# Patient Record
Sex: MALE | Race: WHITE | NOT HISPANIC OR LATINO | Employment: OTHER | URBAN - METROPOLITAN AREA
[De-identification: names, ages, dates, MRNs, and addresses within clinical notes are randomized per-mention and may not be internally consistent; named-entity substitution may affect disease eponyms.]

---

## 2023-02-21 LAB
ERYTHROCYTE DISTRIBUTION WIDTH (RATIO) BY AUTOMATED COUNT: 16 % (ref 11.5–14.5)
ERYTHROCYTE MEAN CORPUSCULAR HEMOGLOBIN CONCENTRATION (G/DL) BY AUTOMATED: 29.7 G/DL (ref 32–36)
ERYTHROCYTE MEAN CORPUSCULAR VOLUME (FL) BY AUTOMATED COUNT: 94 FL (ref 80–100)
ERYTHROCYTES (10*6/UL) IN BLOOD BY AUTOMATED COUNT: 2.93 X10E12/L (ref 4.5–5.9)
HEMATOCRIT (%) IN BLOOD BY AUTOMATED COUNT: 27.6 % (ref 41–52)
HEMOGLOBIN (G/DL) IN BLOOD: 8.2 G/DL (ref 13.5–17.5)
LEUKOCYTES (10*3/UL) IN BLOOD BY AUTOMATED COUNT: 5.6 X10E9/L (ref 4.4–11.3)
NRBC (PER 100 WBCS) BY AUTOMATED COUNT: 0.4 /100 WBC (ref 0–0)
PLATELETS (10*3/UL) IN BLOOD AUTOMATED COUNT: 174 X10E9/L (ref 150–450)

## 2023-06-07 PROBLEM — I10 HYPERTENSION: Status: ACTIVE | Noted: 2023-06-07

## 2023-06-07 PROBLEM — R06.09 DYSPNEA ON EFFORT: Status: ACTIVE | Noted: 2023-06-07

## 2023-06-07 PROBLEM — I67.1 ANEURYSM OF ANTERIOR COMMUNICATING ARTERY (HHS-HCC): Status: ACTIVE | Noted: 2023-06-07

## 2023-06-07 PROBLEM — E11.9 DIABETES MELLITUS (MULTI): Status: ACTIVE | Noted: 2023-06-07

## 2023-06-07 PROBLEM — F41.9 ANXIETY: Status: ACTIVE | Noted: 2023-06-07

## 2023-06-07 PROBLEM — K59.00 CONSTIPATION: Status: ACTIVE | Noted: 2023-06-07

## 2023-06-07 PROBLEM — R06.02 SOB (SHORTNESS OF BREATH) ON EXERTION: Status: ACTIVE | Noted: 2023-06-07

## 2023-06-07 PROBLEM — M16.10 HIP ARTHRITIS: Status: ACTIVE | Noted: 2023-06-07

## 2023-06-07 PROBLEM — M54.12 CERVICAL RADICULITIS: Status: ACTIVE | Noted: 2023-06-07

## 2023-06-07 PROBLEM — M25.511 CHRONIC RIGHT SHOULDER PAIN: Status: ACTIVE | Noted: 2023-06-07

## 2023-06-07 PROBLEM — D64.9 ANEMIA: Status: ACTIVE | Noted: 2023-06-07

## 2023-06-07 PROBLEM — R00.2 PALPITATIONS: Status: ACTIVE | Noted: 2023-06-07

## 2023-06-07 PROBLEM — I48.91 AFIB (MULTI): Status: ACTIVE | Noted: 2023-06-07

## 2023-06-07 PROBLEM — D50.0 IRON DEFICIENCY ANEMIA DUE TO CHRONIC BLOOD LOSS: Status: ACTIVE | Noted: 2023-06-07

## 2023-06-07 PROBLEM — R07.9 CHEST PAIN: Status: ACTIVE | Noted: 2023-06-07

## 2023-06-07 PROBLEM — A41.9 SEPSIS (MULTI): Status: ACTIVE | Noted: 2023-06-07

## 2023-06-07 PROBLEM — G45.9 TIA (TRANSIENT ISCHEMIC ATTACK): Status: ACTIVE | Noted: 2023-06-07

## 2023-06-07 PROBLEM — G89.29 CHRONIC RIGHT SHOULDER PAIN: Status: ACTIVE | Noted: 2023-06-07

## 2023-06-07 PROBLEM — E78.5 HYPERLIPIDEMIA: Status: ACTIVE | Noted: 2023-06-07

## 2023-06-07 PROBLEM — R60.0 LOWER LEG EDEMA: Status: ACTIVE | Noted: 2023-06-07

## 2023-06-07 PROBLEM — K21.9 GERD WITHOUT ESOPHAGITIS: Status: ACTIVE | Noted: 2023-06-07

## 2023-06-07 RX ORDER — METFORMIN HYDROCHLORIDE 500 MG/1
2 TABLET ORAL 2 TIMES DAILY
COMMUNITY
End: 2024-01-29 | Stop reason: SDUPTHER

## 2023-06-07 RX ORDER — LISINOPRIL 20 MG/1
1 TABLET ORAL DAILY
COMMUNITY
Start: 2021-06-09 | End: 2023-10-10

## 2023-06-07 RX ORDER — ASPIRIN 81 MG/1
81 TABLET ORAL DAILY
COMMUNITY
Start: 2022-03-08

## 2023-06-07 RX ORDER — PERPHENAZINE/AMITRIPTYLINE HCL 2 MG-25 MG
TABLET ORAL
COMMUNITY
Start: 2022-01-12 | End: 2023-06-14 | Stop reason: ALTCHOICE

## 2023-06-07 RX ORDER — METFORMIN HYDROCHLORIDE 500 MG/1
2 TABLET ORAL 2 TIMES DAILY
COMMUNITY
End: 2023-06-08 | Stop reason: ALTCHOICE

## 2023-06-07 RX ORDER — INSULIN GLARGINE 100 [IU]/ML
INJECTION, SOLUTION SUBCUTANEOUS
COMMUNITY
Start: 2022-08-16 | End: 2023-06-08 | Stop reason: ALTCHOICE

## 2023-06-07 RX ORDER — FERROUS SULFATE 325(65) MG
1 TABLET ORAL DAILY
COMMUNITY
Start: 2023-02-09 | End: 2023-06-08 | Stop reason: ALTCHOICE

## 2023-06-07 RX ORDER — OMEPRAZOLE 40 MG/1
1 CAPSULE, DELAYED RELEASE ORAL DAILY
COMMUNITY
Start: 2020-03-17 | End: 2023-11-17 | Stop reason: HOSPADM

## 2023-06-07 RX ORDER — TAMSULOSIN HYDROCHLORIDE 0.4 MG/1
1 CAPSULE ORAL 2 TIMES DAILY
COMMUNITY
End: 2023-06-08 | Stop reason: ALTCHOICE

## 2023-06-07 RX ORDER — FERROUS SULFATE 325(65) MG
1 TABLET ORAL DAILY
COMMUNITY
Start: 2023-03-23 | End: 2023-06-08 | Stop reason: ALTCHOICE

## 2023-06-07 RX ORDER — PERPHENAZINE/AMITRIPTYLINE HCL 2 MG-25 MG
TABLET ORAL
COMMUNITY
Start: 2022-01-12 | End: 2023-06-08 | Stop reason: ALTCHOICE

## 2023-06-07 RX ORDER — TAMSULOSIN HYDROCHLORIDE 0.4 MG/1
1 CAPSULE ORAL 2 TIMES DAILY
COMMUNITY
End: 2023-10-17 | Stop reason: SDUPTHER

## 2023-06-07 RX ORDER — SOLIFENACIN SUCCINATE 5 MG/1
TABLET, FILM COATED ORAL
COMMUNITY
Start: 2021-06-27 | End: 2023-06-08 | Stop reason: ALTCHOICE

## 2023-06-07 RX ORDER — INSULIN GLARGINE 100 [IU]/ML
INJECTION, SOLUTION SUBCUTANEOUS
COMMUNITY
Start: 2023-04-21 | End: 2023-06-14 | Stop reason: DRUGHIGH

## 2023-06-07 RX ORDER — ATORVASTATIN CALCIUM 40 MG/1
1 TABLET, FILM COATED ORAL DAILY
COMMUNITY
End: 2024-01-29 | Stop reason: SDUPTHER

## 2023-06-07 RX ORDER — METOPROLOL SUCCINATE 50 MG/1
1 TABLET, EXTENDED RELEASE ORAL DAILY
COMMUNITY
Start: 2020-03-17 | End: 2023-11-17 | Stop reason: HOSPADM

## 2023-06-07 RX ORDER — INSULIN GLARGINE 100 [IU]/ML
INJECTION, SOLUTION SUBCUTANEOUS
COMMUNITY
Start: 2022-08-16 | End: 2023-06-14 | Stop reason: DRUGHIGH

## 2023-06-07 RX ORDER — FINASTERIDE 5 MG/1
1 TABLET, FILM COATED ORAL DAILY
COMMUNITY
End: 2023-06-08 | Stop reason: ALTCHOICE

## 2023-06-07 RX ORDER — FERROUS SULFATE 325(65) MG
1 TABLET, DELAYED RELEASE (ENTERIC COATED) ORAL DAILY
COMMUNITY
Start: 2023-04-19 | End: 2024-01-17 | Stop reason: SDUPTHER

## 2023-06-07 RX ORDER — OMEPRAZOLE 20 MG/1
20 CAPSULE, DELAYED RELEASE ORAL
COMMUNITY
Start: 2022-12-11 | End: 2023-06-08 | Stop reason: ALTCHOICE

## 2023-06-07 RX ORDER — METOPROLOL SUCCINATE 50 MG/1
1 TABLET, EXTENDED RELEASE ORAL DAILY
COMMUNITY
Start: 2020-03-17 | End: 2023-06-08 | Stop reason: ALTCHOICE

## 2023-06-07 RX ORDER — FINASTERIDE 5 MG/1
1 TABLET, FILM COATED ORAL DAILY
COMMUNITY
End: 2023-06-14 | Stop reason: SDUPTHER

## 2023-06-07 RX ORDER — OMEPRAZOLE 40 MG/1
1 CAPSULE, DELAYED RELEASE ORAL DAILY
COMMUNITY
Start: 2020-03-17 | End: 2023-06-08 | Stop reason: ALTCHOICE

## 2023-06-07 RX ORDER — AMLODIPINE BESYLATE 5 MG/1
1 TABLET ORAL DAILY
COMMUNITY
End: 2023-06-14 | Stop reason: ALTCHOICE

## 2023-06-08 ENCOUNTER — OFFICE VISIT (OUTPATIENT)
Dept: PRIMARY CARE | Facility: CLINIC | Age: 83
End: 2023-06-08
Payer: MEDICARE

## 2023-06-08 ENCOUNTER — LAB (OUTPATIENT)
Dept: LAB | Facility: LAB | Age: 83
End: 2023-06-08
Payer: MEDICARE

## 2023-06-08 VITALS
BODY MASS INDEX: 25.2 KG/M2 | HEIGHT: 71 IN | DIASTOLIC BLOOD PRESSURE: 52 MMHG | SYSTOLIC BLOOD PRESSURE: 127 MMHG | TEMPERATURE: 97.8 F | WEIGHT: 180 LBS | HEART RATE: 55 BPM

## 2023-06-08 DIAGNOSIS — K21.9 GERD WITHOUT ESOPHAGITIS: ICD-10-CM

## 2023-06-08 DIAGNOSIS — G45.9 TIA (TRANSIENT ISCHEMIC ATTACK): ICD-10-CM

## 2023-06-08 DIAGNOSIS — I48.91 ATRIAL FIBRILLATION, UNSPECIFIED TYPE (MULTI): ICD-10-CM

## 2023-06-08 DIAGNOSIS — I10 HYPERTENSION, UNSPECIFIED TYPE: ICD-10-CM

## 2023-06-08 DIAGNOSIS — E11.42 TYPE 2 DIABETES MELLITUS WITH DIABETIC POLYNEUROPATHY, WITH LONG-TERM CURRENT USE OF INSULIN (MULTI): ICD-10-CM

## 2023-06-08 DIAGNOSIS — Z00.00 MEDICARE ANNUAL WELLNESS VISIT, SUBSEQUENT: ICD-10-CM

## 2023-06-08 DIAGNOSIS — E11.42 TYPE 2 DIABETES MELLITUS WITH DIABETIC POLYNEUROPATHY, WITH LONG-TERM CURRENT USE OF INSULIN (MULTI): Primary | ICD-10-CM

## 2023-06-08 DIAGNOSIS — D50.0 IRON DEFICIENCY ANEMIA DUE TO CHRONIC BLOOD LOSS: ICD-10-CM

## 2023-06-08 DIAGNOSIS — M25.551 RIGHT HIP PAIN: ICD-10-CM

## 2023-06-08 DIAGNOSIS — R42 DYSEQUILIBRIUM: ICD-10-CM

## 2023-06-08 DIAGNOSIS — Z79.4 TYPE 2 DIABETES MELLITUS WITH DIABETIC POLYNEUROPATHY, WITH LONG-TERM CURRENT USE OF INSULIN (MULTI): ICD-10-CM

## 2023-06-08 DIAGNOSIS — E78.5 HYPERLIPIDEMIA, UNSPECIFIED HYPERLIPIDEMIA TYPE: ICD-10-CM

## 2023-06-08 DIAGNOSIS — Z79.4 TYPE 2 DIABETES MELLITUS WITH DIABETIC POLYNEUROPATHY, WITH LONG-TERM CURRENT USE OF INSULIN (MULTI): Primary | ICD-10-CM

## 2023-06-08 LAB
ALANINE AMINOTRANSFERASE (SGPT) (U/L) IN SER/PLAS: 15 U/L (ref 10–52)
ALBUMIN (G/DL) IN SER/PLAS: 4.5 G/DL (ref 3.4–5)
ALKALINE PHOSPHATASE (U/L) IN SER/PLAS: 51 U/L (ref 33–136)
ANION GAP IN SER/PLAS: 19 MMOL/L (ref 10–20)
ASPARTATE AMINOTRANSFERASE (SGOT) (U/L) IN SER/PLAS: 22 U/L (ref 9–39)
BILIRUBIN TOTAL (MG/DL) IN SER/PLAS: 1 MG/DL (ref 0–1.2)
CALCIUM (MG/DL) IN SER/PLAS: 10.4 MG/DL (ref 8.6–10.6)
CARBON DIOXIDE, TOTAL (MMOL/L) IN SER/PLAS: 26 MMOL/L (ref 21–32)
CHLORIDE (MMOL/L) IN SER/PLAS: 103 MMOL/L (ref 98–107)
CREATININE (MG/DL) IN SER/PLAS: 1.02 MG/DL (ref 0.5–1.3)
ERYTHROCYTE DISTRIBUTION WIDTH (RATIO) BY AUTOMATED COUNT: 17.3 % (ref 11.5–14.5)
ERYTHROCYTE MEAN CORPUSCULAR HEMOGLOBIN CONCENTRATION (G/DL) BY AUTOMATED: 33.2 G/DL (ref 32–36)
ERYTHROCYTE MEAN CORPUSCULAR VOLUME (FL) BY AUTOMATED COUNT: 98 FL (ref 80–100)
ERYTHROCYTES (10*6/UL) IN BLOOD BY AUTOMATED COUNT: 4.02 X10E12/L (ref 4.5–5.9)
GFR MALE: 73 ML/MIN/1.73M2
GLUCOSE (MG/DL) IN SER/PLAS: 125 MG/DL (ref 74–99)
HEMATOCRIT (%) IN BLOOD BY AUTOMATED COUNT: 39.4 % (ref 41–52)
HEMOGLOBIN (G/DL) IN BLOOD: 13.1 G/DL (ref 13.5–17.5)
LEUKOCYTES (10*3/UL) IN BLOOD BY AUTOMATED COUNT: 9.5 X10E9/L (ref 4.4–11.3)
NRBC (PER 100 WBCS) BY AUTOMATED COUNT: 0 /100 WBC (ref 0–0)
PLATELETS (10*3/UL) IN BLOOD AUTOMATED COUNT: 143 X10E9/L (ref 150–450)
POTASSIUM (MMOL/L) IN SER/PLAS: 5.5 MMOL/L (ref 3.5–5.3)
PROTEIN TOTAL: 7.1 G/DL (ref 6.4–8.2)
SODIUM (MMOL/L) IN SER/PLAS: 142 MMOL/L (ref 136–145)
UREA NITROGEN (MG/DL) IN SER/PLAS: 24 MG/DL (ref 6–23)

## 2023-06-08 PROCEDURE — 99204 OFFICE O/P NEW MOD 45 MIN: CPT | Performed by: STUDENT IN AN ORGANIZED HEALTH CARE EDUCATION/TRAINING PROGRAM

## 2023-06-08 PROCEDURE — 1170F FXNL STATUS ASSESSED: CPT | Performed by: STUDENT IN AN ORGANIZED HEALTH CARE EDUCATION/TRAINING PROGRAM

## 2023-06-08 PROCEDURE — 1160F RVW MEDS BY RX/DR IN RCRD: CPT | Performed by: STUDENT IN AN ORGANIZED HEALTH CARE EDUCATION/TRAINING PROGRAM

## 2023-06-08 PROCEDURE — 1159F MED LIST DOCD IN RCRD: CPT | Performed by: STUDENT IN AN ORGANIZED HEALTH CARE EDUCATION/TRAINING PROGRAM

## 2023-06-08 PROCEDURE — 1036F TOBACCO NON-USER: CPT | Performed by: STUDENT IN AN ORGANIZED HEALTH CARE EDUCATION/TRAINING PROGRAM

## 2023-06-08 PROCEDURE — 85027 COMPLETE CBC AUTOMATED: CPT

## 2023-06-08 PROCEDURE — 80053 COMPREHEN METABOLIC PANEL: CPT

## 2023-06-08 PROCEDURE — 3078F DIAST BP <80 MM HG: CPT | Performed by: STUDENT IN AN ORGANIZED HEALTH CARE EDUCATION/TRAINING PROGRAM

## 2023-06-08 PROCEDURE — 3074F SYST BP LT 130 MM HG: CPT | Performed by: STUDENT IN AN ORGANIZED HEALTH CARE EDUCATION/TRAINING PROGRAM

## 2023-06-08 PROCEDURE — 36415 COLL VENOUS BLD VENIPUNCTURE: CPT

## 2023-06-08 PROCEDURE — G0439 PPPS, SUBSEQ VISIT: HCPCS | Performed by: STUDENT IN AN ORGANIZED HEALTH CARE EDUCATION/TRAINING PROGRAM

## 2023-06-08 ASSESSMENT — PATIENT HEALTH QUESTIONNAIRE - PHQ9
1. LITTLE INTEREST OR PLEASURE IN DOING THINGS: NOT AT ALL
2. FEELING DOWN, DEPRESSED OR HOPELESS: NOT AT ALL
SUM OF ALL RESPONSES TO PHQ9 QUESTIONS 1 AND 2: 0

## 2023-06-08 ASSESSMENT — ACTIVITIES OF DAILY LIVING (ADL)
DOING_HOUSEWORK: NEEDS ASSISTANCE
MANAGING_FINANCES: NEEDS ASSISTANCE
BATHING: INDEPENDENT
DRESSING: INDEPENDENT
GROCERY_SHOPPING: NEEDS ASSISTANCE
TAKING_MEDICATION: INDEPENDENT
BATHING: INDEPENDENT
DRESSING: INDEPENDENT

## 2023-06-08 ASSESSMENT — ENCOUNTER SYMPTOMS
OCCASIONAL FEELINGS OF UNSTEADINESS: 1
DEPRESSION: 0
LOSS OF SENSATION IN FEET: 0

## 2023-06-08 NOTE — PROGRESS NOTES
"Subjective   Reason for Visit: Luis Greene is an 83 y.o. male here for a Medicare Wellness visit.          Reviewed all medications by prescribing practitioner or clinical pharmacist (such as prescriptions, OTCs, herbal therapies and supplements) and documented in the medical record.    [facility]    Patient Care Team:  Christopher D'Amico, DO as PCP - General (Family Medicine)     Review of Systems    Objective   Vitals:  /52   Pulse 55   Temp 36.6 °C (97.8 °F)   Ht 1.803 m (5' 11\")   Wt 81.6 kg (180 lb)   PF 95 L/min   BMI 25.10 kg/m²       Physical Exam    Assessment/Plan   Problem List Items Addressed This Visit    None           HPI: 83-year-old male presenting to establish care, for follow-up on chronic care, Medicare annual wellness exam.    CAD with CABG in 2011, A-fib, HTN, HLD  Stable, tolerating current regimen well, following with cardiology.  Currently asymptomatic.  Has a loop recorder implant.    DMII  Currently on basal insulin, recently increased dosing, last A1c above 9.    GI bleed history  Approximately 2 months ago, was discontinued on Xarelto.  Has been stable since.  Following with GI, has had upper and lower scopes and capsule endoscopy, no source of bleeding found per patient.    Diabetic neuropathy, disequilibrium  Patient admits to significant loss of sensation in his lower extremities bilaterally.  Is having difficulty with balance.  Has not seen podiatry.  Does not get diabetic shoes.    12 point ROS reviewed and negative other than as stated in HPI      General: Alert, oriented, pleasant, in no acute distress  HEENT:      Head: normocephalic, atraumatic;      eyes: EOMI, no scleral icterus;   Neck: soft, supple, non-tender, no masses appreciated  CV: Heart with regular rate and rhythm, normal S1/S2, no murmurs  Lungs: CTAB without wheezing, rhonchi or rales; good respiratory effort, no increased work of breathing  Extremities: no edema, no cyanosis  Neuro: Cranial nerves grossly " intact; alert and oriented, ambulating with cane  Psych: Appropriate mood and affect    1. HM  -CBC, CMP, other labs recent and reviewed  -Vaccines:       Flu: out of season      Shingrix: Recommended, advised to go to local pharmacy      Pneumococcal: UTD      Tdap: 2014  -Colonoscopy: No longer screening    2.  CAD with remote CABG, 2021, A-fib 3.  HTN 4.  HLD 5.  History of TIA  - Follows with cardiology and neurology  - Blood pressure at goal in office  - Continue amlodipine 5 mg daily, lisinopril 20 mg daily, metoprolol succinate 50 mg daily  -Continue aspirin 81 mg daily, atorvastatin 40 mg daily    6.  DMII  - Currently on Lantus 20 units in the morning, 5 units in the evening?,  Metformin 1000 mg twice daily, Januvia 50 mg daily  -No recent ophthalmology exam, referral ordered  - Never established with podiatry, diabetic foot exam positive in office, referral ordered  -Most recent A1c above 9 2 months ago  - Refer to [facility] pharmacist for hopeful initiation of GLP-1, discontinuation of DPP 4, possibly SGLT2 given comorbidities, and hopeful decrease in insulin    7.  History of GI bleed  - Repeat CBC  - Continue with GI, and upcoming hematology consultation as scheduled    8.  Diabetic neuropathy 9.  Disequilibrium  - Neurological PT, podiatry, can continue to follow with his neurologist, extensive education given in office    F/U 3 months or sooner if indicated    Chris D'Amico, DO

## 2023-06-09 ENCOUNTER — APPOINTMENT (OUTPATIENT)
Dept: PRIMARY CARE | Facility: CLINIC | Age: 83
End: 2023-06-09
Payer: MEDICARE

## 2023-06-09 DIAGNOSIS — E87.5 HYPERKALEMIA: Primary | ICD-10-CM

## 2023-06-09 NOTE — RESULT ENCOUNTER NOTE
Hemoglobin continuing to improve over past 3 months, near normal.    Still slightly high potassium, but improved from last lab.  This can sometimes be seen with lisinopril, especially if not on diuretic.  We should repeat lab next week, order is in.      If continuing to have elevated potassium, I would advise calling cardiology to see if medication switching is reasonable, as elevated potassium can be dangerous, although current levels should not be emergent concern.    Remaining labs unremarkable.

## 2023-06-14 ENCOUNTER — TELEMEDICINE (OUTPATIENT)
Dept: PHARMACY | Facility: HOSPITAL | Age: 83
End: 2023-06-14
Payer: MEDICARE

## 2023-06-14 DIAGNOSIS — E11.42 TYPE 2 DIABETES MELLITUS WITH DIABETIC POLYNEUROPATHY, WITH LONG-TERM CURRENT USE OF INSULIN (MULTI): ICD-10-CM

## 2023-06-14 DIAGNOSIS — Z79.4 TYPE 2 DIABETES MELLITUS WITHOUT COMPLICATION, WITH LONG-TERM CURRENT USE OF INSULIN (MULTI): Primary | ICD-10-CM

## 2023-06-14 DIAGNOSIS — R60.0 LOWER LEG EDEMA: ICD-10-CM

## 2023-06-14 DIAGNOSIS — E11.9 TYPE 2 DIABETES MELLITUS WITHOUT COMPLICATION, WITH LONG-TERM CURRENT USE OF INSULIN (MULTI): Primary | ICD-10-CM

## 2023-06-14 DIAGNOSIS — N40.0 BENIGN PROSTATIC HYPERPLASIA WITHOUT LOWER URINARY TRACT SYMPTOMS: ICD-10-CM

## 2023-06-14 DIAGNOSIS — I10 HYPERTENSION, UNSPECIFIED TYPE: ICD-10-CM

## 2023-06-14 DIAGNOSIS — Z79.4 TYPE 2 DIABETES MELLITUS WITH DIABETIC POLYNEUROPATHY, WITH LONG-TERM CURRENT USE OF INSULIN (MULTI): ICD-10-CM

## 2023-06-14 RX ORDER — INSULIN ASPART 100 [IU]/ML
5 INJECTION, SOLUTION INTRAVENOUS; SUBCUTANEOUS DAILY
Qty: 3 ML | Refills: 12
Start: 2023-06-14 | End: 2023-06-26 | Stop reason: ALTCHOICE

## 2023-06-14 RX ORDER — OXYBUTYNIN CHLORIDE 5 MG/1
5 TABLET, EXTENDED RELEASE ORAL DAILY
Qty: 30 TABLET | Refills: 11 | Status: ON HOLD
Start: 2023-06-14 | End: 2023-11-10 | Stop reason: SINTOL

## 2023-06-14 RX ORDER — INSULIN GLARGINE 100 [IU]/ML
20 INJECTION, SOLUTION SUBCUTANEOUS NIGHTLY
Qty: 10 ML | Refills: 12
Start: 2023-06-14 | End: 2023-06-26 | Stop reason: ALTCHOICE

## 2023-06-14 RX ORDER — FINASTERIDE 5 MG/1
5 TABLET, FILM COATED ORAL DAILY
Qty: 30 TABLET | Refills: 5 | Status: SHIPPED | OUTPATIENT
Start: 2023-06-14 | End: 2023-10-17 | Stop reason: SDUPTHER

## 2023-06-14 RX ORDER — FUROSEMIDE 40 MG/1
40 TABLET ORAL DAILY
Qty: 30 TABLET | Refills: 11
Start: 2023-06-14 | End: 2024-03-05 | Stop reason: SDUPTHER

## 2023-06-14 NOTE — PROGRESS NOTES
Pharmacist Clinic: Diabetes Management  Luis Greene is a 83 y.o. male was referred to Clinical Pharmacy Team for diabetes management.     Referring Provider: Christopher D'Amico, DO     HISTORY OF PRESENT ILLNESS  Patient is a type 2 diabetic, his DM is currently complicated with steroid injections for back pain. In his lifetime he has gotten 4 shots. Currenlty, [facility] [place] [facility] [facility], he believes he has somewhere between 3-6 months left on this program. Last A1c came back elevated, but patient knew this would happen as his insulin was adjusted 2 months prior.     Diet:   - 1-2 snacks throughout the day (apple, popcorn, chips, sandwich)   - Dinner: varies, pasta, soup, take out     LAB REVIEW   Glucose (mg/dL)   Date Value   06/08/2023 125 (H)   03/22/2023 329 (H)   08/22/2022 188 (H)     Hemoglobin A1C (%)   Date Value   04/19/2023 9.7 (H)   02/03/2023 9.6 (H)   08/22/2022 8.3 (A)   12/20/2021 7.5 (H)     Bicarbonate (mmol/L)   Date Value   06/08/2023 26   03/22/2023 24   08/22/2022 24     Urea Nitrogen (mg/dL)   Date Value   06/08/2023 24 (H)   03/22/2023 21   08/22/2022 20     Creatinine (mg/dL)   Date Value   06/08/2023 1.02   03/22/2023 1.04   08/22/2022 0.95     Lab Results   Component Value Date    HGBA1C 9.7 (H) 04/19/2023    HGBA1C 9.6 (H) 02/03/2023    HGBA1C 8.3 (A) 08/22/2022     Lab Results   Component Value Date    CHOL 77 08/22/2022    CHOL 101 09/17/2019     Lab Results   Component Value Date    HDL 29.5 (A) 08/22/2022    HDL 33.5 (A) 09/17/2019     Lab Results   Component Value Date    TRIG 53 08/22/2022    TRIG 78 09/17/2019       DIABETES ASSESSMENT    CURRENT PHARMACOTHERAPY  - metformin 500 mg 2 tabs by mouth twice daily   - januvia 50 mg by mouth once daily   - lantus 20 units under the skin once daily (evening)   - novolog 5 units under the skin once daily (with dinner)    SECONDARY PREVENTION  - Statin? Yes  - ACE-I/ARB? Yes    SMBG  MEASUREMENTS:  - Fastin's     Patient does not report symptoms of hypoglycemia. Patient denies dizziness, jitteriness, and sweating.  Patient does not report symptoms of hyperglycemia. Patient denies excessive thirst, fatigue, and polyuria.    DISCUSSION:   - Had long discussion with patient and his daughter about his t2dm and medication regimen.   - Discussed goals with medications, how we would like him to be on 1 insulin (not 2), discussed how he doesn't eat enough throughout the day to support his current insulin regimen.   - Discussed how we will use his blood glucose readings to make medication changes for the patient. Discussed importance of testing in the AM and before dinner occasionally.   - Patient does not qualify for  PAP, he and his wife bring in approx $7,000/month.     RECOMMENDATIONS/PLAN  1. Patients diabetes is poorly controlled with most recent A1c of 9.7% (goal < 7 %).   - Continue all meds under the continuation of care with the referring provider and clinical pharmacy team.  - Test your blood sugars every morning and occasionally before dinner, we will use these readings to make medication changes.     2. Future considerations:  - Continue metformin   - Change basal insulin to soliqua for GLP1 benefits   - Discontinue bolus insulin  - Discontinue januvia     Clinical Pharmacist follow up: one week    Thank you,  Leeann Page, PharmD    Verbal consent to manage patient's drug therapy was obtained from the patient. They were informed they may decline to participate or withdraw from participation in pharmacy services at any time.

## 2023-06-19 ENCOUNTER — TELEMEDICINE (OUTPATIENT)
Dept: PHARMACY | Facility: HOSPITAL | Age: 83
End: 2023-06-19
Payer: MEDICARE

## 2023-06-19 DIAGNOSIS — Z79.4 TYPE 2 DIABETES MELLITUS WITHOUT COMPLICATION, WITH LONG-TERM CURRENT USE OF INSULIN (MULTI): Primary | ICD-10-CM

## 2023-06-19 DIAGNOSIS — E11.9 TYPE 2 DIABETES MELLITUS WITHOUT COMPLICATION, WITH LONG-TERM CURRENT USE OF INSULIN (MULTI): Primary | ICD-10-CM

## 2023-06-19 NOTE — PROGRESS NOTES
Pharmacist Clinic: Diabetes Management  Luis Greene is a 83 y.o. male was referred to Clinical Pharmacy Team for diabetes management. At last visit, no medication changes were made.     Referring Provider: Christopher D'Amico,      LAB REVIEW   Glucose (mg/dL)   Date Value   2023 125 (H)   2023 329 (H)   2022 188 (H)     Hemoglobin A1C (%)   Date Value   2023 9.7 (H)   2023 9.6 (H)   2022 8.3 (A)   2021 7.5 (H)     Bicarbonate (mmol/L)   Date Value   2023 26   2023 24   2022 24     Urea Nitrogen (mg/dL)   Date Value   2023 24 (H)   2023 21   2022 20     Creatinine (mg/dL)   Date Value   2023 1.02   2023 1.04   2022 0.95     Lab Results   Component Value Date    HGBA1C 9.7 (H) 2023    HGBA1C 9.6 (H) 2023    HGBA1C 8.3 (A) 2022     Lab Results   Component Value Date    CHOL 77 2022    CHOL 101 2019     Lab Results   Component Value Date    HDL 29.5 (A) 2022    HDL 33.5 (A) 2019     Lab Results   Component Value Date    TRIG 53 2022    TRIG 78 2019       DIABETES ASSESSMENT    CURRENT PHARMACOTHERAPY  - metformin 500 mg 2 tabs by mouth twice daily   - januvia 50 mg by mouth once daily   - lantus 20 units under the skin once daily (evening)   - novolog 5 units under the skin once daily (with dinner)    SECONDARY PREVENTION  - Statin? Yes  - ACE-I/ARB? Yes    SMBG MEASUREMENTS:  - Fastin-125, throughout the day his numbers fluctuate significantly      DISCUSSION:   - Patient checks his sugars throughout the day, there is minimal consistency with how/when he checks   - Discussed having him check and write down his number, the time he checks, and how long ago he ate   - Deferring med changes at this time until we have better sugar readings    RECOMMENDATIONS/PLAN  1. Patients diabetes is poorly controlled with most recent A1c of 9.7% (goal < 7 %).   - Continue all  meds under the continuation of care with the referring provider and clinical pharmacy team.  - Test your blood sugars every morning and occasionally before dinner, write down the number you get from the reading and the time you test.     2. Future considerations:  - Continue metformin   - Change basal insulin to soliqua for GLP1 benefits   - Discontinue bolus insulin  - Discontinue januvia     Clinical Pharmacist follow up: one week    Thank you,  Leeann Page, PharmD    Verbal consent to manage patient's drug therapy was obtained from the patient. They were informed they may decline to participate or withdraw from participation in pharmacy services at any time.

## 2023-06-22 ENCOUNTER — LAB (OUTPATIENT)
Dept: LAB | Facility: LAB | Age: 83
End: 2023-06-22
Payer: MEDICARE

## 2023-06-22 ENCOUNTER — OFFICE VISIT (OUTPATIENT)
Dept: PRIMARY CARE | Facility: CLINIC | Age: 83
End: 2023-06-22
Payer: MEDICARE

## 2023-06-22 VITALS
HEART RATE: 69 BPM | OXYGEN SATURATION: 97 % | SYSTOLIC BLOOD PRESSURE: 94 MMHG | BODY MASS INDEX: 24.27 KG/M2 | TEMPERATURE: 97.7 F | WEIGHT: 174 LBS | DIASTOLIC BLOOD PRESSURE: 55 MMHG

## 2023-06-22 DIAGNOSIS — E87.5 HYPERKALEMIA: ICD-10-CM

## 2023-06-22 DIAGNOSIS — N39.0 URINARY TRACT INFECTION WITHOUT HEMATURIA, SITE UNSPECIFIED: ICD-10-CM

## 2023-06-22 DIAGNOSIS — E11.9 TYPE 2 DIABETES MELLITUS WITHOUT COMPLICATION, WITHOUT LONG-TERM CURRENT USE OF INSULIN (MULTI): ICD-10-CM

## 2023-06-22 DIAGNOSIS — I10 HYPERTENSION, UNSPECIFIED TYPE: Primary | ICD-10-CM

## 2023-06-22 DIAGNOSIS — I10 HYPERTENSION, UNSPECIFIED TYPE: ICD-10-CM

## 2023-06-22 LAB
ALANINE AMINOTRANSFERASE (SGPT) (U/L) IN SER/PLAS: 19 U/L (ref 10–52)
ALBUMIN (G/DL) IN SER/PLAS: 4.3 G/DL (ref 3.4–5)
ALKALINE PHOSPHATASE (U/L) IN SER/PLAS: 51 U/L (ref 33–136)
ANION GAP IN SER/PLAS: 20 MMOL/L (ref 10–20)
APPEARANCE, URINE: CLEAR
ASPARTATE AMINOTRANSFERASE (SGOT) (U/L) IN SER/PLAS: 22 U/L (ref 9–39)
BILIRUBIN TOTAL (MG/DL) IN SER/PLAS: 1.1 MG/DL (ref 0–1.2)
BILIRUBIN, URINE: NEGATIVE
BLOOD, URINE: NEGATIVE
CALCIUM (MG/DL) IN SER/PLAS: 10.1 MG/DL (ref 8.6–10.6)
CARBON DIOXIDE, TOTAL (MMOL/L) IN SER/PLAS: 25 MMOL/L (ref 21–32)
CHLORIDE (MMOL/L) IN SER/PLAS: 98 MMOL/L (ref 98–107)
COLOR, URINE: YELLOW
CREATININE (MG/DL) IN SER/PLAS: 1.16 MG/DL (ref 0.5–1.3)
GFR MALE: 63 ML/MIN/1.73M2
GLUCOSE (MG/DL) IN SER/PLAS: 226 MG/DL (ref 74–99)
GLUCOSE, URINE: NEGATIVE MG/DL
HYALINE CASTS, URINE: ABNORMAL /LPF
KETONES, URINE: NEGATIVE MG/DL
LEUKOCYTE ESTERASE, URINE: ABNORMAL
NITRITE, URINE: NEGATIVE
PH, URINE: 5 (ref 5–8)
POTASSIUM (MMOL/L) IN SER/PLAS: 6.1 MMOL/L (ref 3.5–5.3)
PROTEIN TOTAL: 6.7 G/DL (ref 6.4–8.2)
PROTEIN, URINE: NEGATIVE MG/DL
RBC, URINE: 1 /HPF (ref 0–5)
SODIUM (MMOL/L) IN SER/PLAS: 137 MMOL/L (ref 136–145)
SPECIFIC GRAVITY, URINE: 1.01 (ref 1–1.03)
SQUAMOUS EPITHELIAL CELLS, URINE: 1 /HPF
UREA NITROGEN (MG/DL) IN SER/PLAS: 31 MG/DL (ref 6–23)
UROBILINOGEN, URINE: <2 MG/DL (ref 0–1.9)
WBC, URINE: 4 /HPF (ref 0–5)

## 2023-06-22 PROCEDURE — 80053 COMPREHEN METABOLIC PANEL: CPT

## 2023-06-22 PROCEDURE — 3078F DIAST BP <80 MM HG: CPT | Performed by: STUDENT IN AN ORGANIZED HEALTH CARE EDUCATION/TRAINING PROGRAM

## 2023-06-22 PROCEDURE — 99214 OFFICE O/P EST MOD 30 MIN: CPT | Performed by: STUDENT IN AN ORGANIZED HEALTH CARE EDUCATION/TRAINING PROGRAM

## 2023-06-22 PROCEDURE — 3074F SYST BP LT 130 MM HG: CPT | Performed by: STUDENT IN AN ORGANIZED HEALTH CARE EDUCATION/TRAINING PROGRAM

## 2023-06-22 PROCEDURE — 1036F TOBACCO NON-USER: CPT | Performed by: STUDENT IN AN ORGANIZED HEALTH CARE EDUCATION/TRAINING PROGRAM

## 2023-06-22 PROCEDURE — 36415 COLL VENOUS BLD VENIPUNCTURE: CPT

## 2023-06-22 PROCEDURE — 81001 URINALYSIS AUTO W/SCOPE: CPT

## 2023-06-22 PROCEDURE — 1159F MED LIST DOCD IN RCRD: CPT | Performed by: STUDENT IN AN ORGANIZED HEALTH CARE EDUCATION/TRAINING PROGRAM

## 2023-06-22 PROCEDURE — 1160F RVW MEDS BY RX/DR IN RCRD: CPT | Performed by: STUDENT IN AN ORGANIZED HEALTH CARE EDUCATION/TRAINING PROGRAM

## 2023-06-22 ASSESSMENT — COLUMBIA-SUICIDE SEVERITY RATING SCALE - C-SSRS
2. HAVE YOU ACTUALLY HAD ANY THOUGHTS OF KILLING YOURSELF?: NO
1. IN THE PAST MONTH, HAVE YOU WISHED YOU WERE DEAD OR WISHED YOU COULD GO TO SLEEP AND NOT WAKE UP?: NO
6. HAVE YOU EVER DONE ANYTHING, STARTED TO DO ANYTHING, OR PREPARED TO DO ANYTHING TO END YOUR LIFE?: NO

## 2023-06-22 ASSESSMENT — ENCOUNTER SYMPTOMS
OCCASIONAL FEELINGS OF UNSTEADINESS: 0
DEPRESSION: 0
LOSS OF SENSATION IN FEET: 0

## 2023-06-22 ASSESSMENT — PATIENT HEALTH QUESTIONNAIRE - PHQ9
2. FEELING DOWN, DEPRESSED OR HOPELESS: NOT AT ALL
SUM OF ALL RESPONSES TO PHQ9 QUESTIONS 1 AND 2: 0
1. LITTLE INTEREST OR PLEASURE IN DOING THINGS: NOT AT ALL

## 2023-06-22 NOTE — PROGRESS NOTES
83-year-old male presenting for ED follow-up and follow-up on multiple concerns:    Went to the ED for sugar at 300, altered mental status, increased urinary frequency.  Given ceftriaxone shot, discharged with Keflex.  Still having increased urinary frequency, mental status seems to have improved.  Blood sugar today in the 130s.  Feeling relatively well.    12 point ROS reviewed and negative other than as stated in HPI    General: Alert, oriented, pleasant, in no acute distress  HEENT:      Head: normocephalic, atraumatic;      eyes: EOMI, no scleral icterus;   CV: Sinus arrhythmia, no murmurs appreciated  Lungs: CTAB without wheezing, rhonchi or rales; good respiratory effort, no increased work of breathing  Extremities: no edema, no cyanosis  Neuro: Cranial nerves grossly intact; alert and oriented  Psych: Appropriate mood and affect    1. DMII  -We will continue with [facility] pharmacist, should be on Soliqua and discontinuing insulin regimen, has not been done yet    2. UTI   -Continue with Keflex as prescribed, monitor symptoms, likely urinary frequency exacerbated by water pill    3. Dehydration  -Technically hypotensive, but asymptomatic, will continue to follow outpatient  -Cut Lasix to 20 mg daily for approximately 5 days with close monitoring, monitor weight and leg swelling and breathing, call lisinopril if not improving  -Follow blood pressure closely, want to see improving over 90/60, given blood pressure logs, and will monitor closely    Follow-up in 3 months, plan to talk to pharmacist on Monday, and reach out with any updates    Christopher D'Amico, DO

## 2023-06-23 ENCOUNTER — LAB (OUTPATIENT)
Dept: LAB | Facility: LAB | Age: 83
End: 2023-06-23
Payer: MEDICARE

## 2023-06-23 DIAGNOSIS — E87.5 HYPERKALEMIA: ICD-10-CM

## 2023-06-23 DIAGNOSIS — E87.5 HYPERKALEMIA: Primary | ICD-10-CM

## 2023-06-23 NOTE — RESULT ENCOUNTER NOTE
Discussed with patient myself, moderate hyperkalemia, patient feels well.  Will have repeat stat BMP done this morning.  Patient to hold lisinopril, and continue with previous dose of furosemide at 40 mg, though we discussed decrease to 20 mg at our appointment.  We will follow closely.  Patient advised if he gets any chest pain or palpitations to go to the emergency department immediately.  Patient continues to report that he feels well.

## 2023-06-24 DIAGNOSIS — E87.5 HYPERKALEMIA: Primary | ICD-10-CM

## 2023-06-24 NOTE — PROGRESS NOTES
I received a call while on-call from the lab 6/24/23. Apparently the patient had a repeat STAT CMP ordered by his PCP (for hyperkalemia) however the lab sarah the wrong tube of blood. It was ultimately cancelled.     I called the patient 6/24/23 11:15AM and LVM that he needs to return to the lab and have repeat levels drawn. I placed a new order for STAT CMP under Dr. D' Amico.

## 2023-06-26 ENCOUNTER — TELEMEDICINE (OUTPATIENT)
Dept: PHARMACY | Facility: HOSPITAL | Age: 83
End: 2023-06-26
Payer: MEDICARE

## 2023-06-26 ENCOUNTER — LAB (OUTPATIENT)
Dept: LAB | Facility: LAB | Age: 83
End: 2023-06-26
Payer: MEDICARE

## 2023-06-26 DIAGNOSIS — E87.5 HYPERKALEMIA: ICD-10-CM

## 2023-06-26 DIAGNOSIS — Z79.4 TYPE 2 DIABETES MELLITUS WITHOUT COMPLICATION, WITH LONG-TERM CURRENT USE OF INSULIN (MULTI): Primary | ICD-10-CM

## 2023-06-26 DIAGNOSIS — E11.9 TYPE 2 DIABETES MELLITUS WITHOUT COMPLICATION, WITH LONG-TERM CURRENT USE OF INSULIN (MULTI): Primary | ICD-10-CM

## 2023-06-26 DIAGNOSIS — E87.5 HYPERKALEMIA: Primary | ICD-10-CM

## 2023-06-26 LAB
ALANINE AMINOTRANSFERASE (SGPT) (U/L) IN SER/PLAS: 25 U/L (ref 10–52)
ALBUMIN (G/DL) IN SER/PLAS: 3.8 G/DL (ref 3.4–5)
ALKALINE PHOSPHATASE (U/L) IN SER/PLAS: 43 U/L (ref 33–136)
ANION GAP IN SER/PLAS: 15 MMOL/L (ref 10–20)
ASPARTATE AMINOTRANSFERASE (SGOT) (U/L) IN SER/PLAS: 26 U/L (ref 9–39)
BILIRUBIN TOTAL (MG/DL) IN SER/PLAS: 0.8 MG/DL (ref 0–1.2)
CALCIUM (MG/DL) IN SER/PLAS: 9.5 MG/DL (ref 8.6–10.6)
CARBON DIOXIDE, TOTAL (MMOL/L) IN SER/PLAS: 27 MMOL/L (ref 21–32)
CHLORIDE (MMOL/L) IN SER/PLAS: 102 MMOL/L (ref 98–107)
CREATININE (MG/DL) IN SER/PLAS: 1.03 MG/DL (ref 0.5–1.3)
GFR MALE: 72 ML/MIN/1.73M2
GLUCOSE (MG/DL) IN SER/PLAS: 133 MG/DL (ref 74–99)
POTASSIUM (MMOL/L) IN SER/PLAS: 5.5 MMOL/L (ref 3.5–5.3)
PROTEIN TOTAL: 6.1 G/DL (ref 6.4–8.2)
SODIUM (MMOL/L) IN SER/PLAS: 138 MMOL/L (ref 136–145)
UREA NITROGEN (MG/DL) IN SER/PLAS: 21 MG/DL (ref 6–23)

## 2023-06-26 PROCEDURE — 80053 COMPREHEN METABOLIC PANEL: CPT

## 2023-06-26 PROCEDURE — 36415 COLL VENOUS BLD VENIPUNCTURE: CPT

## 2023-06-26 RX ORDER — INSULIN GLARGINE AND LIXISENATIDE 100; 33 U/ML; UG/ML
15 INJECTION, SOLUTION SUBCUTANEOUS NIGHTLY
Qty: 15 ML | Refills: 1 | Status: SHIPPED | OUTPATIENT
Start: 2023-06-26 | End: 2023-08-01 | Stop reason: SDUPTHER

## 2023-06-26 NOTE — PROGRESS NOTES
Pharmacist Clinic: Diabetes Management  Luis Greene is a 83 y.o. male was referred to Clinical Pharmacy Team for diabetes management. At last visit, no medication changes were made, patient was confused on the phone and his son took him to get lab work.     Referring Provider: Christopher D'Amico, DO     LAB REVIEW   Glucose (mg/dL)   Date Value   06/26/2023 133 (H)   06/22/2023 226 (H)   06/08/2023 125 (H)     Hemoglobin A1C (%)   Date Value   04/19/2023 9.7 (H)   02/03/2023 9.6 (H)   08/22/2022 8.3 (A)   12/20/2021 7.5 (H)     Bicarbonate (mmol/L)   Date Value   06/26/2023 27   06/22/2023 25   06/08/2023 26     Urea Nitrogen (mg/dL)   Date Value   06/26/2023 21   06/22/2023 31 (H)   06/08/2023 24 (H)     Creatinine (mg/dL)   Date Value   06/26/2023 1.03   06/22/2023 1.16   06/08/2023 1.02     Lab Results   Component Value Date    HGBA1C 9.7 (H) 04/19/2023    HGBA1C 9.6 (H) 02/03/2023    HGBA1C 8.3 (A) 08/22/2022     Lab Results   Component Value Date    CHOL 77 08/22/2022    CHOL 101 09/17/2019     Lab Results   Component Value Date    HDL 29.5 (A) 08/22/2022    HDL 33.5 (A) 09/17/2019     Lab Results   Component Value Date    TRIG 53 08/22/2022    TRIG 78 09/17/2019       DIABETES ASSESSMENT    CURRENT PHARMACOTHERAPY  - metformin 500 mg 2 tabs by mouth twice daily   - januvia 50 mg by mouth once daily   - lantus 20 units under the skin once daily (evening)   - novolog 5 units under the skin once daily (with dinner)    SECONDARY PREVENTION  - Statin? Yes  - ACE-I/ARB? Yes    SMBG MEASUREMENTS:  - numbers are fluctuating, ranging from 100-300     DISCUSSION:   - Discussed soliqua with the patient, discussed it is a stronger version of insulin with a molecule that is known as GLP1   - We will stop all other insulins, soliqua will be your new insulin     RECOMMENDATIONS/PLAN  1. Patients diabetes is poorly controlled with most recent A1c of 9.7% (goal < 7 %).   - Continue all meds under the continuation of care  with the referring provider and clinical pharmacy team.  - Initiate soliqua 15 units under the skin once daily. Titrate based on SMBG reading.   - Discontinue basal/bolus insulin regimen.     Clinical Pharmacist follow up: one week  - [phone]    Thank you,  Leeann Page, PharmD    Verbal consent to manage patient's drug therapy was obtained from the patient. They were informed they may decline to participate or withdraw from participation in pharmacy services at any time.

## 2023-06-26 NOTE — RESULT ENCOUNTER NOTE
Potassium has improved, still slightly high, continue to hold lisinopril, and we will repeat labs and 2 to 3 days.  Please find out how blood pressure readings have been doing.

## 2023-06-30 ENCOUNTER — TELEMEDICINE (OUTPATIENT)
Dept: PHARMACY | Facility: HOSPITAL | Age: 83
End: 2023-06-30
Payer: MEDICARE

## 2023-06-30 DIAGNOSIS — Z79.4 TYPE 2 DIABETES MELLITUS WITHOUT COMPLICATION, WITH LONG-TERM CURRENT USE OF INSULIN (MULTI): Primary | ICD-10-CM

## 2023-06-30 DIAGNOSIS — E11.9 TYPE 2 DIABETES MELLITUS WITHOUT COMPLICATION, WITH LONG-TERM CURRENT USE OF INSULIN (MULTI): Primary | ICD-10-CM

## 2023-06-30 NOTE — PROGRESS NOTES
Pharmacist Clinic: Diabetes Management  Luis Greene is a 83 y.o. male was referred to Clinical Pharmacy Team for diabetes management. At last visit, insulin was changed from lantus 20 units and novolog 5 units to soliqua 15 units.    Referring Provider: Christopher D'Amico, DO     HISTORY OF PRESENT ILLNESS  Patient is a type 2 diabetic, his DM is currently complicated with steroid injections for back pain. In his lifetime he has gotten 4 shots. Currenlty, [facility] [place] [facility], [facility] [place], he believes he has somewhere between 3-6 months left on this program. Last A1c came back elevated, but patient knew this would happen as his insulin was adjusted 2 months prior.      Diet:   - 1-2 snacks throughout the day (apple, popcorn, chips, sandwich)   - Dinner: varies, pasta, soup, take out     LAB REVIEW   Glucose (mg/dL)   Date Value   06/26/2023 133 (H)   06/22/2023 226 (H)   06/08/2023 125 (H)     Hemoglobin A1C (%)   Date Value   04/19/2023 9.7 (H)   02/03/2023 9.6 (H)   08/22/2022 8.3 (A)   12/20/2021 7.5 (H)     Bicarbonate (mmol/L)   Date Value   06/26/2023 27   06/22/2023 25   06/08/2023 26     Urea Nitrogen (mg/dL)   Date Value   06/26/2023 21   06/22/2023 31 (H)   06/08/2023 24 (H)     Creatinine (mg/dL)   Date Value   06/26/2023 1.03   06/22/2023 1.16   06/08/2023 1.02     Lab Results   Component Value Date    HGBA1C 9.7 (H) 04/19/2023    HGBA1C 9.6 (H) 02/03/2023    HGBA1C 8.3 (A) 08/22/2022     Lab Results   Component Value Date    CHOL 77 08/22/2022    CHOL 101 09/17/2019     Lab Results   Component Value Date    HDL 29.5 (A) 08/22/2022    HDL 33.5 (A) 09/17/2019     Lab Results   Component Value Date    TRIG 53 08/22/2022    TRIG 78 09/17/2019       DIABETES ASSESSMENT    CURRENT PHARMACOTHERAPY  - metformin 500 mg 2 tabs by mouth twice daily   - januvia 50 mg by mouth once daily   - soliqua 15 units under the skin once daily    SECONDARY PREVENTION  - Statin? Yes  -  ACE-I/ARB? Yes    SMBG MEASUREMENTS:  - fasting blood glucose numbers since starting soliqua have been around 90    DISCUSSION:   - Discussed blood glucose numbers are at goal (between )   - Discussed we will continue to follow up on these readings to ensure no dosage titration is needed    RECOMMENDATIONS/PLAN  1. Patients diabetes is poorly controlled with most recent A1c of 9.7% (goal < 7 %).   - Continue all meds under the continuation of care with the referring provider and clinical pharmacy team    Clinical Pharmacist follow up: 7/5 at 1 PM to discuss BG readings  - [phone]    Thank you,  Leeann Page, PharmD    Verbal consent to manage patient's drug therapy was obtained from the patient. They were informed they may decline to participate or withdraw from participation in pharmacy services at any time.

## 2023-07-05 ENCOUNTER — TELEMEDICINE (OUTPATIENT)
Dept: PHARMACY | Facility: HOSPITAL | Age: 83
End: 2023-07-05
Payer: MEDICARE

## 2023-07-05 DIAGNOSIS — E11.9 TYPE 2 DIABETES MELLITUS WITHOUT COMPLICATION, WITH LONG-TERM CURRENT USE OF INSULIN (MULTI): Primary | ICD-10-CM

## 2023-07-05 DIAGNOSIS — Z79.4 TYPE 2 DIABETES MELLITUS WITHOUT COMPLICATION, WITH LONG-TERM CURRENT USE OF INSULIN (MULTI): Primary | ICD-10-CM

## 2023-07-05 NOTE — PROGRESS NOTES
Pharmacist Clinic: Diabetes Management  Luis Greene is a 83 y.o. male was referred to Clinical Pharmacy Team for diabetes management. At last visit, no medication changes were made.     Referring Provider: Christopher D'Amico, DO     HISTORY OF PRESENT ILLNESS  Patient is a type 2 diabetic, his DM is currently complicated with steroid injections for back pain. In his lifetime he has gotten 4 shots. Currenlty, he [place] [facility], he is [place]10/month, he believes he has somewhere between 3-6 months left on this program. Last A1c came back elevated, but patient knew this would happen as his insulin was adjusted 2 months prior.      Diet:   - 1-2 snacks throughout the day (apple, popcorn, chips, sandwich)   - Dinner: varies, pasta, soup, take out     LAB REVIEW   Glucose (mg/dL)   Date Value   06/26/2023 133 (H)   06/22/2023 226 (H)   06/08/2023 125 (H)     Hemoglobin A1C (%)   Date Value   04/19/2023 9.7 (H)   02/03/2023 9.6 (H)   08/22/2022 8.3 (A)   12/20/2021 7.5 (H)     Bicarbonate (mmol/L)   Date Value   06/26/2023 27   06/22/2023 25   06/08/2023 26     Urea Nitrogen (mg/dL)   Date Value   06/26/2023 21   06/22/2023 31 (H)   06/08/2023 24 (H)     Creatinine (mg/dL)   Date Value   06/26/2023 1.03   06/22/2023 1.16   06/08/2023 1.02     Lab Results   Component Value Date    HGBA1C 9.7 (H) 04/19/2023    HGBA1C 9.6 (H) 02/03/2023    HGBA1C 8.3 (A) 08/22/2022     Lab Results   Component Value Date    CHOL 77 08/22/2022    CHOL 101 09/17/2019     Lab Results   Component Value Date    HDL 29.5 (A) 08/22/2022    HDL 33.5 (A) 09/17/2019     Lab Results   Component Value Date    TRIG 53 08/22/2022    TRIG 78 09/17/2019       DIABETES ASSESSMENT    CURRENT PHARMACOTHERAPY  - metformin 500 mg 2 tabs by mouth twice daily   - januvia 50 mg by mouth once daily   - soliqua 15 units under the skin once daily    SECONDARY PREVENTION  - Statin? Yes  - ACE-I/ARB? Yes    SMBG MEASUREMENTS:  7/5:  114  7/4: 130  7/3: 89  7/2: 88  7/1: 97  6/30: 178  6/29: 160    DISCUSSION:   - Good job testing your blood sugar, your numbers look very good!   - Our goal range for you is between , you are right around this range  - When your sugars are high in the morning, I encourage you to think about what you ate the night prior   - Patient has a steroid injection scheduled for next Tuesday (7/11), plan to monitor his sugars closely for dose increases as needed     RECOMMENDATIONS/PLAN  1. Patients diabetes is poorly controlled with most recent A1c of 9.7% (goal < 7 %).   - Continue all meds under the continuation of care with the referring provider and clinical pharmacy team    Clinical Pharmacist follow up: 7/13 at 1 PM to discuss BG readings  - [phone]    Thank you,  Leeann Page, PharmD    Verbal consent to manage patient's drug therapy was obtained from the patient. They were informed they may decline to participate or withdraw from participation in pharmacy services at any time.

## 2023-07-13 ENCOUNTER — TELEMEDICINE (OUTPATIENT)
Dept: PHARMACY | Facility: HOSPITAL | Age: 83
End: 2023-07-13
Payer: MEDICARE

## 2023-07-13 DIAGNOSIS — Z79.4 TYPE 2 DIABETES MELLITUS WITHOUT COMPLICATION, WITH LONG-TERM CURRENT USE OF INSULIN (MULTI): Primary | ICD-10-CM

## 2023-07-13 DIAGNOSIS — E11.9 TYPE 2 DIABETES MELLITUS WITHOUT COMPLICATION, WITH LONG-TERM CURRENT USE OF INSULIN (MULTI): Primary | ICD-10-CM

## 2023-07-13 NOTE — PROGRESS NOTES
Pharmacist Clinic: Diabetes Management  Luis Greene is a 83 y.o. male was referred to Clinical Pharmacy Team for diabetes management. At last visit, no medication changes were made.     Referring Provider: Christopher D'Amico, DO     HISTORY OF PRESENT ILLNESS  Patient is a type 2 diabetic, his DM is currently complicated with steroid injections for back pain. In his lifetime he has gotten 4 shots. Currenlty, [facility], he is [place]10/month, he believes he has somewhere between 3-6 months left on this program. Last A1c came back elevated, but patient knew this would happen as his insulin was adjusted 2 months prior.      Diet:   - 1-2 snacks throughout the day (apple, popcorn, chips, sandwich)   - Dinner: varies, pasta, soup, take out     LAB REVIEW   Glucose (mg/dL)   Date Value   06/26/2023 133 (H)   06/22/2023 226 (H)   06/08/2023 125 (H)     Hemoglobin A1C (%)   Date Value   04/19/2023 9.7 (H)   02/03/2023 9.6 (H)   08/22/2022 8.3 (A)   12/20/2021 7.5 (H)     Bicarbonate (mmol/L)   Date Value   06/26/2023 27   06/22/2023 25   06/08/2023 26     Urea Nitrogen (mg/dL)   Date Value   06/26/2023 21   06/22/2023 31 (H)   06/08/2023 24 (H)     Creatinine (mg/dL)   Date Value   06/26/2023 1.03   06/22/2023 1.16   06/08/2023 1.02     Lab Results   Component Value Date    HGBA1C 9.7 (H) 04/19/2023    HGBA1C 9.6 (H) 02/03/2023    HGBA1C 8.3 (A) 08/22/2022     Lab Results   Component Value Date    CHOL 77 08/22/2022    CHOL 101 09/17/2019     Lab Results   Component Value Date    HDL 29.5 (A) 08/22/2022    HDL 33.5 (A) 09/17/2019     Lab Results   Component Value Date    TRIG 53 08/22/2022    TRIG 78 09/17/2019       DIABETES ASSESSMENT    CURRENT PHARMACOTHERAPY  - metformin 500 mg 2 tabs by mouth twice daily   - januvia 50 mg by mouth once daily   - soliqua 15 units under the skin once daily    SECONDARY PREVENTION  - Statin? Yes  - ACE-I/ARB? Yes    SMBG MEASUREMENTS: around  100  7/13: 80  7/12: 144  7/11: 140  7/10: 88  7/9: 127  7/8: 80  7/7: 123  7/6: 113    DISCUSSION:   - Good job testing your blood sugar, your numbers look very good!   - Our goal range for you is between , you are right around this range  - When your sugars are high in the morning, I encourage you to think about what you ate the night prior   - Patient has a steroid injection scheduled for next Tuesday (7/11), plan to monitor his sugars closely for dose increases as needed     RECOMMENDATIONS/PLAN  1. Patients diabetes is poorly controlled with most recent A1c of 9.7% (goal < 7 %).   - Continue all meds under the continuation of care with the referring provider and clinical pharmacy team    Clinical Pharmacist follow up: 7/25 at 2 PM- [phone]    Thank you,  Leeann Page, PharmD    Verbal consent to manage patient's drug therapy was obtained from the patient. They were informed they may decline to participate or withdraw from participation in pharmacy services at any time.

## 2023-07-14 LAB
ALANINE AMINOTRANSFERASE (SGPT) (U/L) IN SER/PLAS: 32 U/L (ref 10–52)
ALBUMIN (G/DL) IN SER/PLAS: 4.2 G/DL (ref 3.4–5)
ALKALINE PHOSPHATASE (U/L) IN SER/PLAS: 42 U/L (ref 33–136)
ANION GAP IN SER/PLAS: 18 MMOL/L (ref 10–20)
ASPARTATE AMINOTRANSFERASE (SGOT) (U/L) IN SER/PLAS: 31 U/L (ref 9–39)
BASOPHILS (10*3/UL) IN BLOOD BY AUTOMATED COUNT: 0.03 X10E9/L (ref 0–0.1)
BASOPHILS/100 LEUKOCYTES IN BLOOD BY AUTOMATED COUNT: 0.4 % (ref 0–2)
BILIRUBIN TOTAL (MG/DL) IN SER/PLAS: 1.1 MG/DL (ref 0–1.2)
CALCIUM (MG/DL) IN SER/PLAS: 9.9 MG/DL (ref 8.6–10.6)
CARBON DIOXIDE, TOTAL (MMOL/L) IN SER/PLAS: 26 MMOL/L (ref 21–32)
CHLORIDE (MMOL/L) IN SER/PLAS: 98 MMOL/L (ref 98–107)
COBALAMIN (VITAMIN B12) (PG/ML) IN SER/PLAS: 1456 PG/ML (ref 211–911)
CREATININE (MG/DL) IN SER/PLAS: 1.2 MG/DL (ref 0.5–1.3)
EOSINOPHILS (10*3/UL) IN BLOOD BY AUTOMATED COUNT: 0.37 X10E9/L (ref 0–0.4)
EOSINOPHILS/100 LEUKOCYTES IN BLOOD BY AUTOMATED COUNT: 4.9 % (ref 0–6)
ERYTHROCYTE DISTRIBUTION WIDTH (RATIO) BY AUTOMATED COUNT: 15.3 % (ref 11.5–14.5)
ERYTHROCYTE MEAN CORPUSCULAR HEMOGLOBIN CONCENTRATION (G/DL) BY AUTOMATED: 34.2 G/DL (ref 32–36)
ERYTHROCYTE MEAN CORPUSCULAR VOLUME (FL) BY AUTOMATED COUNT: 100 FL (ref 80–100)
ERYTHROCYTES (10*6/UL) IN BLOOD BY AUTOMATED COUNT: 3.89 X10E12/L (ref 4.5–5.9)
ESTIMATED AVERAGE GLUCOSE FOR HBA1C: 169 MG/DL
GFR MALE: 60 ML/MIN/1.73M2
GLUCOSE (MG/DL) IN SER/PLAS: 145 MG/DL (ref 74–99)
HEMATOCRIT (%) IN BLOOD BY AUTOMATED COUNT: 38.9 % (ref 41–52)
HEMOGLOBIN (G/DL) IN BLOOD: 13.3 G/DL (ref 13.5–17.5)
HEMOGLOBIN A1C/HEMOGLOBIN TOTAL IN BLOOD: 7.5 %
IMMATURE GRANULOCYTES/100 LEUKOCYTES IN BLOOD BY AUTOMATED COUNT: 1.1 % (ref 0–0.9)
LEUKOCYTES (10*3/UL) IN BLOOD BY AUTOMATED COUNT: 7.5 X10E9/L (ref 4.4–11.3)
LYMPHOCYTES (10*3/UL) IN BLOOD BY AUTOMATED COUNT: 2.42 X10E9/L (ref 0.8–3)
LYMPHOCYTES/100 LEUKOCYTES IN BLOOD BY AUTOMATED COUNT: 32.3 % (ref 13–44)
MONOCYTES (10*3/UL) IN BLOOD BY AUTOMATED COUNT: 0.58 X10E9/L (ref 0.05–0.8)
MONOCYTES/100 LEUKOCYTES IN BLOOD BY AUTOMATED COUNT: 7.7 % (ref 2–10)
NEUTROPHILS (10*3/UL) IN BLOOD BY AUTOMATED COUNT: 4.01 X10E9/L (ref 1.6–5.5)
NEUTROPHILS/100 LEUKOCYTES IN BLOOD BY AUTOMATED COUNT: 53.6 % (ref 40–80)
NRBC (PER 100 WBCS) BY AUTOMATED COUNT: 0 /100 WBC (ref 0–0)
PLATELETS (10*3/UL) IN BLOOD AUTOMATED COUNT: 130 X10E9/L (ref 150–450)
POTASSIUM (MMOL/L) IN SER/PLAS: 4.7 MMOL/L (ref 3.5–5.3)
PROTEIN TOTAL: 6.4 G/DL (ref 6.4–8.2)
SEDIMENTATION RATE, ERYTHROCYTE: <1 MM/H (ref 0–20)
SODIUM (MMOL/L) IN SER/PLAS: 137 MMOL/L (ref 136–145)
THYROTROPIN (MIU/L) IN SER/PLAS BY DETECTION LIMIT <= 0.05 MIU/L: 6.44 MIU/L (ref 0.44–3.98)
UREA NITROGEN (MG/DL) IN SER/PLAS: 28 MG/DL (ref 6–23)

## 2023-07-15 LAB — RPR MONITORING: NONREACTIVE

## 2023-07-20 LAB — VITAMIN B6: 65.4 NMOL/L (ref 20–125)

## 2023-07-23 LAB
ALBUMIN ELP: 4.1 G/DL (ref 3.4–5)
ALPHA 1: 0.2 G/DL (ref 0.2–0.6)
ALPHA 2: 0.7 G/DL (ref 0.4–1.1)
BETA: 0.7 G/DL (ref 0.5–1.2)
GAMMA GLOBULIN: 0.7 G/DL (ref 0.5–1.4)
PATH REVIEW - SERUM IMMUNOFIXATION: NORMAL
PATH REVIEW-SERUM PROTEIN ELECTROPHORESIS: NORMAL
PROTEIN ELECTROPHORESIS INTERPRETATION: NORMAL
PROTEIN TOTAL: 6.4 G/DL (ref 6.4–8.2)
SERUM IMMUNOFIXATION INTERPRETATION: NORMAL

## 2023-07-27 ENCOUNTER — TELEMEDICINE (OUTPATIENT)
Dept: PHARMACY | Facility: HOSPITAL | Age: 83
End: 2023-07-27
Payer: MEDICARE

## 2023-07-27 DIAGNOSIS — Z79.4 TYPE 2 DIABETES MELLITUS WITHOUT COMPLICATION, WITH LONG-TERM CURRENT USE OF INSULIN (MULTI): Primary | ICD-10-CM

## 2023-07-27 DIAGNOSIS — E11.9 TYPE 2 DIABETES MELLITUS WITHOUT COMPLICATION, WITH LONG-TERM CURRENT USE OF INSULIN (MULTI): Primary | ICD-10-CM

## 2023-08-01 DIAGNOSIS — E11.9 TYPE 2 DIABETES MELLITUS WITHOUT COMPLICATION, WITH LONG-TERM CURRENT USE OF INSULIN (MULTI): ICD-10-CM

## 2023-08-01 DIAGNOSIS — Z79.4 TYPE 2 DIABETES MELLITUS WITHOUT COMPLICATION, WITH LONG-TERM CURRENT USE OF INSULIN (MULTI): ICD-10-CM

## 2023-08-01 RX ORDER — INSULIN GLARGINE AND LIXISENATIDE 100; 33 U/ML; UG/ML
15 INJECTION, SOLUTION SUBCUTANEOUS NIGHTLY
Qty: 15 ML | Refills: 1 | Status: ON HOLD | OUTPATIENT
Start: 2023-08-01 | End: 2023-12-26 | Stop reason: SDUPTHER

## 2023-08-10 ENCOUNTER — TELEMEDICINE (OUTPATIENT)
Dept: PHARMACY | Facility: HOSPITAL | Age: 83
End: 2023-08-10
Payer: MEDICARE

## 2023-08-10 DIAGNOSIS — E11.9 TYPE 2 DIABETES MELLITUS WITHOUT COMPLICATION, WITH LONG-TERM CURRENT USE OF INSULIN (MULTI): Primary | ICD-10-CM

## 2023-08-10 DIAGNOSIS — Z79.4 TYPE 2 DIABETES MELLITUS WITHOUT COMPLICATION, WITH LONG-TERM CURRENT USE OF INSULIN (MULTI): Primary | ICD-10-CM

## 2023-08-10 NOTE — PROGRESS NOTES
Pharmacist Clinic: Diabetes Management  Luis Greene is a 83 y.o. male was referred to Clinical Pharmacy Team for diabetes management. At last visit, no medication changes were made.     Referring Provider: Christopher D'Amico, DO     HISTORY OF PRESENT ILLNESS  Patient is a type 2 diabetic, his DM is currently complicated with steroid injections for back pain. In his lifetime he has gotten 4 shots.      Diet:   - 1-2 snacks throughout the day (apple, popcorn, chips, sandwich)   - Dinner: varies, pasta, soup, take out     LAB REVIEW   Glucose (mg/dL)   Date Value   07/14/2023 145 (H)   06/26/2023 133 (H)   06/22/2023 226 (H)     Hemoglobin A1C (%)   Date Value   07/14/2023 7.5 (A)   04/19/2023 9.7 (H)   02/03/2023 9.6 (H)   08/22/2022 8.3 (A)   12/20/2021 7.5 (H)     Bicarbonate (mmol/L)   Date Value   07/14/2023 26   06/26/2023 27   06/22/2023 25     Urea Nitrogen (mg/dL)   Date Value   07/14/2023 28 (H)   06/26/2023 21   06/22/2023 31 (H)     Creatinine (mg/dL)   Date Value   07/14/2023 1.20   06/26/2023 1.03   06/22/2023 1.16     Lab Results   Component Value Date    HGBA1C 7.5 (A) 07/14/2023    HGBA1C 9.7 (H) 04/19/2023    HGBA1C 9.6 (H) 02/03/2023     Lab Results   Component Value Date    CHOL 77 08/22/2022    CHOL 101 09/17/2019     Lab Results   Component Value Date    HDL 29.5 (A) 08/22/2022    HDL 33.5 (A) 09/17/2019     Lab Results   Component Value Date    TRIG 53 08/22/2022    TRIG 78 09/17/2019       DIABETES ASSESSMENT    CURRENT PHARMACOTHERAPY  - metformin 500 mg 2 tabs by mouth twice daily   - soliqua 15 units under the skin once daily    SECONDARY PREVENTION  - Statin? Yes  - ACE-I/ARB? Yes    HISTORICAL PHARMACOTHERAPY   - Lantus 20 units under the skin once daily   - Novolog 5 units once daily once daily     SMBG MEASUREMENTS:   8/1: 100  8/2: 140  8/3: 132  8/4: 123  8/5: 121  8/6: 129  8/7: 109  8/8: 126  8/9: 121  8/10: 115    DISCUSSION:   - Good job testing your blood sugar, your numbers  look very good!   - Patient denies any symptoms of hypoglycemia, he denies feeling shakey, sweaty, and lightheadedness     RECOMMENDATIONS/PLAN  1. Patients diabetes is improving with most recent A1c of 7.5% (goal < 7 %).   - Continue all meds under the continuation of care with the referring provider and clinical pharmacy team    Clinical Pharmacist follow up: as needed, PCP appt on 9/14    Thank you,  Leeann Page, PharmD    Verbal consent to manage patient's drug therapy was obtained from the patient. They were informed they may decline to participate or withdraw from participation in pharmacy services at any time.

## 2023-08-22 ENCOUNTER — NURSING HOME VISIT (OUTPATIENT)
Dept: POST ACUTE CARE | Facility: EXTERNAL LOCATION | Age: 83
End: 2023-08-22
Payer: MEDICARE

## 2023-08-22 DIAGNOSIS — M21.371 BILATERAL FOOT-DROP: ICD-10-CM

## 2023-08-22 DIAGNOSIS — M62.81 GENERALIZED MUSCLE WEAKNESS: ICD-10-CM

## 2023-08-22 DIAGNOSIS — R79.89 ELEVATED TROPONIN: ICD-10-CM

## 2023-08-22 DIAGNOSIS — G62.9 PERIPHERAL POLYNEUROPATHY: ICD-10-CM

## 2023-08-22 DIAGNOSIS — G45.9 TIA (TRANSIENT ISCHEMIC ATTACK): Primary | ICD-10-CM

## 2023-08-22 DIAGNOSIS — E78.5 HYPERLIPIDEMIA, UNSPECIFIED HYPERLIPIDEMIA TYPE: ICD-10-CM

## 2023-08-22 DIAGNOSIS — I48.91 ATRIAL FIBRILLATION, UNSPECIFIED TYPE (MULTI): ICD-10-CM

## 2023-08-22 DIAGNOSIS — M48.00 SPINAL STENOSIS, UNSPECIFIED SPINAL REGION: ICD-10-CM

## 2023-08-22 DIAGNOSIS — M21.372 BILATERAL FOOT-DROP: ICD-10-CM

## 2023-08-22 DIAGNOSIS — R26.89 IMPAIRED GAIT AND MOBILITY: ICD-10-CM

## 2023-08-22 DIAGNOSIS — M54.50 CHRONIC LOW BACK PAIN, UNSPECIFIED BACK PAIN LATERALITY, UNSPECIFIED WHETHER SCIATICA PRESENT: ICD-10-CM

## 2023-08-22 DIAGNOSIS — I10 BENIGN ESSENTIAL HTN: ICD-10-CM

## 2023-08-22 DIAGNOSIS — N40.0 BENIGN PROSTATIC HYPERPLASIA WITHOUT LOWER URINARY TRACT SYMPTOMS: ICD-10-CM

## 2023-08-22 DIAGNOSIS — K21.9 GASTROESOPHAGEAL REFLUX DISEASE, UNSPECIFIED WHETHER ESOPHAGITIS PRESENT: ICD-10-CM

## 2023-08-22 DIAGNOSIS — Z79.4 TYPE 2 DIABETES MELLITUS WITHOUT COMPLICATION, WITH LONG-TERM CURRENT USE OF INSULIN (MULTI): ICD-10-CM

## 2023-08-22 DIAGNOSIS — G89.29 CHRONIC LOW BACK PAIN, UNSPECIFIED BACK PAIN LATERALITY, UNSPECIFIED WHETHER SCIATICA PRESENT: ICD-10-CM

## 2023-08-22 DIAGNOSIS — E11.9 TYPE 2 DIABETES MELLITUS WITHOUT COMPLICATION, WITH LONG-TERM CURRENT USE OF INSULIN (MULTI): ICD-10-CM

## 2023-08-22 DIAGNOSIS — I25.10 CORONARY ARTERY DISEASE INVOLVING NATIVE HEART WITHOUT ANGINA PECTORIS, UNSPECIFIED VESSEL OR LESION TYPE: ICD-10-CM

## 2023-08-22 DIAGNOSIS — M19.90 OSTEOARTHRITIS, UNSPECIFIED OSTEOARTHRITIS TYPE, UNSPECIFIED SITE: ICD-10-CM

## 2023-08-22 PROCEDURE — 99310 SBSQ NF CARE HIGH MDM 45: CPT | Performed by: NURSE PRACTITIONER

## 2023-08-22 NOTE — LETTER
Patient: Luis Greene  : 1940    Encounter Date: 2023    Name: Luis Greene    YOB: 1940    [facility]     Chief Complaint:  Initial visit; new patient;  TIA; elevated troponin; atrial fibrillation; etc.......       HPI   83 year old male with medical history of TIAs, hypertension, hyperlipidemia, type 2 diabetes, GERD, arthritis, BPH, CAD s/p CABG, spinal stenosis, bilateral foot drop, chronic back pain, and cataracts.     HOSPITAL COURSE:  Patient presented to [facility] ER for chief complaint of bilateral upper extremity paresthesias and expressive aphasia.  Patient reports that the morning he presented to the ER he was riding the car with his daughter;  he began experiencing abrupt bilateral upper extremity paresthesias and expressive aphasia.  His daughter took note of the situation and drove him to [facility] ER for further evaluation and treatment.    While he was there patient states that his symptoms resolved within 30 minutes.  Patient was alert and oriented x3.    Patient did not have any neurological deficits.  He reported that he was taken off Xarelto last spring because he had blood in his stools.    He also reports he was taken off Plavix last year for an unknown reason.  Patient had a stroke work-up during his current admission.  Neurology was consulted.  CT brain was performed and negative.    MRI of the brain, MRA of the head, ultrasound of bilateral carotid arteries and echocardiogram were all performed.  Patient sees Dr. Garvey outpatient.  Patient had elevated troponin.  EKG was performed that showed A-fib which was unchanged from his previous EKG.  Cardiology was consulted.    Patient sees Dr. Grant outpatient. Patient evaluated by PT/OT. SNF recommended.       Patient admitted to [facility] on 2023 for skilled services.    Hospital and chronic diagnoses as follows:    TIA:  Patient has history of multiple TIAs in the  past.  Had negative stroke work-up in the hospital.  No neurological deficits were noted in the hospital.  Patient's neurologist is Dr. Garvey.  Discharged on aspirin and Eliquis 2.5 mg p.o. twice daily.  Patient seen today.  He is sitting in the room in a chair.  Calm and cooperative.  Follows commands.   Mentation at baseline.   A x O x 3.   No complaints of headaches or dizziness.  No complaints of any TIA or stroke symptoms.     Elevated troponin; atrial fibrillation:  EKG was performed and it showed A-fib, unchanged from previous EKG.  Troponin improved during hospitalization.   On metoprolol.  Patient was not taking an anticoagulant prior to hospitalization.  He was previously on Xarelto that was discontinued in the spring by his cardiologist.  During his hospitalization cardiology started him on low-dose Eliquis 2.5 mg p.o. twice daily.  On metoprolol and Eliquis for a-fib.  No complaints of chest pain or palpitations today.     DM type II:  Discharged from the hospital on sliding scale insulin and metformin.  Patient states prior to his hospitalization he was not on sliding scale he was on Soliqua subcutaneous every day.  No recent hemoglobin A1c available.  Patient is asking that he be taken off sliding scale insulin and started on his Soliqua he used to take at home.    Benign essential hypertension:  On lisinopril, metoprolol and lasix.   No complaints of headaches or dizziness.  Recent /58.    Hyperlipidemia:  On atorvastatin.  No recent lipid panel available.    GERD:  On omeprazole.  No complaints of nausea or vomiting or reflux symptoms.      Peripheral neuropathy:  On gabapentin.  No complaints of neuropathy symptoms today.     Spinal stenosis; osteoarthritis and chronic back pain:  On gabapentin, tramadol as needed and acetaminophen PRN.   No complaints of pain today.  Reminded patient to ask for PRN medications if he is having pain.   At [facility] participating in PT/OT.    BPH:   On  "Tamsulosin.  No complaints of urinary symptoms or urinary retention.    CAD; history of CABG:  On aspirin and Eliquis.  No complaints of chest pain.     Impaired gait and mobility; generalized muscle weakness:  At [facility] for skilled services PT/OT.    Bilateral foot drop:  He states he has bilateral foot drop which makes it difficult to ambulate.   At [facility] for skilled services PT/OT.          Reviewed [facility] records.  Reviewed  hospital records from recent hospitalization.  Reviewed  EMR  Reviewed medical, social, surgical and family history.  Reviewed all current medications and performed medication reconciliation.  Performed prescription drug management.  Reviewed vital signs AND lab results  Reviewed [facility] Documentation  Discussed patient with nursing.   Ordered RX for tramadol.  Spent greater than 45 minutes on patient visit.        ROS: 10 point ROS performed; Negative unless noted in HPI.    SOCIAL HISTORY:    Lives with his wife  Works part-time.  No alcohol use  No tobacco use  No illicit drug use    SURGICAL HISTORY:  Tonsillectomy  Right knee surgery 50 years ago  CABG x2 in 2011  Right shoulder rotator cuff repair in 2002 with postop infection.  Laminectomy L3-S1  Excision of synovial cyst    FAMILY HISTORY:  Father----lung cancer.           Lab Results   Component Value Date    WBC 7.5 07/14/2023    HGB 13.3 (L) 07/14/2023    HCT 38.9 (L) 07/14/2023     (L) 07/14/2023    CHOL 77 08/22/2022    TRIG 53 08/22/2022    HDL 29.5 (A) 08/22/2022    ALT 32 07/14/2023    AST 31 07/14/2023     07/14/2023    K 4.7 07/14/2023    CL 98 07/14/2023    CREATININE 1.20 07/14/2023    BUN 28 (H) 07/14/2023    CO2 26 07/14/2023    TSH 6.44 (H) 07/14/2023    INR 1.1 07/06/2020    HGBA1C 7.5 (A) 07/14/2023             /58   Pulse 70   Temp 36.2 °C (97.2 °F)   Resp 18   Ht 1.803 m (5' 11\")   Wt 78.5 kg (173 lb)   SpO2 94%   BMI 24.13 kg/m²      Physical Exam  Vitals " and nursing note reviewed.   Constitutional:       General: He is awake.      Appearance: Normal appearance.   HENT:      Head: Normocephalic.      Right Ear: External ear normal.      Left Ear: External ear normal.      Nose: Nose normal.      Mouth/Throat:      Mouth: Mucous membranes are moist.      Pharynx: Oropharynx is clear.   Eyes:      Extraocular Movements: Extraocular movements intact.      Conjunctiva/sclera: Conjunctivae normal.      Pupils: Pupils are equal, round, and reactive to light.   Cardiovascular:      Rate and Rhythm: Normal rate and regular rhythm.      Pulses: Normal pulses.      Heart sounds: Normal heart sounds.   Pulmonary:      Effort: Pulmonary effort is normal.      Breath sounds: Normal breath sounds.   Abdominal:      General: Bowel sounds are normal.      Palpations: Abdomen is soft.   Musculoskeletal:         General: Normal range of motion.      Cervical back: Normal range of motion and neck supple.      Comments: Generalized muscle weakness;  Impaired gait and mobility;  Bilateral foot drop.   Skin:     General: Skin is warm and dry.   Neurological:      General: No focal deficit present.      Mental Status: He is alert and oriented to person, place, and time. Mental status is at baseline.      Motor: Weakness present.      Gait: Gait abnormal.   Psychiatric:         Attention and Perception: Attention normal.         Mood and Affect: Mood and affect normal.         Behavior: Behavior normal. Behavior is cooperative.          Assessment/Plan   Ordered CMP, CBC with diff. And Ha1c for tomorrow.      TIA:  Patient has history of multiple TIAs in the past.  Had negative stroke work-up in the hospital.  Patient's neurologist is Dr. Garvey.  Follow up with Dr. Garvey as recommended.   Continue aspirin and Eliquis 2.5 mg p.o. twice daily.  Monitor for TIA and/or stroke symptoms.    Elevated troponin; atrial fibrillation:  EKG was performed and it showed A-fib, unchanged from  previous EKG.  During his hospitalization cardiology started him on low-dose Eliquis 2.5 mg p.o. twice daily.  Continue metoprolol and Eliquis for a-fib.  Monitor for chest pain and palpitations.   Follow up with cardiologist.    DM type II:  DC sliding scale insulin.  Continue metformin.  Order Ha1c.  Start Soliqua 100/33 (insulin glargine/lixisenatide) 15 units subcutaneous at bedtime.   Monitor accuchecks TID without coverage for one week.    Benign essential hypertension:  Continue lisinopril, metoprolol and lasix.   Monitor Bps.     Hyperlipidemia:  Continue atorvastatin.  Order lipid panel.     GERD:  Continue omeprazole.  Elevate HOB 2-3 hours after meals.     Peripheral neuropathy:  Continue gabapentin.    Spinal stenosis; osteoarthritis and chronic back pain:  Continue gabapentin, tramadol as needed and acetaminophen PRN.   Reminded patient to ask for PRN medications if he is having pain.   Continue at [facility] participating in PT/OT.  Follow up with Pain management PRN.    BPH:   Continue Tamsulosin.  Monitor for urinary symptoms or urinary retention.    CAD; history of CABG:  Continue aspirin and Eliquis.  Monitor for chest pain.     Impaired gait and mobility; generalized muscle weakness:  Continue at  [facility] for skilled services PT/OT.  Fall prevention strategies.     Bilateral foot drop:  He states he has bilateral foot drop which makes it difficult to ambulate.   Continue at [facility] for skilled services PT/OT.  Fall prevention strategies.       Problem List Items Addressed This Visit          Cardiac and Vasculature    Afib (CMS/[facility])    Hyperlipidemia       Endocrine/Metabolic    Diabetes mellitus (CMS/[facility])       Neuro    TIA (transient ischemic attack) - Primary     Other Visit Diagnoses       Elevated troponin        Benign essential HTN        Gastroesophageal reflux disease, unspecified whether esophagitis present        Peripheral polyneuropathy        Spinal stenosis, unspecified spinal region         Osteoarthritis, unspecified osteoarthritis type, unspecified site        Chronic low back pain, unspecified back pain laterality, unspecified whether sciatica present        Benign prostatic hyperplasia without lower urinary tract symptoms        Coronary artery disease involving native heart without angina pectoris, unspecified vessel or lesion type        Impaired gait and mobility        Generalized muscle weakness        Bilateral foot-drop                SUSAN Baugh       Electronically Signed By: SUSAN Baugh   8/27/23  3:34 PM

## 2023-08-27 VITALS
HEIGHT: 71 IN | SYSTOLIC BLOOD PRESSURE: 123 MMHG | BODY MASS INDEX: 24.22 KG/M2 | RESPIRATION RATE: 18 BRPM | DIASTOLIC BLOOD PRESSURE: 58 MMHG | WEIGHT: 173 LBS | HEART RATE: 70 BPM | OXYGEN SATURATION: 94 % | TEMPERATURE: 97.2 F

## 2023-08-27 NOTE — PROGRESS NOTES
Name: Luis Greene    YOB: 1940    Code Status: DNc     Chief Complaint:  Initial visit; new patient;  TIA; elevated troponin; atrial fibrillation; etc.......       HPI   83 year old male with medical history of TIAs, hypertension, hyperlipidemia, type 2 diabetes, GERD, arthritis, BPH, CAD s/p CABG, spinal stenosis, bilateral foot drop, chronic back pain, and cataracts.     HOSPITAL COURSE:  Patient presented to [facility] ER for chief complaint of bilateral upper extremity paresthesias and expressive aphasia.  Patient reports that the morning he presented to the ER he was riding the car with his daughter;  he began experiencing abrupt bilateral upper extremity paresthesias and expressive aphasia.  His daughter took note of the situation and drove him to [facility] ER for further evaluation and treatment.    While he was there patient states that his symptoms resolved within 30 minutes.  Patient was alert and oriented x3.    Patient did not have any neurological deficits.  He reported that he was taken off Xarelto last spring because he had blood in his stools.    He also reports he was taken off Plavix last year for an unknown reason.  Patient had a stroke work-up during his current admission.  Neurology was consulted.  CT brain was performed and negative.    MRI of the brain, MRA of the head, ultrasound of bilateral carotid arteries and echocardiogram were all performed.  Patient sees Dr. Garvey outpatient.  Patient had elevated troponin.  EKG was performed that showed A-fib which was unchanged from his previous EKG.  Cardiology was consulted.    Patient sees Dr. Grant outpatient. Patient evaluated by PT/OT. SNF recommended.       Patient admitted to [facility] on 8/18/2023 for skilled services.    Hospital and chronic diagnoses as follows:    TIA:  Patient has history of multiple TIAs in the past.  Had negative stroke work-up in the hospital.  No neurological  deficits were noted in the hospital.  Patient's neurologist is Dr. Garvey.  Discharged on aspirin and Eliquis 2.5 mg p.o. twice daily.  Patient seen today.  He is sitting in the room in a chair.  Calm and cooperative.  Follows commands.   Mentation at baseline.   A x O x 3.   No complaints of headaches or dizziness.  No complaints of any TIA or stroke symptoms.     Elevated troponin; atrial fibrillation:  EKG was performed and it showed A-fib, unchanged from previous EKG.  Troponin improved during hospitalization.   On metoprolol.  Patient was not taking an anticoagulant prior to hospitalization.  He was previously on Xarelto that was discontinued in the spring by his cardiologist.  During his hospitalization cardiology started him on low-dose Eliquis 2.5 mg p.o. twice daily.  On metoprolol and Eliquis for a-fib.  No complaints of chest pain or palpitations today.     DM type II:  Discharged from the hospital on sliding scale insulin and metformin.  Patient states prior to his hospitalization he was not on sliding scale he was on Soliqua subcutaneous every day.  No recent hemoglobin A1c available.  Patient is asking that he be taken off sliding scale insulin and started on his Soliqua he used to take at home.    Benign essential hypertension:  On lisinopril, metoprolol and lasix.   No complaints of headaches or dizziness.  Recent /58.    Hyperlipidemia:  On atorvastatin.  No recent lipid panel available.    GERD:  On omeprazole.  No complaints of nausea or vomiting or reflux symptoms.      Peripheral neuropathy:  On gabapentin.  No complaints of neuropathy symptoms today.     Spinal stenosis; osteoarthritis and chronic back pain:  On gabapentin, tramadol as needed and acetaminophen PRN.   No complaints of pain today.  Reminded patient to ask for PRN medications if he is having pain.   At [facility] participating in PT/OT.    BPH:   On Tamsulosin.  No complaints of urinary symptoms or urinary retention.    CAD;  "history of CABG:  On aspirin and Eliquis.  No complaints of chest pain.     Impaired gait and mobility; generalized muscle weakness:  At [facility] for skilled services PT/OT.    Bilateral foot drop:  He states he has bilateral foot drop which makes it difficult to ambulate.   At [facility] for skilled services PT/OT.          Reviewed [facility] records.  Reviewed  hospital records from recent hospitalization.  Reviewed  EMR  Reviewed medical, social, surgical and family history.  Reviewed all current medications and performed medication reconciliation.  Performed prescription drug management.  Reviewed vital signs AND lab results  Reviewed [facility] Documentation  Discussed patient with nursing.   Ordered RX for tramadol.  Spent greater than 45 minutes on patient visit.        ROS: 10 point ROS performed; Negative unless noted in HPI.    SOCIAL HISTORY:    Lives with his wife  Works part-time.  No alcohol use  No tobacco use  No illicit drug use    SURGICAL HISTORY:  Tonsillectomy  Right knee surgery 50 years ago  CABG x2 in 2011  Right shoulder rotator cuff repair in 2002 with postop infection.  Laminectomy L3-S1  Excision of synovial cyst    FAMILY HISTORY:  Father----lung cancer.           Lab Results   Component Value Date    WBC 7.5 07/14/2023    HGB 13.3 (L) 07/14/2023    HCT 38.9 (L) 07/14/2023     (L) 07/14/2023    CHOL 77 08/22/2022    TRIG 53 08/22/2022    HDL 29.5 (A) 08/22/2022    ALT 32 07/14/2023    AST 31 07/14/2023     07/14/2023    K 4.7 07/14/2023    CL 98 07/14/2023    CREATININE 1.20 07/14/2023    BUN 28 (H) 07/14/2023    CO2 26 07/14/2023    TSH 6.44 (H) 07/14/2023    INR 1.1 07/06/2020    HGBA1C 7.5 (A) 07/14/2023             /58   Pulse 70   Temp 36.2 °C (97.2 °F)   Resp 18   Ht 1.803 m (5' 11\")   Wt 78.5 kg (173 lb)   SpO2 94%   BMI 24.13 kg/m²      Physical Exam  Vitals and nursing note reviewed.   Constitutional:       General: He is awake.      " Appearance: Normal appearance.   HENT:      Head: Normocephalic.      Right Ear: External ear normal.      Left Ear: External ear normal.      Nose: Nose normal.      Mouth/Throat:      Mouth: Mucous membranes are moist.      Pharynx: Oropharynx is clear.   Eyes:      Extraocular Movements: Extraocular movements intact.      Conjunctiva/sclera: Conjunctivae normal.      Pupils: Pupils are equal, round, and reactive to light.   Cardiovascular:      Rate and Rhythm: Normal rate and regular rhythm.      Pulses: Normal pulses.      Heart sounds: Normal heart sounds.   Pulmonary:      Effort: Pulmonary effort is normal.      Breath sounds: Normal breath sounds.   Abdominal:      General: Bowel sounds are normal.      Palpations: Abdomen is soft.   Musculoskeletal:         General: Normal range of motion.      Cervical back: Normal range of motion and neck supple.      Comments: Generalized muscle weakness;  Impaired gait and mobility;  Bilateral foot drop.   Skin:     General: Skin is warm and dry.   Neurological:      General: No focal deficit present.      Mental Status: He is alert and oriented to person, place, and time. Mental status is at baseline.      Motor: Weakness present.      Gait: Gait abnormal.   Psychiatric:         Attention and Perception: Attention normal.         Mood and Affect: Mood and affect normal.         Behavior: Behavior normal. Behavior is cooperative.          Assessment/Plan    Ordered CMP, CBC with diff. And Ha1c for tomorrow.      TIA:  Patient has history of multiple TIAs in the past.  Had negative stroke work-up in the hospital.  Patient's neurologist is Dr. Garvey.  Follow up with Dr. Garvey as recommended.   Continue aspirin and Eliquis 2.5 mg p.o. twice daily.  Monitor for TIA and/or stroke symptoms.    Elevated troponin; atrial fibrillation:  EKG was performed and it showed A-fib, unchanged from previous EKG.  During his hospitalization cardiology started him on low-dose  Eliquis 2.5 mg p.o. twice daily.  Continue metoprolol and Eliquis for a-fib.  Monitor for chest pain and palpitations.   Follow up with cardiologist.    DM type II:  DC sliding scale insulin.  Continue metformin.  Order Ha1c.  Start Soliqua 100/33 (insulin glargine/lixisenatide) 15 units subcutaneous at bedtime.   Monitor accuchecks TID without coverage for one week.    Benign essential hypertension:  Continue lisinopril, metoprolol and lasix.   Monitor Bps.     Hyperlipidemia:  Continue atorvastatin.  Order lipid panel.     GERD:  Continue omeprazole.  Elevate HOB 2-3 hours after meals.     Peripheral neuropathy:  Continue gabapentin.    Spinal stenosis; osteoarthritis and chronic back pain:  Continue gabapentin, tramadol as needed and acetaminophen PRN.   Reminded patient to ask for PRN medications if he is having pain.   Continue at [facility] participating in PT/OT.  Follow up with Pain management PRN.    BPH:   Continue Tamsulosin.  Monitor for urinary symptoms or urinary retention.    CAD; history of CABG:  Continue aspirin and Eliquis.  Monitor for chest pain.     Impaired gait and mobility; generalized muscle weakness:  Continue at  [facility] for skilled services PT/OT.  Fall prevention strategies.     Bilateral foot drop:  He states he has bilateral foot drop which makes it difficult to ambulate.   Continue at [facility] for skilled services PT/OT.  Fall prevention strategies.       Problem List Items Addressed This Visit          Cardiac and Vasculature    Afib (CMS/[facility])    Hyperlipidemia       Endocrine/Metabolic    Diabetes mellitus (CMS/[facility])       Neuro    TIA (transient ischemic attack) - Primary     Other Visit Diagnoses       Elevated troponin        Benign essential HTN        Gastroesophageal reflux disease, unspecified whether esophagitis present        Peripheral polyneuropathy        Spinal stenosis, unspecified spinal region        Osteoarthritis, unspecified osteoarthritis type, unspecified site        Chronic  low back pain, unspecified back pain laterality, unspecified whether sciatica present        Benign prostatic hyperplasia without lower urinary tract symptoms        Coronary artery disease involving native heart without angina pectoris, unspecified vessel or lesion type        Impaired gait and mobility        Generalized muscle weakness        Bilateral foot-drop                Mellissa Kat, APRN-CNP

## 2023-08-28 ENCOUNTER — TELEPHONE (OUTPATIENT)
Dept: PHARMACY | Facility: HOSPITAL | Age: 83
End: 2023-08-28
Payer: MEDICARE

## 2023-08-28 NOTE — TELEPHONE ENCOUNTER
Patient called to say he is in a facility right now and would like permission from his PCP to bring his soliqua into the facility so he does not have to use basal/bolus insulin.     Pharmacy team to reach out to the facility to discuss with care team at [facility], room [address] wing [address].     Thanks,   Leeann Page

## 2023-08-29 ENCOUNTER — NURSING HOME VISIT (OUTPATIENT)
Dept: POST ACUTE CARE | Facility: EXTERNAL LOCATION | Age: 83
End: 2023-08-29
Payer: MEDICARE

## 2023-08-29 DIAGNOSIS — G89.29 CHRONIC LOW BACK PAIN, UNSPECIFIED BACK PAIN LATERALITY, UNSPECIFIED WHETHER SCIATICA PRESENT: ICD-10-CM

## 2023-08-29 DIAGNOSIS — I25.10 CORONARY ARTERY DISEASE INVOLVING NATIVE HEART WITHOUT ANGINA PECTORIS, UNSPECIFIED VESSEL OR LESION TYPE: ICD-10-CM

## 2023-08-29 DIAGNOSIS — Z86.73 HX OF TIA (TRANSIENT ISCHEMIC ATTACK) AND STROKE: ICD-10-CM

## 2023-08-29 DIAGNOSIS — E11.9 TYPE 2 DIABETES MELLITUS WITHOUT COMPLICATION, WITH LONG-TERM CURRENT USE OF INSULIN (MULTI): Primary | ICD-10-CM

## 2023-08-29 DIAGNOSIS — Z79.4 TYPE 2 DIABETES MELLITUS WITHOUT COMPLICATION, WITH LONG-TERM CURRENT USE OF INSULIN (MULTI): Primary | ICD-10-CM

## 2023-08-29 DIAGNOSIS — I48.91 ATRIAL FIBRILLATION, UNSPECIFIED TYPE (MULTI): ICD-10-CM

## 2023-08-29 DIAGNOSIS — I10 BENIGN ESSENTIAL HTN: ICD-10-CM

## 2023-08-29 DIAGNOSIS — M19.90 OSTEOARTHRITIS, UNSPECIFIED OSTEOARTHRITIS TYPE, UNSPECIFIED SITE: ICD-10-CM

## 2023-08-29 DIAGNOSIS — M62.81 GENERALIZED MUSCLE WEAKNESS: ICD-10-CM

## 2023-08-29 DIAGNOSIS — G62.9 PERIPHERAL POLYNEUROPATHY: ICD-10-CM

## 2023-08-29 DIAGNOSIS — M54.50 CHRONIC LOW BACK PAIN, UNSPECIFIED BACK PAIN LATERALITY, UNSPECIFIED WHETHER SCIATICA PRESENT: ICD-10-CM

## 2023-08-29 DIAGNOSIS — M48.00 SPINAL STENOSIS, UNSPECIFIED SPINAL REGION: ICD-10-CM

## 2023-08-29 DIAGNOSIS — R26.89 IMPAIRED GAIT AND MOBILITY: ICD-10-CM

## 2023-08-29 PROCEDURE — 99309 SBSQ NF CARE MODERATE MDM 30: CPT | Performed by: NURSE PRACTITIONER

## 2023-08-29 NOTE — LETTER
Patient: Luis Greene  : 1940    Encounter Date: 2023    Name: Luis Greene    YOB: 1940    Code Status: [facility]     Chief Complaint:  Follow up on DM 2; etc.......       HPI   83 year old male with medical history of TIAs, hypertension, hyperlipidemia, type 2 diabetes, GERD, arthritis, BPH, CAD s/p CABG, spinal stenosis, bilateral foot drop, chronic back pain, and cataracts.     Patient admitted to [facility] on 2023 for skilled services.    Diagnoses as follows:    DM type II:  Discharged from the hospital on sliding scale insulin and metformin.  Patient had reported that  prior to his hospitalization he was not on sliding scale he was on Soliqua subcutaneous every day  Per patient request his sliding scale insulin was discontinued and he was started on his Soliqua he used to take at home.  Recent accuchecks as follows: 122 mg/dL, 243 mg/dL, 204 mg/dL, 132 mg/dL  Patient seen today.  He is sitting in the room in a chair.  Calm and cooperative.  Follows commands.   Mentation at baseline.   A x O x 3.   No complaints of headaches or dizziness.    Atrial fibrillation:  New diagnosis of atrial fibrillation during his hospitalization.  On metoprolol.  Patient was not taking an anticoagulant prior to hospitalization.  He was previously on Xarelto that was discontinued in the spring by his cardiologist.  During his hospitalization cardiology started him on low-dose Eliquis 2.5 mg p.o. twice daily.  On metoprolol and Eliquis for a-fib.  No complaints of chest pain or palpitations today.     Benign essential hypertension:  On lisinopril, metoprolol and lasix.   No complaints of headaches or dizziness.  Recent /70    Peripheral neuropathy:  On gabapentin.  No complaints of neuropathy symptoms today.     Osteoarthritis and chronic back pain; Spinal stenosis:  On gabapentin, tramadol as needed and acetaminophen PRN.   No complaints of pain today.  Reminded patient to ask  for PRN medications if he is having pain.   At [facility] participating in PT/OT.    CAD; history of CABG:  On aspirin and Eliquis.  No complaints of chest pain.     TIA:  Patient has history of multiple TIAs in the past.  Had negative stroke work-up in the hospital.  No neurological deficits were noted in the hospital.  Patient's neurologist is Dr. Garvey.  Discharged on aspirin and Eliquis 2.5 mg p.o. twice daily.    Generalized muscle weakness; Impaired gait and mobility; :  At [facility] for skilled services PT/OT.              Reviewed  hospital records from recent hospitalization.  Reviewed  EMR  Reviewed medical, social, surgical and family history.  Reviewed all current medications and performed medication reconciliation.  Performed prescription drug management.  Reviewed vital signs AND lab results  Reviewed [facility] Documentation  Discussed patient with nursing.         ROS: 10 point ROS performed; Negative unless noted in HPI.    SOCIAL HISTORY:    Lives with his wife  Works part-time.  No alcohol use  No tobacco use  No illicit drug use    SURGICAL HISTORY:  Tonsillectomy  Right knee surgery 50 years ago  CABG x2 in 2011  Right shoulder rotator cuff repair in 2002 with postop infection.  Laminectomy L3-S1  Excision of synovial cyst    FAMILY HISTORY:  Father----lung cancer.    BMP: Date: 8/23/2023 Na 138 K 4.7 Cr 0.85 BUN 23 glucose 101 Ca 9.5 GFR 87  CBC: Date: 8/23/2023 WBC 6.05 Hgb 12 hematocrit 34.4 platelets 109  Liver function: Date: 8/23/2023 ALT 20 AST 20 alkaline phos.  40 bilirubin 0.9 albumin 3.7 protein 5.6       Lab Results   Component Value Date    WBC 7.5 07/14/2023    HGB 13.3 (L) 07/14/2023    HCT 38.9 (L) 07/14/2023     (L) 07/14/2023    CHOL 77 08/22/2022    TRIG 53 08/22/2022    HDL 29.5 (A) 08/22/2022    ALT 32 07/14/2023    AST 31 07/14/2023     07/14/2023    K 4.7 07/14/2023    CL 98 07/14/2023    CREATININE 1.20 07/14/2023    BUN 28 (H) 07/14/2023    CO2 26  "07/14/2023    TSH 6.44 (H) 07/14/2023    INR 1.1 07/06/2020    HGBA1C 7.5 (A) 07/14/2023             /70   Pulse 70   Temp 36.5 °C (97.7 °F)   Resp 16   Ht 1.803 m (5' 11\")   Wt 78.6 kg (173 lb 3.2 oz)   SpO2 99%   BMI 24.16 kg/m²      Physical Exam  Vitals and nursing note reviewed.   Constitutional:       General: He is awake.      Appearance: Normal appearance.   HENT:      Head: Normocephalic.      Right Ear: External ear normal.      Left Ear: External ear normal.      Nose: Nose normal.      Mouth/Throat:      Mouth: Mucous membranes are moist.      Pharynx: Oropharynx is clear.   Eyes:      Extraocular Movements: Extraocular movements intact.      Conjunctiva/sclera: Conjunctivae normal.      Pupils: Pupils are equal, round, and reactive to light.   Cardiovascular:      Rate and Rhythm: Normal rate and regular rhythm.      Pulses: Normal pulses.      Heart sounds: Normal heart sounds.   Pulmonary:      Effort: Pulmonary effort is normal.      Breath sounds: Normal breath sounds.   Abdominal:      General: Bowel sounds are normal.      Palpations: Abdomen is soft.   Musculoskeletal:         General: Normal range of motion.      Cervical back: Normal range of motion and neck supple.      Comments: Generalized muscle weakness;  Impaired gait and mobility;  Bilateral foot drop.   Skin:     General: Skin is warm and dry.   Neurological:      General: No focal deficit present.      Mental Status: He is alert and oriented to person, place, and time. Mental status is at baseline.      Motor: Weakness present.      Gait: Gait abnormal.   Psychiatric:         Attention and Perception: Attention normal.         Mood and Affect: Mood and affect normal.         Behavior: Behavior normal. Behavior is cooperative.          Assessment/Plan   Ordered CMP, CBC with diff. And Ha1c for tomorrow.    DM type II:  Continue Soliqua and metformin.  Monitor accuchecks.   Patient seen today.  He is sitting in the room in a " chair.  Calm and cooperative.  Follows commands.   Mentation at baseline.   A x O x 3.   No complaints of headaches or dizziness.    Atrial fibrillation:  Continue metoprolol & Eliquis.  Monitor for chest pain and palpitations.    Benign essential hypertension:  Continue lisinopril, metoprolol and lasix.     Peripheral neuropathy:  Continue gabapentin.    Osteoarthritis and chronic back pain; Spinal stenosis:  Continue gabapentin, tramadol as needed and acetaminophen PRN.   Continue at [facility] participating in PT/OT.    CAD; history of CABG:  Continue aspirin and Eliquis.  Monitor for chest pain.     Hx TIA:  Patient has history of multiple TIAs in the past.  Had negative stroke work-up in the hospital.  No neurological deficits were noted in the hospital.  Patient's neurologist is Dr. Garvey.  Discharged on aspirin and Eliquis 2.5 mg p.o. twice daily.    Generalized muscle weakness; Impaired gait and mobility:  Continue at  [facility] for skilled services PT/OT.  Fall prevention strategies.      Problem List Items Addressed This Visit          Cardiac and Vasculature    Afib (CMS/[facility])       Endocrine/Metabolic    Diabetes mellitus (CMS/[facility]) - Primary     Other Visit Diagnoses       Benign essential HTN        Peripheral polyneuropathy        Osteoarthritis, unspecified osteoarthritis type, unspecified site        Chronic low back pain, unspecified back pain laterality, unspecified whether sciatica present        Spinal stenosis, unspecified spinal region        Coronary artery disease involving native heart without angina pectoris, unspecified vessel or lesion type        Hx of TIA (transient ischemic attack) and stroke        Generalized muscle weakness        Impaired gait and mobility              SUSAN Baugh       Electronically Signed By: SUSAN Baugh   9/1/23  4:45 PM

## 2023-09-01 VITALS
BODY MASS INDEX: 24.25 KG/M2 | SYSTOLIC BLOOD PRESSURE: 122 MMHG | HEART RATE: 70 BPM | DIASTOLIC BLOOD PRESSURE: 70 MMHG | RESPIRATION RATE: 16 BRPM | OXYGEN SATURATION: 99 % | HEIGHT: 71 IN | WEIGHT: 173.2 LBS | TEMPERATURE: 97.7 F

## 2023-09-01 NOTE — PROGRESS NOTES
Name: Luis Greene    YOB: 1940    Code Status: [facility]     Chief Complaint:  Follow up on DM 2; etc.......       HPI   83 year old male with medical history of TIAs, hypertension, hyperlipidemia, type 2 diabetes, GERD, arthritis, BPH, CAD s/p CABG, spinal stenosis, bilateral foot drop, chronic back pain, and cataracts.     Patient admitted to [facility] on 8/18/2023 for skilled services.    Diagnoses as follows:    DM type II:  Discharged from the hospital on sliding scale insulin and metformin.  Patient had reported that  prior to his hospitalization he was not on sliding scale he was on Soliqua subcutaneous every day  Per patient request his sliding scale insulin was discontinued and he was started on his Soliqua he used to take at home.  Recent accuchecks as follows: 122 mg/dL, 243 mg/dL, 204 mg/dL, 132 mg/dL  Patient seen today.  He is sitting in the room in a chair.  Calm and cooperative.  Follows commands.   Mentation at baseline.   A x O x 3.   No complaints of headaches or dizziness.    Atrial fibrillation:  New diagnosis of atrial fibrillation during his hospitalization.  On metoprolol.  Patient was not taking an anticoagulant prior to hospitalization.  He was previously on Xarelto that was discontinued in the spring by his cardiologist.  During his hospitalization cardiology started him on low-dose Eliquis 2.5 mg p.o. twice daily.  On metoprolol and Eliquis for a-fib.  No complaints of chest pain or palpitations today.     Benign essential hypertension:  On lisinopril, metoprolol and lasix.   No complaints of headaches or dizziness.  Recent /70    Peripheral neuropathy:  On gabapentin.  No complaints of neuropathy symptoms today.     Osteoarthritis and chronic back pain; Spinal stenosis:  On gabapentin, tramadol as needed and acetaminophen PRN.   No complaints of pain today.  Reminded patient to ask for PRN medications if he is having pain.   At [facility] participating in  PT/OT.    CAD; history of CABG:  On aspirin and Eliquis.  No complaints of chest pain.     TIA:  Patient has history of multiple TIAs in the past.  Had negative stroke work-up in the hospital.  No neurological deficits were noted in the hospital.  Patient's neurologist is Dr. Garvey.  Discharged on aspirin and Eliquis 2.5 mg p.o. twice daily.    Generalized muscle weakness; Impaired gait and mobility; :  At [facility] for skilled services PT/OT.              Reviewed  hospital records from recent hospitalization.  Reviewed  EMR  Reviewed medical, social, surgical and family history.  Reviewed all current medications and performed medication reconciliation.  Performed prescription drug management.  Reviewed vital signs AND lab results  Reviewed [facility] Documentation  Discussed patient with nursing.         ROS: 10 point ROS performed; Negative unless noted in HPI.    SOCIAL HISTORY:    Lives with his wife  Works part-time.  No alcohol use  No tobacco use  No illicit drug use    SURGICAL HISTORY:  Tonsillectomy  Right knee surgery 50 years ago  CABG x2 in 2011  Right shoulder rotator cuff repair in 2002 with postop infection.  Laminectomy L3-S1  Excision of synovial cyst    FAMILY HISTORY:  Father----lung cancer.    BMP: Date: 8/23/2023 Na 138 K 4.7 Cr 0.85 BUN 23 glucose 101 Ca 9.5 GFR 87  CBC: Date: 8/23/2023 WBC 6.05 Hgb 12 hematocrit 34.4 platelets 109  Liver function: Date: 8/23/2023 ALT 20 AST 20 alkaline phos.  40 bilirubin 0.9 albumin 3.7 protein 5.6       Lab Results   Component Value Date    WBC 7.5 07/14/2023    HGB 13.3 (L) 07/14/2023    HCT 38.9 (L) 07/14/2023     (L) 07/14/2023    CHOL 77 08/22/2022    TRIG 53 08/22/2022    HDL 29.5 (A) 08/22/2022    ALT 32 07/14/2023    AST 31 07/14/2023     07/14/2023    K 4.7 07/14/2023    CL 98 07/14/2023    CREATININE 1.20 07/14/2023    BUN 28 (H) 07/14/2023    CO2 26 07/14/2023    TSH 6.44 (H) 07/14/2023    INR 1.1 07/06/2020    HGBA1C  "7.5 (A) 07/14/2023             /70   Pulse 70   Temp 36.5 °C (97.7 °F)   Resp 16   Ht 1.803 m (5' 11\")   Wt 78.6 kg (173 lb 3.2 oz)   SpO2 99%   BMI 24.16 kg/m²      Physical Exam  Vitals and nursing note reviewed.   Constitutional:       General: He is awake.      Appearance: Normal appearance.   HENT:      Head: Normocephalic.      Right Ear: External ear normal.      Left Ear: External ear normal.      Nose: Nose normal.      Mouth/Throat:      Mouth: Mucous membranes are moist.      Pharynx: Oropharynx is clear.   Eyes:      Extraocular Movements: Extraocular movements intact.      Conjunctiva/sclera: Conjunctivae normal.      Pupils: Pupils are equal, round, and reactive to light.   Cardiovascular:      Rate and Rhythm: Normal rate and regular rhythm.      Pulses: Normal pulses.      Heart sounds: Normal heart sounds.   Pulmonary:      Effort: Pulmonary effort is normal.      Breath sounds: Normal breath sounds.   Abdominal:      General: Bowel sounds are normal.      Palpations: Abdomen is soft.   Musculoskeletal:         General: Normal range of motion.      Cervical back: Normal range of motion and neck supple.      Comments: Generalized muscle weakness;  Impaired gait and mobility;  Bilateral foot drop.   Skin:     General: Skin is warm and dry.   Neurological:      General: No focal deficit present.      Mental Status: He is alert and oriented to person, place, and time. Mental status is at baseline.      Motor: Weakness present.      Gait: Gait abnormal.   Psychiatric:         Attention and Perception: Attention normal.         Mood and Affect: Mood and affect normal.         Behavior: Behavior normal. Behavior is cooperative.          Assessment/Plan    Ordered CMP, CBC with diff. And Ha1c for tomorrow.    DM type II:  Continue Soliqua and metformin.  Monitor accuchecks.   Patient seen today.  He is sitting in the room in a chair.  Calm and cooperative.  Follows commands.   Mentation at " baseline.   A x O x 3.   No complaints of headaches or dizziness.    Atrial fibrillation:  Continue metoprolol & Eliquis.  Monitor for chest pain and palpitations.    Benign essential hypertension:  Continue lisinopril, metoprolol and lasix.     Peripheral neuropathy:  Continue gabapentin.    Osteoarthritis and chronic back pain; Spinal stenosis:  Continue gabapentin, tramadol as needed and acetaminophen PRN.   Continue at [facility] participating in PT/OT.    CAD; history of CABG:  Continue aspirin and Eliquis.  Monitor for chest pain.     Hx TIA:  Patient has history of multiple TIAs in the past.  Had negative stroke work-up in the hospital.  No neurological deficits were noted in the hospital.  Patient's neurologist is Dr. Garvey.  Discharged on aspirin and Eliquis 2.5 mg p.o. twice daily.    Generalized muscle weakness; Impaired gait and mobility:  Continue at  [facility] for skilled services PT/OT.  Fall prevention strategies.      Problem List Items Addressed This Visit          Cardiac and Vasculature    Afib (CMS/[facility])       Endocrine/Metabolic    Diabetes mellitus (CMS/[facility]) - Primary     Other Visit Diagnoses       Benign essential HTN        Peripheral polyneuropathy        Osteoarthritis, unspecified osteoarthritis type, unspecified site        Chronic low back pain, unspecified back pain laterality, unspecified whether sciatica present        Spinal stenosis, unspecified spinal region        Coronary artery disease involving native heart without angina pectoris, unspecified vessel or lesion type        Hx of TIA (transient ischemic attack) and stroke        Generalized muscle weakness        Impaired gait and mobility              Mellissa Kat, APRN-CNP

## 2023-09-07 ENCOUNTER — APPOINTMENT (OUTPATIENT)
Dept: PRIMARY CARE | Facility: CLINIC | Age: 83
End: 2023-09-07
Payer: MEDICARE

## 2023-09-12 ENCOUNTER — TELEPHONE (OUTPATIENT)
Dept: PRIMARY CARE | Facility: CLINIC | Age: 83
End: 2023-09-12

## 2023-09-14 ENCOUNTER — LAB (OUTPATIENT)
Dept: LAB | Facility: LAB | Age: 83
End: 2023-09-14
Payer: MEDICARE

## 2023-09-14 ENCOUNTER — OFFICE VISIT (OUTPATIENT)
Dept: PRIMARY CARE | Facility: CLINIC | Age: 83
End: 2023-09-14
Payer: MEDICARE

## 2023-09-14 VITALS
WEIGHT: 170 LBS | OXYGEN SATURATION: 96 % | SYSTOLIC BLOOD PRESSURE: 137 MMHG | DIASTOLIC BLOOD PRESSURE: 63 MMHG | BODY MASS INDEX: 23.8 KG/M2 | HEART RATE: 56 BPM | HEIGHT: 71 IN

## 2023-09-14 DIAGNOSIS — E11.9 TYPE 2 DIABETES MELLITUS WITHOUT COMPLICATION, WITH LONG-TERM CURRENT USE OF INSULIN (MULTI): ICD-10-CM

## 2023-09-14 DIAGNOSIS — I50.9 CONGESTIVE HEART FAILURE, UNSPECIFIED HF CHRONICITY, UNSPECIFIED HEART FAILURE TYPE (MULTI): ICD-10-CM

## 2023-09-14 DIAGNOSIS — I10 HYPERTENSION, UNSPECIFIED TYPE: ICD-10-CM

## 2023-09-14 DIAGNOSIS — D69.6 THROMBOCYTOPENIA, UNSPECIFIED (CMS-HCC): ICD-10-CM

## 2023-09-14 DIAGNOSIS — E78.5 HYPERLIPIDEMIA, UNSPECIFIED HYPERLIPIDEMIA TYPE: ICD-10-CM

## 2023-09-14 DIAGNOSIS — Z79.4 TYPE 2 DIABETES MELLITUS WITHOUT COMPLICATION, WITH LONG-TERM CURRENT USE OF INSULIN (MULTI): ICD-10-CM

## 2023-09-14 DIAGNOSIS — G45.9 TIA (TRANSIENT ISCHEMIC ATTACK): ICD-10-CM

## 2023-09-14 DIAGNOSIS — G62.9 POLYNEUROPATHY: ICD-10-CM

## 2023-09-14 DIAGNOSIS — D64.9 ANEMIA, UNSPECIFIED TYPE: ICD-10-CM

## 2023-09-14 DIAGNOSIS — E87.5 HYPERKALEMIA: ICD-10-CM

## 2023-09-14 DIAGNOSIS — I48.91 ATRIAL FIBRILLATION, UNSPECIFIED TYPE (MULTI): ICD-10-CM

## 2023-09-14 DIAGNOSIS — G45.9 TIA (TRANSIENT ISCHEMIC ATTACK): Primary | ICD-10-CM

## 2023-09-14 DIAGNOSIS — Z86.73 HISTORY OF TIA (TRANSIENT ISCHEMIC ATTACK): ICD-10-CM

## 2023-09-14 PROBLEM — M75.111 NONTRAUMATIC INCOMPLETE TEAR OF RIGHT ROTATOR CUFF: Status: ACTIVE | Noted: 2019-06-24

## 2023-09-14 PROBLEM — M47.816 LUMBAR SPONDYLOSIS: Status: ACTIVE | Noted: 2021-03-03

## 2023-09-14 PROBLEM — G47.30 SLEEP APNEA: Status: ACTIVE | Noted: 2023-09-14

## 2023-09-14 PROBLEM — T81.41XA INFECTION OF SUPERFICIAL INCISIONAL SURGICAL SITE AFTER PROCEDURE: Status: ACTIVE | Noted: 2020-01-12

## 2023-09-14 PROBLEM — Z86.0100 HISTORY OF COLONIC POLYPS: Status: ACTIVE | Noted: 2019-08-09

## 2023-09-14 PROBLEM — M48.061 SPINAL STENOSIS OF LUMBAR REGION: Status: ACTIVE | Noted: 2020-10-23

## 2023-09-14 PROBLEM — Z86.010 HISTORY OF COLONIC POLYPS: Status: ACTIVE | Noted: 2019-08-09

## 2023-09-14 PROBLEM — R25.1 TREMOR: Status: ACTIVE | Noted: 2023-09-14
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
## 2023-09-14 PROBLEM — S61.219A LACERATION OF FINGER: Status: ACTIVE | Noted: 2023-09-14

## 2023-09-14 PROBLEM — H52.03 HYPEROPIA OF BOTH EYES: Status: ACTIVE | Noted: 2023-09-14

## 2023-09-14 PROBLEM — R26.81 GENERAL UNSTEADINESS: Status: ACTIVE | Noted: 2023-09-14

## 2023-09-14 PROBLEM — M70.61 TROCHANTERIC BURSITIS OF RIGHT HIP: Status: ACTIVE | Noted: 2021-08-25

## 2023-09-14 PROBLEM — G47.33 OBSTRUCTIVE SLEEP APNEA SYNDROME: Status: ACTIVE | Noted: 2023-09-14

## 2023-09-14 PROBLEM — G89.18 POSTOPERATIVE PAIN: Status: ACTIVE | Noted: 2023-09-14

## 2023-09-14 LAB
ALANINE AMINOTRANSFERASE (SGPT) (U/L) IN SER/PLAS: 18 U/L (ref 10–52)
ALBUMIN (G/DL) IN SER/PLAS: 4.2 G/DL (ref 3.4–5)
ALKALINE PHOSPHATASE (U/L) IN SER/PLAS: 40 U/L (ref 33–136)
ANION GAP IN SER/PLAS: 19 MMOL/L (ref 10–20)
ASPARTATE AMINOTRANSFERASE (SGOT) (U/L) IN SER/PLAS: 22 U/L (ref 9–39)
BASOPHILS (10*3/UL) IN BLOOD BY AUTOMATED COUNT: 0.03 X10E9/L (ref 0–0.1)
BASOPHILS/100 LEUKOCYTES IN BLOOD BY AUTOMATED COUNT: 0.3 % (ref 0–2)
BILIRUBIN TOTAL (MG/DL) IN SER/PLAS: 1 MG/DL (ref 0–1.2)
CALCIUM (MG/DL) IN SER/PLAS: 9.4 MG/DL (ref 8.6–10.6)
CARBON DIOXIDE, TOTAL (MMOL/L) IN SER/PLAS: 23 MMOL/L (ref 21–32)
CHLORIDE (MMOL/L) IN SER/PLAS: 103 MMOL/L (ref 98–107)
COBALAMIN (VITAMIN B12) (PG/ML) IN SER/PLAS: 1114 PG/ML (ref 211–911)
CREATININE (MG/DL) IN SER/PLAS: 0.93 MG/DL (ref 0.5–1.3)
EOSINOPHILS (10*3/UL) IN BLOOD BY AUTOMATED COUNT: 0.31 X10E9/L (ref 0–0.4)
EOSINOPHILS/100 LEUKOCYTES IN BLOOD BY AUTOMATED COUNT: 3.3 % (ref 0–6)
ERYTHROCYTE DISTRIBUTION WIDTH (RATIO) BY AUTOMATED COUNT: 13.9 % (ref 11.5–14.5)
ERYTHROCYTE MEAN CORPUSCULAR HEMOGLOBIN CONCENTRATION (G/DL) BY AUTOMATED: 34.3 G/DL (ref 32–36)
ERYTHROCYTE MEAN CORPUSCULAR VOLUME (FL) BY AUTOMATED COUNT: 108 FL (ref 80–100)
ERYTHROCYTES (10*6/UL) IN BLOOD BY AUTOMATED COUNT: 3.37 X10E12/L (ref 4.5–5.9)
ESTIMATED AVERAGE GLUCOSE FOR HBA1C: 148 MG/DL
GFR MALE: 82 ML/MIN/1.73M2
GLUCOSE (MG/DL) IN SER/PLAS: 175 MG/DL (ref 74–99)
HEMATOCRIT (%) IN BLOOD BY AUTOMATED COUNT: 36.4 % (ref 41–52)
HEMOGLOBIN (G/DL) IN BLOOD: 12.5 G/DL (ref 13.5–17.5)
HEMOGLOBIN A1C/HEMOGLOBIN TOTAL IN BLOOD: 6.8 %
IMMATURE GRANULOCYTES/100 LEUKOCYTES IN BLOOD BY AUTOMATED COUNT: 0.5 % (ref 0–0.9)
LEUKOCYTES (10*3/UL) IN BLOOD BY AUTOMATED COUNT: 9.5 X10E9/L (ref 4.4–11.3)
LYMPHOCYTES (10*3/UL) IN BLOOD BY AUTOMATED COUNT: 2.23 X10E9/L (ref 0.8–3)
LYMPHOCYTES/100 LEUKOCYTES IN BLOOD BY AUTOMATED COUNT: 23.6 % (ref 13–44)
MONOCYTES (10*3/UL) IN BLOOD BY AUTOMATED COUNT: 0.7 X10E9/L (ref 0.05–0.8)
MONOCYTES/100 LEUKOCYTES IN BLOOD BY AUTOMATED COUNT: 7.4 % (ref 2–10)
NEUTROPHILS (10*3/UL) IN BLOOD BY AUTOMATED COUNT: 6.13 X10E9/L (ref 1.6–5.5)
NEUTROPHILS/100 LEUKOCYTES IN BLOOD BY AUTOMATED COUNT: 64.9 % (ref 40–80)
NRBC (PER 100 WBCS) BY AUTOMATED COUNT: 0 /100 WBC (ref 0–0)
PLATELETS (10*3/UL) IN BLOOD AUTOMATED COUNT: 143 X10E9/L (ref 150–450)
POTASSIUM (MMOL/L) IN SER/PLAS: 4.8 MMOL/L (ref 3.5–5.3)
PROTEIN TOTAL: 6.3 G/DL (ref 6.4–8.2)
SODIUM (MMOL/L) IN SER/PLAS: 140 MMOL/L (ref 136–145)
UREA NITROGEN (MG/DL) IN SER/PLAS: 26 MG/DL (ref 6–23)

## 2023-09-14 PROCEDURE — 1036F TOBACCO NON-USER: CPT | Performed by: STUDENT IN AN ORGANIZED HEALTH CARE EDUCATION/TRAINING PROGRAM

## 2023-09-14 PROCEDURE — 1159F MED LIST DOCD IN RCRD: CPT | Performed by: STUDENT IN AN ORGANIZED HEALTH CARE EDUCATION/TRAINING PROGRAM

## 2023-09-14 PROCEDURE — 85025 COMPLETE CBC W/AUTO DIFF WBC: CPT

## 2023-09-14 PROCEDURE — 3078F DIAST BP <80 MM HG: CPT | Performed by: STUDENT IN AN ORGANIZED HEALTH CARE EDUCATION/TRAINING PROGRAM

## 2023-09-14 PROCEDURE — 1125F AMNT PAIN NOTED PAIN PRSNT: CPT | Performed by: STUDENT IN AN ORGANIZED HEALTH CARE EDUCATION/TRAINING PROGRAM

## 2023-09-14 PROCEDURE — 82607 VITAMIN B-12: CPT

## 2023-09-14 PROCEDURE — 99496 TRANSJ CARE MGMT HIGH F2F 7D: CPT | Performed by: STUDENT IN AN ORGANIZED HEALTH CARE EDUCATION/TRAINING PROGRAM

## 2023-09-14 PROCEDURE — 80053 COMPREHEN METABOLIC PANEL: CPT

## 2023-09-14 PROCEDURE — 83036 HEMOGLOBIN GLYCOSYLATED A1C: CPT

## 2023-09-14 PROCEDURE — 3075F SYST BP GE 130 - 139MM HG: CPT | Performed by: STUDENT IN AN ORGANIZED HEALTH CARE EDUCATION/TRAINING PROGRAM

## 2023-09-14 PROCEDURE — 36415 COLL VENOUS BLD VENIPUNCTURE: CPT

## 2023-09-14 PROCEDURE — 1160F RVW MEDS BY RX/DR IN RCRD: CPT | Performed by: STUDENT IN AN ORGANIZED HEALTH CARE EDUCATION/TRAINING PROGRAM

## 2023-09-14 RX ORDER — GABAPENTIN 100 MG/1
CAPSULE ORAL
COMMUNITY
Start: 2023-09-08 | End: 2023-10-16 | Stop reason: SDUPTHER

## 2023-09-14 RX ORDER — INSULIN ASPART 100 [IU]/ML
INJECTION, SOLUTION INTRAVENOUS; SUBCUTANEOUS
COMMUNITY
Start: 2023-04-20 | End: 2023-10-17 | Stop reason: ALTCHOICE

## 2023-09-14 RX ORDER — HYDROCHLOROTHIAZIDE 25 MG/1
25 TABLET ORAL
COMMUNITY
End: 2023-10-31

## 2023-09-14 RX ORDER — APIXABAN 2.5 MG/1
2.5 TABLET, FILM COATED ORAL 2 TIMES DAILY
COMMUNITY
Start: 2023-09-08 | End: 2023-11-10 | Stop reason: HOSPADM

## 2023-09-14 RX ORDER — TRAMADOL HYDROCHLORIDE 50 MG/1
1 TABLET ORAL 2 TIMES DAILY PRN
COMMUNITY
End: 2023-11-17 | Stop reason: HOSPADM

## 2023-09-14 RX ORDER — INSULIN GLARGINE 100 [IU]/ML
INJECTION, SOLUTION SUBCUTANEOUS
COMMUNITY
Start: 2023-04-20 | End: 2023-10-17 | Stop reason: ALTCHOICE

## 2023-09-14 RX ORDER — GLIMEPIRIDE 2 MG/1
TABLET ORAL EVERY 24 HOURS
Status: ON HOLD | COMMUNITY
End: 2023-11-10 | Stop reason: ALTCHOICE

## 2023-09-14 NOTE — PROGRESS NOTES
83-year-old male presenting for hospital follow-up.  Admitted to [facility] 8/15/2023 - 8/18/2023.  Discharged to SNF 8/18/2023 - 9/9/2023.  Patient with TIA symptoms, upper extremity paresthesia and dysarthria.  Resolved while at the ED.  Originally put in for observation, then admitted to inpatient, followed by SNF discharge.  Was seen by neuro and cardio.  Was found to be in A-fib, which is known to patient.  Was previously taken off Xarelto earlier in the year due to potential GI bleed.  Was restarted at Eliquis 2.5 mg twice daily, aspirin 81 mg daily.  He is tolerating well.  At SNF, worked on balance issues, states that there is some improvement, still using rollator.  Now getting home health.    DMII  Was put on insulin basal bolus regimen, back on Soliqua, blood pressures look good.  Down to 15 units daily, having blood sugars down into the low 80s, asymptomatic, though on beta-blocker.    No other new concerns or interval changes.  Patient doing relatively well, at baseline.  Denies chest pain/shortness of breath/palpitations.  Has follow-up with cardiology outpatient within 2 weeks.    12 point ROS reviewed and negative other than as stated in HPI    General: Alert, oriented, pleasant, in no acute distress  HEENT:      Head: normocephalic, atraumatic;      eyes: EOMI, no scleral icterus;   CV: Mostly sinus rhythm with ectopy, no murmur  Lungs: CTAB without wheezing, rhonchi or rales; good respiratory effort, no increased work of breathing  Abdomen: soft, non-tender, non-distended, no masses appreciated  Extremities: no edema, no cyanosis  Neuro: Cranial nerves grossly intact; alert and oriented  Psych: Appropriate mood and affect    1. TIA 2.  CAD with remote CABG, 2021, A-fib 3.  HTN 4.  HLD 5.  History of TIA  - Follows with cardiology and neurology  - Blood pressure at goal in office  - Continue amlodipine 5 mg daily, lisinopril 20 mg daily, metoprolol succinate 50 mg daily  -Continue aspirin 81 mg  daily, Eliquis 2.5 mg twice daily atorvastatin 40 mg daily     6.  DMII  - Currently on Soliqua 15 units daily, metformin 1000 mg twice daily  -No recent ophthalmology exam, referral ordered at last appointment  - Never established with podiatry, diabetic foot exam positive in office, referral ordered at last appointment  -Most recent A1c below 8  - We will discuss with [facility] pharmacist, would like to get him off insulin altogether, trial maximize GLP-1, consider SGLT2 for further treatment that is necessary     7.  History of GI bleed  - Repeat CBC     8.  Diabetic neuropathy 9.  Disequilibrium  - Continue with home health, PT/OT    Follow-up 12 weeks, sooner if indicated    Christopher D'Amico, DO

## 2023-09-15 NOTE — RESULT ENCOUNTER NOTE
NUTRITIONAL RE- ASSESSMENT GLYCEMIC CONTROL/ PLAN OF CARE Wilda Newby           37 y.o.           2/26/2019 1. Sepsis, due to unspecified organism ([facility] [address] 2. Hypotension, unspecified hypotension type Patient Active Problem List  
Diagnosis Code     
 UTI (urinary tract infection) N39.0  DM (diabetes mellitus) ([facility] [address] E11.9  Acute on chronic respiratory failure with hypoxia (HCC) J96.21  
 GERD (gastroesophageal reflux disease) K21.9  Quadriplegia ([facility] [address] G82.50 INTERVENTIONS/PLAN:  
Changed supplement to Loftware tid at pt's request. 
 Encouraged increased intake at meals to meet calorie and protein requirements. ASSESSMENT:  
Nutritional Status: 
Pt is underweight related to inadequate caloric intake as evidenced by 85% ideal weight and BMI= 19.7kg/m2 . Pt with increased calorie and protein requirements related to multiple stage 3 and one stage 4 sacral ulcers. Pt with trach collar, and quadriplegia fed by RN. Observed intake of breakfast 90% of meal. 
Altered nutrition-related lab values: pt meets criteria for prediabetes as evidenced by A1c of 6.0%, well controlled on DM meds. Diabetes Management:  
Recent blood glucose: 
Lab Results Component Value Date/Time GLU 82 02/28/2019 04:00 AM  
 GLUCPOC 170 (H) 02/28/2019 11:54 AM  
 GLUCPOC 92 02/28/2019 08:56 AM  
 GLUCPOC 86 02/27/2019 10:23 PM  
Within target range (non-ICU: <140; ICU<180): [x] Yes   []  No 
Current Insulin regimen: corrective lispro Home medication/insulin regimen:   
Key Antihyperglycemic Medications   
    
  
 glipiZIDE SR (GLUCOTROL XL) 10 mg CR tablet   
 metFORMIN (GLUCOPHAGE) 500 mg tablet Take  by mouth two (2) times daily (with meals). HbA1c: 6%  equivalent  to ave Blood Glucose of 120 mg/dl for 2-3 months prior to admission Adequate glycemic control PTA:  [x] Yes  [] No 
  
SUBJECTIVE/OBJECTIVE:  
 
Diet: Diabetic Patient Vitals for the past 100 hrs: Vitamin B12 is above the upper limits of normal, no concern for loss of treatment issues.    Hemoglobin A1c down to 6.8, which is good, but does indicate as discussed in office that insulin is likely unnecessary.  Continue to follow with Leeann and try to eliminate it altogether.    Borderline anemia, elevated MCV, borderline low platelets.  Not far off from previous labs other than increased MCV, unclear etiology, but such mild anemia it is not concerning.  We will continue to monitor, no need for intervention at this time % Diet Eaten 02/27/19 1600 95 % Medications: [x]                Reviewed  
 multivitamin Labs:  
Lab Results Component Value Date/Time Hemoglobin A1c 6.0 (H) 02/23/2019 07:25 AM  
 
 
Lab Results Component Value Date/Time Sodium 141 02/28/2019 04:00 AM  
 Potassium 3.7 02/28/2019 04:00 AM  
 Chloride 109 (H) 02/28/2019 04:00 AM  
 CO2 27 02/28/2019 04:00 AM  
 Anion gap 5 02/28/2019 04:00 AM  
 Glucose 82 02/28/2019 04:00 AM  
 BUN 6 (L) 02/28/2019 04:00 AM  
 Creatinine 0.68 02/28/2019 04:00 AM  
 Calcium 8.0 (L) 02/28/2019 04:00 AM  
 Magnesium 1.9 02/28/2019 04:00 AM  
 Phosphorus 2.0 (L) 02/28/2019 04:00 AM  
 Albumin 2.8 (L) 02/26/2019 11:30 PM  
 
 
Anthropometrics: IBW : 67.1 kg (148 lb), % IBW (Calculated): 85.14 %, BMI (calculated): 19.7 Wt Readings from Last 1 Encounters:  
02/27/19 57.2 kg (126 lb) Ht Readings from Last 1 Encounters:  
02/27/19 5' 7\" (1.702 m) Estimated Nutrition Needs:  1995 Kcals/day, Protein (g): 86 g Fluid (ml): 2000 ml Based on:   [x]          Actual BW    []          ABW   []            Adjusted BW   
 
Nutrition Diagnoses:  
    
Meets Criteria for Chronic Malnutrition  
[x]Moderate Malnutrition, as evidenced by: 
 [x] Mild muscle wasting, loss of subcutaneous fat 
 [x] Nutritional intake <75% of recommended intake for >1 month 
 [] Weight loss of  5% in 1 month, 7.5% in 3 months, 10% in 6 months, or 20% in 1 year Nutrition Interventions: Ensure Enlive Supplements to provide additional calories and protein. Goal:  
Blood glucose will be within target range of  mg/dL by 3/2. Weight gain 1 lb. per week 3/9/19. Nutrition Monitoring and Evaluation   
 
[x]     Monitor po intake on meal rounds 
[x]     Continue inpatient monitoring and intervention 
[]     Other: 
 
 
Nutrition Risk:  [x]   High     []  Moderate    []  Minimal/Uncompromised Riddhi Etienne RD 
pgr [phone]

## 2023-10-02 ENCOUNTER — HOSPITAL ENCOUNTER (OUTPATIENT)
Dept: CARDIOLOGY | Facility: CLINIC | Age: 83
Discharge: HOME | End: 2023-10-02
Payer: MEDICARE

## 2023-10-02 DIAGNOSIS — R55 SYNCOPE AND COLLAPSE: ICD-10-CM

## 2023-10-02 DIAGNOSIS — Z45.09 ENCOUNTER FOR ADJUSTMENT AND MANAGEMENT OF OTHER CARDIAC DEVICE: ICD-10-CM

## 2023-10-02 DIAGNOSIS — Z45.09 ENCOUNTER FOR LOOP RECORDER CHECK: ICD-10-CM

## 2023-10-04 ENCOUNTER — HOSPITAL ENCOUNTER (OUTPATIENT)
Dept: CARDIOLOGY | Facility: CLINIC | Age: 83
Discharge: HOME | End: 2023-10-04
Payer: MEDICARE

## 2023-10-04 DIAGNOSIS — Z45.09 ENCOUNTER FOR LOOP RECORDER CHECK: ICD-10-CM

## 2023-10-04 DIAGNOSIS — R55 SYNCOPE AND COLLAPSE: ICD-10-CM

## 2023-10-07 DIAGNOSIS — I10 PRIMARY HYPERTENSION: Primary | ICD-10-CM

## 2023-10-08 DIAGNOSIS — N40.0 BENIGN PROSTATIC HYPERPLASIA WITHOUT LOWER URINARY TRACT SYMPTOMS: ICD-10-CM

## 2023-10-09 ENCOUNTER — HOSPITAL ENCOUNTER (OUTPATIENT)
Dept: CARDIOLOGY | Facility: CLINIC | Age: 83
Discharge: HOME | End: 2023-10-09
Payer: MEDICARE

## 2023-10-09 DIAGNOSIS — R55 SYNCOPE AND COLLAPSE: ICD-10-CM

## 2023-10-10 RX ORDER — LISINOPRIL 20 MG/1
20 TABLET ORAL DAILY
Qty: 90 TABLET | Refills: 0 | Status: ON HOLD | OUTPATIENT
Start: 2023-10-10 | End: 2023-11-17 | Stop reason: SDUPTHER

## 2023-10-11 ENCOUNTER — HOSPITAL ENCOUNTER (OUTPATIENT)
Dept: CARDIOLOGY | Facility: CLINIC | Age: 83
Discharge: HOME | End: 2023-10-11
Payer: MEDICARE

## 2023-10-11 DIAGNOSIS — R55 SYNCOPE AND COLLAPSE: ICD-10-CM

## 2023-10-16 ENCOUNTER — APPOINTMENT (OUTPATIENT)
Dept: CARDIOLOGY | Facility: CLINIC | Age: 83
End: 2023-10-16
Payer: MEDICARE

## 2023-10-16 ENCOUNTER — HOSPITAL ENCOUNTER (OUTPATIENT)
Dept: CARDIOLOGY | Facility: CLINIC | Age: 83
Discharge: HOME | End: 2023-10-16
Payer: MEDICARE

## 2023-10-16 DIAGNOSIS — R55 SYNCOPE AND COLLAPSE: ICD-10-CM

## 2023-10-16 DIAGNOSIS — G62.9 NEUROPATHY: ICD-10-CM

## 2023-10-16 PROBLEM — M19.90 UNSPECIFIED OSTEOARTHRITIS, UNSPECIFIED SITE: Status: ACTIVE | Noted: 2023-08-18

## 2023-10-16 PROBLEM — M21.372 FOOT DROP, LEFT FOOT: Status: ACTIVE | Noted: 2023-08-18

## 2023-10-16 PROBLEM — K59.00 CONSTIPATION: Status: RESOLVED | Noted: 2023-06-07 | Resolved: 2023-10-16

## 2023-10-16 PROBLEM — R06.02 SOB (SHORTNESS OF BREATH) ON EXERTION: Status: RESOLVED | Noted: 2023-06-07 | Resolved: 2023-10-16

## 2023-10-16 PROBLEM — Z95.1 PRESENCE OF AORTOCORONARY BYPASS GRAFT: Status: ACTIVE | Noted: 2023-08-18

## 2023-10-16 PROBLEM — M21.371 FOOT DROP, RIGHT FOOT: Status: ACTIVE | Noted: 2023-08-18

## 2023-10-16 PROBLEM — H52.4 BILATERAL PRESBYOPIA: Status: ACTIVE | Noted: 2023-10-16
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
## 2023-10-16 PROBLEM — R53.1 ASTHENIA: Status: ACTIVE | Noted: 2023-10-16

## 2023-10-16 PROBLEM — G89.18 POSTOPERATIVE PAIN: Status: RESOLVED | Noted: 2023-09-14 | Resolved: 2023-10-16

## 2023-10-16 PROBLEM — E11.40 TYPE 2 DIABETES MELLITUS WITH DIABETIC NEUROPATHY, UNSPECIFIED (MULTI): Status: RESOLVED | Noted: 2023-08-18 | Resolved: 2023-10-16

## 2023-10-16 PROBLEM — R40.1 CLOUDED CONSCIOUSNESS: Status: ACTIVE | Noted: 2023-10-16

## 2023-10-16 PROBLEM — I48.0 PAROXYSMAL ATRIAL FIBRILLATION (MULTI): Status: ACTIVE | Noted: 2023-10-16

## 2023-10-16 PROBLEM — M62.81 MUSCLE WEAKNESS (GENERALIZED): Status: RESOLVED | Noted: 2023-08-20 | Resolved: 2023-10-16

## 2023-10-16 PROBLEM — G25.0 ESSENTIAL TREMOR: Status: ACTIVE | Noted: 2023-10-16

## 2023-10-16 PROBLEM — E11.9 DIABETES MELLITUS (MULTI): Status: RESOLVED | Noted: 2023-06-07 | Resolved: 2023-10-16

## 2023-10-16 PROBLEM — Z96.1 BILATERAL PSEUDOPHAKIA: Status: ACTIVE | Noted: 2023-10-16

## 2023-10-16 PROBLEM — E11.40 TYPE 2 DIABETES MELLITUS WITH DIABETIC NEUROPATHY, UNSPECIFIED (MULTI): Status: ACTIVE | Noted: 2023-08-18

## 2023-10-16 PROBLEM — I10 BENIGN ESSENTIAL HYPERTENSION: Status: ACTIVE | Noted: 2023-10-16

## 2023-10-16 PROBLEM — M62.81 MUSCLE WEAKNESS (GENERALIZED): Status: ACTIVE | Noted: 2023-08-20

## 2023-10-16 RX ORDER — GABAPENTIN 100 MG/1
CAPSULE ORAL
Qty: 90 CAPSULE | Refills: 0 | Status: SHIPPED | OUTPATIENT
Start: 2023-10-16 | End: 2024-01-29 | Stop reason: SDUPTHER

## 2023-10-16 RX ORDER — AMLODIPINE BESYLATE 5 MG/1
1 TABLET ORAL DAILY
COMMUNITY
End: 2023-11-17 | Stop reason: HOSPADM

## 2023-10-16 RX ORDER — LIDOCAINE 560 MG/1
1 PATCH PERCUTANEOUS; TOPICAL; TRANSDERMAL DAILY
COMMUNITY
Start: 2023-08-29 | End: 2023-10-31

## 2023-10-16 RX ORDER — IPRATROPIUM BROMIDE AND ALBUTEROL SULFATE 2.5; .5 MG/3ML; MG/3ML
3 SOLUTION RESPIRATORY (INHALATION) EVERY 4 HOURS PRN
COMMUNITY
Start: 2023-09-01 | End: 2023-10-31

## 2023-10-16 RX ORDER — SOLIFENACIN SUCCINATE 5 MG/1
1 TABLET, FILM COATED ORAL DAILY
COMMUNITY
End: 2023-10-31

## 2023-10-17 ENCOUNTER — OFFICE VISIT (OUTPATIENT)
Dept: PRIMARY CARE | Facility: CLINIC | Age: 83
End: 2023-10-17
Payer: MEDICARE

## 2023-10-17 ENCOUNTER — HOSPITAL ENCOUNTER (OUTPATIENT)
Dept: CARDIOLOGY | Facility: CLINIC | Age: 83
Discharge: HOME | End: 2023-10-17
Payer: MEDICARE

## 2023-10-17 VITALS
HEIGHT: 71 IN | OXYGEN SATURATION: 100 % | DIASTOLIC BLOOD PRESSURE: 64 MMHG | SYSTOLIC BLOOD PRESSURE: 161 MMHG | BODY MASS INDEX: 23.52 KG/M2 | HEART RATE: 73 BPM | WEIGHT: 168 LBS

## 2023-10-17 DIAGNOSIS — R55 SYNCOPE AND COLLAPSE: ICD-10-CM

## 2023-10-17 DIAGNOSIS — R30.0 DYSURIA: ICD-10-CM

## 2023-10-17 DIAGNOSIS — N39.0 URINARY TRACT INFECTION WITHOUT HEMATURIA, SITE UNSPECIFIED: Primary | ICD-10-CM

## 2023-10-17 DIAGNOSIS — N40.0 BENIGN PROSTATIC HYPERPLASIA WITHOUT LOWER URINARY TRACT SYMPTOMS: Primary | ICD-10-CM

## 2023-10-17 DIAGNOSIS — E11.9 DIABETES MELLITUS TYPE 2 WITHOUT RETINOPATHY (MULTI): ICD-10-CM

## 2023-10-17 DIAGNOSIS — N40.0 BENIGN PROSTATIC HYPERPLASIA WITHOUT LOWER URINARY TRACT SYMPTOMS: ICD-10-CM

## 2023-10-17 PROCEDURE — 3077F SYST BP >= 140 MM HG: CPT | Performed by: STUDENT IN AN ORGANIZED HEALTH CARE EDUCATION/TRAINING PROGRAM

## 2023-10-17 PROCEDURE — 1125F AMNT PAIN NOTED PAIN PRSNT: CPT | Performed by: STUDENT IN AN ORGANIZED HEALTH CARE EDUCATION/TRAINING PROGRAM

## 2023-10-17 PROCEDURE — 99214 OFFICE O/P EST MOD 30 MIN: CPT | Performed by: STUDENT IN AN ORGANIZED HEALTH CARE EDUCATION/TRAINING PROGRAM

## 2023-10-17 PROCEDURE — 1159F MED LIST DOCD IN RCRD: CPT | Performed by: STUDENT IN AN ORGANIZED HEALTH CARE EDUCATION/TRAINING PROGRAM

## 2023-10-17 PROCEDURE — 87181 SC STD AGAR DILUTION PER AGT: CPT

## 2023-10-17 PROCEDURE — 1036F TOBACCO NON-USER: CPT | Performed by: STUDENT IN AN ORGANIZED HEALTH CARE EDUCATION/TRAINING PROGRAM

## 2023-10-17 PROCEDURE — 87086 URINE CULTURE/COLONY COUNT: CPT

## 2023-10-17 PROCEDURE — 3078F DIAST BP <80 MM HG: CPT | Performed by: STUDENT IN AN ORGANIZED HEALTH CARE EDUCATION/TRAINING PROGRAM

## 2023-10-17 PROCEDURE — 1160F RVW MEDS BY RX/DR IN RCRD: CPT | Performed by: STUDENT IN AN ORGANIZED HEALTH CARE EDUCATION/TRAINING PROGRAM

## 2023-10-17 RX ORDER — FINASTERIDE 5 MG/1
5 TABLET, FILM COATED ORAL DAILY
Qty: 90 TABLET | Refills: 0 | OUTPATIENT
Start: 2023-10-17

## 2023-10-17 RX ORDER — CIPROFLOXACIN 250 MG/1
500 TABLET, FILM COATED ORAL 2 TIMES DAILY
Qty: 20 TABLET | Refills: 0 | Status: SHIPPED | OUTPATIENT
Start: 2023-10-17 | End: 2023-10-22

## 2023-10-17 RX ORDER — FINASTERIDE 5 MG/1
5 TABLET, FILM COATED ORAL DAILY
Qty: 30 TABLET | Refills: 5 | Status: SHIPPED | OUTPATIENT
Start: 2023-10-17

## 2023-10-17 RX ORDER — TAMSULOSIN HYDROCHLORIDE 0.4 MG/1
0.4 CAPSULE ORAL 2 TIMES DAILY
Qty: 180 CAPSULE | Refills: 1 | Status: SHIPPED | OUTPATIENT
Start: 2023-10-17

## 2023-10-17 ASSESSMENT — ENCOUNTER SYMPTOMS
OCCASIONAL FEELINGS OF UNSTEADINESS: 0
LOSS OF SENSATION IN FEET: 0
DEPRESSION: 0

## 2023-10-17 ASSESSMENT — COLUMBIA-SUICIDE SEVERITY RATING SCALE - C-SSRS
1. IN THE PAST MONTH, HAVE YOU WISHED YOU WERE DEAD OR WISHED YOU COULD GO TO SLEEP AND NOT WAKE UP?: NO
6. HAVE YOU EVER DONE ANYTHING, STARTED TO DO ANYTHING, OR PREPARED TO DO ANYTHING TO END YOUR LIFE?: NO
2. HAVE YOU ACTUALLY HAD ANY THOUGHTS OF KILLING YOURSELF?: NO

## 2023-10-17 ASSESSMENT — PATIENT HEALTH QUESTIONNAIRE - PHQ9
SUM OF ALL RESPONSES TO PHQ9 QUESTIONS 1 AND 2: 0
2. FEELING DOWN, DEPRESSED OR HOPELESS: NOT AT ALL
1. LITTLE INTEREST OR PLEASURE IN DOING THINGS: NOT AT ALL

## 2023-10-17 NOTE — PROGRESS NOTES
83-year-old male with extensive past medical history presenting for UTI.  Has not had finasteride  for couple weeks due to prescription issues, has started to develop increased urinary frequency/dysuria and now incontinence.  Family reports having some confusion throughout the day over the past couple days.    DMII  Stable has been doing well.  Sugars have been increasing over the last few days.  Has not heard from pharmacist long-term.    A-fib, TIA, HTN, HLD  Follow with cardiology, has EP appointment tomorrow, considering discontinuing Eliquis and starting watchman device.    12 point ROS reviewed and negative other than as stated in HPI    General: Alert, oriented, pleasant, in no acute distress  HEENT:      Head: normocephalic, atraumatic;      eyes: EOMI, no scleral icterus;   CV: Mostly sinus rhythm with ectopy, no murmur  Lungs: CTAB without wheezing, rhonchi or rales; good respiratory effort, no increased work of breathing  Abdomen: soft, non-tender, non-distended, no masses appreciated  Extremities: no edema, no cyanosis  Neuro: Cranial nerves grossly intact; alert and oriented  Psych: Appropriate mood and affect    #TIA #CAD with remote CABG, 2021 #A-fib #HTN #HLD  - Follows with cardiology and neurology  - Continue amlodipine 5 mg daily, lisinopril 20 mg daily, metoprolol succinate 50 mg daily  -Continue aspirin 81 mg daily, Eliquis 2.5 mg twice daily atorvastatin 40 mg daily  -Following with cardiology, has EP scheduled for tomorrow, consideration of Watchman device and discontinuation of anticoagulation due to history of GI bleed     #DMII  - Currently on Soliqua 15 units daily, metformin 1000 mg twice daily  -No recent ophthalmology exam, referral ordered at last appointment  - Never established with podiatry, diabetic foot exam positive in office, referral ordered at last appointment  -Most recent A1c below 8  - [facility] pharmacist to contact, we were trying to get him off of insulin altogether, possible  addition of SGLT2, GLP-1 by itself     #History of GI bleed  - Repeat CBCs have been stable     #UTI  - Has had success with Cipro in the past, bad reaction to other antibiotics  - Rx ciprofloxacin 250 mg twice daily x5 days  - Urine sent out for culture    #BPH  - Reorder finasteride 5 mg daily, continue tamsulosin 0.4 mg daily    Follow-up as scheduled in December    Christopher D'Amico, DO

## 2023-10-18 ENCOUNTER — OFFICE VISIT (OUTPATIENT)
Dept: CARDIOLOGY | Facility: CLINIC | Age: 83
End: 2023-10-18
Payer: MEDICARE

## 2023-10-18 ENCOUNTER — HOSPITAL ENCOUNTER (OUTPATIENT)
Dept: CARDIOLOGY | Facility: CLINIC | Age: 83
Discharge: HOME | End: 2023-10-18
Payer: MEDICARE

## 2023-10-18 VITALS
BODY MASS INDEX: 24.18 KG/M2 | DIASTOLIC BLOOD PRESSURE: 69 MMHG | SYSTOLIC BLOOD PRESSURE: 149 MMHG | OXYGEN SATURATION: 99 % | WEIGHT: 173.4 LBS | HEART RATE: 85 BPM

## 2023-10-18 DIAGNOSIS — R55 SYNCOPE AND COLLAPSE: ICD-10-CM

## 2023-10-18 DIAGNOSIS — I48.0 PAROXYSMAL ATRIAL FIBRILLATION (MULTI): Primary | ICD-10-CM

## 2023-10-18 PROCEDURE — 3078F DIAST BP <80 MM HG: CPT | Performed by: INTERNAL MEDICINE

## 2023-10-18 PROCEDURE — 1159F MED LIST DOCD IN RCRD: CPT | Performed by: INTERNAL MEDICINE

## 2023-10-18 PROCEDURE — 1160F RVW MEDS BY RX/DR IN RCRD: CPT | Performed by: INTERNAL MEDICINE

## 2023-10-18 PROCEDURE — 99214 OFFICE O/P EST MOD 30 MIN: CPT | Performed by: INTERNAL MEDICINE

## 2023-10-18 PROCEDURE — 1036F TOBACCO NON-USER: CPT | Performed by: INTERNAL MEDICINE

## 2023-10-18 PROCEDURE — 3077F SYST BP >= 140 MM HG: CPT | Performed by: INTERNAL MEDICINE

## 2023-10-18 PROCEDURE — 1126F AMNT PAIN NOTED NONE PRSNT: CPT | Performed by: INTERNAL MEDICINE

## 2023-10-18 PROCEDURE — 99214 OFFICE O/P EST MOD 30 MIN: CPT | Mod: PO | Performed by: INTERNAL MEDICINE

## 2023-10-18 ASSESSMENT — LIFESTYLE VARIABLES
AUDIT-C TOTAL SCORE: 0
HOW OFTEN DO YOU HAVE SIX OR MORE DRINKS ON ONE OCCASION: NEVER
SKIP TO QUESTIONS 9-10: 1
HOW MANY STANDARD DRINKS CONTAINING ALCOHOL DO YOU HAVE ON A TYPICAL DAY: PATIENT DOES NOT DRINK
HOW OFTEN DO YOU HAVE A DRINK CONTAINING ALCOHOL: NEVER

## 2023-10-18 ASSESSMENT — PAIN SCALES - GENERAL: PAINLEVEL: 0-NO PAIN

## 2023-10-18 ASSESSMENT — ENCOUNTER SYMPTOMS: OCCASIONAL FEELINGS OF UNSTEADINESS: 1

## 2023-10-18 NOTE — PROGRESS NOTES
PCP: Dr. D'amico  Cardio: Dr. Grant     I was asked by Dr. Grant to evaluate this patient in consultation for evaluation of left atrial appendage closure.    The patient is a 83-year-old male with past medical history of CAD s/p CABG 2011, dyslipidemia and GI bleed last year when his gastroenterologist took off Xarelto.  He is currently on Eliquis and has not had any more GI bleeds.  Of note patient has had multiple TIAs in the past as per the patient and his wife here to TIAs in 2019 and another one in 2021 patient has a loop recorder which showed A-fib.  Patient had another episode of TIA in August 2023 when he was in a grocery store and he fell down and walk or speak.  Patient has also had multiple falls in the past and has had multiple ER visits in the past for falls.  Because of his diabetic neuropathy patient uses braces to maintain his balance and uses walker to walk and states he has very poor balance .  The patient has normal LVEF.    Given the patient's significant GI bleed, falls, TIAs,  the patient is referred for consideration of left atrial appendage closure for stroke risk reduction.       ROS:  Constitutional: not feeling tired, not feeling poorly, no fever and no chills.   Eyes: no eyesight problems, no blurred vision, no diplopia, no eye pain, no purulent discharge from the eyes, eyes not red, no dryness of the eyes and no itching of the eyes.   ENT: no nosebleeds, no hearing loss, no tinnitus, no earache, no sore throat, no hoarseness, no swollen glands in the neck and no nasal discharge.   Cardiovascular: no intermittent leg claudication, no chest pain, no tightness or heavy pressure, no shortness of breath, no palpitations, no lower extremity edema, the heart rate was not slow, the heart rate was not fast and as noted in HPI.   Respiratory: no chronic cough, not coughing up sputum,  no wheezing that is consistent with asthma, no asthma, no orthopnea and no postural nocturnal dyspnea.    Gastrointestinal: no change in bowel habits, no blood in stools, no diarrhea, no constipation, no nausea, no vomiting, no abdominal pain, no signs and symptoms of ulcer disease, no kristina colored stools and no intolerance to fatty foods.   Genitourinary: no hematuria,  no urinary frequency, no dysuria, no incontinence, no burning sensation during urination, no urinary hesitancy, no nocturia, no genital lesion, no testicular pain, urinary stream is not smaller and urinary stream does not start and stop.   Musculoskeletal: no arthralgias, no myalgias, no joint swelling, no joint stiffness, no muscle weakness, no back pain, no limb pain, no limb swelling and no difficulty walking.   Skin: no skin rashes, no change in skin color and pigmentation, no skin lesions and no skin lumps.   Neurological: no seizures, + frequent falls, no headaches, no dizziness, no tingling, no fainting and no limb weakness.   Psychiatric: no depression, not suicidal, no confusion, no memory lapses or loss, no anxiety, no personality change and no emotional problems.   Endocrine: no goiter, no thyroid disorder, no diabetes mellitus, no excessive thirst, no dry skin, no cold intolerance, no heat intolerance, no erectile dysfunction, no increased urinary frequency, no proptosis and no deepening of the voice.   Hematologic/Lymphatic: no bleeding issues.   All other systems have been reviewed and are negative for complaint.     Physical Exam:     Visit Vitals  /69 (BP Location: Left arm, Patient Position: Sitting, BP Cuff Size: Adult)   Pulse 85   Wt 78.7 kg (173 lb 6.4 oz)   SpO2 99%   BMI 24.18 kg/m²   Smoking Status Never   BSA 1.99 m²        Constitutional: alert and in no acute distress.   Eyes: no erythema, swelling or discharge from the eye .   Ears, Nose, Mouth, and Throat: external inspection of ears and nose is normal , lips, teeth, and gums are normal with good dentition  and oropharynx normal with no erythema, edema, exudate or  lesions .   Neck: neck is supple, symmetric, trachea midline, no masses  and no thyromegaly .   Pulmonary: no increased work of breathing or signs of respiratory distress , lungs clear to auscultation. , normal percussion of chest  and chest palpation normal .   Cardiovascular: RRR, no murmur,  no leg edema  Abdomen: abdomen non-tender, no masses  and no hepatomegaly .           Skin:  no skin lesions          Neurologic: non-focal neurologic examination.      Psychiatric judgment and insight is normal , oriented to person, place and time , normal mood and affect .       Labs:    Results for orders placed or performed during the hospital encounter of 10/17/23   Cardiac device check - Remote   Result Value Ref Range    BSA 1.95 m2          Medications:    Current Outpatient Medications   Medication Instructions    acetaminophen (Tylenol-ODT) 40 mg split rapid/chew split tablet 1 half tablet, oral, Daily    amLODIPine (Norvasc) 5 mg tablet 1 tablet, oral, Daily    aspirin 81 mg EC tablet 2 tablets, oral, Daily    atorvastatin (Lipitor) 40 mg tablet 1 tablet, oral, Daily    ciprofloxacin (CIPRO) 500 mg, oral, 2 times daily    Eliquis 2.5 mg tablet GIVE 1 TABLET BY MOUTH TWO TIMES A DAY FOR BLOOD THINNER    ferrous sulfate 325 (65 Fe) MG EC tablet 1 tablet, oral, Daily    finasteride (PROSCAR) 5 mg, oral, Daily    folic acid-vitamin B complex-vitamin C-selenium-zinc (Dialyvite) 3-70-15 mg-mcg-mg tablet 1 tablet, oral, Daily    furosemide (LASIX) 40 mg, oral, Daily    gabapentin (Neurontin) 100 mg capsule TAKE 1 CAPSULE BY MOUTH ONCE DAILY FOR NEUROPATHY PAIN    glimepiride (Amaryl) 2 mg tablet Every 24 hours    hydroCHLOROthiazide (HYDRODIURIL) 25 mg, oral, Daily RT    insulin glargine-lixisenatide (Soliqua 100/33) 100 unit-33 mcg/mL insulin pen 15 Units, subcutaneous, Nightly    ipratropium-albuteroL (Duo-Neb) 0.5-2.5 mg/3 mL nebulizer solution 3 mL, inhalation, Every 4 hours PRN    lidocaine (Lidocore) 4 % patch 1  patch, Topical, Daily, Apply to Right Knee     lisinopril 20 mg, oral, Daily    metFORMIN (Glucophage) 500 mg tablet 2 tablets, oral, 2 times daily    metoprolol succinate XL (Toprol-XL) 50 mg 24 hr tablet 1 tablet, oral, Daily    omeprazole (PriLOSEC) 40 mg DR capsule 1 capsule, oral, Daily    oxybutynin XL (DITROPAN-XL) 5 mg, oral, Daily, Do not crush, chew, or split.    solifenacin (VESIcare) 5 mg tablet 1 tablet, oral, Daily, Swallow tablet whole; do not crush, chew, or split.    tamsulosin (FLOMAX) 0.4 mg, oral, 2 times daily    traMADol (Ultram) 50 mg tablet GIVE 1 TABLET BY MOUTH TWICE DAILY AS NEEDED FOR PAIN          Assessment:      This is a 83-year-old gentleman with history of GI bleed, multiple falls requiring ER visits and multiple TIAs, who is also at increased risk of falls because of his diabetic neuropathy and use of braces/walker who was referred to us for left atrial appendage closure    The CHADS-VASC score is 6 and HAS-BLED score is 4. The patient is at increased risk of both bleeding and stroke.  As such the patient is a reasonable candidate for consideration of left atrial appendage occluder placement.    Today we discussed the left atrial appendage closure procedure. The patient was given written educational handout materials and watched an educational video. All risks, benefits and alternatives were discussed.     The risks discussed included but were not limited to vascular complications, sedation related complications, risk of MI, CVA, device embolization, pericardial tamponade and death. The patient verbalized understanding and decided to proceed.        Plan will be for preprocedural cardiac CT. Following device implant, strategy will be for dual antiplatelet therapy with aspirin and clopidogrel for 6 months then aspirin for life.     Thank you, Dr. Grant, for this consultation and for allowing me to participate in the care of this patient.

## 2023-10-19 ENCOUNTER — TELEPHONE (OUTPATIENT)
Dept: CARDIOLOGY | Facility: HOSPITAL | Age: 83
End: 2023-10-19
Payer: MEDICARE

## 2023-10-19 DIAGNOSIS — I48.91 ATRIAL FIBRILLATION, UNSPECIFIED TYPE (MULTI): ICD-10-CM

## 2023-10-20 ENCOUNTER — TELEPHONE (OUTPATIENT)
Dept: PRIMARY CARE | Facility: CLINIC | Age: 83
End: 2023-10-20
Payer: MEDICARE

## 2023-10-20 LAB — BACTERIA UR CULT: ABNORMAL

## 2023-10-20 NOTE — TELEPHONE ENCOUNTER
"Patients daughter Chantel called to ask your advice on what to do about her father's constant urination., says he has been going thru \"doubled depends\" and she doesn't know what to do , can you please call her @ [phone]  "

## 2023-10-23 ENCOUNTER — HOSPITAL ENCOUNTER (OUTPATIENT)
Dept: CARDIOLOGY | Facility: CLINIC | Age: 83
Discharge: HOME | End: 2023-10-23
Payer: MEDICARE

## 2023-10-23 ENCOUNTER — HOSPITAL ENCOUNTER (OUTPATIENT)
Facility: HOSPITAL | Age: 83
Setting detail: SURGERY ADMIT
End: 2023-10-23
Attending: INTERNAL MEDICINE | Admitting: INTERNAL MEDICINE
Payer: MEDICARE

## 2023-10-23 DIAGNOSIS — I48.91 ATRIAL FIBRILLATION, UNSPECIFIED TYPE (MULTI): ICD-10-CM

## 2023-10-23 DIAGNOSIS — R55 SYNCOPE AND COLLAPSE: ICD-10-CM

## 2023-10-23 PROCEDURE — 93298 REM INTERROG DEV EVAL SCRMS: CPT | Performed by: INTERNAL MEDICINE

## 2023-10-24 ENCOUNTER — LAB (OUTPATIENT)
Dept: LAB | Facility: LAB | Age: 83
End: 2023-10-24
Payer: MEDICARE

## 2023-10-24 DIAGNOSIS — I48.91 ATRIAL FIBRILLATION, UNSPECIFIED TYPE (MULTI): ICD-10-CM

## 2023-10-24 LAB
ANION GAP SERPL CALC-SCNC: 16 MMOL/L (ref 10–20)
BUN SERPL-MCNC: 21 MG/DL (ref 6–23)
CALCIUM SERPL-MCNC: 9.7 MG/DL (ref 8.6–10.6)
CHLORIDE SERPL-SCNC: 104 MMOL/L (ref 98–107)
CO2 SERPL-SCNC: 25 MMOL/L (ref 21–32)
CREAT SERPL-MCNC: 0.88 MG/DL (ref 0.5–1.3)
ERYTHROCYTE [DISTWIDTH] IN BLOOD BY AUTOMATED COUNT: 14.6 % (ref 11.5–14.5)
GFR SERPL CREATININE-BSD FRML MDRD: 86 ML/MIN/1.73M*2
GLUCOSE SERPL-MCNC: 100 MG/DL (ref 74–99)
HCT VFR BLD AUTO: 37 % (ref 41–52)
HGB BLD-MCNC: 12.4 G/DL (ref 13.5–17.5)
MCH RBC QN AUTO: 36.7 PG (ref 26–34)
MCHC RBC AUTO-ENTMCNC: 33.5 G/DL (ref 32–36)
MCV RBC AUTO: 110 FL (ref 80–100)
NRBC BLD-RTO: 0 /100 WBCS (ref 0–0)
PLATELET # BLD AUTO: 139 X10*3/UL (ref 150–450)
PMV BLD AUTO: 10.8 FL (ref 7.5–11.5)
POTASSIUM SERPL-SCNC: 4.4 MMOL/L (ref 3.5–5.3)
RBC # BLD AUTO: 3.38 X10*6/UL (ref 4.5–5.9)
SODIUM SERPL-SCNC: 141 MMOL/L (ref 136–145)
WBC # BLD AUTO: 6.4 X10*3/UL (ref 4.4–11.3)

## 2023-10-24 PROCEDURE — 85027 COMPLETE CBC AUTOMATED: CPT

## 2023-10-24 PROCEDURE — 36415 COLL VENOUS BLD VENIPUNCTURE: CPT

## 2023-10-24 PROCEDURE — 80048 BASIC METABOLIC PNL TOTAL CA: CPT

## 2023-10-25 ENCOUNTER — TELEPHONE (OUTPATIENT)
Dept: CARDIOLOGY | Facility: HOSPITAL | Age: 83
End: 2023-10-25
Payer: MEDICARE

## 2023-10-25 NOTE — TELEPHONE ENCOUNTER
Recived call from patients daughter. Pt is scheduled for [facility]O on 10/27. Currently being treated for UTI, under care of Urologist ,   Ua shows bacteruria and a culture is pending  October 24, 2023  >=100,000 CFU/ml Lactose positive gram negative bacilli   October 25, 2023  >=100,000 CFU/ml Escherichia coli Abnormal   Sens to macrobid which I eprescribed        Dr Avitia aware, and advised to cancel WATCHMAN procedure for 10/27/23. Daughter called back and aware . Instructed her to let us know how long patient would be on new antibiotic.

## 2023-10-27 ENCOUNTER — TELEPHONE (OUTPATIENT)
Dept: CARDIOLOGY | Facility: HOSPITAL | Age: 83
End: 2023-10-27
Payer: MEDICARE

## 2023-10-27 ENCOUNTER — HOSPITAL ENCOUNTER (OUTPATIENT)
Dept: RADIOLOGY | Facility: HOSPITAL | Age: 83
End: 2023-10-27
Payer: MEDICARE

## 2023-10-27 DIAGNOSIS — I48.91 ATRIAL FIBRILLATION, UNSPECIFIED TYPE (MULTI): ICD-10-CM

## 2023-10-30 ENCOUNTER — HOSPITAL ENCOUNTER (OUTPATIENT)
Dept: CARDIOLOGY | Facility: CLINIC | Age: 83
Discharge: HOME | End: 2023-10-30
Payer: MEDICARE

## 2023-10-30 DIAGNOSIS — R55 SYNCOPE AND COLLAPSE: ICD-10-CM

## 2023-10-31 ENCOUNTER — OFFICE VISIT (OUTPATIENT)
Dept: CARDIOLOGY | Facility: CLINIC | Age: 83
End: 2023-10-31
Payer: MEDICARE

## 2023-10-31 VITALS
SYSTOLIC BLOOD PRESSURE: 115 MMHG | HEIGHT: 71 IN | WEIGHT: 173.7 LBS | HEART RATE: 90 BPM | BODY MASS INDEX: 24.32 KG/M2 | OXYGEN SATURATION: 98 % | DIASTOLIC BLOOD PRESSURE: 58 MMHG

## 2023-10-31 DIAGNOSIS — I48.91 ATRIAL FIBRILLATION, UNSPECIFIED TYPE (MULTI): Primary | ICD-10-CM

## 2023-10-31 PROCEDURE — 99214 OFFICE O/P EST MOD 30 MIN: CPT | Performed by: NURSE PRACTITIONER

## 2023-10-31 PROCEDURE — 1126F AMNT PAIN NOTED NONE PRSNT: CPT | Performed by: NURSE PRACTITIONER

## 2023-10-31 PROCEDURE — 3074F SYST BP LT 130 MM HG: CPT | Performed by: NURSE PRACTITIONER

## 2023-10-31 PROCEDURE — 1036F TOBACCO NON-USER: CPT | Performed by: NURSE PRACTITIONER

## 2023-10-31 PROCEDURE — 1160F RVW MEDS BY RX/DR IN RCRD: CPT | Performed by: NURSE PRACTITIONER

## 2023-10-31 PROCEDURE — 99214 OFFICE O/P EST MOD 30 MIN: CPT | Mod: PO | Performed by: NURSE PRACTITIONER

## 2023-10-31 PROCEDURE — 1159F MED LIST DOCD IN RCRD: CPT | Performed by: NURSE PRACTITIONER

## 2023-10-31 PROCEDURE — 3078F DIAST BP <80 MM HG: CPT | Performed by: NURSE PRACTITIONER

## 2023-10-31 ASSESSMENT — ENCOUNTER SYMPTOMS
NEUROLOGICAL NEGATIVE: 1
OCCASIONAL FEELINGS OF UNSTEADINESS: 1
CARDIOVASCULAR NEGATIVE: 1
GASTROINTESTINAL NEGATIVE: 1
RESPIRATORY NEGATIVE: 1
LOSS OF SENSATION IN FEET: 0
DEPRESSION: 0
MUSCULOSKELETAL NEGATIVE: 1
CONSTITUTIONAL NEGATIVE: 1

## 2023-10-31 ASSESSMENT — PAIN SCALES - GENERAL: PAINLEVEL: 0-NO PAIN

## 2023-10-31 NOTE — PROGRESS NOTES
"Chief Complaint:   Follow up    History Of Present Illness:    .Mr Greene returns today in follow up.  He will get his Watchman device.  Denies chest pain, sob, palpitations or pedal edema.           Last Recorded Vitals:  Blood pressure 115/58, pulse 90, height 1.803 m (5' 11\"), weight 78.8 kg (173 lb 11.2 oz), SpO2 98 %.     Past Medical History:  Past Medical History:   Diagnosis Date    Personal history of other diseases of the circulatory system     History of cardiac disorder    Personal history of other endocrine, nutritional and metabolic disease     History of hypercholesterolemia        Past Surgical History:  Past Surgical History:   Procedure Laterality Date    MR HEAD ANGIO WO IV CONTRAST  2/17/2019    MR HEAD ANGIO WO IV CONTRAST [facility]    MR HEAD ANGIO WO IV CONTRAST  10/28/2022    MR HEAD ANGIO WO IV CONTRAST [facility]    MR HEAD ANGIO WO IV CONTRAST  8/15/2023    MR HEAD ANGIO WO IV CONTRAST [facility]    OTHER SURGICAL HISTORY  03/11/2019    Heart surgery    OTHER SURGICAL HISTORY  03/11/2019    Knee surgery    OTHER SURGICAL HISTORY  03/11/2019    Tonsillectomy       Social History:  Social History     Socioeconomic History    Marital status:      Spouse name: None    Number of children: None    Years of education: None    Highest education level: None   Occupational History    None   Tobacco Use    Smoking status: Never    Smokeless tobacco: Never   Vaping Use    Vaping Use: Never used   Substance and Sexual Activity    Alcohol use: Never    Drug use: Never    Sexual activity: None   Other Topics Concern    None   Social History Narrative    None     Social Determinants of Health     Financial Resource Strain: Not on file   Food Insecurity: Not on file   Transportation Needs: Not on file   Physical Activity: Not on file   Stress: Not on file   Social Connections: Not on file   Intimate Partner Violence: Not on file   Housing Stability: Not on file "       Family History:  Family History   Problem Relation Name Age of Onset    Other (cardiac disorder) Father      Diabetes Father      Hyperlipidemia Father      Hypertension Father      Other (liver carcinoma) Father      Other (hypercholesterolemia) Father      Hypertension Daughter           Allergies:  Famotidine, Prednisone, Amoxicillin, Donepezil, Gabapentin, Hydrochlorothiazide, Ibuprofen, Other, Oxycodone-acetaminophen, Tramadol hcl, and Oxycodone hcl    Outpatient Medications:  Current Outpatient Medications   Medication Sig Dispense Refill    acetaminophen (Tylenol-ODT) 40 mg split rapid/chew split tablet Take 1 half tablet (40 mg) by mouth once daily.      amLODIPine (Norvasc) 5 mg tablet Take 1 tablet (5 mg) by mouth once daily.      aspirin 81 mg EC tablet Take 2 tablets (162 mg) by mouth once daily.      atorvastatin (Lipitor) 40 mg tablet Take 1 tablet (40 mg) by mouth once daily.      Eliquis 2.5 mg tablet GIVE 1 TABLET BY MOUTH TWO TIMES A DAY FOR BLOOD THINNER      ferrous sulfate 325 (65 Fe) MG EC tablet Take 1 tablet (325 mg) by mouth once daily.      finasteride (Proscar) 5 mg tablet Take 1 tablet (5 mg) by mouth once daily. 30 tablet 5    folic acid-vitamin B complex-vitamin C-selenium-zinc (Dialyvite) 3-70-15 mg-mcg-mg tablet Take 1 tablet by mouth once daily.      furosemide (Lasix) 40 mg tablet Take 1 tablet (40 mg) by mouth once daily. 30 tablet 11    gabapentin (Neurontin) 100 mg capsule TAKE 1 CAPSULE BY MOUTH ONCE DAILY FOR NEUROPATHY PAIN 90 capsule 0    glimepiride (Amaryl) 2 mg tablet once every 24 hours.      insulin glargine-lixisenatide (Soliqua 100/33) 100 unit-33 mcg/mL insulin pen Inject 15 Units under the skin once daily at bedtime. 15 mL 1    lisinopril 20 mg tablet Take 1 tablet by mouth once daily 90 tablet 0    metFORMIN (Glucophage) 500 mg tablet Take 2 tablets (1,000 mg) by mouth 2 times a day.      metoprolol succinate XL (Toprol-XL) 50 mg 24 hr tablet Take 1 tablet (50  mg) by mouth once daily.      omeprazole (PriLOSEC) 40 mg DR capsule Take 1 capsule (40 mg) by mouth once daily.      oxybutynin XL (Ditropan-XL) 5 mg 24 hr tablet Take 1 tablet (5 mg) by mouth once daily. Do not crush, chew, or split. 30 tablet 11    tamsulosin (Flomax) 0.4 mg 24 hr capsule Take 1 capsule (0.4 mg) by mouth 2 times a day. 180 capsule 1    traMADol (Ultram) 50 mg tablet GIVE 1 TABLET BY MOUTH TWICE DAILY AS NEEDED FOR PAIN       No current facility-administered medications for this visit.        Physical Exam:  Cardiovascular:      PMI at left midclavicular line. Normal rate. Regular rhythm. Normal S1. Normal S2.       Murmurs: There is no murmur.      No gallop.  No click. No rub.   Pulses:     Intact distal pulses.   Edema:     Peripheral edema absent.         ROS:  Review of Systems   Constitutional: Negative.   Cardiovascular: Negative.    Respiratory: Negative.     Skin: Negative.    Musculoskeletal: Negative.    Gastrointestinal: Negative.    Genitourinary: Negative.    Neurological: Negative.           Last Labs:  CBC -  Lab Results   Component Value Date    WBC 6.4 10/24/2023    HGB 12.4 (L) 10/24/2023    HCT 37.0 (L) 10/24/2023     (H) 10/24/2023     (L) 10/24/2023       CMP -  Lab Results   Component Value Date    CALCIUM 9.7 10/24/2023    PROT 6.3 (L) 09/14/2023    ALBUMIN 4.2 09/14/2023    ALBUMIN 4.0 06/20/2023    AST 22 09/14/2023    ALT 18 09/14/2023    ALKPHOS 40 09/14/2023    BILITOT 1.0 09/14/2023       LIPID PANEL -   Lab Results   Component Value Date    CHOL 81 (L) 08/15/2023    TRIG 109 08/15/2023    HDL 28 (L) 08/15/2023    CHHDL 2.9 08/15/2023    LDLF 37 08/22/2022    VLDL 11 08/22/2022       RENAL FUNCTION PANEL -   Lab Results   Component Value Date    GLUCOSE 100 (H) 10/24/2023     10/24/2023    K 4.4 10/24/2023     10/24/2023    CO2 25 10/24/2023    ANIONGAP 16 10/24/2023    BUN 21 10/24/2023    CREATININE 0.88 10/24/2023    GFRMALE 82 09/14/2023     CALCIUM 9.7 10/24/2023    ALBUMIN 4.2 09/14/2023    ALBUMIN 4.0 06/20/2023        Lab Results   Component Value Date    HGBA1C 6.8 (A) 09/14/2023    HGBA1C 9.7 (H) 04/19/2023         Assessment/Plan   Problem List Items Addressed This Visit    None  Impressions     Assessment:     1. Coronary artery disease with remote CABG Ã--2 2011. This patient did develop recurrent angina in 2013 and underwent a repeat cardiac catheterization demonstrating closure of the original bypass grafts but with collateral circulation and no PCI was required.     2. History of TIA. This patient did have a TIA following his cardiac catheterization with transient confusion on the day of discharge. The patient was admitted to [facility] on 02/17/2019 with numbness of his left arm hand and ultimately jaw. The numbness of the left hand and jaw resolved but persisted somewhat longer within the left arm. Evaluation at that time included a chest x-ray showing previous sternotomy with clear lung fields. Carotid ultrasound showed mild bilateral plaque less than 50% stenoses. CT angiogram of the head and neck was unremarkable with nonstenotic calcification of the right common carotid artery without stenosis. The left common carotid artery had a nonstenotic calcification with a patent internal carotid artery. His intracerebral vessels were unremarkable with no aneurysm. An MRI of the brain on 02/17/2019 was unremarkable. He had an MRI of the cervical spine that showed minimal disc bulging at C2-C3 with moderate disc osteophyte complex at C3-C4 causing moderate canals narrowing and cord impingement along with bilateral neural foraminal stenoses. There was an osteophyte complex at C4-C5 causing mild anterior cord impingement and bilateral moderate neural foraminal narrowing and disc bulging at C5-C6 without cord impingement along with anterior subluxation of C7 upon T1. EKG showed sinus rhythm with first-degree AV block and no ST segment  change. There was no evidence of atrial fibrillation by telemetry monitoring. His high sensitivity troponins were negative. He had an echocardiogram performed on 02/17/2019 showing an estimated LV ejection fraction at 55â€“59 percent with mild hypokinesis of the anteroseptal wall due to previous thoracotomy. Left atrial size was in the upper range of normal with mild aortic root calcification and a mildly dilated right atrium. The patient was readmitted to [facility] on 06/04/2019 with recurrent neurological complaints. He had pins and needles paresthesias of the left hand and arm along with perceived weakness of the left hand and arm with loss of coordination. The patient underwent repeat evaluation including MRI of the brain which was unremarkable. Negligible carotid vascular disease by ultrasound and CT angiogram in the past. Blood pressure readings were acceptable at that time with lisinopril 40 mg daily amlodipine 5 mg daily metoprolol extended release 5 mg daily. He was on aspirin at that time along with the atorvastatin 40 mg daily. More recently the patient [place] he began to develop paresthesias of the right hand and fingertips. Initially he thought that an Ace wrap of his right shoulder was too tight and continued driving. The sense of tingling began to get worse and he developed a funny sensation around the right side of his face involving the lip. The patient pulled his car off to the side of the road. At that point in time he was having trouble communicating verbally due to the inability to formulate words. He was taken by EMS to [facility] and observed but then released without admission. He is subsequently seen by neurology as an outpatient. He did have a extra no cardiac monitor performed on 02/05/2020â€“02/14/2020. This did not demonstrate atrial fibrillation. It did demonstrate some brief episodes of Mobitz 1 second degree AV  block. He also had one 8 beat run of nonsustained ventricular tachycardia. The patient has never had presyncopal or syncopal events but more notably paresthesias. The results of this external cardiac monitor were discussed with electrophysiology and at this point the patient will be scheduled to have a loop recorder implanted to ensure that he is not having clinically silent paroxysmal atrial fibrillation not detected on previous telemetry monitoring. The patient has been seen by neurology and was switched to Plavix 75 mg daily which will be continued unless there is documentation of paroxysmal atrial fibrillation by the loop recorder. Echocardiogram performed at time of today's visit 03/17/2020 demonstrates an LV ejection fraction of 55â€“60 percent with mild hypokinesis of the anteroseptal wall due to previous thoracotomy with oneâ€“2+ mitral valve regurgitation. The findings are very similar to the echo that was performed at [facility], [place], operated by [facility] in 2/2019. Patient had loop recorder placed 07/2020 by Dr Steven Flores. Had negative pharmacologic nuclear stress test done 01/2022. Patient had hospital stay at [facility] 08/15/2023 and was worked up for TIA vs migraine equivalent. Carotid US showed < 50% stenosis bilaterally. MRI of brain was negative and MRA was unremarkable. Echo showed EF 55-60%, 1+ MR and 1-2+ TR. See Dr Avitia for consideration for Watchman device.     3. Hypertension. Adequate control with present management which includes lisinopril 20 mg daily, amlodipine 5 mg daily and metoprolol ER 50 mg daily.     4. Hyperlipidemia. Good response to atorvastatin 40 mg daily. Lipid panel on 08/2022 includes cholesterol 77 LDL 37 HDL 29 triglycerides 537. FLP done 08/2022 chol 77, HDL 29 LDL 37 trig 53.     5. Type 2 diabetes.     6. Lifetime nonsmoking.     5. Status post right rotator cuff repair 11/11/2019. The patient had a right rotator cuff operation on 11 11/20/1990 evidently developed an infection that required  surgical debridement.     6. Hx of covid-19 vaccine #1 and #2.      7. Afib noted to loop recorder after back surgery 02/2022. Patient had laminectomy with Dr Allred 02/22/2022 and developed afib on his loop recorder. Has been started on Xarelto. ECG done prior shows NSR with long first degree AVB.      8. TIA. Patient has been admitted to [facility] on 4 occasions, the first being February 2019 for complaints of left hand numbness and difficulty finding words and left leg heaviness.Carotid ultrasound showed less than 50% stenosis bilaterally. CT angiogram of the head and neck was unremarkable. MRI MRA of the brain was negative for acute stroke. Later in 2019 the patient was again admitted with similar complaints while driving. He had a loop recorder implanted which did show 2 episodes of atrial tachycardia or atrial fibrillation the longest lasting 6 minutes. The burden was only 0.06%. In October 2022 he was treated for UTI with antibiotics and again admitted for similar complaints. Neurology recommended outpatient physical therapy for inner ear issue. CT angiogram was again negative, carotid ultrasound showed less than 50% stenosis bilaterally, MRI MRA did not show any acute infarct or intracerebral vascular stenosis. Echo done October 2022 showed an EF of 60%, RAD, mild MR, mild to moderate TR, mildly elevated RVSP, PASP 43 mmHg. He was seen by neurology for possible alternative mechanisms to his TIA and treated with magnesium citrate and possibly verapamil. Alternative explanation may be migraine equivalent or focal seizure. Neurology discharged him with referral for vestibular studies and PT for balance. Patient currently will follow-up with Dr. Garvey.      9. GI bleed. Patient with black stools. Had extensive work up with hemoc and can restart Eliquis.     10. AVB. Monitor worn 03/24/2023 showed HR  with 67 bpm average. First degree AVB. PVC 0.08%. PAC 0.64%.         Sandy Gar, APRN-CNP

## 2023-11-02 ENCOUNTER — HOSPITAL ENCOUNTER (OUTPATIENT)
Dept: CARDIOLOGY | Facility: CLINIC | Age: 83
Discharge: HOME | End: 2023-11-02
Payer: MEDICARE

## 2023-11-02 DIAGNOSIS — R55 SYNCOPE AND COLLAPSE: ICD-10-CM

## 2023-11-03 ENCOUNTER — HOSPITAL ENCOUNTER (OUTPATIENT)
Dept: CARDIOLOGY | Facility: CLINIC | Age: 83
Discharge: HOME | End: 2023-11-03
Payer: MEDICARE

## 2023-11-03 DIAGNOSIS — R55 SYNCOPE AND COLLAPSE: ICD-10-CM

## 2023-11-06 ENCOUNTER — HOSPITAL ENCOUNTER (OUTPATIENT)
Dept: CARDIOLOGY | Facility: CLINIC | Age: 83
Discharge: HOME | End: 2023-11-06
Payer: MEDICARE

## 2023-11-06 DIAGNOSIS — R55 SYNCOPE AND COLLAPSE: ICD-10-CM

## 2023-11-08 ENCOUNTER — TELEPHONE (OUTPATIENT)
Dept: CARDIOLOGY | Facility: HOSPITAL | Age: 83
End: 2023-11-08
Payer: MEDICARE

## 2023-11-08 ENCOUNTER — HOME HEALTH ADMISSION (OUTPATIENT)
Dept: HOME HEALTH SERVICES | Facility: HOME HEALTH | Age: 83
End: 2023-11-08
Payer: MEDICARE

## 2023-11-09 ENCOUNTER — HOSPITAL ENCOUNTER (OUTPATIENT)
Dept: CARDIOLOGY | Facility: CLINIC | Age: 83
Discharge: HOME | End: 2023-11-09
Payer: MEDICARE

## 2023-11-09 DIAGNOSIS — R55 SYNCOPE AND COLLAPSE: ICD-10-CM

## 2023-11-10 ENCOUNTER — PREP FOR PROCEDURE (OUTPATIENT)
Dept: CARDIOLOGY | Facility: HOSPITAL | Age: 83
End: 2023-11-10

## 2023-11-10 ENCOUNTER — HOSPITAL ENCOUNTER (OUTPATIENT)
Dept: RADIOLOGY | Facility: HOSPITAL | Age: 83
Discharge: HOME | DRG: 274 | End: 2023-11-10
Payer: MEDICARE

## 2023-11-10 ENCOUNTER — APPOINTMENT (OUTPATIENT)
Dept: RADIOLOGY | Facility: HOSPITAL | Age: 83
DRG: 101 | End: 2023-11-10
Payer: MEDICARE

## 2023-11-10 ENCOUNTER — APPOINTMENT (OUTPATIENT)
Dept: CARDIOLOGY | Facility: HOSPITAL | Age: 83
DRG: 274 | End: 2023-11-10
Payer: MEDICARE

## 2023-11-10 ENCOUNTER — HOSPITAL ENCOUNTER (INPATIENT)
Facility: HOSPITAL | Age: 83
LOS: 4 days | Discharge: NURSING, REHAB OR LONG-TERM CARE | DRG: 101 | End: 2023-11-17
Attending: STUDENT IN AN ORGANIZED HEALTH CARE EDUCATION/TRAINING PROGRAM | Admitting: INTERNAL MEDICINE
Payer: MEDICARE

## 2023-11-10 ENCOUNTER — HOSPITAL ENCOUNTER (INPATIENT)
Facility: HOSPITAL | Age: 83
LOS: 1 days | Discharge: HOME | DRG: 274 | End: 2023-11-10
Attending: INTERNAL MEDICINE | Admitting: INTERNAL MEDICINE
Payer: MEDICARE

## 2023-11-10 DIAGNOSIS — I48.91 ATRIAL FIBRILLATION (MULTI): Primary | ICD-10-CM

## 2023-11-10 DIAGNOSIS — I10 BENIGN ESSENTIAL HYPERTENSION: ICD-10-CM

## 2023-11-10 DIAGNOSIS — N40.0 BENIGN PROSTATIC HYPERPLASIA WITHOUT LOWER URINARY TRACT SYMPTOMS: ICD-10-CM

## 2023-11-10 DIAGNOSIS — I48.0 PAROXYSMAL ATRIAL FIBRILLATION (MULTI): ICD-10-CM

## 2023-11-10 DIAGNOSIS — E11.9 TYPE 2 DIABETES MELLITUS WITHOUT COMPLICATION, WITH LONG-TERM CURRENT USE OF INSULIN (MULTI): ICD-10-CM

## 2023-11-10 DIAGNOSIS — I48.91 ATRIAL FIBRILLATION, UNSPECIFIED TYPE (MULTI): ICD-10-CM

## 2023-11-10 DIAGNOSIS — Z95.818 PRESENCE OF OTHER CARDIAC IMPLANTS AND GRAFTS: ICD-10-CM

## 2023-11-10 DIAGNOSIS — I10 PRIMARY HYPERTENSION: ICD-10-CM

## 2023-11-10 DIAGNOSIS — Z01.810 ENCOUNTER FOR PREPROCEDURAL CARDIOVASCULAR EXAMINATION: ICD-10-CM

## 2023-11-10 DIAGNOSIS — Z86.010 HISTORY OF COLONIC POLYPS: ICD-10-CM

## 2023-11-10 DIAGNOSIS — G45.9 TIA (TRANSIENT ISCHEMIC ATTACK): Primary | ICD-10-CM

## 2023-11-10 DIAGNOSIS — Z86.73 HISTORY OF TIA (TRANSIENT ISCHEMIC ATTACK): ICD-10-CM

## 2023-11-10 DIAGNOSIS — Z74.09 MOBILITY IMPAIRED: ICD-10-CM

## 2023-11-10 DIAGNOSIS — R47.01 APHASIA: ICD-10-CM

## 2023-11-10 DIAGNOSIS — D64.9 ANEMIA, UNSPECIFIED TYPE: ICD-10-CM

## 2023-11-10 DIAGNOSIS — Z79.4 TYPE 2 DIABETES MELLITUS WITHOUT COMPLICATION, WITH LONG-TERM CURRENT USE OF INSULIN (MULTI): ICD-10-CM

## 2023-11-10 DIAGNOSIS — I35.0 NONRHEUMATIC AORTIC VALVE STENOSIS: ICD-10-CM

## 2023-11-10 DIAGNOSIS — Z87.19 HISTORY OF GI BLEED: ICD-10-CM

## 2023-11-10 DIAGNOSIS — R53.1 WEAKNESS: ICD-10-CM

## 2023-11-10 DIAGNOSIS — K59.00 CONSTIPATION, UNSPECIFIED CONSTIPATION TYPE: ICD-10-CM

## 2023-11-10 PROBLEM — R29.90 STROKE-LIKE EPISODE: Status: ACTIVE | Noted: 2023-11-10

## 2023-11-10 LAB
ALBUMIN SERPL-MCNC: 3.5 G/DL (ref 3.5–5)
ALP BLD-CCNC: 45 U/L (ref 35–125)
ALT SERPL-CCNC: 40 U/L (ref 5–40)
ANION GAP SERPL CALC-SCNC: 10 MMOL/L
APTT PPP: 22.2 SECONDS (ref 22–32.5)
AST SERPL-CCNC: 30 U/L (ref 5–40)
BASOPHILS # BLD AUTO: 0.01 X10*3/UL (ref 0–0.1)
BASOPHILS NFR BLD AUTO: 0.1 %
BILIRUB SERPL-MCNC: 1.1 MG/DL (ref 0.1–1.2)
BUN SERPL-MCNC: 20 MG/DL (ref 8–25)
CALCIUM SERPL-MCNC: 8.4 MG/DL (ref 8.5–10.4)
CHLORIDE SERPL-SCNC: 103 MMOL/L (ref 97–107)
CO2 SERPL-SCNC: 22 MMOL/L (ref 24–31)
CREAT SERPL-MCNC: 0.8 MG/DL (ref 0.4–1.6)
EOSINOPHIL # BLD AUTO: 0.26 X10*3/UL (ref 0–0.4)
EOSINOPHIL NFR BLD AUTO: 3.9 %
ERYTHROCYTE [DISTWIDTH] IN BLOOD BY AUTOMATED COUNT: 13.8 % (ref 11.5–14.5)
GFR SERPL CREATININE-BSD FRML MDRD: 88 ML/MIN/1.73M*2
GLUCOSE BLD MANUAL STRIP-MCNC: 208 MG/DL (ref 74–99)
GLUCOSE SERPL-MCNC: 231 MG/DL (ref 65–99)
HCT VFR BLD AUTO: 32.5 % (ref 41–52)
HGB BLD-MCNC: 11.2 G/DL (ref 13.5–17.5)
IMM GRANULOCYTES # BLD AUTO: 0.03 X10*3/UL (ref 0–0.5)
IMM GRANULOCYTES NFR BLD AUTO: 0.4 % (ref 0–0.9)
INR PPP: 1.2 (ref 0.9–1.2)
LEFT VENTRICLE INTERNAL DIMENSION DIASTOLE: 4.36 (ref 3.5–6)
LYMPHOCYTES # BLD AUTO: 1.09 X10*3/UL (ref 0.8–3)
LYMPHOCYTES NFR BLD AUTO: 16.3 %
MCH RBC QN AUTO: 35.8 PG (ref 26–34)
MCHC RBC AUTO-ENTMCNC: 34.5 G/DL (ref 32–36)
MCV RBC AUTO: 104 FL (ref 80–100)
MONOCYTES # BLD AUTO: 0.72 X10*3/UL (ref 0.05–0.8)
MONOCYTES NFR BLD AUTO: 10.8 %
NEUTROPHILS # BLD AUTO: 4.58 X10*3/UL (ref 1.6–5.5)
NEUTROPHILS NFR BLD AUTO: 68.5 %
NRBC BLD-RTO: 0 /100 WBCS (ref 0–0)
PLATELET # BLD AUTO: 91 X10*3/UL (ref 150–450)
POTASSIUM SERPL-SCNC: 4.4 MMOL/L (ref 3.4–5.1)
PROT SERPL-MCNC: 5.3 G/DL (ref 5.9–7.9)
PROTHROMBIN TIME: 12.3 SECONDS (ref 9.3–12.7)
RBC # BLD AUTO: 3.13 X10*6/UL (ref 4.5–5.9)
RBC MORPH BLD: NORMAL
SODIUM SERPL-SCNC: 135 MMOL/L (ref 133–145)
TROPONIN T SERPL-MCNC: 117 NG/L
WBC # BLD AUTO: 6.7 X10*3/UL (ref 4.4–11.3)

## 2023-11-10 PROCEDURE — 85025 COMPLETE CBC W/AUTO DIFF WBC: CPT | Performed by: STUDENT IN AN ORGANIZED HEALTH CARE EDUCATION/TRAINING PROGRAM

## 2023-11-10 PROCEDURE — 75574 CT ANGIO HRT W/3D IMAGE: CPT | Performed by: RADIOLOGY

## 2023-11-10 PROCEDURE — 85347 COAGULATION TIME ACTIVATED: CPT | Performed by: INTERNAL MEDICINE

## 2023-11-10 PROCEDURE — 93662 INTRACARDIAC ECG (ICE): CPT | Performed by: INTERNAL MEDICINE

## 2023-11-10 PROCEDURE — C1760 CLOSURE DEV, VASC: HCPCS | Performed by: INTERNAL MEDICINE

## 2023-11-10 PROCEDURE — 70498 CT ANGIOGRAPHY NECK: CPT

## 2023-11-10 PROCEDURE — 33340 PERQ CLSR TCAT L ATR APNDGE: CPT | Performed by: INTERNAL MEDICINE

## 2023-11-10 PROCEDURE — 2550000001 HC RX 255 CONTRASTS: Performed by: STUDENT IN AN ORGANIZED HEALTH CARE EDUCATION/TRAINING PROGRAM

## 2023-11-10 PROCEDURE — 93308 TTE F-UP OR LMTD: CPT

## 2023-11-10 PROCEDURE — G0269 OCCLUSIVE DEVICE IN VEIN ART: HCPCS | Mod: 59

## 2023-11-10 PROCEDURE — 2500000005 HC RX 250 GENERAL PHARMACY W/O HCPCS: Performed by: INTERNAL MEDICINE

## 2023-11-10 PROCEDURE — 2720000007 HC OR 272 NO HCPCS: Performed by: INTERNAL MEDICINE

## 2023-11-10 PROCEDURE — 93308 TTE F-UP OR LMTD: CPT | Performed by: INTERNAL MEDICINE

## 2023-11-10 PROCEDURE — 2500000004 HC RX 250 GENERAL PHARMACY W/ HCPCS (ALT 636 FOR OP/ED): Performed by: INTERNAL MEDICINE

## 2023-11-10 PROCEDURE — 99291 CRITICAL CARE FIRST HOUR: CPT | Mod: 25 | Performed by: STUDENT IN AN ORGANIZED HEALTH CARE EDUCATION/TRAINING PROGRAM

## 2023-11-10 PROCEDURE — C1889 IMPLANT/INSERT DEVICE, NOC: HCPCS | Performed by: INTERNAL MEDICINE

## 2023-11-10 PROCEDURE — 2550000001 HC RX 255 CONTRASTS: Performed by: INTERNAL MEDICINE

## 2023-11-10 PROCEDURE — 2500000001 HC RX 250 WO HCPCS SELF ADMINISTERED DRUGS (ALT 637 FOR MEDICARE OP): Performed by: INTERNAL MEDICINE

## 2023-11-10 PROCEDURE — 84484 ASSAY OF TROPONIN QUANT: CPT | Performed by: STUDENT IN AN ORGANIZED HEALTH CARE EDUCATION/TRAINING PROGRAM

## 2023-11-10 PROCEDURE — G0378 HOSPITAL OBSERVATION PER HR: HCPCS

## 2023-11-10 PROCEDURE — C1893 INTRO/SHEATH, FIXED,NON-PEEL: HCPCS | Performed by: INTERNAL MEDICINE

## 2023-11-10 PROCEDURE — 71045 X-RAY EXAM CHEST 1 VIEW: CPT | Mod: FY

## 2023-11-10 PROCEDURE — 82947 ASSAY GLUCOSE BLOOD QUANT: CPT

## 2023-11-10 PROCEDURE — 1210000001 HC SEMI-PRIVATE ROOM DAILY

## 2023-11-10 PROCEDURE — 93010 ELECTROCARDIOGRAM REPORT: CPT | Performed by: INTERNAL MEDICINE

## 2023-11-10 PROCEDURE — 2780000003 HC OR 278 NO HCPCS: Performed by: INTERNAL MEDICINE

## 2023-11-10 PROCEDURE — 99153 MOD SED SAME PHYS/QHP EA: CPT | Performed by: INTERNAL MEDICINE

## 2023-11-10 PROCEDURE — 70496 CT ANGIOGRAPHY HEAD: CPT

## 2023-11-10 PROCEDURE — 99152 MOD SED SAME PHYS/QHP 5/>YRS: CPT | Performed by: INTERNAL MEDICINE

## 2023-11-10 PROCEDURE — 85610 PROTHROMBIN TIME: CPT | Performed by: STUDENT IN AN ORGANIZED HEALTH CARE EDUCATION/TRAINING PROGRAM

## 2023-11-10 PROCEDURE — C1894 INTRO/SHEATH, NON-LASER: HCPCS | Performed by: INTERNAL MEDICINE

## 2023-11-10 PROCEDURE — 75572 CT HRT W/3D IMAGE: CPT

## 2023-11-10 PROCEDURE — 94760 N-INVAS EAR/PLS OXIMETRY 1: CPT

## 2023-11-10 PROCEDURE — C1759 CATH, INTRA ECHOCARDIOGRAPHY: HCPCS | Performed by: INTERNAL MEDICINE

## 2023-11-10 PROCEDURE — 80053 COMPREHEN METABOLIC PANEL: CPT | Performed by: STUDENT IN AN ORGANIZED HEALTH CARE EDUCATION/TRAINING PROGRAM

## 2023-11-10 PROCEDURE — 2500000001 HC RX 250 WO HCPCS SELF ADMINISTERED DRUGS (ALT 637 FOR MEDICARE OP)

## 2023-11-10 PROCEDURE — 2500000004 HC RX 250 GENERAL PHARMACY W/ HCPCS (ALT 636 FOR OP/ED)

## 2023-11-10 PROCEDURE — 70450 CT HEAD/BRAIN W/O DYE: CPT

## 2023-11-10 PROCEDURE — 85730 THROMBOPLASTIN TIME PARTIAL: CPT | Performed by: STUDENT IN AN ORGANIZED HEALTH CARE EDUCATION/TRAINING PROGRAM

## 2023-11-10 PROCEDURE — 02L73DK OCCLUSION OF LEFT ATRIAL APPENDAGE WITH INTRALUMINAL DEVICE, PERCUTANEOUS APPROACH: ICD-10-PCS | Performed by: HOSPITALIST

## 2023-11-10 PROCEDURE — 36415 COLL VENOUS BLD VENIPUNCTURE: CPT | Performed by: STUDENT IN AN ORGANIZED HEALTH CARE EDUCATION/TRAINING PROGRAM

## 2023-11-10 DEVICE — IMPLANTABLE DEVICE: Type: IMPLANTABLE DEVICE | Site: HEART | Status: FUNCTIONAL

## 2023-11-10 RX ORDER — CLOPIDOGREL BISULFATE 300 MG/1
TABLET, FILM COATED ORAL AS NEEDED
Status: DISCONTINUED | OUTPATIENT
Start: 2023-11-10 | End: 2023-11-10 | Stop reason: HOSPADM

## 2023-11-10 RX ORDER — VANCOMYCIN HYDROCHLORIDE 1 G/200ML
1000 INJECTION, SOLUTION INTRAVENOUS ONCE
Status: COMPLETED | OUTPATIENT
Start: 2023-11-10 | End: 2023-11-10

## 2023-11-10 RX ORDER — FENTANYL CITRATE 50 UG/ML
INJECTION, SOLUTION INTRAMUSCULAR; INTRAVENOUS AS NEEDED
Status: DISCONTINUED | OUTPATIENT
Start: 2023-11-10 | End: 2023-11-10 | Stop reason: HOSPADM

## 2023-11-10 RX ORDER — SODIUM CHLORIDE, SODIUM LACTATE, POTASSIUM CHLORIDE, CALCIUM CHLORIDE 600; 310; 30; 20 MG/100ML; MG/100ML; MG/100ML; MG/100ML
75 INJECTION, SOLUTION INTRAVENOUS CONTINUOUS
Status: CANCELLED | OUTPATIENT
Start: 2023-11-10

## 2023-11-10 RX ORDER — LIDOCAINE HYDROCHLORIDE 10 MG/ML
INJECTION, SOLUTION EPIDURAL; INFILTRATION; INTRACAUDAL; PERINEURAL AS NEEDED
Status: DISCONTINUED | OUTPATIENT
Start: 2023-11-10 | End: 2023-11-10 | Stop reason: HOSPADM

## 2023-11-10 RX ORDER — HEPARIN SODIUM 1000 [USP'U]/ML
INJECTION, SOLUTION INTRAVENOUS; SUBCUTANEOUS AS NEEDED
Status: DISCONTINUED | OUTPATIENT
Start: 2023-11-10 | End: 2023-11-10 | Stop reason: HOSPADM

## 2023-11-10 RX ORDER — DOCUSATE SODIUM 100 MG/1
100 CAPSULE, LIQUID FILLED ORAL 2 TIMES DAILY
Status: DISCONTINUED | OUTPATIENT
Start: 2023-11-10 | End: 2023-11-17 | Stop reason: HOSPADM

## 2023-11-10 RX ORDER — NAPROXEN SODIUM 220 MG/1
324 TABLET, FILM COATED ORAL ONCE
Status: COMPLETED | OUTPATIENT
Start: 2023-11-10 | End: 2023-11-10

## 2023-11-10 RX ORDER — SODIUM CHLORIDE, SODIUM LACTATE, POTASSIUM CHLORIDE, CALCIUM CHLORIDE 600; 310; 30; 20 MG/100ML; MG/100ML; MG/100ML; MG/100ML
75 INJECTION, SOLUTION INTRAVENOUS CONTINUOUS
Status: DISCONTINUED | OUTPATIENT
Start: 2023-11-10 | End: 2023-11-10 | Stop reason: HOSPADM

## 2023-11-10 RX ORDER — DOCUSATE SODIUM 100 MG/1
100 CAPSULE, LIQUID FILLED ORAL 2 TIMES DAILY
Status: CANCELLED | OUTPATIENT
Start: 2023-11-10

## 2023-11-10 RX ORDER — ONDANSETRON HYDROCHLORIDE 2 MG/ML
4 INJECTION, SOLUTION INTRAVENOUS EVERY 8 HOURS PRN
Status: CANCELLED | OUTPATIENT
Start: 2023-11-10

## 2023-11-10 RX ORDER — VANCOMYCIN HYDROCHLORIDE 1 G/200ML
1000 INJECTION, SOLUTION INTRAVENOUS ONCE
Status: CANCELLED | OUTPATIENT
Start: 2023-11-10 | End: 2023-11-10

## 2023-11-10 RX ORDER — ONDANSETRON 4 MG/1
4 TABLET, ORALLY DISINTEGRATING ORAL EVERY 8 HOURS PRN
Status: CANCELLED | OUTPATIENT
Start: 2023-11-10

## 2023-11-10 RX ORDER — MIDAZOLAM HYDROCHLORIDE 1 MG/ML
INJECTION INTRAMUSCULAR; INTRAVENOUS AS NEEDED
Status: DISCONTINUED | OUTPATIENT
Start: 2023-11-10 | End: 2023-11-10 | Stop reason: HOSPADM

## 2023-11-10 RX ORDER — CLOPIDOGREL BISULFATE 75 MG/1
75 TABLET ORAL DAILY
Qty: 90 TABLET | Refills: 1 | Status: SHIPPED | OUTPATIENT
Start: 2023-11-10 | End: 2024-03-05 | Stop reason: ALTCHOICE

## 2023-11-10 RX ORDER — ONDANSETRON 4 MG/1
4 TABLET, ORALLY DISINTEGRATING ORAL EVERY 8 HOURS PRN
Status: DISCONTINUED | OUTPATIENT
Start: 2023-11-10 | End: 2023-11-17 | Stop reason: HOSPADM

## 2023-11-10 RX ORDER — ONDANSETRON HYDROCHLORIDE 2 MG/ML
4 INJECTION, SOLUTION INTRAVENOUS EVERY 8 HOURS PRN
Status: DISCONTINUED | OUTPATIENT
Start: 2023-11-10 | End: 2023-11-17 | Stop reason: HOSPADM

## 2023-11-10 RX ORDER — CLOPIDOGREL BISULFATE 75 MG/1
75 TABLET ORAL DAILY
Status: CANCELLED | OUTPATIENT
Start: 2023-11-11 | End: 2024-11-10

## 2023-11-10 RX ORDER — NAPROXEN SODIUM 220 MG/1
324 TABLET, FILM COATED ORAL ONCE
Status: CANCELLED | OUTPATIENT
Start: 2023-11-10 | End: 2023-11-10

## 2023-11-10 RX ORDER — PROTAMINE SULFATE 10 MG/ML
INJECTION, SOLUTION INTRAVENOUS CONTINUOUS PRN
Status: COMPLETED | OUTPATIENT
Start: 2023-11-10 | End: 2023-11-10

## 2023-11-10 RX ORDER — CLOPIDOGREL BISULFATE 75 MG/1
75 TABLET ORAL DAILY
Status: DISCONTINUED | OUTPATIENT
Start: 2023-11-11 | End: 2023-11-11 | Stop reason: SDUPTHER

## 2023-11-10 RX ADMIN — SODIUM CHLORIDE 500 ML: 9 INJECTION, SOLUTION INTRAVENOUS at 08:30

## 2023-11-10 RX ADMIN — IOHEXOL 75 ML: 350 INJECTION, SOLUTION INTRAVENOUS at 17:02

## 2023-11-10 RX ADMIN — IOHEXOL 70 ML: 350 INJECTION, SOLUTION INTRAVENOUS at 08:29

## 2023-11-10 RX ADMIN — SODIUM CHLORIDE, POTASSIUM CHLORIDE, SODIUM LACTATE AND CALCIUM CHLORIDE 75 ML/HR: 600; 310; 30; 20 INJECTION, SOLUTION INTRAVENOUS at 08:59

## 2023-11-10 RX ADMIN — IOHEXOL 75 ML: 350 INJECTION, SOLUTION INTRAVENOUS at 17:00

## 2023-11-10 RX ADMIN — ASPIRIN 81 MG CHEWABLE TABLET 324 MG: 81 TABLET CHEWABLE at 09:12

## 2023-11-10 SDOH — SOCIAL STABILITY: SOCIAL INSECURITY: DO YOU FEEL UNSAFE GOING BACK TO THE PLACE WHERE YOU ARE LIVING?: NO

## 2023-11-10 SDOH — SOCIAL STABILITY: SOCIAL INSECURITY: ARE YOU OR HAVE YOU BEEN THREATENED OR ABUSED PHYSICALLY, EMOTIONALLY, OR SEXUALLY BY ANYONE?: NO

## 2023-11-10 SDOH — SOCIAL STABILITY: SOCIAL INSECURITY: ABUSE: ADULT

## 2023-11-10 SDOH — SOCIAL STABILITY: SOCIAL INSECURITY: DOES ANYONE TRY TO KEEP YOU FROM HAVING/CONTACTING OTHER FRIENDS OR DOING THINGS OUTSIDE YOUR HOME?: NO

## 2023-11-10 SDOH — SOCIAL STABILITY: SOCIAL INSECURITY: WERE YOU ABLE TO COMPLETE ALL THE BEHAVIORAL HEALTH SCREENINGS?: YES

## 2023-11-10 SDOH — SOCIAL STABILITY: SOCIAL INSECURITY: DO YOU FEEL ANYONE HAS EXPLOITED OR TAKEN ADVANTAGE OF YOU FINANCIALLY OR OF YOUR PERSONAL PROPERTY?: NO

## 2023-11-10 SDOH — SOCIAL STABILITY: SOCIAL INSECURITY: HAVE YOU HAD THOUGHTS OF HARMING ANYONE ELSE?: YES

## 2023-11-10 SDOH — SOCIAL STABILITY: SOCIAL INSECURITY: HAS ANYONE EVER THREATENED TO HURT YOUR FAMILY OR YOUR PETS?: NO

## 2023-11-10 SDOH — SOCIAL STABILITY: SOCIAL INSECURITY: ARE THERE ANY APPARENT SIGNS OF INJURIES/BEHAVIORS THAT COULD BE RELATED TO ABUSE/NEGLECT?: NO

## 2023-11-10 ASSESSMENT — COGNITIVE AND FUNCTIONAL STATUS - GENERAL
HELP NEEDED FOR BATHING: A LITTLE
WALKING IN HOSPITAL ROOM: A LITTLE
PATIENT BASELINE BEDBOUND: NO
DRESSING REGULAR LOWER BODY CLOTHING: A LITTLE
MOBILITY SCORE: 22
DAILY ACTIVITIY SCORE: 19
TOILETING: A LITTLE
CLIMB 3 TO 5 STEPS WITH RAILING: A LITTLE
DRESSING REGULAR UPPER BODY CLOTHING: A LITTLE
PERSONAL GROOMING: A LITTLE

## 2023-11-10 ASSESSMENT — ACTIVITIES OF DAILY LIVING (ADL)
FEEDING YOURSELF: INDEPENDENT
ASSISTIVE_DEVICE: WALKER;DENTURES UPPER
PATIENT'S MEMORY ADEQUATE TO SAFELY COMPLETE DAILY ACTIVITIES?: NO
JUDGMENT_ADEQUATE_SAFELY_COMPLETE_DAILY_ACTIVITIES: NO
BATHING: INDEPENDENT
ADEQUATE_TO_COMPLETE_ADL: YES
LACK_OF_TRANSPORTATION: NO
LACK_OF_TRANSPORTATION: NO
HEARING - LEFT EAR: FUNCTIONAL
TOILETING: INDEPENDENT
WALKS IN HOME: INDEPENDENT
DRESSING YOURSELF: INDEPENDENT
GROOMING: INDEPENDENT
HEARING - RIGHT EAR: FUNCTIONAL

## 2023-11-10 ASSESSMENT — LIFESTYLE VARIABLES
AUDIT-C TOTAL SCORE: 0
REASON UNABLE TO ASSESS: NO
HAVE YOU EVER FELT YOU SHOULD CUT DOWN ON YOUR DRINKING: NO
HOW OFTEN DO YOU HAVE 6 OR MORE DRINKS ON ONE OCCASION: NEVER
AUDIT-C TOTAL SCORE: 0
EVER FELT BAD OR GUILTY ABOUT YOUR DRINKING: NO
HOW OFTEN DO YOU HAVE A DRINK CONTAINING ALCOHOL: NEVER
SUBSTANCE_ABUSE_PAST_12_MONTHS: NO
PRESCIPTION_ABUSE_PAST_12_MONTHS: NO
SKIP TO QUESTIONS 9-10: 1
EVER HAD A DRINK FIRST THING IN THE MORNING TO STEADY YOUR NERVES TO GET RID OF A HANGOVER: NO
HOW MANY STANDARD DRINKS CONTAINING ALCOHOL DO YOU HAVE ON A TYPICAL DAY: PATIENT DOES NOT DRINK
HAVE PEOPLE ANNOYED YOU BY CRITICIZING YOUR DRINKING: NO

## 2023-11-10 ASSESSMENT — COLUMBIA-SUICIDE SEVERITY RATING SCALE - C-SSRS
1. IN THE PAST MONTH, HAVE YOU WISHED YOU WERE DEAD OR WISHED YOU COULD GO TO SLEEP AND NOT WAKE UP?: NO
2. HAVE YOU ACTUALLY HAD ANY THOUGHTS OF KILLING YOURSELF?: NO
1. IN THE PAST MONTH, HAVE YOU WISHED YOU WERE DEAD OR WISHED YOU COULD GO TO SLEEP AND NOT WAKE UP?: NO
6. HAVE YOU EVER DONE ANYTHING, STARTED TO DO ANYTHING, OR PREPARED TO DO ANYTHING TO END YOUR LIFE?: NO
2. HAVE YOU ACTUALLY HAD ANY THOUGHTS OF KILLING YOURSELF?: NO
6. HAVE YOU EVER DONE ANYTHING, STARTED TO DO ANYTHING, OR PREPARED TO DO ANYTHING TO END YOUR LIFE?: NO

## 2023-11-10 ASSESSMENT — PAIN SCALES - GENERAL
PAINLEVEL_OUTOF10: 0 - NO PAIN
PAINLEVEL_OUTOF10: 0 - NO PAIN

## 2023-11-10 ASSESSMENT — PAIN - FUNCTIONAL ASSESSMENT
PAIN_FUNCTIONAL_ASSESSMENT: 0-10
PAIN_FUNCTIONAL_ASSESSMENT: 0-10

## 2023-11-10 ASSESSMENT — PATIENT HEALTH QUESTIONNAIRE - PHQ9
1. LITTLE INTEREST OR PLEASURE IN DOING THINGS: NOT AT ALL
2. FEELING DOWN, DEPRESSED OR HOPELESS: NOT AT ALL
SUM OF ALL RESPONSES TO PHQ9 QUESTIONS 1 & 2: 0

## 2023-11-10 NOTE — POST-PROCEDURE NOTE
Physician Transition of Care Summary  Invasive Cardiovascular Lab    Procedure Date: 11/10/2023  Attending:    * Louie Avitia - Primary  Resident/Fellow/Other Assistant: Surgeon(s) and Role:     * Donnie Kendrick MD - Fellow    Indications:   Pre-op Diagnosis     * Atrial fibrillation, unspecified type (CMS/HCC) [I48.91]    Post-procedure diagnosis:   Post-op Diagnosis     * Atrial fibrillation, unspecified type (CMS/HCC) [I48.91]    Procedure(s):   LAAO (Left Atrial Appendage Occlusion)  58959 - [place] PERQ CLSR TCAT L ATR APNDGE W/ENDOCARDIAL IMPLNT    [place] PERQ CLSR TCAT L ATR APNDGE W/ENDOCARDIAL IMPLNT [22019]      Description of the Procedure:   S/p RICHARDSON closure    Access: Dual right femoral vein access  Closure:               Primary : Perclose               Secondary: Vascade    Device: After confirmation of the device position on fluoroscopy and ICE, anchor with a tug test, compression and seal a 27 mm (initial attempt at 24 mm unsuccessful, second TSP needed) Watchman was successfully deployed without any immediate complications.     No effusion on ICE was present pre and post watchman deployment.     Recommendations:   DAPT   CT in 4 months  Tele  access site monitoring.   TTE   Likely discharge home today once recovery and monitoring period is complete.        Complications:   None    Stents/Implants:         Estimated Blood Loss:   10 mL    Anesthesia: Moderate Sedation Anesthesia Staff: No anesthesia staff entered.    Any Specimen(s) Removed:   No specimens collected during this procedure.          Electronically signed by: Donnie Kendrick MD, 11/10/2023 11:29 AM

## 2023-11-10 NOTE — H&P
PCP: Dr. D'amico  Cardio: Dr. Grant           The patient is a 83-year-old male with past medical history of CAD s/p CABG 2011, dyslipidemia and GI bleed last year when his gastroenterologist took off Xarelto.  He is currently on Eliquis and has not had any more GI bleeds.  Of note patient has had multiple TIAs in the past as per the patient and his wife here to TIAs in 2019 and another one in 2021 patient has a loop recorder which showed A-fib.  Patient had another episode of TIA in August 2023 when he was in a grocery store and he fell down and walk or speak.  Patient has also had multiple falls in the past and has had multiple ER visits in the past for falls.  Because of his diabetic neuropathy patient uses braces to maintain his balance and uses walker to walk and states he has very poor balance .  The patient has normal LVEF.     Given the patient's significant GI bleed, falls, TIAs,  the patient is here for left atrial appendage closure for stroke risk reduction.         ROS:  Constitutional: not feeling tired, not feeling poorly, no fever and no chills.   Eyes: no eyesight problems, no blurred vision, no diplopia, no eye pain, no purulent discharge from the eyes, eyes not red, no dryness of the eyes and no itching of the eyes.   ENT: no nosebleeds, no hearing loss, no tinnitus, no earache, no sore throat, no hoarseness, no swollen glands in the neck and no nasal discharge.   Cardiovascular: no intermittent leg claudication, no chest pain, no tightness or heavy pressure, no shortness of breath, no palpitations, no lower extremity edema, the heart rate was not slow, the heart rate was not fast and as noted in HPI.   Respiratory: no chronic cough, not coughing up sputum,  no wheezing that is consistent with asthma, no asthma, no orthopnea and no postural nocturnal dyspnea.   Gastrointestinal: no change in bowel habits, no blood in stools, no diarrhea, no constipation, no nausea, no vomiting, no abdominal pain,  no signs and symptoms of ulcer disease, no kristina colored stools and no intolerance to fatty foods.   Genitourinary: no hematuria,  no urinary frequency, no dysuria, no incontinence, no burning sensation during urination, no urinary hesitancy, no nocturia, no genital lesion, no testicular pain, urinary stream is not smaller and urinary stream does not start and stop.   Musculoskeletal: no arthralgias, no myalgias, no joint swelling, no joint stiffness, no muscle weakness, no back pain, no limb pain, no limb swelling and no difficulty walking.   Skin: no skin rashes, no change in skin color and pigmentation, no skin lesions and no skin lumps.   Neurological: no seizures, + frequent falls, no headaches, no dizziness, no tingling, no fainting and no limb weakness.   Psychiatric: no depression, not suicidal, no confusion, no memory lapses or loss, no anxiety, no personality change and no emotional problems.   Endocrine: no goiter, no thyroid disorder, no diabetes mellitus, no excessive thirst, no dry skin, no cold intolerance, no heat intolerance, no erectile dysfunction, no increased urinary frequency, no proptosis and no deepening of the voice.   Hematologic/Lymphatic: no bleeding issues.   All other systems have been reviewed and are negative for complaint.      Physical Exam:      Visit Vitals  reviewed        Constitutional: alert and in no acute distress.   Eyes: no erythema, swelling or discharge from the eye .   Ears, Nose, Mouth, and Throat: external inspection of ears and nose is normal , lips, teeth, and gums are normal with good dentition  and oropharynx normal with no erythema, edema, exudate or lesions .   Neck: neck is supple, symmetric, trachea midline, no masses  and no thyromegaly .   Pulmonary: no increased work of breathing or signs of respiratory distress , lungs clear to auscultation. , normal percussion of chest  and chest palpation normal .   Cardiovascular: RRR, no murmur,  no leg edema  Abdomen:  abdomen non-tender, no masses  and no hepatomegaly .           Skin:  no skin lesions          Neurologic: non-focal neurologic examination.      Psychiatric judgment and insight is normal , oriented to person, place and time , normal mood and affect .         Labs:           Results for orders placed or performed during the hospital encounter of 10/17/23   Cardiac device check - Remote   Result Value Ref Range     BSA 1.95 m2            Medications:          Current Outpatient Medications   Medication Instructions    acetaminophen (Tylenol-ODT) 40 mg split rapid/chew split tablet 1 half tablet, oral, Daily    amLODIPine (Norvasc) 5 mg tablet 1 tablet, oral, Daily    aspirin 81 mg EC tablet 2 tablets, oral, Daily    atorvastatin (Lipitor) 40 mg tablet 1 tablet, oral, Daily    ciprofloxacin (CIPRO) 500 mg, oral, 2 times daily    Eliquis 2.5 mg tablet GIVE 1 TABLET BY MOUTH TWO TIMES A DAY FOR BLOOD THINNER    ferrous sulfate 325 (65 Fe) MG EC tablet 1 tablet, oral, Daily    finasteride (PROSCAR) 5 mg, oral, Daily    folic acid-vitamin B complex-vitamin C-selenium-zinc (Dialyvite) 3-70-15 mg-mcg-mg tablet 1 tablet, oral, Daily    furosemide (LASIX) 40 mg, oral, Daily    gabapentin (Neurontin) 100 mg capsule TAKE 1 CAPSULE BY MOUTH ONCE DAILY FOR NEUROPATHY PAIN    glimepiride (Amaryl) 2 mg tablet Every 24 hours    hydroCHLOROthiazide (HYDRODIURIL) 25 mg, oral, Daily RT    insulin glargine-lixisenatide (Soliqua 100/33) 100 unit-33 mcg/mL insulin pen 15 Units, subcutaneous, Nightly    ipratropium-albuteroL (Duo-Neb) 0.5-2.5 mg/3 mL nebulizer solution 3 mL, inhalation, Every 4 hours PRN    lidocaine (Lidocore) 4 % patch 1 patch, Topical, Daily, Apply to Right Knee     lisinopril 20 mg, oral, Daily    metFORMIN (Glucophage) 500 mg tablet 2 tablets, oral, 2 times daily    metoprolol succinate XL (Toprol-XL) 50 mg 24 hr tablet 1 tablet, oral, Daily    omeprazole (PriLOSEC) 40 mg DR capsule 1 capsule, oral, Daily     oxybutynin XL (DITROPAN-XL) 5 mg, oral, Daily, Do not crush, chew, or split.    solifenacin (VESIcare) 5 mg tablet 1 tablet, oral, Daily, Swallow tablet whole; do not crush, chew, or split.    tamsulosin (FLOMAX) 0.4 mg, oral, 2 times daily    traMADol (Ultram) 50 mg tablet GIVE 1 TABLET BY MOUTH TWICE DAILY AS NEEDED FOR PAIN            Assessment:       This is a 83-year-old gentleman with history of GI bleed, multiple falls requiring ER visits and multiple TIAs, who is also at increased risk of falls because of his diabetic neuropathy and use of braces/walker who was referred to us for left atrial appendage closure     The CHADS-VASC score is 6 and HAS-BLED score is 4. The patient is at increased risk of both bleeding and stroke.  As such the patient is a reasonable candidate for consideration of left atrial appendage occluder placement.    Proceed with LAAC.       The risks discussed included but were not limited to vascular complications, sedation related complications, risk of MI, CVA, device embolization, pericardial tamponade and death. The patient verbalized understanding and decided to proceed.     Following device implant, strategy will be for dual antiplatelet therapy with aspirin and clopidogrel for 6 months then aspirin for life.

## 2023-11-10 NOTE — PROGRESS NOTES
Pharmacy Medication History Review    Luis Greene is a 83 y.o. male admitted for Afib (CMS/[facility]). Pharmacy reviewed the patient's hjvme-cd-vooemgics medications and allergies for accuracy.    The list below reflects the updated PTA list.   Comments regarding how patient may be taking medications differently can be found in the Admit Orders Activity  Prior to Admission Medications   Prescriptions Last Dose Informant Patient Reported?   Eliquis 2.5 mg tablet 11/6/2023 Family Member Yes   Sig: Take 1 tablet (2.5 mg) by mouth 2 times a day.   amLODIPine (Norvasc) 5 mg tablet 11/10/2023 Family Member Yes   Sig: Take 1 tablet (5 mg) by mouth once daily.   aspirin 81 mg EC tablet 11/10/2023 Family Member Yes   Sig: Take 2 tablets (162 mg) by mouth once daily.   atorvastatin (Lipitor) 40 mg tablet 11/10/2023 Family Member Yes   Sig: Take 1 tablet (40 mg) by mouth once daily.   ferrous sulfate 325 (65 Fe) MG EC tablet 11/10/2023 Family Member Yes   Sig: Take 1 tablet (325 mg) by mouth once daily.   finasteride (Proscar) 5 mg tablet 11/10/2023 Family Member No   Sig: Take 1 tablet (5 mg) by mouth once daily.   folic acid-vitamin B complex-vitamin C-selenium-zinc (Dialyvite) 3-70-15 mg-mcg-mg tablet 11/10/2023 Family Member Yes   Sig: Take 1 tablet by mouth once daily.   furosemide (Lasix) 40 mg tablet 11/9/2023 Family Member No   Sig: Take 1 tablet (40 mg) by mouth once daily.   gabapentin (Neurontin) 100 mg capsule 11/9/2023 Family Member No   Sig: TAKE 1 CAPSULE BY MOUTH ONCE DAILY FOR NEUROPATHY PAIN   insulin glargine-lixisenatide (Soliqua 100/33) 100 unit-33 mcg/mL insulin pen 11/9/2023 Family Member No   Sig: Inject 15 Units under the skin once daily at bedtime.   lisinopril 20 mg tablet 11/9/2023 Family Member No   Sig: Take 1 tablet by mouth once daily   metFORMIN (Glucophage) 500 mg tablet 11/10/2023 Family Member Yes   Sig: Take 2 tablets (1,000 mg) by mouth 2 times a day.   metoprolol succinate XL (Toprol-XL) 50  "mg 24 hr tablet 11/10/2023 Family Member Yes   Sig: Take 1 tablet (50 mg) by mouth once daily.   omeprazole (PriLOSEC) 40 mg DR capsule 11/10/2023 Family Member Yes   Sig: Take 1 capsule (40 mg) by mouth once daily.   tamsulosin (Flomax) 0.4 mg 24 hr capsule 11/10/2023 Family Member No   Sig: Take 1 capsule (0.4 mg) by mouth 2 times a day.   traMADol (Ultram) 50 mg tablet 2023 Family Member Yes   Si tablet (50 mg) 2 times a day as needed.      Facility-Administered Medications: None        The list below reflects the updated allergy list. Please review each documented allergy for additional clarification and justification.  Allergies  Reviewed by John Hewitt RPh on 11/10/2023        Severity Reactions Comments    Famotidine Medium Agitation Shakiness      Prednisone Medium Other, Unknown Agitation     Amoxicillin Not Specified Unknown     Donepezil Not Specified Unknown     Gabapentin Not Specified Other Tolerates - spouse added to allergy list because unsure if altered mental status was from gabapentin or UTI    Hydrochlorothiazide Not Specified Other     Ibuprofen Not Specified Unknown     Other Not Specified Other     Oxycodone-acetaminophen Not Specified Other     Tramadol Hcl Not Specified Unknown Tolerates tramadol     Oxycodone Hcl Low Rash             Sources:   Dispense History  Interview with family members (with med list)    Additional Comments:  M2B service not offered prior to surgery/cath lab, please reassess prior to patient discharge if Meds to Beds is desired.    John Hewitt PharmD  Transitions of Care Pharmacist    Reach out via [employer] Chat for questions,   if no response call [phone] or [employer] \"Med Rec\"  "

## 2023-11-10 NOTE — DISCHARGE INSTRUCTIONS
Anticoagulation Plan: Plavix and 81mg Aspirin for 6 months, then aspirin for life    Do not drive or lift anything heavier than 10lbs for 1 week.    You will have CT imaging completed in 4 months to assess the effectiveness of the Watchman Device. You will receive a call (within 45 days) in regards to timing of 4 month CT.     Please follow up with your primary cardiologist or PCP in 1-2 weeks     No elective dental procedures or cleanings for 3 months post procedure  You will need dental prophylaxis prior to dental work/cleanings for 6 months     Please call our nurse line for any questions or concerns: [phone]           Watchman Discharge Instructions  Anticoagulation Plan:  You are being discharged on Aspirin and Plavix for 6 months (Plavix will be stopped after 6 months and you will remain on 81mg Aspirin for life).  You will have a 4 month CTA to assess the effectiveness of the Watchman Device.     General Instructions:  DO NOT drink any alcoholic drinks or take any non-prescriptive medications that contain alcohol for the first 24 hours.   DO NOT make any important decisions for the first 24 hours.  Activity:  You are advised to go directly home from the hospital.   DO NOT lift anything heavier than 10 pounds for one week, this allows for proper healing of the groin.   No excessive exercise or treadmill use for one week. You may walk and do stairs, slowly.   No sexual activities for 24 hours after you arrive home.  Will need antibiotic prophylaxis to prevent infective endocarditis 1 hour prior to dental work for 6 months.     Wound Care:  If slight bleeding should occur at site, lie down and have someone apply firm pressure just above the puncture site for 5 minutes.  If it continues or is profuse, call 911. Always notify your doctor if bleeding occurs.   Keep site clean and dry. Let air dry or you may use a simple bandaid.   Gently cleanse the puncture site in your groin with soap and water only.   You  may experience some tenderness, bruising or minimal inflammation.  If you have any concerns, you may contact the Cath Lab or if any of these symptoms become excessive, contact your cardiologist or go to the emergency room.   No tub baths, soaking, or swimming for one week.   May shower the next day after your procedure.    Diet:  You may resume your normal diet. However it is better to start with liquids such as juices then soup and crackers, and gradually work up to solid foods.    Other Instructions:  If you develop difficulty breathing, rash, hives, severe nausea, vomiting, light-headedness or any signs of infection, immediately contact your doctor and go to the nearest emergency room.   You must take your aspirin, clopidogrel (Plavix), prasugrel (Effient), or ticagrelor (Brilinta) every day without missing a single dose.  If you are getting low on these medications, contact your physician immediately for a refill.  - CALL 911 IF YOU HAVE ANY OF THE SIGNS AND SYMPTOMS OF HEART FAILURE: 1. Chest pain 2. Significant Shortness of breath 3. Fainting.     Call Provider If (Home-going Patients):  Breathing faster than normal.   Breathing harder than normal or having retractions.   Fever of 100.4 F (38 C) or higher.   Chills.   Drinking less than normal.   Urinating less than normal, over 1 day.   Acting very sleepy and difficult to awaken.   Vomiting (throwing up) and not able to eat or drink for 12 hours.   3 or more loose, watery bowel movements in 24 hours (diarrhea).   Any new concerning symptoms.

## 2023-11-10 NOTE — TELECONSULT
HPI: Telestroke consult at   83 y.o. male with acute onset of difficulty speaking witnessed by family, had Watchman device placed earlier today.  Eliquis stopped for procedure, on plavix.     Prior episode 2/2019- confusion post-cath, numbness L face/ UE, MRI DWI no acute findings.    6/2019- numbness L face/ UE, MRI DWI no acute findings.   2021- speech difficulties and R face/ UE paresthesias.   8/2023- another event speech difficulty, MRI DWI no acute findings.     Last known Well: 3:50 pm  NIH Stroke Scale Reported: 8 with inability to speak, drift RUE, RLE, LLE, that was rapidly improving, recovery of speech and language.     Prior Functional Status (Modified [place] Scale):  3 Moderate disability; requiring some help, but able to walk without assistance. Listed due to spinal stenosis, neuropathy, LE weakness requiring AFO due to falls.      Imaging Results:  Head CT: no hemorrhage or early infarct signs  Head/Neck CTA- no LVO or severe stenosis.      Assessment:   Working Diagnosis: TIA by history, pattern is LMCA involvement with prior episodes also suggesting either LMCA or RMCA.        Recommendations:   IV tPA is not recommended/ Rationale rapidly improving, recent procedure  Patient is NOT a candidate for endovascular treatment/ Rationale no LVO      Additional Recommendations: suggest IV heparin and contact Dr Avitia given procedure today for his recommendations.      Consult completed by: TELEPHONE communication was used to provide this telehealth service.  Time includes consultation with ED provider and extensive review of data- history, medical records, test results, and neuroimaging studies: 21 - 30 mins was spent in consultation

## 2023-11-10 NOTE — ED PROVIDER NOTES
HPI   No chief complaint on file.      Patient presents with symptoms concerning for CVA.  Patient reportedly had a Watchman procedure performed earlier today.  He had gone out to eat with his daughter following the procedure and he developed sudden onset of inability to speak and difficulty moving the right side of his body starting at about 3:50 PM.  Patient is not able to contribute at all to history at this time.                          No data recorded       NIH Stroke Scale: 8          Patient History   Past Medical History:   Diagnosis Date    Coronary artery disease     Hyperlipidemia     Personal history of other diseases of the circulatory system     History of cardiac disorder    Personal history of other endocrine, nutritional and metabolic disease     History of hypercholesterolemia     Past Surgical History:   Procedure Laterality Date    MR HEAD ANGIO WO IV CONTRAST  2/17/2019    MR HEAD ANGIO WO IV CONTRAST [facility]    MR HEAD ANGIO WO IV CONTRAST  10/28/2022    MR HEAD ANGIO WO IV CONTRAST [facility]    MR HEAD ANGIO WO IV CONTRAST  8/15/2023    MR HEAD ANGIO WO IV CONTRAST [facility]    OTHER SURGICAL HISTORY  03/11/2019    Heart surgery    OTHER SURGICAL HISTORY  03/11/2019    Knee surgery    OTHER SURGICAL HISTORY  03/11/2019    Tonsillectomy     Family History   Problem Relation Name Age of Onset    Other (cardiac disorder) Father      Diabetes Father      Hyperlipidemia Father      Hypertension Father      Other (liver carcinoma) Father      Other (hypercholesterolemia) Father      Hypertension Daughter       Social History     Tobacco Use    Smoking status: Never    Smokeless tobacco: Never   Vaping Use    Vaping Use: Never used   Substance Use Topics    Alcohol use: Never    Drug use: Never       Physical Exam   ED Triage Vitals   Temp Pulse Resp BP   -- -- -- --      SpO2 Temp src Heart Rate Source Patient Position   -- -- -- --      BP Location FiO2 (%)      -- --       Physical Exam  HENT:      Head: Normocephalic.   Eyes:      Extraocular Movements: Extraocular movements intact.      Pupils: Pupils are equal, round, and reactive to light.   Pulmonary:      Effort: Pulmonary effort is normal.   Skin:     General: Skin is warm.   Neurological:      Mental Status: He is alert.      Comments: Patient seems to be able to understand commands but is not able to speak at all.  When asked questions, he attempts to respond verbally but is not able to do so.  Both of his legs seem weak although the right leg seems weaker than the left.  His right arm seems weak as well.  NIH stroke scale is 8.  Patient has difficulty cooperating in the coordination and visual field and sensory portion of testing.       EKG interpreted by me: Atrial fibrillation, rate 81.  Normal axis.  Right bundle branch block.  No ST or T wave abnormalities.    ED Course & MDM   Diagnoses as of 11/10/23 0750   TIA (transient ischemic attack)   Aphasia   Weakness       Medical Decision Making  Patient's case discussed with brain attack neurologist, Dr. Liao, who does not feel that patient is candidate for neuro intervention or TNK at this time.  She did recommend that I discussed with physician who performed Watchman procedure, Dr. Avitia, to see if patient may require echocardiogram.  On reassessment, patient is now completely normal.  NIH stroke scale is 0.    Dr Avitia is okay with anticoagulation if need be.  Patient's daughter later states that patient has had multiple episodes similar to this and a previous neurologist had felt that his symptoms were caused by migraine more so than TIA.  In this setting, as patient has already been given a Plavix load earlier today, I did not give him any further antiplatelet agents.  I also do not feel that he should receive blood thinners at this time as I am uncertain of the etiology of his symptoms.  Patient accepted to medicine service for further management.  Parts  of this chart were completed with dictation software, please excuse any errors in transcription.        Procedure  Critical Care    Performed by: Christian Trejo MD  Authorized by: Christian Trejo MD    Critical care provider statement:     Critical care time (minutes):  35    Critical care time was exclusive of:  Separately billable procedures and treating other patients    Critical care was necessary to treat or prevent imminent or life-threatening deterioration of the following conditions:  CNS failure or compromise    Critical care was time spent personally by me on the following activities:  Development of treatment plan with patient or surrogate, discussions with consultants, examination of patient, ordering and performing treatments and interventions, ordering and review of laboratory studies, ordering and review of radiographic studies, re-evaluation of patient's condition and obtaining history from patient or surrogate       Christian Trejo MD  11/10/23 5948

## 2023-11-11 LAB
ANION GAP SERPL CALC-SCNC: 11 MMOL/L
ANION GAP SERPL CALC-SCNC: 13 MMOL/L
BASOPHILS # BLD AUTO: 0.01 X10*3/UL (ref 0–0.1)
BASOPHILS # BLD AUTO: 0.02 X10*3/UL (ref 0–0.1)
BASOPHILS NFR BLD AUTO: 0.2 %
BASOPHILS NFR BLD AUTO: 0.4 %
BUN SERPL-MCNC: 20 MG/DL (ref 8–25)
BUN SERPL-MCNC: 20 MG/DL (ref 8–25)
CALCIUM SERPL-MCNC: 8.4 MG/DL (ref 8.5–10.4)
CALCIUM SERPL-MCNC: 8.7 MG/DL (ref 8.5–10.4)
CHLORIDE SERPL-SCNC: 100 MMOL/L (ref 97–107)
CHLORIDE SERPL-SCNC: 107 MMOL/L (ref 97–107)
CO2 SERPL-SCNC: 21 MMOL/L (ref 24–31)
CO2 SERPL-SCNC: 22 MMOL/L (ref 24–31)
CREAT SERPL-MCNC: 0.8 MG/DL (ref 0.4–1.6)
CREAT SERPL-MCNC: 0.8 MG/DL (ref 0.4–1.6)
EOSINOPHIL # BLD AUTO: 0.3 X10*3/UL (ref 0–0.4)
EOSINOPHIL # BLD AUTO: 0.3 X10*3/UL (ref 0–0.4)
EOSINOPHIL NFR BLD AUTO: 6.2 %
EOSINOPHIL NFR BLD AUTO: 6.2 %
ERYTHROCYTE [DISTWIDTH] IN BLOOD BY AUTOMATED COUNT: 13.6 % (ref 11.5–14.5)
ERYTHROCYTE [DISTWIDTH] IN BLOOD BY AUTOMATED COUNT: 13.8 % (ref 11.5–14.5)
GFR SERPL CREATININE-BSD FRML MDRD: 88 ML/MIN/1.73M*2
GFR SERPL CREATININE-BSD FRML MDRD: 88 ML/MIN/1.73M*2
GLUCOSE BLD MANUAL STRIP-MCNC: 164 MG/DL (ref 74–99)
GLUCOSE BLD MANUAL STRIP-MCNC: 275 MG/DL (ref 74–99)
GLUCOSE BLD MANUAL STRIP-MCNC: 296 MG/DL (ref 74–99)
GLUCOSE SERPL-MCNC: 171 MG/DL (ref 65–99)
GLUCOSE SERPL-MCNC: 243 MG/DL (ref 65–99)
HCT VFR BLD AUTO: 29.5 % (ref 41–52)
HCT VFR BLD AUTO: 31.4 % (ref 41–52)
HGB BLD-MCNC: 10.2 G/DL (ref 13.5–17.5)
HGB BLD-MCNC: 11 G/DL (ref 13.5–17.5)
IMM GRANULOCYTES # BLD AUTO: 0.01 X10*3/UL (ref 0–0.5)
IMM GRANULOCYTES # BLD AUTO: 0.02 X10*3/UL (ref 0–0.5)
IMM GRANULOCYTES NFR BLD AUTO: 0.2 % (ref 0–0.9)
IMM GRANULOCYTES NFR BLD AUTO: 0.4 % (ref 0–0.9)
INR PPP: 1.2 (ref 0.9–1.2)
INR PPP: 1.2 (ref 0.9–1.2)
LYMPHOCYTES # BLD AUTO: 0.97 X10*3/UL (ref 0.8–3)
LYMPHOCYTES # BLD AUTO: 1.13 X10*3/UL (ref 0.8–3)
LYMPHOCYTES NFR BLD AUTO: 19.9 %
LYMPHOCYTES NFR BLD AUTO: 23.4 %
MAGNESIUM SERPL-MCNC: 1.8 MG/DL (ref 1.6–3.1)
MAGNESIUM SERPL-MCNC: 1.9 MG/DL (ref 1.6–3.1)
MCH RBC QN AUTO: 35.5 PG (ref 26–34)
MCH RBC QN AUTO: 36.2 PG (ref 26–34)
MCHC RBC AUTO-ENTMCNC: 34.6 G/DL (ref 32–36)
MCHC RBC AUTO-ENTMCNC: 35 G/DL (ref 32–36)
MCV RBC AUTO: 101 FL (ref 80–100)
MCV RBC AUTO: 105 FL (ref 80–100)
MONOCYTES # BLD AUTO: 0.4 X10*3/UL (ref 0.05–0.8)
MONOCYTES # BLD AUTO: 0.45 X10*3/UL (ref 0.05–0.8)
MONOCYTES NFR BLD AUTO: 8.2 %
MONOCYTES NFR BLD AUTO: 9.3 %
NEUTROPHILS # BLD AUTO: 2.92 X10*3/UL (ref 1.6–5.5)
NEUTROPHILS # BLD AUTO: 3.17 X10*3/UL (ref 1.6–5.5)
NEUTROPHILS NFR BLD AUTO: 60.5 %
NEUTROPHILS NFR BLD AUTO: 65.1 %
NRBC BLD-RTO: 0 /100 WBCS (ref 0–0)
NRBC BLD-RTO: 0 /100 WBCS (ref 0–0)
PLATELET # BLD AUTO: 83 X10*3/UL (ref 150–450)
PLATELET # BLD AUTO: 91 X10*3/UL (ref 150–450)
POTASSIUM SERPL-SCNC: 4.5 MMOL/L (ref 3.4–5.1)
POTASSIUM SERPL-SCNC: 4.7 MMOL/L (ref 3.4–5.1)
PROTHROMBIN TIME: 12.3 SECONDS (ref 9.3–12.7)
PROTHROMBIN TIME: 12.3 SECONDS (ref 9.3–12.7)
RBC # BLD AUTO: 2.82 X10*6/UL (ref 4.5–5.9)
RBC # BLD AUTO: 3.1 X10*6/UL (ref 4.5–5.9)
RBC MORPH BLD: NORMAL
SODIUM SERPL-SCNC: 135 MMOL/L (ref 133–145)
SODIUM SERPL-SCNC: 139 MMOL/L (ref 133–145)
WBC # BLD AUTO: 4.8 X10*3/UL (ref 4.4–11.3)
WBC # BLD AUTO: 4.9 X10*3/UL (ref 4.4–11.3)

## 2023-11-11 PROCEDURE — 93010 ELECTROCARDIOGRAM REPORT: CPT | Performed by: INTERNAL MEDICINE

## 2023-11-11 PROCEDURE — 2500000002 HC RX 250 W HCPCS SELF ADMINISTERED DRUGS (ALT 637 FOR MEDICARE OP, ALT 636 FOR OP/ED): Performed by: INTERNAL MEDICINE

## 2023-11-11 PROCEDURE — 2500000001 HC RX 250 WO HCPCS SELF ADMINISTERED DRUGS (ALT 637 FOR MEDICARE OP): Performed by: INTERNAL MEDICINE

## 2023-11-11 PROCEDURE — 85025 COMPLETE CBC W/AUTO DIFF WBC: CPT

## 2023-11-11 PROCEDURE — 2500000004 HC RX 250 GENERAL PHARMACY W/ HCPCS (ALT 636 FOR OP/ED): Performed by: INTERNAL MEDICINE

## 2023-11-11 PROCEDURE — 82947 ASSAY GLUCOSE BLOOD QUANT: CPT

## 2023-11-11 PROCEDURE — 85610 PROTHROMBIN TIME: CPT

## 2023-11-11 PROCEDURE — 2500000001 HC RX 250 WO HCPCS SELF ADMINISTERED DRUGS (ALT 637 FOR MEDICARE OP)

## 2023-11-11 PROCEDURE — 83735 ASSAY OF MAGNESIUM: CPT

## 2023-11-11 PROCEDURE — 36415 COLL VENOUS BLD VENIPUNCTURE: CPT

## 2023-11-11 PROCEDURE — 82374 ASSAY BLOOD CARBON DIOXIDE: CPT

## 2023-11-11 PROCEDURE — G0378 HOSPITAL OBSERVATION PER HR: HCPCS

## 2023-11-11 RX ORDER — METFORMIN HYDROCHLORIDE 1000 MG/1
1000 TABLET ORAL 2 TIMES DAILY
Status: DISCONTINUED | OUTPATIENT
Start: 2023-11-11 | End: 2023-11-17 | Stop reason: HOSPADM

## 2023-11-11 RX ORDER — CLOPIDOGREL BISULFATE 75 MG/1
75 TABLET ORAL DAILY
Status: DISCONTINUED | OUTPATIENT
Start: 2023-11-12 | End: 2023-11-17 | Stop reason: HOSPADM

## 2023-11-11 RX ORDER — ATORVASTATIN CALCIUM 40 MG/1
40 TABLET, FILM COATED ORAL NIGHTLY
Status: DISCONTINUED | OUTPATIENT
Start: 2023-11-11 | End: 2023-11-17 | Stop reason: HOSPADM

## 2023-11-11 RX ORDER — FERROUS SULFATE 325(65) MG
325 TABLET ORAL DAILY
Status: DISCONTINUED | OUTPATIENT
Start: 2023-11-11 | End: 2023-11-17 | Stop reason: HOSPADM

## 2023-11-11 RX ORDER — LISINOPRIL 20 MG/1
20 TABLET ORAL DAILY
Status: DISCONTINUED | OUTPATIENT
Start: 2023-11-11 | End: 2023-11-17 | Stop reason: HOSPADM

## 2023-11-11 RX ORDER — GABAPENTIN 100 MG/1
200 CAPSULE ORAL 2 TIMES DAILY
Status: DISCONTINUED | OUTPATIENT
Start: 2023-11-11 | End: 2023-11-17 | Stop reason: HOSPADM

## 2023-11-11 RX ORDER — DEXTROSE 50 % IN WATER (D50W) INTRAVENOUS SYRINGE
25
Status: DISCONTINUED | OUTPATIENT
Start: 2023-11-11 | End: 2023-11-17 | Stop reason: HOSPADM

## 2023-11-11 RX ORDER — AMLODIPINE BESYLATE 5 MG/1
5 TABLET ORAL DAILY
Status: DISCONTINUED | OUTPATIENT
Start: 2023-11-11 | End: 2023-11-12

## 2023-11-11 RX ORDER — TAMSULOSIN HYDROCHLORIDE 0.4 MG/1
0.4 CAPSULE ORAL 2 TIMES DAILY
Status: DISCONTINUED | OUTPATIENT
Start: 2023-11-11 | End: 2023-11-17 | Stop reason: HOSPADM

## 2023-11-11 RX ORDER — DEXTROSE MONOHYDRATE 100 MG/ML
0.3 INJECTION, SOLUTION INTRAVENOUS ONCE AS NEEDED
Status: DISCONTINUED | OUTPATIENT
Start: 2023-11-11 | End: 2023-11-17 | Stop reason: HOSPADM

## 2023-11-11 RX ORDER — FINASTERIDE 5 MG/1
5 TABLET, FILM COATED ORAL DAILY
Status: DISCONTINUED | OUTPATIENT
Start: 2023-11-11 | End: 2023-11-17 | Stop reason: HOSPADM

## 2023-11-11 RX ORDER — ASPIRIN 81 MG/1
162 TABLET ORAL DAILY
Status: DISCONTINUED | OUTPATIENT
Start: 2023-11-11 | End: 2023-11-17 | Stop reason: HOSPADM

## 2023-11-11 RX ORDER — PANTOPRAZOLE SODIUM 40 MG/1
40 TABLET, DELAYED RELEASE ORAL
Status: DISCONTINUED | OUTPATIENT
Start: 2023-11-12 | End: 2023-11-13

## 2023-11-11 RX ORDER — METOPROLOL SUCCINATE 50 MG/1
50 TABLET, EXTENDED RELEASE ORAL DAILY
Status: DISCONTINUED | OUTPATIENT
Start: 2023-11-11 | End: 2023-11-12

## 2023-11-11 RX ORDER — INSULIN GLARGINE 100 [IU]/ML
15 INJECTION, SOLUTION SUBCUTANEOUS NIGHTLY
Status: DISCONTINUED | OUTPATIENT
Start: 2023-11-11 | End: 2023-11-17 | Stop reason: HOSPADM

## 2023-11-11 RX ADMIN — INSULIN GLARGINE 15 UNITS: 100 INJECTION, SOLUTION SUBCUTANEOUS at 22:47

## 2023-11-11 RX ADMIN — METFORMIN HYDROCHLORIDE 1000 MG: 1000 TABLET, FILM COATED ORAL at 22:34

## 2023-11-11 RX ADMIN — FINASTERIDE 5 MG: 5 TABLET, FILM COATED ORAL at 22:34

## 2023-11-11 RX ADMIN — AMLODIPINE BESYLATE 5 MG: 5 TABLET ORAL at 22:34

## 2023-11-11 RX ADMIN — DOCUSATE SODIUM 100 MG: 100 CAPSULE, LIQUID FILLED ORAL at 22:33

## 2023-11-11 RX ADMIN — TAMSULOSIN HYDROCHLORIDE 0.4 MG: 0.4 CAPSULE ORAL at 22:33

## 2023-11-11 RX ADMIN — ASPIRIN 162 MG: 81 TABLET, COATED ORAL at 22:33

## 2023-11-11 RX ADMIN — METOPROLOL SUCCINATE 50 MG: 50 TABLET, EXTENDED RELEASE ORAL at 22:32

## 2023-11-11 RX ADMIN — FERROUS SULFATE TAB 325 MG (65 MG ELEMENTAL FE) 325 MG: 325 (65 FE) TAB at 22:33

## 2023-11-11 RX ADMIN — ATORVASTATIN CALCIUM 40 MG: 40 TABLET, FILM COATED ORAL at 22:33

## 2023-11-11 RX ADMIN — CLOPIDOGREL BISULFATE 75 MG: 75 TABLET ORAL at 08:55

## 2023-11-11 RX ADMIN — DOCUSATE SODIUM 100 MG: 100 CAPSULE, LIQUID FILLED ORAL at 08:55

## 2023-11-11 RX ADMIN — GABAPENTIN 200 MG: 100 CAPSULE ORAL at 22:35

## 2023-11-11 ASSESSMENT — COGNITIVE AND FUNCTIONAL STATUS - GENERAL
PERSONAL GROOMING: A LOT
WALKING IN HOSPITAL ROOM: TOTAL
HELP NEEDED FOR BATHING: A LOT
TOILETING: A LOT
TURNING FROM BACK TO SIDE WHILE IN FLAT BAD: A LITTLE
STANDING UP FROM CHAIR USING ARMS: A LOT
MOVING TO AND FROM BED TO CHAIR: A LOT
DRESSING REGULAR LOWER BODY CLOTHING: A LITTLE
CLIMB 3 TO 5 STEPS WITH RAILING: TOTAL
DAILY ACTIVITIY SCORE: 17
MOBILITY SCORE: 13

## 2023-11-11 ASSESSMENT — PAIN SCALES - GENERAL
PAINLEVEL_OUTOF10: 0 - NO PAIN

## 2023-11-11 ASSESSMENT — PAIN - FUNCTIONAL ASSESSMENT
PAIN_FUNCTIONAL_ASSESSMENT: 0-10

## 2023-11-11 NOTE — PROGRESS NOTES
"Luis Greene is a 83 y.o. male on day 0 of admission presenting with Stroke-like episode.    Subjective   The patient was seen and examined.  Lying in the bed.  Comfortable.  Not in acute distress.  Patient denies any nausea or vomiting.       Objective     Physical Exam  HEENT:  Head externally atraumatic, no pallor, no icterus, extraocular movements intact, pupils reacting to light, oral mucosa moist and throat clear.  Neck:  Supple, no JVP, no palpable adenopathy or thyromegaly.  No carotid bruit.  Chest:  Clear to auscultation and resonant.  Heart:  Regular rate and rhythm, no murmur or gallop could be appreciated.  Abdomen:  Soft, nontender, bowel sounds present, normoactive, no palpable hepatosplenomegaly.  Extremities:  No edema, pulses present, no cyanosis or clubbing.  CNS:  Patient alert, oriented to time, place and person.  Power 5/5 all over and deep tendon reflexes symmetrical, cranial nerves 2-12 grossly intact.  Patient has got some cognitive impairment.  Skin:  No active rash.  Musculoskeletal:  No joint swelling or erythema, range of movement normal.  Last Recorded Vitals  Heart Rate:  []   Temp:  [36.4 °C (97.5 °F)-37 °C (98.6 °F)]   Resp:  [14-24]   BP: ()/()   Height:  [180.3 cm (5' 11\")]   Weight:  [78.5 kg (173 lb)]   SpO2:  [95 %-100 %]     Intake/Output last 3 Shifts:  I/O last 3 completed shifts:  In: - (0 mL/kg)   Out: 700 (8.9 mL/kg) [Urine:700 (0.2 mL/kg/hr)]  Weight: 78.5 kg     Relevant Results  Susceptibility data from last 90 days.  Collected Specimen Info Organism Ceftriaxone Ciprofloxacin Levofloxacin Penicillin Tetracycline Vancomycin   10/17/23 Urine from Clean Catch/Voided Aerococcus urinae S S S S S S     Results for orders placed or performed during the hospital encounter of 11/10/23 (from the past 24 hour(s))   CBC and Auto Differential   Result Value Ref Range    WBC 6.7 4.4 - 11.3 x10*3/uL    nRBC 0.0 0.0 - 0.0 /100 WBCs    RBC 3.13 (L) 4.50 - 5.90 " x10*6/uL    Hemoglobin 11.2 (L) 13.5 - 17.5 g/dL    Hematocrit 32.5 (L) 41.0 - 52.0 %     (H) 80 - 100 fL    MCH 35.8 (H) 26.0 - 34.0 pg    MCHC 34.5 32.0 - 36.0 g/dL    RDW 13.8 11.5 - 14.5 %    Platelets 91 (L) 150 - 450 x10*3/uL    Neutrophils % 68.5 40.0 - 80.0 %    Immature Granulocytes %, Automated 0.4 0.0 - 0.9 %    Lymphocytes % 16.3 13.0 - 44.0 %    Monocytes % 10.8 2.0 - 10.0 %    Eosinophils % 3.9 0.0 - 6.0 %    Basophils % 0.1 0.0 - 2.0 %    Neutrophils Absolute 4.58 1.60 - 5.50 x10*3/uL    Immature Granulocytes Absolute, Automated 0.03 0.00 - 0.50 x10*3/uL    Lymphocytes Absolute 1.09 0.80 - 3.00 x10*3/uL    Monocytes Absolute 0.72 0.05 - 0.80 x10*3/uL    Eosinophils Absolute 0.26 0.00 - 0.40 x10*3/uL    Basophils Absolute 0.01 0.00 - 0.10 x10*3/uL   Comprehensive metabolic panel   Result Value Ref Range    Glucose 231 (H) 65 - 99 mg/dL    Sodium 135 133 - 145 mmol/L    Potassium 4.4 3.4 - 5.1 mmol/L    Chloride 103 97 - 107 mmol/L    Bicarbonate 22 (L) 24 - 31 mmol/L    Urea Nitrogen 20 8 - 25 mg/dL    Creatinine 0.80 0.40 - 1.60 mg/dL    eGFR 88 >60 mL/min/1.73m*2    Calcium 8.4 (L) 8.5 - 10.4 mg/dL    Albumin 3.5 3.5 - 5.0 g/dL    Alkaline Phosphatase 45 35 - 125 U/L    Total Protein 5.3 (L) 5.9 - 7.9 g/dL    AST 30 5 - 40 U/L    Bilirubin, Total 1.1 0.1 - 1.2 mg/dL    ALT 40 5 - 40 U/L    Anion Gap 10 <=19 mmol/L   Troponin T, High Sensitivity   Result Value Ref Range    Troponin T, High Sensitivity 117 (H) <=15 ng/L   Protime-INR   Result Value Ref Range    Protime 12.3 9.3 - 12.7 seconds    INR 1.2 0.9 - 1.2   APTT   Result Value Ref Range    aPTT 22.2 22.0 - 32.5 seconds   Morphology   Result Value Ref Range    RBC Morphology No significant RBC morphology present    POCT GLUCOSE   Result Value Ref Range    POCT Glucose 208 (H) 74 - 99 mg/dL   Basic metabolic panel   Result Value Ref Range    Glucose 243 (H) 65 - 99 mg/dL    Sodium 135 133 - 145 mmol/L    Potassium 4.5 3.4 - 5.1 mmol/L     Chloride 100 97 - 107 mmol/L    Bicarbonate 22 (L) 24 - 31 mmol/L    Urea Nitrogen 20 8 - 25 mg/dL    Creatinine 0.80 0.40 - 1.60 mg/dL    eGFR 88 >60 mL/min/1.73m*2    Calcium 8.4 (L) 8.5 - 10.4 mg/dL    Anion Gap 13 <=19 mmol/L   Magnesium   Result Value Ref Range    Magnesium 1.80 1.60 - 3.10 mg/dL   CBC and Auto Differential   Result Value Ref Range    WBC 4.9 4.4 - 11.3 x10*3/uL    nRBC 0.0 0.0 - 0.0 /100 WBCs    RBC 2.82 (L) 4.50 - 5.90 x10*6/uL    Hemoglobin 10.2 (L) 13.5 - 17.5 g/dL    Hematocrit 29.5 (L) 41.0 - 52.0 %     (H) 80 - 100 fL    MCH 36.2 (H) 26.0 - 34.0 pg    MCHC 34.6 32.0 - 36.0 g/dL    RDW 13.8 11.5 - 14.5 %    Platelets 83 (L) 150 - 450 x10*3/uL    Neutrophils % 65.1 40.0 - 80.0 %    Immature Granulocytes %, Automated 0.2 0.0 - 0.9 %    Lymphocytes % 19.9 13.0 - 44.0 %    Monocytes % 8.2 2.0 - 10.0 %    Eosinophils % 6.2 0.0 - 6.0 %    Basophils % 0.4 0.0 - 2.0 %    Neutrophils Absolute 3.17 1.60 - 5.50 x10*3/uL    Immature Granulocytes Absolute, Automated 0.01 0.00 - 0.50 x10*3/uL    Lymphocytes Absolute 0.97 0.80 - 3.00 x10*3/uL    Monocytes Absolute 0.40 0.05 - 0.80 x10*3/uL    Eosinophils Absolute 0.30 0.00 - 0.40 x10*3/uL    Basophils Absolute 0.02 0.00 - 0.10 x10*3/uL   Protime-INR   Result Value Ref Range    Protime 12.3 9.3 - 12.7 seconds    INR 1.2 0.9 - 1.2   Morphology   Result Value Ref Range    RBC Morphology No significant RBC morphology present    CBC and Auto Differential   Result Value Ref Range    WBC 4.8 4.4 - 11.3 x10*3/uL    nRBC 0.0 0.0 - 0.0 /100 WBCs    RBC 3.10 (L) 4.50 - 5.90 x10*6/uL    Hemoglobin 11.0 (L) 13.5 - 17.5 g/dL    Hematocrit 31.4 (L) 41.0 - 52.0 %     (H) 80 - 100 fL    MCH 35.5 (H) 26.0 - 34.0 pg    MCHC 35.0 32.0 - 36.0 g/dL    RDW 13.6 11.5 - 14.5 %    Platelets 91 (L) 150 - 450 x10*3/uL    Neutrophils % 60.5 40.0 - 80.0 %    Immature Granulocytes %, Automated 0.4 0.0 - 0.9 %    Lymphocytes % 23.4 13.0 - 44.0 %    Monocytes % 9.3 2.0 -  10.0 %    Eosinophils % 6.2 0.0 - 6.0 %    Basophils % 0.2 0.0 - 2.0 %    Neutrophils Absolute 2.92 1.60 - 5.50 x10*3/uL    Immature Granulocytes Absolute, Automated 0.02 0.00 - 0.50 x10*3/uL    Lymphocytes Absolute 1.13 0.80 - 3.00 x10*3/uL    Monocytes Absolute 0.45 0.05 - 0.80 x10*3/uL    Eosinophils Absolute 0.30 0.00 - 0.40 x10*3/uL    Basophils Absolute 0.01 0.00 - 0.10 x10*3/uL   Protime-INR   Result Value Ref Range    Protime 12.3 9.3 - 12.7 seconds    INR 1.2 0.9 - 1.2   Magnesium   Result Value Ref Range    Magnesium 1.90 1.60 - 3.10 mg/dL   Basic Metabolic Panel   Result Value Ref Range    Glucose 171 (H) 65 - 99 mg/dL    Sodium 139 133 - 145 mmol/L    Potassium 4.7 3.4 - 5.1 mmol/L    Chloride 107 97 - 107 mmol/L    Bicarbonate 21 (L) 24 - 31 mmol/L    Urea Nitrogen 20 8 - 25 mg/dL    Creatinine 0.80 0.40 - 1.60 mg/dL    eGFR 88 >60 mL/min/1.73m*2    Calcium 8.7 8.5 - 10.4 mg/dL    Anion Gap 11 <=19 mmol/L   POCT GLUCOSE   Result Value Ref Range    POCT Glucose 164 (H) 74 - 99 mg/dL        Current Facility-Administered Medications:     clopidogrel (Plavix) tablet 75 mg, 75 mg, oral, Daily, SUSAN Orr, 75 mg at 11/11/23 0855    docusate sodium (Colace) capsule 100 mg, 100 mg, oral, BID, JONA Orr-CNP, 100 mg at 11/11/23 0855    ondansetron ODT (Zofran-ODT) disintegrating tablet 4 mg, 4 mg, oral, q8h PRN **OR** ondansetron (Zofran) injection 4 mg, 4 mg, intravenous, q8h PRN, SUSAN Orr    perflutren lipid microspheres (Definity) injection 0.5-10 mL of dilution, 0.5-10 mL of dilution, intravenous, Once in imaging, SUSAN Orr    perflutren lipid microspheres (Definity) injection 0.5-10 mL of dilution, 0.5-10 mL of dilution, intravenous, Once in imaging, SUSAN Orr    perflutren protein A microsphere (Optison) injection 0.5 mL, 0.5 mL, intravenous, Once in imaging, JONA Orr-CNP    perflutren protein A microsphere (Optison) injection 0.5 mL, 0.5 mL,  intravenous, Once in imaging, SUSAN Orr    sulfur hexafluoride microsphr (Lumason) injection 24.28 mg, 2 mL, intravenous, Once in imaging, SUSAN Orr    sulfur hexafluoride microsphr (Lumason) injection 24.28 mg, 2 mL, intravenous, Once in imaging, SUSAN Orr   Assessment/Plan   Principal Problem:    Stroke-like episode  Proximal atrial fibrillation  Coronary artery disease  Osteoarthritis    Plan: Continue current medication get supportive care.  Neurology consult.  Will discuss with neurology and MRI of the MRI can be done after Watchman procedure.  Consult cardiology.  We will take DVT, fall, aspiration decubitus precautions         Pete Oneill MD

## 2023-11-11 NOTE — NURSING NOTE
Called Dr Oneill regarding blood glucoses and need for insulin and homes meds. States he will put them in.

## 2023-11-11 NOTE — NURSING NOTE
Confused, removing telemetry, pulse ox and gown, bed alarm activated. Oriented to self only. Per family patient does experience increased confusion when hospitalized.

## 2023-11-11 NOTE — NURSING NOTE
Called for report was placed on hold. Hung up needed to attend to a call light.   2113: report received from [facility] in the ED.    2150; transferred to SDU 13A, alert to name and situation, confused to place and time. Family at the bedside, able to establish baseline.  Hedy (daughter) states that Devyn has had a decline in memory and is experiencing sundowners in the last few weeks. Chronic guidry present with brown color urine that was placed due to urinary retention diagnosed after a previous UTI. The patient is alert and able to answer some questions approprietly.  Admission and physical assessment completed.  Bed alarm activated, patient and family have reported a number of falls in the last few months.     0010: Up dated Dr. Oneill on patient admission and orders.     0015; patient removed IV and coband to skin tear on right wrist wound.     0020: with assistance a new 20g was placed in the LFA, patient cleaned, bedding changed CHG bath performed.  Iv is currently wrapped with gauze and dressing to the skin tear on the FA dressed with a tegaderm. Labs obtained.

## 2023-11-11 NOTE — CARE PLAN
The patient's goals for the shift include maintain safety    The clinical goals for the shift include intact neuros,    Over the shift, the patient did not make progress toward the following goals. Barriers to progression include increased confusion at night. Recommendations to address these barriers include activate bed alarm.

## 2023-11-11 NOTE — CONSULTS
Neurology Consult    History Of Present Illness  Luis Greene is a 83 y.o. male with listed PMHX, coming for evaluation of stroke-like sympt's.  Hx is obtained from chart, daughter at bedside and patient, but he is a poor historian.   Patient came yesterday for watchman device which was done w/o complications or major events reported.  Pat was d/h and went with camilo to buy/carry out something to eat.  Upon coming out from buying food, about 3:50 pm, developed sudden onset of inability to speak, some confusion and Rt facial droop.  Pat came to ED in stroke code w/ tele-Neurology done, but pat not found to be an IV thrombolysis or acute vascular intervention candidate given that his sympt's improved significantly at time of ER eval and no LVO found on CTA's.  Daughter mentions since been admitted, to at times seemed confusion.  Pt w/o good recollection of all details of the event.  Pat feels still speech still not completely 100% and feels some very mild weakness in right thigh.  Denies new sensory deficits, but he suffers from chronic distal LE tingling from neuropathy.  Daughter denies previous hx of strokes, but has hx of mlt TIA events w/ similar sympt's.  Pat was taking eliquis previous to watchman, but started on DAPT after device placement.  In ? If pat can have MRI or not after this device placement.        Past Medical History  Past Medical History:   Diagnosis Date    Coronary artery disease     Hyperlipidemia     Personal history of other diseases of the circulatory system     History of cardiac disorder    Personal history of other endocrine, nutritional and metabolic disease     History of hypercholesterolemia     Surgical History  Past Surgical History:   Procedure Laterality Date    MR HEAD ANGIO WO IV CONTRAST  2/17/2019    MR HEAD ANGIO WO IV CONTRAST [facility]    MR HEAD ANGIO WO IV CONTRAST  10/28/2022    MR HEAD ANGIO WO IV CONTRAST [facility] EM[facility]GENCY LEGACY    MR HEAD ANGIO WO IV  CONTRAST  8/15/2023    MR HEAD ANGIO WO IV CONTRAST [facility]    OTHER SURGICAL HISTORY  03/11/2019    Heart surgery    OTHER SURGICAL HISTORY  03/11/2019    Knee surgery    OTHER SURGICAL HISTORY  03/11/2019    Tonsillectomy     Social History  Social History     Tobacco Use    Smoking status: Never    Smokeless tobacco: Never   Vaping Use    Vaping Use: Never used   Substance Use Topics    Alcohol use: Never    Drug use: Never     Allergies  Famotidine, Prednisone, Amoxicillin, Donepezil, Gabapentin, Hydrochlorothiazide, Ibuprofen, Other, Oxycodone-acetaminophen, Tramadol hcl, and Oxycodone hcl  Medications Prior to Admission   Medication Sig Dispense Refill Last Dose    amLODIPine (Norvasc) 5 mg tablet Take 1 tablet (5 mg) by mouth once daily.   11/10/2023 at 0800    aspirin 81 mg EC tablet Take 2 tablets (162 mg) by mouth once daily.   11/10/2023 at 0800    atorvastatin (Lipitor) 40 mg tablet Take 1 tablet (40 mg) by mouth once daily.   11/9/2023 at 2200    clopidogrel (Plavix) 75 mg tablet Take 1 tablet (75 mg) by mouth once daily. 90 tablet 1     ferrous sulfate 325 (65 Fe) MG EC tablet Take 1 tablet (325 mg) by mouth once daily.   11/10/2023 at 0800    finasteride (Proscar) 5 mg tablet Take 1 tablet (5 mg) by mouth once daily. 30 tablet 5 11/10/2023 at 0800    folic acid-vitamin B complex-vitamin C-selenium-zinc (Dialyvite) 3-70-15 mg-mcg-mg tablet Take 1 tablet by mouth once daily.   11/10/2023 at 0800    furosemide (Lasix) 40 mg tablet Take 1 tablet (40 mg) by mouth once daily. 30 tablet 11 11/10/2023 at 0800    gabapentin (Neurontin) 100 mg capsule TAKE 1 CAPSULE BY MOUTH ONCE DAILY FOR NEUROPATHY PAIN 90 capsule 0 11/9/2023 at 2200    insulin glargine-lixisenatide (Soliqua 100/33) 100 unit-33 mcg/mL insulin pen Inject 15 Units under the skin once daily at bedtime. 15 mL 1 11/9/2023 at 2200    lisinopril 20 mg tablet Take 1 tablet by mouth once daily 90 tablet 0 11/10/2023 at 2200    metFORMIN  (Glucophage) 500 mg tablet Take 2 tablets (1,000 mg) by mouth 2 times a day.   11/9/2023 at 2200    metoprolol succinate XL (Toprol-XL) 50 mg 24 hr tablet Take 1 tablet (50 mg) by mouth once daily.   11/9/2023 at 0800    omeprazole (PriLOSEC) 40 mg DR capsule Take 1 capsule (40 mg) by mouth once daily.   11/10/2023 at 0800    tamsulosin (Flomax) 0.4 mg 24 hr capsule Take 1 capsule (0.4 mg) by mouth 2 times a day. 180 capsule 1 11/10/2023 at 0800    traMADol (Ultram) 50 mg tablet 1 tablet (50 mg) 2 times a day as needed.   11/9/2023 at 2200       ROS: See hpi and H&P  Neurological Exam  Physical Exam  General:  lying in bed, daughter at bedside and POA connected vial cell phone audio-video system, cooperative to the interview and physical exam, no distress, poor historian.    Head and Neck:  atraumatic.   Mental Status:  Alert, oriented to person, place and time, name current [employer] presidents, follow commands, no right to left confusion, no clear aphasia (+) bradyphrenia, fair naming and repetition w/ some titubation.       Cranial Nerves II-XII:  positive papillary reactivity to light and accommodation from 2mm to 1.5 mm bilaterally, normal extraocular movements, no visual field deficits to confrontational testing, normal bilateral facial sensation to light touch at all quadrants, normal jaw muscle strength, normal bilateral facial grimace, decreased hearing to conversation, normal tongue protrusion, palate elevation not eval, normal shoulder shrug, Motor:  slight Rt UE drift; strength (MRC score) at Rt UE is -5/5 w/ rest been 5/5; rest is 5/5  Coordination:  fair finger to nose and rapid alternating movements to finger tap bilaterally.     Reflexes:  +2/4 at blt Ue's; +1/4 at Le's; plantar reflex is flexor bilaterally.   Sensory:  Normal light touch, at all extremities.     Gait: deferred.        Last Recorded Vitals  Blood pressure (!) 147/36, pulse 77, temperature 37.6 °C (99.7 °F), temperature source Oral, resp.  "rate 20, height 1.803 m (5' 11\"), weight 78.5 kg (173 lb), SpO2 100 %.    Relevant Results  Results for orders placed or performed during the hospital encounter of 11/10/23 (from the past 96 hour(s))   CBC and Auto Differential   Result Value Ref Range    WBC 6.7 4.4 - 11.3 x10*3/uL    nRBC 0.0 0.0 - 0.0 /100 WBCs    RBC 3.13 (L) 4.50 - 5.90 x10*6/uL    Hemoglobin 11.2 (L) 13.5 - 17.5 g/dL    Hematocrit 32.5 (L) 41.0 - 52.0 %     (H) 80 - 100 fL    MCH 35.8 (H) 26.0 - 34.0 pg    MCHC 34.5 32.0 - 36.0 g/dL    RDW 13.8 11.5 - 14.5 %    Platelets 91 (L) 150 - 450 x10*3/uL    Neutrophils % 68.5 40.0 - 80.0 %    Immature Granulocytes %, Automated 0.4 0.0 - 0.9 %    Lymphocytes % 16.3 13.0 - 44.0 %    Monocytes % 10.8 2.0 - 10.0 %    Eosinophils % 3.9 0.0 - 6.0 %    Basophils % 0.1 0.0 - 2.0 %    Neutrophils Absolute 4.58 1.60 - 5.50 x10*3/uL    Immature Granulocytes Absolute, Automated 0.03 0.00 - 0.50 x10*3/uL    Lymphocytes Absolute 1.09 0.80 - 3.00 x10*3/uL    Monocytes Absolute 0.72 0.05 - 0.80 x10*3/uL    Eosinophils Absolute 0.26 0.00 - 0.40 x10*3/uL    Basophils Absolute 0.01 0.00 - 0.10 x10*3/uL   Comprehensive metabolic panel   Result Value Ref Range    Glucose 231 (H) 65 - 99 mg/dL    Sodium 135 133 - 145 mmol/L    Potassium 4.4 3.4 - 5.1 mmol/L    Chloride 103 97 - 107 mmol/L    Bicarbonate 22 (L) 24 - 31 mmol/L    Urea Nitrogen 20 8 - 25 mg/dL    Creatinine 0.80 0.40 - 1.60 mg/dL    eGFR 88 >60 mL/min/1.73m*2    Calcium 8.4 (L) 8.5 - 10.4 mg/dL    Albumin 3.5 3.5 - 5.0 g/dL    Alkaline Phosphatase 45 35 - 125 U/L    Total Protein 5.3 (L) 5.9 - 7.9 g/dL    AST 30 5 - 40 U/L    Bilirubin, Total 1.1 0.1 - 1.2 mg/dL    ALT 40 5 - 40 U/L    Anion Gap 10 <=19 mmol/L   Troponin T, High Sensitivity   Result Value Ref Range    Troponin T, High Sensitivity 117 (H) <=15 ng/L   Protime-INR   Result Value Ref Range    Protime 12.3 9.3 - 12.7 seconds    INR 1.2 0.9 - 1.2   APTT   Result Value Ref Range    aPTT 22.2 " 22.0 - 32.5 seconds   Morphology   Result Value Ref Range    RBC Morphology No significant RBC morphology present    POCT GLUCOSE   Result Value Ref Range    POCT Glucose 208 (H) 74 - 99 mg/dL   Basic metabolic panel   Result Value Ref Range    Glucose 243 (H) 65 - 99 mg/dL    Sodium 135 133 - 145 mmol/L    Potassium 4.5 3.4 - 5.1 mmol/L    Chloride 100 97 - 107 mmol/L    Bicarbonate 22 (L) 24 - 31 mmol/L    Urea Nitrogen 20 8 - 25 mg/dL    Creatinine 0.80 0.40 - 1.60 mg/dL    eGFR 88 >60 mL/min/1.73m*2    Calcium 8.4 (L) 8.5 - 10.4 mg/dL    Anion Gap 13 <=19 mmol/L   Magnesium   Result Value Ref Range    Magnesium 1.80 1.60 - 3.10 mg/dL   CBC and Auto Differential   Result Value Ref Range    WBC 4.9 4.4 - 11.3 x10*3/uL    nRBC 0.0 0.0 - 0.0 /100 WBCs    RBC 2.82 (L) 4.50 - 5.90 x10*6/uL    Hemoglobin 10.2 (L) 13.5 - 17.5 g/dL    Hematocrit 29.5 (L) 41.0 - 52.0 %     (H) 80 - 100 fL    MCH 36.2 (H) 26.0 - 34.0 pg    MCHC 34.6 32.0 - 36.0 g/dL    RDW 13.8 11.5 - 14.5 %    Platelets 83 (L) 150 - 450 x10*3/uL    Neutrophils % 65.1 40.0 - 80.0 %    Immature Granulocytes %, Automated 0.2 0.0 - 0.9 %    Lymphocytes % 19.9 13.0 - 44.0 %    Monocytes % 8.2 2.0 - 10.0 %    Eosinophils % 6.2 0.0 - 6.0 %    Basophils % 0.4 0.0 - 2.0 %    Neutrophils Absolute 3.17 1.60 - 5.50 x10*3/uL    Immature Granulocytes Absolute, Automated 0.01 0.00 - 0.50 x10*3/uL    Lymphocytes Absolute 0.97 0.80 - 3.00 x10*3/uL    Monocytes Absolute 0.40 0.05 - 0.80 x10*3/uL    Eosinophils Absolute 0.30 0.00 - 0.40 x10*3/uL    Basophils Absolute 0.02 0.00 - 0.10 x10*3/uL   Protime-INR   Result Value Ref Range    Protime 12.3 9.3 - 12.7 seconds    INR 1.2 0.9 - 1.2   Morphology   Result Value Ref Range    RBC Morphology No significant RBC morphology present    CBC and Auto Differential   Result Value Ref Range    WBC 4.8 4.4 - 11.3 x10*3/uL    nRBC 0.0 0.0 - 0.0 /100 WBCs    RBC 3.10 (L) 4.50 - 5.90 x10*6/uL    Hemoglobin 11.0 (L) 13.5 - 17.5  g/dL    Hematocrit 31.4 (L) 41.0 - 52.0 %     (H) 80 - 100 fL    MCH 35.5 (H) 26.0 - 34.0 pg    MCHC 35.0 32.0 - 36.0 g/dL    RDW 13.6 11.5 - 14.5 %    Platelets 91 (L) 150 - 450 x10*3/uL    Neutrophils % 60.5 40.0 - 80.0 %    Immature Granulocytes %, Automated 0.4 0.0 - 0.9 %    Lymphocytes % 23.4 13.0 - 44.0 %    Monocytes % 9.3 2.0 - 10.0 %    Eosinophils % 6.2 0.0 - 6.0 %    Basophils % 0.2 0.0 - 2.0 %    Neutrophils Absolute 2.92 1.60 - 5.50 x10*3/uL    Immature Granulocytes Absolute, Automated 0.02 0.00 - 0.50 x10*3/uL    Lymphocytes Absolute 1.13 0.80 - 3.00 x10*3/uL    Monocytes Absolute 0.45 0.05 - 0.80 x10*3/uL    Eosinophils Absolute 0.30 0.00 - 0.40 x10*3/uL    Basophils Absolute 0.01 0.00 - 0.10 x10*3/uL   Protime-INR   Result Value Ref Range    Protime 12.3 9.3 - 12.7 seconds    INR 1.2 0.9 - 1.2   Magnesium   Result Value Ref Range    Magnesium 1.90 1.60 - 3.10 mg/dL   Basic Metabolic Panel   Result Value Ref Range    Glucose 171 (H) 65 - 99 mg/dL    Sodium 139 133 - 145 mmol/L    Potassium 4.7 3.4 - 5.1 mmol/L    Chloride 107 97 - 107 mmol/L    Bicarbonate 21 (L) 24 - 31 mmol/L    Urea Nitrogen 20 8 - 25 mg/dL    Creatinine 0.80 0.40 - 1.60 mg/dL    eGFR 88 >60 mL/min/1.73m*2    Calcium 8.7 8.5 - 10.4 mg/dL    Anion Gap 11 <=19 mmol/L   POCT GLUCOSE   Result Value Ref Range    POCT Glucose 164 (H) 74 - 99 mg/dL     CT angio brain attack head w IV contrast and post procedure  Result Date: 11/10/2023  1. No large vessel occlusion or high-grade stenosis. 2. Atherosclerotic plaques in the bilateral carotid bulbs contributing to mild (less than 50%) stenosis. Atherosclerotic plaque in the right proximal ICA contributing to approximately 50% narrowing.       CT brain attack head wo IV contrast  Result Date: 11/10/2023  No acute intracranial abnormality.           Transthoracic Echo (TTE) Limited  Result Date: 11/10/2023  CONCLUSIONS:  1. Left ventricular systolic function is normal with a 60-65%  estimated ejection fraction.  2. Abnormal septal motion consistent with post-operative status.  3. The left atrium is enlarged.  4. The right atrium is moderately dilated.     Transthoracic Echo (TTE) Limited  Result Date: 11/10/2023  CONCLUSIONS:  1. Left ventricular systolic function is normal with a 60-65% estimated ejection fraction.  2. The left atrium is enlarged.  3. There is a trivial pericardial effusion.     CT watchman full contrast  Result Date: 11/10/2023  1. No evidence of left atrial/left atrial appendage thrombus. 2. Limited evaluation secondary to motion artifact which may affect the accuracy of left atrial appendage measurements. The left atrial appendage orifice is round in shape, measuring 1.9 x 1.8 cm in orthogonal dimensions and with an area of 2.8 cm. sq. Left atrial appendage depth is 2.2 cm. 3. Severe coronary artery calcifications are seen in the setting of prior bypass grafting. Please note,the study is not optimized for evaluation of coronary arteries/grafts. 4. Biatrial enlargement. 5. Mild aortic valve calcifications.       XR chest 1 view  Result Date: 11/10/2023  No acute cardiopulmonary process.        Assessment/Plan   Impression:  Stroke-like sympt's s/p watchman device placement.  As per hx and exam, cannot exclude an small lacunar stroke in the left subcortical territory.  Will benefit from MRI brain if ok w/ Cardio.  Otherwise may have to be hold and do a follow up CT.  Need to cont. Control of all vascular risk factors and dapt as per cardio.      Recommendations/Plan:  Check MRI brain w/o cont, if ok w/ Cardio.  If not, consider repeating CT brain w/o cont tomorrow.    Telemetry while in hospital.   Follow up Cardio eval/recc's.  4.   Control of all vascular risk factors, statin and dapt (aspirin 81mg + Plavix 75mg) Qday.  Defer to Cardio if pat needs to be switch to anticoagulation.    5.    Avoid hypotension, BP Parameters: SBP>120<170; DBP>60<90.    6.   Get PT/OT/ST eval.   NPO until swallowing is clear.    7.   DVT prophylaxis as per primary medical team.   8.   Follow to cont.     Joseph Elizondo MD

## 2023-11-11 NOTE — PROGRESS NOTES
I was contacted by Dr. Louie Avitia (interventional Cardiologist) who implanted watchman device and oriented that device is MRI compatible and MRI can be done shortly after implantation is clinically necessary.  Also noted this to be described on device safety info.  Will go ahead and proceed w/ MRI brain to further eval.

## 2023-11-11 NOTE — CARE PLAN
Problem: Fall/Injury  Goal: Not fall by end of shift  Outcome: Progressing  Goal: Be free from injury by end of the shift  Outcome: Progressing  Goal: Verbalize understanding of personal risk factors for fall in the hospital  Outcome: Progressing  Goal: Verbalize understanding of risk factor reduction measures to prevent injury from fall in the home  Outcome: Progressing  Goal: Use assistive devices by end of the shift  Outcome: Progressing  Goal: Pace activities to prevent fatigue by end of the shift  Outcome: Progressing     Problem: Diabetes  Goal: Achieve decreasing blood glucose levels by end of shift  Outcome: Progressing  Goal: Increase stability of blood glucose readings by end of shift  Outcome: Progressing  Goal: Decrease in ketones present in urine by end of shift  Outcome: Progressing  Goal: Maintain electrolyte levels within acceptable range throughout shift  Outcome: Progressing  Goal: Maintain glucose levels >70mg/dl to <250mg/dl throughout shift  Outcome: Progressing  Goal: No changes in neurological exam by end of shift  Outcome: Progressing  Goal: Learn about and adhere to nutrition recommendations by end of shift  Outcome: Progressing  Goal: Vital signs within normal range for age by end of shift  Outcome: Progressing  Goal: Increase self care and/or family involovement by end of shift  Outcome: Progressing  Goal: Receive DSME education by end of shift  Outcome: Progressing     Problem: Pain  Goal: My pain/discomfort is manageable  Outcome: Progressing     Problem: Safety  Goal: Patient will be injury free during hospitalization  Outcome: Progressing  Goal: I will remain free of falls  Outcome: Progressing     Problem: Daily Care  Goal: Daily care needs are met  Outcome: Progressing     Problem: Psychosocial Needs  Goal: Demonstrates ability to cope with hospitalization/illness  Outcome: Progressing  Goal: Collaborate with me, my family, and caregiver to identify my specific goals  Outcome:  Progressing

## 2023-11-11 NOTE — NURSING NOTE
Secure chat sent to Dr. Oneill regarding blood glucose and no insulin orders. Waiting for response.

## 2023-11-11 NOTE — NURSING NOTE
Called Dr. Oneill regarding need for home med orders and other plan of care orders. States he will place orders.

## 2023-11-11 NOTE — H&P
History Of Present Illness  Luis Greene is a 83 y.o. male presenting with slurred speech and right-sided weakness.  Patient had history of recurrent strokes in the past.  Patient had a Watchman procedure done today.  Patient was discharged home.  Patient went out to eat with his daughter.  Patient developed sudden slurred speech and right-sided weakness.  With this complaint he was brought to the emergency room.  Patient not able to provide much history.  Most of the history was taken from the old record and ER record.     Past Medical History  Past Medical History:   Diagnosis Date    Coronary artery disease     Hyperlipidemia     Personal history of other diseases of the circulatory system     History of cardiac disorder    Personal history of other endocrine, nutritional and metabolic disease     History of hypercholesterolemia       Surgical History  Past Surgical History:   Procedure Laterality Date    MR HEAD ANGIO WO IV CONTRAST  2/17/2019    MR HEAD ANGIO WO IV CONTRAST [facility] EMERGENCY LEGACY    MR HEAD ANGIO WO IV CONTRAST  10/28/2022    MR HEAD ANGIO WO IV CONTRAST [facility] EMERGENCY LEGACY    MR HEAD ANGIO WO IV CONTRAST  8/15/2023    MR HEAD ANGIO WO IV CONTRAST [facility] INPATIENT LEGACY    OTHER SURGICAL HISTORY  03/11/2019    Heart surgery    OTHER SURGICAL HISTORY  03/11/2019    Knee surgery    OTHER SURGICAL HISTORY  03/11/2019    Tonsillectomy        Social History  He reports that he has never smoked. He has never used smokeless tobacco. He reports that he does not drink alcohol and does not use drugs.    Family History  Family History   Problem Relation Name Age of Onset    Other (cardiac disorder) Father      Diabetes Father      Hyperlipidemia Father      Hypertension Father      Other (liver carcinoma) Father      Other (hypercholesterolemia) Father      Hypertension Daughter          Allergies  Famotidine, Prednisone, Amoxicillin, Donepezil, Gabapentin, Hydrochlorothiazide, Ibuprofen, Other,  "Oxycodone-acetaminophen, Tramadol hcl, and Oxycodone hcl    Review of Systems  I reviewed all systems reviewed as above otherwise is negative.  Physical Exam  HEENT:  Head externally atraumatic, no pallor, no icterus, extraocular movements intact, pupils reactive to light, oral mucosa moist and throat clear.  Neck:  Supple, no JVP, no palpable adenopathy or thyromegaly.  No carotid bruit.  Chest:  Clear to auscultation and resonant.  Heart:  Regular rate and rhythm, no murmur or gallop could be appreciated.  Abdomen:  Soft, nontender, bowel sounds present, normoactive, no palpable hepatosplenomegaly.  Extremities:  No edema, pulses present, no cyanosis or clubbing.  CNS:  Patient alert, oriented x1.  Power 5/5 all over and deep tendon reflexes symmetrical, cranial nerves 2-12 grossly intact.  Skin:  No active rash.  Musculoskeletal:  No joint swelling or erythema, range of movement normal.  Last Recorded Vitals  Heart Rate:  []   Resp:  [14-24]   BP: ()/()   Height:  [180.3 cm (5' 11\")]   Weight:  [78.5 kg (173 lb)]   SpO2:  [95 %-100 %]       Relevant Results        Results for orders placed or performed during the hospital encounter of 11/10/23 (from the past 24 hour(s))   CBC and Auto Differential   Result Value Ref Range    WBC 6.7 4.4 - 11.3 x10*3/uL    nRBC 0.0 0.0 - 0.0 /100 WBCs    RBC 3.13 (L) 4.50 - 5.90 x10*6/uL    Hemoglobin 11.2 (L) 13.5 - 17.5 g/dL    Hematocrit 32.5 (L) 41.0 - 52.0 %     (H) 80 - 100 fL    MCH 35.8 (H) 26.0 - 34.0 pg    MCHC 34.5 32.0 - 36.0 g/dL    RDW 13.8 11.5 - 14.5 %    Platelets 91 (L) 150 - 450 x10*3/uL    Neutrophils % 68.5 40.0 - 80.0 %    Immature Granulocytes %, Automated 0.4 0.0 - 0.9 %    Lymphocytes % 16.3 13.0 - 44.0 %    Monocytes % 10.8 2.0 - 10.0 %    Eosinophils % 3.9 0.0 - 6.0 %    Basophils % 0.1 0.0 - 2.0 %    Neutrophils Absolute 4.58 1.60 - 5.50 x10*3/uL    Immature Granulocytes Absolute, Automated 0.03 0.00 - 0.50 x10*3/uL    " Lymphocytes Absolute 1.09 0.80 - 3.00 x10*3/uL    Monocytes Absolute 0.72 0.05 - 0.80 x10*3/uL    Eosinophils Absolute 0.26 0.00 - 0.40 x10*3/uL    Basophils Absolute 0.01 0.00 - 0.10 x10*3/uL   Comprehensive metabolic panel   Result Value Ref Range    Glucose 231 (H) 65 - 99 mg/dL    Sodium 135 133 - 145 mmol/L    Potassium 4.4 3.4 - 5.1 mmol/L    Chloride 103 97 - 107 mmol/L    Bicarbonate 22 (L) 24 - 31 mmol/L    Urea Nitrogen 20 8 - 25 mg/dL    Creatinine 0.80 0.40 - 1.60 mg/dL    eGFR 88 >60 mL/min/1.73m*2    Calcium 8.4 (L) 8.5 - 10.4 mg/dL    Albumin 3.5 3.5 - 5.0 g/dL    Alkaline Phosphatase 45 35 - 125 U/L    Total Protein 5.3 (L) 5.9 - 7.9 g/dL    AST 30 5 - 40 U/L    Bilirubin, Total 1.1 0.1 - 1.2 mg/dL    ALT 40 5 - 40 U/L    Anion Gap 10 <=19 mmol/L   Troponin T, High Sensitivity   Result Value Ref Range    Troponin T, High Sensitivity 117 (H) <=15 ng/L   Protime-INR   Result Value Ref Range    Protime 12.3 9.3 - 12.7 seconds    INR 1.2 0.9 - 1.2   APTT   Result Value Ref Range    aPTT 22.2 22.0 - 32.5 seconds   Morphology   Result Value Ref Range    RBC Morphology No significant RBC morphology present    POCT GLUCOSE   Result Value Ref Range    POCT Glucose 208 (H) 74 - 99 mg/dL   Protime-INR   Result Value Ref Range    Protime 12.3 9.3 - 12.7 seconds    INR 1.2 0.9 - 1.2     Prior to Admission medications    Medication Sig Start Date End Date Taking? Authorizing Provider   amLODIPine (Norvasc) 5 mg tablet Take 1 tablet (5 mg) by mouth once daily.   Yes Historical Provider, MD   aspirin 81 mg EC tablet Take 2 tablets (162 mg) by mouth once daily. 3/8/22  Yes Historical Provider, MD   atorvastatin (Lipitor) 40 mg tablet Take 1 tablet (40 mg) by mouth once daily.   Yes Historical Provider, MD   ferrous sulfate 325 (65 Fe) MG EC tablet Take 1 tablet (325 mg) by mouth once daily. 4/19/23  Yes Historical Provider, MD   finasteride (Proscar) 5 mg tablet Take 1 tablet (5 mg) by mouth once daily. 10/17/23  Yes  Christopher D'Amico,    folic acid-vitamin B complex-vitamin C-selenium-zinc (Dialyvite) 3-70-15 mg-mcg-mg tablet Take 1 tablet by mouth once daily.   Yes Historical Provider, MD   furosemide (Lasix) 40 mg tablet Take 1 tablet (40 mg) by mouth once daily. 6/14/23  Yes Christopher D'Amico, DO   gabapentin (Neurontin) 100 mg capsule TAKE 1 CAPSULE BY MOUTH ONCE DAILY FOR NEUROPATHY PAIN 10/16/23  Yes Christopher D'Amico, DO   insulin glargine-lixisenatide (Soliqua 100/33) 100 unit-33 mcg/mL insulin pen Inject 15 Units under the skin once daily at bedtime. 8/1/23  Yes Christopher D'Amico, DO   lisinopril 20 mg tablet Take 1 tablet by mouth once daily 10/10/23  Yes Luis Grant MD   metFORMIN (Glucophage) 500 mg tablet Take 2 tablets (1,000 mg) by mouth 2 times a day.   Yes Historical Provider, MD   metoprolol succinate XL (Toprol-XL) 50 mg 24 hr tablet Take 1 tablet (50 mg) by mouth once daily. 3/17/20  Yes Historical Provider, MD   omeprazole (PriLOSEC) 40 mg DR capsule Take 1 capsule (40 mg) by mouth once daily. 3/17/20  Yes Historical Provider, MD   tamsulosin (Flomax) 0.4 mg 24 hr capsule Take 1 capsule (0.4 mg) by mouth 2 times a day. 10/17/23  Yes Christopher D'Amico, DO   traMADol (Ultram) 50 mg tablet 1 tablet (50 mg) 2 times a day as needed.   Yes Historical Provider, MD   clopidogrel (Plavix) 75 mg tablet Take 1 tablet (75 mg) by mouth once daily. 11/10/23   Donnie Kendrick MD   acetaminophen (Tylenol-ODT) 40 mg split rapid/chew split tablet Take 1 half tablet (40 mg) by mouth once daily. 8/21/23 11/10/23  Historical Provider, MD   Eliquis 2.5 mg tablet Take 1 tablet (2.5 mg) by mouth 2 times a day. 9/8/23 11/10/23  Historical Provider, MD   glimepiride (Amaryl) 2 mg tablet once every 24 hours.  11/10/23  Historical Provider, MD   oxybutynin XL (Ditropan-XL) 5 mg 24 hr tablet Take 1 tablet (5 mg) by mouth once daily. Do not crush, chew, or split. 6/14/23 11/10/23  Christopher D'Amico,        Current  Facility-Administered Medications:     clopidogrel (Plavix) tablet 75 mg, 75 mg, oral, Daily, SUSAN Orr    docusate sodium (Colace) capsule 100 mg, 100 mg, oral, BID, SUSAN Orr    ondansetron ODT (Zofran-ODT) disintegrating tablet 4 mg, 4 mg, oral, q8h PRN **OR** ondansetron (Zofran) injection 4 mg, 4 mg, intravenous, q8h PRN, SUSAN Orr    perflutren lipid microspheres (Definity) injection 0.5-10 mL of dilution, 0.5-10 mL of dilution, intravenous, Once in imaging, JONA Orr-CNP    perflutren lipid microspheres (Definity) injection 0.5-10 mL of dilution, 0.5-10 mL of dilution, intravenous, Once in imaging, JONA Orr-CNP    perflutren protein A microsphere (Optison) injection 0.5 mL, 0.5 mL, intravenous, Once in imaging, JONA Orr-CNP    perflutren protein A microsphere (Optison) injection 0.5 mL, 0.5 mL, intravenous, Once in imaging, JONA Orr-CNP    sulfur hexafluoride microsphr (Lumason) injection 24.28 mg, 2 mL, intravenous, Once in imaging, JONA Orr-CNP    sulfur hexafluoride microsphr (Lumason) injection 24.28 mg, 2 mL, intravenous, Once in imaging, SUSAN Orr  CT angio brain attack head w IV contrast and post procedure    Result Date: 11/10/2023  Interpreted By:  Martha Coleman, STUDY: CT ANGIO BRAIN ATTACK HEAD W IV CONTRAST AND POST PROCEDURE; CT ANGIO BRAIN ATTACK NECK W IV CONTRAST AND POST PROCEDURE; 11/10/2023 5:13 pm   INDICATION: Signs/Symptoms:Stroke Evaluation with VAN Positive;   COMPARISON: CT head same day /   ACCESSION NUMBER(S): [id number]; [id number]   ORDERING CLINICIAN: JUDY CHACON   TECHNIQUE: CT arteriogram of the head and neck with 75 ml of Omnipaque 350 was injected intravenously. 3D MIP images were created, processed, and interpreted on an independent workstation.   FINDINGS: CTA HEAD:   Basilar: Patent without aneurysm, dissection or thrombus. PCOM: Patent without aneurysm, dissection or  thrombus. ACOM: Patent without aneurysm, dissection or thrombus. ICA: Patent without aneurysm, dissection or thrombus.   LEFT:   MCA: Patent without aneurysm, dissection or thrombus. YOVANY: Patent without aneurysm, dissection or thrombus. PCA: Patent without aneurysm, dissection or thrombus. ICA: Patent without aneurysm, dissection or thrombus. VERT: Patent without aneurysm, dissection or thrombus.   RIGHT:   MCA: Patent without aneurysm, dissection or thrombus. YOVANY: Patent without aneurysm, dissection or thrombus. PCA: Patent without aneurysm, dissection or thrombus. ICA: Patent without aneurysm, dissection or thrombus. VERT: Patent without aneurysm, dissection or thrombus.     CTA NECK:     The estimate of the degree of stenosis included in this report is based on the NASCET CRITERIA for calculating stenosis by using the internal carotid artery distal to the stenosis as the reference point. Normal is no stenosis. Mild is less than 50% stenosis. Moderate is 50-69% stenosis. Severe is 70-99% stenosis. Total occlusion is no detectable patent lumen.   LEFT:   CCA: Patent without aneurysm, dissection or thrombus. Atherosclerotic plaque at the carotid bulb which contributes to less than 50% narrowing. ICA: Patent without aneurysm, dissection or thrombus. ECA: Patent without aneurysm, dissection or thrombus. VERT: Patent without aneurysm, dissection or thrombus.     RIGHT:   CCA: Patent without aneurysm, dissection or thrombus. Atherosclerotic plaque at the carotid bulb extending into the proximal right ICA and contributing to approximately 50% narrowing. ICA: Patent without aneurysm, dissection or thrombus. ECA: Patent without aneurysm, dissection or thrombus. VERT: Patent without aneurysm, dissection or thrombus.   AORTIC ARCH AND BRANCHES: Patent without aneurysm, dissection or thrombus.   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           1. No large vessel occlusion or high-grade stenosis.  2. Atherosclerotic plaques in the bilateral carotid bulbs contributing to mild (less than 50%) stenosis. Atherosclerotic plaque in the right proximal ICA contributing to approximately 50% narrowing.   Signed by: Martha Coleman 11/10/2023 6:19 PM Dictation workstation:   [id number]    CT angio brain attack neck w IV contrast and post procedure    Result Date: 11/10/2023  Interpreted By:  Martha Coleman, STUDY: CT ANGIO BRAIN ATTACK HEAD W IV CONTRAST AND POST PROCEDURE; CT ANGIO BRAIN ATTACK NECK W IV CONTRAST AND POST PROCEDURE; 11/10/2023 5:13 pm   INDICATION: Signs/Symptoms:Stroke Evaluation with VAN Positive;   COMPARISON: CT head same day /   ACCESSION NUMBER(S): [id number]; [id number]   ORDERING CLINICIAN: JUDY CHACON   TECHNIQUE: CT arteriogram of the head and neck with 75 ml of Omnipaque 350 was injected intravenously. 3D MIP images were created, processed, and interpreted on an independent workstation.   FINDINGS: CTA HEAD:   Basilar: Patent without aneurysm, dissection or thrombus. PCOM: Patent without aneurysm, dissection or thrombus. ACOM: Patent without aneurysm, dissection or thrombus. ICA: Patent without aneurysm, dissection or thrombus.   LEFT:   MCA: Patent without aneurysm, dissection or thrombus. YOVANY: Patent without aneurysm, dissection or thrombus. PCA: Patent without aneurysm, dissection or thrombus. ICA: Patent without aneurysm, dissection or thrombus. VERT: Patent without aneurysm, dissection or thrombus.   RIGHT:   MCA: Patent without aneurysm, dissection or thrombus. YOVANY: Patent without aneurysm, dissection or thrombus. PCA: Patent without aneurysm, dissection or thrombus. ICA: Patent without aneurysm, dissection or thrombus. VERT: Patent without aneurysm, dissection or thrombus.     CTA NECK:     The estimate of the degree of stenosis included in this report is based on the NASCET CRITERIA for calculating stenosis by using the internal carotid artery distal to the stenosis as the  reference point. Normal is no stenosis. Mild is less than 50% stenosis. Moderate is 50-69% stenosis. Severe is 70-99% stenosis. Total occlusion is no detectable patent lumen.   LEFT:   CCA: Patent without aneurysm, dissection or thrombus. Atherosclerotic plaque at the carotid bulb which contributes to less than 50% narrowing. ICA: Patent without aneurysm, dissection or thrombus. ECA: Patent without aneurysm, dissection or thrombus. VERT: Patent without aneurysm, dissection or thrombus.     RIGHT:   CCA: Patent without aneurysm, dissection or thrombus. Atherosclerotic plaque at the carotid bulb extending into the proximal right ICA and contributing to approximately 50% narrowing. ICA: Patent without aneurysm, dissection or thrombus. ECA: Patent without aneurysm, dissection or thrombus. VERT: Patent without aneurysm, dissection or thrombus.   AORTIC ARCH AND BRANCHES: Patent without aneurysm, dissection or thrombus.   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           1. No large vessel occlusion or high-grade stenosis. 2. Atherosclerotic plaques in the bilateral carotid bulbs contributing to mild (less than 50%) stenosis. Atherosclerotic plaque in the right proximal ICA contributing to approximately 50% narrowing.   Signed by: Martha Coleman 11/10/2023 6:19 PM Dictation workstation:   [id number]    XR chest 1 view    Result Date: 11/10/2023  Interpreted By:  Martha Coleman, STUDY: XR CHEST 1 VIEW; 11/10/2023 4:42 pm   INDICATION: Signs/Symptoms:brain attack/cough   COMPARISON: 06/20/2023   ACCESSION NUMBER(S): [id number]   ORDERING CLINICIAN: JUDY CHACON   TECHNIQUE: Frontal  chest radiographs.   FINDINGS: The cardiomediastinal silhouette is unremarkable. The lungs are clear. No pleural effusion is identified.   The osseous structures are intact.       No acute cardiopulmonary process.       Signed by: Martha Coleman 11/10/2023 4:46 PM Dictation workstation:   [id number]    CT  brain attack head wo IV contrast    Result Date: 11/10/2023  Interpreted By:  Martha Coleman, STUDY: CT BRAIN ATTACK HEAD WO IV CONTRAST;  11/10/2023 4:38 pm   INDICATION: Signs/Symptoms:Stroke Evaluation.   COMPARISON: [id number]   ACCESSION NUMBER(S): [id number]   ORDERING CLINICIAN: JUDY CHACON   TECHNIQUE: CT axial images through the Brain were obtained without contrast. All CT examinations are performed with 1 or more of the following dose reduction techniques: Automated exposure control, adjustment of mA and/or kv according to patient's size, or use of iterative reconstruction techniques.   FINDINGS: There is no mass effect, hemorrhage, or infarct. The ventricles appear normal. Gray-white differentiation is maintained. Patchy areas of hypodense attenuation are seen in the subcortical and periventricular white matter; compatible with small vessel ischemic disease. The visualized paranasal sinuses appear clear.       No acute intracranial abnormality.   MACRO: Martha Coleman discussed the significance and urgency of this critical finding by telephone with  JUDY FONTANEZHMAN on 11/10/2023 at 4:45 pm.  (**-RCF-**) Findings:  See findings.     Signed by: Martha Coleman 11/10/2023 4:45 PM Dictation workstation:   [id number]    Transthoracic Echo (TTE) Limited    Result Date: 11/10/2023   [facility], [address] [place] [address]                Tel [phone] and Fax [phone] TRANSTHORACIC ECHOCARDIOGRAM REPORT  Patient Name:      ASHKAN OLIVEROS       Reading Physician:    [id number] Kamar Doyle MD Study Date:        11/10/2023           Ordering Provider:    [id number] DAWNA AUGUSTIN MRN/PID:           [id number]             Fellow:               [id number] Yobany Strong MD Accession#:        [id number]         Nurse: Date of Birth/Age: 1940 / 83 years Sonographer:           Bruna Dalton [facility] Gender:            M                    Additional Staff: Height:            180.34 cm            Admit Date:           11/10/2023 Weight:            78.47 kg             Admission Status:     Inpatient - Time                                                               Critical BSA:               1.98 m2              Encounter#:           [id number]                                         Department Location:  [facility]                                                               Cath Lab Blood Pressure: 129 /65 mmHg Study Type:    TRANSTHORACIC ECHO (TTE) LIMITED Diagnosis/ICD: Presence of other cardiac implants and grafts-Z95.818 Indication:    Post-Watchman Procedure CPT Code:      Echo Limited-38554 Patient History: Pertinent History: S/p LAAO. Study Detail: The following Echo studies were performed: 2D. Technically               challenging study due to patient lying in supine position.  PHYSICIAN INTERPRETATION: Left Ventricle: The left ventricular systolic function is normal, with an estimated ejection fraction of 60-65%. The left ventricular cavity size is normal. There is left ventricular concentric remodeling. Abnormal (paradoxical) septal motion consistent with post-operative status. Left ventricular diastolic filling was not assessed. Left Atrium: The left atrium is enlarged. Right Ventricle: The right ventricle was not well visualized. Unable to determine right ventricular systolic function. Right Atrium: The right atrium is moderately dilated. Aortic Valve: The aortic valve appears normal. Aortic valve regurgitation was not assessed. Mitral Valve: The mitral valve is normal in structure. Mitral valve regurgitation was not assessed. Tricuspid Valve: The tricuspid valve is structurally normal. Tricuspid regurgitation was not assessed. Pulmonic Valve: The pulmonic valve is not well visualized. Pulmonic valve regurgitation was not assessed. Pericardium: There is no pericardial  effusion noted. Aorta: The aortic root is normal. Systemic Veins: The inferior vena cava was not well visualized. In comparison to the previous echocardiogram(s): Compared with study from 11/10/2023, no significant change. Note that this was a limited study. Compared to prior full echo from 8/16/23, there is no significant change from available data.  CONCLUSIONS:  1. Left ventricular systolic function is normal with a 60-65% estimated ejection fraction.  2. Abnormal septal motion consistent with post-operative status.  3. The left atrium is enlarged.  4. The right atrium is moderately dilated. QUANTITATIVE DATA SUMMARY: 2D MEASUREMENTS:                           Normal Ranges: IVSd:          1.27 cm    (0.6-1.1cm) LVPWd:         1.25 cm    (0.6-1.1cm) LVIDd:         4.36 cm    (3.9-5.9cm) LV Mass Index: 102.2 g/m2 RA VOLUME BY A/L METHOD:                       Normal Ranges: RA Area A4C: 22.0 cm2  [id number] Kamar Doyle MD Electronically signed on 11/10/2023 at 3:02:52 PM  ** Final **     Transthoracic Echo (TTE) Limited    Result Date: 11/10/2023   [facility] ([facility] [address] [place] [address]                Tel [phone] and Fax [phone] TRANSTHORACIC ECHOCARDIOGRAM REPORT  Patient Name:     ASHKAN Schmidt Physician:  [id number] Varghese Robles MD Study Date:       11/10/2023          Ordering Provider:  [id number] DAWNA AUGUSTIN MRN/PID:          [id number]            Fellow: Accession#:       [id number]        Nurse: Date of           1940 / 83      Sonographer:        Bruna Merlos RDCS Birth/Age:        years Gender:           M                   Additional Staff: Height:           180.00 cm           Admit Date:         11/10/2023 Weight:           79.00 kg            Admission Status:   Inpatient - Routine BSA:              1.99 m2             Encounter#:         [id number]                                       Department          [facility] Cath                                        Location:           Lab Blood Pressure: 149 /69 mmHg Study Type:    TRANSTHORACIC ECHO (TTE) LIMITED Diagnosis/ICD: Encounter for preprocedural cardiovascular examination-Z01.810 Indication:    Pre-Watchman Procedure CPT Code:      Echo Limited-44687 Patient History: Pertinent History: A-Fib, Chest Pain, HTN, Hyperlipidemia, LE Edema and                    Palpitations. ALEJANDRINA, DMII, CAD s/p CABG. Study Detail: The following Echo studies were performed: 2D. Technically               challenging study due to patient lying in supine position.  PHYSICIAN INTERPRETATION: Left Ventricle: The left ventricular systolic function is normal, with an estimated ejection fraction of 60-65%. There are no regional wall motion abnormalities. The left ventricular cavity size is normal. Left ventricular diastolic filling was not assessed. Left Atrium: The left atrium is enlarged. Right Ventricle: The right ventricle is normal in size. There is normal right ventricular global systolic function. Right Atrium: The right atrium is enlarged. Aortic Valve: The aortic valve is probably trileaflet. Aortic valve regurgitation was not assessed. Mitral Valve: The mitral valve was not well visualized. Mitral valve regurgitation was not assessed. Tricuspid Valve: The tricuspid valve was not well visualized. Tricuspid regurgitation was not assessed. The right ventricular systolic pressure is unable to be estimated. Pulmonic Valve: The pulmonic valve was not assessed. Pulmonic valve regurgitation was not assessed. Pericardium: There is a trivial pericardial effusion. Aorta: The aortic root is normal.  CONCLUSIONS:  1. Left ventricular systolic function is normal with a 60-65% estimated ejection fraction.  2. The left atrium is enlarged.  3. There is a trivial pericardial effusion. QUANTITATIVE DATA SUMMARY:  [id number] Varghese Robles MD Electronically signed on 11/10/2023 at 2:16:20 PM  ** Final **     CT watchman full contrast    Result Date:  11/10/2023  Interpreted By:  Russ Avila, STUDY: CT WATCHMAN FULL CONTRAST;  11/10/2023 8:28 am   INDICATION: Signs/Symptoms:Preprocedural RICHARDSON sizing and thrombus detection.   COMPARISON: None.   ACCESSION NUMBER(S): [id number]   ORDERING CLINICIAN: JAMIL RAMIREZ   TECHNIQUE: Using multi detector CT technology,axial imaging with prospective gating was performed of the chest following the intravenous administration of contrast material.  A low-osmolar contrast agent was used (70 mL Omnipaque 350). Next, the imaging was repeated with prospective gating after 10 sec delay as per watchman protocol. Delete   For optimization of anatomic evaluation, multiplanar reconstruction, maximum intensity projections, and advanced 3-D off-line postprocessing were performed on a dedicated stand-alone workstation under the direct supervision of the interpreting physician.   CT Dose-Length Product (DLP):  1943 mGy*cm CT Dose Reduction Employed: Yes (Prospective triggering, iterative reconstruction)   FINDINGS: POTENTIAL STUDY LIMITATIONS:  Motion artifact limits accurate assessment of the left atrial appendage.   CORONARY ARTERIES:   CORONARY ANATOMY: There is normal origin of the coronary arteries. Right dominant system. Severe coronary artery calcifications are seen. Please note,the study is not optimized for evaluation of coronary arteries.Status post coronary artery bypass grafting (CABG) with bypass grafts not optimally evaluated on present study.   CARDIAC CHAMBERS: The cardiac chambers demonstrate normal atrioventricular and ventriculoarterial concordance, and systemic and pulmonary venous return.   LEFT ATRIUM: Dilated (34.2-cm2). There is no evidence of left atrium or left atrial appendage thrombus. The left atrial appendage orifice is round in shape, measuring 1.9 x 1.8 cm in orthogonal dimensions and with an area of  2.8 cm. sq. Left atrial appendage depth is 2.2 cm.   RIGHT ATRIUM: Dilated (28.4-cm2)   VENTRICLES: The  left and right ventricles appear normal in appearance, although accurate size measurements are not possible on systolic phase imaging.   INTERATRIAL SEPTUM: Intact.   INTERVENTRICULAR SEPTUM: Intact.   AORTIC VALVE: The aortic valve is trileaflet in morphology. Mild calcifications.   MITRAL VALVE: No valvular thickening/calcification.   THORACIC AORTA: The thoracic aorta normal in course and caliber.There are mild scattered atherosclerosis present, including calcified and noncalcified plaques.There is no evidence for acute aortic pathology, such as dissection, intramural hematoma, or contained rupture. The aortic arch is not included on this examination.   PERICARDIUM: There is no pericardial effusion seen.   CHEST: The trachea and central airways are patent. No endobronchial lesion is seen. There is mild diffuse bronchial wall thickening, which is a non-specific finding. The bilateral lungs are clear without evidence of focal consolidation, pleural effusion, or pneumothorax. Minimal emphysematous changes. No evidence of lymphadenopathy within included mediastinum. Visualized esophagus appears within normal limits as seen.   UPPER ABDOMEN: The visualized subdiaphragmatic structures demonstrate no remarkable findings.     CHEST WALL AND OSSEOUS STRUCTURES: Status post median sternotomy. No acute osseous pathology.There are no suspicious osseous lesions within included chest.Multilevel degenerative changes of the spine. Osteopenia.       1. No evidence of left atrial/left atrial appendage thrombus. 2. Limited evaluation secondary to motion artifact which may affect the accuracy of left atrial appendage measurements. The left atrial appendage orifice is round in shape, measuring 1.9 x 1.8 cm in orthogonal dimensions and with an area of 2.8 cm. sq. Left atrial appendage depth is 2.2 cm. 3. Severe coronary artery calcifications are seen in the setting of prior bypass grafting. Please note,the study is not optimized for  evaluation of coronary arteries/grafts. 4. Biatrial enlargement. 5. Mild aortic valve calcifications.   MACRO: None   Signed by: Russ Avila 11/10/2023 9:44 AM Dictation workstation:   [id number]    Susceptibility data from last 90 days.  Collected Specimen Info Organism Ceftriaxone Ciprofloxacin Levofloxacin Penicillin Tetracycline Vancomycin   10/17/23 Urine from Clean Catch/Voided Aerococcus urinae S S S S S S        Assessment/Plan   Principal Problem:    Stroke-like episode  Coronary artery disease  Hyperlipidemia  Atrial fibrillation  Hyperlipidemia  Urinary incontinence  Diabetes mellitus type 2        Plan: Continue current medication.  Supportive care.  Monitor CBC and BMP.  Increase activity.  Check MRI of the brain if possible.  Consult neurology.  Check 2D echo, ultrasound carotid and do neurochecks.  We will take DVT fall, aspiration decubitus precautions.           Pete Oneill MD

## 2023-11-12 LAB
GLUCOSE BLD MANUAL STRIP-MCNC: 158 MG/DL (ref 74–99)
GLUCOSE BLD MANUAL STRIP-MCNC: 220 MG/DL (ref 74–99)
GLUCOSE BLD MANUAL STRIP-MCNC: 268 MG/DL (ref 74–99)
GLUCOSE BLD MANUAL STRIP-MCNC: 278 MG/DL (ref 74–99)

## 2023-11-12 PROCEDURE — 99222 1ST HOSP IP/OBS MODERATE 55: CPT | Performed by: NURSE PRACTITIONER

## 2023-11-12 PROCEDURE — 93010 ELECTROCARDIOGRAM REPORT: CPT | Performed by: INTERNAL MEDICINE

## 2023-11-12 PROCEDURE — 2500000001 HC RX 250 WO HCPCS SELF ADMINISTERED DRUGS (ALT 637 FOR MEDICARE OP): Performed by: INTERNAL MEDICINE

## 2023-11-12 PROCEDURE — 94760 N-INVAS EAR/PLS OXIMETRY 1: CPT

## 2023-11-12 PROCEDURE — 2500000002 HC RX 250 W HCPCS SELF ADMINISTERED DRUGS (ALT 637 FOR MEDICARE OP, ALT 636 FOR OP/ED): Performed by: INTERNAL MEDICINE

## 2023-11-12 PROCEDURE — 2500000004 HC RX 250 GENERAL PHARMACY W/ HCPCS (ALT 636 FOR OP/ED): Performed by: INTERNAL MEDICINE

## 2023-11-12 PROCEDURE — 2500000001 HC RX 250 WO HCPCS SELF ADMINISTERED DRUGS (ALT 637 FOR MEDICARE OP)

## 2023-11-12 PROCEDURE — G0378 HOSPITAL OBSERVATION PER HR: HCPCS

## 2023-11-12 PROCEDURE — 82947 ASSAY GLUCOSE BLOOD QUANT: CPT

## 2023-11-12 RX ORDER — ACETAMINOPHEN 325 MG/1
650 TABLET ORAL EVERY 6 HOURS PRN
Status: DISCONTINUED | OUTPATIENT
Start: 2023-11-12 | End: 2023-11-17 | Stop reason: HOSPADM

## 2023-11-12 RX ORDER — INSULIN LISPRO 100 [IU]/ML
0-15 INJECTION, SOLUTION INTRAVENOUS; SUBCUTANEOUS
Status: DISCONTINUED | OUTPATIENT
Start: 2023-11-12 | End: 2023-11-17 | Stop reason: HOSPADM

## 2023-11-12 RX ADMIN — LISINOPRIL 20 MG: 20 TABLET ORAL at 09:42

## 2023-11-12 RX ADMIN — ASPIRIN 162 MG: 81 TABLET, COATED ORAL at 09:43

## 2023-11-12 RX ADMIN — TAMSULOSIN HYDROCHLORIDE 0.4 MG: 0.4 CAPSULE ORAL at 09:43

## 2023-11-12 RX ADMIN — ACETAMINOPHEN 650 MG: 325 TABLET ORAL at 16:43

## 2023-11-12 RX ADMIN — ATORVASTATIN CALCIUM 40 MG: 40 TABLET, FILM COATED ORAL at 21:07

## 2023-11-12 RX ADMIN — DOCUSATE SODIUM 100 MG: 100 CAPSULE, LIQUID FILLED ORAL at 21:06

## 2023-11-12 RX ADMIN — INSULIN LISPRO 6 UNITS: 100 INJECTION, SOLUTION INTRAVENOUS; SUBCUTANEOUS at 16:43

## 2023-11-12 RX ADMIN — INSULIN GLARGINE 15 UNITS: 100 INJECTION, SOLUTION SUBCUTANEOUS at 21:05

## 2023-11-12 RX ADMIN — DOCUSATE SODIUM 100 MG: 100 CAPSULE, LIQUID FILLED ORAL at 09:43

## 2023-11-12 RX ADMIN — METFORMIN HYDROCHLORIDE 1000 MG: 1000 TABLET, FILM COATED ORAL at 21:06

## 2023-11-12 RX ADMIN — TAMSULOSIN HYDROCHLORIDE 0.4 MG: 0.4 CAPSULE ORAL at 21:07

## 2023-11-12 RX ADMIN — METFORMIN HYDROCHLORIDE 1000 MG: 1000 TABLET, FILM COATED ORAL at 09:43

## 2023-11-12 RX ADMIN — GABAPENTIN 200 MG: 100 CAPSULE ORAL at 09:43

## 2023-11-12 RX ADMIN — GABAPENTIN 200 MG: 100 CAPSULE ORAL at 21:06

## 2023-11-12 RX ADMIN — FINASTERIDE 5 MG: 5 TABLET, FILM COATED ORAL at 09:43

## 2023-11-12 RX ADMIN — FERROUS SULFATE TAB 325 MG (65 MG ELEMENTAL FE) 325 MG: 325 (65 FE) TAB at 09:43

## 2023-11-12 RX ADMIN — CLOPIDOGREL BISULFATE 75 MG: 75 TABLET ORAL at 09:43

## 2023-11-12 ASSESSMENT — COGNITIVE AND FUNCTIONAL STATUS - GENERAL
DRESSING REGULAR UPPER BODY CLOTHING: A LOT
TURNING FROM BACK TO SIDE WHILE IN FLAT BAD: A LITTLE
MOVING TO AND FROM BED TO CHAIR: A LOT
CLIMB 3 TO 5 STEPS WITH RAILING: TOTAL
STANDING UP FROM CHAIR USING ARMS: A LOT
WALKING IN HOSPITAL ROOM: TOTAL
PERSONAL GROOMING: A LOT
DRESSING REGULAR LOWER BODY CLOTHING: A LOT
DAILY ACTIVITIY SCORE: 13
HELP NEEDED FOR BATHING: A LOT
EATING MEALS: A LITTLE
TOILETING: A LOT
MOBILITY SCORE: 13

## 2023-11-12 ASSESSMENT — PAIN SCALES - GENERAL
PAINLEVEL_OUTOF10: 0 - NO PAIN
PAINLEVEL_OUTOF10: 0 - NO PAIN

## 2023-11-12 ASSESSMENT — PAIN - FUNCTIONAL ASSESSMENT: PAIN_FUNCTIONAL_ASSESSMENT: 0-10

## 2023-11-12 NOTE — PROGRESS NOTES
Luis Greene is a 83 y.o. male on day 0 of admission presenting with Stroke-like episode.    Subjective   The patient was seen and examined.  More alert.  Denies any headache or dizziness.  Family at bedside.  No fever chills or rigors.  No cough or expectoration.       Objective     Physical Exam  HEENT:  Head externally atraumatic, no pallor, no icterus, extraocular movements intact, pupils reacting to light, oral mucosa moist and throat clear.  Neck:  Supple, no JVP, no palpable adenopathy or thyromegaly.  No carotid bruit.  Chest:  Clear to auscultation and resonant.  Heart:  Regular rate and rhythm, no murmur or gallop could be appreciated.  Abdomen:  Soft, nontender, bowel sounds present, normoactive, no palpable hepatosplenomegaly.  Extremities:  No edema, pulses present, no cyanosis or clubbing.  CNS:  Patient alert, oriented to time, place and person.  Power 5/5 all over and deep tendon reflexes symmetrical, cranial nerves 2-12 grossly intact.  Skin:  No active rash.  Musculoskeletal:  No joint swelling or erythema, range of movement normal.  Last Recorded Vitals  Heart Rate:  [53-77]   Temp:  [36.4 °C (97.5 °F)-37.6 °C (99.7 °F)]   Resp:  [16-20]   BP: (110-149)/(36-55)   SpO2:  [96 %-100 %]     Intake/Output last 3 Shifts:  I/O last 3 completed shifts:  In: - (0 mL/kg)   Out: 1425 (18.2 mL/kg) [Urine:1425 (0.5 mL/kg/hr)]  Weight: 78.5 kg     Relevant Results  Susceptibility data from last 90 days.  Collected Specimen Info Organism Ceftriaxone Ciprofloxacin Levofloxacin Penicillin Tetracycline Vancomycin   10/17/23 Urine from Clean Catch/Voided Aerococcus urinae S S S S S S     Results for orders placed or performed during the hospital encounter of 11/10/23 (from the past 24 hour(s))   POCT GLUCOSE   Result Value Ref Range    POCT Glucose 275 (H) 74 - 99 mg/dL   POCT GLUCOSE   Result Value Ref Range    POCT Glucose 296 (H) 74 - 99 mg/dL   POCT GLUCOSE   Result Value Ref Range    POCT Glucose 158 (H) 74 -  99 mg/dL   POCT GLUCOSE   Result Value Ref Range    POCT Glucose 278 (H) 74 - 99 mg/dL        Current Facility-Administered Medications:     acetaminophen (Tylenol) tablet 650 mg, 650 mg, oral, q6h PRN, Pete Oneill MD    aspirin EC tablet 162 mg, 162 mg, oral, Daily, Pete Oneill MD, 162 mg at 11/12/23 0943    atorvastatin (Lipitor) tablet 40 mg, 40 mg, oral, Nightly, Pete Oneill MD, 40 mg at 11/11/23 2233    clopidogrel (Plavix) tablet 75 mg, 75 mg, oral, Daily, Pete Oneill MD, 75 mg at 11/12/23 0943    dextrose 10 % in water (D10W) infusion, 0.3 g/kg/hr, intravenous, Once PRN, Pete Oneill MD    dextrose 50 % injection 25 g, 25 g, intravenous, q15 min PRN, Pete Oneill MD    docusate sodium (Colace) capsule 100 mg, 100 mg, oral, BID, Alma Rosa Holden, APRN-CNP, 100 mg at 11/12/23 0943    ferrous sulfate (325 mg ferrous sulfate) tablet 325 mg, 325 mg, oral, Daily, Pete Oneill MD, 325 mg at 11/12/23 0943    finasteride (Proscar) tablet 5 mg, 5 mg, oral, Daily, Pete Oniell MD, 5 mg at 11/12/23 0943    gabapentin (Neurontin) capsule 200 mg, 200 mg, oral, BID, Pete Oneill MD, 200 mg at 11/12/23 0943    glucagon (Glucagen) injection 1 mg, 1 mg, intramuscular, q15 min PRN, Pete Oneill MD    insulin glargine (Lantus) injection 15 Units, 15 Units, subcutaneous, Nightly, Pete Oneill MD, 15 Units at 11/11/23 2247    lisinopril tablet 20 mg, 20 mg, oral, Daily, Pete Oneill MD, 20 mg at 11/12/23 0942    metFORMIN (Glucophage) tablet 1,000 mg, 1,000 mg, oral, BID, Pete Oneill MD, 1,000 mg at 11/12/23 0943    ondansetron ODT (Zofran-ODT) disintegrating tablet 4 mg, 4 mg, oral, q8h PRN **OR** ondansetron (Zofran) injection 4 mg, 4 mg, intravenous, q8h PRN, Alma Rosa Holden, APRN-CNP    pantoprazole (ProtoNix) EC tablet 40 mg, 40 mg, oral, Daily before breakfast, Pete Oneill MD    perflutren lipid microspheres (Definity) injection  0.5-10 mL of dilution, 0.5-10 mL of dilution, intravenous, Once in imaging, SUSAN Orr    perflutren lipid microspheres (Definity) injection 0.5-10 mL of dilution, 0.5-10 mL of dilution, intravenous, Once in imaging, JONA Orr-CNP    perflutren protein A microsphere (Optison) injection 0.5 mL, 0.5 mL, intravenous, Once in imaging, JONA Orr-CNP    perflutren protein A microsphere (Optison) injection 0.5 mL, 0.5 mL, intravenous, Once in imaging, JONA Orr-CNP    sulfur hexafluoride microsphr (Lumason) injection 24.28 mg, 2 mL, intravenous, Once in imaging, JONA Orr-CNP    sulfur hexafluoride microsphr (Lumason) injection 24.28 mg, 2 mL, intravenous, Once in imaging, SUSAN Orr    tamsulosin (Flomax) 24 hr capsule 0.4 mg, 0.4 mg, oral, BID, Pete Oneill MD, 0.4 mg at 11/12/23 0943   Assessment/Plan   Principal Problem:    Stroke-like episode  Diabetes mellitus type 2  Chronic A-fib  Hypertension  Hyperlipidemia  Coronary artery disease  Multi-infarct dementia  GERD  BPH    Plan: Continue current medication.  Get supportive care.  Give physical therapy and Occupational Therapy.  Monitor CBC and BMP.  Increase activity.  We will treat DVT, fall, aspiration decubitus question.  Monitor heart rate.  Heart was running low.  Patient was seen by cardiology.  Metoprolol has been discontinued.  Put parameter for the antihypertensives as after starting his home medications and blood pressures dropped down.  Continue to monitor.  We will take DVT, fall, aspiration decubitus pressure.  Monitor blood sugar before every meal and at bedtime; with a sliding scale insulin.  Check MRI of the brain.  Patient has been cleared by his cardiologist and electrophysiologist to go for MRI.         Pete Oneill MD

## 2023-11-12 NOTE — CARE PLAN
Problem: Fall/Injury  Goal: Not fall by end of shift  Outcome: Progressing  Goal: Be free from injury by end of the shift  Outcome: Progressing  Goal: Verbalize understanding of personal risk factors for fall in the hospital  Outcome: Progressing  Goal: Verbalize understanding of risk factor reduction measures to prevent injury from fall in the home  Outcome: Progressing  Goal: Use assistive devices by end of the shift  Outcome: Progressing  Goal: Pace activities to prevent fatigue by end of the shift  Outcome: Progressing     Problem: Diabetes  Goal: Achieve decreasing blood glucose levels by end of shift  Outcome: Progressing  Goal: Increase stability of blood glucose readings by end of shift  Outcome: Progressing  Goal: Decrease in ketones present in urine by end of shift  Outcome: Progressing  Goal: Maintain electrolyte levels within acceptable range throughout shift  Outcome: Progressing  Goal: Maintain glucose levels >70mg/dl to <250mg/dl throughout shift  Outcome: Progressing  Goal: No changes in neurological exam by end of shift  Outcome: Progressing  Goal: Learn about and adhere to nutrition recommendations by end of shift  Outcome: Progressing  Goal: Vital signs within normal range for age by end of shift  Outcome: Progressing  Goal: Increase self care and/or family involovement by end of shift  Outcome: Progressing  Goal: Receive DSME education by end of shift  Outcome: Progressing     Problem: Pain  Goal: My pain/discomfort is manageable  Outcome: Progressing     Problem: Safety  Goal: Patient will be injury free during hospitalization  Outcome: Progressing  Goal: I will remain free of falls  Outcome: Progressing     Problem: Daily Care  Goal: Daily care needs are met  Outcome: Progressing     Problem: Psychosocial Needs  Goal: Demonstrates ability to cope with hospitalization/illness  Outcome: Progressing  Goal: Collaborate with me, my family, and caregiver to identify my specific goals  Outcome:  Progressing   The patient's goals for the shift include maintain safety    The clinical goals for the shift include remain neurologically intact    Over the shift, the patient did not make progress toward the following goals. Barriers to progression include weakness. Recommendations to address these barriers include increase mobility.

## 2023-11-12 NOTE — CONSULTS
Inpatient consult to Cardiology  Consult performed by: JONA Cardenas-CNP  Consult ordered by: Pete Oneill MD  Reason for consult: Rule out stroke, atrial fibrillation, Watchman device, bradycardia        History Of Present Illness:    Luis Greene is a 83 y.o. male presenting with strokelike symptoms after he left the hospital from his Watchman device which included confusion as well as tingling/paresthesia to his left hand.  Current with Dr. Grant.  Recently underwent a Watchman procedure downtown on 11/10, post procedure appears to have strokelike symptoms.  Presented with this.  EKG on admission a rate controlled atrial fibrillation with right bundle branch block.  Creatinine 0.8.  Potassium 4.7.  Hemoglobin 11.0.  CT without acute findings.  Chest x-ray without acute findings.  Neurology has assessed patient and recommends MRI, has requested our service to assess if patient is capable having MRI at this time.      Last Recorded Vitals:  Vitals:    11/11/23 2025 11/12/23 0017 11/12/23 0454 11/12/23 0728   BP: 149/55 (!) 144/36 (!) 128/40 (!) 111/42   BP Location: Left arm Left arm Right arm Left arm   Patient Position: Lying Lying Lying Lying   Pulse: 77 75 56 53   Resp: 18 16 16 17   Temp: 36.9 °C (98.4 °F) 36.5 °C (97.7 °F) 36.5 °C (97.7 °F) 36.4 °C (97.5 °F)   TempSrc: Temporal Temporal Temporal Temporal   SpO2: 97% 96% 100% 97%   Weight:       Height:           Last Labs:  Results for orders placed or performed during the hospital encounter of 11/10/23 (from the past 24 hour(s))   POCT GLUCOSE   Result Value Ref Range    POCT Glucose 164 (H) 74 - 99 mg/dL   POCT GLUCOSE   Result Value Ref Range    POCT Glucose 275 (H) 74 - 99 mg/dL   POCT GLUCOSE   Result Value Ref Range    POCT Glucose 296 (H) 74 - 99 mg/dL   POCT GLUCOSE   Result Value Ref Range    POCT Glucose 158 (H) 74 - 99 mg/dL     Transthoracic Echo (TTE) Limited    Result Date: 11/10/2023   [facility], [address]  [place] [address]                Tel [phone] and Fax [phone] TRANSTHORACIC ECHOCARDIOGRAM REPORT  Patient Name:      ASHKAN OLIVEROS       Reading Physician:    [id number] Kamar Doyle MD Study Date:        11/10/2023           Ordering Provider:    [id number] DAWNA AUGUSTIN MRN/PID:           [id number]             Fellow:               [id number] Yobany Strong MD Accession#:        [id number]         Nurse: Date of Birth/Age: 1940 / 83 years Sonographer:          Bruna Merlos RDCS Gender:            M                    Additional Staff: Height:            180.34 cm            Admit Date:           11/10/2023 Weight:            78.47 kg             Admission Status:     Inpatient - Time                                                               Critical BSA:               1.98 m2              Encounter#:           [id number]                                         Department Location:  [facility]                                                               Cath Lab Blood Pressure: 129 /65 mmHg Study Type:    TRANSTHORACIC ECHO (TTE) LIMITED Diagnosis/ICD: Presence of other cardiac implants and grafts-Z95.818 Indication:    Post-Watchman Procedure CPT Code:      Echo Limited-49217 Patient History: Pertinent History: S/p LAAO. Study Detail: The following Echo studies were performed: 2D. Technically               challenging study due to patient lying in supine position.  PHYSICIAN INTERPRETATION: Left Ventricle: The left ventricular systolic function is normal, with an estimated ejection fraction of 60-65%. The left ventricular cavity size is normal. There is left ventricular concentric remodeling. Abnormal (paradoxical) septal motion consistent with post-operative status. Left ventricular diastolic filling was not assessed. Left Atrium: The left atrium is enlarged. Right  Ventricle: The right ventricle was not well visualized. Unable to determine right ventricular systolic function. Right Atrium: The right atrium is moderately dilated. Aortic Valve: The aortic valve appears normal. Aortic valve regurgitation was not assessed. Mitral Valve: The mitral valve is normal in structure. Mitral valve regurgitation was not assessed. Tricuspid Valve: The tricuspid valve is structurally normal. Tricuspid regurgitation was not assessed. Pulmonic Valve: The pulmonic valve is not well visualized. Pulmonic valve regurgitation was not assessed. Pericardium: There is no pericardial effusion noted. Aorta: The aortic root is normal. Systemic Veins: The inferior vena cava was not well visualized. In comparison to the previous echocardiogram(s): Compared with study from 11/10/2023, no significant change. Note that this was a limited study. Compared to prior full echo from 8/16/23, there is no significant change from available data.  CONCLUSIONS:  1. Left ventricular systolic function is normal with a 60-65% estimated ejection fraction.  2. Abnormal septal motion consistent with post-operative status.  3. The left atrium is enlarged.  4. The right atrium is moderately dilated. QUANTITATIVE DATA SUMMARY: 2D MEASUREMENTS:                           Normal Ranges: IVSd:          1.27 cm    (0.6-1.1cm) LVPWd:         1.25 cm    (0.6-1.1cm) LVIDd:         4.36 cm    (3.9-5.9cm) LV Mass Index: 102.2 g/m2 RA VOLUME BY A/L METHOD:                       Normal Ranges: RA Area A4C: 22.0 cm2  [id number] Kamar Doyle MD Electronically signed on 11/10/2023 at 3:02:52 PM  ** Final **     Transthoracic Echo (TTE) Limited    Result Date: 11/10/2023   [facility], [address] [place] [address]                Tel [phone] and Fax [phone] TRANSTHORACIC ECHOCARDIOGRAM REPORT  Patient Name:     ASHKAN Schmidt Physician:  [id number] Varghese Robles MD Study Date:       11/10/2023           Ordering Provider:  [id number] DAWNA AUGUSTIN MRN/PID:          [id number]            Fellow: Accession#:       [id number]        Nurse: Date of           1940 / 83      Sonographer:        Bruna Merlos RDCS Birth/Age:        years Gender:           M                   Additional Staff: Height:           180.00 cm           Admit Date:         11/10/2023 Weight:           79.00 kg            Admission Status:   Inpatient - Routine BSA:              1.99 m2             Encounter#:         [id number]                                       [facility] Cath                                       Location:           Lab Blood Pressure: 149 /69 mmHg Study Type:    TRANSTHORACIC ECHO (TTE) LIMITED Diagnosis/ICD: Encounter for preprocedural cardiovascular examination-Z01.810 Indication:    Pre-Watchman Procedure CPT Code:      Echo Limited-08191 Patient History: Pertinent History: A-Fib, Chest Pain, HTN, Hyperlipidemia, LE Edema and                    Palpitations. ALEJANDRINA, DMII, CAD s/p CABG. Study Detail: The following Echo studies were performed: 2D. Technically               challenging study due to patient lying in supine position.  PHYSICIAN INTERPRETATION: Left Ventricle: The left ventricular systolic function is normal, with an estimated ejection fraction of 60-65%. There are no regional wall motion abnormalities. The left ventricular cavity size is normal. Left ventricular diastolic filling was not assessed. Left Atrium: The left atrium is enlarged. Right Ventricle: The right ventricle is normal in size. There is normal right ventricular global systolic function. Right Atrium: The right atrium is enlarged. Aortic Valve: The aortic valve is probably trileaflet. Aortic valve regurgitation was not assessed. Mitral Valve: The mitral valve was not well visualized. Mitral valve regurgitation was not assessed. Tricuspid Valve: The tricuspid valve was not well visualized. Tricuspid regurgitation was not  assessed. The right ventricular systolic pressure is unable to be estimated. Pulmonic Valve: The pulmonic valve was not assessed. Pulmonic valve regurgitation was not assessed. Pericardium: There is a trivial pericardial effusion. Aorta: The aortic root is normal.  CONCLUSIONS:  1. Left ventricular systolic function is normal with a 60-65% estimated ejection fraction.  2. The left atrium is enlarged.  3. There is a trivial pericardial effusion. QUANTITATIVE DATA SUMMARY:  [id number] Varghese Robles MD Electronically signed on 11/10/2023 at 2:16:20 PM  ** Final **      Last I/O:  I/O last 3 completed shifts:  In: - (0 mL/kg)   Out: 1425 (18.2 mL/kg) [Urine:1425 (0.5 mL/kg/hr)]  Weight: 78.5 kg     Past Cardiology Tests (Last 3 Years):  EKG: Rate controlled atrial fibrillation with right bundle branch block    Ejection Fractions: 60%    Past Medical History:  He has a past medical history of Coronary artery disease, Hyperlipidemia, Personal history of other diseases of the circulatory system, and Personal history of other endocrine, nutritional and metabolic disease.    Past Surgical History:  He has a past surgical history that includes Other surgical history (03/11/2019); Other surgical history (03/11/2019); Other surgical history (03/11/2019); MR angio head wo IV contrast (2/17/2019); MR angio head wo IV contrast (10/28/2022); and MR angio head wo IV contrast (8/15/2023).      Social History:  He reports that he has never smoked. He has never used smokeless tobacco. He reports that he does not drink alcohol and does not use drugs.    Family History:  Family History   Problem Relation Name Age of Onset    Other (cardiac disorder) Father      Diabetes Father      Hyperlipidemia Father      Hypertension Father      Other (liver carcinoma) Father      Other (hypercholesterolemia) Father      Hypertension Daughter          Allergies:  Famotidine, Prednisone, Amoxicillin, Donepezil, Gabapentin, Hydrochlorothiazide, Ibuprofen,  Other, Oxycodone-acetaminophen, Tramadol hcl, and Oxycodone hcl    Inpatient Medications:  Scheduled medications   Medication Dose Route Frequency    aspirin  162 mg oral Daily    atorvastatin  40 mg oral Nightly    clopidogrel  75 mg oral Daily    docusate sodium  100 mg oral BID    ferrous sulfate (325 mg ferrous sulfate)  325 mg oral Daily    finasteride  5 mg oral Daily    gabapentin  200 mg oral BID    insulin glargine  15 Units subcutaneous Nightly    lisinopril  20 mg oral Daily    metFORMIN  1,000 mg oral BID    pantoprazole  40 mg oral Daily before breakfast    perflutren lipid microspheres  0.5-10 mL of dilution intravenous Once in imaging    perflutren lipid microspheres  0.5-10 mL of dilution intravenous Once in imaging    perflutren protein A microsphere  0.5 mL intravenous Once in imaging    perflutren protein A microsphere  0.5 mL intravenous Once in imaging    sulfur hexafluoride microsphr  2 mL intravenous Once in imaging    sulfur hexafluoride microsphr  2 mL intravenous Once in imaging    tamsulosin  0.4 mg oral BID     PRN medications   Medication    dextrose 10 % in water (D10W)    dextrose    glucagon    ondansetron ODT    Or    ondansetron     Continuous Medications   Medication Dose Last Rate     Outpatient Medications:  Current Outpatient Medications   Medication Instructions    amLODIPine (Norvasc) 5 mg tablet 1 tablet, oral, Daily    aspirin 81 mg EC tablet 2 tablets, oral, Daily    atorvastatin (Lipitor) 40 mg tablet 1 tablet, oral, Daily    clopidogrel (PLAVIX) 75 mg, oral, Daily    ferrous sulfate 325 (65 Fe) MG EC tablet 1 tablet, oral, Daily    finasteride (PROSCAR) 5 mg, oral, Daily    folic acid-vitamin B complex-vitamin C-selenium-zinc (Dialyvite) 3-70-15 mg-mcg-mg tablet 1 tablet, oral, Daily    furosemide (LASIX) 40 mg, oral, Daily    gabapentin (Neurontin) 100 mg capsule TAKE 1 CAPSULE BY MOUTH ONCE DAILY FOR NEUROPATHY PAIN    insulin glargine-lixisenatide (Soliqua 100/33) 100  unit-33 mcg/mL insulin pen 15 Units, subcutaneous, Nightly    lisinopril 20 mg, oral, Daily    metFORMIN (Glucophage) 500 mg tablet 2 tablets, oral, 2 times daily    metoprolol succinate XL (Toprol-XL) 50 mg 24 hr tablet 1 tablet, oral, Daily    omeprazole (PriLOSEC) 40 mg DR capsule 1 capsule, oral, Daily    tamsulosin (FLOMAX) 0.4 mg, oral, 2 times daily    traMADol (Ultram) 50 mg tablet 1 tablet (50 mg) 2 times a day as needed.       Physical Exam:  General: alert, oriented x2-3, very pleasant; son at bedside  HEENT: normal cephalic, atraumatic  Neck: No JVD, bruit or thrill, masses or tenderness   Heart: S1/S2, Rate 50, Rhythm irregular, no s3 or s4, no murmur, thrill, or heaves at PMI.   Lungs: Clear, equal expansion and excursion, no wheezes, crackles, rhales or rhonci.  Room air.  No conversational dyspnea appreciated.  No tachypnea.  No pain with deep aspiration   Abdomen: Overweight, bowel sounds x 4, soft, non-tender   Genitourinary: deferred   Extremities: No significant upper or lower extremity daylin appreciated.       Assessment/Plan     Rule out stroke  Watchman device  Longstanding persistent atrial fibrillation  Slow atrial fibrillation  Coronary disease  Hypertensive disorder    Overall impression:    11/12: Assessed by [facility] in collaboration with Dr. Ford on coverage for Dr. Grant.  Presented with strokelike symptoms.  Recently underwent a Watchman device placement on 11/10/2023.  Initial CT brain does not show any acute findings.  Neurology has requested MRI brain; given patient's recent Watchman device and the metallic nature of this device they question the compatibility of the Watchman device with MRI.      Review of literature from [employer] indicates 3 criteria which are required to safely undergo an MRI scan as follows:  Static magnetic field of 3.0 Judy or 1.5 Judy  Maximum spatial gradient field of 2500 GAUSS/centimeters  Maximum MR system reported, whole  body average specific absorption rate (XI) of <2.0 w/kg (normal operating mode only) for 15 minutes    Patient's implanting physician was contacted (Dr. Avitia) who notes the patient may go ahead to receive an MRI of the brain for further evaluation.    Otherwise from a cardiac perspective patient does remain in a slow atrial fibrillation with rates overnight in the mid 30s.  At this point we will stop his metoprolol and will stop calcium channel blocker and reassess heart rates over the next 24 hours.  I have seen no evidence to this point and the patient will require a permanent pacemaker, but we will continue to monitor.  He is euvolemic on examination chest pain-free.  Breathing comfortably.  We will follow with you.      Code Status:  Full Code    I spent 60 minutes in the professional and overall care of this patient.        Juan Luis Tate, JONA-CNP

## 2023-11-12 NOTE — NURSING NOTE
BSSR. Assumed patient care. Patient resting comfortably in bed with call light within reach. No complaints at this time.

## 2023-11-12 NOTE — PROGRESS NOTES
"Neurology Progress Note     Subjective   84 y/o M been eval for tia vs stroke.  Pat cont clinically stable and feels better and denies speech difficulties or obvious new weakness.  Family at bedside still notice a very subtle Rt decreased nasolabial fold.  Denies HA, n/v, vision changes or dizziness.       Objective   Scheduled medications  aspirin, 162 mg, oral, Daily  atorvastatin, 40 mg, oral, Nightly  clopidogrel, 75 mg, oral, Daily  docusate sodium, 100 mg, oral, BID  ferrous sulfate (325 mg ferrous sulfate), 325 mg, oral, Daily  finasteride, 5 mg, oral, Daily  gabapentin, 200 mg, oral, BID  insulin glargine, 15 Units, subcutaneous, Nightly  insulin lispro, 0-15 Units, subcutaneous, TID with meals  lisinopril, 20 mg, oral, Daily  metFORMIN, 1,000 mg, oral, BID  pantoprazole, 40 mg, oral, Daily before breakfast  perflutren lipid microspheres, 0.5-10 mL of dilution, intravenous, Once in imaging  perflutren lipid microspheres, 0.5-10 mL of dilution, intravenous, Once in imaging  perflutren protein A microsphere, 0.5 mL, intravenous, Once in imaging  perflutren protein A microsphere, 0.5 mL, intravenous, Once in imaging  sulfur hexafluoride microsphr, 2 mL, intravenous, Once in imaging  sulfur hexafluoride microsphr, 2 mL, intravenous, Once in imaging  tamsulosin, 0.4 mg, oral, BID      PRN medications  PRN medications: acetaminophen, dextrose 10 % in water (D10W), dextrose, glucagon, ondansetron ODT **OR** ondansetron    Last Recorded Vitals  Blood pressure 163/70, pulse 83, temperature 36.7 °C (98.1 °F), temperature source Temporal, resp. rate 26, height 1.803 m (5' 11\"), weight 78.5 kg (173 lb), SpO2 94 %.    Physical Exam  Neurological Exam  General:  lying in bed, family at bedside, cooperative to the interview and physical exam, no distress, poor historian.    Mental Status:  Alert, oriented to person, place and time, name current [employer] presidents, follow commands, no right to left confusion, no clear aphasia, " fair naming and repetition.       Cranial Nerves II-XII:  positive papillary reactivity to light and accommodation from 2mm to 1.5 mm bilaterally, normal extraocular movements, no visual field deficits to confrontational testing, normal bilateral facial sensation to light touch at all quadrants, normal jaw muscle strength, normal bilateral facial grimace, decreased hearing to conversation, normal tongue protrusion, palate elevation not eval, normal shoulder shrug, Motor:  slight Rt UE drift; strength (MRC score) at Rt UE is -5/5 w/ rest been 5/5; rest is 5/5  Coordination:  fair finger to nose and rapid alternating movements to finger tap bilaterally.     Reflexes:  +2/4 at blt Ue's; +1/4 at Le's; plantar reflex is flexor bilaterally.   Sensory:  Normal light touch, at all extremities.     Gait: deferred.        Relevant Results  Results for orders placed or performed during the hospital encounter of 11/10/23 (from the past 96 hour(s))   CBC and Auto Differential   Result Value Ref Range    WBC 6.7 4.4 - 11.3 x10*3/uL    nRBC 0.0 0.0 - 0.0 /100 WBCs    RBC 3.13 (L) 4.50 - 5.90 x10*6/uL    Hemoglobin 11.2 (L) 13.5 - 17.5 g/dL    Hematocrit 32.5 (L) 41.0 - 52.0 %     (H) 80 - 100 fL    MCH 35.8 (H) 26.0 - 34.0 pg    MCHC 34.5 32.0 - 36.0 g/dL    RDW 13.8 11.5 - 14.5 %    Platelets 91 (L) 150 - 450 x10*3/uL    Neutrophils % 68.5 40.0 - 80.0 %    Immature Granulocytes %, Automated 0.4 0.0 - 0.9 %    Lymphocytes % 16.3 13.0 - 44.0 %    Monocytes % 10.8 2.0 - 10.0 %    Eosinophils % 3.9 0.0 - 6.0 %    Basophils % 0.1 0.0 - 2.0 %    Neutrophils Absolute 4.58 1.60 - 5.50 x10*3/uL    Immature Granulocytes Absolute, Automated 0.03 0.00 - 0.50 x10*3/uL    Lymphocytes Absolute 1.09 0.80 - 3.00 x10*3/uL    Monocytes Absolute 0.72 0.05 - 0.80 x10*3/uL    Eosinophils Absolute 0.26 0.00 - 0.40 x10*3/uL    Basophils Absolute 0.01 0.00 - 0.10 x10*3/uL   Comprehensive metabolic panel   Result Value Ref Range    Glucose 231 (H)  65 - 99 mg/dL    Sodium 135 133 - 145 mmol/L    Potassium 4.4 3.4 - 5.1 mmol/L    Chloride 103 97 - 107 mmol/L    Bicarbonate 22 (L) 24 - 31 mmol/L    Urea Nitrogen 20 8 - 25 mg/dL    Creatinine 0.80 0.40 - 1.60 mg/dL    eGFR 88 >60 mL/min/1.73m*2    Calcium 8.4 (L) 8.5 - 10.4 mg/dL    Albumin 3.5 3.5 - 5.0 g/dL    Alkaline Phosphatase 45 35 - 125 U/L    Total Protein 5.3 (L) 5.9 - 7.9 g/dL    AST 30 5 - 40 U/L    Bilirubin, Total 1.1 0.1 - 1.2 mg/dL    ALT 40 5 - 40 U/L    Anion Gap 10 <=19 mmol/L   Troponin T, High Sensitivity   Result Value Ref Range    Troponin T, High Sensitivity 117 (H) <=15 ng/L   Protime-INR   Result Value Ref Range    Protime 12.3 9.3 - 12.7 seconds    INR 1.2 0.9 - 1.2   APTT   Result Value Ref Range    aPTT 22.2 22.0 - 32.5 seconds   Morphology   Result Value Ref Range    RBC Morphology No significant RBC morphology present    POCT GLUCOSE   Result Value Ref Range    POCT Glucose 208 (H) 74 - 99 mg/dL   Basic metabolic panel   Result Value Ref Range    Glucose 243 (H) 65 - 99 mg/dL    Sodium 135 133 - 145 mmol/L    Potassium 4.5 3.4 - 5.1 mmol/L    Chloride 100 97 - 107 mmol/L    Bicarbonate 22 (L) 24 - 31 mmol/L    Urea Nitrogen 20 8 - 25 mg/dL    Creatinine 0.80 0.40 - 1.60 mg/dL    eGFR 88 >60 mL/min/1.73m*2    Calcium 8.4 (L) 8.5 - 10.4 mg/dL    Anion Gap 13 <=19 mmol/L   Magnesium   Result Value Ref Range    Magnesium 1.80 1.60 - 3.10 mg/dL   CBC and Auto Differential   Result Value Ref Range    WBC 4.9 4.4 - 11.3 x10*3/uL    nRBC 0.0 0.0 - 0.0 /100 WBCs    RBC 2.82 (L) 4.50 - 5.90 x10*6/uL    Hemoglobin 10.2 (L) 13.5 - 17.5 g/dL    Hematocrit 29.5 (L) 41.0 - 52.0 %     (H) 80 - 100 fL    MCH 36.2 (H) 26.0 - 34.0 pg    MCHC 34.6 32.0 - 36.0 g/dL    RDW 13.8 11.5 - 14.5 %    Platelets 83 (L) 150 - 450 x10*3/uL    Neutrophils % 65.1 40.0 - 80.0 %    Immature Granulocytes %, Automated 0.2 0.0 - 0.9 %    Lymphocytes % 19.9 13.0 - 44.0 %    Monocytes % 8.2 2.0 - 10.0 %     Eosinophils % 6.2 0.0 - 6.0 %    Basophils % 0.4 0.0 - 2.0 %    Neutrophils Absolute 3.17 1.60 - 5.50 x10*3/uL    Immature Granulocytes Absolute, Automated 0.01 0.00 - 0.50 x10*3/uL    Lymphocytes Absolute 0.97 0.80 - 3.00 x10*3/uL    Monocytes Absolute 0.40 0.05 - 0.80 x10*3/uL    Eosinophils Absolute 0.30 0.00 - 0.40 x10*3/uL    Basophils Absolute 0.02 0.00 - 0.10 x10*3/uL   Protime-INR   Result Value Ref Range    Protime 12.3 9.3 - 12.7 seconds    INR 1.2 0.9 - 1.2   Morphology   Result Value Ref Range    RBC Morphology No significant RBC morphology present    CBC and Auto Differential   Result Value Ref Range    WBC 4.8 4.4 - 11.3 x10*3/uL    nRBC 0.0 0.0 - 0.0 /100 WBCs    RBC 3.10 (L) 4.50 - 5.90 x10*6/uL    Hemoglobin 11.0 (L) 13.5 - 17.5 g/dL    Hematocrit 31.4 (L) 41.0 - 52.0 %     (H) 80 - 100 fL    MCH 35.5 (H) 26.0 - 34.0 pg    MCHC 35.0 32.0 - 36.0 g/dL    RDW 13.6 11.5 - 14.5 %    Platelets 91 (L) 150 - 450 x10*3/uL    Neutrophils % 60.5 40.0 - 80.0 %    Immature Granulocytes %, Automated 0.4 0.0 - 0.9 %    Lymphocytes % 23.4 13.0 - 44.0 %    Monocytes % 9.3 2.0 - 10.0 %    Eosinophils % 6.2 0.0 - 6.0 %    Basophils % 0.2 0.0 - 2.0 %    Neutrophils Absolute 2.92 1.60 - 5.50 x10*3/uL    Immature Granulocytes Absolute, Automated 0.02 0.00 - 0.50 x10*3/uL    Lymphocytes Absolute 1.13 0.80 - 3.00 x10*3/uL    Monocytes Absolute 0.45 0.05 - 0.80 x10*3/uL    Eosinophils Absolute 0.30 0.00 - 0.40 x10*3/uL    Basophils Absolute 0.01 0.00 - 0.10 x10*3/uL   Protime-INR   Result Value Ref Range    Protime 12.3 9.3 - 12.7 seconds    INR 1.2 0.9 - 1.2   Magnesium   Result Value Ref Range    Magnesium 1.90 1.60 - 3.10 mg/dL   Basic Metabolic Panel   Result Value Ref Range    Glucose 171 (H) 65 - 99 mg/dL    Sodium 139 133 - 145 mmol/L    Potassium 4.7 3.4 - 5.1 mmol/L    Chloride 107 97 - 107 mmol/L    Bicarbonate 21 (L) 24 - 31 mmol/L    Urea Nitrogen 20 8 - 25 mg/dL    Creatinine 0.80 0.40 - 1.60 mg/dL     eGFR 88 >60 mL/min/1.73m*2    Calcium 8.7 8.5 - 10.4 mg/dL    Anion Gap 11 <=19 mmol/L   POCT GLUCOSE   Result Value Ref Range    POCT Glucose 164 (H) 74 - 99 mg/dL   POCT GLUCOSE   Result Value Ref Range    POCT Glucose 275 (H) 74 - 99 mg/dL   POCT GLUCOSE   Result Value Ref Range    POCT Glucose 296 (H) 74 - 99 mg/dL   POCT GLUCOSE   Result Value Ref Range    POCT Glucose 158 (H) 74 - 99 mg/dL   POCT GLUCOSE   Result Value Ref Range    POCT Glucose 278 (H) 74 - 99 mg/dL   POCT GLUCOSE   Result Value Ref Range    POCT Glucose 220 (H) 74 - 99 mg/dL          CT angio brain attack head w IV contrast and post procedure  Result Date: 11/10/2023  1. No large vessel occlusion or high-grade stenosis. 2. Atherosclerotic plaques in the bilateral carotid bulbs contributing to mild (less than 50%) stenosis. Atherosclerotic plaque in the right proximal ICA contributing to approximately 50% narrowing.        CT brain attack head wo IV contrast  Result Date: 11/10/2023  No acute intracranial abnormality.            Transthoracic Echo (TTE) Limited  Result Date: 11/10/2023  CONCLUSIONS:  1. Left ventricular systolic function is normal with a 60-65% estimated ejection fraction.  2. Abnormal septal motion consistent with post-operative status.  3. The left atrium is enlarged.  4. The right atrium is moderately dilated.      Transthoracic Echo (TTE) Limited  Result Date: 11/10/2023  CONCLUSIONS:  1. Left ventricular systolic function is normal with a 60-65% estimated ejection fraction.  2. The left atrium is enlarged.  3. There is a trivial pericardial effusion.      CT watchman full contrast  Result Date: 11/10/2023  1. No evidence of left atrial/left atrial appendage thrombus. 2. Limited evaluation secondary to motion artifact which may affect the accuracy of left atrial appendage measurements. The left atrial appendage orifice is round in shape, measuring 1.9 x 1.8 cm in orthogonal dimensions and with an area of 2.8 cm. sq. Left  atrial appendage depth is 2.2 cm. 3. Severe coronary artery calcifications are seen in the setting of prior bypass grafting. Please note,the study is not optimized for evaluation of coronary arteries/grafts. 4. Biatrial enlargement. 5. Mild aortic valve calcifications.        XR chest 1 view  Result Date: 11/10/2023  No acute cardiopulmonary process.     Assessment/Plan   Impression:  Stroke-like sympt's s/p watchman device placement.  As per hx and exam, cannot exclude an small lacunar stroke in the left subcortical territory.  MRI brain pending to further eval.  Cardio on board.  Further eval/tx will depend of MRI results.  To cont. control of all vascular risk factors and dapt as per cardio.       Recommendations/Plan:  Check MRI brain w/o cont.    Telemetry while in hospital.   Follow up Cardio eval/recc's.  4.   Control of all vascular risk factors, statin and dapt (aspirin 81mg + Plavix 75mg) Qday.  Defer to Cardio if pat needs to be switch to anticoagulation.    5.    Avoid hypotension, BP Parameters: SBP>120<160; DBP>60<90.    6.    DVT prophylaxis as per primary medical team.   7.   Follow to cont by Neurology on call.    Joseph Elizondo MD

## 2023-11-12 NOTE — CARE PLAN
The patient's goals for the shift include maintain safety    The clinical goals for the shift include remain neurologically intact    Over the shift, the patient did not make progress toward the following goals. Barriers to progression include . Recommendations to address these barriers include .

## 2023-11-13 ENCOUNTER — APPOINTMENT (OUTPATIENT)
Dept: RADIOLOGY | Facility: HOSPITAL | Age: 83
DRG: 101 | End: 2023-11-13
Payer: MEDICARE

## 2023-11-13 ENCOUNTER — HOME CARE VISIT (OUTPATIENT)
Dept: HOME HEALTH SERVICES | Facility: HOME HEALTH | Age: 83
End: 2023-11-13

## 2023-11-13 LAB
ANION GAP SERPL CALC-SCNC: 10 MMOL/L
BASOPHILS # BLD AUTO: 0.01 X10*3/UL (ref 0–0.1)
BASOPHILS NFR BLD AUTO: 0.2 %
BUN SERPL-MCNC: 24 MG/DL (ref 8–25)
CALCIUM SERPL-MCNC: 8.5 MG/DL (ref 8.5–10.4)
CHLORIDE SERPL-SCNC: 104 MMOL/L (ref 97–107)
CO2 SERPL-SCNC: 21 MMOL/L (ref 24–31)
CREAT SERPL-MCNC: 1 MG/DL (ref 0.4–1.6)
EOSINOPHIL # BLD AUTO: 0.34 X10*3/UL (ref 0–0.4)
EOSINOPHIL NFR BLD AUTO: 5.7 %
ERYTHROCYTE [DISTWIDTH] IN BLOOD BY AUTOMATED COUNT: 13.4 % (ref 11.5–14.5)
GFR SERPL CREATININE-BSD FRML MDRD: 75 ML/MIN/1.73M*2
GLUCOSE BLD MANUAL STRIP-MCNC: 161 MG/DL (ref 74–99)
GLUCOSE BLD MANUAL STRIP-MCNC: 238 MG/DL (ref 74–99)
GLUCOSE BLD MANUAL STRIP-MCNC: 256 MG/DL (ref 74–99)
GLUCOSE BLD MANUAL STRIP-MCNC: 259 MG/DL (ref 74–99)
GLUCOSE SERPL-MCNC: 123 MG/DL (ref 65–99)
HCT VFR BLD AUTO: 29.9 % (ref 41–52)
HCT VFR BLD AUTO: 31.6 % (ref 41–52)
HCT VFR BLD AUTO: 32.7 % (ref 41–52)
HGB BLD-MCNC: 10.5 G/DL (ref 13.5–17.5)
HGB BLD-MCNC: 10.8 G/DL (ref 13.5–17.5)
HGB BLD-MCNC: 11.2 G/DL (ref 13.5–17.5)
IMM GRANULOCYTES # BLD AUTO: 0.02 X10*3/UL (ref 0–0.5)
IMM GRANULOCYTES NFR BLD AUTO: 0.3 % (ref 0–0.9)
LYMPHOCYTES # BLD AUTO: 1.58 X10*3/UL (ref 0.8–3)
LYMPHOCYTES NFR BLD AUTO: 26.4 %
MCH RBC QN AUTO: 35.1 PG (ref 26–34)
MCHC RBC AUTO-ENTMCNC: 34.3 G/DL (ref 32–36)
MCV RBC AUTO: 103 FL (ref 80–100)
MONOCYTES # BLD AUTO: 0.65 X10*3/UL (ref 0.05–0.8)
MONOCYTES NFR BLD AUTO: 10.9 %
NEUTROPHILS # BLD AUTO: 3.39 X10*3/UL (ref 1.6–5.5)
NEUTROPHILS NFR BLD AUTO: 56.5 %
NRBC BLD-RTO: 0 /100 WBCS (ref 0–0)
PLATELET # BLD AUTO: 84 X10*3/UL (ref 150–450)
POTASSIUM SERPL-SCNC: 4.5 MMOL/L (ref 3.4–5.1)
RBC # BLD AUTO: 3.19 X10*6/UL (ref 4.5–5.9)
SODIUM SERPL-SCNC: 135 MMOL/L (ref 133–145)
WBC # BLD AUTO: 6 X10*3/UL (ref 4.4–11.3)

## 2023-11-13 PROCEDURE — 2500000001 HC RX 250 WO HCPCS SELF ADMINISTERED DRUGS (ALT 637 FOR MEDICARE OP): Performed by: INTERNAL MEDICINE

## 2023-11-13 PROCEDURE — 97161 PT EVAL LOW COMPLEX 20 MIN: CPT | Mod: GP

## 2023-11-13 PROCEDURE — 2500000004 HC RX 250 GENERAL PHARMACY W/ HCPCS (ALT 636 FOR OP/ED): Performed by: INTERNAL MEDICINE

## 2023-11-13 PROCEDURE — 36415 COLL VENOUS BLD VENIPUNCTURE: CPT | Performed by: INTERNAL MEDICINE

## 2023-11-13 PROCEDURE — 2500000002 HC RX 250 W HCPCS SELF ADMINISTERED DRUGS (ALT 637 FOR MEDICARE OP, ALT 636 FOR OP/ED): Performed by: INTERNAL MEDICINE

## 2023-11-13 PROCEDURE — 82947 ASSAY GLUCOSE BLOOD QUANT: CPT

## 2023-11-13 PROCEDURE — 2500000004 HC RX 250 GENERAL PHARMACY W/ HCPCS (ALT 636 FOR OP/ED): Performed by: NURSE PRACTITIONER

## 2023-11-13 PROCEDURE — 36415 COLL VENOUS BLD VENIPUNCTURE: CPT | Performed by: NURSE PRACTITIONER

## 2023-11-13 PROCEDURE — 85025 COMPLETE CBC W/AUTO DIFF WBC: CPT | Performed by: INTERNAL MEDICINE

## 2023-11-13 PROCEDURE — 85014 HEMATOCRIT: CPT | Performed by: NURSE PRACTITIONER

## 2023-11-13 PROCEDURE — 70551 MRI BRAIN STEM W/O DYE: CPT

## 2023-11-13 PROCEDURE — 97166 OT EVAL MOD COMPLEX 45 MIN: CPT | Mod: GO

## 2023-11-13 PROCEDURE — 80048 BASIC METABOLIC PNL TOTAL CA: CPT | Performed by: INTERNAL MEDICINE

## 2023-11-13 PROCEDURE — 85018 HEMOGLOBIN: CPT | Performed by: NURSE PRACTITIONER

## 2023-11-13 PROCEDURE — 1200000002 HC GENERAL ROOM WITH TELEMETRY DAILY

## 2023-11-13 PROCEDURE — 97116 GAIT TRAINING THERAPY: CPT | Mod: GP

## 2023-11-13 PROCEDURE — C9113 INJ PANTOPRAZOLE SODIUM, VIA: HCPCS | Performed by: NURSE PRACTITIONER

## 2023-11-13 PROCEDURE — 99233 SBSQ HOSP IP/OBS HIGH 50: CPT | Performed by: INTERNAL MEDICINE

## 2023-11-13 RX ORDER — PANTOPRAZOLE SODIUM 40 MG/10ML
40 INJECTION, POWDER, LYOPHILIZED, FOR SOLUTION INTRAVENOUS 2 TIMES DAILY
Status: DISCONTINUED | OUTPATIENT
Start: 2023-11-13 | End: 2023-11-15

## 2023-11-13 RX ADMIN — METFORMIN HYDROCHLORIDE 1000 MG: 1000 TABLET, FILM COATED ORAL at 09:16

## 2023-11-13 RX ADMIN — TAMSULOSIN HYDROCHLORIDE 0.4 MG: 0.4 CAPSULE ORAL at 21:59

## 2023-11-13 RX ADMIN — INSULIN LISPRO 3 UNITS: 100 INJECTION, SOLUTION INTRAVENOUS; SUBCUTANEOUS at 09:14

## 2023-11-13 RX ADMIN — PANTOPRAZOLE SODIUM 40 MG: 40 TABLET, DELAYED RELEASE ORAL at 06:16

## 2023-11-13 RX ADMIN — INSULIN LISPRO 6 UNITS: 100 INJECTION, SOLUTION INTRAVENOUS; SUBCUTANEOUS at 11:44

## 2023-11-13 RX ADMIN — GABAPENTIN 200 MG: 100 CAPSULE ORAL at 09:15

## 2023-11-13 RX ADMIN — ASPIRIN 162 MG: 81 TABLET, COATED ORAL at 16:43

## 2023-11-13 RX ADMIN — PANTOPRAZOLE SODIUM 40 MG: 40 INJECTION, POWDER, FOR SOLUTION INTRAVENOUS at 21:59

## 2023-11-13 RX ADMIN — FINASTERIDE 5 MG: 5 TABLET, FILM COATED ORAL at 09:16

## 2023-11-13 RX ADMIN — ATORVASTATIN CALCIUM 40 MG: 40 TABLET, FILM COATED ORAL at 21:59

## 2023-11-13 RX ADMIN — INSULIN LISPRO 9 UNITS: 100 INJECTION, SOLUTION INTRAVENOUS; SUBCUTANEOUS at 16:43

## 2023-11-13 RX ADMIN — TAMSULOSIN HYDROCHLORIDE 0.4 MG: 0.4 CAPSULE ORAL at 09:15

## 2023-11-13 RX ADMIN — FERROUS SULFATE TAB 325 MG (65 MG ELEMENTAL FE) 325 MG: 325 (65 FE) TAB at 09:16

## 2023-11-13 RX ADMIN — METFORMIN HYDROCHLORIDE 1000 MG: 1000 TABLET, FILM COATED ORAL at 21:59

## 2023-11-13 RX ADMIN — CLOPIDOGREL BISULFATE 75 MG: 75 TABLET ORAL at 16:42

## 2023-11-13 RX ADMIN — GABAPENTIN 200 MG: 100 CAPSULE ORAL at 21:59

## 2023-11-13 RX ADMIN — INSULIN GLARGINE 15 UNITS: 100 INJECTION, SOLUTION SUBCUTANEOUS at 21:59

## 2023-11-13 ASSESSMENT — COGNITIVE AND FUNCTIONAL STATUS - GENERAL
WALKING IN HOSPITAL ROOM: TOTAL
PERSONAL GROOMING: A LITTLE
TURNING FROM BACK TO SIDE WHILE IN FLAT BAD: A LITTLE
CLIMB 3 TO 5 STEPS WITH RAILING: A LOT
TOILETING: A LITTLE
DRESSING REGULAR UPPER BODY CLOTHING: A LOT
DAILY ACTIVITIY SCORE: 17
STANDING UP FROM CHAIR USING ARMS: A LOT
EATING MEALS: A LITTLE
MOVING TO AND FROM BED TO CHAIR: A LOT
WALKING IN HOSPITAL ROOM: A LITTLE
HELP NEEDED FOR BATHING: A LOT
HELP NEEDED FOR BATHING: A LOT
DRESSING REGULAR UPPER BODY CLOTHING: A LOT
DRESSING REGULAR LOWER BODY CLOTHING: A LOT
DRESSING REGULAR LOWER BODY CLOTHING: A LITTLE
MOBILITY SCORE: 13
MOBILITY SCORE: 19
CLIMB 3 TO 5 STEPS WITH RAILING: A LOT
TURNING FROM BACK TO SIDE WHILE IN FLAT BAD: A LITTLE
STANDING UP FROM CHAIR USING ARMS: A LITTLE
WALKING IN HOSPITAL ROOM: A LITTLE
DRESSING REGULAR LOWER BODY CLOTHING: A LOT
MOBILITY SCORE: 17
CLIMB 3 TO 5 STEPS WITH RAILING: TOTAL
HELP NEEDED FOR BATHING: A LOT
DRESSING REGULAR UPPER BODY CLOTHING: A LITTLE
PERSONAL GROOMING: A LITTLE
MOVING TO AND FROM BED TO CHAIR: A LITTLE
MOVING FROM LYING ON BACK TO SITTING ON SIDE OF FLAT BED WITH BEDRAILS: A LITTLE
PERSONAL GROOMING: A LOT
MOBILITY SCORE: 17
MOVING FROM LYING ON BACK TO SITTING ON SIDE OF FLAT BED WITH BEDRAILS: A LITTLE
HELP NEEDED FOR BATHING: A LOT
MOVING TO AND FROM BED TO CHAIR: A LITTLE
STANDING UP FROM CHAIR USING ARMS: A LITTLE
TOILETING: A LOT
CLIMB 3 TO 5 STEPS WITH RAILING: A LOT
TURNING FROM BACK TO SIDE WHILE IN FLAT BAD: A LITTLE
WALKING IN HOSPITAL ROOM: A LITTLE
DAILY ACTIVITIY SCORE: 13
TOILETING: A LITTLE
DAILY ACTIVITIY SCORE: 17
DRESSING REGULAR LOWER BODY CLOTHING: A LOT
DRESSING REGULAR UPPER BODY CLOTHING: A LITTLE
MOVING TO AND FROM BED TO CHAIR: A LITTLE
DAILY ACTIVITIY SCORE: 16
STANDING UP FROM CHAIR USING ARMS: A LITTLE
TOILETING: A LITTLE
PERSONAL GROOMING: A LOT

## 2023-11-13 ASSESSMENT — ENCOUNTER SYMPTOMS
EYES NEGATIVE: 1
PSYCHIATRIC NEGATIVE: 1
FACIAL ASYMMETRY: 1
SPEECH DIFFICULTY: 1
ACTIVITY CHANGE: 1
CARDIOVASCULAR NEGATIVE: 1
CONFUSION: 1
ALLERGIC/IMMUNOLOGIC NEGATIVE: 1
RESPIRATORY NEGATIVE: 1
MUSCULOSKELETAL NEGATIVE: 1
NEUROLOGICAL NEGATIVE: 1
HEMATOLOGIC/LYMPHATIC NEGATIVE: 1
ENDOCRINE NEGATIVE: 1
BLOOD IN STOOL: 1

## 2023-11-13 ASSESSMENT — PAIN SCALES - GENERAL
PAINLEVEL_OUTOF10: 0 - NO PAIN

## 2023-11-13 ASSESSMENT — PAIN - FUNCTIONAL ASSESSMENT
PAIN_FUNCTIONAL_ASSESSMENT: 0-10
PAIN_FUNCTIONAL_ASSESSMENT: FLACC (FACE, LEGS, ACTIVITY, CRY, CONSOLABILITY)
PAIN_FUNCTIONAL_ASSESSMENT: 0-10

## 2023-11-13 ASSESSMENT — ACTIVITIES OF DAILY LIVING (ADL)
ADLS_ADDRESSED: NO
ADL_ASSISTANCE: INDEPENDENT
BATHING_ASSISTANCE: MODERATE
ADL_ASSISTANCE: INDEPENDENT

## 2023-11-13 NOTE — CONSULTS
Inpatient consult to Gastroenterology  Consult performed by: SUSAN Gilman  Consult ordered by: SUSAN Man      Reason For Consult  Hematochezia     History Of Present Illness  Luis Greene is a 83 y.o. male presenting with  stroke like symptoms of slurred speech and right sided weakness.  He has PMH of Afib,  CAD s/p CABG 2011, dyslipidemia, GINGER. Patient remains confused, wife at bedside. He recently had a watchman procedure on 11/10/23. Patient is currently on Plavix protocol as per watchman. The patient has had intermittent GI bleeding in the past when placed on Eliquis. Patient had an episode of bright red stools this afternoon. Last colonoscopy/egd on 2/28/23 was done in by Dr. Goodman showed diverticulosis, no polyps/masses. He also capsule endoscopy which was negative. No use of NSAIDS, alcohol. He denies any abdominal pain, nausea, vomiting.      Past Medical History  He has a past medical history of Coronary artery disease, Hyperlipidemia, Personal history of other diseases of the circulatory system, and Personal history of other endocrine, nutritional and metabolic disease.    Surgical History  He has a past surgical history that includes Other surgical history (03/11/2019); Other surgical history (03/11/2019); Other surgical history (03/11/2019); MR angio head wo IV contrast (2/17/2019); MR angio head wo IV contrast (10/28/2022); MR angio head wo IV contrast (8/15/2023); and Cardiac catheterization (N/A, 11/10/2023).     Social History  He reports that he has never smoked. He has never used smokeless tobacco. He reports that he does not drink alcohol and does not use drugs.    Family History  Family History   Problem Relation Name Age of Onset    Other (cardiac disorder) Father      Diabetes Father      Hyperlipidemia Father      Hypertension Father      Other (liver carcinoma) Father      Other (hypercholesterolemia) Father      Hypertension Daughter         "  Allergies  Famotidine, Prednisone, Amoxicillin, Donepezil, Gabapentin, Hydrochlorothiazide, Ibuprofen, Oxycodone-acetaminophen, Tramadol hcl, and Oxycodone hcl    Review of Systems   Constitutional:  Positive for activity change.   HENT: Negative.     Eyes: Negative.    Respiratory: Negative.     Cardiovascular: Negative.    Gastrointestinal:  Positive for blood in stool.   Endocrine: Negative.    Genitourinary: Negative.    Musculoskeletal: Negative.    Skin: Negative.    Allergic/Immunologic: Negative.    Neurological: Negative.    Hematological: Negative.    Psychiatric/Behavioral: Negative.          Physical Exam  Constitutional:       Appearance: He is ill-appearing.   HENT:      Head: Normocephalic.      Nose: Nose normal.   Eyes:      Pupils: Pupils are equal, round, and reactive to light.   Cardiovascular:      Pulses: Normal pulses.   Pulmonary:      Effort: Pulmonary effort is normal.   Abdominal:      Palpations: Abdomen is soft.   Musculoskeletal:         General: Normal range of motion.      Cervical back: Normal range of motion.   Skin:     General: Skin is warm.   Neurological:      Mental Status: He is disoriented.   Psychiatric:         Mood and Affect: Mood normal.          Last Recorded Vitals  Blood pressure (!) 127/43, pulse 79, temperature 36.1 °C (97 °F), temperature source Temporal, resp. rate 22, height 1.803 m (5' 11\"), weight 78.5 kg (173 lb), SpO2 95 %.    Relevant Results  Results for orders placed or performed during the hospital encounter of 11/10/23 (from the past 24 hour(s))   POCT GLUCOSE   Result Value Ref Range    POCT Glucose 220 (H) 74 - 99 mg/dL   POCT GLUCOSE   Result Value Ref Range    POCT Glucose 268 (H) 74 - 99 mg/dL   CBC and Auto Differential   Result Value Ref Range    WBC 6.0 4.4 - 11.3 x10*3/uL    nRBC 0.0 0.0 - 0.0 /100 WBCs    RBC 3.19 (L) 4.50 - 5.90 x10*6/uL    Hemoglobin 11.2 (L) 13.5 - 17.5 g/dL    Hematocrit 32.7 (L) 41.0 - 52.0 %     (H) 80 - 100 fL "    MCH 35.1 (H) 26.0 - 34.0 pg    MCHC 34.3 32.0 - 36.0 g/dL    RDW 13.4 11.5 - 14.5 %    Platelets 84 (L) 150 - 450 x10*3/uL    Neutrophils % 56.5 40.0 - 80.0 %    Immature Granulocytes %, Automated 0.3 0.0 - 0.9 %    Lymphocytes % 26.4 13.0 - 44.0 %    Monocytes % 10.9 2.0 - 10.0 %    Eosinophils % 5.7 0.0 - 6.0 %    Basophils % 0.2 0.0 - 2.0 %    Neutrophils Absolute 3.39 1.60 - 5.50 x10*3/uL    Immature Granulocytes Absolute, Automated 0.02 0.00 - 0.50 x10*3/uL    Lymphocytes Absolute 1.58 0.80 - 3.00 x10*3/uL    Monocytes Absolute 0.65 0.05 - 0.80 x10*3/uL    Eosinophils Absolute 0.34 0.00 - 0.40 x10*3/uL    Basophils Absolute 0.01 0.00 - 0.10 x10*3/uL   Basic Metabolic Panel   Result Value Ref Range    Glucose 123 (H) 65 - 99 mg/dL    Sodium 135 133 - 145 mmol/L    Potassium 4.5 3.4 - 5.1 mmol/L    Chloride 104 97 - 107 mmol/L    Bicarbonate 21 (L) 24 - 31 mmol/L    Urea Nitrogen 24 8 - 25 mg/dL    Creatinine 1.00 0.40 - 1.60 mg/dL    eGFR 75 >60 mL/min/1.73m*2    Calcium 8.5 8.5 - 10.4 mg/dL    Anion Gap 10 <=19 mmol/L   POCT GLUCOSE   Result Value Ref Range    POCT Glucose 161 (H) 74 - 99 mg/dL   POCT GLUCOSE   Result Value Ref Range    POCT Glucose 238 (H) 74 - 99 mg/dL         Assessment/Plan     Rectal bleeding  Patient had an episode of red/bloody stool this afternoon.  Could be from diverticular bleeding. Patient also is on Plavix  Hgb stable at 11  - Continue to trend Hgb BID (goal > 7), platelets (goal > 50), fibrinogen daily (goal > 100)  - Additional supportive care per primary team  -PPI IV BID   -no urgent need for scope   -supportive care   -will monitor for now     Anticoagulant therapy   On plavix   No contraindications to hold at this time  Cardiology following     Patient evaluated by myself and Dr. Oliveros

## 2023-11-13 NOTE — NURSING NOTE
0732  Assuming pt care.    0740   Transport up to take to mri. In for assist and assmt. Pt confused to situation. Cleaned of incon red mucusy bm. Pt denies pain, cp, sob, abdominal and calf pain. Has nt of the ble. Chronic guidry. Pulses palpable all extrem. Scattered bruising. Will continue to round and monitor.    9343  Pt is turning himself, witnessed. Afib hr 78.

## 2023-11-13 NOTE — PROGRESS NOTES
Luis Greene is a 83 y.o. male on day 0 of admission presenting with Stroke-like episode.    TCC met with patient and daughter at bedside. Introduced self and explained role. Patient lives home with wife. There are 3 steps to enter and no steps within the home. Patient has grab bars and shower chair and a WC and walker. Patient independent with ADLs prior to admission. No reports of smoking or alcohol use.  PCP is Christopher Deamico . [facility] on [address] is pharmacy of choice for medications. Patient has LW and POA. Daughter Ni herrera listed as POA.  Daughter states pt had HC then was discharged, then pt was in the hospital and planned to have HHC again but then came to our ED. Would like to have HC set up upon discharge.     Patient will karina internal referral to home care upon discharge     Tess Ravi RN

## 2023-11-13 NOTE — PROGRESS NOTES
Luis Greene is a 83 y.o. male on day 0 of admission presenting with Stroke-like episode.    Patient observation at this time awating MRI and neuro clearance     Tess Ravi RN

## 2023-11-13 NOTE — CONSULTS
Consults    History Of Present Illness  Luis Greene is a 83 y.o. male with history of CAD, HLD, recent placement of Watchman device on 11/10 without complication.  After placement of Watchman device he was discharged while out to lunch after the procedure his daughter noted right facial droop and difficulty with speech as well as some mild confusion.  Patient is alert and oriented to all tells me he is back to baseline today his wife is at bedside and agrees however notes that over the last 3 years he has been evaluated for similar episodes that last anywhere from 20 to 40 minutes.  He seemingly has some transient weakness and confusion lasting less than an hour then returns to baseline.   MRI brain without acute findings.  CTA without significant stenosis  There is concern for antiplatelets given intermittent GI bleed over the last four months will defer to GI for this.  Past Medical History  Past Medical History:   Diagnosis Date    Coronary artery disease     Hyperlipidemia     Personal history of other diseases of the circulatory system     History of cardiac disorder    Personal history of other endocrine, nutritional and metabolic disease     History of hypercholesterolemia     Surgical History  Past Surgical History:   Procedure Laterality Date    CARDIAC CATHETERIZATION N/A 11/10/2023    Procedure: LAAO (Left Atrial Appendage Occlusion);  Surgeon: Louie Avitia MD;  Location: [place] [address] Cardiac Cath Lab;  Service: Cardiovascular;  Laterality: N/A;  Last Eliquis 11/06/SD /[employer]    MR HEAD ANGIO WO IV CONTRAST  2/17/2019    MR HEAD ANGIO WO IV CONTRAST [facility] EMERGENCY LEGACY    MR HEAD ANGIO WO IV CONTRAST  10/28/2022    MR HEAD ANGIO WO IV CONTRAST [facility]    MR HEAD ANGIO WO IV CONTRAST  8/15/2023    MR HEAD ANGIO WO IV CONTRAST [facility] INPATIENT LEGACY    OTHER SURGICAL HISTORY  03/11/2019    Heart surgery    OTHER SURGICAL HISTORY  03/11/2019    Knee surgery    OTHER  SURGICAL HISTORY  03/11/2019    Tonsillectomy     Social History  Social History     Tobacco Use    Smoking status: Never    Smokeless tobacco: Never   Vaping Use    Vaping Use: Never used   Substance Use Topics    Alcohol use: Never    Drug use: Never     Allergies  Famotidine, Prednisone, Amoxicillin, Donepezil, Gabapentin, Hydrochlorothiazide, Ibuprofen, Oxycodone-acetaminophen, Tramadol hcl, and Oxycodone hcl  Medications Prior to Admission   Medication Sig Dispense Refill Last Dose    amLODIPine (Norvasc) 5 mg tablet Take 1 tablet (5 mg) by mouth once daily.   11/10/2023 at 0800    aspirin 81 mg EC tablet Take 2 tablets (162 mg) by mouth once daily.   11/10/2023 at 0800    atorvastatin (Lipitor) 40 mg tablet Take 1 tablet (40 mg) by mouth once daily.   11/9/2023 at 2200    clopidogrel (Plavix) 75 mg tablet Take 1 tablet (75 mg) by mouth once daily. 90 tablet 1     ferrous sulfate 325 (65 Fe) MG EC tablet Take 1 tablet (325 mg) by mouth once daily.   11/10/2023 at 0800    finasteride (Proscar) 5 mg tablet Take 1 tablet (5 mg) by mouth once daily. 30 tablet 5 11/10/2023 at 0800    folic acid-vitamin B complex-vitamin C-selenium-zinc (Dialyvite) 3-70-15 mg-mcg-mg tablet Take 1 tablet by mouth once daily.   11/10/2023 at 0800    furosemide (Lasix) 40 mg tablet Take 1 tablet (40 mg) by mouth once daily. 30 tablet 11 11/10/2023 at 0800    gabapentin (Neurontin) 100 mg capsule TAKE 1 CAPSULE BY MOUTH ONCE DAILY FOR NEUROPATHY PAIN 90 capsule 0 11/9/2023 at 2200    insulin glargine-lixisenatide (Soliqua 100/33) 100 unit-33 mcg/mL insulin pen Inject 15 Units under the skin once daily at bedtime. 15 mL 1 11/9/2023 at 2200    lisinopril 20 mg tablet Take 1 tablet by mouth once daily 90 tablet 0 11/10/2023 at 2200    metFORMIN (Glucophage) 500 mg tablet Take 2 tablets (1,000 mg) by mouth 2 times a day.   11/9/2023 at 2200    metoprolol succinate XL (Toprol-XL) 50 mg 24 hr tablet Take 1 tablet (50 mg) by mouth once daily.    "11/9/2023 at 0800    omeprazole (PriLOSEC) 40 mg DR capsule Take 1 capsule (40 mg) by mouth once daily.   11/10/2023 at 0800    tamsulosin (Flomax) 0.4 mg 24 hr capsule Take 1 capsule (0.4 mg) by mouth 2 times a day. 180 capsule 1 11/10/2023 at 0800    traMADol (Ultram) 50 mg tablet 1 tablet (50 mg) 2 times a day as needed.   11/9/2023 at 2200       Review of Systems   Constitutional:  Positive for activity change.   Neurological:  Positive for facial asymmetry and speech difficulty.   Psychiatric/Behavioral:  Positive for confusion.      Neurological Exam  Mental Status  Alert.    Neurologic exam:    Awake alert oriented to all, no dysarthria, no aphasia  Naming repetition intact, speech is fluent  PERRL, EOMI without ptosis or nystagmus, visual fields intact, face symmetrical, tongue midline  Strength in upper and lower extremities is 5/5  Sensation is intact  FTN intact BLAS intact  Heel-to-shin without ataxia  Reflexes  2 throughout,  Toes downgoing bilaterally  Gait not assessed at this time    Physical Exam  Cardiovascular:      Rate and Rhythm: Normal rate.   Pulmonary:      Effort: Pulmonary effort is normal.   Skin:     General: Skin is warm and dry.   Neurological:      Mental Status: He is alert.         Last Recorded Vitals  Blood pressure 109/52, pulse 67, temperature 36.1 °C (97 °F), temperature source Temporal, resp. rate 18, height 1.803 m (5' 11\"), weight 78.5 kg (173 lb), SpO2 100 %.    Relevant Results  Results for orders placed or performed during the hospital encounter of 11/10/23 (from the past 24 hour(s))   POCT GLUCOSE   Result Value Ref Range    POCT Glucose 220 (H) 74 - 99 mg/dL   POCT GLUCOSE   Result Value Ref Range    POCT Glucose 268 (H) 74 - 99 mg/dL   CBC and Auto Differential   Result Value Ref Range    WBC 6.0 4.4 - 11.3 x10*3/uL    nRBC 0.0 0.0 - 0.0 /100 WBCs    RBC 3.19 (L) 4.50 - 5.90 x10*6/uL    Hemoglobin 11.2 (L) 13.5 - 17.5 g/dL    Hematocrit 32.7 (L) 41.0 - 52.0 %     " (H) 80 - 100 fL    MCH 35.1 (H) 26.0 - 34.0 pg    MCHC 34.3 32.0 - 36.0 g/dL    RDW 13.4 11.5 - 14.5 %    Platelets 84 (L) 150 - 450 x10*3/uL    Neutrophils % 56.5 40.0 - 80.0 %    Immature Granulocytes %, Automated 0.3 0.0 - 0.9 %    Lymphocytes % 26.4 13.0 - 44.0 %    Monocytes % 10.9 2.0 - 10.0 %    Eosinophils % 5.7 0.0 - 6.0 %    Basophils % 0.2 0.0 - 2.0 %    Neutrophils Absolute 3.39 1.60 - 5.50 x10*3/uL    Immature Granulocytes Absolute, Automated 0.02 0.00 - 0.50 x10*3/uL    Lymphocytes Absolute 1.58 0.80 - 3.00 x10*3/uL    Monocytes Absolute 0.65 0.05 - 0.80 x10*3/uL    Eosinophils Absolute 0.34 0.00 - 0.40 x10*3/uL    Basophils Absolute 0.01 0.00 - 0.10 x10*3/uL   Basic Metabolic Panel   Result Value Ref Range    Glucose 123 (H) 65 - 99 mg/dL    Sodium 135 133 - 145 mmol/L    Potassium 4.5 3.4 - 5.1 mmol/L    Chloride 104 97 - 107 mmol/L    Bicarbonate 21 (L) 24 - 31 mmol/L    Urea Nitrogen 24 8 - 25 mg/dL    Creatinine 1.00 0.40 - 1.60 mg/dL    eGFR 75 >60 mL/min/1.73m*2    Calcium 8.5 8.5 - 10.4 mg/dL    Anion Gap 10 <=19 mmol/L   POCT GLUCOSE   Result Value Ref Range    POCT Glucose 161 (H) 74 - 99 mg/dL   POCT GLUCOSE   Result Value Ref Range    POCT Glucose 238 (H) 74 - 99 mg/dL     Current Facility-Administered Medications:     acetaminophen (Tylenol) tablet 650 mg, 650 mg, oral, q6h PRN, Pete Oneill MD, 650 mg at 11/12/23 1643    aspirin EC tablet 162 mg, 162 mg, oral, Daily, Pete Oneill MD, 162 mg at 11/12/23 0943    atorvastatin (Lipitor) tablet 40 mg, 40 mg, oral, Nightly, Pete Oneill MD, 40 mg at 11/12/23 2107    clopidogrel (Plavix) tablet 75 mg, 75 mg, oral, Daily, Pete Oneill MD, 75 mg at 11/12/23 0943    dextrose 10 % in water (D10W) infusion, 0.3 g/kg/hr, intravenous, Once PRN, Pete Oneill MD    dextrose 50 % injection 25 g, 25 g, intravenous, q15 min PRN, Pete Oneill MD    docusate sodium (Colace) capsule 100 mg, 100 mg, oral, BID, Alma Rosa M  Navin, APRN-CNP, 100 mg at 11/12/23 2106    ferrous sulfate (325 mg ferrous sulfate) tablet 325 mg, 325 mg, oral, Daily, Pete Oneill MD, 325 mg at 11/13/23 0916    finasteride (Proscar) tablet 5 mg, 5 mg, oral, Daily, Pete Oneill MD, 5 mg at 11/13/23 0916    gabapentin (Neurontin) capsule 200 mg, 200 mg, oral, BID, Pete Oneill MD, 200 mg at 11/13/23 0915    glucagon (Glucagen) injection 1 mg, 1 mg, intramuscular, q15 min PRN, Pete Oneill MD    insulin glargine (Lantus) injection 15 Units, 15 Units, subcutaneous, Nightly, Pete Oneill MD, 15 Units at 11/12/23 2105    insulin lispro (HumaLOG) injection 0-15 Units, 0-15 Units, subcutaneous, TID with meals, Pete Oneill MD, 6 Units at 11/13/23 1144    lisinopril tablet 20 mg, 20 mg, oral, Daily, Pete Oneill MD, 20 mg at 11/12/23 0942    metFORMIN (Glucophage) tablet 1,000 mg, 1,000 mg, oral, BID, Pete Oneill MD, 1,000 mg at 11/13/23 0916    ondansetron ODT (Zofran-ODT) disintegrating tablet 4 mg, 4 mg, oral, q8h PRN **OR** ondansetron (Zofran) injection 4 mg, 4 mg, intravenous, q8h PRN, SUSAN Orr    pantoprazole (ProtoNix) injection 40 mg, 40 mg, intravenous, BID, SUSAN Man    perflutren lipid microspheres (Definity) injection 0.5-10 mL of dilution, 0.5-10 mL of dilution, intravenous, Once in imaging, SUSAN Orr    perflutren lipid microspheres (Definity) injection 0.5-10 mL of dilution, 0.5-10 mL of dilution, intravenous, Once in imaging, SUSAN Orr    perflutren protein A microsphere (Optison) injection 0.5 mL, 0.5 mL, intravenous, Once in imaging, Alma Rosa Holden APRN-CNP    perflutren protein A microsphere (Optison) injection 0.5 mL, 0.5 mL, intravenous, Once in imaging, JONA Orr-CNP    sulfur hexafluoride microsphr (Lumason) injection 24.28 mg, 2 mL, intravenous, Once in imaging, JONA rOr-CNP    sulfur hexafluoride microsphr (Lumason)  injection 24.28 mg, 2 mL, intravenous, Once in imaging, Alma Rosa Holden, APRN-CNP    tamsulosin (Flomax) 24 hr capsule 0.4 mg, 0.4 mg, oral, BID, Pete Oneill MD, 0.4 mg at 11/13/23 0915      NIH Stroke Scale  1A. Level of Consciousness: Alert, Keenly Responsive  1B. Ask Month and Age: Both Questions Right  1C. Blink Eyes & Squeeze Hands: Performs Both Tasks  2. Best Gaze: Normal  3. Visual: No Visual Loss  4. Facial Palsy: Normal Symmetrical Movements  5A. Motor - Left Arm: No Drift  5B. Motor - Right Arm: No Drift  6A. Motor - Left Leg: No Drift  6B. Motor - Right Leg: No Drift  7. Limb Ataxia: Absent  8. Sensory Loss: Normal  9. Best Language: No Aphasia  10. Dysarthria: Normal  11. Extinction and Inattention: No Abnormality  NIH Stroke Scale: 0           Tyrone Coma Scale  Best Eye Response: Spontaneous  Best Verbal Response: Confused  Best Motor Response: Follows commands  Tyrone Coma Scale Score: 14                 I have personally reviewed the following imaging results MR brain wo IV contrast    Result Date: 11/13/2023  Interpreted By:  Galdino Monet, STUDY: MR BRAIN WO IV CONTRAST; 11/13/2023 8:20 am   INDICATION: Signs/Symptoms:stroke-like sympt's.   COMPARISON: None.   ACCESSION NUMBER(S): [id number]   ORDERING CLINICIAN: SAMUEL GAITAN   TECHNIQUE: Multiecho multiplanar imaging of the brain without intravenous contrast.   FINDINGS: Extra-axial spaces and ventricular system: Prominent, reflecting atrophy Cerebral parenchyma: No restricted diffusion. No sign of intracranial hemorrhage. Small patchy periventricular hyperintensity and small hyperintensity in inferior left temporal lobe Midline shift: No mass effect or midline shift. Cerebellum: Normal. Brainstem: Normal. Pituitary gland: Normal.   Visualized paranasal sinuses: Small retention cyst left maxillary sinus. Visualized orbits: Sign of cataract surgery bilaterally. Visualized upper cervical spine: Normal.       No acute finding.    Signed by: Galdino Monet 11/13/2023 10:32 AM Dictation workstation:   [id number]    CT angio brain attack head w IV contrast and post procedure    Result Date: 11/10/2023  Interpreted By:  Martha Coleman, STUDY: CT ANGIO BRAIN ATTACK HEAD W IV CONTRAST AND POST PROCEDURE; CT ANGIO BRAIN ATTACK NECK W IV CONTRAST AND POST PROCEDURE; 11/10/2023 5:13 pm   INDICATION: Signs/Symptoms:Stroke Evaluation with VAN Positive;   COMPARISON: CT head same day /   ACCESSION NUMBER(S): [id number]; [id number]   ORDERING CLINICIAN: JUDY CHACON   TECHNIQUE: CT arteriogram of the head and neck with 75 ml of Omnipaque 350 was injected intravenously. 3D MIP images were created, processed, and interpreted on an independent workstation.   FINDINGS: CTA HEAD:   Basilar: Patent without aneurysm, dissection or thrombus. PCOM: Patent without aneurysm, dissection or thrombus. ACOM: Patent without aneurysm, dissection or thrombus. ICA: Patent without aneurysm, dissection or thrombus.   LEFT:   MCA: Patent without aneurysm, dissection or thrombus. YOVANY: Patent without aneurysm, dissection or thrombus. PCA: Patent without aneurysm, dissection or thrombus. ICA: Patent without aneurysm, dissection or thrombus. VERT: Patent without aneurysm, dissection or thrombus.   RIGHT:   MCA: Patent without aneurysm, dissection or thrombus. YOVANY: Patent without aneurysm, dissection or thrombus. PCA: Patent without aneurysm, dissection or thrombus. ICA: Patent without aneurysm, dissection or thrombus. VERT: Patent without aneurysm, dissection or thrombus.     CTA NECK:     The estimate of the degree of stenosis included in this report is based on the NASCET CRITERIA for calculating stenosis by using the internal carotid artery distal to the stenosis as the reference point. Normal is no stenosis. Mild is less than 50% stenosis. Moderate is 50-69% stenosis. Severe is 70-99% stenosis. Total occlusion is no detectable patent lumen.   LEFT:   CCA: Patent without  aneurysm, dissection or thrombus. Atherosclerotic plaque at the carotid bulb which contributes to less than 50% narrowing. ICA: Patent without aneurysm, dissection or thrombus. ECA: Patent without aneurysm, dissection or thrombus. VERT: Patent without aneurysm, dissection or thrombus.     RIGHT:   CCA: Patent without aneurysm, dissection or thrombus. Atherosclerotic plaque at the carotid bulb extending into the proximal right ICA and contributing to approximately 50% narrowing. ICA: Patent without aneurysm, dissection or thrombus. ECA: Patent without aneurysm, dissection or thrombus. VERT: Patent without aneurysm, dissection or thrombus.   AORTIC ARCH AND BRANCHES: Patent without aneurysm, dissection or thrombus.   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           1. No large vessel occlusion or high-grade stenosis. 2. Atherosclerotic plaques in the bilateral carotid bulbs contributing to mild (less than 50%) stenosis. Atherosclerotic plaque in the right proximal ICA contributing to approximately 50% narrowing.   Signed by: Martha Coleman 11/10/2023 6:19 PM Dictation workstation:   [id number]    CT angio brain attack neck w IV contrast and post procedure    Result Date: 11/10/2023  Interpreted By:  Martha Coleman, STUDY: CT ANGIO BRAIN ATTACK HEAD W IV CONTRAST AND POST PROCEDURE; CT ANGIO BRAIN ATTACK NECK W IV CONTRAST AND POST PROCEDURE; 11/10/2023 5:13 pm   INDICATION: Signs/Symptoms:Stroke Evaluation with VAN Positive;   COMPARISON: CT head same day /   ACCESSION NUMBER(S): [id number]; [id number]   ORDERING CLINICIAN: JUDY CHACON   TECHNIQUE: CT arteriogram of the head and neck with 75 ml of Omnipaque 350 was injected intravenously. 3D MIP images were created, processed, and interpreted on an independent workstation.   FINDINGS: CTA HEAD:   Basilar: Patent without aneurysm, dissection or thrombus. PCOM: Patent without aneurysm, dissection or thrombus. ACOM: Patent without  aneurysm, dissection or thrombus. ICA: Patent without aneurysm, dissection or thrombus.   LEFT:   MCA: Patent without aneurysm, dissection or thrombus. YOVANY: Patent without aneurysm, dissection or thrombus. PCA: Patent without aneurysm, dissection or thrombus. ICA: Patent without aneurysm, dissection or thrombus. VERT: Patent without aneurysm, dissection or thrombus.   RIGHT:   MCA: Patent without aneurysm, dissection or thrombus. YOVANY: Patent without aneurysm, dissection or thrombus. PCA: Patent without aneurysm, dissection or thrombus. ICA: Patent without aneurysm, dissection or thrombus. VERT: Patent without aneurysm, dissection or thrombus.     CTA NECK:     The estimate of the degree of stenosis included in this report is based on the NASCET CRITERIA for calculating stenosis by using the internal carotid artery distal to the stenosis as the reference point. Normal is no stenosis. Mild is less than 50% stenosis. Moderate is 50-69% stenosis. Severe is 70-99% stenosis. Total occlusion is no detectable patent lumen.   LEFT:   CCA: Patent without aneurysm, dissection or thrombus. Atherosclerotic plaque at the carotid bulb which contributes to less than 50% narrowing. ICA: Patent without aneurysm, dissection or thrombus. ECA: Patent without aneurysm, dissection or thrombus. VERT: Patent without aneurysm, dissection or thrombus.     RIGHT:   CCA: Patent without aneurysm, dissection or thrombus. Atherosclerotic plaque at the carotid bulb extending into the proximal right ICA and contributing to approximately 50% narrowing. ICA: Patent without aneurysm, dissection or thrombus. ECA: Patent without aneurysm, dissection or thrombus. VERT: Patent without aneurysm, dissection or thrombus.   AORTIC ARCH AND BRANCHES: Patent without aneurysm, dissection or thrombus.   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           1. No large vessel occlusion or high-grade stenosis. 2. Atherosclerotic plaques in  the bilateral carotid bulbs contributing to mild (less than 50%) stenosis. Atherosclerotic plaque in the right proximal ICA contributing to approximately 50% narrowing.   Signed by: Martha Coleman 11/10/2023 6:19 PM Dictation workstation:   [id number]    XR chest 1 view    Result Date: 11/10/2023  Interpreted By:  Martha Coleman, STUDY: XR CHEST 1 VIEW; 11/10/2023 4:42 pm   INDICATION: Signs/Symptoms:brain attack/cough   COMPARISON: 06/20/2023   ACCESSION NUMBER(S): [id number]   ORDERING CLINICIAN: JUDY CHACON   TECHNIQUE: Frontal  chest radiographs.   FINDINGS: The cardiomediastinal silhouette is unremarkable. The lungs are clear. No pleural effusion is identified.   The osseous structures are intact.       No acute cardiopulmonary process.       Signed by: Martha Coleman 11/10/2023 4:46 PM Dictation workstation:   [id number]    CT brain attack head wo IV contrast    Result Date: 11/10/2023  Interpreted By:  Martha Coleman, STUDY: CT BRAIN ATTACK HEAD WO IV CONTRAST;  11/10/2023 4:38 pm   INDICATION: Signs/Symptoms:Stroke Evaluation.   COMPARISON: [id number]   ACCESSION NUMBER(S): [id number]   ORDERING CLINICIAN: JUDY CHACON   TECHNIQUE: CT axial images through the Brain were obtained without contrast. All CT examinations are performed with 1 or more of the following dose reduction techniques: Automated exposure control, adjustment of mA and/or kv according to patient's size, or use of iterative reconstruction techniques.   FINDINGS: There is no mass effect, hemorrhage, or infarct. The ventricles appear normal. Gray-white differentiation is maintained. Patchy areas of hypodense attenuation are seen in the subcortical and periventricular white matter; compatible with small vessel ischemic disease. The visualized paranasal sinuses appear clear.       No acute intracranial abnormality.   MACRO: Martha Coleman discussed the significance and urgency of this critical finding by telephone with  JUDY CHACON on  11/10/2023 at 4:45 pm.  (**-RCF-**) Findings:  See findings.     Signed by: Martha Coleman 11/10/2023 4:45 PM Dictation workstation:   [id number]    Transthoracic Echo (TTE) Limited    Result Date: 11/10/2023   [facility], [address] [place] [address]                Tel [phone] and Fax [phone] TRANSTHORACIC ECHOCARDIOGRAM REPORT  Patient Name:      ASHKAN Schmidt Physician:    [id number] Kamar Doyle MD Study Date:        11/10/2023           Ordering Provider:    [id number] DAWNA AUGUSTIN MRN/PID:           [id number]             Fellow:               [id number] Yobany Strong MD Accession#:        [id number]         Nurse: Date of Birth/Age: 1940 / 83 years Sonographer:          Bruna Merlos RDCS Gender:            M                    Additional Staff: Height:            180.34 cm            Admit Date:           11/10/2023 Weight:            78.47 kg             Admission Status:     Inpatient - Time                                                               Critical BSA:               1.98 m2              Encounter#:           [id number]                                         Department Location:  [facility]                                                               Cath Lab Blood Pressure: 129 /65 mmHg Study Type:    TRANSTHORACIC ECHO (TTE) LIMITED Diagnosis/ICD: Presence of other cardiac implants and grafts-Z95.818 Indication:    Post-Watchman Procedure CPT Code:      Echo Limited-73930 Patient History: Pertinent History: S/p LAAO. Study Detail: The following Echo studies were performed: 2D. Technically               challenging study due to patient lying in supine position.  PHYSICIAN INTERPRETATION: Left Ventricle: The left ventricular systolic function is normal, with an estimated ejection fraction of 60-65%. The left ventricular  cavity size is normal. There is left ventricular concentric remodeling. Abnormal (paradoxical) septal motion consistent with post-operative status. Left ventricular diastolic filling was not assessed. Left Atrium: The left atrium is enlarged. Right Ventricle: The right ventricle was not well visualized. Unable to determine right ventricular systolic function. Right Atrium: The right atrium is moderately dilated. Aortic Valve: The aortic valve appears normal. Aortic valve regurgitation was not assessed. Mitral Valve: The mitral valve is normal in structure. Mitral valve regurgitation was not assessed. Tricuspid Valve: The tricuspid valve is structurally normal. Tricuspid regurgitation was not assessed. Pulmonic Valve: The pulmonic valve is not well visualized. Pulmonic valve regurgitation was not assessed. Pericardium: There is no pericardial effusion noted. Aorta: The aortic root is normal. Systemic Veins: The inferior vena cava was not well visualized. In comparison to the previous echocardiogram(s): Compared with study from 11/10/2023, no significant change. Note that this was a limited study. Compared to prior full echo from 8/16/23, there is no significant change from available data.  CONCLUSIONS:  1. Left ventricular systolic function is normal with a 60-65% estimated ejection fraction.  2. Abnormal septal motion consistent with post-operative status.  3. The left atrium is enlarged.  4. The right atrium is moderately dilated. QUANTITATIVE DATA SUMMARY: 2D MEASUREMENTS:                           Normal Ranges: IVSd:          1.27 cm    (0.6-1.1cm) LVPWd:         1.25 cm    (0.6-1.1cm) LVIDd:         4.36 cm    (3.9-5.9cm) LV Mass Index: 102.2 g/m2 RA VOLUME BY A/L METHOD:                       Normal Ranges: RA Area A4C: 22.0 cm2  [id number] Kamar Doyle MD Electronically signed on 11/10/2023 at 3:02:52 PM  ** Final **     Transthoracic Echo (TTE) Limited    Result Date: 11/10/2023   [facility],  [address] [place] [address]                Tel [phone] and Fax [phone] TRANSTHORACIC ECHOCARDIOGRAM REPORT  Patient Name:     ASHKAN OLIVEROS      Reading Physician:  [id number] Varghese Robles MD Study Date:       11/10/2023          Ordering Provider:  [id number] DAWNA AUGUSTIN MRN/PID:          [id number]            Fellow: Accession#:       [id number]        Nurse: Date of           1940 / 83      Sonographer:        Bruna Merlos RDCS Birth/Age:        years Gender:           M                   Additional Staff: Height:           180.00 cm           Admit Date:         11/10/2023 Weight:           79.00 kg            Admission Status:   Inpatient - Routine BSA:              1.99 m2             Encounter#:         [id number]                                       [facility]                                       Location:           Lab Blood Pressure: 149 /69 mmHg Study Type:    TRANSTHORACIC ECHO (TTE) LIMITED Diagnosis/ICD: Encounter for preprocedural cardiovascular examination-Z01.810 Indication:    Pre-Watchman Procedure CPT Code:      Echo Limited-74600 Patient History: Pertinent History: A-Fib, Chest Pain, HTN, Hyperlipidemia, LE Edema and                    Palpitations. ALEJANDRINA, DMII, CAD s/p CABG. Study Detail: The following Echo studies were performed: 2D. Technically               challenging study due to patient lying in supine position.  PHYSICIAN INTERPRETATION: Left Ventricle: The left ventricular systolic function is normal, with an estimated ejection fraction of 60-65%. There are no regional wall motion abnormalities. The left ventricular cavity size is normal. Left ventricular diastolic filling was not assessed. Left Atrium: The left atrium is enlarged. Right Ventricle: The right ventricle is normal in size. There is normal right ventricular global systolic function. Right Atrium: The right atrium is enlarged. Aortic Valve: The aortic valve is probably  trileaflet. Aortic valve regurgitation was not assessed. Mitral Valve: The mitral valve was not well visualized. Mitral valve regurgitation was not assessed. Tricuspid Valve: The tricuspid valve was not well visualized. Tricuspid regurgitation was not assessed. The right ventricular systolic pressure is unable to be estimated. Pulmonic Valve: The pulmonic valve was not assessed. Pulmonic valve regurgitation was not assessed. Pericardium: There is a trivial pericardial effusion. Aorta: The aortic root is normal.  CONCLUSIONS:  1. Left ventricular systolic function is normal with a 60-65% estimated ejection fraction.  2. The left atrium is enlarged.  3. There is a trivial pericardial effusion. QUANTITATIVE DATA SUMMARY:  [id number] Varghese Robles MD Electronically signed on 11/10/2023 at 2:16:20 PM  ** Final **     CT watchman full contrast    Result Date: 11/10/2023  Interpreted By:  Russ Avila, STUDY: CT WATCHMAN FULL CONTRAST;  11/10/2023 8:28 am   INDICATION: Signs/Symptoms:Preprocedural RICHARDSON sizing and thrombus detection.   COMPARISON: None.   ACCESSION NUMBER(S): [id number]   ORDERING CLINICIAN: JAMIL RAMIREZ   TECHNIQUE: Using multi detector CT technology,axial imaging with prospective gating was performed of the chest following the intravenous administration of contrast material.  A low-osmolar contrast agent was used (70 mL Omnipaque 350). Next, the imaging was repeated with prospective gating after 10 sec delay as per watchman protocol. Delete   For optimization of anatomic evaluation, multiplanar reconstruction, maximum intensity projections, and advanced 3-D off-line postprocessing were performed on a dedicated stand-alone workstation under the direct supervision of the interpreting physician.   CT Dose-Length Product (DLP):  1943 mGy*cm CT Dose Reduction Employed: Yes (Prospective triggering, iterative reconstruction)   FINDINGS: POTENTIAL STUDY LIMITATIONS:  Motion artifact limits accurate assessment of the  left atrial appendage.   CORONARY ARTERIES:   CORONARY ANATOMY: There is normal origin of the coronary arteries. Right dominant system. Severe coronary artery calcifications are seen. Please note,the study is not optimized for evaluation of coronary arteries.Status post coronary artery bypass grafting (CABG) with bypass grafts not optimally evaluated on present study.   CARDIAC CHAMBERS: The cardiac chambers demonstrate normal atrioventricular and ventriculoarterial concordance, and systemic and pulmonary venous return.   LEFT ATRIUM: Dilated (34.2-cm2). There is no evidence of left atrium or left atrial appendage thrombus. The left atrial appendage orifice is round in shape, measuring 1.9 x 1.8 cm in orthogonal dimensions and with an area of  2.8 cm. sq. Left atrial appendage depth is 2.2 cm.   RIGHT ATRIUM: Dilated (28.4-cm2)   VENTRICLES: The left and right ventricles appear normal in appearance, although accurate size measurements are not possible on systolic phase imaging.   INTERATRIAL SEPTUM: Intact.   INTERVENTRICULAR SEPTUM: Intact.   AORTIC VALVE: The aortic valve is trileaflet in morphology. Mild calcifications.   MITRAL VALVE: No valvular thickening/calcification.   THORACIC AORTA: The thoracic aorta normal in course and caliber.There are mild scattered atherosclerosis present, including calcified and noncalcified plaques.There is no evidence for acute aortic pathology, such as dissection, intramural hematoma, or contained rupture. The aortic arch is not included on this examination.   PERICARDIUM: There is no pericardial effusion seen.   CHEST: The trachea and central airways are patent. No endobronchial lesion is seen. There is mild diffuse bronchial wall thickening, which is a non-specific finding. The bilateral lungs are clear without evidence of focal consolidation, pleural effusion, or pneumothorax. Minimal emphysematous changes. No evidence of lymphadenopathy within included mediastinum.  Visualized esophagus appears within normal limits as seen.   UPPER ABDOMEN: The visualized subdiaphragmatic structures demonstrate no remarkable findings.     CHEST WALL AND OSSEOUS STRUCTURES: Status post median sternotomy. No acute osseous pathology.There are no suspicious osseous lesions within included chest.Multilevel degenerative changes of the spine. Osteopenia.       1. No evidence of left atrial/left atrial appendage thrombus. 2. Limited evaluation secondary to motion artifact which may affect the accuracy of left atrial appendage measurements. The left atrial appendage orifice is round in shape, measuring 1.9 x 1.8 cm in orthogonal dimensions and with an area of 2.8 cm. sq. Left atrial appendage depth is 2.2 cm. 3. Severe coronary artery calcifications are seen in the setting of prior bypass grafting. Please note,the study is not optimized for evaluation of coronary arteries/grafts. 4. Biatrial enlargement. 5. Mild aortic valve calcifications.   MACRO: None   Signed by: Russ Avila 11/10/2023 9:44 AM Dictation workstation:   [id number]    Assessment/Plan   Principal Problem:    Stroke-like episode    83-year-old male with history of CAD, HLD presented to the emergency department on 11/10 after having concerns for dysarthria and facial droop noted by his daughter while they were out to lunch after having a successful watchman placed without complications.  Patient has had these transient episodes of the last 3 years with intermittent confusion as well.  MRI of the brain without acute findings.  CT angio of the head and neck without significant stenosis.  Given that he has had multiple episodes similar to this with negative MR imaging these are not likely stroke related events,  Will order EEG and follow-up tomorrow.  Consider low-dose antiseizure medications in the future.  Continue to monitor.    Lanette Frederick, APRN-CNP

## 2023-11-13 NOTE — PROGRESS NOTES
Occupational Therapy    Evaluation    Patient Name: Luis Greene  MRN: [id number]  : 1940  Today's Date: 23  Time Calculation  Start Time: 837  Stop Time: 855  Time Calculation (min): 18 min       Assessment:  OT Assessment: Pt would benefit from acute OT services  Prognosis: Good  End of Session Communication: Bedside nurse  End of Session Patient Position: Up in chair, Alarm off, not on at start of session (daughter present, breakfast set up, all needs in reach)  OT Assessment Results: Decreased ADL status, Decreased upper extremity strength, Decreased endurance, Decreased functional mobility  Prognosis: Good  Strengths: Support of extended family/friends  Plan:  Treatment Interventions: ADL retraining, Functional transfer training, UE strengthening/ROM, Patient/family training, Equipment evaluation/education  OT Frequency: 4 times per week  OT Discharge Recommendations: Moderate intensity level of continued care  OT - OK to Discharge: Yes  Treatment Interventions: ADL retraining, Functional transfer training, UE strengthening/ROM, Patient/family training, Equipment evaluation/education    Subjective     General:   OT Received On: 23  General  Reason for Referral: Impaired ADLs; pt is an 83 year-old M admitted from home with c/o R-sided weakness and slured speech. CT negative for acute findings, pt awaiting MRI to r/o CVA. Pt s/p Watchman procedure 11/10 and d/c home when he developed above symptoms.  Past Medical History Relevant to Rehab: CAD, HLD, watchman, hyperchol, DM, Afib, HTN, dementia, GERD, BPH  Family/Caregiver Present: Yes (daughter present)  Co-Treatment: PT  Prior to Session Communication: Bedside nurse  Patient Position Received: Bed, 2 rail up, Alarm off, not on at start of session  General Comment: Cleared by nursing. Pt pleasant and agreeable to therapy  Precautions:  Hearing/Visual Limitations: reading glasses  Medical Precautions: Fall precautions  Vital Signs:      Pain:  Pain Assessment  Pain Assessment: 0-10  Pain Score: 0 - No pain    Objective   Cognition:  Overall Cognitive Status: Impaired at baseline  Orientation Level: Disoriented to situation           Home Living:  Type of Home: House  Lives With: Spouse  Home Adaptive Equipment: Walker rolling or standard  Home Layout: One level  Home Access: Stairs to enter with rails  Entrance Stairs-Rails: Both  Entrance Stairs-Number of Steps: 4  Bathroom Shower/Tub: Walk-in shower  Bathroom Toilet: Standard  Bathroom Equipment: Grab bars in shower, Shower chair with back  Prior Function:  Level of [place]: Independent with ADLs and functional transfers, Independent with homemaking with ambulation  Receives Help From: Family  ADL Assistance: Independent (sits during bathing)  Homemaking Assistance: Independent  Ambulatory Assistance: Independent (mod I with rollator)  Hand Dominance: Right       ADL:  Eating Assistance: Independent  Grooming Assistance:  (set up)  Bathing Assistance: Moderate  UE Dressing Assistance: Minimal  LE Dressing Assistance: Moderate  Toileting Assistance with Device: Minimal     Bed Mobility/Transfers: Bed Mobility  Bed Mobility:  (close supervision supine>seated EOB with HOB elevated and use of bed rail, increased time and effort to perform)    Transfers  Transfer:  (min A for balance sit>stand bed level; min A for controlled descent stand>sit chair level; VCs for safe hand and leg placement)    Vision:Vision - Basic Assessment  Current Vision: Wears glasses only for reading  Sensation:  Sensation Comment: pt denied numbness/paresthesia BUE  Strength:  Strength Comments: BUE grossly 3+/5    Hand Function:  Hand Function  Gross Grasp: Functional  Coordination: Functional  Extremities: RUE   RUE : Within Functional Limits and LUE   LUE: Within Functional Limits    Outcome Measures: [facility] Daily Activity  Putting on and taking off regular lower body clothing: A lot  Bathing (including washing,  rinsing, drying): A lot  Putting on and taking off regular upper body clothing: A little  Toileting, which includes using toilet, bedpan or urinal: A little  Taking care of personal grooming such as brushing teeth: A little  Eating Meals: None  Daily Activity - Total Score: 17    Education Documentation  ADL Training, taught by Malissa Zavala OT at 11/13/2023 11:25 AM.  Learner: Family, Patient  Readiness: Acceptance  Method: Explanation, Demonstration  Response: Needs Reinforcement    Education Comments  No comments found.      Goals:  Encounter Problems       Encounter Problems (Active)       ADLs       Pt will perform ADL tasks at mod I with use of AE prn (Progressing)       Start:  11/13/23    Expected End:  12/04/23               Mobility       Pt will perform functional mobility household distances at mod I with use of RW.    (Progressing)       Start:  11/13/23    Expected End:  12/04/23               Transfers       Pt will perform functional transfers at mod I with use of DME prn.   (Progressing)       Start:  11/13/23    Expected End:  12/04/23            Pt will perform BUE exercises to improve strength and independence with functional transfers/ADL tasks.   (Progressing)       Start:  11/13/23    Expected End:  12/04/23

## 2023-11-13 NOTE — CARE PLAN
The patient's goals for the shift include maintain safety    The clinical goals for the shift include Remain free of falls for the duration of shift    Over the shift, the patient did make progress toward the following goals.       Problem: Fall/Injury  Goal: Not fall by end of shift  Outcome: Progressing  Goal: Be free from injury by end of the shift  Outcome: Progressing  Goal: Verbalize understanding of personal risk factors for fall in the hospital  Outcome: Progressing  Goal: Verbalize understanding of risk factor reduction measures to prevent injury from fall in the home  Outcome: Progressing  Goal: Use assistive devices by end of the shift  Outcome: Progressing  Goal: Pace activities to prevent fatigue by end of the shift  Outcome: Progressing

## 2023-11-13 NOTE — NURSING NOTE
Assumed care of patient at this time 1226. At bedside report, patient had no c/o pain. vss and afebrile. Bed low and locked, call button within reach and bed alarm on. Patient has no needs that require immediate nursing interventions.

## 2023-11-13 NOTE — PROGRESS NOTES
Subjective Data:  The patient remains asymptomatic    Overnight Events:    No significant overnight events.     Objective Data:  Last Recorded Vitals:  Vitals:    11/12/23 1528 11/12/23 1943 11/13/23 0250 11/13/23 0637   BP: 163/70 118/56 (!) 124/44 109/52   BP Location: Left arm Left arm Right arm Right arm   Patient Position: Lying Lying Lying Lying   Pulse: 83 84 65 67   Resp: 26 12 16 18   Temp: 36.7 °C (98.1 °F) 36.4 °C (97.5 °F) 36.4 °C (97.5 °F) 36.1 °C (97 °F)   TempSrc: Temporal Temporal Temporal Temporal   SpO2: 94% 95% 98% 100%   Weight:       Height:           Last Labs:  CBC - 11/13/2023:  5:49 AM  6.0 11.2 84    32.7      CMP - 11/13/2023:  5:49 AM  8.5 5.3 30 --- 1.1   _ 3.5 40 45      PTT - 11/10/2023:  4:59 PM  1.2   12.3 22.2     HGBA1C   Date/Time Value Ref Range Status   09/14/2023 12:14 PM 6.8 % Final     Comment:          Diagnosis of Diabetes-Adults   Non-Diabetic: < or = 5.6%   Increased risk for developing diabetes: 5.7-6.4%   Diagnostic of diabetes: > or = 6.5%  .       Monitoring of Diabetes                Age (y)     Therapeutic Goal (%)   Adults:          >18           <7.0   Pediatrics:    13-18           <7.5                   7-12           <8.0                   0- 6            7.5-8.5   American Diabetes Association. Diabetes Care 33(S1), Jan 2010.   08/14/2023 02:45 PM 8.1 4.0 - 6.0 % Final     Comment:     Hemoglobin A1C levels are related to mean blood glucose during the   preceding 2-3 months. The relationship table below may be used as a   general guide. Each 1% increase in HGB A1C is a reflection of an   increase in mean glucose of approximately 30 mg/dl.   Reference: Diabetes Care, volume 29, supplement 1 Jan. 2006                        HGB A1C ................. Approx. Mean Glucose   _______________________________________________   6%   ...............................  120 mg/dl   7%   ...............................  150 mg/dl   8%   ...............................  180  "mg/dl   9%   ...............................  210 mg/dl   10%  ...............................  240 mg/dl  Performed at [address] [address]     04/19/2023 04:03 PM 9.7 4.0 - 5.6 % Final   02/03/2023 09:12 AM 9.6 4.0 - 5.6 % Final     LDLCALC   Date/Time Value Ref Range Status   08/15/2023 05:50 AM 31 65 - 130 MG/DL Final   10/28/2022 05:39 AM 32 65 - 130 MG/DL Final   07/06/2021 09:50 AM 39 65 - 130 MG/DL Final     VLDL   Date/Time Value Ref Range Status   08/22/2022 10:17 AM 11 0 - 40 mg/dL Final   09/17/2019 01:20 PM 16 0 - 40 mg/dL Final      Last I/O:  I/O last 3 completed shifts:  In: 1080 (13.8 mL/kg) [P.O.:1080]  Out: 875 (11.2 mL/kg) [Urine:875 (0.3 mL/kg/hr)]  Weight: 78.5 kg     Past Cardiology Tests (Last 3 Years):  EKG:  No results found for this or any previous visit from the past 1095 days.    Echo:  Transthoracic Echo (TTE) Limited 11/10/2023      Transthoracic Echo (TTE) Limited 11/10/2023    Ejection Fractions:  No results found for: \"EF\"  Cath:  No results found for this or any previous visit from the past 1095 days.    Stress Test:  Nuclear Stress Test 2/13/2023    Cardiac Imaging:  No results found for this or any previous visit from the past 1095 days.      Inpatient Medications:  Scheduled medications   Medication Dose Route Frequency    aspirin  162 mg oral Daily    atorvastatin  40 mg oral Nightly    clopidogrel  75 mg oral Daily    docusate sodium  100 mg oral BID    ferrous sulfate (325 mg ferrous sulfate)  325 mg oral Daily    finasteride  5 mg oral Daily    gabapentin  200 mg oral BID    insulin glargine  15 Units subcutaneous Nightly    insulin lispro  0-15 Units subcutaneous TID with meals    lisinopril  20 mg oral Daily    metFORMIN  1,000 mg oral BID    pantoprazole  40 mg oral Daily before breakfast    perflutren lipid microspheres  0.5-10 mL of dilution intravenous Once in imaging    perflutren lipid microspheres  0.5-10 mL of dilution intravenous Once in " imaging    perflutren protein A microsphere  0.5 mL intravenous Once in imaging    perflutren protein A microsphere  0.5 mL intravenous Once in imaging    sulfur hexafluoride microsphr  2 mL intravenous Once in imaging    sulfur hexafluoride microsphr  2 mL intravenous Once in imaging    tamsulosin  0.4 mg oral BID     PRN medications   Medication    acetaminophen    dextrose 10 % in water (D10W)    dextrose    glucagon    ondansetron ODT    Or    ondansetron     Continuous Medications   Medication Dose Last Rate       Physical Exam:  General: alert, oriented x2-3, very pleasant; son at bedside  HEENT: normal cephalic, atraumatic  Neck: No JVD, bruit or thrill, masses or tenderness   Heart: S1/S2, Rate 50, Rhythm irregular, no s3 or s4, no murmur, thrill, or heaves at PMI.   Lungs: Clear, equal expansion and excursion, no wheezes, crackles, rhales or rhonci.  Room air.  No conversational dyspnea appreciated.  No tachypnea.  No pain with deep aspiration   Abdomen: Overweight, bowel sounds x 4, soft, non-tender   Genitourinary: deferred   Extremities: No significant upper or lower extremity daylin appreciated.        Assessment/Plan   11/12: Assessed by [facility] in collaboration with Dr. Ford on coverage for Dr. Grant.  Presented with strokelike symptoms.  Recently underwent a Watchman device placement on 11/10/2023.  Initial CT brain does not show any acute findings.  Neurology has requested MRI brain; given patient's recent Watchman device and the metallic nature of this device they question the compatibility of the Watchman device with MRI.       Review of literature from [employer] indicates 3 criteria which are required to safely undergo an MRI scan as follows:  Static magnetic field of 3.0 Judy or 1.5 Judy  Maximum spatial gradient field of 2500 GAUSS/centimeters  Maximum MR system reported, whole body average specific absorption rate (XI) of <2.0 w/kg (normal operating mode only) for  15 minutes     Patient's implanting physician was contacted (Dr. Avitia) who notes the patient may go ahead to receive an MRI of the brain for further evaluation.     Otherwise from a cardiac perspective patient does remain in a slow atrial fibrillation with rates overnight in the mid 30s.  At this point we will stop his metoprolol and will stop calcium channel blocker and reassess heart rates over the next 24 hours.  I have seen no evidence to this point and the patient will require a permanent pacemaker, but we will continue to monitor.  He is euvolemic on examination chest pain-free.  Breathing comfortably.  We will follow with you.    11/13: The patient was seen and events reviewed in detail also discussed with one of his daughters.  The patient underwent his transesophageal echo guided Watchman implantation device performed at [facility] 11/10/2023 that was performed uneventfully.  The patient's left atrial appendage was negative for any visible thrombus at the time of implantation.  The patient was released on the same day.  After returning home he evidently had an episode of confusion and incomprehensible speech that lasted for approximately 20 to 30 minutes.  His other daughter was present at the time and drove him to the [facility], operated by [facility] emergency room and the symptoms had nearly resolved by the time of his arrival.  His initial head CT was negative for CVA.  His MRI of the brain has recently been completed.  His telemetry monitor initially demonstrated atrial fibrillation with a slow ventricular rate as low as the mid 30s per minute range and his metoprolol succinate 50 mg daily was held.  Amlodipine was also temporarily held.  I am still concerned about the possibility of recurring focal seizures given the fact that the patient has had at least 5 transient neurological events all attributed to TIAs but never confirmed CVA.  Patient is currently on Plavix protocol as per watchman glide lines  which will be continued for now.  The patient has had intermittent GI bleeding in the past when placed on Eliquis.  The patient's atrial fibrillation currently has a ventricular rate of 65/min and will observe for now in the absence of metoprolol.  The patient may have enough intrinsic AV charity conduction delay to provide rate control without the need for beta-blockade.  Patient's of blood pressure appears to be satisfactory on lisinopril 20 mg daily without previously prescribed amlodipine.  Peripheral IV 11/11/23 20 G Left;Dorsal Forearm (Active)   Site Assessment Clean;Dry;Intact 11/13/23 0700   Dressing Status Clean;Dry;Occlusive 11/13/23 0700   Number of days: 2       Urethral Catheter (Active)   Site Assessment Clean;Skin intact 11/13/23 0343   Number of days: 3       Code Status:  Full Code    I spent 30 minutes in the professional and overall care of this patient.        Luis Grant MD

## 2023-11-13 NOTE — PROGRESS NOTES
Physical Therapy    Physical Therapy Evaluation & Treatment    Patient Name: Luis Greene  MRN: [id number]  Today's Date: 11/13/2023   Time Calculation  Start Time: 0831  Stop Time: 0851  Time Calculation (min): 20 min    Assessment/Plan   PT Assessment  PT Assessment Results: Decreased strength, Decreased range of motion, Decreased endurance, Impaired balance, Decreased mobility, Decreased safety awareness  Rehab Prognosis: Good  Evaluation/Treatment Tolerance: Patient tolerated treatment well  Medical Staff Made Aware: Yes  Strengths: Support and attitude of living partners, Support of extended family/friends, Attitude of self, Living arrangement secure  Barriers to Participation: Comorbidities  End of Session Communication: Bedside nurse  Assessment Comment: Pt demonstrates mildly impaired functional mobility from baseline; pt with mild balance/BLE strength deficits and decreased overall activity tolerance; would benefit from continued skilled PT services during hospital stay and f/u in outpatient PT for strengthening/balance training.  End of Session Patient Position: Up in chair (daughter present)  IP OR SWING BED PT PLAN  Inpatient or Swing Bed: Inpatient  PT Plan  Treatment/Interventions: Bed mobility, Transfer training, Gait training, Stair training, Balance training, Endurance training, Strengthening, Range of motion, Therapeutic exercise, Therapeutic activity  PT Plan: Skilled PT  PT Frequency: 3 times per week  PT Discharge Recommendations: Low intensity level of continued care, 24 hr supervision due to cognition  Equipment Recommended upon Discharge: Wheeled walker  PT Recommended Transfer Status: Contact guard  PT - OK to Discharge: Yes    Subjective     General Visit Information:  General  Reason for Referral: weakness; r/o CVA  Referred By: Dr. Mai MD  Past Medical History Relevant to Rehab: CAD, HLD, watchman, hyperchol, DM, Afib, HTN, dementia, GERD, BPH  Family/Caregiver Present: Yes  Caregiver  Feedback: daughter at bedside  Co-Treatment: OT  Co-Treatment Reason: eval complexity  Prior to Session Communication: Bedside nurse  Patient Position Received: Bed, 2 rail up  Preferred Learning Style: verbal  General Comment: Pt cleared for therapy via RN, received in supine, NAD, agreeable to participate in therapy. (+) Karie (pt is an 83 year-old M admitted from home with c/o R-sided weakness and slured speech. CT negative for acute findings, pt awaiting MRI to r/o CVA. Pt s/p Watchman procedure 11/10 and d/c home when he developed above symptoms.)  Home Living:  Home Living  Type of Home: House  Lives With: Spouse  Home Adaptive Equipment: Walker rolling or standard  Home Layout: One level  Home Access: Stairs to enter with rails  Entrance Stairs-Rails: Both  Entrance Stairs-Number of Steps: 4  Bathroom Shower/Tub: Walk-in shower  Bathroom Toilet: Standard  Bathroom Equipment: Grab bars in shower, Shower chair with back  Prior Level of Function:  Prior Function Per Pt/Caregiver Report  Level of [place]: Independent with ADLs and functional transfers, Independent with homemaking with ambulation  Receives Help From: Family  ADL Assistance: Independent (sits during bathing)  Homemaking Assistance: Independent  Ambulatory Assistance: Independent (mod indep with rollator)  Vocational: Retired  Hand Dominance: Right  Precautions:  Precautions  Hearing/Visual Limitations: reading glasses  Medical Precautions: Fall precautions    Objective   Pain:  Pain Assessment  Pain Assessment: 0-10  Pain Score: 0 - No pain  Cognition:  Cognition  Overall Cognitive Status: Impaired at baseline  Orientation Level: Disoriented to situation  Safety/Judgement: Exceptions to WFL  Insight: Mild    General Assessments:      Activity Tolerance  Endurance: Tolerates 10 - 20 min exercise with multiple rests    Sensation  Light Touch: Partial deficits in the RLE, Partial deficits in the LLE (neuropathy BLE)    Strength  Strength  Comments: > 4/5 BLE except B ankle dorsiflexion > 2/5; B foot drop noted, pt wears B AFOs in shoes (currently not at hospital, spouse to bring in)    Coordination  Movements are Fluid and Coordinated: Yes    Postural Control  Postural Control: Impaired  Posture Comment: forward head, rounded shoulders    Static Sitting Balance  Static Sitting-Balance Support: Bilateral upper extremity supported  Static Sitting-Level of Assistance: Close supervision    Static Standing Balance  Static Standing-Balance Support: Bilateral upper extremity supported  Static Standing-Level of Assistance: Minimum assistance  Functional Assessments:  ADL  ADL's Addressed: No    Bed Mobility  Bed Mobility: Yes  Bed Mobility 1  Bed Mobility 1: Supine to sitting  Level of Assistance 1: Close supervision  Bed Mobility Comments 1: HOB elevated, use of bedrails    Transfers  Transfer: Yes  Transfer 1  Transfer From 1: Sit to  Transfer to 1: Stand  Technique 1: Sit to stand  Transfer Device 1: Walker  Transfer Level of Assistance 1: Minimum assistance  Trials/Comments 1: cueing for hand placement and use of brakes on rollator; min assist x 1 for safety  Transfers 2  Transfer From 2: Stand to  Transfer to 2: Sit  Technique 2: Stand to sit  Transfer Device 2: Walker  Transfer Level of Assistance 2: Minimum assistance  Trials/Comments 2: cueing for hand placement, min assist x 1 for eccentric control    Ambulation/Gait Training  Ambulation/Gait Training Performed: Yes  Ambulation/Gait Training 1  Surface 1: Level tile  Device 1: Rolling walker  Assistance 1: Minimum assistance  Quality of Gait 1:  (decreased jose david, reciprocating pattern, B foot drop with exaggerated B hip flexion to compensate, narrow BETH, forward head)  Comments/Distance (ft) 1: 30' x 2    Stairs  Stairs: No    Treatments:     Bed Mobility  Bed Mobility: Yes  Bed Mobility 1  Bed Mobility 1: Supine to sitting  Level of Assistance 1: Close supervision  Bed Mobility Comments 1: HOB  elevated, use of bedrails    Ambulation/Gait Training  Ambulation/Gait Training Performed: Yes  Ambulation/Gait Training 1  Surface 1: Level tile  Device 1: Rolling walker  Assistance 1: Minimum assistance  Quality of Gait 1:  (decreased jose david, reciprocating pattern, B foot drop with exaggerated B hip flexion to compensate, narrow BETH, forward head)  Comments/Distance (ft) 1: 30' x 2  Transfers  Transfer: Yes  Transfer 1  Transfer From 1: Sit to  Transfer to 1: Stand  Technique 1: Sit to stand  Transfer Device 1: Walker  Transfer Level of Assistance 1: Minimum assistance  Trials/Comments 1: cueing for hand placement and use of brakes on rollator; min assist x 1 for safety  Transfers 2  Transfer From 2: Stand to  Transfer to 2: Sit  Technique 2: Stand to sit  Transfer Device 2: Walker  Transfer Level of Assistance 2: Minimum assistance  Trials/Comments 2: cueing for hand placement, min assist x 1 for eccentric control    Stairs  Stairs: No    Outcome Measures:  [facility] Basic Mobility  Turning from your back to your side while in a flat bed without using bedrails: A little  Moving from lying on your back to sitting on the side of a flat bed without using bedrails: A little  Moving to and from bed to chair (including a wheelchair): A little  Standing up from a chair using your arms (e.g. wheelchair or bedside chair): A little  To walk in hospital room: A little  Climbing 3-5 steps with railing: A lot  Basic Mobility - Total Score: 17    Encounter Problems       Encounter Problems (Active)       Mobility       STG - Patient will ambulate 150' with rolling walker and modified independence. (Progressing)       Start:  11/13/23    Expected End:  11/27/23            STG - Patient will ascend and descend four stairs with one handrail and modified independence. (Progressing)       Start:  11/13/23    Expected End:  11/27/23               Pain - Adult          Safety       LTG - Patient will demonstrate safety requirements  appropriate to situation/environment with supervision. (Progressing)       Start:  11/13/23    Expected End:  11/27/23            STG - Patient uses call light consistently to request assistance with transfers (Progressing)       Start:  11/13/23    Expected End:  11/27/23               Transfers       STG - Patient will transfer sit to and from stand with rolling walker and modified independence. (Progressing)       Start:  11/13/23    Expected End:  11/27/23                   Education Documentation  Mobility Training, taught by Hafsa Lozano PT at 11/13/2023  9:44 AM.  Learner: Patient  Readiness: Acceptance  Method: Explanation, Demonstration  Response: Needs Reinforcement    Education Comments  No comments found.

## 2023-11-14 ENCOUNTER — APPOINTMENT (OUTPATIENT)
Dept: NEUROLOGY | Facility: HOSPITAL | Age: 83
DRG: 101 | End: 2023-11-14
Payer: MEDICARE

## 2023-11-14 LAB
ANION GAP SERPL CALC-SCNC: 10 MMOL/L
BUN SERPL-MCNC: 22 MG/DL (ref 8–25)
CALCIUM SERPL-MCNC: 8.3 MG/DL (ref 8.5–10.4)
CHLORIDE SERPL-SCNC: 103 MMOL/L (ref 97–107)
CO2 SERPL-SCNC: 20 MMOL/L (ref 24–31)
CREAT SERPL-MCNC: 1 MG/DL (ref 0.4–1.6)
ERYTHROCYTE [DISTWIDTH] IN BLOOD BY AUTOMATED COUNT: 13.2 % (ref 11.5–14.5)
GFR SERPL CREATININE-BSD FRML MDRD: 75 ML/MIN/1.73M*2
GLUCOSE BLD MANUAL STRIP-MCNC: 144 MG/DL (ref 74–99)
GLUCOSE BLD MANUAL STRIP-MCNC: 166 MG/DL (ref 74–99)
GLUCOSE BLD MANUAL STRIP-MCNC: 185 MG/DL (ref 74–99)
GLUCOSE BLD MANUAL STRIP-MCNC: 205 MG/DL (ref 74–99)
GLUCOSE BLD MANUAL STRIP-MCNC: 205 MG/DL (ref 74–99)
GLUCOSE SERPL-MCNC: 153 MG/DL (ref 65–99)
HCT VFR BLD AUTO: 28.9 % (ref 41–52)
HGB BLD-MCNC: 10.1 G/DL (ref 13.5–17.5)
MCH RBC QN AUTO: 35.4 PG (ref 26–34)
MCHC RBC AUTO-ENTMCNC: 34.9 G/DL (ref 32–36)
MCV RBC AUTO: 101 FL (ref 80–100)
NRBC BLD-RTO: 0 /100 WBCS (ref 0–0)
PLATELET # BLD AUTO: 86 X10*3/UL (ref 150–450)
POTASSIUM SERPL-SCNC: 4.7 MMOL/L (ref 3.4–5.1)
RBC # BLD AUTO: 2.85 X10*6/UL (ref 4.5–5.9)
SODIUM SERPL-SCNC: 133 MMOL/L (ref 133–145)
WBC # BLD AUTO: 4.8 X10*3/UL (ref 4.4–11.3)

## 2023-11-14 PROCEDURE — 1200000002 HC GENERAL ROOM WITH TELEMETRY DAILY

## 2023-11-14 PROCEDURE — 97116 GAIT TRAINING THERAPY: CPT | Mod: GP

## 2023-11-14 PROCEDURE — 2500000001 HC RX 250 WO HCPCS SELF ADMINISTERED DRUGS (ALT 637 FOR MEDICARE OP)

## 2023-11-14 PROCEDURE — C9113 INJ PANTOPRAZOLE SODIUM, VIA: HCPCS | Performed by: NURSE PRACTITIONER

## 2023-11-14 PROCEDURE — 2500000004 HC RX 250 GENERAL PHARMACY W/ HCPCS (ALT 636 FOR OP/ED): Performed by: INTERNAL MEDICINE

## 2023-11-14 PROCEDURE — 36415 COLL VENOUS BLD VENIPUNCTURE: CPT | Performed by: NURSE PRACTITIONER

## 2023-11-14 PROCEDURE — 2500000002 HC RX 250 W HCPCS SELF ADMINISTERED DRUGS (ALT 637 FOR MEDICARE OP, ALT 636 FOR OP/ED): Performed by: INTERNAL MEDICINE

## 2023-11-14 PROCEDURE — 80048 BASIC METABOLIC PNL TOTAL CA: CPT | Performed by: INTERNAL MEDICINE

## 2023-11-14 PROCEDURE — 99232 SBSQ HOSP IP/OBS MODERATE 35: CPT | Performed by: INTERNAL MEDICINE

## 2023-11-14 PROCEDURE — 95816 EEG AWAKE AND DROWSY: CPT | Performed by: PSYCHIATRY & NEUROLOGY

## 2023-11-14 PROCEDURE — 97535 SELF CARE MNGMENT TRAINING: CPT | Mod: GO,CO

## 2023-11-14 PROCEDURE — 36415 COLL VENOUS BLD VENIPUNCTURE: CPT | Performed by: INTERNAL MEDICINE

## 2023-11-14 PROCEDURE — 85027 COMPLETE CBC AUTOMATED: CPT | Performed by: NURSE PRACTITIONER

## 2023-11-14 PROCEDURE — 95816 EEG AWAKE AND DROWSY: CPT

## 2023-11-14 PROCEDURE — 2500000001 HC RX 250 WO HCPCS SELF ADMINISTERED DRUGS (ALT 637 FOR MEDICARE OP): Performed by: INTERNAL MEDICINE

## 2023-11-14 PROCEDURE — 97110 THERAPEUTIC EXERCISES: CPT | Mod: GP

## 2023-11-14 PROCEDURE — 2500000001 HC RX 250 WO HCPCS SELF ADMINISTERED DRUGS (ALT 637 FOR MEDICARE OP): Performed by: PSYCHIATRY & NEUROLOGY

## 2023-11-14 PROCEDURE — 2500000004 HC RX 250 GENERAL PHARMACY W/ HCPCS (ALT 636 FOR OP/ED): Performed by: NURSE PRACTITIONER

## 2023-11-14 PROCEDURE — 82947 ASSAY GLUCOSE BLOOD QUANT: CPT

## 2023-11-14 RX ORDER — LEVETIRACETAM 250 MG/1
250 TABLET ORAL 2 TIMES DAILY
Status: DISCONTINUED | OUTPATIENT
Start: 2023-11-14 | End: 2023-11-17 | Stop reason: HOSPADM

## 2023-11-14 RX ADMIN — ASPIRIN 162 MG: 81 TABLET, COATED ORAL at 09:42

## 2023-11-14 RX ADMIN — PANTOPRAZOLE SODIUM 40 MG: 40 INJECTION, POWDER, FOR SOLUTION INTRAVENOUS at 21:32

## 2023-11-14 RX ADMIN — GABAPENTIN 200 MG: 100 CAPSULE ORAL at 09:42

## 2023-11-14 RX ADMIN — METFORMIN HYDROCHLORIDE 1000 MG: 1000 TABLET, FILM COATED ORAL at 21:32

## 2023-11-14 RX ADMIN — INSULIN GLARGINE 15 UNITS: 100 INJECTION, SOLUTION SUBCUTANEOUS at 21:43

## 2023-11-14 RX ADMIN — ATORVASTATIN CALCIUM 40 MG: 40 TABLET, FILM COATED ORAL at 21:32

## 2023-11-14 RX ADMIN — LEVETIRACETAM 250 MG: 250 TABLET, FILM COATED ORAL at 21:32

## 2023-11-14 RX ADMIN — GABAPENTIN 200 MG: 100 CAPSULE ORAL at 21:32

## 2023-11-14 RX ADMIN — FINASTERIDE 5 MG: 5 TABLET, FILM COATED ORAL at 09:46

## 2023-11-14 RX ADMIN — FERROUS SULFATE TAB 325 MG (65 MG ELEMENTAL FE) 325 MG: 325 (65 FE) TAB at 09:42

## 2023-11-14 RX ADMIN — INSULIN LISPRO 6 UNITS: 100 INJECTION, SOLUTION INTRAVENOUS; SUBCUTANEOUS at 17:45

## 2023-11-14 RX ADMIN — DOCUSATE SODIUM 100 MG: 100 CAPSULE, LIQUID FILLED ORAL at 21:32

## 2023-11-14 RX ADMIN — TAMSULOSIN HYDROCHLORIDE 0.4 MG: 0.4 CAPSULE ORAL at 21:32

## 2023-11-14 RX ADMIN — ACETAMINOPHEN 650 MG: 325 TABLET ORAL at 02:34

## 2023-11-14 RX ADMIN — INSULIN LISPRO 6 UNITS: 100 INJECTION, SOLUTION INTRAVENOUS; SUBCUTANEOUS at 12:00

## 2023-11-14 RX ADMIN — PANTOPRAZOLE SODIUM 40 MG: 40 INJECTION, POWDER, FOR SOLUTION INTRAVENOUS at 09:43

## 2023-11-14 RX ADMIN — METFORMIN HYDROCHLORIDE 1000 MG: 1000 TABLET, FILM COATED ORAL at 09:42

## 2023-11-14 RX ADMIN — CLOPIDOGREL BISULFATE 75 MG: 75 TABLET ORAL at 09:42

## 2023-11-14 RX ADMIN — TAMSULOSIN HYDROCHLORIDE 0.4 MG: 0.4 CAPSULE ORAL at 09:42

## 2023-11-14 ASSESSMENT — PAIN - FUNCTIONAL ASSESSMENT
PAIN_FUNCTIONAL_ASSESSMENT: 0-10

## 2023-11-14 ASSESSMENT — COGNITIVE AND FUNCTIONAL STATUS - GENERAL
DRESSING REGULAR UPPER BODY CLOTHING: A LOT
HELP NEEDED FOR BATHING: A LOT
STANDING UP FROM CHAIR USING ARMS: A LITTLE
PERSONAL GROOMING: A LITTLE
MOVING TO AND FROM BED TO CHAIR: A LITTLE
WALKING IN HOSPITAL ROOM: A LITTLE
DRESSING REGULAR LOWER BODY CLOTHING: A LOT
DRESSING REGULAR LOWER BODY CLOTHING: A LOT
DAILY ACTIVITIY SCORE: 15
TOILETING: A LOT
TOILETING: A LITTLE
EATING MEALS: A LITTLE
HELP NEEDED FOR BATHING: A LITTLE
CLIMB 3 TO 5 STEPS WITH RAILING: A LOT
STANDING UP FROM CHAIR USING ARMS: A LITTLE
MOBILITY SCORE: 18
MOVING TO AND FROM BED TO CHAIR: A LITTLE
PERSONAL GROOMING: A LITTLE
CLIMB 3 TO 5 STEPS WITH RAILING: A LITTLE
DAILY ACTIVITIY SCORE: 16
MOVING FROM LYING ON BACK TO SITTING ON SIDE OF FLAT BED WITH BEDRAILS: A LITTLE
DRESSING REGULAR UPPER BODY CLOTHING: A LITTLE
EATING MEALS: A LITTLE
TURNING FROM BACK TO SIDE WHILE IN FLAT BAD: A LITTLE
MOBILITY SCORE: 19
WALKING IN HOSPITAL ROOM: A LITTLE

## 2023-11-14 ASSESSMENT — PAIN DESCRIPTION - ORIENTATION: ORIENTATION: LEFT

## 2023-11-14 ASSESSMENT — ACTIVITIES OF DAILY LIVING (ADL)
BATHING_LEVEL_OF_ASSISTANCE: SETUP;CLOSE SUPERVISION
BATHING_WHERE_ASSESSED: SITTING SINKSIDE
HOME_MANAGEMENT_TIME_ENTRY: 26

## 2023-11-14 ASSESSMENT — PAIN DESCRIPTION - LOCATION: LOCATION: HIP

## 2023-11-14 ASSESSMENT — PAIN SCALES - GENERAL
PAINLEVEL_OUTOF10: 7
PAINLEVEL_OUTOF10: 3
PAINLEVEL_OUTOF10: 0 - NO PAIN

## 2023-11-14 ASSESSMENT — PAIN DESCRIPTION - DESCRIPTORS
DESCRIPTORS: ACHING
DESCRIPTORS: SORE;DISCOMFORT

## 2023-11-14 NOTE — CARE PLAN
The clinical goals for the shift include No falls for the duration of shift    Over the shift, the patient did make progress toward the following goals.       Problem: Fall/Injury  Goal: Not fall by end of shift  Outcome: Progressing  Goal: Be free from injury by end of the shift  Outcome: Progressing  Goal: Verbalize understanding of personal risk factors for fall in the hospital  Outcome: Progressing  Goal: Verbalize understanding of risk factor reduction measures to prevent injury from fall in the home  Outcome: Progressing  Goal: Use assistive devices by end of the shift  Outcome: Progressing  Goal: Pace activities to prevent fatigue by end of the shift  Outcome: Progressing

## 2023-11-14 NOTE — PROGRESS NOTES
"Luis Greene is a 83 y.o. male on day 1 of admission presenting with Stroke-like episode.    Subjective   Denies bleeding today. Had red blood and mucous yesterday       Objective     Physical Exam  Constitutional:       Appearance: Normal appearance.   HENT:      Head: Normocephalic and atraumatic.   Pulmonary:      Effort: Pulmonary effort is normal.   Abdominal:      General: There is no distension.      Palpations: Abdomen is soft.      Tenderness: There is no abdominal tenderness. There is no guarding.   Musculoskeletal:         General: Normal range of motion.      Cervical back: Normal range of motion.   Skin:     General: Skin is warm and dry.      Coloration: Skin is pale.   Neurological:      General: No focal deficit present.      Mental Status: He is alert. Mental status is at baseline.   Psychiatric:         Mood and Affect: Mood normal.         Last Recorded Vitals  Blood pressure 126/52, pulse 63, temperature 36.9 °C (98.4 °F), temperature source Temporal, resp. rate 17, height 1.803 m (5' 11\"), weight 78.5 kg (173 lb), SpO2 100 %.  Intake/Output last 3 Shifts:  I/O last 3 completed shifts:  In: 1310 (16.7 mL/kg) [P.O.:1310]  Out: 1425 (18.2 mL/kg) [Urine:1425 (0.5 mL/kg/hr)]  Weight: 78.5 kg     Relevant Results              Results for orders placed or performed during the hospital encounter of 11/10/23 (from the past 24 hour(s))   POCT GLUCOSE   Result Value Ref Range    POCT Glucose 256 (H) 74 - 99 mg/dL   Hemoglobin and hematocrit, blood   Result Value Ref Range    Hemoglobin 10.8 (L) 13.5 - 17.5 g/dL    Hematocrit 31.6 (L) 41.0 - 52.0 %   POCT GLUCOSE   Result Value Ref Range    POCT Glucose 259 (H) 74 - 99 mg/dL   Hemoglobin and hematocrit, blood   Result Value Ref Range    Hemoglobin 10.5 (L) 13.5 - 17.5 g/dL    Hematocrit 29.9 (L) 41.0 - 52.0 %   Basic Metabolic Panel   Result Value Ref Range    Glucose 153 (H) 65 - 99 mg/dL    Sodium 133 133 - 145 mmol/L    Potassium 4.7 3.4 - 5.1 mmol/L "    Chloride 103 97 - 107 mmol/L    Bicarbonate 20 (L) 24 - 31 mmol/L    Urea Nitrogen 22 8 - 25 mg/dL    Creatinine 1.00 0.40 - 1.60 mg/dL    eGFR 75 >60 mL/min/1.73m*2    Calcium 8.3 (L) 8.5 - 10.4 mg/dL    Anion Gap 10 <=19 mmol/L   CBC   Result Value Ref Range    WBC 4.8 4.4 - 11.3 x10*3/uL    nRBC 0.0 0.0 - 0.0 /100 WBCs    RBC 2.85 (L) 4.50 - 5.90 x10*6/uL    Hemoglobin 10.1 (L) 13.5 - 17.5 g/dL    Hematocrit 28.9 (L) 41.0 - 52.0 %     (H) 80 - 100 fL    MCH 35.4 (H) 26.0 - 34.0 pg    MCHC 34.9 32.0 - 36.0 g/dL    RDW 13.2 11.5 - 14.5 %    Platelets 86 (L) 150 - 450 x10*3/uL   POCT GLUCOSE   Result Value Ref Range    POCT Glucose 144 (H) 74 - 99 mg/dL   POCT GLUCOSE   Result Value Ref Range    POCT Glucose 205 (H) 74 - 99 mg/dL     MR brain wo IV contrast    Result Date: 11/13/2023  Interpreted By:  Galdino Monet, STUDY: MR BRAIN WO IV CONTRAST; 11/13/2023 8:20 am   INDICATION: Signs/Symptoms:stroke-like sympt's.   COMPARISON: None.   ACCESSION NUMBER(S): [id number]   ORDERING CLINICIAN: SAMUEL GAITAN   TECHNIQUE: Multiecho multiplanar imaging of the brain without intravenous contrast.   FINDINGS: Extra-axial spaces and ventricular system: Prominent, reflecting atrophy Cerebral parenchyma: No restricted diffusion. No sign of intracranial hemorrhage. Small patchy periventricular hyperintensity and small hyperintensity in inferior left temporal lobe Midline shift: No mass effect or midline shift. Cerebellum: Normal. Brainstem: Normal. Pituitary gland: Normal.   Visualized paranasal sinuses: Small retention cyst left maxillary sinus. Visualized orbits: Sign of cataract surgery bilaterally. Visualized upper cervical spine: Normal.       No acute finding.   Signed by: Galdino Monet 11/13/2023 10:32 AM Dictation workstation:   [id number]    CT angio brain attack head w IV contrast and post procedure    Result Date: 11/10/2023  Interpreted By:  Martha Coleman, STUDY: CT ANGIO BRAIN ATTACK HEAD W IV  CONTRAST AND POST PROCEDURE; CT ANGIO BRAIN ATTACK NECK W IV CONTRAST AND POST PROCEDURE; 11/10/2023 5:13 pm   INDICATION: Signs/Symptoms:Stroke Evaluation with VAN Positive;   COMPARISON: CT head same day /   ACCESSION NUMBER(S): [id number]; [id number]   ORDERING CLINICIAN: JUDY CHACON   TECHNIQUE: CT arteriogram of the head and neck with 75 ml of Omnipaque 350 was injected intravenously. 3D MIP images were created, processed, and interpreted on an independent workstation.   FINDINGS: CTA HEAD:   Basilar: Patent without aneurysm, dissection or thrombus. PCOM: Patent without aneurysm, dissection or thrombus. ACOM: Patent without aneurysm, dissection or thrombus. ICA: Patent without aneurysm, dissection or thrombus.   LEFT:   MCA: Patent without aneurysm, dissection or thrombus. YOVANY: Patent without aneurysm, dissection or thrombus. PCA: Patent without aneurysm, dissection or thrombus. ICA: Patent without aneurysm, dissection or thrombus. VERT: Patent without aneurysm, dissection or thrombus.   RIGHT:   MCA: Patent without aneurysm, dissection or thrombus. YOVANY: Patent without aneurysm, dissection or thrombus. PCA: Patent without aneurysm, dissection or thrombus. ICA: Patent without aneurysm, dissection or thrombus. VERT: Patent without aneurysm, dissection or thrombus.     CTA NECK:     The estimate of the degree of stenosis included in this report is based on the NASCET CRITERIA for calculating stenosis by using the internal carotid artery distal to the stenosis as the reference point. Normal is no stenosis. Mild is less than 50% stenosis. Moderate is 50-69% stenosis. Severe is 70-99% stenosis. Total occlusion is no detectable patent lumen.   LEFT:   CCA: Patent without aneurysm, dissection or thrombus. Atherosclerotic plaque at the carotid bulb which contributes to less than 50% narrowing. ICA: Patent without aneurysm, dissection or thrombus. ECA: Patent without aneurysm, dissection or thrombus. VERT: Patent  without aneurysm, dissection or thrombus.     RIGHT:   CCA: Patent without aneurysm, dissection or thrombus. Atherosclerotic plaque at the carotid bulb extending into the proximal right ICA and contributing to approximately 50% narrowing. ICA: Patent without aneurysm, dissection or thrombus. ECA: Patent without aneurysm, dissection or thrombus. VERT: Patent without aneurysm, dissection or thrombus.   AORTIC ARCH AND BRANCHES: Patent without aneurysm, dissection or thrombus.   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           1. No large vessel occlusion or high-grade stenosis. 2. Atherosclerotic plaques in the bilateral carotid bulbs contributing to mild (less than 50%) stenosis. Atherosclerotic plaque in the right proximal ICA contributing to approximately 50% narrowing.   Signed by: Martha Coleman 11/10/2023 6:19 PM Dictation workstation:   [id number]    CT angio brain attack neck w IV contrast and post procedure    Result Date: 11/10/2023  Interpreted By:  Martha Coleman, STUDY: CT ANGIO BRAIN ATTACK HEAD W IV CONTRAST AND POST PROCEDURE; CT ANGIO BRAIN ATTACK NECK W IV CONTRAST AND POST PROCEDURE; 11/10/2023 5:13 pm   INDICATION: Signs/Symptoms:Stroke Evaluation with VAN Positive;   COMPARISON: CT head same day /   ACCESSION NUMBER(S): [id number]; [id number]   ORDERING CLINICIAN: JUDY CHACON   TECHNIQUE: CT arteriogram of the head and neck with 75 ml of Omnipaque 350 was injected intravenously. 3D MIP images were created, processed, and interpreted on an independent workstation.   FINDINGS: CTA HEAD:   Basilar: Patent without aneurysm, dissection or thrombus. PCOM: Patent without aneurysm, dissection or thrombus. ACOM: Patent without aneurysm, dissection or thrombus. ICA: Patent without aneurysm, dissection or thrombus.   LEFT:   MCA: Patent without aneurysm, dissection or thrombus. YOVANY: Patent without aneurysm, dissection or thrombus. PCA: Patent without aneurysm,  dissection or thrombus. ICA: Patent without aneurysm, dissection or thrombus. VERT: Patent without aneurysm, dissection or thrombus.   RIGHT:   MCA: Patent without aneurysm, dissection or thrombus. YOVANY: Patent without aneurysm, dissection or thrombus. PCA: Patent without aneurysm, dissection or thrombus. ICA: Patent without aneurysm, dissection or thrombus. VERT: Patent without aneurysm, dissection or thrombus.     CTA NECK:     The estimate of the degree of stenosis included in this report is based on the NASCET CRITERIA for calculating stenosis by using the internal carotid artery distal to the stenosis as the reference point. Normal is no stenosis. Mild is less than 50% stenosis. Moderate is 50-69% stenosis. Severe is 70-99% stenosis. Total occlusion is no detectable patent lumen.   LEFT:   CCA: Patent without aneurysm, dissection or thrombus. Atherosclerotic plaque at the carotid bulb which contributes to less than 50% narrowing. ICA: Patent without aneurysm, dissection or thrombus. ECA: Patent without aneurysm, dissection or thrombus. VERT: Patent without aneurysm, dissection or thrombus.     RIGHT:   CCA: Patent without aneurysm, dissection or thrombus. Atherosclerotic plaque at the carotid bulb extending into the proximal right ICA and contributing to approximately 50% narrowing. ICA: Patent without aneurysm, dissection or thrombus. ECA: Patent without aneurysm, dissection or thrombus. VERT: Patent without aneurysm, dissection or thrombus.   AORTIC ARCH AND BRANCHES: Patent without aneurysm, dissection or thrombus.   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           1. No large vessel occlusion or high-grade stenosis. 2. Atherosclerotic plaques in the bilateral carotid bulbs contributing to mild (less than 50%) stenosis. Atherosclerotic plaque in the right proximal ICA contributing to approximately 50% narrowing.   Signed by: Martha Coleman 11/10/2023 6:19 PM Dictation  workstation:   [id number]    XR chest 1 view    Result Date: 11/10/2023  Interpreted By:  Martha Coleman, STUDY: XR CHEST 1 VIEW; 11/10/2023 4:42 pm   INDICATION: Signs/Symptoms:brain attack/cough   COMPARISON: 06/20/2023   ACCESSION NUMBER(S): [id number]   ORDERING CLINICIAN: JUDY CHACON   TECHNIQUE: Frontal  chest radiographs.   FINDINGS: The cardiomediastinal silhouette is unremarkable. The lungs are clear. No pleural effusion is identified.   The osseous structures are intact.       No acute cardiopulmonary process.       Signed by: Martha Coleman 11/10/2023 4:46 PM Dictation workstation:   [id number]    CT brain attack head wo IV contrast    Result Date: 11/10/2023  Interpreted By:  Martha Coleman, STUDY: CT BRAIN ATTACK HEAD WO IV CONTRAST;  11/10/2023 4:38 pm   INDICATION: Signs/Symptoms:Stroke Evaluation.   COMPARISON: [id number]   ACCESSION NUMBER(S): [id number]   ORDERING CLINICIAN: JUDY CHACON   TECHNIQUE: CT axial images through the Brain were obtained without contrast. All CT examinations are performed with 1 or more of the following dose reduction techniques: Automated exposure control, adjustment of mA and/or kv according to patient's size, or use of iterative reconstruction techniques.   FINDINGS: There is no mass effect, hemorrhage, or infarct. The ventricles appear normal. Gray-white differentiation is maintained. Patchy areas of hypodense attenuation are seen in the subcortical and periventricular white matter; compatible with small vessel ischemic disease. The visualized paranasal sinuses appear clear.       No acute intracranial abnormality.   MACRO: Martha Coleman discussed the significance and urgency of this critical finding by telephone with  JUDY CHACON on 11/10/2023 at 4:45 pm.  (**-RCF-**) Findings:  See findings.     Signed by: Martha Coleman 11/10/2023 4:45 PM Dictation workstation:   [id number]    Transthoracic Echo (TTE) Limited    Result Date: 11/10/2023   [place]  [facility], [address] [place] [address]                Tel [phone] and Fax [phone] TRANSTHORACIC ECHOCARDIOGRAM REPORT  Patient Name:      ASHKAN ALICIAACE       Reading Physician:    [id number] Kamar Doyle MD Study Date:        11/10/2023           Ordering Provider:    [id number] DAWNA AUGUSTIN MRN/PID:           [id number]             Fellow:               [id number] Yobany Strong MD Accession#:        [id number]         Nurse: Date of Birth/Age: 1940 / 83 years Sonographer:          Bruna Merlos RD Gender:            M                    Additional Staff: Height:            180.34 cm            Admit Date:           11/10/2023 Weight:            78.47 kg             Admission Status:     Inpatient - Time                                                               Critical BSA:               1.98 m2              Encounter#:           [id number]                                         Department Location:  [facility]                                                               Cath Lab Blood Pressure: 129 /65 mmHg Study Type:    TRANSTHORACIC ECHO (TTE) LIMITED Diagnosis/ICD: Presence of other cardiac implants and grafts-Z95.818 Indication:    Post-Watchman Procedure CPT Code:      Echo Limited-77701 Patient History: Pertinent History: S/p LAAO. Study Detail: The following Echo studies were performed: 2D. Technically               challenging study due to patient lying in supine position.  PHYSICIAN INTERPRETATION: Left Ventricle: The left ventricular systolic function is normal, with an estimated ejection fraction of 60-65%. The left ventricular cavity size is normal. There is left ventricular concentric remodeling. Abnormal (paradoxical) septal motion consistent with post-operative status. Left ventricular diastolic filling was not assessed. Left Atrium: The left  atrium is enlarged. Right Ventricle: The right ventricle was not well visualized. Unable to determine right ventricular systolic function. Right Atrium: The right atrium is moderately dilated. Aortic Valve: The aortic valve appears normal. Aortic valve regurgitation was not assessed. Mitral Valve: The mitral valve is normal in structure. Mitral valve regurgitation was not assessed. Tricuspid Valve: The tricuspid valve is structurally normal. Tricuspid regurgitation was not assessed. Pulmonic Valve: The pulmonic valve is not well visualized. Pulmonic valve regurgitation was not assessed. Pericardium: There is no pericardial effusion noted. Aorta: The aortic root is normal. Systemic Veins: The inferior vena cava was not well visualized. In comparison to the previous echocardiogram(s): Compared with study from 11/10/2023, no significant change. Note that this was a limited study. Compared to prior full echo from 8/16/23, there is no significant change from available data.  CONCLUSIONS:  1. Left ventricular systolic function is normal with a 60-65% estimated ejection fraction.  2. Abnormal septal motion consistent with post-operative status.  3. The left atrium is enlarged.  4. The right atrium is moderately dilated. QUANTITATIVE DATA SUMMARY: 2D MEASUREMENTS:                           Normal Ranges: IVSd:          1.27 cm    (0.6-1.1cm) LVPWd:         1.25 cm    (0.6-1.1cm) LVIDd:         4.36 cm    (3.9-5.9cm) LV Mass Index: 102.2 g/m2 RA VOLUME BY A/L METHOD:                       Normal Ranges: RA Area A4C: 22.0 cm2  [id number] Kamar Doyle MD Electronically signed on 11/10/2023 at 3:02:52 PM  ** Final **     Transthoracic Echo (TTE) Limited    Result Date: 11/10/2023   [facility], [address] [place] [address]                Tel [phone] and Fax [phone] TRANSTHORACIC ECHOCARDIOGRAM REPORT  Patient Name:     ASHKAN OLIVEROS      Reading Physician:  [id number] Varghese Robles MD Study Date:        11/10/2023          Ordering Provider:  [id number] DAWNA AUGUSTIN MRN/PID:          [id number]            Fellow: Accession#:       [id number]        Nurse: Date of           1940 / 83      Sonographer:        Bruna Merols RDCS Birth/Age:        years Gender:           M                   Additional Staff: Height:           180.00 cm           Admit Date:         11/10/2023 Weight:           79.00 kg            Admission Status:   Inpatient - Routine BSA:              1.99 m2             Encounter#:         [id number]                                       Department          [facility] Cath                                       Location:           Lab Blood Pressure: 149 /69 mmHg Study Type:    TRANSTHORACIC ECHO (TTE) LIMITED Diagnosis/ICD: Encounter for preprocedural cardiovascular examination-Z01.810 Indication:    Pre-Watchman Procedure CPT Code:      Echo Limited-71328 Patient History: Pertinent History: A-Fib, Chest Pain, HTN, Hyperlipidemia, LE Edema and                    Palpitations. ALEJANDRINA, DMII, CAD s/p CABG. Study Detail: The following Echo studies were performed: 2D. Technically               challenging study due to patient lying in supine position.  PHYSICIAN INTERPRETATION: Left Ventricle: The left ventricular systolic function is normal, with an estimated ejection fraction of 60-65%. There are no regional wall motion abnormalities. The left ventricular cavity size is normal. Left ventricular diastolic filling was not assessed. Left Atrium: The left atrium is enlarged. Right Ventricle: The right ventricle is normal in size. There is normal right ventricular global systolic function. Right Atrium: The right atrium is enlarged. Aortic Valve: The aortic valve is probably trileaflet. Aortic valve regurgitation was not assessed. Mitral Valve: The mitral valve was not well visualized. Mitral valve regurgitation was not assessed. Tricuspid Valve: The tricuspid valve was not well visualized. Tricuspid  regurgitation was not assessed. The right ventricular systolic pressure is unable to be estimated. Pulmonic Valve: The pulmonic valve was not assessed. Pulmonic valve regurgitation was not assessed. Pericardium: There is a trivial pericardial effusion. Aorta: The aortic root is normal.  CONCLUSIONS:  1. Left ventricular systolic function is normal with a 60-65% estimated ejection fraction.  2. The left atrium is enlarged.  3. There is a trivial pericardial effusion. QUANTITATIVE DATA SUMMARY:  [id number] Varghese Robles MD Electronically signed on 11/10/2023 at 2:16:20 PM  ** Final **     CT watchman full contrast    Result Date: 11/10/2023  Interpreted By:  Russ Avila, STUDY: CT WATCHMAN FULL CONTRAST;  11/10/2023 8:28 am   INDICATION: Signs/Symptoms:Preprocedural RICHARDSON sizing and thrombus detection.   COMPARISON: None.   ACCESSION NUMBER(S): [id number]   ORDERING CLINICIAN: JAMIL RAMIREZ   TECHNIQUE: Using multi detector CT technology,axial imaging with prospective gating was performed of the chest following the intravenous administration of contrast material.  A low-osmolar contrast agent was used (70 mL Omnipaque 350). Next, the imaging was repeated with prospective gating after 10 sec delay as per watchman protocol. Delete   For optimization of anatomic evaluation, multiplanar reconstruction, maximum intensity projections, and advanced 3-D off-line postprocessing were performed on a dedicated stand-alone workstation under the direct supervision of the interpreting physician.   CT Dose-Length Product (DLP):  1943 mGy*cm CT Dose Reduction Employed: Yes (Prospective triggering, iterative reconstruction)   FINDINGS: POTENTIAL STUDY LIMITATIONS:  Motion artifact limits accurate assessment of the left atrial appendage.   CORONARY ARTERIES:   CORONARY ANATOMY: There is normal origin of the coronary arteries. Right dominant system. Severe coronary artery calcifications are seen. Please note,the study is not optimized for  evaluation of coronary arteries.Status post coronary artery bypass grafting (CABG) with bypass grafts not optimally evaluated on present study.   CARDIAC CHAMBERS: The cardiac chambers demonstrate normal atrioventricular and ventriculoarterial concordance, and systemic and pulmonary venous return.   LEFT ATRIUM: Dilated (34.2-cm2). There is no evidence of left atrium or left atrial appendage thrombus. The left atrial appendage orifice is round in shape, measuring 1.9 x 1.8 cm in orthogonal dimensions and with an area of  2.8 cm. sq. Left atrial appendage depth is 2.2 cm.   RIGHT ATRIUM: Dilated (28.4-cm2)   VENTRICLES: The left and right ventricles appear normal in appearance, although accurate size measurements are not possible on systolic phase imaging.   INTERATRIAL SEPTUM: Intact.   INTERVENTRICULAR SEPTUM: Intact.   AORTIC VALVE: The aortic valve is trileaflet in morphology. Mild calcifications.   MITRAL VALVE: No valvular thickening/calcification.   THORACIC AORTA: The thoracic aorta normal in course and caliber.There are mild scattered atherosclerosis present, including calcified and noncalcified plaques.There is no evidence for acute aortic pathology, such as dissection, intramural hematoma, or contained rupture. The aortic arch is not included on this examination.   PERICARDIUM: There is no pericardial effusion seen.   CHEST: The trachea and central airways are patent. No endobronchial lesion is seen. There is mild diffuse bronchial wall thickening, which is a non-specific finding. The bilateral lungs are clear without evidence of focal consolidation, pleural effusion, or pneumothorax. Minimal emphysematous changes. No evidence of lymphadenopathy within included mediastinum. Visualized esophagus appears within normal limits as seen.   UPPER ABDOMEN: The visualized subdiaphragmatic structures demonstrate no remarkable findings.     CHEST WALL AND OSSEOUS STRUCTURES: Status post median sternotomy. No acute  osseous pathology.There are no suspicious osseous lesions within included chest.Multilevel degenerative changes of the spine. Osteopenia.       1. No evidence of left atrial/left atrial appendage thrombus. 2. Limited evaluation secondary to motion artifact which may affect the accuracy of left atrial appendage measurements. The left atrial appendage orifice is round in shape, measuring 1.9 x 1.8 cm in orthogonal dimensions and with an area of 2.8 cm. sq. Left atrial appendage depth is 2.2 cm. 3. Severe coronary artery calcifications are seen in the setting of prior bypass grafting. Please note,the study is not optimized for evaluation of coronary arteries/grafts. 4. Biatrial enlargement. 5. Mild aortic valve calcifications.   MACRO: None   Signed by: Russ Avila 11/10/2023 9:44 AM Dictation workstation:   [id number]                  Assessment/Plan   Principal Problem:    Stroke-like episode  Active Problems:    TIA (transient ischemic attack)    Rectal bleeding (Red blood x2. Had recent EGD/colon/capsule 2023 no source)  -PPI IV BID   -no urgent need for scope   -supportive care   -will monitor for now   -possible he has some AVM intermittently bleeding. At the time he is passing active red blood, should complete stat CT angio vs NM scan. Unlikely to show anything today as he has not passed bloody stool in over 12 hours   -monitor HH. If repeat tomorrow is stable without overt bleeding, he could then consider discharge      Anticoagulant therapy   On plavix   No contraindications to hold at this time  Cardiology following        I spent 20 minutes in the professional and overall care of this patient.      Theresa Giraldo, APRN-CNP

## 2023-11-14 NOTE — NURSING NOTE
Assumed care of pt, pt is awake in bed eating breakfast, pt denies pain, HR is 72 afib on tele, on RA, bedside shift report given by WILY Salas, bed locked and lowered, continue to monitor, call light w/in reach.

## 2023-11-14 NOTE — PROGRESS NOTES
Subjective Data:  Patient without any new complaints.    Overnight Events:    No significant overnight events.     Objective Data:  Last Recorded Vitals:  Vitals:    11/13/23 1900 11/13/23 2330 11/14/23 0400 11/14/23 0903   BP: 137/69 142/71 145/61 126/52   BP Location: Right arm Right arm Right arm Left arm   Patient Position: Lying Lying Lying Lying   Pulse:  70 69 63   Resp:  20 16 17   Temp: 36.7 °C (98.1 °F) 36.8 °C (98.2 °F) 36.7 °C (98.1 °F) 36.9 °C (98.4 °F)   TempSrc: Temporal Oral Temporal Temporal   SpO2: 97% 96% 96% 100%   Weight:       Height:           Last Labs:  CBC - 11/14/2023:  5:56 AM  4.8 10.1 86    28.9      CMP - 11/14/2023:  5:56 AM  8.3 5.3 30 --- 1.1   _ 3.5 40 45      PTT - 11/10/2023:  4:59 PM  1.2   12.3 22.2     HGBA1C   Date/Time Value Ref Range Status   09/14/2023 12:14 PM 6.8 % Final     Comment:          Diagnosis of Diabetes-Adults   Non-Diabetic: < or = 5.6%   Increased risk for developing diabetes: 5.7-6.4%   Diagnostic of diabetes: > or = 6.5%  .       Monitoring of Diabetes                Age (y)     Therapeutic Goal (%)   Adults:          >18           <7.0   Pediatrics:    13-18           <7.5                   7-12           <8.0                   0- 6            7.5-8.5   American Diabetes Association. Diabetes Care 33(S1), Jan 2010.   08/14/2023 02:45 PM 8.1 4.0 - 6.0 % Final     Comment:     Hemoglobin A1C levels are related to mean blood glucose during the   preceding 2-3 months. The relationship table below may be used as a   general guide. Each 1% increase in HGB A1C is a reflection of an   increase in mean glucose of approximately 30 mg/dl.   Reference: Diabetes Care, volume 29, supplement 1 Jan. 2006                        HGB A1C ................. Approx. Mean Glucose   _______________________________________________   6%   ...............................  120 mg/dl   7%   ...............................  150 mg/dl   8%   ...............................  180 mg/dl    "9%   ...............................  210 mg/dl   10%  ...............................  240 mg/dl  Performed at [address] [address]     04/19/2023 04:03 PM 9.7 4.0 - 5.6 % Final   02/03/2023 09:12 AM 9.6 4.0 - 5.6 % Final     LDLCALC   Date/Time Value Ref Range Status   08/15/2023 05:50 AM 31 65 - 130 MG/DL Final   10/28/2022 05:39 AM 32 65 - 130 MG/DL Final   07/06/2021 09:50 AM 39 65 - 130 MG/DL Final     VLDL   Date/Time Value Ref Range Status   08/22/2022 10:17 AM 11 0 - 40 mg/dL Final   09/17/2019 01:20 PM 16 0 - 40 mg/dL Final      Last I/O:  I/O last 3 completed shifts:  In: 1310 (16.7 mL/kg) [P.O.:1310]  Out: 1425 (18.2 mL/kg) [Urine:1425 (0.5 mL/kg/hr)]  Weight: 78.5 kg     Past Cardiology Tests (Last 3 Years):  EKG:  No results found for this or any previous visit from the past 1095 days.    Echo:  Transthoracic Echo (TTE) Limited 11/10/2023      Transthoracic Echo (TTE) Limited 11/10/2023    Ejection Fractions:  No results found for: \"EF\"  Cath:  No results found for this or any previous visit from the past 1095 days.    Stress Test:  Nuclear Stress Test 2/13/2023    Cardiac Imaging:  No results found for this or any previous visit from the past 1095 days.      Inpatient Medications:  Scheduled medications   Medication Dose Route Frequency    aspirin  162 mg oral Daily    atorvastatin  40 mg oral Nightly    clopidogrel  75 mg oral Daily    docusate sodium  100 mg oral BID    ferrous sulfate (325 mg ferrous sulfate)  325 mg oral Daily    finasteride  5 mg oral Daily    gabapentin  200 mg oral BID    insulin glargine  15 Units subcutaneous Nightly    insulin lispro  0-15 Units subcutaneous TID with meals    insulin regular  0-15 Units subcutaneous TID with meals    lisinopril  20 mg oral Daily    metFORMIN  1,000 mg oral BID    pantoprazole  40 mg intravenous BID    perflutren lipid microspheres  0.5-10 mL of dilution intravenous Once in imaging    perflutren lipid microspheres  " 0.5-10 mL of dilution intravenous Once in imaging    perflutren protein A microsphere  0.5 mL intravenous Once in imaging    perflutren protein A microsphere  0.5 mL intravenous Once in imaging    sulfur hexafluoride microsphr  2 mL intravenous Once in imaging    sulfur hexafluoride microsphr  2 mL intravenous Once in imaging    tamsulosin  0.4 mg oral BID     PRN medications   Medication    acetaminophen    dextrose 10 % in water (D10W)    dextrose    glucagon    ondansetron ODT    Or    ondansetron     Continuous Medications   Medication Dose Last Rate       Physical Exam:  General: alert, oriented x2-3, very pleasant; son at bedside  HEENT: normal cephalic, atraumatic  Neck: No JVD, bruit or thrill, masses or tenderness   Heart: S1/S2, Rate 50, Rhythm irregular, no s3 or s4, no murmur, thrill, or heaves at PMI.   Lungs: Clear, equal expansion and excursion, no wheezes, crackles, rhales or rhonci.  Room air.  No conversational dyspnea appreciated.  No tachypnea.  No pain with deep aspiration   Abdomen: Overweight, bowel sounds x 4, soft, non-tender   Genitourinary: deferred   Extremities: No significant upper or lower extremity daylin appreciated.     Assessment/Plan   11/12: Assessed by [facility] in collaboration with Dr. Ford on coverage for Dr. Grant.  Presented with strokelike symptoms.  Recently underwent a Watchman device placement on 11/10/2023.  Initial CT brain does not show any acute findings.  Neurology has requested MRI brain; given patient's recent Watchman device and the metallic nature of this device they question the compatibility of the Watchman device with MRI.       Review of literature from [employer] indicates 3 criteria which are required to safely undergo an MRI scan as follows:  Static magnetic field of 3.0 Judy or 1.5 Judy  Maximum spatial gradient field of 2500 GAUSS/centimeters  Maximum MR system reported, whole body average specific absorption rate (XI) of <2.0  w/kg (normal operating mode only) for 15 minutes     Patient's implanting physician was contacted (Dr. Avitia) who notes the patient may go ahead to receive an MRI of the brain for further evaluation.     Otherwise from a cardiac perspective patient does remain in a slow atrial fibrillation with rates overnight in the mid 30s.  At this point we will stop his metoprolol and will stop calcium channel blocker and reassess heart rates over the next 24 hours.  I have seen no evidence to this point and the patient will require a permanent pacemaker, but we will continue to monitor.  He is euvolemic on examination chest pain-free.  Breathing comfortably.  We will follow with you.     11/13: The patient was seen and events reviewed in detail also discussed with one of his daughters.  The patient underwent his transesophageal echo guided Watchman implantation device performed at [facility] 11/10/2023 that was performed uneventfully.  The patient's left atrial appendage was negative for any visible thrombus at the time of implantation.  The patient was released on the same day.  After returning home he evidently had an episode of confusion and incomprehensible speech that lasted for approximately 20 to 30 minutes.  His other daughter was present at the time and drove him to the [facility] emergency room and the symptoms had nearly resolved by the time of his arrival.  His initial head CT was negative for CVA.  His MRI of the brain has recently been completed.  His telemetry monitor initially demonstrated atrial fibrillation with a slow ventricular rate as low as the mid 30s per minute range and his metoprolol succinate 50 mg daily was held.  Amlodipine was also temporarily held.  I am still concerned about the possibility of recurring focal seizures given the fact that the patient has had at least 5 transient neurological events all attributed to TIAs but never confirmed CVA.  Patient is currently on Plavix  protocol as per watchman glide lines which will be continued for now.  The patient has had intermittent GI bleeding in the past when placed on Eliquis.  The patient's atrial fibrillation currently has a ventricular rate of 65/min and will observe for now in the absence of metoprolol.  The patient may have enough intrinsic AV charity conduction delay to provide rate control without the need for beta-blockade.  Patient's of blood pressure appears to be satisfactory on lisinopril 20 mg daily without previously prescribed amlodipine.    11/14: The patient is sitting at edge of bed visiting with daughters and beginning to perform some physical therapy.  He is awake alert conversant without any complaints.  His MRI of the brain was negative for CVA.  He has been seen by neurology and again there is concerned about potential focal seizure disorder.  He is scheduled to have an EEG performed and subsequently will be placed on empiric antiseizure therapy with Keppra.  The patient's telemetry monitor shows atrial fibrillation controlled ventricular rate in the 60s per minute in the absence of previously prescribed metoprolol.  His systolic blood pressures are ranging between 125-145 mmHg currently on lisinopril 20 mg daily alone without the previously prescribed amlodipine.  We will continue to monitor.  The patient's electrolyte panel today is unremarkable other than glucose 153 creatinine is 1.0.  CBC stable hemoglobin 10.1 hematocrit 28.9.  Peripheral IV 11/11/23 20 G Left;Dorsal Forearm (Active)   Site Assessment Clean;Dry;Intact 11/14/23 0800   Dressing Status Clean;Dry 11/14/23 0800   Number of days: 3       Urethral Catheter (Active)   Site Assessment Clean;Skin intact 11/14/23 0317   Number of days: 4       Code Status:  Full Code    I spent 20 minutes in the professional and overall care of this patient.        Luis Grant MD

## 2023-11-14 NOTE — PROGRESS NOTES
Physical Therapy    Physical Therapy Treatment    Patient Name: Luis Greene  MRN: [id number]  Today's Date: 11/14/2023  Time Calculation  Start Time: 0935  Stop Time: 1004  Time Calculation (min): 29 min       Assessment/Plan   PT Assessment  PT Assessment Results: Decreased strength, Decreased range of motion, Decreased endurance, Impaired balance, Decreased mobility, Decreased safety awareness  Rehab Prognosis: Good  Evaluation/Treatment Tolerance: Patient tolerated treatment well  Medical Staff Made Aware: Yes  Strengths: Support and attitude of living partners, Support of extended family/friends, Attitude of self, Living arrangement secure  Barriers to Participation: Comorbidities  End of Session Communication: Bedside nurse  Assessment Comment: Improving functional mobility;  improving tolerance to activity;  fall risk.  End of Session Patient Position: Up in chair  PT Plan  Inpatient/Swing Bed or Outpatient: Inpatient  PT Plan  Treatment/Interventions: Bed mobility, Transfer training, Gait training, Stair training, Balance training, Endurance training, Strengthening, Range of motion, Therapeutic exercise, Therapeutic activity  PT Plan: Skilled PT  PT Frequency: 3 times per week  PT Discharge Recommendations: Low intensity level of continued care  Equipment Recommended upon Discharge: Wheeled walker  PT Recommended Transfer Status: Assist x1  PT - OK to Discharge: Yes (with skilled physical therapy services at next level of care.)      General Visit Information:   PT  Visit  PT Received On: 11/14/23  General  Prior to Session Communication: Bedside nurse  Patient Position Received: Bed, 3 rail up  General Comment: RN cleared patient for treatment.  Patient reports agreeable to treatment.    Subjective   Precautions:  Precautions  Hearing/Visual Limitations: reading glasses  Medical Precautions: Fall precautions         Objective   Pain:  Pain Assessment  Pain Assessment: 0-10  Pain Score: 0 - No  pain  Cognition:  Cognition  Overall Cognitive Status: Within Functional Limits               Activity Tolerance:  Activity Tolerance  Endurance: Decreased tolerance for upright activites  Treatments:  Therapeutic Exercise  Therapeutic Exercise Performed: Yes  Therapeutic Exercise Activity 1: performed passive gastroc stretch trinity LE ankles 5 reps x 1 set;  performed assisted ankle dorsiflexion (with green theraband) trinity LE ankles 5 reps x 1 set              Bed Mobility  Bed Mobility: Yes  Bed Mobility 1  Bed Mobility 1: Supine to sitting  Level of Assistance 1: Independent    Ambulation/Gait Training  Ambulation/Gait Training Performed: Yes  Ambulation/Gait Training 1  Surface 1: Level tile  Device 1: Rolling walker  Assistance 1: Close supervision  Comments/Distance (ft) 1: 100 feet x 2 with 4 wheeled walker and supervision for balance (Patient ambulates with reciprocating gait pattern, decreased step length trinity LE, narrow base of support, and forward flexed posture.)  Transfers  Transfer: Yes  Transfer 1  Transfer From 1: Sit to  Transfer to 1: Stand  Technique 1: Sit to stand  Transfer Device 1: Walker  Transfer Level of Assistance 1: Close supervision  Trials/Comments 1: Verbal cues for hand placement.  Transfers 2  Transfer From 2: Stand to  Transfer to 2: Sit  Technique 2: Stand to sit  Transfer Device 2: Walker  Transfer Level of Assistance 2: Close supervision  Trials/Comments 2: Verbal cues for hand placement.    Stairs  Stairs: No         Outcome Measures:  [facility] Basic Mobility  Turning from your back to your side while in a flat bed without using bedrails: None  Moving from lying on your back to sitting on the side of a flat bed without using bedrails: None  Moving to and from bed to chair (including a wheelchair): A little  Standing up from a chair using your arms (e.g. wheelchair or bedside chair): A little  To walk in hospital room: A little  Climbing 3-5 steps with railing: A lot  Basic Mobility -  Total Score: 19    Education Documentation  No documentation found.  Education Comments  No comments found.        OP EDUCATION:       Encounter Problems       Encounter Problems (Active)       Mobility       STG - Patient will ambulate 150' with rolling walker and modified independence. (Progressing)       Start:  11/13/23    Expected End:  11/27/23            STG - Patient will ascend and descend four stairs with one handrail and modified independence. (Progressing)       Start:  11/13/23    Expected End:  11/27/23               Mobility       Pt will perform functional mobility household distances at mod I with use of RW.    (Progressing)       Start:  11/13/23    Expected End:  12/04/23               Pain - Adult          Safety       LTG - Patient will demonstrate safety requirements appropriate to situation/environment with supervision. (Progressing)       Start:  11/13/23    Expected End:  11/27/23            STG - Patient uses call light consistently to request assistance with transfers (Progressing)       Start:  11/13/23    Expected End:  11/27/23               Transfers       STG - Patient will transfer sit to and from stand with rolling walker and modified independence. (Progressing)       Start:  11/13/23    Expected End:  11/27/23               Transfers       Pt will perform functional transfers at mod I with use of DME prn.   (Progressing)       Start:  11/13/23    Expected End:  12/04/23            Pt will perform BUE exercises to improve strength and independence with functional transfers/ADL tasks.   (Progressing)       Start:  11/13/23    Expected End:  12/04/23

## 2023-11-14 NOTE — NURSING NOTE
Called Dr. Oneill to let him know that the telemetry order was  for this patient, however the order was never renewed. So at this time patient has no active telemetry orders but is still on telemetry.

## 2023-11-14 NOTE — PROGRESS NOTES
Luis Greene is a 83 y.o. male on day 0 of admission presenting with Stroke-like episode.    Subjective   Patient seen and examined.  Resting sitting up in the chair in no acute distress.  Daughter at bedside.  Awake alert oriented x 3.  No complaints.  Intermittent stutter, chronic.  No new complaints.  Per daughter mentation at baseline     Spoke with nursing red mucus stool observed.  Capsule endoscopy completed 4 months ago per patient/daughter    Objective     Physical Exam  Vitals and nursing note reviewed.   Constitutional:       General: He is not in acute distress.     Appearance: Normal appearance. He is normal weight. He is not ill-appearing or toxic-appearing.   HENT:      Head: Normocephalic and atraumatic.      Right Ear: Tympanic membrane normal.      Left Ear: Tympanic membrane normal.      Nose: Nose normal.      Mouth/Throat:      Mouth: Mucous membranes are moist.      Pharynx: Oropharynx is clear.   Eyes:      Extraocular Movements: Extraocular movements intact.      Conjunctiva/sclera: Conjunctivae normal.      Pupils: Pupils are equal, round, and reactive to light.   Cardiovascular:      Rate and Rhythm: Normal rate and regular rhythm.      Pulses: Normal pulses.      Heart sounds: Normal heart sounds.   Pulmonary:      Effort: Pulmonary effort is normal. No respiratory distress.      Breath sounds: Normal breath sounds. No wheezing, rhonchi or rales.   Abdominal:      General: Bowel sounds are normal. There is no distension.      Palpations: Abdomen is soft.   Genitourinary:     Comments:  Tiwari catheter in place draining clear yellow urine. Rectal examination deferred  Musculoskeletal:         General: Normal range of motion.      Cervical back: Normal range of motion and neck supple.   Skin:     General: Skin is warm and dry.      Capillary Refill: Capillary refill takes less than 2 seconds.   Neurological:      General: No focal deficit present.      Mental Status: He is alert and  "oriented to person, place, and time.      Sensory: Sensation is intact.      Motor: Motor function is intact.   Psychiatric:         Mood and Affect: Mood normal.         Behavior: Behavior normal.       Last Recorded Vitals  Blood pressure 137/69, pulse 80, temperature 36.7 °C (98.1 °F), temperature source Temporal, resp. rate 20, height 1.803 m (5' 11\"), weight 78.5 kg (173 lb), SpO2 97 %.    Intake/Output last 3 Shifts:  I/O last 3 completed shifts:  In: 2390 (30.5 mL/kg) [P.O.:2390]  Out: 675 (8.6 mL/kg) [Urine:675 (0.2 mL/kg/hr)]  Weight: 78.5 kg     Telemetry    Relevant Results  Results for orders placed or performed during the hospital encounter of 11/10/23 (from the past 24 hour(s))   CBC and Auto Differential   Result Value Ref Range    WBC 6.0 4.4 - 11.3 x10*3/uL    nRBC 0.0 0.0 - 0.0 /100 WBCs    RBC 3.19 (L) 4.50 - 5.90 x10*6/uL    Hemoglobin 11.2 (L) 13.5 - 17.5 g/dL    Hematocrit 32.7 (L) 41.0 - 52.0 %     (H) 80 - 100 fL    MCH 35.1 (H) 26.0 - 34.0 pg    MCHC 34.3 32.0 - 36.0 g/dL    RDW 13.4 11.5 - 14.5 %    Platelets 84 (L) 150 - 450 x10*3/uL    Neutrophils % 56.5 40.0 - 80.0 %    Immature Granulocytes %, Automated 0.3 0.0 - 0.9 %    Lymphocytes % 26.4 13.0 - 44.0 %    Monocytes % 10.9 2.0 - 10.0 %    Eosinophils % 5.7 0.0 - 6.0 %    Basophils % 0.2 0.0 - 2.0 %    Neutrophils Absolute 3.39 1.60 - 5.50 x10*3/uL    Immature Granulocytes Absolute, Automated 0.02 0.00 - 0.50 x10*3/uL    Lymphocytes Absolute 1.58 0.80 - 3.00 x10*3/uL    Monocytes Absolute 0.65 0.05 - 0.80 x10*3/uL    Eosinophils Absolute 0.34 0.00 - 0.40 x10*3/uL    Basophils Absolute 0.01 0.00 - 0.10 x10*3/uL   Basic Metabolic Panel   Result Value Ref Range    Glucose 123 (H) 65 - 99 mg/dL    Sodium 135 133 - 145 mmol/L    Potassium 4.5 3.4 - 5.1 mmol/L    Chloride 104 97 - 107 mmol/L    Bicarbonate 21 (L) 24 - 31 mmol/L    Urea Nitrogen 24 8 - 25 mg/dL    Creatinine 1.00 0.40 - 1.60 mg/dL    eGFR 75 >60 mL/min/1.73m*2    " Calcium 8.5 8.5 - 10.4 mg/dL    Anion Gap 10 <=19 mmol/L   POCT GLUCOSE   Result Value Ref Range    POCT Glucose 161 (H) 74 - 99 mg/dL   POCT GLUCOSE   Result Value Ref Range    POCT Glucose 238 (H) 74 - 99 mg/dL   POCT GLUCOSE   Result Value Ref Range    POCT Glucose 256 (H) 74 - 99 mg/dL   Hemoglobin and hematocrit, blood   Result Value Ref Range    Hemoglobin 10.8 (L) 13.5 - 17.5 g/dL    Hematocrit 31.6 (L) 41.0 - 52.0 %   POCT GLUCOSE   Result Value Ref Range    POCT Glucose 259 (H) 74 - 99 mg/dL     MR brain complete, pending    Scheduled medications  aspirin, 162 mg, oral, Daily  atorvastatin, 40 mg, oral, Nightly  clopidogrel, 75 mg, oral, Daily  docusate sodium, 100 mg, oral, BID  ferrous sulfate (325 mg ferrous sulfate), 325 mg, oral, Daily  finasteride, 5 mg, oral, Daily  gabapentin, 200 mg, oral, BID  insulin glargine, 15 Units, subcutaneous, Nightly  insulin lispro, 0-15 Units, subcutaneous, TID with meals  lisinopril, 20 mg, oral, Daily  metFORMIN, 1,000 mg, oral, BID  pantoprazole, 40 mg, intravenous, BID  perflutren lipid microspheres, 0.5-10 mL of dilution, intravenous, Once in imaging  perflutren lipid microspheres, 0.5-10 mL of dilution, intravenous, Once in imaging  perflutren protein A microsphere, 0.5 mL, intravenous, Once in imaging  perflutren protein A microsphere, 0.5 mL, intravenous, Once in imaging  sulfur hexafluoride microsphr, 2 mL, intravenous, Once in imaging  sulfur hexafluoride microsphr, 2 mL, intravenous, Once in imaging  tamsulosin, 0.4 mg, oral, BID      Continuous medications     PRN medications  PRN medications: acetaminophen, dextrose 10 % in water (D10W), dextrose, glucagon, ondansetron ODT **OR** ondansetron    This patient has a urinary catheter   Reason for the urinary catheter remaining today? urinary retention/bladder outlet obstruction, acute or chronic    ASSESSMENT:  Stroke-like episode  Generalized weakness  Dementia  Hematechezia  Anemia  Chronic atrial  fibrillation - status post Watchman implant  Hypertension  Hyperlipidemia  Coronary artery disease  Type 2 diabetes mellitus  GERD   BPH  Tiwari catheter    PLAN:  Mentation stable.  No focal deficits.  MRI Brain complete, report pending.  Neuro checks.  Monitor mentation.  Neurology following.  Consult Gastroenterology.  Protonix to IV BID.  Repeat H&H later today, monitor.  Monitor blood pressure.  Metoprolol on hold.  Lisinopril with parameters.  Telemetry.  Cardiology following.  HgbA1c 6.8 9/14/2023. Monitor point of care glucose ac/hs.  Continue current medications.  PT/OT.  Fall precautions.  Up with assistance only.  Bed and chair alarm at all times.  DVT prophylaxis SCD's.  Supportive care.  Patient and daughter reassured.  Case management following for discharge planning.  Anticipate discharge pending MRI Brain, cleared by Neurology and Gastroenterology.  Discussed with patient, daughter, nursing and Dr. Oneill.      Martina Haynes, APRN-CNP

## 2023-11-14 NOTE — PROGRESS NOTES
Luis Greene is a 83 y.o. male on day 1 of admission presenting with Stroke-like episode.    Subjective   Patient seen and examined.  Resting in bed in no acute distress.  Other daughter at bedside.  Awake alert oriented x 3.  No new complaints.  Stool loose, grainy, brown - no black tarry or red bloody stools    Spoke with nursing at bedside, no issues    Objective     Physical Exam  Vitals and nursing note reviewed.   Constitutional:       General: He is not in acute distress.     Appearance: Normal appearance. He is normal weight. He is not ill-appearing or toxic-appearing.   HENT:      Head: Normocephalic and atraumatic.      Right Ear: Tympanic membrane normal.      Left Ear: Tympanic membrane normal.      Nose: Nose normal.      Mouth/Throat:      Mouth: Mucous membranes are moist.      Pharynx: Oropharynx is clear.   Eyes:      Extraocular Movements: Extraocular movements intact.      Conjunctiva/sclera: Conjunctivae normal.      Pupils: Pupils are equal, round, and reactive to light.   Cardiovascular:      Rate and Rhythm: Normal rate. Rhythm irregular.      Pulses: Normal pulses.      Heart sounds: Normal heart sounds. No murmur heard.  Pulmonary:      Effort: Pulmonary effort is normal. No respiratory distress.      Breath sounds: Normal breath sounds. No wheezing, rhonchi or rales.   Abdominal:      General: Bowel sounds are normal. There is no distension.      Palpations: Abdomen is soft.      Tenderness: There is no abdominal tenderness.   Genitourinary:     Comments:  Tiwari catheter in place draining clear yellow urine. Rectal examination deferred  Musculoskeletal:         General: No swelling. Normal range of motion.      Cervical back: Normal range of motion and neck supple.   Skin:     General: Skin is warm and dry.      Capillary Refill: Capillary refill takes less than 2 seconds.   Neurological:      General: No focal deficit present.      Mental Status: He is alert and oriented to person,  "place, and time.   Psychiatric:         Mood and Affect: Mood normal.         Behavior: Behavior normal.       Last Recorded Vitals  Blood pressure 126/52, pulse 63, temperature 36.9 °C (98.4 °F), temperature source Temporal, resp. rate 17, height 1.803 m (5' 11\"), weight 78.5 kg (173 lb), SpO2 100 %.    Intake/Output last 3 Shifts:  I/O last 3 completed shifts:  In: 1310 (16.7 mL/kg) [P.O.:1310]  Out: 1425 (18.2 mL/kg) [Urine:1425 (0.5 mL/kg/hr)]  Weight: 78.5 kg     Telemetry atrial fibrillation rate 70's    Relevant Results  Results for orders placed or performed during the hospital encounter of 11/10/23 (from the past 24 hour(s))   POCT GLUCOSE   Result Value Ref Range    POCT Glucose 256 (H) 74 - 99 mg/dL   Hemoglobin and hematocrit, blood   Result Value Ref Range    Hemoglobin 10.8 (L) 13.5 - 17.5 g/dL    Hematocrit 31.6 (L) 41.0 - 52.0 %   POCT GLUCOSE   Result Value Ref Range    POCT Glucose 259 (H) 74 - 99 mg/dL   Hemoglobin and hematocrit, blood   Result Value Ref Range    Hemoglobin 10.5 (L) 13.5 - 17.5 g/dL    Hematocrit 29.9 (L) 41.0 - 52.0 %   Basic Metabolic Panel   Result Value Ref Range    Glucose 153 (H) 65 - 99 mg/dL    Sodium 133 133 - 145 mmol/L    Potassium 4.7 3.4 - 5.1 mmol/L    Chloride 103 97 - 107 mmol/L    Bicarbonate 20 (L) 24 - 31 mmol/L    Urea Nitrogen 22 8 - 25 mg/dL    Creatinine 1.00 0.40 - 1.60 mg/dL    eGFR 75 >60 mL/min/1.73m*2    Calcium 8.3 (L) 8.5 - 10.4 mg/dL    Anion Gap 10 <=19 mmol/L   CBC   Result Value Ref Range    WBC 4.8 4.4 - 11.3 x10*3/uL    nRBC 0.0 0.0 - 0.0 /100 WBCs    RBC 2.85 (L) 4.50 - 5.90 x10*6/uL    Hemoglobin 10.1 (L) 13.5 - 17.5 g/dL    Hematocrit 28.9 (L) 41.0 - 52.0 %     (H) 80 - 100 fL    MCH 35.4 (H) 26.0 - 34.0 pg    MCHC 34.9 32.0 - 36.0 g/dL    RDW 13.2 11.5 - 14.5 %    Platelets 86 (L) 150 - 450 x10*3/uL   POCT GLUCOSE   Result Value Ref Range    POCT Glucose 144 (H) 74 - 99 mg/dL   POCT GLUCOSE   Result Value Ref Range    POCT Glucose " 205 (H) 74 - 99 mg/dL     ASSESSMENT:  Stroke-like episode  Generalized weakness  Dementia  Hematechezia - resolved?  Diarrhea/Grainy stool  Anemia stable  Chronic atrial fibrillation - status post Watchman implant  Hypertension  Hyperlipidemia  Coronary artery disease  Type 2 diabetes mellitus  GERD  BPH  Chronic urinary retention - Tiwari catheter    PLAN:  Patient is doing well this morning.  No new issues.  Mentation stable.  A&Ox 3.  No focal deficits.  MRI Brain radiology report reviewed, no acute findings.  EEG today.  Monitor.  Gastroenterology input appreciated.  Monitor H&H.  Continue Protonix 40 milligrams IV BID.  Continue Aspirin, Plavix - okay per Gastroenterology.  Telemetry atrial fibrillation rate controlled, 70's.  Blood pressure stable.  Metoprolol on hold.  Lisinopril with parameters.  Telemetry.  Cardiology following.  Stable cardiac.  Point of care glucose reviewed.  Continue current medications.  Sliding scale insulin.  PT/OT.  Fall precautions.  Up with assistance only.  Bed and chair alarm at all times.  DVT prophylaxis SCD's.  Supportive care.  Patient and daughter reassured.  Case management following for discharge planning.  Discharge plan home with  home health care.  Anticipate discharge pending EEG, consultations clearance.  Discussed with patient, daughter, nursing and Dr. Oneill.      Martina Haynes, JONA-CNP

## 2023-11-14 NOTE — PROGRESS NOTES
Occupational Therapy    OT Treatment    Patient Name: Luis Greene  MRN: [id number]  Today's Date: 11/14/2023  Time Calculation  Start Time: 1402  Stop Time: 1428  Time Calculation (min): 26 min         Assessment:  OT Assessment: Gradual progress made towards OT goals. Continue with current OT POC to maximize safety and independence prior to discharge.  Evaluation/Treatment Tolerance: Patient tolerated treatment well  End of Session Communication: PCT/NA/CTA  End of Session Patient Position: Bed, 3 rail up, Alarm off, not on at start of session  OT Assessment Results: Decreased ADL status, Decreased upper extremity strength, Decreased endurance, Decreased functional mobility  Evaluation/Treatment Tolerance: Patient tolerated treatment well  Plan:  OT Discharge Recommendations: Moderate intensity level of continued care       Subjective   General:  OT Received On: 11/14/23  Reason for Referral: Impaired ADLs; pt is an 83 year-old M admitted from home with c/o R-sided weakness and slured speech. CT negative for acute findings, pt awaiting MRI to r/o CVA. Pt s/p Watchman procedure 11/10 and d/c home when he developed above symptoms.  Past Medical History Relevant to Rehab: CAD, HLD, watchman, hyperchol, DM, Afib, HTN, dementia, GERD, BPH  Family/Caregiver Present: Yes  Caregiver Feedback: daughter at bedside  Prior to Session Communication: Bedside nurse  Patient Position Received: Bed, 3 rail up, Alarm off, not on at start of session  General Comment: Pt cleared for therapy per nursing, pleasant and cooperative throughout session.  Precautions:  Hearing/Visual Limitations: reading glasses  Medical Precautions: Fall precautions  Vital Signs:     Pain:  Pain Assessment  Pain Assessment: 0-10  Pain Score: 0 - No pain  Clinical Progression: Not changed    Objective    Cognition:  Cognition  Overall Cognitive Status:  (intermittent confusion observed throughout session.)  Coordination:  Movements are Fluid and  Coordinated: Yes  Activities of Daily Living: Grooming  Grooming Comments: hand and oral hygiene tasks completed seated at sinkside    UE Bathing  UE Bathing Level of Assistance: Close supervision, Setup  UE Bathing Where Assessed: Sitting sinkside    LE Bathing  LE Bathing Level of Assistance: Setup, Close supervision  LE Bathing Where Assessed: Sitting sinkside  LE Bathing Comments: increased time required for bathing tasks this date    Toileting  Toileting Level of Assistance: Maximum assistance  Where Assessed: Toilet  Toileting Comments: increased assistance required for guidry management. Verbal instruction and demonstration provided on emptying guidry this date  Functional Standing Tolerance:     Bed Mobility/Transfers: Bed Mobility  Bed Mobility: Yes  Bed Mobility 1  Bed Mobility 1: Supine to sitting, Sitting to supine  Level of Assistance 1: Close supervision  Bed Mobility Comments 1: HOB moderately elevated and use of bed rails    Transfers  Transfer: Yes  Transfer 1  Transfer From 1: Bed to  Transfer to 1: Stand  Technique 1: Sit to stand, Stand to sit  Transfer Device 1: Walker  Transfer Level of Assistance 1: Close supervision    Toilet Transfers  Toilet Transfer From: Bed  Toilet Transfer Type: To and from  Toilet Transfer to: Standard toilet  Toilet Transfer Technique: Ambulating  Toilet Transfers: Supervision  Toilet Transfers Comments: pt able to complete functional mobility <>bathroom with 4WW and close supervision however required Jeanne to complete sit>stand from standard toilet height      Outcome Measures:[facility] Daily Activity  Putting on and taking off regular lower body clothing: A lot  Bathing (including washing, rinsing, drying): A lot  Putting on and taking off regular upper body clothing: A little  Toileting, which includes using toilet, bedpan or urinal: A lot  Taking care of personal grooming such as brushing teeth: A little  Eating Meals: A little  Daily Activity - Total Score:  15        Education Documentation  ADL Training, taught by JF Gipson at 11/14/2023  3:59 PM.  Learner: Family, Patient  Readiness: Acceptance  Method: Explanation, Demonstration  Response: Verbalizes Understanding    Education Comments  No comments found.        OP EDUCATION:       Goals:  Encounter Problems       Encounter Problems (Active)       ADLs       Pt will perform ADL tasks at mod I with use of AE prn (Progressing)       Start:  11/13/23    Expected End:  12/04/23               Mobility       STG - Patient will ambulate 150' with rolling walker and modified independence. (Progressing)       Start:  11/13/23    Expected End:  11/27/23            STG - Patient will ascend and descend four stairs with one handrail and modified independence. (Progressing)       Start:  11/13/23    Expected End:  11/27/23               Mobility       Pt will perform functional mobility household distances at mod I with use of RW.    (Progressing)       Start:  11/13/23    Expected End:  12/04/23               Safety       LTG - Patient will demonstrate safety requirements appropriate to situation/environment with supervision. (Progressing)       Start:  11/13/23    Expected End:  11/27/23            STG - Patient uses call light consistently to request assistance with transfers (Progressing)       Start:  11/13/23    Expected End:  11/27/23               Transfers       STG - Patient will transfer sit to and from stand with rolling walker and modified independence. (Progressing)       Start:  11/13/23    Expected End:  11/27/23               Transfers       Pt will perform functional transfers at mod I with use of DME prn.   (Progressing)       Start:  11/13/23    Expected End:  12/04/23            Pt will perform BUE exercises to improve strength and independence with functional transfers/ADL tasks.   (Progressing)       Start:  11/13/23    Expected End:  12/04/23

## 2023-11-14 NOTE — NURSING NOTE
Assumed care of patient at this time 1922. At bedside report, patient had no c/o pain. vss and afebrile. Bed low and locked, call button within reach and bed alarm on. Patient has no needs that require immediate nursing interventions.

## 2023-11-15 ENCOUNTER — APPOINTMENT (OUTPATIENT)
Dept: CARDIOLOGY | Facility: HOSPITAL | Age: 83
DRG: 101 | End: 2023-11-15
Payer: MEDICARE

## 2023-11-15 LAB
ANION GAP SERPL CALC-SCNC: 11 MMOL/L
ATRIAL RATE: 88 BPM
BUN SERPL-MCNC: 17 MG/DL (ref 8–25)
CALCIUM SERPL-MCNC: 8.6 MG/DL (ref 8.5–10.4)
CHLORIDE SERPL-SCNC: 104 MMOL/L (ref 97–107)
CHOLEST SERPL-MCNC: 81 MG/DL (ref 133–200)
CHOLEST/HDLC SERPL: 2.8 {RATIO}
CO2 SERPL-SCNC: 20 MMOL/L (ref 24–31)
CREAT SERPL-MCNC: 0.9 MG/DL (ref 0.4–1.6)
ERYTHROCYTE [DISTWIDTH] IN BLOOD BY AUTOMATED COUNT: 13.2 % (ref 11.5–14.5)
EST. AVERAGE GLUCOSE BLD GHB EST-MCNC: 146 MG/DL
GFR SERPL CREATININE-BSD FRML MDRD: 85 ML/MIN/1.73M*2
GLUCOSE BLD MANUAL STRIP-MCNC: 100 MG/DL (ref 74–99)
GLUCOSE BLD MANUAL STRIP-MCNC: 184 MG/DL (ref 74–99)
GLUCOSE BLD MANUAL STRIP-MCNC: 192 MG/DL (ref 74–99)
GLUCOSE SERPL-MCNC: 104 MG/DL (ref 65–99)
HBA1C MFR BLD: 6.7 %
HCT VFR BLD AUTO: 29.5 % (ref 41–52)
HDLC SERPL-MCNC: 29 MG/DL
HGB BLD-MCNC: 10.4 G/DL (ref 13.5–17.5)
LDLC SERPL CALC-MCNC: 30 MG/DL (ref 65–130)
MCH RBC QN AUTO: 35.9 PG (ref 26–34)
MCHC RBC AUTO-ENTMCNC: 35.3 G/DL (ref 32–36)
MCV RBC AUTO: 102 FL (ref 80–100)
NRBC BLD-RTO: 0 /100 WBCS (ref 0–0)
PLATELET # BLD AUTO: 100 X10*3/UL (ref 150–450)
POTASSIUM SERPL-SCNC: 4.3 MMOL/L (ref 3.4–5.1)
Q ONSET: 221 MS
Q ONSET: 223 MS
Q ONSET: 223 MS
QRS COUNT: 12 BEATS
QRS COUNT: 14 BEATS
QRS COUNT: 9 BEATS
QRS DURATION: 132 MS
QRS DURATION: 142 MS
QRS DURATION: 142 MS
QT INTERVAL: 406 MS
QT INTERVAL: 406 MS
QT INTERVAL: 458 MS
QTC CALCULATION(BAZETT): 429 MS
QTC CALCULATION(BAZETT): 459 MS
QTC CALCULATION(BAZETT): 471 MS
QTC FREDERICIA: 439 MS
QTC FREDERICIA: 441 MS
QTC FREDERICIA: 448 MS
R AXIS: 79 DEGREES
R AXIS: 80 DEGREES
R AXIS: 82 DEGREES
RBC # BLD AUTO: 2.9 X10*6/UL (ref 4.5–5.9)
SODIUM SERPL-SCNC: 135 MMOL/L (ref 133–145)
T AXIS: -11 DEGREES
T AXIS: 17 DEGREES
T AXIS: 27 DEGREES
T OFFSET: 426 MS
T OFFSET: 426 MS
T OFFSET: 450 MS
TRIGL SERPL-MCNC: 110 MG/DL (ref 40–150)
VENTRICULAR RATE: 53 BPM
VENTRICULAR RATE: 77 BPM
VENTRICULAR RATE: 81 BPM
WBC # BLD AUTO: 4.9 X10*3/UL (ref 4.4–11.3)

## 2023-11-15 PROCEDURE — 2500000004 HC RX 250 GENERAL PHARMACY W/ HCPCS (ALT 636 FOR OP/ED): Performed by: INTERNAL MEDICINE

## 2023-11-15 PROCEDURE — 94760 N-INVAS EAR/PLS OXIMETRY 1: CPT

## 2023-11-15 PROCEDURE — 97110 THERAPEUTIC EXERCISES: CPT | Mod: GP

## 2023-11-15 PROCEDURE — 2500000002 HC RX 250 W HCPCS SELF ADMINISTERED DRUGS (ALT 637 FOR MEDICARE OP, ALT 636 FOR OP/ED): Performed by: INTERNAL MEDICINE

## 2023-11-15 PROCEDURE — 97116 GAIT TRAINING THERAPY: CPT | Mod: GP

## 2023-11-15 PROCEDURE — 83036 HEMOGLOBIN GLYCOSYLATED A1C: CPT | Performed by: PSYCHIATRY & NEUROLOGY

## 2023-11-15 PROCEDURE — 85027 COMPLETE CBC AUTOMATED: CPT | Performed by: NURSE PRACTITIONER

## 2023-11-15 PROCEDURE — 82947 ASSAY GLUCOSE BLOOD QUANT: CPT

## 2023-11-15 PROCEDURE — 93005 ELECTROCARDIOGRAM TRACING: CPT

## 2023-11-15 PROCEDURE — C9113 INJ PANTOPRAZOLE SODIUM, VIA: HCPCS | Performed by: NURSE PRACTITIONER

## 2023-11-15 PROCEDURE — 80048 BASIC METABOLIC PNL TOTAL CA: CPT | Performed by: NURSE PRACTITIONER

## 2023-11-15 PROCEDURE — 2500000004 HC RX 250 GENERAL PHARMACY W/ HCPCS (ALT 636 FOR OP/ED): Performed by: NURSE PRACTITIONER

## 2023-11-15 PROCEDURE — 97535 SELF CARE MNGMENT TRAINING: CPT | Mod: GO

## 2023-11-15 PROCEDURE — 1200000002 HC GENERAL ROOM WITH TELEMETRY DAILY

## 2023-11-15 PROCEDURE — 2500000001 HC RX 250 WO HCPCS SELF ADMINISTERED DRUGS (ALT 637 FOR MEDICARE OP): Performed by: INTERNAL MEDICINE

## 2023-11-15 PROCEDURE — 36415 COLL VENOUS BLD VENIPUNCTURE: CPT | Performed by: PSYCHIATRY & NEUROLOGY

## 2023-11-15 PROCEDURE — 2500000001 HC RX 250 WO HCPCS SELF ADMINISTERED DRUGS (ALT 637 FOR MEDICARE OP): Performed by: PSYCHIATRY & NEUROLOGY

## 2023-11-15 PROCEDURE — 36415 COLL VENOUS BLD VENIPUNCTURE: CPT | Performed by: NURSE PRACTITIONER

## 2023-11-15 PROCEDURE — 80061 LIPID PANEL: CPT | Performed by: PSYCHIATRY & NEUROLOGY

## 2023-11-15 PROCEDURE — 99232 SBSQ HOSP IP/OBS MODERATE 35: CPT | Performed by: INTERNAL MEDICINE

## 2023-11-15 RX ORDER — POLYETHYLENE GLYCOL 3350 17 G/17G
17 POWDER, FOR SOLUTION ORAL DAILY
Status: DISCONTINUED | OUTPATIENT
Start: 2023-11-15 | End: 2023-11-17

## 2023-11-15 RX ORDER — PANTOPRAZOLE SODIUM 40 MG/1
40 TABLET, DELAYED RELEASE ORAL 2 TIMES DAILY
Status: DISCONTINUED | OUTPATIENT
Start: 2023-11-15 | End: 2023-11-17 | Stop reason: HOSPADM

## 2023-11-15 RX ADMIN — FINASTERIDE 5 MG: 5 TABLET, FILM COATED ORAL at 09:34

## 2023-11-15 RX ADMIN — TAMSULOSIN HYDROCHLORIDE 0.4 MG: 0.4 CAPSULE ORAL at 20:42

## 2023-11-15 RX ADMIN — POLYETHYLENE GLYCOL 3350 17 G: 17 POWDER, FOR SOLUTION ORAL at 09:32

## 2023-11-15 RX ADMIN — INSULIN GLARGINE 15 UNITS: 100 INJECTION, SOLUTION SUBCUTANEOUS at 20:52

## 2023-11-15 RX ADMIN — INSULIN LISPRO 3 UNITS: 100 INJECTION, SOLUTION INTRAVENOUS; SUBCUTANEOUS at 12:11

## 2023-11-15 RX ADMIN — ASPIRIN 162 MG: 81 TABLET, COATED ORAL at 09:32

## 2023-11-15 RX ADMIN — TAMSULOSIN HYDROCHLORIDE 0.4 MG: 0.4 CAPSULE ORAL at 09:32

## 2023-11-15 RX ADMIN — METFORMIN HYDROCHLORIDE 1000 MG: 1000 TABLET, FILM COATED ORAL at 20:42

## 2023-11-15 RX ADMIN — GABAPENTIN 200 MG: 100 CAPSULE ORAL at 09:32

## 2023-11-15 RX ADMIN — PANTOPRAZOLE SODIUM 40 MG: 40 TABLET, DELAYED RELEASE ORAL at 20:42

## 2023-11-15 RX ADMIN — LISINOPRIL 20 MG: 20 TABLET ORAL at 09:32

## 2023-11-15 RX ADMIN — FERROUS SULFATE TAB 325 MG (65 MG ELEMENTAL FE) 325 MG: 325 (65 FE) TAB at 09:32

## 2023-11-15 RX ADMIN — LEVETIRACETAM 250 MG: 250 TABLET, FILM COATED ORAL at 20:41

## 2023-11-15 RX ADMIN — ATORVASTATIN CALCIUM 40 MG: 40 TABLET, FILM COATED ORAL at 20:37

## 2023-11-15 RX ADMIN — LEVETIRACETAM 250 MG: 250 TABLET, FILM COATED ORAL at 09:32

## 2023-11-15 RX ADMIN — PANTOPRAZOLE SODIUM 40 MG: 40 INJECTION, POWDER, FOR SOLUTION INTRAVENOUS at 09:32

## 2023-11-15 RX ADMIN — GABAPENTIN 200 MG: 100 CAPSULE ORAL at 20:41

## 2023-11-15 RX ADMIN — CLOPIDOGREL BISULFATE 75 MG: 75 TABLET ORAL at 09:32

## 2023-11-15 RX ADMIN — METFORMIN HYDROCHLORIDE 1000 MG: 1000 TABLET, FILM COATED ORAL at 09:32

## 2023-11-15 ASSESSMENT — COGNITIVE AND FUNCTIONAL STATUS - GENERAL
HELP NEEDED FOR BATHING: A LITTLE
DRESSING REGULAR LOWER BODY CLOTHING: A LITTLE
MOBILITY SCORE: 18
DRESSING REGULAR LOWER BODY CLOTHING: A LITTLE
PERSONAL GROOMING: A LITTLE
CLIMB 3 TO 5 STEPS WITH RAILING: A LITTLE
MOBILITY SCORE: 18
MOVING TO AND FROM BED TO CHAIR: A LITTLE
WALKING IN HOSPITAL ROOM: A LITTLE
EATING MEALS: A LITTLE
DAILY ACTIVITIY SCORE: 19
TOILETING: A LITTLE
MOVING TO AND FROM BED TO CHAIR: A LITTLE
HELP NEEDED FOR BATHING: A LITTLE
MOVING FROM LYING ON BACK TO SITTING ON SIDE OF FLAT BED WITH BEDRAILS: A LITTLE
DRESSING REGULAR UPPER BODY CLOTHING: A LITTLE
CLIMB 3 TO 5 STEPS WITH RAILING: A LITTLE
TURNING FROM BACK TO SIDE WHILE IN FLAT BAD: A LITTLE
DRESSING REGULAR UPPER BODY CLOTHING: A LOT
STANDING UP FROM CHAIR USING ARMS: A LITTLE
STANDING UP FROM CHAIR USING ARMS: A LITTLE
TURNING FROM BACK TO SIDE WHILE IN FLAT BAD: A LITTLE
DRESSING REGULAR LOWER BODY CLOTHING: A LOT
TOILETING: A LITTLE
DRESSING REGULAR UPPER BODY CLOTHING: A LITTLE
TOILETING: A LITTLE
PERSONAL GROOMING: A LITTLE
WALKING IN HOSPITAL ROOM: A LITTLE
MOBILITY SCORE: 18
CLIMB 3 TO 5 STEPS WITH RAILING: A LITTLE
MOVING FROM LYING ON BACK TO SITTING ON SIDE OF FLAT BED WITH BEDRAILS: A LITTLE
DAILY ACTIVITIY SCORE: 19
WALKING IN HOSPITAL ROOM: A LITTLE
DAILY ACTIVITIY SCORE: 16
HELP NEEDED FOR BATHING: A LITTLE
STANDING UP FROM CHAIR USING ARMS: A LITTLE
PERSONAL GROOMING: A LITTLE
TURNING FROM BACK TO SIDE WHILE IN FLAT BAD: A LITTLE
MOVING FROM LYING ON BACK TO SITTING ON SIDE OF FLAT BED WITH BEDRAILS: A LITTLE
MOVING TO AND FROM BED TO CHAIR: A LITTLE

## 2023-11-15 ASSESSMENT — PAIN - FUNCTIONAL ASSESSMENT
PAIN_FUNCTIONAL_ASSESSMENT: 0-10

## 2023-11-15 ASSESSMENT — PAIN SCALES - GENERAL
PAINLEVEL_OUTOF10: 0 - NO PAIN

## 2023-11-15 ASSESSMENT — ACTIVITIES OF DAILY LIVING (ADL): HOME_MANAGEMENT_TIME_ENTRY: 46

## 2023-11-15 NOTE — PROGRESS NOTES
Occupational Therapy    OT Treatment    Patient Name: Luis Greene  MRN: [id number]  Today's Date: 11/15/2023  Time Calculation  Start Time: 1014  Stop Time: 1100  Time Calculation (min): 46 min         Assessment:  OT Assessment: Pt demonstrates improvement with functional status. Pt does continue to have deconditioning impacting safety and independence with functional tasks.  Prognosis: Good  Evaluation/Treatment Tolerance: Patient tolerated treatment well  End of Session Communication: Bedside nurse  End of Session Patient Position: Up in chair (call bell in reach, family at bedsdie)  OT Assessment Results: Decreased ADL status, Decreased upper extremity strength, Decreased endurance, Decreased functional mobility  Prognosis: Good  Evaluation/Treatment Tolerance: Patient tolerated treatment well  Plan:  Treatment Interventions: ADL retraining, Functional transfer training, UE strengthening/ROM, Endurance training, Patient/family training  OT Discharge Recommendations: Low intensity level of continued care  Equipment Recommended upon Discharge:  (3-in-1 commode)  OT Recommended Transfer Status: Stand by assist  OT - OK to Discharge: Yes  Treatment Interventions: ADL retraining, Functional transfer training, UE strengthening/ROM, Endurance training, Patient/family training    Subjective   General:  OT Received On: 11/15/23  Reason for Referral: Impaired ADLs; pt is an 83 year-old M admitted from home with c/o R-sided weakness and slured speech. CT negative for acute findings, pt awaiting MRI to r/o CVA. Pt s/p Watchman procedure 11/10 and d/c home when he developed above symptoms.  Past Medical History Relevant to Rehab: CAD, HLD, watchman, hyperchol, DM, Afib, HTN, dementia, GERD, BPH  Family/Caregiver Present: Yes  Caregiver Feedback: dtr and spouse at bedside  Prior to Session Communication: Bedside nurse  Patient Position Received: Bed, 2 rail up  General Comment: Pt cleared by RN agreeable to participate in  OT.  Precautions:  Medical Precautions: Fall precautions  Prosthesis/Orthosis Used: Ankle/Foot orthosis (B)  Vital Signs:     Pain:  Pain Assessment  Pain Assessment: 0-10  Pain Score: 0 - No pain    Objective    Cognition:  Cognition  Overall Cognitive Status: Within Functional Limits  Orientation Level: Oriented X4  Coordination:  Movements are Fluid and Coordinated: Yes  Activities of Daily Living: Grooming  Grooming Level of Assistance: Close supervision  Grooming Where Assessed: Sitting sinkside (in rollator)    UE Bathing  UE Bathing Level of Assistance: Close supervision, Distant supervision  UE Bathing Where Assessed: Sitting sinkside (on rollator)    UE Dressing  UE Dressing Level of Assistance: Contact guard, Close supervision  UE Dressing Where Assessed: Other (Comment) (seated on rollator)    LE Dressing  LE Dressing: Yes  Sock Level of Assistance: Minimum assistance, Contact guard  Orthotics Level of Assistance: Minimum assistance, Contact guard  LE Dressing Where Assessed: Edge of bed    Toileting  Toileting Level of Assistance: Minimum assistance, Contact guard  Where Assessed: Toilet  Toileting Comments: Assist to stand from low level toilet, cues for hand placement on grab bars. Discussed with pt and family use of 3-in-1 commode over toilet to promote independence with transfers upon d/c.  Functional Standing Tolerance:     Bed Mobility/Transfers: Bed Mobility  Bed Mobility: Yes  Bed Mobility 1  Bed Mobility 1: Supine to sitting  Level of Assistance 1: Contact guard, Close supervision  Bed Mobility Comments 1: use of bedrails    Transfers  Transfer: Yes  Transfer 1  Technique 1: Stand to sit, Sit to stand  Transfer Level of Assistance 1: Contact guard, Close supervision    Toilet Transfers  Toilet Transfer Type: To and from  Toilet Transfer to: Standard toilet  Toilet Transfer Technique: Ambulating  Toilet Transfers: Contact guard, Minimal assistance, Verbal cues  Toilet Transfers Comments: Use of  grab bars    Ambulation/Gait Training:  Ambulation/Gait Training  Ambulation/Gait Training Performed: Yes  Ambulation/Gait Training 1  Surface 1: Level tile  Device 1: Rollator  Gait Support Devices: R Ankle-foot orthoses, L Ankle-foot orthosis  Assistance 1: Contact guard  Comments/Distance (ft) 1: ~25 ft  Sitting Balance:  Dynamic Sitting Balance  Dynamic Sitting-Balance Support: Feet supported  Dynamic Sitting-Comments: SUP  Standing Balance:  Dynamic Standing Balance  Dynamic Standing-Balance Support: Bilateral upper extremity supported  Dynamic Standing-Comments: SUP    Outcome Measures:[facility] Daily Activity  Putting on and taking off regular lower body clothing: A little  Bathing (including washing, rinsing, drying): A little  Putting on and taking off regular upper body clothing: A little  Toileting, which includes using toilet, bedpan or urinal: A little  Taking care of personal grooming such as brushing teeth: A little  Eating Meals: None  Daily Activity - Total Score: 19      Education Documentation  ADL Training, taught by Cheyenne Gonzales OT at 11/15/2023 11:56 AM.  Learner: Family, Patient  Readiness: Acceptance  Method: Explanation, Demonstration  Response: Verbalizes Understanding, Demonstrated Understanding    Education Comments  No comments found.        OP EDUCATION:       Goals:  Encounter Problems       Encounter Problems (Active)       ADLs       Pt will perform ADL tasks at mod I with use of AE prn (Progressing)       Start:  11/13/23    Expected End:  11/20/23                     Mobility       Pt will perform functional mobility household distances at mod I with use of RW.    (Progressing)       Start:  11/13/23    Expected End:  11/20/23               Transfers       Pt will perform functional transfers at mod I with use of DME prn.   (Progressing)       Start:  11/13/23    Expected End:  11/20/23            Pt will perform BUE exercises to improve strength and independence with functional  transfers/ADL tasks.   (Progressing)       Start:  11/13/23    Expected End:  11/20/23

## 2023-11-15 NOTE — PROGRESS NOTES
"Luis Greene is a 83 y.o. male on day 2 of admission presenting with Stroke-like episode.    Subjective   No bowel movements. Had some \"mucous and bloody anal leakage.\"        Objective     Physical Exam  Constitutional:       Appearance: Normal appearance.   HENT:      Head: Normocephalic and atraumatic.   Pulmonary:      Effort: Pulmonary effort is normal.   Abdominal:      General: There is no distension.      Palpations: Abdomen is soft.      Tenderness: There is no abdominal tenderness. There is no guarding.   Musculoskeletal:         General: Normal range of motion.      Cervical back: Normal range of motion.   Skin:     General: Skin is warm and dry.      Coloration: Skin is pale.   Neurological:      General: No focal deficit present.      Mental Status: He is alert. Mental status is at baseline.   Psychiatric:         Mood and Affect: Mood normal.         Last Recorded Vitals  Blood pressure 118/63, pulse 64, temperature 36.4 °C (97.5 °F), temperature source Oral, resp. rate 20, height 1.803 m (5' 11\"), weight 78.5 kg (173 lb), SpO2 97 %.  Intake/Output last 3 Shifts:  I/O last 3 completed shifts:  In: 480 (6.1 mL/kg) [P.O.:480]  Out: 3150 (40.1 mL/kg) [Urine:3150 (1.1 mL/kg/hr)]  Weight: 78.5 kg     Relevant Results              Results for orders placed or performed during the hospital encounter of 11/10/23 (from the past 24 hour(s))   POCT GLUCOSE   Result Value Ref Range    POCT Glucose 205 (H) 74 - 99 mg/dL   POCT GLUCOSE   Result Value Ref Range    POCT Glucose 205 (H) 74 - 99 mg/dL   POCT GLUCOSE   Result Value Ref Range    POCT Glucose 185 (H) 74 - 99 mg/dL   Basic Metabolic Panel   Result Value Ref Range    Glucose 104 (H) 65 - 99 mg/dL    Sodium 135 133 - 145 mmol/L    Potassium 4.3 3.4 - 5.1 mmol/L    Chloride 104 97 - 107 mmol/L    Bicarbonate 20 (L) 24 - 31 mmol/L    Urea Nitrogen 17 8 - 25 mg/dL    Creatinine 0.90 0.40 - 1.60 mg/dL    eGFR 85 >60 mL/min/1.73m*2    Calcium 8.6 8.5 - 10.4 " mg/dL    Anion Gap 11 <=19 mmol/L   CBC   Result Value Ref Range    WBC 4.9 4.4 - 11.3 x10*3/uL    nRBC 0.0 0.0 - 0.0 /100 WBCs    RBC 2.90 (L) 4.50 - 5.90 x10*6/uL    Hemoglobin 10.4 (L) 13.5 - 17.5 g/dL    Hematocrit 29.5 (L) 41.0 - 52.0 %     (H) 80 - 100 fL    MCH 35.9 (H) 26.0 - 34.0 pg    MCHC 35.3 32.0 - 36.0 g/dL    RDW 13.2 11.5 - 14.5 %    Platelets 100 (L) 150 - 450 x10*3/uL   POCT GLUCOSE   Result Value Ref Range    POCT Glucose 100 (H) 74 - 99 mg/dL     MR brain wo IV contrast    Result Date: 11/13/2023  Interpreted By:  Galdino Monet, STUDY: MR BRAIN WO IV CONTRAST; 11/13/2023 8:20 am   INDICATION: Signs/Symptoms:stroke-like sympt's.   COMPARISON: None.   ACCESSION NUMBER(S): [id number]   ORDERING CLINICIAN: SAMUEL GAITAN   TECHNIQUE: Multiecho multiplanar imaging of the brain without intravenous contrast.   FINDINGS: Extra-axial spaces and ventricular system: Prominent, reflecting atrophy Cerebral parenchyma: No restricted diffusion. No sign of intracranial hemorrhage. Small patchy periventricular hyperintensity and small hyperintensity in inferior left temporal lobe Midline shift: No mass effect or midline shift. Cerebellum: Normal. Brainstem: Normal. Pituitary gland: Normal.   Visualized paranasal sinuses: Small retention cyst left maxillary sinus. Visualized orbits: Sign of cataract surgery bilaterally. Visualized upper cervical spine: Normal.       No acute finding.   Signed by: Galdino Monet 11/13/2023 10:32 AM Dictation workstation:   [id number]    CT angio brain attack head w IV contrast and post procedure    Result Date: 11/10/2023  Interpreted By:  Martha Coleman, STUDY: CT ANGIO BRAIN ATTACK HEAD W IV CONTRAST AND POST PROCEDURE; CT ANGIO BRAIN ATTACK NECK W IV CONTRAST AND POST PROCEDURE; 11/10/2023 5:13 pm   INDICATION: Signs/Symptoms:Stroke Evaluation with VAN Positive;   COMPARISON: CT head same day /   ACCESSION NUMBER(S): [id number]; [id number]   ORDERING  CLINICIAN: JUDY CHACON   TECHNIQUE: CT arteriogram of the head and neck with 75 ml of Omnipaque 350 was injected intravenously. 3D MIP images were created, processed, and interpreted on an independent workstation.   FINDINGS: CTA HEAD:   Basilar: Patent without aneurysm, dissection or thrombus. PCOM: Patent without aneurysm, dissection or thrombus. ACOM: Patent without aneurysm, dissection or thrombus. ICA: Patent without aneurysm, dissection or thrombus.   LEFT:   MCA: Patent without aneurysm, dissection or thrombus. YOVANY: Patent without aneurysm, dissection or thrombus. PCA: Patent without aneurysm, dissection or thrombus. ICA: Patent without aneurysm, dissection or thrombus. VERT: Patent without aneurysm, dissection or thrombus.   RIGHT:   MCA: Patent without aneurysm, dissection or thrombus. YOVANY: Patent without aneurysm, dissection or thrombus. PCA: Patent without aneurysm, dissection or thrombus. ICA: Patent without aneurysm, dissection or thrombus. VERT: Patent without aneurysm, dissection or thrombus.     CTA NECK:     The estimate of the degree of stenosis included in this report is based on the NASCET CRITERIA for calculating stenosis by using the internal carotid artery distal to the stenosis as the reference point. Normal is no stenosis. Mild is less than 50% stenosis. Moderate is 50-69% stenosis. Severe is 70-99% stenosis. Total occlusion is no detectable patent lumen.   LEFT:   CCA: Patent without aneurysm, dissection or thrombus. Atherosclerotic plaque at the carotid bulb which contributes to less than 50% narrowing. ICA: Patent without aneurysm, dissection or thrombus. ECA: Patent without aneurysm, dissection or thrombus. VERT: Patent without aneurysm, dissection or thrombus.     RIGHT:   CCA: Patent without aneurysm, dissection or thrombus. Atherosclerotic plaque at the carotid bulb extending into the proximal right ICA and contributing to approximately 50% narrowing. ICA: Patent without aneurysm,  dissection or thrombus. ECA: Patent without aneurysm, dissection or thrombus. VERT: Patent without aneurysm, dissection or thrombus.   AORTIC ARCH AND BRANCHES: Patent without aneurysm, dissection or thrombus.   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           1. No large vessel occlusion or high-grade stenosis. 2. Atherosclerotic plaques in the bilateral carotid bulbs contributing to mild (less than 50%) stenosis. Atherosclerotic plaque in the right proximal ICA contributing to approximately 50% narrowing.   Signed by: Martha Coleman 11/10/2023 6:19 PM Dictation workstation:   [id number]    CT angio brain attack neck w IV contrast and post procedure    Result Date: 11/10/2023  Interpreted By:  Martha Coleman, STUDY: CT ANGIO BRAIN ATTACK HEAD W IV CONTRAST AND POST PROCEDURE; CT ANGIO BRAIN ATTACK NECK W IV CONTRAST AND POST PROCEDURE; 11/10/2023 5:13 pm   INDICATION: Signs/Symptoms:Stroke Evaluation with VAN Positive;   COMPARISON: CT head same day /   ACCESSION NUMBER(S): [id number]; [id number]   ORDERING CLINICIAN: JUDY CHACON   TECHNIQUE: CT arteriogram of the head and neck with 75 ml of Omnipaque 350 was injected intravenously. 3D MIP images were created, processed, and interpreted on an independent workstation.   FINDINGS: CTA HEAD:   Basilar: Patent without aneurysm, dissection or thrombus. PCOM: Patent without aneurysm, dissection or thrombus. ACOM: Patent without aneurysm, dissection or thrombus. ICA: Patent without aneurysm, dissection or thrombus.   LEFT:   MCA: Patent without aneurysm, dissection or thrombus. YOVANY: Patent without aneurysm, dissection or thrombus. PCA: Patent without aneurysm, dissection or thrombus. ICA: Patent without aneurysm, dissection or thrombus. VERT: Patent without aneurysm, dissection or thrombus.   RIGHT:   MCA: Patent without aneurysm, dissection or thrombus. YOVANY: Patent without aneurysm, dissection or thrombus. PCA: Patent without  aneurysm, dissection or thrombus. ICA: Patent without aneurysm, dissection or thrombus. VERT: Patent without aneurysm, dissection or thrombus.     CTA NECK:     The estimate of the degree of stenosis included in this report is based on the NASCET CRITERIA for calculating stenosis by using the internal carotid artery distal to the stenosis as the reference point. Normal is no stenosis. Mild is less than 50% stenosis. Moderate is 50-69% stenosis. Severe is 70-99% stenosis. Total occlusion is no detectable patent lumen.   LEFT:   CCA: Patent without aneurysm, dissection or thrombus. Atherosclerotic plaque at the carotid bulb which contributes to less than 50% narrowing. ICA: Patent without aneurysm, dissection or thrombus. ECA: Patent without aneurysm, dissection or thrombus. VERT: Patent without aneurysm, dissection or thrombus.     RIGHT:   CCA: Patent without aneurysm, dissection or thrombus. Atherosclerotic plaque at the carotid bulb extending into the proximal right ICA and contributing to approximately 50% narrowing. ICA: Patent without aneurysm, dissection or thrombus. ECA: Patent without aneurysm, dissection or thrombus. VERT: Patent without aneurysm, dissection or thrombus.   AORTIC ARCH AND BRANCHES: Patent without aneurysm, dissection or thrombus.   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           1. No large vessel occlusion or high-grade stenosis. 2. Atherosclerotic plaques in the bilateral carotid bulbs contributing to mild (less than 50%) stenosis. Atherosclerotic plaque in the right proximal ICA contributing to approximately 50% narrowing.   Signed by: Martha Coleman 11/10/2023 6:19 PM Dictation workstation:   [id number]    XR chest 1 view    Result Date: 11/10/2023  Interpreted By:  Martha Coleman, STUDY: XR CHEST 1 VIEW; 11/10/2023 4:42 pm   INDICATION: Signs/Symptoms:brain attack/cough   COMPARISON: 06/20/2023   ACCESSION NUMBER(S): [id number]   ORDERING  CLINICIAN: JUDY CHACON   TECHNIQUE: Frontal  chest radiographs.   FINDINGS: The cardiomediastinal silhouette is unremarkable. The lungs are clear. No pleural effusion is identified.   The osseous structures are intact.       No acute cardiopulmonary process.       Signed by: Martha Coleman 11/10/2023 4:46 PM Dictation workstation:   [id number]    CT brain attack head wo IV contrast    Result Date: 11/10/2023  Interpreted By:  Martha Coleman, STUDY: CT BRAIN ATTACK HEAD WO IV CONTRAST;  11/10/2023 4:38 pm   INDICATION: Signs/Symptoms:Stroke Evaluation.   COMPARISON: [id number]   ACCESSION NUMBER(S): [id number]   ORDERING CLINICIAN: JUDY CHACON   TECHNIQUE: CT axial images through the Brain were obtained without contrast. All CT examinations are performed with 1 or more of the following dose reduction techniques: Automated exposure control, adjustment of mA and/or kv according to patient's size, or use of iterative reconstruction techniques.   FINDINGS: There is no mass effect, hemorrhage, or infarct. The ventricles appear normal. Gray-white differentiation is maintained. Patchy areas of hypodense attenuation are seen in the subcortical and periventricular white matter; compatible with small vessel ischemic disease. The visualized paranasal sinuses appear clear.       No acute intracranial abnormality.   MACRO: Martha Coleman discussed the significance and urgency of this critical finding by telephone with  JUDY CHACON on 11/10/2023 at 4:45 pm.  (**-RCF-**) Findings:  See findings.     Signed by: Martha Coleman 11/10/2023 4:45 PM Dictation workstation:   [id number]    Transthoracic Echo (TTE) Limited    Result Date: 11/10/2023   [facility], [address] [place] [address]                Tel [phone] and Fax [phone] TRANSTHORACIC ECHOCARDIOGRAM REPORT  Patient Name:      ASHKAN Schmidt Physician:    [id number] Galvin                                                                Yanira CHEATHAM Study Date:        11/10/2023           Ordering Provider:    [id number] DAWNA CHAYA HUNTER MRN/PID:           [id number]             Fellow:               [id number] Yobany Strong MD Accession#:        [id number]         Nurse: Date of Birth/Age: 1940 / 83 years Sonographer:          Bruna Merlos RDCS Gender:            M                    Additional Staff: Height:            180.34 cm            Admit Date:           11/10/2023 Weight:            78.47 kg             Admission Status:     Inpatient - Time                                                               Critical BSA:               1.98 m2              Encounter#:           [id number]                                         Department Location:  [facility]                                                               Cath Lab Blood Pressure: 129 /65 mmHg Study Type:    TRANSTHORACIC ECHO (TTE) LIMITED Diagnosis/ICD: Presence of other cardiac implants and grafts-Z95.818 Indication:    Post-Watchman Procedure CPT Code:      Echo Limited-44162 Patient History: Pertinent History: S/p LAAO. Study Detail: The following Echo studies were performed: 2D. Technically               challenging study due to patient lying in supine position.  PHYSICIAN INTERPRETATION: Left Ventricle: The left ventricular systolic function is normal, with an estimated ejection fraction of 60-65%. The left ventricular cavity size is normal. There is left ventricular concentric remodeling. Abnormal (paradoxical) septal motion consistent with post-operative status. Left ventricular diastolic filling was not assessed. Left Atrium: The left atrium is enlarged. Right Ventricle: The right ventricle was not well visualized. Unable to determine right ventricular systolic function. Right Atrium: The right atrium is moderately dilated. Aortic Valve: The aortic valve appears normal. Aortic valve regurgitation was not  assessed. Mitral Valve: The mitral valve is normal in structure. Mitral valve regurgitation was not assessed. Tricuspid Valve: The tricuspid valve is structurally normal. Tricuspid regurgitation was not assessed. Pulmonic Valve: The pulmonic valve is not well visualized. Pulmonic valve regurgitation was not assessed. Pericardium: There is no pericardial effusion noted. Aorta: The aortic root is normal. Systemic Veins: The inferior vena cava was not well visualized. In comparison to the previous echocardiogram(s): Compared with study from 11/10/2023, no significant change. Note that this was a limited study. Compared to prior full echo from 8/16/23, there is no significant change from available data.  CONCLUSIONS:  1. Left ventricular systolic function is normal with a 60-65% estimated ejection fraction.  2. Abnormal septal motion consistent with post-operative status.  3. The left atrium is enlarged.  4. The right atrium is moderately dilated. QUANTITATIVE DATA SUMMARY: 2D MEASUREMENTS:                           Normal Ranges: IVSd:          1.27 cm    (0.6-1.1cm) LVPWd:         1.25 cm    (0.6-1.1cm) LVIDd:         4.36 cm    (3.9-5.9cm) LV Mass Index: 102.2 g/m2 RA VOLUME BY A/L METHOD:                       Normal Ranges: RA Area A4C: 22.0 cm2  [id number] Kamar Doyle MD Electronically signed on 11/10/2023 at 3:02:52 PM  ** Final **     Transthoracic Echo (TTE) Limited    Result Date: 11/10/2023   [facility], [address] [place] [address]                Tel [phone] and Fax [phone] TRANSTHORACIC ECHOCARDIOGRAM REPORT  Patient Name:     ASHKAN Schmidt Physician:  [id number] Varghese Robles MD Study Date:       11/10/2023          Ordering Provider:  [id number] DAWNA AUGUSTIN MRN/PID:          [id number]            Fellow: Accession#:       [id number]        Nurse: Date of           1940 / 83      Sonographer:        Bruna Merlos RDCS Birth/Age:        years Gender:           M                    Additional Staff: Height:           180.00 cm           Admit Date:         11/10/2023 Weight:           79.00 kg            Admission Status:   Inpatient - Routine BSA:              1.99 m2             Encounter#:         [id number]                                       [facility] Cath                                       Location:           Lab Blood Pressure: 149 /69 mmHg Study Type:    TRANSTHORACIC ECHO (TTE) LIMITED Diagnosis/ICD: Encounter for preprocedural cardiovascular examination-Z01.810 Indication:    Pre-Watchman Procedure CPT Code:      Echo Limited-57725 Patient History: Pertinent History: A-Fib, Chest Pain, HTN, Hyperlipidemia, LE Edema and                    Palpitations. ALEJANDRINA, DMII, CAD s/p CABG. Study Detail: The following Echo studies were performed: 2D. Technically               challenging study due to patient lying in supine position.  PHYSICIAN INTERPRETATION: Left Ventricle: The left ventricular systolic function is normal, with an estimated ejection fraction of 60-65%. There are no regional wall motion abnormalities. The left ventricular cavity size is normal. Left ventricular diastolic filling was not assessed. Left Atrium: The left atrium is enlarged. Right Ventricle: The right ventricle is normal in size. There is normal right ventricular global systolic function. Right Atrium: The right atrium is enlarged. Aortic Valve: The aortic valve is probably trileaflet. Aortic valve regurgitation was not assessed. Mitral Valve: The mitral valve was not well visualized. Mitral valve regurgitation was not assessed. Tricuspid Valve: The tricuspid valve was not well visualized. Tricuspid regurgitation was not assessed. The right ventricular systolic pressure is unable to be estimated. Pulmonic Valve: The pulmonic valve was not assessed. Pulmonic valve regurgitation was not assessed. Pericardium: There is a trivial pericardial effusion. Aorta: The aortic root is  normal.  CONCLUSIONS:  1. Left ventricular systolic function is normal with a 60-65% estimated ejection fraction.  2. The left atrium is enlarged.  3. There is a trivial pericardial effusion. QUANTITATIVE DATA SUMMARY:  [id number] Varghese Robles MD Electronically signed on 11/10/2023 at 2:16:20 PM  ** Final **     CT watchman full contrast    Result Date: 11/10/2023  Interpreted By:  Russ Avila, STUDY: CT WATCHMAN FULL CONTRAST;  11/10/2023 8:28 am   INDICATION: Signs/Symptoms:Preprocedural RICHARDSON sizing and thrombus detection.   COMPARISON: None.   ACCESSION NUMBER(S): [id number]   ORDERING CLINICIAN: JAMIL RAMIREZ   TECHNIQUE: Using multi detector CT technology,axial imaging with prospective gating was performed of the chest following the intravenous administration of contrast material.  A low-osmolar contrast agent was used (70 mL Omnipaque 350). Next, the imaging was repeated with prospective gating after 10 sec delay as per watchman protocol. Delete   For optimization of anatomic evaluation, multiplanar reconstruction, maximum intensity projections, and advanced 3-D off-line postprocessing were performed on a dedicated stand-alone workstation under the direct supervision of the interpreting physician.   CT Dose-Length Product (DLP):  1943 mGy*cm CT Dose Reduction Employed: Yes (Prospective triggering, iterative reconstruction)   FINDINGS: POTENTIAL STUDY LIMITATIONS:  Motion artifact limits accurate assessment of the left atrial appendage.   CORONARY ARTERIES:   CORONARY ANATOMY: There is normal origin of the coronary arteries. Right dominant system. Severe coronary artery calcifications are seen. Please note,the study is not optimized for evaluation of coronary arteries.Status post coronary artery bypass grafting (CABG) with bypass grafts not optimally evaluated on present study.   CARDIAC CHAMBERS: The cardiac chambers demonstrate normal atrioventricular and ventriculoarterial concordance, and systemic and pulmonary  venous return.   LEFT ATRIUM: Dilated (34.2-cm2). There is no evidence of left atrium or left atrial appendage thrombus. The left atrial appendage orifice is round in shape, measuring 1.9 x 1.8 cm in orthogonal dimensions and with an area of  2.8 cm. sq. Left atrial appendage depth is 2.2 cm.   RIGHT ATRIUM: Dilated (28.4-cm2)   VENTRICLES: The left and right ventricles appear normal in appearance, although accurate size measurements are not possible on systolic phase imaging.   INTERATRIAL SEPTUM: Intact.   INTERVENTRICULAR SEPTUM: Intact.   AORTIC VALVE: The aortic valve is trileaflet in morphology. Mild calcifications.   MITRAL VALVE: No valvular thickening/calcification.   THORACIC AORTA: The thoracic aorta normal in course and caliber.There are mild scattered atherosclerosis present, including calcified and noncalcified plaques.There is no evidence for acute aortic pathology, such as dissection, intramural hematoma, or contained rupture. The aortic arch is not included on this examination.   PERICARDIUM: There is no pericardial effusion seen.   CHEST: The trachea and central airways are patent. No endobronchial lesion is seen. There is mild diffuse bronchial wall thickening, which is a non-specific finding. The bilateral lungs are clear without evidence of focal consolidation, pleural effusion, or pneumothorax. Minimal emphysematous changes. No evidence of lymphadenopathy within included mediastinum. Visualized esophagus appears within normal limits as seen.   UPPER ABDOMEN: The visualized subdiaphragmatic structures demonstrate no remarkable findings.     CHEST WALL AND OSSEOUS STRUCTURES: Status post median sternotomy. No acute osseous pathology.There are no suspicious osseous lesions within included chest.Multilevel degenerative changes of the spine. Osteopenia.       1. No evidence of left atrial/left atrial appendage thrombus. 2. Limited evaluation secondary to motion artifact which may affect the accuracy  of left atrial appendage measurements. The left atrial appendage orifice is round in shape, measuring 1.9 x 1.8 cm in orthogonal dimensions and with an area of 2.8 cm. sq. Left atrial appendage depth is 2.2 cm. 3. Severe coronary artery calcifications are seen in the setting of prior bypass grafting. Please note,the study is not optimized for evaluation of coronary arteries/grafts. 4. Biatrial enlargement. 5. Mild aortic valve calcifications.   MACRO: None   Signed by: Russ Avila 11/10/2023 9:44 AM Dictation workstation:   [id number]                  Assessment/Plan   Principal Problem:    Stroke-like episode  Active Problems:    TIA (transient ischemic attack)    Rectal bleeding   -This is small amounts of red bloody mucous more than active bleeding. This is more consistent with his history of constipation than diverticulosis. Possible fissure vs stercoral colitis. His HH remains stable. No indication for urgent endoscopy   -Prevent constipation. Recommend Miralax daily for home bowel regimen       Anticoagulant therapy   No absolute contraindications to hold at this time from GI standpoint     No further GI intervention needed at this time. Please call us with questions or concerns          I spent 20 minutes in the professional and overall care of this patient.      Theresa Giraldo, APRN-CNP

## 2023-11-15 NOTE — PROGRESS NOTES
"Subjective   Patient remains at his normal neurologic baseline.  His wife is with him today and able to add to the history which does not change my interpretation.     Objective   Neurological Exam  Physical Exam    Last Recorded Vitals  Blood pressure 137/56, pulse 74, temperature 37.1 °C (98.8 °F), temperature source Temporal, resp. rate 17, height 1.803 m (5' 11\"), weight 78.5 kg (173 lb), SpO2 100 %.      Neurologically, pt is awake and oriented x4. Attention, concentration, memory, cortical processing are intact. No aphasia  Cranial nerves: VFF, EOMI, No nystagmus, Face is symmetric to sensory and motor, no dysarthria, Tongue protrudes midline, Shrug symmetric  Motor: 5/5 strength B throughout, no tremor or asterixis  Sensation is intact bilaterally throughout  Coordination: no ataxia, no dysmetria/dysdiadochokinesia         Scheduled medications  aspirin, 162 mg, oral, Daily  atorvastatin, 40 mg, oral, Nightly  clopidogrel, 75 mg, oral, Daily  docusate sodium, 100 mg, oral, BID  ferrous sulfate (325 mg ferrous sulfate), 325 mg, oral, Daily  finasteride, 5 mg, oral, Daily  gabapentin, 200 mg, oral, BID  insulin glargine, 15 Units, subcutaneous, Nightly  insulin lispro, 0-15 Units, subcutaneous, TID with meals  levETIRAcetam, 250 mg, oral, BID  lisinopril, 20 mg, oral, Daily  metFORMIN, 1,000 mg, oral, BID  pantoprazole, 40 mg, oral, BID  perflutren lipid microspheres, 0.5-10 mL of dilution, intravenous, Once in imaging  perflutren lipid microspheres, 0.5-10 mL of dilution, intravenous, Once in imaging  perflutren protein A microsphere, 0.5 mL, intravenous, Once in imaging  perflutren protein A microsphere, 0.5 mL, intravenous, Once in imaging  polyethylene glycol, 17 g, oral, Daily  sulfur hexafluoride microsphr, 2 mL, intravenous, Once in imaging  sulfur hexafluoride microsphr, 2 mL, intravenous, Once in imaging  tamsulosin, 0.4 mg, oral, BID      Continuous medications     PRN medications  PRN medications: " acetaminophen, dextrose 10 % in water (D10W), dextrose, glucagon, ondansetron ODT **OR** ondansetron     Results for orders placed or performed during the hospital encounter of 11/10/23 (from the past 96 hour(s))   POCT GLUCOSE   Result Value Ref Range    POCT Glucose 296 (H) 74 - 99 mg/dL   POCT GLUCOSE   Result Value Ref Range    POCT Glucose 158 (H) 74 - 99 mg/dL   POCT GLUCOSE   Result Value Ref Range    POCT Glucose 278 (H) 74 - 99 mg/dL   POCT GLUCOSE   Result Value Ref Range    POCT Glucose 220 (H) 74 - 99 mg/dL   POCT GLUCOSE   Result Value Ref Range    POCT Glucose 268 (H) 74 - 99 mg/dL   CBC and Auto Differential   Result Value Ref Range    WBC 6.0 4.4 - 11.3 x10*3/uL    nRBC 0.0 0.0 - 0.0 /100 WBCs    RBC 3.19 (L) 4.50 - 5.90 x10*6/uL    Hemoglobin 11.2 (L) 13.5 - 17.5 g/dL    Hematocrit 32.7 (L) 41.0 - 52.0 %     (H) 80 - 100 fL    MCH 35.1 (H) 26.0 - 34.0 pg    MCHC 34.3 32.0 - 36.0 g/dL    RDW 13.4 11.5 - 14.5 %    Platelets 84 (L) 150 - 450 x10*3/uL    Neutrophils % 56.5 40.0 - 80.0 %    Immature Granulocytes %, Automated 0.3 0.0 - 0.9 %    Lymphocytes % 26.4 13.0 - 44.0 %    Monocytes % 10.9 2.0 - 10.0 %    Eosinophils % 5.7 0.0 - 6.0 %    Basophils % 0.2 0.0 - 2.0 %    Neutrophils Absolute 3.39 1.60 - 5.50 x10*3/uL    Immature Granulocytes Absolute, Automated 0.02 0.00 - 0.50 x10*3/uL    Lymphocytes Absolute 1.58 0.80 - 3.00 x10*3/uL    Monocytes Absolute 0.65 0.05 - 0.80 x10*3/uL    Eosinophils Absolute 0.34 0.00 - 0.40 x10*3/uL    Basophils Absolute 0.01 0.00 - 0.10 x10*3/uL   Basic Metabolic Panel   Result Value Ref Range    Glucose 123 (H) 65 - 99 mg/dL    Sodium 135 133 - 145 mmol/L    Potassium 4.5 3.4 - 5.1 mmol/L    Chloride 104 97 - 107 mmol/L    Bicarbonate 21 (L) 24 - 31 mmol/L    Urea Nitrogen 24 8 - 25 mg/dL    Creatinine 1.00 0.40 - 1.60 mg/dL    eGFR 75 >60 mL/min/1.73m*2    Calcium 8.5 8.5 - 10.4 mg/dL    Anion Gap 10 <=19 mmol/L   POCT GLUCOSE   Result Value Ref Range    POCT  Glucose 161 (H) 74 - 99 mg/dL   POCT GLUCOSE   Result Value Ref Range    POCT Glucose 238 (H) 74 - 99 mg/dL   POCT GLUCOSE   Result Value Ref Range    POCT Glucose 256 (H) 74 - 99 mg/dL   Hemoglobin and hematocrit, blood   Result Value Ref Range    Hemoglobin 10.8 (L) 13.5 - 17.5 g/dL    Hematocrit 31.6 (L) 41.0 - 52.0 %   POCT GLUCOSE   Result Value Ref Range    POCT Glucose 259 (H) 74 - 99 mg/dL   Hemoglobin and hematocrit, blood   Result Value Ref Range    Hemoglobin 10.5 (L) 13.5 - 17.5 g/dL    Hematocrit 29.9 (L) 41.0 - 52.0 %   Basic Metabolic Panel   Result Value Ref Range    Glucose 153 (H) 65 - 99 mg/dL    Sodium 133 133 - 145 mmol/L    Potassium 4.7 3.4 - 5.1 mmol/L    Chloride 103 97 - 107 mmol/L    Bicarbonate 20 (L) 24 - 31 mmol/L    Urea Nitrogen 22 8 - 25 mg/dL    Creatinine 1.00 0.40 - 1.60 mg/dL    eGFR 75 >60 mL/min/1.73m*2    Calcium 8.3 (L) 8.5 - 10.4 mg/dL    Anion Gap 10 <=19 mmol/L   CBC   Result Value Ref Range    WBC 4.8 4.4 - 11.3 x10*3/uL    nRBC 0.0 0.0 - 0.0 /100 WBCs    RBC 2.85 (L) 4.50 - 5.90 x10*6/uL    Hemoglobin 10.1 (L) 13.5 - 17.5 g/dL    Hematocrit 28.9 (L) 41.0 - 52.0 %     (H) 80 - 100 fL    MCH 35.4 (H) 26.0 - 34.0 pg    MCHC 34.9 32.0 - 36.0 g/dL    RDW 13.2 11.5 - 14.5 %    Platelets 86 (L) 150 - 450 x10*3/uL   POCT GLUCOSE   Result Value Ref Range    POCT Glucose 144 (H) 74 - 99 mg/dL   POCT GLUCOSE   Result Value Ref Range    POCT Glucose 205 (H) 74 - 99 mg/dL   POCT GLUCOSE   Result Value Ref Range    POCT Glucose 205 (H) 74 - 99 mg/dL   POCT GLUCOSE   Result Value Ref Range    POCT Glucose 185 (H) 74 - 99 mg/dL   Basic Metabolic Panel   Result Value Ref Range    Glucose 104 (H) 65 - 99 mg/dL    Sodium 135 133 - 145 mmol/L    Potassium 4.3 3.4 - 5.1 mmol/L    Chloride 104 97 - 107 mmol/L    Bicarbonate 20 (L) 24 - 31 mmol/L    Urea Nitrogen 17 8 - 25 mg/dL    Creatinine 0.90 0.40 - 1.60 mg/dL    eGFR 85 >60 mL/min/1.73m*2    Calcium 8.6 8.5 - 10.4 mg/dL    Anion  Gap 11 <=19 mmol/L   CBC   Result Value Ref Range    WBC 4.9 4.4 - 11.3 x10*3/uL    nRBC 0.0 0.0 - 0.0 /100 WBCs    RBC 2.90 (L) 4.50 - 5.90 x10*6/uL    Hemoglobin 10.4 (L) 13.5 - 17.5 g/dL    Hematocrit 29.5 (L) 41.0 - 52.0 %     (H) 80 - 100 fL    MCH 35.9 (H) 26.0 - 34.0 pg    MCHC 35.3 32.0 - 36.0 g/dL    RDW 13.2 11.5 - 14.5 %    Platelets 100 (L) 150 - 450 x10*3/uL   POCT GLUCOSE   Result Value Ref Range    POCT Glucose 100 (H) 74 - 99 mg/dL   ECG 12 lead   Result Value Ref Range    Ventricular Rate 81 BPM    QRS Duration 142 ms    QT Interval 406 ms    QTC Calculation(Bazett) 471 ms    R Axis 79 degrees    T Axis 17 degrees    QRS Count 14 beats    Q Onset 223 ms    T Offset 426 ms    QTC Fredericia 448 ms   ECG 12 lead daily   Result Value Ref Range    Ventricular Rate 77 BPM    Atrial Rate 88 BPM    QRS Duration 132 ms    QT Interval 406 ms    QTC Calculation(Bazett) 459 ms    R Axis 82 degrees    T Axis -11 degrees    QRS Count 12 beats    Q Onset 223 ms    T Offset 426 ms    QTC Fredericia 441 ms   ECG 12 lead daily   Result Value Ref Range    Ventricular Rate 53 BPM    QRS Duration 142 ms    QT Interval 458 ms    QTC Calculation(Bazett) 429 ms    R Axis 80 degrees    T Axis 27 degrees    QRS Count 9 beats    Q Onset 221 ms    T Offset 450 ms    QTC Fredericia 439 ms   Lipid Panel   Result Value Ref Range    Cholesterol 81 (L) 133 - 200 mg/dL    HDL-Cholesterol 29.0 (L) >40.0 mg/dL    Cholesterol/HDL Ratio 2.8 SEE COMMENT    LDL Calculated 30 (L) 65 - 130 mg/dL    Triglycerides 110 40 - 150 mg/dL   Hemoglobin A1c   Result Value Ref Range    Hemoglobin A1C 6.7 (H) See below %    Estimated Average Glucose 146 Not Established mg/dL   POCT GLUCOSE   Result Value Ref Range    POCT Glucose 192 (H) 74 - 99 mg/dL          EEG    Result Date: 11/15/2023  IMPRESSION Impression This routine EEG is indicative of a moderate diffuse encephalopathy. No epileptiform discharges or lateralizing signs are recorded. A  full report will be scanned into the patient's chart at a later time. This report has been interpreted and electronically signed by    ECG 12 lead daily    Result Date: 11/15/2023  Atrial fibrillation Right bundle branch block Abnormal ECG No previous ECGs available Confirmed by Jailyn Boyer ([id number]) on 11/15/2023 8:53:14 AM    ECG 12 lead daily    Result Date: 11/15/2023  Undetermined rhythm Likely AFIB Right bundle branch block Septal infarct , age undetermined Abnormal ECG No previous ECGs available Confirmed by Jailyn Boyer ([id number]) on 11/15/2023 8:50:34 AM    ECG 12 lead    Result Date: 11/15/2023  Atrial fibrillation Right bundle branch block Abnormal ECG When compared with ECG of 10-NOV-2023 08:49, (unconfirmed) No significant change was found Confirmed by Jailyn Boyer ([id number]) on 11/15/2023 8:46:33 AM    Structural heart procedure    Result Date: 11/14/2023   [facility], Cath Lab, [address] [place] [address] Cardiovascular Catheterization Report Patient Name:     ASHKAN OLIVEROS      Performing Physician:  [id number] Avitia MD Study Date:       11/10/2023          Verifying Physician:   [id number] Avitia MD MRN/PID:          [id number]            Cardiologist/Co-scrub: Accession#:       [id number]        Ordering Physician:    [id number] AVIITA Date of           1940 / 83      Fellow:                [id number] Kendrick Birth/Age:        years                                      MD Gender:           M                   Fellow: Encounter#:       [id number]  Study: Left Atrial Appendage Closure  Left Atrial Appendage Closure (LAAC):  Sterile prep and appropriate procedure timeout were performed. Using ultrasound guidance and micropuncture technique dual access was obtained in the right common  femoral vein. Two 8F sheaths were placed using a modified Seldinger technique. The patient was administered IV Heparin and ACT was confirmed to be therapeutic. An AcuNav ICE probe was then advanced through one of the sheaths and under ICE guidance, transseptal puncture was with a versacross (access solutions), accessing the left atrium. The transseptal tract was then dilated with a Watchman Double Curve access sheath and the ICE probe was advanced through the dilated tract into the left atrium. Next, a 6 Syriac angled pigtail was advanced through the delivery sheath into the left atrial appendage and angiography was performed via the pigtail. Sizing of the device was confirmed by ICE and angiography. We then advanced a 24 mm Watchman Closure Device. At this stage it was evidence to us that the trans-septal puncture height was sub-optimal. Multiple attempts were made to optimize the position of the WM device, which were all unsuccessful (3 recaptures were performed total). At this stage a decision was made to optimize the trans-septal puncture. A second TSP was performed at lower location on the septum. Then, following the approprate steps a 27 mm Watchman FLX was advance and deployed successfully., and confirmed placement both by ICE and angiography. A 'tug test' was performed and confirmed stability. No color Doppler flow around the device was appreciated. 24% device compression was also confirmed. Having satisfied position, anchoring, size and seal criteria, the device was successfully deployed. All equipment was then removed and hemostasis was facilitated by Perclose for 14 Fr and Vascade for 8 Fr. Patient was enrolled in the LAAO registry and data entered for research purposes.  Hemo Personnel: +----------------+---------+ Name            Duty      +----------------+---------+ Louie Avitia MD 1 +----------------+---------+  Hemodynamic Pressures:   +----+----------------------+----------+---------+-----------+-----------+ Site      Date Time       Phase NameMean mmHgA-Wave mmHgV-Wave mmHg +----+----------------------+----------+---------+-----------+-----------+   LA11/10/2023 10:52:30 AM      Rest       18         14         31 +----+----------------------+----------+---------+-----------+-----------+  Complications: No in-lab complications observed.  Cardiac Cath Post Procedure Notes: Post Procedure Diagnosis: Successful LAAC with a 27 mm WM FLX. Blood Loss:               Estimated blood loss during the procedure was 20 mls. Specimens Removed:        Number of specimen(s) removed: none. ____________________________________________________________________________________ CONCLUSIONS:  1. Successful LAAC with a 27 mm WM FLX. ICD 10 Codes: Paroxysmal atrial fibrillation-I48.0  CPT Codes: Perc left atrial appendage closure (LAAC)-34539  [id number] oLuie Avitia MD Performing Physician Electronically signed by [id number] Louie Avitia MD on 11/14/2023 at 3:57:51 PM  ** Final **     MR brain wo IV contrast    Result Date: 11/13/2023  Interpreted By:  Galdino Monet, STUDY: MR BRAIN WO IV CONTRAST; 11/13/2023 8:20 am   INDICATION: Signs/Symptoms:stroke-like sympt's.   COMPARISON: None.   ACCESSION NUMBER(S): [id number]   ORDERING CLINICIAN: SAMUEL GAITAN   TECHNIQUE: Multiecho multiplanar imaging of the brain without intravenous contrast.   FINDINGS: Extra-axial spaces and ventricular system: Prominent, reflecting atrophy Cerebral parenchyma: No restricted diffusion. No sign of intracranial hemorrhage. Small patchy periventricular hyperintensity and small hyperintensity in inferior left temporal lobe Midline shift: No mass effect or midline shift. Cerebellum: Normal. Brainstem: Normal. Pituitary gland: Normal.   Visualized paranasal sinuses: Small retention cyst left maxillary sinus. Visualized orbits: Sign of cataract surgery bilaterally. Visualized upper  cervical spine: Normal.       No acute finding.   Signed by: Galdino Monet 11/13/2023 10:32 AM Dictation workstation:   [id number]    CT angio brain attack head w IV contrast and post procedure    Result Date: 11/10/2023  Interpreted By:  Martha Coleman, STUDY: CT ANGIO BRAIN ATTACK HEAD W IV CONTRAST AND POST PROCEDURE; CT ANGIO BRAIN ATTACK NECK W IV CONTRAST AND POST PROCEDURE; 11/10/2023 5:13 pm   INDICATION: Signs/Symptoms:Stroke Evaluation with VAN Positive;   COMPARISON: CT head same day /   ACCESSION NUMBER(S): [id number]; [id number]   ORDERING CLINICIAN: JUDY CHACON   TECHNIQUE: CT arteriogram of the head and neck with 75 ml of Omnipaque 350 was injected intravenously. 3D MIP images were created, processed, and interpreted on an independent workstation.   FINDINGS: CTA HEAD:   Basilar: Patent without aneurysm, dissection or thrombus. PCOM: Patent without aneurysm, dissection or thrombus. ACOM: Patent without aneurysm, dissection or thrombus. ICA: Patent without aneurysm, dissection or thrombus.   LEFT:   MCA: Patent without aneurysm, dissection or thrombus. YOVANY: Patent without aneurysm, dissection or thrombus. PCA: Patent without aneurysm, dissection or thrombus. ICA: Patent without aneurysm, dissection or thrombus. VERT: Patent without aneurysm, dissection or thrombus.   RIGHT:   MCA: Patent without aneurysm, dissection or thrombus. YOVANY: Patent without aneurysm, dissection or thrombus. PCA: Patent without aneurysm, dissection or thrombus. ICA: Patent without aneurysm, dissection or thrombus. VERT: Patent without aneurysm, dissection or thrombus.     CTA NECK:     The estimate of the degree of stenosis included in this report is based on the NASCET CRITERIA for calculating stenosis by using the internal carotid artery distal to the stenosis as the reference point. Normal is no stenosis. Mild is less than 50% stenosis. Moderate is 50-69% stenosis. Severe is 70-99% stenosis. Total occlusion is no  detectable patent lumen.   LEFT:   CCA: Patent without aneurysm, dissection or thrombus. Atherosclerotic plaque at the carotid bulb which contributes to less than 50% narrowing. ICA: Patent without aneurysm, dissection or thrombus. ECA: Patent without aneurysm, dissection or thrombus. VERT: Patent without aneurysm, dissection or thrombus.     RIGHT:   CCA: Patent without aneurysm, dissection or thrombus. Atherosclerotic plaque at the carotid bulb extending into the proximal right ICA and contributing to approximately 50% narrowing. ICA: Patent without aneurysm, dissection or thrombus. ECA: Patent without aneurysm, dissection or thrombus. VERT: Patent without aneurysm, dissection or thrombus.   AORTIC ARCH AND BRANCHES: Patent without aneurysm, dissection or thrombus.   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           1. No large vessel occlusion or high-grade stenosis. 2. Atherosclerotic plaques in the bilateral carotid bulbs contributing to mild (less than 50%) stenosis. Atherosclerotic plaque in the right proximal ICA contributing to approximately 50% narrowing.   Signed by: Martha Coleman 11/10/2023 6:19 PM Dictation workstation:   [id number]    CT angio brain attack neck w IV contrast and post procedure    Result Date: 11/10/2023  Interpreted By:  Martha Coleman, STUDY: CT ANGIO BRAIN ATTACK HEAD W IV CONTRAST AND POST PROCEDURE; CT ANGIO BRAIN ATTACK NECK W IV CONTRAST AND POST PROCEDURE; 11/10/2023 5:13 pm   INDICATION: Signs/Symptoms:Stroke Evaluation with VAN Positive;   COMPARISON: CT head same day /   ACCESSION NUMBER(S): [id number]; [id number]   ORDERING CLINICIAN: JUDY CHACON   TECHNIQUE: CT arteriogram of the head and neck with 75 ml of Omnipaque 350 was injected intravenously. 3D MIP images were created, processed, and interpreted on an independent workstation.   FINDINGS: CTA HEAD:   Basilar: Patent without aneurysm, dissection or thrombus. PCOM: Patent without  aneurysm, dissection or thrombus. ACOM: Patent without aneurysm, dissection or thrombus. ICA: Patent without aneurysm, dissection or thrombus.   LEFT:   MCA: Patent without aneurysm, dissection or thrombus. YOVANY: Patent without aneurysm, dissection or thrombus. PCA: Patent without aneurysm, dissection or thrombus. ICA: Patent without aneurysm, dissection or thrombus. VERT: Patent without aneurysm, dissection or thrombus.   RIGHT:   MCA: Patent without aneurysm, dissection or thrombus. YOVANY: Patent without aneurysm, dissection or thrombus. PCA: Patent without aneurysm, dissection or thrombus. ICA: Patent without aneurysm, dissection or thrombus. VERT: Patent without aneurysm, dissection or thrombus.     CTA NECK:     The estimate of the degree of stenosis included in this report is based on the NASCET CRITERIA for calculating stenosis by using the internal carotid artery distal to the stenosis as the reference point. Normal is no stenosis. Mild is less than 50% stenosis. Moderate is 50-69% stenosis. Severe is 70-99% stenosis. Total occlusion is no detectable patent lumen.   LEFT:   CCA: Patent without aneurysm, dissection or thrombus. Atherosclerotic plaque at the carotid bulb which contributes to less than 50% narrowing. ICA: Patent without aneurysm, dissection or thrombus. ECA: Patent without aneurysm, dissection or thrombus. VERT: Patent without aneurysm, dissection or thrombus.     RIGHT:   CCA: Patent without aneurysm, dissection or thrombus. Atherosclerotic plaque at the carotid bulb extending into the proximal right ICA and contributing to approximately 50% narrowing. ICA: Patent without aneurysm, dissection or thrombus. ECA: Patent without aneurysm, dissection or thrombus. VERT: Patent without aneurysm, dissection or thrombus.   AORTIC ARCH AND BRANCHES: Patent without aneurysm, dissection or thrombus.   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           1. No large vessel occlusion  or high-grade stenosis. 2. Atherosclerotic plaques in the bilateral carotid bulbs contributing to mild (less than 50%) stenosis. Atherosclerotic plaque in the right proximal ICA contributing to approximately 50% narrowing.   Signed by: Martha Coleman 11/10/2023 6:19 PM Dictation workstation:   [id number]    XR chest 1 view    Result Date: 11/10/2023  Interpreted By:  Martha Coleman, STUDY: XR CHEST 1 VIEW; 11/10/2023 4:42 pm   INDICATION: Signs/Symptoms:brain attack/cough   COMPARISON: 06/20/2023   ACCESSION NUMBER(S): [id number]   ORDERING CLINICIAN: JUDY CHACON   TECHNIQUE: Frontal  chest radiographs.   FINDINGS: The cardiomediastinal silhouette is unremarkable. The lungs are clear. No pleural effusion is identified.   The osseous structures are intact.       No acute cardiopulmonary process.       Signed by: Martha Coleman 11/10/2023 4:46 PM Dictation workstation:   [id number]    CT brain attack head wo IV contrast    Result Date: 11/10/2023  Interpreted By:  Martha Coleman, STUDY: CT BRAIN ATTACK HEAD WO IV CONTRAST;  11/10/2023 4:38 pm   INDICATION: Signs/Symptoms:Stroke Evaluation.   COMPARISON: [id number]   ACCESSION NUMBER(S): [id number]   ORDERING CLINICIAN: JUDY CHACON   TECHNIQUE: CT axial images through the Brain were obtained without contrast. All CT examinations are performed with 1 or more of the following dose reduction techniques: Automated exposure control, adjustment of mA and/or kv according to patient's size, or use of iterative reconstruction techniques.   FINDINGS: There is no mass effect, hemorrhage, or infarct. The ventricles appear normal. Gray-white differentiation is maintained. Patchy areas of hypodense attenuation are seen in the subcortical and periventricular white matter; compatible with small vessel ischemic disease. The visualized paranasal sinuses appear clear.       No acute intracranial abnormality.   MACRO: Martha Coleman discussed the significance and urgency of this  critical finding by telephone with  JUDY CHACON on 11/10/2023 at 4:45 pm.  (**-RCF-**) Findings:  See findings.     Signed by: Martha Coleman 11/10/2023 4:45 PM Dictation workstation:   [id number]    Transthoracic Echo (TTE) Limited    Result Date: 11/10/2023   [facility], [address] [place] [address]                Tel [phone] and Fax [phone] TRANSTHORACIC ECHOCARDIOGRAM REPORT  Patient Name:      ASHKAN Schmidt Physician:    [id number] Kamar Doyle MD Study Date:        11/10/2023           Ordering Provider:    [id number] DAWNA AUGUSTIN MRN/PID:           [id number]             Fellow:               [id number] Yobany Strong MD Accession#:        [id number]         Nurse: Date of Birth/Age: 1940 / 83 years Sonographer:          Bruna Merlos RDCS Gender:            M                    Additional Staff: Height:            180.34 cm            Admit Date:           11/10/2023 Weight:            78.47 kg             Admission Status:     Inpatient - Time                                                               Critical BSA:               1.98 m2              Encounter#:           [id number]                                         Department Location[facility]  [facility]                                                               Cath Lab Blood Pressure: 129 /65 mmHg Study Type:    TRANSTHORACIC ECHO (TTE) LIMITED Diagnosis/ICD: 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 Study Detail: The following Echo studies were performed: 2D. Technically               challenging study due to patient lying in supine position.  PHYSICIAN INTERPRETATION: Left Ventricle: The left ventricular systolic function is normal, with an estimated  ejection fraction of 60-65%. The left ventricular cavity size is normal. There is left ventricular concentric remodeling. Abnormal (paradoxical) septal motion consistent with post-operative status. Left ventricular diastolic filling was not assessed. Left Atrium: The left atrium is enlarged. Right Ventricle: The right ventricle was not well visualized. Unable to determine right ventricular systolic function. Right Atrium: The right atrium is moderately dilated. Aortic Valve: The aortic valve appears normal. Aortic valve regurgitation was not assessed. Mitral Valve: The mitral valve is normal in structure. Mitral valve regurgitation was not assessed. Tricuspid Valve: The tricuspid valve is structurally normal. Tricuspid regurgitation was not assessed. Pulmonic Valve: The pulmonic valve is not well visualized. Pulmonic valve regurgitation was not assessed. Pericardium: There is no pericardial effusion noted. Aorta: The aortic root is normal. Systemic Veins: The inferior vena cava was not well visualized. In comparison to the previous echocardiogram(s): Compared with study from 11/10/2023, no significant change. Note that this was a limited study. Compared to prior full echo from 8/16/23, there is no significant change from available data.  CONCLUSIONS:  1. Left ventricular systolic function is normal with a 60-65% estimated ejection fraction.  2. Abnormal septal motion consistent with post-operative status.  3. The left atrium is enlarged.  4. The right atrium is moderately dilated. QUANTITATIVE DATA SUMMARY: 2D MEASUREMENTS:                           Normal Ranges: IVSd:          1.27 cm    (0.6-1.1cm) LVPWd:         1.25 cm    (0.6-1.1cm) LVIDd:         4.36 cm    (3.9-5.9cm) LV Mass Index: 102.2 g/m2 RA VOLUME BY A/L METHOD:                       Normal Ranges: RA Area A4C: 22.0 cm2  [id number] Kamar Doyle MD Electronically signed on 11/10/2023 at 3:02:52 PM  ** Final **     Transthoracic Echo (TTE)  Limited    Result Date: 11/10/2023   [facility], [address] [place] [address]                Tel [phone] and Fax [phone] TRANSTHORACIC ECHOCARDIOGRAM REPORT  Patient Name:     ASHKAN OLIVEROS      Reading Physician:  [id number] Varghese Robles MD Study Date:       11/10/2023          Ordering Provider:  [id number] DAWNA AUGUSTIN MRN/PID:          [id number]            Fellow: Accession#:       [id number]        Nurse: Date of           1940 / 83      Sonographer:        Bruna Merlos [facility] Birth/Age:        years Gender:           M                   Additional Staff: Height:           180.00 cm           Admit Date:         11/10/2023 Weight:           79.00 kg            Admission Status:   Inpatient - Routine BSA:              1.99 m2             Encounter#:         [id number]                                       Department          [facility] Cath                                       Location:           Lab Blood Pressure: 149 /69 mmHg Study Type:    TRANSTHORACIC ECHO (TTE) LIMITED Diagnosis/ICD: Encounter for preprocedural cardiovascular examination-Z01.810 Indication:    Pre-Watchman Procedure CPT Code:      Echo Limited-66282 Patient History: Pertinent History: A-Fib, Chest Pain, HTN, Hyperlipidemia, LE Edema and                    Palpitations. ALEJANDRINA, DMII, CAD s/p CABG. Study Detail: The following Echo studies were performed: 2D. Technically               challenging study due to patient lying in supine position.  PHYSICIAN INTERPRETATION: Left Ventricle: The left ventricular systolic function is normal, with an estimated ejection fraction of 60-65%. There are no regional wall motion abnormalities. The left ventricular cavity size is normal. Left ventricular diastolic filling was not assessed. Left Atrium: The left atrium is enlarged. Right Ventricle: The right ventricle is normal in size. There is normal right ventricular global systolic function. Right Atrium: The right atrium  is enlarged. Aortic Valve: The aortic valve is probably trileaflet. Aortic valve regurgitation was not assessed. Mitral Valve: The mitral valve was not well visualized. Mitral valve regurgitation was not assessed. Tricuspid Valve: The tricuspid valve was not well visualized. Tricuspid regurgitation was not assessed. The right ventricular systolic pressure is unable to be estimated. Pulmonic Valve: The pulmonic valve was not assessed. Pulmonic valve regurgitation was not assessed. Pericardium: There is a trivial pericardial effusion. Aorta: The aortic root is normal.  CONCLUSIONS:  1. Left ventricular systolic function is normal with a 60-65% estimated ejection fraction.  2. The left atrium is enlarged.  3. There is a trivial pericardial effusion. QUANTITATIVE DATA SUMMARY:  [id number] Varghese Robles MD Electronically signed on 11/10/2023 at 2:16:20 PM  ** Final **     CT watchman full contrast    Result Date: 11/10/2023  Interpreted By:  Russ Avila, STUDY: CT WATCHMAN FULL CONTRAST;  11/10/2023 8:28 am   INDICATION: Signs/Symptoms:Preprocedural RICHARDSON sizing and thrombus detection.   COMPARISON: None.   ACCESSION NUMBER(S): [id number]   ORDERING CLINICIAN: JAMIL RAMIREZ   TECHNIQUE: Using multi detector CT technology,axial imaging with prospective gating was performed of the chest following the intravenous administration of contrast material.  A low-osmolar contrast agent was used (70 mL Omnipaque 350). Next, the imaging was repeated with prospective gating after 10 sec delay as per watchman protocol. Delete   For optimization of anatomic evaluation, multiplanar reconstruction, maximum intensity projections, and advanced 3-D off-line postprocessing were performed on a dedicated stand-alone workstation under the direct supervision of the interpreting physician.   CT Dose-Length Product (DLP):  1943 mGy*cm CT Dose Reduction Employed: Yes (Prospective triggering, iterative reconstruction)   FINDINGS: POTENTIAL STUDY  LIMITATIONS:  Motion artifact limits accurate assessment of the left atrial appendage.   CORONARY ARTERIES:   CORONARY ANATOMY: There is normal origin of the coronary arteries. Right dominant system. Severe coronary artery calcifications are seen. Please note,the study is not optimized for evaluation of coronary arteries.Status post coronary artery bypass grafting (CABG) with bypass grafts not optimally evaluated on present study.   CARDIAC CHAMBERS: The cardiac chambers demonstrate normal atrioventricular and ventriculoarterial concordance, and systemic and pulmonary venous return.   LEFT ATRIUM: Dilated (34.2-cm2). There is no evidence of left atrium or left atrial appendage thrombus. The left atrial appendage orifice is round in shape, measuring 1.9 x 1.8 cm in orthogonal dimensions and with an area of  2.8 cm. sq. Left atrial appendage depth is 2.2 cm.   RIGHT ATRIUM: Dilated (28.4-cm2)   VENTRICLES: The left and right ventricles appear normal in appearance, although accurate size measurements are not possible on systolic phase imaging.   INTERATRIAL SEPTUM: Intact.   INTERVENTRICULAR SEPTUM: Intact.   AORTIC VALVE: The aortic valve is trileaflet in morphology. Mild calcifications.   MITRAL VALVE: No valvular thickening/calcification.   THORACIC AORTA: The thoracic aorta normal in course and caliber.There are mild scattered atherosclerosis present, including calcified and noncalcified plaques.There is no evidence for acute aortic pathology, such as dissection, intramural hematoma, or contained rupture. The aortic arch is not included on this examination.   PERICARDIUM: There is no pericardial effusion seen.   CHEST: The trachea and central airways are patent. No endobronchial lesion is seen. There is mild diffuse bronchial wall thickening, which is a non-specific finding. The bilateral lungs are clear without evidence of focal consolidation, pleural effusion, or pneumothorax. Minimal emphysematous changes. No  evidence of lymphadenopathy within included mediastinum. Visualized esophagus appears within normal limits as seen.   UPPER ABDOMEN: The visualized subdiaphragmatic structures demonstrate no remarkable findings.     CHEST WALL AND OSSEOUS STRUCTURES: Status post median sternotomy. No acute osseous pathology.There are no suspicious osseous lesions within included chest.Multilevel degenerative changes of the spine. Osteopenia.       1. No evidence of left atrial/left atrial appendage thrombus. 2. Limited evaluation secondary to motion artifact which may affect the accuracy of left atrial appendage measurements. The left atrial appendage orifice is round in shape, measuring 1.9 x 1.8 cm in orthogonal dimensions and with an area of 2.8 cm. sq. Left atrial appendage depth is 2.2 cm. 3. Severe coronary artery calcifications are seen in the setting of prior bypass grafting. Please note,the study is not optimized for evaluation of coronary arteries/grafts. 4. Biatrial enlargement. 5. Mild aortic valve calcifications.   MACRO: None   Signed by: Russ Avila 11/10/2023 9:44 AM Dictation workstation:   [id number]       Assessment/Plan   Principal Problem:    Stroke-like episode  Active Problems:    TIA (transient ischemic attack)    83-year-old man with a normal MRI and normal EEG likely had a seizure leading to this hospitalization.  No antiepileptic medications are indicated at this time.  He is okay for discharge to home and we discussed routine seizure precautions including not driving for the next 6 months.  He should follow-up with me in 4 weeks.  He is okay for discharge to home today.       I personally spent 30 minutes today, exclusive of procedures, providing care for this patient, including preparation, face to face time, documentation and other services such as review of medical records, diagnostic result, patient education, counseling, coordination of care as specified in the encounter.

## 2023-11-15 NOTE — PROGRESS NOTES
Physical Therapy    Physical Therapy Treatment    Patient Name: Luis Greene  MRN: [id number]  Today's Date: 11/15/2023  Time Calculation  Start Time: 1116  Stop Time: 1140  Time Calculation (min): 24 min     Assessment/Plan   PT Assessment  PT Assessment Results: Decreased strength, Decreased range of motion, Decreased endurance, Impaired balance, Decreased mobility, Decreased safety awareness  Rehab Prognosis: Good  Evaluation/Treatment Tolerance: Patient tolerated treatment well  Medical Staff Made Aware: Yes  Strengths: Support of extended family/friends, Support and attitude of living partners, Living arrangement secure  Barriers to Participation: Comorbidities  End of Session Communication: Bedside nurse  Assessment Comment: Improved gait quality/tolerance, able to negotiate 3 steps with min assist x 1, pleasant and cooperative throughout, intermittent cueing for safety/sequencing, may benefit from 24 hr supervision upon d/c.  End of Session Patient Position: Up in chair  PT Plan  Inpatient/Swing Bed or Outpatient: Inpatient  PT Plan  Treatment/Interventions: Bed mobility, Transfer training, Gait training, Stair training, Balance training, Neuromuscular re-education, Strengthening, Endurance training, Range of motion, Therapeutic exercise, Therapeutic activity  PT Plan: Skilled PT  PT Frequency: 3 times per week  PT Discharge Recommendations: Low intensity level of continued care  Equipment Recommended upon Discharge: Wheeled walker  PT Recommended Transfer Status: Assist x1  PT - OK to Discharge: Yes    General Visit Information:   PT  Visit  PT Received On: 11/15/23  Response to Previous Treatment: Patient with no complaints from previous session.  General  Reason for Referral: impaired functional mobility  Referred By: Dr. Mai MD  Past Medical History Relevant to Rehab: CAD, HLD, watchman, hyperchol, DM, Afib, HTN, dementia, GERD, BPH  Missed Visit: No  Missed Visit Reason: Other  (Comment)  Family/Caregiver Present: Yes  Caregiver Feedback: daughter and spouse at bedside  Co-Treatment:  (no)  Co-Treatment Reason: n/a  Prior to Session Communication: Bedside nurse  Patient Position Received: On cart  Preferred Learning Style: verbal  General Comment: Pt cleared for therapy via RN, received in sitting, NAD, agreeable to participate in therapy. (+) telemetry    Subjective   Precautions:  Precautions  Hearing/Visual Limitations: reading glasses  Medical Precautions: Fall precautions  Prosthesis/Orthosis Used: Ankle/Foot orthosis (B AFOs)    Objective   Pain:  Pain Assessment  Pain Assessment: 0-10  Pain Score: 0 - No pain  Cognition:  Cognition  Overall Cognitive Status: Within Functional Limits  Orientation Level: Oriented X4  Safety/Judgement: Exceptions to WFL  Insight: Mild  Postural Control:  Postural Control  Postural Control: Impaired  Posture Comment: forward head, rounded shoulders  Extremity/Trunk Assessments:  RLE   RLE : Exceptions to WFL (foot drop)  LLE   LLE : Exceptions to WFL (foot drop)  Activity Tolerance:  Activity Tolerance  Endurance: Endurance does not limit participation in activity  Treatments:  Therapeutic Exercise  Therapeutic Exercise Performed: Yes  Therapeutic Exercise Activity 1: B seated knee ext x 10  Therapeutic Exercise Activity 2: B seated hip flex x 10  Therapeutic Exercise Activity 3: B seated isometric hip adduction x 10  Therapeutic Exercise Activity 4: B seated isometric hip abduction x 10  Therapeutic Exercise Activity 5: chair push-ups x 10    Ambulation/Gait Training  Ambulation/Gait Training Performed: Yes  Ambulation/Gait Training 1  Surface 1: Level tile  Device 1: Rolling walker  Gait Support Devices: R Ankle-foot orthoses, L Ankle-foot orthosis  Assistance 1: Close supervision  Quality of Gait 1:  (normal jose david, decreased B heel strike/toe off, forward head, steady gait)  Comments/Distance (ft) 1: 150' x 2  Transfers  Transfer: Yes  Transfer  1  Transfer From 1: Sit to  Transfer to 1: Stand  Technique 1: Sit to stand  Transfer Device 1: Walker  Transfer Level of Assistance 1: Close supervision, Minimal verbal cues  Trials/Comments 1: cueing for sequencing/safety  Transfers 2  Transfer From 2: Stand to  Transfer to 2: Sit  Technique 2: Stand to sit  Transfer Device 2: Walker  Transfer Level of Assistance 2: Close supervision  Trials/Comments 2: good sequencing noted to sit    Stairs  Stairs: Yes  Stairs  Rails 1: Right  Curb Step 1: No  Device 1: Railing  Support Devices 1: Right Ankle-foot orthoses, Left Ankle-foot orthosis  Assistance 1: Minimum assistance  Comment/Number of Steps 1: 3 (non-reciprocating pattern, cueing for sequencing, no LOB noted)    Outcome Measures:  [facility] Basic Mobility  Turning from your back to your side while in a flat bed without using bedrails: A little  Moving from lying on your back to sitting on the side of a flat bed without using bedrails: A little  Moving to and from bed to chair (including a wheelchair): A little  Standing up from a chair using your arms (e.g. wheelchair or bedside chair): A little  To walk in hospital room: A little  Climbing 3-5 steps with railing: A little  Basic Mobility - Total Score: 18    Education Documentation  Mobility Training, taught by Hafsa Lozano PT at 11/15/2023 12:16 PM.  Learner: Patient  Readiness: Eager  Method: Explanation, Demonstration  Response: Demonstrated Understanding    Education Comments  No comments found.      OP EDUCATION:  Education  Individual(s) Educated: Patient  Education Provided: Fall Risk, Home Exercise Program  Risk and Benefits Discussed with Patient/Caregiver/Other: yes  Patient/Caregiver Demonstrated Understanding: yes  Plan of Care Discussed and Agreed Upon: yes  Patient Response to Education: Patient/Caregiver Verbalized Understanding of Information    Encounter Problems       Encounter Problems (Active)       Mobility       STG - Patient will ambulate  150' with rolling walker and modified independence. (Progressing)       Start:  11/13/23    Expected End:  11/20/23            STG - Patient will ascend and descend four stairs with one handrail and modified independence. (Progressing)       Start:  11/13/23    Expected End:  11/20/23               Pain - Adult          Safety       LTG - Patient will demonstrate safety requirements appropriate to situation/environment with supervision. (Progressing)       Start:  11/13/23    Expected End:  11/20/23            STG - Patient uses call light consistently to request assistance with transfers (Progressing)       Start:  11/13/23    Expected End:  11/20/23               Transfers       STG - Patient will transfer sit to and from stand with rolling walker and modified independence. (Progressing)       Start:  11/13/23    Expected End:  11/20/23

## 2023-11-15 NOTE — PROGRESS NOTES
Luis Greene is a 83 y.o. male on day 2 of admission presenting with Stroke-like episode.    Subjective   Patient seen and examined.  Resting in bed in no acute distress.  Spouse and daughter at bedside.  Awake alert oriented x 3.  No new complaints.  Stool mucous, pink tinged.  Note Gastroenterology impression symptoms related to constipation.  No new issues per family at bedside -- Mentation at baseline, no confusion.  Questions re: discharge disposition, possibility of skilled nursing rehabilitation      Objective     Physical Exam  Vitals and nursing note reviewed.   Constitutional:       General: He is not in acute distress.     Appearance: Normal appearance. He is normal weight. He is not ill-appearing or toxic-appearing.   HENT:      Head: Normocephalic and atraumatic.      Right Ear: Tympanic membrane normal.      Left Ear: Tympanic membrane normal.      Nose: Nose normal.      Mouth/Throat:      Mouth: Mucous membranes are moist.      Pharynx: Oropharynx is clear.   Eyes:      Extraocular Movements: Extraocular movements intact.      Conjunctiva/sclera: Conjunctivae normal.      Pupils: Pupils are equal, round, and reactive to light.   Cardiovascular:      Rate and Rhythm: Normal rate. Rhythm irregular.      Pulses: Normal pulses.      Heart sounds: Normal heart sounds. No murmur heard.  Pulmonary:      Effort: Pulmonary effort is normal.      Breath sounds: Normal breath sounds.   Abdominal:      General: Bowel sounds are normal. There is no distension.      Palpations: Abdomen is soft.      Tenderness: There is no abdominal tenderness.   Genitourinary:     Comments:  Tiwari catheter in place draining clear yellow urine. Rectal examination deferred  Musculoskeletal:         General: No swelling. Normal range of motion.      Cervical back: Normal range of motion and neck supple.   Skin:     General: Skin is warm and dry.      Capillary Refill: Capillary refill takes less than 2 seconds.   Neurological:  "     General: No focal deficit present.      Mental Status: He is alert and oriented to person, place, and time.   Psychiatric:         Mood and Affect: Mood normal.         Behavior: Behavior normal.       Last Recorded Vitals  Blood pressure 149/53, pulse 79, temperature 36 °C (96.8 °F), temperature source Temporal, resp. rate 20, height 1.803 m (5' 11\"), weight 78.5 kg (173 lb), SpO2 97 %.    Intake/Output last 3 Shifts:  I/O last 3 completed shifts:  In: 480 (6.1 mL/kg) [P.O.:480]  Out: 3150 (40.1 mL/kg) [Urine:3150 (1.1 mL/kg/hr)]  Weight: 78.5 kg     Telemetry atrial fibrillation rate 70's    Relevant Results  Results for orders placed or performed during the hospital encounter of 11/10/23 (from the past 24 hour(s))   POCT GLUCOSE   Result Value Ref Range    POCT Glucose 205 (H) 74 - 99 mg/dL   POCT GLUCOSE   Result Value Ref Range    POCT Glucose 185 (H) 74 - 99 mg/dL   Basic Metabolic Panel   Result Value Ref Range    Glucose 104 (H) 65 - 99 mg/dL    Sodium 135 133 - 145 mmol/L    Potassium 4.3 3.4 - 5.1 mmol/L    Chloride 104 97 - 107 mmol/L    Bicarbonate 20 (L) 24 - 31 mmol/L    Urea Nitrogen 17 8 - 25 mg/dL    Creatinine 0.90 0.40 - 1.60 mg/dL    eGFR 85 >60 mL/min/1.73m*2    Calcium 8.6 8.5 - 10.4 mg/dL    Anion Gap 11 <=19 mmol/L   CBC   Result Value Ref Range    WBC 4.9 4.4 - 11.3 x10*3/uL    nRBC 0.0 0.0 - 0.0 /100 WBCs    RBC 2.90 (L) 4.50 - 5.90 x10*6/uL    Hemoglobin 10.4 (L) 13.5 - 17.5 g/dL    Hematocrit 29.5 (L) 41.0 - 52.0 %     (H) 80 - 100 fL    MCH 35.9 (H) 26.0 - 34.0 pg    MCHC 35.3 32.0 - 36.0 g/dL    RDW 13.2 11.5 - 14.5 %    Platelets 100 (L) 150 - 450 x10*3/uL   POCT GLUCOSE   Result Value Ref Range    POCT Glucose 100 (H) 74 - 99 mg/dL   ECG 12 lead   Result Value Ref Range    Ventricular Rate 81 BPM    QRS Duration 142 ms    QT Interval 406 ms    QTC Calculation(Bazett) 471 ms    R Axis 79 degrees    T Axis 17 degrees    QRS Count 14 beats    Q Onset 223 ms    T Offset 426 ms "    QTC Fredericia 448 ms   ECG 12 lead daily   Result Value Ref Range    Ventricular Rate 77 BPM    Atrial Rate 88 BPM    QRS Duration 132 ms    QT Interval 406 ms    QTC Calculation(Bazett) 459 ms    R Axis 82 degrees    T Axis -11 degrees    QRS Count 12 beats    Q Onset 223 ms    T Offset 426 ms    QTC Fredericia 441 ms   ECG 12 lead daily   Result Value Ref Range    Ventricular Rate 53 BPM    QRS Duration 142 ms    QT Interval 458 ms    QTC Calculation(Bazett) 429 ms    R Axis 80 degrees    T Axis 27 degrees    QRS Count 9 beats    Q Onset 221 ms    T Offset 450 ms    QTC Fredericia 439 ms   Hemoglobin A1c   Result Value Ref Range    Hemoglobin A1C 6.7 (H) See below %    Estimated Average Glucose 146 Not Established mg/dL     ECG 12 lead daily    Result Date: 11/15/2023  Atrial fibrillation Right bundle branch block Abnormal ECG No previous ECGs available Confirmed by Jailyn Boyer ([id number]) on 11/15/2023 8:53:14 AM    ECG 12 lead daily    Result Date: 11/15/2023  Undetermined rhythm Likely AFIB Right bundle branch block Septal infarct , age undetermined Abnormal ECG No previous ECGs available Confirmed by Jailyn Boyer ([id number]) on 11/15/2023 8:50:34 AM    ECG 12 lead    Result Date: 11/15/2023  Atrial fibrillation Right bundle branch block Abnormal ECG When compared with ECG of 10-NOV-2023 08:49, (unconfirmed) No significant change was found Confirmed by Jailyn Boyer ([id number]) on 11/15/2023 8:46:33 AM     Scheduled medications  aspirin, 162 mg, oral, Daily  atorvastatin, 40 mg, oral, Nightly  clopidogrel, 75 mg, oral, Daily  docusate sodium, 100 mg, oral, BID  ferrous sulfate (325 mg ferrous sulfate), 325 mg, oral, Daily  finasteride, 5 mg, oral, Daily  gabapentin, 200 mg, oral, BID  insulin glargine, 15 Units, subcutaneous, Nightly  insulin lispro, 0-15 Units, subcutaneous, TID with meals  levETIRAcetam, 250 mg, oral, BID  lisinopril, 20 mg, oral, Daily  metFORMIN, 1,000 mg, oral, BID  pantoprazole, 40 mg, oral,  BID  perflutren lipid microspheres, 0.5-10 mL of dilution, intravenous, Once in imaging  perflutren lipid microspheres, 0.5-10 mL of dilution, intravenous, Once in imaging  perflutren protein A microsphere, 0.5 mL, intravenous, Once in imaging  perflutren protein A microsphere, 0.5 mL, intravenous, Once in imaging  polyethylene glycol, 17 g, oral, Daily  sulfur hexafluoride microsphr, 2 mL, intravenous, Once in imaging  sulfur hexafluoride microsphr, 2 mL, intravenous, Once in imaging  tamsulosin, 0.4 mg, oral, BID      Continuous medications     PRN medications  PRN medications: acetaminophen, dextrose 10 % in water (D10W), dextrose, glucagon, ondansetron ODT **OR** ondansetron      This patient has a urinary catheter   Reason for the urinary catheter remaining today? urinary retention/bladder outlet obstruction, acute or chronic    ASSESSMENT:  Stroke-like episode  Possible seizure  Dementia  Hematochezia  Diarrhea/grainy stool intermittent  Anemia stable  Chronic atrial fibrillation status post Watchman implant  Hypertension  Hyperlipidemia  Coronary artery disease  Type 2 diabetes mellitus  GERD  BPH  Chronic urinary retention - Guidry catheter    PLAN:  Patient is doing well this morning.  No new issues.  Mentation stable.  Empiric Keppra 250 milligrams p.o bid per Neurology.  Education.  EEG complete.  Await reading.  H&H stable.  Stable per Gastroenterology.  Continue Aspirin, Plavix.  Continue Ppi.  Outpatient follow up.  Maintain guidry catheter.  Telemetry atrial fibrillation rate controlled.  Blood pressure stable.  Metoprolol on hold.  Lisinopril with parameters.  Discussed with Cardiology Dr. Grant.  Medical management per recommendations.  Stable Cardiac.  Point of care glucose reviewed.  Continue sliding scale insulin.  PT/OT.  Fall precautions.  Up with assistance only.  Bed and chair alarm at all times.  DVT prophylaxis SCD's.  Supportive care.  Patient, daughter, spouse reassured.  Case management  following for discharge planning.  Await discharge arrangements home with  home health care vs SNF.  Anticipate discharge pending EEG, Neurology clearance, when arrangements are made by case management.  Discussed with patient, daughter, spouse, nursing, case management and Dr. Oneill.    Martina Haynes, JONA-CNP

## 2023-11-15 NOTE — PROGRESS NOTES
Subjective Data:  Patient appears well no new complaints.    Overnight Events:    No significant overnight events.     Objective Data:  Last Recorded Vitals:  Vitals:    11/14/23 1650 11/14/23 2113 11/15/23 0054 11/15/23 0930   BP: 155/65 141/58 118/63 149/53   BP Location: Right arm Left arm Right arm Left arm   Patient Position: Lying Lying Lying Lying   Pulse: 76 70 64 79   Resp: 22 21 20 20   Temp: 36.7 °C (98.1 °F) 36.5 °C (97.7 °F) 36.4 °C (97.5 °F) 36 °C (96.8 °F)   TempSrc: Oral Temporal Oral Temporal   SpO2: 98% 98% 97% 97%   Weight:       Height:           Last Labs:  CBC - 11/15/2023:  5:57 AM  4.9 10.4 100    29.5      CMP - 11/15/2023:  5:57 AM  8.6 5.3 30 --- 1.1   _ 3.5 40 45      PTT - 11/10/2023:  4:59 PM  1.2   12.3 22.2     HGBA1C   Date/Time Value Ref Range Status   09/14/2023 12:14 PM 6.8 % Final     Comment:          Diagnosis of Diabetes-Adults   Non-Diabetic: < or = 5.6%   Increased risk for developing diabetes: 5.7-6.4%   Diagnostic of diabetes: > or = 6.5%  .       Monitoring of Diabetes                Age (y)     Therapeutic Goal (%)   Adults:          >18           <7.0   Pediatrics:    13-18           <7.5                   7-12           <8.0                   0- 6            7.5-8.5   American Diabetes Association. Diabetes Care 33(S1), Jan 2010.   08/14/2023 02:45 PM 8.1 4.0 - 6.0 % Final     Comment:     Hemoglobin A1C levels are related to mean blood glucose during the   preceding 2-3 months. The relationship table below may be used as a   general guide. Each 1% increase in HGB A1C is a reflection of an   increase in mean glucose of approximately 30 mg/dl.   Reference: Diabetes Care, volume 29, supplement 1 Jan. 2006                        HGB A1C ................. Approx. Mean Glucose   _______________________________________________   6%   ...............................  120 mg/dl   7%   ...............................  150 mg/dl   8%   ...............................  180  "mg/dl   9%   ...............................  210 mg/dl   10%  ...............................  240 mg/dl  Performed at [address] [address]     04/19/2023 04:03 PM 9.7 4.0 - 5.6 % Final   02/03/2023 09:12 AM 9.6 4.0 - 5.6 % Final     LDLCALC   Date/Time Value Ref Range Status   08/15/2023 05:50 AM 31 65 - 130 MG/DL Final   10/28/2022 05:39 AM 32 65 - 130 MG/DL Final   07/06/2021 09:50 AM 39 65 - 130 MG/DL Final     VLDL   Date/Time Value Ref Range Status   08/22/2022 10:17 AM 11 0 - 40 mg/dL Final   09/17/2019 01:20 PM 16 0 - 40 mg/dL Final      Last I/O:  I/O last 3 completed shifts:  In: 480 (6.1 mL/kg) [P.O.:480]  Out: 3150 (40.1 mL/kg) [Urine:3150 (1.1 mL/kg/hr)]  Weight: 78.5 kg     Past Cardiology Tests (Last 3 Years):  EKG:  ECG 12 lead daily 11/15/2023      ECG 12 lead daily 11/15/2023      ECG 12 lead 11/15/2023    Echo:  Transthoracic Echo (TTE) Limited 11/10/2023      Transthoracic Echo (TTE) Limited 11/10/2023    Ejection Fractions:  No results found for: \"EF\"  Cath:  No results found for this or any previous visit from the past 1095 days.    Stress Test:  Nuclear Stress Test 2/13/2023    Cardiac Imaging:  No results found for this or any previous visit from the past 1095 days.      Inpatient Medications:  Scheduled medications   Medication Dose Route Frequency    aspirin  162 mg oral Daily    atorvastatin  40 mg oral Nightly    clopidogrel  75 mg oral Daily    docusate sodium  100 mg oral BID    ferrous sulfate (325 mg ferrous sulfate)  325 mg oral Daily    finasteride  5 mg oral Daily    gabapentin  200 mg oral BID    insulin glargine  15 Units subcutaneous Nightly    insulin lispro  0-15 Units subcutaneous TID with meals    levETIRAcetam  250 mg oral BID    lisinopril  20 mg oral Daily    metFORMIN  1,000 mg oral BID    pantoprazole  40 mg intravenous BID    perflutren lipid microspheres  0.5-10 mL of dilution intravenous Once in imaging    perflutren lipid microspheres  " 0.5-10 mL of dilution intravenous Once in imaging    perflutren protein A microsphere  0.5 mL intravenous Once in imaging    perflutren protein A microsphere  0.5 mL intravenous Once in imaging    polyethylene glycol  17 g oral Daily    sulfur hexafluoride microsphr  2 mL intravenous Once in imaging    sulfur hexafluoride microsphr  2 mL intravenous Once in imaging    tamsulosin  0.4 mg oral BID     PRN medications   Medication    acetaminophen    dextrose 10 % in water (D10W)    dextrose    glucagon    ondansetron ODT    Or    ondansetron     Continuous Medications   Medication Dose Last Rate       Physical Exam:  General: alert, oriented x2-3, very pleasant; son at bedside  HEENT: normal cephalic, atraumatic  Neck: No JVD, bruit or thrill, masses or tenderness   Heart: S1/S2, Rate 50, Rhythm irregular, no s3 or s4, no murmur, thrill, or heaves at PMI.   Lungs: Clear, equal expansion and excursion, no wheezes, crackles, rhales or rhonci.  Room air.  No conversational dyspnea appreciated.  No tachypnea.  No pain with deep aspiration   Abdomen: Overweight, bowel sounds x 4, soft, non-tender   Genitourinary: deferred   Extremities: No significant upper or lower extremity daylin appreciated.     Assessment/Plan   11/12: Assessed by [facility] in collaboration with Dr. Ford on coverage for Dr. Grant.  Presented with strokelike symptoms.  Recently underwent a Watchman device placement on 11/10/2023.  Initial CT brain does not show any acute findings.  Neurology has requested MRI brain; given patient's recent Watchman device and the metallic nature of this device they question the compatibility of the Watchman device with MRI.       Review of literature from [place] indicates 3 criteria which are required to safely undergo an MRI scan as follows:  Static magnetic field of 3.0 Judy or 1.5 Judy  Maximum spatial gradient field of 2500 GAUSS/centimeters  Maximum MR system reported, whole body  average specific absorption rate (XI) of <2.0 w/kg (normal operating mode only) for 15 minutes     Patient's implanting physician was contacted (Dr. Avitia) who notes the patient may go ahead to receive an MRI of the brain for further evaluation.     Otherwise from a cardiac perspective patient does remain in a slow atrial fibrillation with rates overnight in the mid 30s.  At this point we will stop his metoprolol and will stop calcium channel blocker and reassess heart rates over the next 24 hours.  I have seen no evidence to this point and the patient will require a permanent pacemaker, but we will continue to monitor.  He is euvolemic on examination chest pain-free.  Breathing comfortably.  We will follow with you.     11/13: The patient was seen and events reviewed in detail also discussed with one of his daughters.  The patient underwent his transesophageal echo guided Watchman implantation device performed at [facility] 11/10/2023 that was performed uneventfully.  The patient's left atrial appendage was negative for any visible thrombus at the time of implantation.  The patient was released on the same day.  After returning home he evidently had an episode of confusion and incomprehensible speech that lasted for approximately 20 to 30 minutes.  His other daughter was present at the time and drove him to the [facility], operated by [facility] emergency room and the symptoms had nearly resolved by the time of his arrival.  His initial head CT was negative for CVA.  His MRI of the brain has recently been completed.  His telemetry monitor initially demonstrated atrial fibrillation with a slow ventricular rate as low as the mid 30s per minute range and his metoprolol succinate 50 mg daily was held.  Amlodipine was also temporarily held.  I am still concerned about the possibility of recurring focal seizures given the fact that the patient has had at least 5 transient neurological events all attributed to TIAs but never  confirmed CVA.  Patient is currently on Plavix protocol as per watchman glide lines which will be continued for now.  The patient has had intermittent GI bleeding in the past when placed on Eliquis.  The patient's atrial fibrillation currently has a ventricular rate of 65/min and will observe for now in the absence of metoprolol.  The patient may have enough intrinsic AV charity conduction delay to provide rate control without the need for beta-blockade.  Patient's of blood pressure appears to be satisfactory on lisinopril 20 mg daily without previously prescribed amlodipine.     11/14: The patient is sitting at edge of bed visiting with daughters and beginning to perform some physical therapy.  He is awake alert conversant without any complaints.  His MRI of the brain was negative for CVA.  He has been seen by neurology and again there is concerned about potential focal seizure disorder.  He is scheduled to have an EEG performed and subsequently will be placed on empiric antiseizure therapy with Keppra.  The patient's telemetry monitor shows atrial fibrillation controlled ventricular rate in the 60s per minute in the absence of previously prescribed metoprolol.  His systolic blood pressures are ranging between 125-145 mmHg currently on lisinopril 20 mg daily alone without the previously prescribed amlodipine.  We will continue to monitor.  The patient's electrolyte panel today is unremarkable other than glucose 153 creatinine is 1.0.  CBC stable hemoglobin 10.1 hematocrit 28.9.    11/15: The patient appears to be at his usual baseline mental status.  He did have a EEG performed yesterday which was negative for epileptiform activity or lateralizing signs.  The recurrence of his episodes however does suggest the possibility of seizure clinically and he has been started empirically on Keppra 250 mg twice daily.  He will have additional outpatient neurology follow-up in approximately 4 weeks.  From the cardiac  perspective the patient is stable.  The ventricular rate of his atrial fibrillation is controlled in the 60 to 70/min range in the absence of previously prescribed metoprolol which will not be utilized.  The patient should continue with lisinopril 20 mg daily for his hypertension but previously utilized amlodipine at this point is not required.  Patient will be instructed to put both the metoprolol and amlodipine aside but most likely neither will be restarted.  Patient will remain on Plavix therapy along with aspirin 81 mg daily.  As per protocol for his Watchman device.  Patient's electrolyte panel from today is unremarkable creatinine is 0.90.  Hemoglobin unchanged at 10.4.  Patient stable for discharge today and will be seen in 2 to 3 weeks for outpatient cardiology follow-up.  Peripheral IV 11/11/23 20 G Left;Dorsal Forearm (Active)   Site Assessment Clean;Dry;Intact 11/15/23 0800   Dressing Status Dry;Clean 11/15/23 0800   Number of days: 4       Urethral Catheter (Active)   Output (mL) 250 mL 11/15/23 0600   Number of days: 5       Code Status:  Full Code    I spent 20 minutes in the professional and overall care of this patient.        Luis Grant MD

## 2023-11-15 NOTE — PROGRESS NOTES
"Luis Greene is a 83 y.o. male on day 1 of admission presenting with Stroke-like episode.      Subjective   Patient is back to his normal baseline without confusion or altered mental status nor focal findings.       Objective     Last Recorded Vitals  Blood pressure 155/65, pulse 76, temperature 36.7 °C (98.1 °F), temperature source Oral, resp. rate 22, height 1.803 m (5' 11\"), weight 78.5 kg (173 lb), SpO2 98 %.    Physical Exam  Neurological Exam    patient is awake and oriented x3, no distress  Neurologically patient is alert and oriented as above.  Attention concentration memory fund of knowledge are intact.  No aphasia.  Cranial nerves: Visual fields are full, extraocular muscles are intact, pupils are equal round reactive to light.  Face is symmetric to sensation and motor.  No dysarthria.  Tongue protrudes to midline.  Motor exam: 5 out of 5 strength bilaterally throughout with normal bulk and tone.  No adventitious movements.  Sensation is intact bilaterally throughout x4 extremities  Coordination: No ataxia.      Assessment/Plan      Principal Problem:    Stroke-like episode  Active Problems:    TIA (transient ischemic attack)      83-year-old man with known cardiac issues presents with recurrence of his altered mental status episodes despite recent watchman placement.  I do not think these episodes are A-fib.  While his EEG shows no epileptiform activity nor lateralizing signs, the recurrence of these events suggests possible seizure.  I will therefore empirically treat him with levetiracetam 250 mg twice a day.  He should follow-up with me as an outpatient in 4 weeks.  I will stop by to discuss the findings and plan with him tomorrow as his EEG results were not yet available earlier today when I was rounding.  I anticipate discharge from a neurologic perspective later tomorrow afternoon.    I spent 35 minutes in the professional and overall care of this patient.      Maria Del Carmen Singh MD  "

## 2023-11-15 NOTE — NURSING NOTE
Assumed care of pt, pt is awake in bed eating breakfast, HR is 66 afib on tele, pt has no complaints of pain, pt concerned about talking with GI NP, will communicate that, on RA, family at bedside, bed locked and lowered, bedside shift report given by WILY Mckeon, call light /in reach, continue to monitor.

## 2023-11-16 LAB
GLUCOSE BLD MANUAL STRIP-MCNC: 152 MG/DL (ref 74–99)
GLUCOSE BLD MANUAL STRIP-MCNC: 159 MG/DL (ref 74–99)
GLUCOSE BLD MANUAL STRIP-MCNC: 225 MG/DL (ref 74–99)
GLUCOSE BLD MANUAL STRIP-MCNC: 93 MG/DL (ref 74–99)

## 2023-11-16 PROCEDURE — 82947 ASSAY GLUCOSE BLOOD QUANT: CPT

## 2023-11-16 PROCEDURE — 97535 SELF CARE MNGMENT TRAINING: CPT | Mod: GO,CO

## 2023-11-16 PROCEDURE — 1200000002 HC GENERAL ROOM WITH TELEMETRY DAILY

## 2023-11-16 PROCEDURE — 97116 GAIT TRAINING THERAPY: CPT | Mod: GP

## 2023-11-16 PROCEDURE — 2500000004 HC RX 250 GENERAL PHARMACY W/ HCPCS (ALT 636 FOR OP/ED): Performed by: NURSE PRACTITIONER

## 2023-11-16 PROCEDURE — 2500000004 HC RX 250 GENERAL PHARMACY W/ HCPCS (ALT 636 FOR OP/ED): Performed by: INTERNAL MEDICINE

## 2023-11-16 PROCEDURE — 97110 THERAPEUTIC EXERCISES: CPT | Mod: GP

## 2023-11-16 PROCEDURE — 2500000001 HC RX 250 WO HCPCS SELF ADMINISTERED DRUGS (ALT 637 FOR MEDICARE OP): Performed by: INTERNAL MEDICINE

## 2023-11-16 PROCEDURE — 2500000001 HC RX 250 WO HCPCS SELF ADMINISTERED DRUGS (ALT 637 FOR MEDICARE OP)

## 2023-11-16 PROCEDURE — 2500000001 HC RX 250 WO HCPCS SELF ADMINISTERED DRUGS (ALT 637 FOR MEDICARE OP): Performed by: PSYCHIATRY & NEUROLOGY

## 2023-11-16 PROCEDURE — 2500000002 HC RX 250 W HCPCS SELF ADMINISTERED DRUGS (ALT 637 FOR MEDICARE OP, ALT 636 FOR OP/ED): Performed by: INTERNAL MEDICINE

## 2023-11-16 PROCEDURE — 97530 THERAPEUTIC ACTIVITIES: CPT | Mod: GO,CO

## 2023-11-16 RX ADMIN — LISINOPRIL 20 MG: 20 TABLET ORAL at 09:54

## 2023-11-16 RX ADMIN — GABAPENTIN 200 MG: 100 CAPSULE ORAL at 09:54

## 2023-11-16 RX ADMIN — INSULIN GLARGINE 15 UNITS: 100 INJECTION, SOLUTION SUBCUTANEOUS at 20:38

## 2023-11-16 RX ADMIN — FERROUS SULFATE TAB 325 MG (65 MG ELEMENTAL FE) 325 MG: 325 (65 FE) TAB at 09:54

## 2023-11-16 RX ADMIN — PANTOPRAZOLE SODIUM 40 MG: 40 TABLET, DELAYED RELEASE ORAL at 09:54

## 2023-11-16 RX ADMIN — TAMSULOSIN HYDROCHLORIDE 0.4 MG: 0.4 CAPSULE ORAL at 09:54

## 2023-11-16 RX ADMIN — TAMSULOSIN HYDROCHLORIDE 0.4 MG: 0.4 CAPSULE ORAL at 20:40

## 2023-11-16 RX ADMIN — POLYETHYLENE GLYCOL 3350 17 G: 17 POWDER, FOR SOLUTION ORAL at 09:54

## 2023-11-16 RX ADMIN — GABAPENTIN 200 MG: 100 CAPSULE ORAL at 20:40

## 2023-11-16 RX ADMIN — DOCUSATE SODIUM 100 MG: 100 CAPSULE, LIQUID FILLED ORAL at 20:40

## 2023-11-16 RX ADMIN — PANTOPRAZOLE SODIUM 40 MG: 40 TABLET, DELAYED RELEASE ORAL at 20:40

## 2023-11-16 RX ADMIN — ATORVASTATIN CALCIUM 40 MG: 40 TABLET, FILM COATED ORAL at 20:40

## 2023-11-16 RX ADMIN — FINASTERIDE 5 MG: 5 TABLET, FILM COATED ORAL at 09:54

## 2023-11-16 RX ADMIN — LEVETIRACETAM 250 MG: 250 TABLET, FILM COATED ORAL at 09:54

## 2023-11-16 RX ADMIN — METFORMIN HYDROCHLORIDE 1000 MG: 1000 TABLET, FILM COATED ORAL at 09:54

## 2023-11-16 RX ADMIN — ASPIRIN 162 MG: 81 TABLET, COATED ORAL at 09:54

## 2023-11-16 RX ADMIN — LEVETIRACETAM 250 MG: 250 TABLET, FILM COATED ORAL at 20:40

## 2023-11-16 RX ADMIN — METFORMIN HYDROCHLORIDE 1000 MG: 1000 TABLET, FILM COATED ORAL at 20:40

## 2023-11-16 RX ADMIN — CLOPIDOGREL BISULFATE 75 MG: 75 TABLET ORAL at 09:54

## 2023-11-16 ASSESSMENT — COGNITIVE AND FUNCTIONAL STATUS - GENERAL
TOILETING: A LITTLE
DRESSING REGULAR UPPER BODY CLOTHING: A LITTLE
STANDING UP FROM CHAIR USING ARMS: A LITTLE
MOVING TO AND FROM BED TO CHAIR: A LITTLE
DRESSING REGULAR UPPER BODY CLOTHING: A LITTLE
MOVING TO AND FROM BED TO CHAIR: A LITTLE
TURNING FROM BACK TO SIDE WHILE IN FLAT BAD: A LITTLE
MOVING FROM LYING ON BACK TO SITTING ON SIDE OF FLAT BED WITH BEDRAILS: A LITTLE
DRESSING REGULAR LOWER BODY CLOTHING: A LITTLE
MOBILITY SCORE: 18
MOBILITY SCORE: 18
DAILY ACTIVITIY SCORE: 19
STANDING UP FROM CHAIR USING ARMS: A LITTLE
MOVING FROM LYING ON BACK TO SITTING ON SIDE OF FLAT BED WITH BEDRAILS: A LITTLE
HELP NEEDED FOR BATHING: A LITTLE
DAILY ACTIVITIY SCORE: 19
CLIMB 3 TO 5 STEPS WITH RAILING: A LITTLE
MOVING TO AND FROM BED TO CHAIR: A LITTLE
WALKING IN HOSPITAL ROOM: A LITTLE
CLIMB 3 TO 5 STEPS WITH RAILING: A LITTLE
HELP NEEDED FOR BATHING: A LITTLE
STANDING UP FROM CHAIR USING ARMS: A LITTLE
TOILETING: A LITTLE
TURNING FROM BACK TO SIDE WHILE IN FLAT BAD: A LITTLE
MOVING FROM LYING ON BACK TO SITTING ON SIDE OF FLAT BED WITH BEDRAILS: A LITTLE
DRESSING REGULAR LOWER BODY CLOTHING: A LITTLE
CLIMB 3 TO 5 STEPS WITH RAILING: A LITTLE
PERSONAL GROOMING: A LITTLE
WALKING IN HOSPITAL ROOM: A LITTLE
MOBILITY SCORE: 18
TURNING FROM BACK TO SIDE WHILE IN FLAT BAD: A LITTLE
WALKING IN HOSPITAL ROOM: A LITTLE
PERSONAL GROOMING: A LITTLE

## 2023-11-16 ASSESSMENT — PAIN - FUNCTIONAL ASSESSMENT
PAIN_FUNCTIONAL_ASSESSMENT: 0-10

## 2023-11-16 ASSESSMENT — PAIN SCALES - GENERAL
PAINLEVEL_OUTOF10: 0 - NO PAIN

## 2023-11-16 ASSESSMENT — ACTIVITIES OF DAILY LIVING (ADL): HOME_MANAGEMENT_TIME_ENTRY: 28

## 2023-11-16 NOTE — PROGRESS NOTES
Luis Greene is a 83 y.o. male on day 3 of admission presenting with Stroke-like episode.    Subjective   TCC spoke to patient and rajighter at bedside pt and family would like to go to SNF upon discharge. Choices received   Roni  [facility] rehab   Info cent to PCN to build and send referral at this jani       Objective     Physical Exam    Last Recorded Vitals    I/O last 3 completed shifts:  In: 480 (6.1 mL/kg) [P.O.:480]  Out: 2400 (30.6 mL/kg) [Urine:2400 (0.8 mL/kg/hr)]  Weight: 78.5 kg     Relevant Results                             Assessment/Plan         Tess Ravi RN

## 2023-11-16 NOTE — NURSING NOTE
Continued care of patient. Repositioned himself. No signs of pain or distress. Call light in reach. Same assessment as previous.

## 2023-11-16 NOTE — PROGRESS NOTES
Occupational Therapy    OT Treatment    Patient Name: Luis Greene  MRN: [id number]  Today's Date: 11/16/2023  Time Calculation  Start Time: 1427  Stop Time: 1505  Time Calculation (min): 38 min         Assessment:  OT Assessment: Pt christophe participating with all requested tasks  Prognosis: Good  Evaluation/Treatment Tolerance: Patient tolerated treatment well  End of Session Communication: Bedside nurse  End of Session Patient Position: Bed, 3 rail up, Alarm on  OT Assessment Results: Decreased ADL status, Decreased upper extremity strength, Decreased endurance, Decreased functional mobility  Prognosis: Good  Evaluation/Treatment Tolerance: Patient tolerated treatment well  Strengths: Ability to acquire knowledge, Attitude of self, Coping skills, Support of Caregivers  Barriers to Participation: Comorbidities  Plan:  Treatment Interventions: ADL retraining, Functional transfer training, Endurance training, Patient/family training, Compensatory technique education, Equipment evaluation/education  OT Frequency: 4 times per week  OT Discharge Recommendations: Low intensity level of continued care  Equipment Recommended upon Discharge: Wheeled walker  OT Recommended Transfer Status: Stand by assist  OT - OK to Discharge: Yes  Treatment Interventions: ADL retraining, Functional transfer training, Endurance training, Patient/family training, Compensatory technique education, Equipment evaluation/education    Subjective   Previous Visit Info:  OT Last Visit  OT Received On: 11/16/23  Precautions:  Hearing/Visual Limitations: reading glasses  Prosthesis/Orthosis Used: Ankle/Foot orthosis  Vital Signs:     Pain:  Pain Assessment  Pain Assessment: 0-10  Pain Score: 0 - No pain    Objective    Cognition:  Cognition  Overall Cognitive Status: Within Functional Limits  Orientation Level: Oriented X4  Coordination:     Activities of Daily Living:      Grooming  Grooming Level of Assistance: Setup  Grooming Where Assessed:  Sitting sinkside, Other (Comment) (Rollator)  Grooming Comments: Pt remove/clean/replace upper/lower partial    UE Dressing  UE Dressing Level of Assistance: Contact guard, Minimal verbal cues  UE Dressing Where Assessed: Other (Comment) (in stance at sink Rollator behind Pt for support)  UE Dressing Comments: initial cues setup Pt doff/don hospital gown    LE Dressing  LE Dressing: Yes  LE Dressing Adaptive Equipment:  (step stool)  Shoe Level of Assistance: Close supervision  Orthotics Level of Assistance: Close supervision  LE Dressing Where Assessed: Edge of bed  LE Dressing Comments: trial adaptive technique step stool pt don BLE orthotic and fastenlaces    Toileting  Toileting Level of Assistance: Close supervision  Where Assessed: Toilet  Toileting Comments: Pt adjusting clothing prior/after +Tiwari catheter  Functional Standing Tolerance:  Time: 3 minutes  Activity: UB dressing  Functional Standing Tolerance Comments: Pt  using unilateral hand support  Bed Mobility/Transfers: Bed Mobility  Bed Mobility: Yes  Bed Mobility 1  Bed Mobility 1: Supine to sitting  Level of Assistance 1: Close supervision  Bed Mobility Comments 1: bed to neutral use side transfer bar  Bed Mobility 2  Bed Mobility  2: Sitting to supine  Level of Assistance 2: Distant supervision  Bed Mobility Comments 2: bed to neutral use side transfer bar      Toilet Transfers  Toilet Transfer From: Bed  Toilet Transfer Type: To and from  Toilet Transfer to: Standard toilet  Toilet Transfer Technique: Ambulating  Toilet Transfers: Contact guard  Toilet Transfers Comments: grab bar use     Therapy/Activity:      Therapeutic Activity  Therapeutic Activity Performed: Yes  Therapeutic Activity 1: functional mobility RW 15' x 2 CGA    Outcome Measures:[facility] Daily Activity  Putting on and taking off regular lower body clothing: A little  Bathing (including washing, rinsing, drying): A little  Putting on and taking off regular upper body clothing: A  little  Toileting, which includes using toilet, bedpan or urinal: A little  Taking care of personal grooming such as brushing teeth: A little  Eating Meals: None  Daily Activity - Total Score: 19        Education Documentation  ADL Training, taught by JF Angeles at 11/16/2023  3:12 PM.  Learner: Family, Patient  Readiness: Acceptance  Method: Explanation, Demonstration, Teach-back  Response: Demonstrated Understanding, Verbalizes Understanding    Education Comments  No comments found.        OP EDUCATION:       Goals:  Encounter Problems       Encounter Problems (Active)       ADLs       Pt will perform ADL tasks at mod I with use of AE prn (Progressing)       Start:  11/13/23    Expected End:  11/20/23               Mobility       STG - Patient will ambulate 150' with rolling walker and modified independence. (Progressing)       Start:  11/13/23    Expected End:  11/20/23            STG - Patient will ascend and descend four stairs with one handrail and modified independence. (Progressing)       Start:  11/13/23    Expected End:  11/20/23               Mobility       Pt will perform functional mobility household distances at mod I with use of RW.    (Progressing)       Start:  11/13/23    Expected End:  11/20/23               Safety       LTG - Patient will demonstrate safety requirements appropriate to situation/environment with supervision. (Progressing)       Start:  11/13/23    Expected End:  11/20/23            STG - Patient uses call light consistently to request assistance with transfers (Progressing)       Start:  11/13/23    Expected End:  11/20/23               Transfers       STG - Patient will transfer sit to and from stand with rolling walker and modified independence. (Progressing)       Start:  11/13/23    Expected End:  11/20/23               Transfers       Pt will perform functional transfers at mod I with use of DME prn.   (Progressing)       Start:  11/13/23    Expected End:  11/20/23             Pt will perform BUE exercises to improve strength and independence with functional transfers/ADL tasks.   (Progressing)       Start:  11/13/23    Expected End:  11/20/23

## 2023-11-16 NOTE — NURSING NOTE
Assumed care of pt, pt is awake talking on the phone, pt denies pain, HR is 66 afib on tele, no neurological deficit noted, pt has no complaints at this time, bed locked and lowered, bedside shift report given by WILY Miles, continue to monitor, call light w/in reach.

## 2023-11-16 NOTE — PROGRESS NOTES
Luis Greene is a 83 y.o. male on day 3 of admission presenting with Stroke-like episode.    Subjective   Patient seen and examined.  Resting sitting up in the chair in no acute distress.  Daughter at beside.  Awake alert oriented x 3.  No complaints.  Mentation unchanged at baseline.  No bowel movements.  Discharge plan to SNF    Objective     Physical Exam  Vitals and nursing note reviewed.   Constitutional:       General: He is not in acute distress.     Appearance: Normal appearance. He is normal weight. He is not ill-appearing or toxic-appearing.   HENT:      Head: Normocephalic and atraumatic.      Right Ear: Tympanic membrane normal.      Left Ear: Tympanic membrane normal.      Nose: Nose normal.      Mouth/Throat:      Mouth: Mucous membranes are moist.      Pharynx: Oropharynx is clear.   Eyes:      Extraocular Movements: Extraocular movements intact.      Conjunctiva/sclera: Conjunctivae normal.      Pupils: Pupils are equal, round, and reactive to light.   Cardiovascular:      Rate and Rhythm: Normal rate. Rhythm irregular.      Pulses: Normal pulses.      Heart sounds: Normal heart sounds. No murmur heard.  Pulmonary:      Effort: Pulmonary effort is normal.      Breath sounds: Normal breath sounds.   Abdominal:      General: Bowel sounds are normal. There is no distension.      Palpations: Abdomen is soft.      Tenderness: There is no abdominal tenderness.   Genitourinary:     Comments:  Tiwari catheter in place draining clear yellow urine. Rectal examination deferred  Musculoskeletal:         General: No swelling. Normal range of motion.      Cervical back: Normal range of motion and neck supple.   Skin:     General: Skin is warm and dry.      Capillary Refill: Capillary refill takes less than 2 seconds.   Neurological:      General: No focal deficit present.      Mental Status: He is alert and oriented to person, place, and time.   Psychiatric:         Mood and Affect: Mood normal.          "Behavior: Behavior normal.       Last Recorded Vitals  Blood pressure (!) 142/43, pulse 90, temperature 36.9 °C (98.4 °F), temperature source Oral, resp. rate 18, height 1.803 m (5' 11\"), weight 78.5 kg (173 lb), SpO2 100 %.    Intake/Output last 3 Shifts:  I/O last 3 completed shifts:  In: 480 (6.1 mL/kg) [P.O.:480]  Out: 2400 (30.6 mL/kg) [Urine:2400 (0.8 mL/kg/hr)]  Weight: 78.5 kg     Telemetry atrial fibrillation rate 70's    Relevant Results  Results for orders placed or performed during the hospital encounter of 11/10/23 (from the past 24 hour(s))   POCT GLUCOSE   Result Value Ref Range    POCT Glucose 184 (H) 74 - 99 mg/dL   POCT GLUCOSE   Result Value Ref Range    POCT Glucose 93 74 - 99 mg/dL   POCT GLUCOSE   Result Value Ref Range    POCT Glucose 159 (H) 74 - 99 mg/dL     EEG    Result Date: 11/15/2023  IMPRESSION Impression This routine EEG is indicative of a moderate diffuse encephalopathy. No epileptiform discharges or lateralizing signs are recorded. A full report will be scanned into the patient's chart at a later time. This report has been interpreted and electronically signed by    ECG 12 lead daily    Result Date: 11/15/2023  Atrial fibrillation Right bundle branch block Abnormal ECG No previous ECGs available Confirmed by Jailyn Boyer ([id number]) on 11/15/2023 8:53:14 AM    ECG 12 lead daily    Result Date: 11/15/2023  Undetermined rhythm Likely AFIB Right bundle branch block Septal infarct , age undetermined Abnormal ECG No previous ECGs available Confirmed by Jailyn Boyer ([id number]) on 11/15/2023 8:50:34 AM    ECG 12 lead    Result Date: 11/15/2023  Atrial fibrillation Right bundle branch block Abnormal ECG When compared with ECG of 10-NOV-2023 08:49, (unconfirmed) No significant change was found Confirmed by Jailyn Boyer ([id number]) on 11/15/2023 8:46:33 AM     Scheduled medications  aspirin, 162 mg, oral, Daily  atorvastatin, 40 mg, oral, Nightly  clopidogrel, 75 mg, oral, Daily  docusate sodium, 100 " mg, oral, BID  ferrous sulfate (325 mg ferrous sulfate), 325 mg, oral, Daily  finasteride, 5 mg, oral, Daily  gabapentin, 200 mg, oral, BID  insulin glargine, 15 Units, subcutaneous, Nightly  insulin lispro, 0-15 Units, subcutaneous, TID with meals  levETIRAcetam, 250 mg, oral, BID  lisinopril, 20 mg, oral, Daily  metFORMIN, 1,000 mg, oral, BID  pantoprazole, 40 mg, oral, BID  perflutren lipid microspheres, 0.5-10 mL of dilution, intravenous, Once in imaging  perflutren lipid microspheres, 0.5-10 mL of dilution, intravenous, Once in imaging  perflutren protein A microsphere, 0.5 mL, intravenous, Once in imaging  perflutren protein A microsphere, 0.5 mL, intravenous, Once in imaging  polyethylene glycol, 17 g, oral, Daily  sulfur hexafluoride microsphr, 2 mL, intravenous, Once in imaging  sulfur hexafluoride microsphr, 2 mL, intravenous, Once in imaging  tamsulosin, 0.4 mg, oral, BID      Continuous medications     PRN medications  PRN medications: acetaminophen, dextrose 10 % in water (D10W), dextrose, glucagon, ondansetron ODT **OR** ondansetron    ASSESSMENT:  Stroke-like episode  Possible seizure  Dementia  Hematochezia resolved  Diarrhea/grainy stool - resolved  Anemia stable  Chronic atrial fibrillation status post Watchman implant  Hypertension  Hyperlipidemia  Coronary artery disease  Type 2 diabetes mellitus  GERD  BPH  Urinary retention - guidry catheter    PLAN:  Patient is doing well this morning.  No new issues.  Mentation remains stable.  EEG normal.  Continue empiric Keppra 250 mg p.o. twice daily per Neurology.  Medication education.  Outpatient follow-up with Dr. Maria Del Carmen Singh in 4 weeks.  Hematochezia resolved.  H&H stable.  Gastroenterology signed off.  Continue Aspirin, Plavix as prescribed.  Continue PPI.  Outpatient follow-up with Gastroenterology.  Maintain Guidry catheter.  Outpatient follow-up with Urologist, Dr. Lozano as scheduled next week.  Telemetry atrial fibrillation rate controlled  70s.  Metoprolol remains on hold.  Blood pressures reviewed, stable.  Continue to hold Amlodipine.  Continue Lisinopril 20 milligrams p.o daily.  Outpatient follow-up with Dr. Grant 2-3 weeks.  Point-of-care glucose reviewed.  Continue sliding scale insulin.  Patient follow-up blood glucose monitoring and diabetes management.  PT/OT.  Fall precautions.  Up with assistance only.  Bed and chair alarm at all times.  DVT prophylaxis SCDs.  Supportive care.  Patient and daughter reassured.  PT/OT recommend low intensity rehabilitation, 24 hour supervision due to cognition.  Case management following for discharge planning.  Discharge plan to skilled nursing rehabilitation.  Awaiting arrangements.  Discussed with Dr. Oneill.  Okay to discharge when arrangements are made by case management.  Discussed with patient and daughter at bedside.    ADDENDUM 1535:   Spoke with Case management.  Awaiting skilled nursing rehabilitation facility discharge arrangements    Martina Haynes, APRN-CNP

## 2023-11-16 NOTE — PROGRESS NOTES
Physical Therapy    Physical Therapy Treatment    Patient Name: Luis Greene  MRN: [id number]  Today's Date: 11/16/2023  Time Calculation  Start Time: 1110  Stop Time: 1133  Time Calculation (min): 23 min     Assessment/Plan   PT Assessment  PT Assessment Results: Decreased strength, Decreased endurance, Impaired balance, Decreased mobility, Decreased range of motion, Impaired judgement, Decreased safety awareness  Rehab Prognosis: Good  Evaluation/Treatment Tolerance: Patient tolerated treatment well  Medical Staff Made Aware: Yes  Strengths: Support and attitude of living partners, Support of extended family/friends, Living arrangement secure  Barriers to Participation: Comorbidities  End of Session Communication: Bedside nurse  Assessment Comment: improved gait tolerance, good motivation to participate, may benefit from 24 hr supervision upon d/c for safety  End of Session Patient Position: Up in chair (daughter present)  PT Plan  Inpatient/Swing Bed or Outpatient: Inpatient  PT Plan  Treatment/Interventions: Bed mobility, Transfer training, Gait training, Stair training, Balance training, Neuromuscular re-education, Strengthening, Endurance training, Range of motion, Therapeutic exercise, Therapeutic activity  PT Plan: Skilled PT  PT Frequency: 3 times per week  PT Discharge Recommendations: Low intensity level of continued care, 24 hr supervision due to cognition  Equipment Recommended upon Discharge: Wheeled walker  PT Recommended Transfer Status: Assist x1  PT - OK to Discharge: Yes    General Visit Information:   PT  Visit  PT Received On: 11/16/23  Response to Previous Treatment: Patient with no complaints from previous session.  General  Reason for Referral: impaired functional mobility  Referred By: Dr. Mai MD  Past Medical History Relevant to Rehab: CAD, HLD, watchman, hyperchol, DM, Afib, HTN, dementia, GERD, BPH  Missed Visit: No  Missed Visit Reason: Other (Comment)  Family/Caregiver Present:  Yes  Caregiver Feedback: daughter at bedside  Co-Treatment:  (no)  Co-Treatment Reason: n/a  Prior to Session Communication: Bedside nurse  Patient Position Received: Bed, 2 rail up  Preferred Learning Style: verbal  General Comment: Pt cleared for therapy via RN, received in supine, NAD, agreeable to participate in therapy. (+) Tiwari, telemetry    Subjective   Precautions:  Precautions  Hearing/Visual Limitations: reading glasses  Medical Precautions: Fall precautions  Prosthesis/Orthosis Used: Ankle/Foot orthosis (BLE)  Vital Signs:  Vital Signs  Heart Rate: 72    Objective   Pain:  Pain Assessment  Pain Assessment: 0-10  Pain Score: 0 - No pain  Cognition:  Cognition  Overall Cognitive Status: Within Functional Limits  Safety/Judgement: Exceptions to WFL  Insight: Mild  Postural Control:  Postural Control  Postural Control: Impaired  Posture Comment: forward head, rounded shoulders  Extremity/Trunk Assessments:  RLE   RLE : Exceptions to WFL (foot drop)  LLE   LLE : Exceptions to WFL (foot drop)  Activity Tolerance:  Activity Tolerance  Endurance: Tolerates 10 - 20 min exercise with multiple rests  Treatments:  Therapeutic Exercise  Therapeutic Exercise Performed: Yes  Therapeutic Exercise Activity 1: B seated knee ext x 10  Therapeutic Exercise Activity 2: B seated hip flex x 10  Therapeutic Exercise Activity 3: chair push ups x 5  Therapeutic Exercise Activity 4: chin tucks x 10  Therapeutic Exercise Activity 5: B scapular retraction x 10    Bed Mobility  Bed Mobility: Yes  Bed Mobility 1  Bed Mobility 1: Supine to sitting  Level of Assistance 1: Close supervision  Bed Mobility Comments 1: use of bedrails, HOB elevated    Ambulation/Gait Training  Ambulation/Gait Training Performed: Yes  Ambulation/Gait Training 1  Surface 1: Level tile  Device 1: Rolling walker  Gait Support Devices: R Ankle-foot orthoses, L Ankle-foot orthosis  Assistance 1: Close supervision  Quality of Gait 1:  (normal jose david,  reciprocating pattern, forward head, decreased B heel strike)  Comments/Distance (ft) 1: 150' x 3 (one episode of LOB requiring min assist x 1 via PT for correction)  Transfers  Transfer: Yes  Transfer 1  Transfer From 1: Sit to  Transfer to 1: Stand  Technique 1: Sit to stand  Transfer Device 1: Walker  Transfer Level of Assistance 1: Close supervision  Trials/Comments 1: cueing for hand placement  Transfers 2  Transfer From 2: Stand to  Transfer to 2: Sit  Technique 2: Stand to sit  Transfer Device 2: Walker  Transfer Level of Assistance 2: Close supervision  Trials/Comments 2: fair eccentric control noted to sit despite cueing    Stairs  Stairs: No (declined stairs this date)  Stairs  Rails 1: Right  Curb Step 1: No  Device 1: Railing  Support Devices 1: Right Ankle-foot orthoses, Left Ankle-foot orthosis  Assistance 1: Minimum assistance  Comment/Number of Steps 1: 3 (non-reciprocating pattern, cueing for sequencing, no LOB noted)    Outcome Measures:  [facility] Basic Mobility  Turning from your back to your side while in a flat bed without using bedrails: A little  Moving from lying on your back to sitting on the side of a flat bed without using bedrails: A little  Moving to and from bed to chair (including a wheelchair): A little  Standing up from a chair using your arms (e.g. wheelchair or bedside chair): A little  To walk in hospital room: A little  Climbing 3-5 steps with railing: A little  Basic Mobility - Total Score: 18    Education Documentation  Mobility Training, taught by Hafsa Lozano, PT at 11/16/2023 12:08 PM.  Learner: Patient  Readiness: Eager  Method: Explanation, Demonstration  Response: Needs Reinforcement    Education Comments  No comments found.      OP EDUCATION:  Outpatient Education  Individual(s) Educated: Patient  Education Provided: Fall Risk, Home Exercise Program  Risk and Benefits Discussed with Patient/Caregiver/Other: yes  Patient/Caregiver Demonstrated Understanding: yes  Plan of  Care Discussed and Agreed Upon: yes  Patient Response to Education: Patient/Caregiver Verbalized Understanding of Information    Encounter Problems       Encounter Problems (Active)       Mobility       STG - Patient will ambulate 150' with rolling walker and modified independence. (Progressing)       Start:  11/13/23    Expected End:  11/20/23            STG - Patient will ascend and descend four stairs with one handrail and modified independence. (Progressing)       Start:  11/13/23    Expected End:  11/20/23                     Pain - Adult          Safety       LTG - Patient will demonstrate safety requirements appropriate to situation/environment with supervision. (Progressing)       Start:  11/13/23    Expected End:  11/20/23            STG - Patient uses call light consistently to request assistance with transfers (Progressing)       Start:  11/13/23    Expected End:  11/20/23               Transfers       STG - Patient will transfer sit to and from stand with rolling walker and modified independence. (Progressing)       Start:  11/13/23    Expected End:  11/20/23

## 2023-11-16 NOTE — CARE PLAN
The patient's goals for the shift include maintain safety    The clinical goals for the shift include maintain safety and comfort      Problem: Skin  Goal: Decreased wound size/increased tissue granulation at next dressing change  Outcome: Progressing  Flowsheets (Taken 11/15/2023 1957)  Decreased wound size/increased tissue granulation at next dressing change: Protective dressings over bony prominences  Goal: Participates in plan/prevention/treatment measures  Outcome: Progressing  Flowsheets (Taken 11/15/2023 1957)  Participates in plan/prevention/treatment measures: Discuss with provider PT/OT consult  Goal: Prevent/manage excess moisture  Outcome: Progressing  Flowsheets (Taken 11/15/2023 1957)  Prevent/manage excess moisture:   Moisturize dry skin   Monitor for/manage infection if present  Goal: Prevent/minimize sheer/friction injuries  Outcome: Progressing  Flowsheets (Taken 11/15/2023 1957)  Prevent/minimize sheer/friction injuries:   Use pull sheet   Turn/reposition every 2 hours/use positioning/transfer devices   HOB 30 degrees or less  Goal: Promote/optimize nutrition  Outcome: Progressing  Flowsheets (Taken 11/15/2023 1957)  Promote/optimize nutrition: Consume > 50% meals/supplements  Goal: Promote skin healing  Outcome: Progressing  Flowsheets (Taken 11/15/2023 1957)  Promote skin healing:   Turn/reposition every 2 hours/use positioning/transfer devices   Protective dressings over bony prominences

## 2023-11-16 NOTE — NURSING NOTE
Assessment   Anal fistula  (primary encounter diagnosis)      Plan   Overall patient is doing well 18 days status post drainage of perianal abscess and placement of seton. There is no recurrence of perianal abscess or infection.     Patient should return i Assumed care of patient. Report done at bedside. Calm and quiet on bed when rounded. Denies pain or discomfort. Assessment as charted. Need attended. Will monitor.

## 2023-11-17 ENCOUNTER — APPOINTMENT (OUTPATIENT)
Dept: RADIOLOGY | Facility: HOSPITAL | Age: 83
DRG: 101 | End: 2023-11-17
Payer: MEDICARE

## 2023-11-17 ENCOUNTER — APPOINTMENT (OUTPATIENT)
Dept: CARDIOLOGY | Facility: HOSPITAL | Age: 83
DRG: 101 | End: 2023-11-17
Payer: MEDICARE

## 2023-11-17 VITALS
RESPIRATION RATE: 19 BRPM | BODY MASS INDEX: 24.22 KG/M2 | SYSTOLIC BLOOD PRESSURE: 132 MMHG | OXYGEN SATURATION: 95 % | WEIGHT: 173 LBS | HEIGHT: 71 IN | TEMPERATURE: 97.9 F | DIASTOLIC BLOOD PRESSURE: 70 MMHG | HEART RATE: 87 BPM

## 2023-11-17 PROBLEM — G45.9 TIA (TRANSIENT ISCHEMIC ATTACK): Status: RESOLVED | Noted: 2023-06-07 | Resolved: 2023-11-17

## 2023-11-17 LAB
ATRIAL RATE: 76 BPM
GLUCOSE BLD MANUAL STRIP-MCNC: 111 MG/DL (ref 74–99)
GLUCOSE BLD MANUAL STRIP-MCNC: 99 MG/DL (ref 74–99)
Q ONSET: 222 MS
QRS COUNT: 11 BEATS
QRS DURATION: 138 MS
QT INTERVAL: 440 MS
QTC CALCULATION(BAZETT): 471 MS
QTC FREDERICIA: 461 MS
R AXIS: 77 DEGREES
T AXIS: -13 DEGREES
T OFFSET: 442 MS
VENTRICULAR RATE: 69 BPM

## 2023-11-17 PROCEDURE — 97535 SELF CARE MNGMENT TRAINING: CPT | Mod: GO,CO

## 2023-11-17 PROCEDURE — 2500000001 HC RX 250 WO HCPCS SELF ADMINISTERED DRUGS (ALT 637 FOR MEDICARE OP)

## 2023-11-17 PROCEDURE — 2500000004 HC RX 250 GENERAL PHARMACY W/ HCPCS (ALT 636 FOR OP/ED): Performed by: NURSE PRACTITIONER

## 2023-11-17 PROCEDURE — 2500000001 HC RX 250 WO HCPCS SELF ADMINISTERED DRUGS (ALT 637 FOR MEDICARE OP): Performed by: PSYCHIATRY & NEUROLOGY

## 2023-11-17 PROCEDURE — 93005 ELECTROCARDIOGRAM TRACING: CPT

## 2023-11-17 PROCEDURE — 71046 X-RAY EXAM CHEST 2 VIEWS: CPT | Mod: FY

## 2023-11-17 PROCEDURE — 2500000001 HC RX 250 WO HCPCS SELF ADMINISTERED DRUGS (ALT 637 FOR MEDICARE OP): Performed by: INTERNAL MEDICINE

## 2023-11-17 PROCEDURE — 2500000004 HC RX 250 GENERAL PHARMACY W/ HCPCS (ALT 636 FOR OP/ED): Performed by: INTERNAL MEDICINE

## 2023-11-17 PROCEDURE — 82947 ASSAY GLUCOSE BLOOD QUANT: CPT

## 2023-11-17 PROCEDURE — 97530 THERAPEUTIC ACTIVITIES: CPT | Mod: GO,CO

## 2023-11-17 RX ORDER — POLYETHYLENE GLYCOL 3350 17 G/17G
17 POWDER, FOR SOLUTION ORAL 2 TIMES DAILY
Start: 2023-11-17 | End: 2024-01-17 | Stop reason: ALTCHOICE

## 2023-11-17 RX ORDER — DOCUSATE SODIUM 100 MG/1
100 CAPSULE, LIQUID FILLED ORAL 2 TIMES DAILY
Start: 2023-11-17 | End: 2024-01-17 | Stop reason: ALTCHOICE

## 2023-11-17 RX ORDER — POLYETHYLENE GLYCOL 3350 17 G/17G
17 POWDER, FOR SOLUTION ORAL 2 TIMES DAILY
Status: DISCONTINUED | OUTPATIENT
Start: 2023-11-17 | End: 2023-11-17 | Stop reason: HOSPADM

## 2023-11-17 RX ORDER — PANTOPRAZOLE SODIUM 40 MG/1
40 TABLET, DELAYED RELEASE ORAL
Start: 2023-11-17 | End: 2024-01-17 | Stop reason: ALTCHOICE

## 2023-11-17 RX ORDER — LISINOPRIL 20 MG/1
20 TABLET ORAL DAILY
Start: 2023-11-17 | End: 2024-01-17 | Stop reason: ALTCHOICE

## 2023-11-17 RX ORDER — INSULIN LISPRO 100 [IU]/ML
0-15 INJECTION, SOLUTION INTRAVENOUS; SUBCUTANEOUS
Status: ON HOLD
Start: 2023-11-17 | End: 2023-11-30 | Stop reason: ENTERED-IN-ERROR

## 2023-11-17 RX ADMIN — TAMSULOSIN HYDROCHLORIDE 0.4 MG: 0.4 CAPSULE ORAL at 10:00

## 2023-11-17 RX ADMIN — FINASTERIDE 5 MG: 5 TABLET, FILM COATED ORAL at 10:00

## 2023-11-17 RX ADMIN — POLYETHYLENE GLYCOL 3350 17 G: 17 POWDER, FOR SOLUTION ORAL at 10:00

## 2023-11-17 RX ADMIN — FERROUS SULFATE TAB 325 MG (65 MG ELEMENTAL FE) 325 MG: 325 (65 FE) TAB at 10:00

## 2023-11-17 RX ADMIN — LISINOPRIL 20 MG: 20 TABLET ORAL at 10:00

## 2023-11-17 RX ADMIN — PANTOPRAZOLE SODIUM 40 MG: 40 TABLET, DELAYED RELEASE ORAL at 10:00

## 2023-11-17 RX ADMIN — ASPIRIN 162 MG: 81 TABLET, COATED ORAL at 10:00

## 2023-11-17 RX ADMIN — GABAPENTIN 200 MG: 100 CAPSULE ORAL at 10:00

## 2023-11-17 RX ADMIN — CLOPIDOGREL BISULFATE 75 MG: 75 TABLET ORAL at 10:00

## 2023-11-17 RX ADMIN — LEVETIRACETAM 250 MG: 250 TABLET, FILM COATED ORAL at 10:00

## 2023-11-17 RX ADMIN — METFORMIN HYDROCHLORIDE 1000 MG: 1000 TABLET, FILM COATED ORAL at 10:00

## 2023-11-17 RX ADMIN — DOCUSATE SODIUM 100 MG: 100 CAPSULE, LIQUID FILLED ORAL at 10:09

## 2023-11-17 ASSESSMENT — ACTIVITIES OF DAILY LIVING (ADL): HOME_MANAGEMENT_TIME_ENTRY: 40

## 2023-11-17 ASSESSMENT — COGNITIVE AND FUNCTIONAL STATUS - GENERAL
DAILY ACTIVITIY SCORE: 21
DRESSING REGULAR LOWER BODY CLOTHING: A LITTLE
TOILETING: A LITTLE
HELP NEEDED FOR BATHING: A LITTLE

## 2023-11-17 ASSESSMENT — PAIN - FUNCTIONAL ASSESSMENT
PAIN_FUNCTIONAL_ASSESSMENT: 0-10
PAIN_FUNCTIONAL_ASSESSMENT: 0-10

## 2023-11-17 ASSESSMENT — PAIN SCALES - GENERAL
PAINLEVEL_OUTOF10: 0 - NO PAIN
PAINLEVEL_OUTOF10: 0 - NO PAIN

## 2023-11-17 NOTE — PROGRESS NOTES
Luis Greene is a 83 y.o. male on day 4 of admission presenting with Stroke-like episode.      Facilities reviewing at this time, no accepting facility at this time     **Patient does not have safe discharge plan, do not discharge without speaking to care coordination**     Tess Ravi RN

## 2023-11-17 NOTE — CARE PLAN
Problem: Fall/Injury  Goal: Not fall by end of shift  Outcome: Progressing  Goal: Be free from injury by end of the shift  Outcome: Progressing  Goal: Verbalize understanding of personal risk factors for fall in the hospital  Outcome: Progressing  Goal: Verbalize understanding of risk factor reduction measures to prevent injury from fall in the home  Outcome: Progressing  Goal: Use assistive devices by end of the shift  Outcome: Progressing     Problem: Skin  Goal: Decreased wound size/increased tissue granulation at next dressing change  Outcome: Progressing  Goal: Participates in plan/prevention/treatment measures  Outcome: Progressing  Goal: Prevent/manage excess moisture  Outcome: Progressing  Goal: Prevent/minimize sheer/friction injuries  Outcome: Progressing  Goal: Promote/optimize nutrition  Outcome: Progressing  Flowsheets (Taken 11/16/2023 2101)  Promote/optimize nutrition: Consume > 50% meals/supplements  Goal: Promote skin healing  Outcome: Progressing  Flowsheets (Taken 11/15/2023 1957 by Wilda Edgar RN)  Promote skin healing:   Turn/reposition every 2 hours/use positioning/transfer devices   Protective dressings over bony prominences   The patient's goals for the shift include maintain safety    The clinical goals for the shift include patient safety    Over the shift, the patient did make progress toward the following goals.

## 2023-11-17 NOTE — NURSING NOTE
Assumed care of pt, pt is awake in bed, pt denies pain, on RA, HR is 71 afib on tele, bedside shift report given by WILY Lund, bed locked and lowered, continue to monitor, call light w/in reach.

## 2023-11-17 NOTE — PROGRESS NOTES
Luis Greene is a 83 y.o. male on day 4 of admission presenting with Stroke-like episode.    TCC spoke to daughter dbeorah, [phone], updated regarding no accepting facility at this time, states that if all 3 facilities say no pt will go home with home care. They do not want to submit for more choices. Will update if anything changes     1245: sole able to accept pt today, Erin CARDENAS updated at this time  Daughter updated at this time     1630: transport set up for 1745 RN updated and given report number  Tess Ravi RN

## 2023-11-17 NOTE — PROGRESS NOTES
Luis Greene is a 83 y.o. male on day 4 of admission presenting with Stroke-like episode.    Subjective   Patient seen and examined.  Resting in bed in no acute distress.  Awake alert oriented x 3.  No new complaints.  No bowel movements.  No nausea, vomiting.  Tolerating diet.  Taking Miralax.  Intermittent, non productive cough.  No sputum.  No chest pain.  Discussed discharge plan to home with home health care versus skilled nursing rehabilitation pending arrangements he is in agreement    Objective     Physical Exam  Vitals and nursing note reviewed.   Constitutional:       General: He is not in acute distress.     Appearance: Normal appearance. He is normal weight. He is not ill-appearing or toxic-appearing.   HENT:      Head: Normocephalic and atraumatic.      Right Ear: Tympanic membrane normal.      Left Ear: Tympanic membrane normal.      Nose: Nose normal.      Mouth/Throat:      Mouth: Mucous membranes are moist.      Pharynx: Oropharynx is clear.   Eyes:      Extraocular Movements: Extraocular movements intact.      Conjunctiva/sclera: Conjunctivae normal.      Pupils: Pupils are equal, round, and reactive to light.   Cardiovascular:      Rate and Rhythm: Normal rate. Rhythm irregular.      Pulses: Normal pulses.      Heart sounds: Normal heart sounds. No murmur heard.  Pulmonary:      Effort: Pulmonary effort is normal. No respiratory distress.      Breath sounds: Normal breath sounds. No wheezing, rhonchi or rales.      Comments: Intermittent cough  Abdominal:      General: Bowel sounds are normal. There is no distension.      Palpations: Abdomen is soft.      Tenderness: There is no abdominal tenderness.   Genitourinary:     Comments:  Tiwari catheter in place draining clear yellow urine. Rectal examination deferred  Musculoskeletal:         General: No swelling. Normal range of motion.      Cervical back: Normal range of motion and neck supple.   Skin:     General: Skin is warm and dry.       "Capillary Refill: Capillary refill takes less than 2 seconds.   Neurological:      General: No focal deficit present.      Mental Status: He is alert and oriented to person, place, and time.   Psychiatric:         Mood and Affect: Mood normal.         Behavior: Behavior normal.       Last Recorded Vitals  Blood pressure 161/56, pulse 61, temperature 36.7 °C (98.1 °F), temperature source Oral, resp. rate 16, height 1.803 m (5' 11\"), weight 78.5 kg (173 lb), SpO2 96 %.    Intake/Output last 3 Shifts:  I/O last 3 completed shifts:  In: 180 (2.3 mL/kg) [P.O.:180]  Out: 2000 (25.5 mL/kg) [Urine:2000 (0.7 mL/kg/hr)]  Weight: 78.5 kg     Relevant Results  Results for orders placed or performed during the hospital encounter of 11/10/23 (from the past 24 hour(s))   POCT GLUCOSE   Result Value Ref Range    POCT Glucose 152 (H) 74 - 99 mg/dL   POCT GLUCOSE   Result Value Ref Range    POCT Glucose 225 (H) 74 - 99 mg/dL   POCT GLUCOSE   Result Value Ref Range    POCT Glucose 111 (H) 74 - 99 mg/dL   POCT GLUCOSE   Result Value Ref Range    POCT Glucose 99 74 - 99 mg/dL     No results found.    Scheduled medications  aspirin, 162 mg, oral, Daily  atorvastatin, 40 mg, oral, Nightly  clopidogrel, 75 mg, oral, Daily  docusate sodium, 100 mg, oral, BID  ferrous sulfate (325 mg ferrous sulfate), 325 mg, oral, Daily  finasteride, 5 mg, oral, Daily  gabapentin, 200 mg, oral, BID  insulin glargine, 15 Units, subcutaneous, Nightly  insulin lispro, 0-15 Units, subcutaneous, TID with meals  levETIRAcetam, 250 mg, oral, BID  lisinopril, 20 mg, oral, Daily  metFORMIN, 1,000 mg, oral, BID  pantoprazole, 40 mg, oral, BID  perflutren lipid microspheres, 0.5-10 mL of dilution, intravenous, Once in imaging  perflutren lipid microspheres, 0.5-10 mL of dilution, intravenous, Once in imaging  perflutren protein A microsphere, 0.5 mL, intravenous, Once in imaging  perflutren protein A microsphere, 0.5 mL, intravenous, Once in imaging  polyethylene " glycol, 17 g, oral, BID  sulfur hexafluoride microsphr, 2 mL, intravenous, Once in imaging  sulfur hexafluoride microsphr, 2 mL, intravenous, Once in imaging  tamsulosin, 0.4 mg, oral, BID      Continuous medications     PRN medications  PRN medications: acetaminophen, dextrose 10 % in water (D10W), dextrose, glucagon, ondansetron ODT **OR** ondansetron    ASSESSMENT:  Stroke-like episode  Possible seizure  Dementia  Hematochezia resolved  Diarrhea resolved  Anemia stable   Chronic atrial fibrillation status post Watchman implant  Hypertension  Hyperlipidemia  Coronary artery disease  Type 2 diabetes mellitus  GERD  BPH  Urinary retention - guidry catheter    PLAN:  Intermittent cough.  Respiratory status, pulse oximetry stable on room air.  Check 2 view chest xray.  Mentation stable.  No anti-epileptic medications per Neurology.  Seizure precautions.  No driving for 6 months.  Follow up with Dr. Singh in 4 weeks.  Continue Aspirin, Plavix.  Continue Ppi - Protonix on discharge.  Outpatient follow up with Gastroenterology.  Maintain guidry catheter for acute urinary retention.  Follow up with Urologist, Dr. Lozano, as scheduled next week.  Telemetry atrial fibrillation rate controlled.  Metoprolol on hold.  Continue to hold Amlodipine.  Continue Lisinopril 20 milligrams p.o daily with parameters.  Outpatient follow up with Dr. Grant in 2-3 weeks.  Point of care glucose reviewed.  Continue home insulin on discharge.  Outpatient follow up with primary care provider.  PT/OT.  Fall precautions.  Up with assistance only.  Bed and chair alarm at all times.  DVT prophylaxis SCD's.  Supportive care.  Patient and daughter reassured.  PT/OT recommend low intensity rehabilitation, 24 hour supervision due to cognition.  Case management following for discharge planning.  Discharge plan to skilled nursing rehabilitation facility.  Awaiting arrangements.  Discussed with Dr. Oneill.  Okay to discharge when arrangements are made  by case management.  Discussed with patient, nursing and Dr. Oneill.    ADDENDUM:  Per case management, discharge plan to [facility].  2 view chest xray result reviewed, no acute findings.  Okay to discharge.  Discussed with Dr. Oneill.  Okay to discharge.  See discharge orders and instructions.      Martina Haynes, JONA-CNP

## 2023-11-17 NOTE — DISCHARGE SUMMARY
Discharge Diagnosis  Stroke-like episode  Possible seizure  Dementia  Hematochezia resolved  Diarrhea resolved  Anemia stable  Chronic atrial fibrillation status post Watchman implant  Hypertension  Hyperlipidemia  Coronary artery disease  Type 2 diabetes mellitus  GERD  BPH  Urinary retention - guidry catheter    Discharge Meds  See discharge medication list    Hospital Course  This is a very pleasant 83 year old elderly  male who presented to the emergency department with complaints of slurred speech and right sided weakness.  He has a history of atrial fibrillation, underwent Watchman procedure, currently on Aspirin and Plavix.  In the emergency department, initial work-up was done.  CT brain showed nothing acute.  He was admitted.  He was evaluated by Neurology.  MRI brain, EEG were unremarkable.  He was treated empirically with Keppra.  Per Neurology, no indication for antiepileptic medications.  He is advised seizure precautions, no driving for 6 months, outpatient follow-up with Dr. Maria Del Carmen Singh in 4 weeks.  He was evaluated and treated by Cardiology.  Toprol-XL was held for bradycardia.  Amlodipine was held for hypotension.  Lisinopril was continued with parameters.  His blood pressure and heart rate stabilized.  He was evaluated and treated by Gastroenterology for red blood in his stool.  He was treated for constipation.  He was continue on Ppi.  He was continued on Aspirin and Plavix.  His blood count remained stable.  He was advised to avoid constipation and follow up outpatient.  His condition improved.  He was evaluated by physical and occupational therapy, recommend low intensity rehabilitation, 24 hour supervision.  Discharge arrangements have been made to skilled nursing rehabilitation.  He is stable for discharge to skilled nursing rehabilitation.     Pertinent Physical Exam At Time of Discharge  See physical examination    Outpatient Follow-Up  Future Appointments   Date Time Provider  [facility]   12/6/2023  4:30 PM Luis Grant MD [id number] [facility]   12/14/2023  9:15 AM Louie Avitia MD [id number] [facility]   12/14/2023 12:00 PM Christopher D'Amico, DO [id number] [facility]   1/16/2024  1:30 PM JONA Mccann-CNP [id number] [facility]     Follow up with Dr. Lozano, as scheduled    JONA Man-CNP

## 2023-11-17 NOTE — PROGRESS NOTES
Occupational Therapy    Occupational Therapy Treatment    Name: Luis Greene  MRN: [id number]  : 1940  Date: 23  Time Calculation  Start Time: 222  Stop Time: 312  Time Calculation (min): 50 min  Plan:  Treatment Interventions: ADL retraining, Functional transfer training, Endurance training, Patient/family training, Compensatory technique education, Equipment evaluation/education  OT Frequency: 4 times per week  OT Discharge Recommendations: Low intensity level of continued care  Equipment Recommended upon Discharge: Wheeled walker  OT Recommended Transfer Status: Stand by assist  OT - OK to Discharge: Yes    Subjective Pt supine in bed, agreeable to therapy, daughter present during end of sessoin.   Previous Visit Info:  OT Last Visit  OT Received On: 23  General:  General  Prior to Session Communication: Bedside nurse  Patient Position Received: Bed, 2 rail up  Preferred Learning Style: verbal, visual  General Comment: patient agreeable to therapy, guidry, IV in L UE  Vitals:WFL     Pain Assessment:  Pain Assessment  Pain Assessment: 0-10  Pain Score: 0 - No pain     Objective   Activities of Daily Living: LE Dressing  LE Dressing: Yes  LE Dressing Adaptive Equipment: Reacher, Sock aide  Pants Level of Assistance: Minimum assistance (to steady when standing to pull up pants.)  Sock Level of Assistance: Setup (use of sock aide, has one and already knew how to use it)    Functional Standing Tolerance:     Bed Mobility/Transfers: Bed Mobility 1  Bed Mobility 1: Supine to sitting  Level of Assistance 1: Independent    Transfer 1  Transfer From 1: Bed to  Transfer to 1: Chair with arms  Technique 1: Stand pivot  Transfer Device 1: Walker  Transfer Level of Assistance 1: Minimum assistance  Trials/Comments 1: use of personal rollator (up in chair with daughter present. Daughter to ensure staff is notified before leaving to assist pt return to bed.)     Outcome Measures:  [facility] Daily  Activity  Putting on and taking off regular lower body clothing: A little  Bathing (including washing, rinsing, drying): A little  Putting on and taking off regular upper body clothing: None  Toileting, which includes using toilet, bedpan or urinal: A little  Taking care of personal grooming such as brushing teeth: None  Eating Meals: None  Daily Activity - Total Score: 21        Education Documentation  ADL Training, taught by JF Gamble at 11/17/2023  3:09 PM.  Learner: Family, Patient  Readiness: Acceptance  Method: Explanation, Demonstration  Response: Verbalizes Understanding, Demonstrated Understanding    Education Comments  No comments found.      Goals:  Encounter Problems       Encounter Problems (Active)       ADLs       Pt will perform ADL tasks at mod I with use of AE prn (Progressing)       Start:  11/13/23    Expected End:  11/20/23               Mobility       STG - Patient will ambulate 150' with rolling walker and modified independence. (Progressing)       Start:  11/13/23    Expected End:  11/20/23            STG - Patient will ascend and descend four stairs with one handrail and modified independence. (Progressing)       Start:  11/13/23    Expected End:  11/20/23               Mobility       Pt will perform functional mobility household distances at mod I with use of RW.    (Progressing)       Start:  11/13/23    Expected End:  11/20/23               Safety       LTG - Patient will demonstrate safety requirements appropriate to situation/environment with supervision. (Progressing)       Start:  11/13/23    Expected End:  11/20/23            STG - Patient uses call light consistently to request assistance with transfers (Progressing)       Start:  11/13/23    Expected End:  11/20/23               Transfers       STG - Patient will transfer sit to and from stand with rolling walker and modified independence. (Progressing)       Start:  11/13/23    Expected End:  11/20/23                Transfers       Pt will perform functional transfers at mod I with use of DME prn.   (Progressing)       Start:  11/13/23    Expected End:  11/20/23            Pt will perform BUE exercises to improve strength and independence with functional transfers/ADL tasks.   (Progressing)       Start:  11/13/23    Expected End:  11/20/23

## 2023-11-24 ENCOUNTER — APPOINTMENT (OUTPATIENT)
Dept: RADIOLOGY | Facility: HOSPITAL | Age: 83
DRG: 698 | End: 2023-11-24
Payer: MEDICARE

## 2023-11-24 ENCOUNTER — HOSPITAL ENCOUNTER (INPATIENT)
Facility: HOSPITAL | Age: 83
LOS: 6 days | Discharge: NURSING, REHAB OR LONG-TERM CARE | DRG: 698 | End: 2023-11-30
Attending: STUDENT IN AN ORGANIZED HEALTH CARE EDUCATION/TRAINING PROGRAM | Admitting: INTERNAL MEDICINE
Payer: MEDICARE

## 2023-11-24 DIAGNOSIS — N39.0 URINARY TRACT INFECTION DUE TO EXTENDED-SPECTRUM BETA LACTAMASE (ESBL) PRODUCING ESCHERICHIA COLI: ICD-10-CM

## 2023-11-24 DIAGNOSIS — E83.42 HYPOMAGNESEMIA: ICD-10-CM

## 2023-11-24 DIAGNOSIS — B96.29 URINARY TRACT INFECTION DUE TO EXTENDED-SPECTRUM BETA LACTAMASE (ESBL) PRODUCING ESCHERICHIA COLI: ICD-10-CM

## 2023-11-24 DIAGNOSIS — M25.551 RIGHT HIP PAIN: ICD-10-CM

## 2023-11-24 DIAGNOSIS — I10 BENIGN ESSENTIAL HYPERTENSION: ICD-10-CM

## 2023-11-24 DIAGNOSIS — Z16.12 URINARY TRACT INFECTION DUE TO EXTENDED-SPECTRUM BETA LACTAMASE (ESBL) PRODUCING ESCHERICHIA COLI: ICD-10-CM

## 2023-11-24 DIAGNOSIS — A41.9 SEPSIS, DUE TO UNSPECIFIED ORGANISM, UNSPECIFIED WHETHER ACUTE ORGAN DYSFUNCTION PRESENT (MULTI): Primary | ICD-10-CM

## 2023-11-24 DIAGNOSIS — N39.0 URINARY TRACT INFECTION WITHOUT HEMATURIA, SITE UNSPECIFIED: ICD-10-CM

## 2023-11-24 LAB
ALBUMIN SERPL-MCNC: 3.7 G/DL (ref 3.5–5)
ALP BLD-CCNC: 68 U/L (ref 35–125)
ALT SERPL-CCNC: 21 U/L (ref 5–40)
ANION GAP SERPL CALC-SCNC: 17 MMOL/L
APPEARANCE UR: ABNORMAL
AST SERPL-CCNC: 38 U/L (ref 5–40)
BACTERIA #/AREA URNS AUTO: ABNORMAL /HPF
BASOPHILS # BLD AUTO: 0.03 X10*3/UL (ref 0–0.1)
BASOPHILS NFR BLD AUTO: 0.3 %
BILIRUB SERPL-MCNC: 1.6 MG/DL (ref 0.1–1.2)
BILIRUB UR STRIP.AUTO-MCNC: NEGATIVE MG/DL
BUN SERPL-MCNC: 26 MG/DL (ref 8–25)
BURR CELLS BLD QL SMEAR: NORMAL
CALCIUM SERPL-MCNC: 9.1 MG/DL (ref 8.5–10.4)
CHLORIDE SERPL-SCNC: 94 MMOL/L (ref 97–107)
CO2 SERPL-SCNC: 21 MMOL/L (ref 24–31)
COLOR UR: ABNORMAL
CREAT SERPL-MCNC: 1.3 MG/DL (ref 0.4–1.6)
EOSINOPHIL # BLD AUTO: 0 X10*3/UL (ref 0–0.4)
EOSINOPHIL NFR BLD AUTO: 0 %
ERYTHROCYTE [DISTWIDTH] IN BLOOD BY AUTOMATED COUNT: 13 % (ref 11.5–14.5)
FLUAV RNA RESP QL NAA+PROBE: NOT DETECTED
FLUBV RNA RESP QL NAA+PROBE: NOT DETECTED
GFR SERPL CREATININE-BSD FRML MDRD: 55 ML/MIN/1.73M*2
GLUCOSE BLD MANUAL STRIP-MCNC: 190 MG/DL (ref 74–99)
GLUCOSE BLD MANUAL STRIP-MCNC: 219 MG/DL (ref 74–99)
GLUCOSE SERPL-MCNC: 199 MG/DL (ref 65–99)
GLUCOSE UR STRIP.AUTO-MCNC: NORMAL MG/DL
HCT VFR BLD AUTO: 34.3 % (ref 41–52)
HGB BLD-MCNC: 12 G/DL (ref 13.5–17.5)
HOLD SPECIMEN: NORMAL
IMM GRANULOCYTES # BLD AUTO: 0.06 X10*3/UL (ref 0–0.5)
IMM GRANULOCYTES NFR BLD AUTO: 0.5 % (ref 0–0.9)
KETONES UR STRIP.AUTO-MCNC: NEGATIVE MG/DL
LACTATE BLDV-SCNC: 1.6 MMOL/L (ref 0.4–2)
LACTATE BLDV-SCNC: 2.1 MMOL/L (ref 0.4–2)
LEUKOCYTE ESTERASE UR QL STRIP.AUTO: ABNORMAL
LYMPHOCYTES # BLD AUTO: 0.58 X10*3/UL (ref 0.8–3)
LYMPHOCYTES NFR BLD AUTO: 5 %
MCH RBC QN AUTO: 35.1 PG (ref 26–34)
MCHC RBC AUTO-ENTMCNC: 35 G/DL (ref 32–36)
MCV RBC AUTO: 100 FL (ref 80–100)
MONOCYTES # BLD AUTO: 1.1 X10*3/UL (ref 0.05–0.8)
MONOCYTES NFR BLD AUTO: 9.4 %
NEUTROPHILS # BLD AUTO: 9.88 X10*3/UL (ref 1.6–5.5)
NEUTROPHILS NFR BLD AUTO: 84.8 %
NITRITE UR QL STRIP.AUTO: NEGATIVE
NRBC BLD-RTO: 0 /100 WBCS (ref 0–0)
PH UR STRIP.AUTO: 6 [PH]
PLATELET # BLD AUTO: 209 X10*3/UL (ref 150–450)
POLYCHROMASIA BLD QL SMEAR: NORMAL
POTASSIUM SERPL-SCNC: 5.2 MMOL/L (ref 3.4–5.1)
PROT SERPL-MCNC: 6.8 G/DL (ref 5.9–7.9)
PROT UR STRIP.AUTO-MCNC: ABNORMAL MG/DL
RBC # BLD AUTO: 3.42 X10*6/UL (ref 4.5–5.9)
RBC # UR STRIP.AUTO: ABNORMAL /UL
RBC #/AREA URNS AUTO: ABNORMAL /HPF
RBC MORPH BLD: NORMAL
SARS-COV-2 RNA RESP QL NAA+PROBE: NOT DETECTED
SODIUM SERPL-SCNC: 132 MMOL/L (ref 133–145)
SP GR UR STRIP.AUTO: 1.01
UROBILINOGEN UR STRIP.AUTO-MCNC: ABNORMAL MG/DL
WBC # BLD AUTO: 11.7 X10*3/UL (ref 4.4–11.3)
WBC #/AREA URNS AUTO: >50 /HPF
WBC CLUMPS #/AREA URNS AUTO: ABNORMAL /HPF

## 2023-11-24 PROCEDURE — 2060000001 HC INTERMEDIATE ICU ROOM DAILY

## 2023-11-24 PROCEDURE — 96365 THER/PROPH/DIAG IV INF INIT: CPT | Mod: 59

## 2023-11-24 PROCEDURE — 2500000001 HC RX 250 WO HCPCS SELF ADMINISTERED DRUGS (ALT 637 FOR MEDICARE OP): Performed by: INTERNAL MEDICINE

## 2023-11-24 PROCEDURE — 96372 THER/PROPH/DIAG INJ SC/IM: CPT | Performed by: INTERNAL MEDICINE

## 2023-11-24 PROCEDURE — 85025 COMPLETE CBC W/AUTO DIFF WBC: CPT | Performed by: PHYSICIAN ASSISTANT

## 2023-11-24 PROCEDURE — 80053 COMPREHEN METABOLIC PANEL: CPT | Performed by: PHYSICIAN ASSISTANT

## 2023-11-24 PROCEDURE — 70450 CT HEAD/BRAIN W/O DYE: CPT

## 2023-11-24 PROCEDURE — 83605 ASSAY OF LACTIC ACID: CPT | Performed by: PHYSICIAN ASSISTANT

## 2023-11-24 PROCEDURE — 2500000002 HC RX 250 W HCPCS SELF ADMINISTERED DRUGS (ALT 637 FOR MEDICARE OP, ALT 636 FOR OP/ED): Performed by: INTERNAL MEDICINE

## 2023-11-24 PROCEDURE — 82947 ASSAY GLUCOSE BLOOD QUANT: CPT

## 2023-11-24 PROCEDURE — 2500000004 HC RX 250 GENERAL PHARMACY W/ HCPCS (ALT 636 FOR OP/ED): Performed by: INTERNAL MEDICINE

## 2023-11-24 PROCEDURE — 2500000004 HC RX 250 GENERAL PHARMACY W/ HCPCS (ALT 636 FOR OP/ED): Performed by: NURSE PRACTITIONER

## 2023-11-24 PROCEDURE — 36415 COLL VENOUS BLD VENIPUNCTURE: CPT | Performed by: PHYSICIAN ASSISTANT

## 2023-11-24 PROCEDURE — 87636 SARSCOV2 & INF A&B AMP PRB: CPT | Performed by: PHYSICIAN ASSISTANT

## 2023-11-24 PROCEDURE — 87075 CULTR BACTERIA EXCEPT BLOOD: CPT | Mod: WESLAB | Performed by: PHYSICIAN ASSISTANT

## 2023-11-24 PROCEDURE — 2500000004 HC RX 250 GENERAL PHARMACY W/ HCPCS (ALT 636 FOR OP/ED): Performed by: PHYSICIAN ASSISTANT

## 2023-11-24 PROCEDURE — 81001 URINALYSIS AUTO W/SCOPE: CPT | Performed by: PHYSICIAN ASSISTANT

## 2023-11-24 PROCEDURE — 99291 CRITICAL CARE FIRST HOUR: CPT | Performed by: STUDENT IN AN ORGANIZED HEALTH CARE EDUCATION/TRAINING PROGRAM

## 2023-11-24 PROCEDURE — 71046 X-RAY EXAM CHEST 2 VIEWS: CPT | Mod: FY

## 2023-11-24 PROCEDURE — 87086 URINE CULTURE/COLONY COUNT: CPT | Mod: WESLAB | Performed by: PHYSICIAN ASSISTANT

## 2023-11-24 RX ORDER — ACETAMINOPHEN 160 MG/5ML
650 SOLUTION ORAL EVERY 4 HOURS PRN
Status: DISCONTINUED | OUTPATIENT
Start: 2023-11-24 | End: 2023-11-30 | Stop reason: HOSPADM

## 2023-11-24 RX ORDER — SODIUM CHLORIDE 9 MG/ML
100 INJECTION, SOLUTION INTRAVENOUS CONTINUOUS
Status: DISCONTINUED | OUTPATIENT
Start: 2023-11-24 | End: 2023-11-29

## 2023-11-24 RX ORDER — DEXTROSE MONOHYDRATE 100 MG/ML
0.3 INJECTION, SOLUTION INTRAVENOUS ONCE AS NEEDED
Status: DISCONTINUED | OUTPATIENT
Start: 2023-11-24 | End: 2023-11-24 | Stop reason: SDUPTHER

## 2023-11-24 RX ORDER — INSULIN LISPRO 100 [IU]/ML
0-15 INJECTION, SOLUTION INTRAVENOUS; SUBCUTANEOUS
Status: DISCONTINUED | OUTPATIENT
Start: 2023-11-24 | End: 2023-11-24

## 2023-11-24 RX ORDER — ONDANSETRON HYDROCHLORIDE 2 MG/ML
4 INJECTION, SOLUTION INTRAVENOUS EVERY 8 HOURS PRN
Status: DISCONTINUED | OUTPATIENT
Start: 2023-11-24 | End: 2023-11-30 | Stop reason: HOSPADM

## 2023-11-24 RX ORDER — DEXTROSE 50 % IN WATER (D50W) INTRAVENOUS SYRINGE
25
Status: DISCONTINUED | OUTPATIENT
Start: 2023-11-24 | End: 2023-11-24 | Stop reason: SDUPTHER

## 2023-11-24 RX ORDER — POLYETHYLENE GLYCOL 3350 17 G/17G
17 POWDER, FOR SOLUTION ORAL DAILY PRN
Status: DISCONTINUED | OUTPATIENT
Start: 2023-11-24 | End: 2023-11-30 | Stop reason: HOSPADM

## 2023-11-24 RX ORDER — PANTOPRAZOLE SODIUM 40 MG/1
40 TABLET, DELAYED RELEASE ORAL
Status: DISCONTINUED | OUTPATIENT
Start: 2023-11-25 | End: 2023-11-30 | Stop reason: HOSPADM

## 2023-11-24 RX ORDER — FINASTERIDE 5 MG/1
5 TABLET, FILM COATED ORAL DAILY
Status: DISCONTINUED | OUTPATIENT
Start: 2023-11-24 | End: 2023-11-30 | Stop reason: HOSPADM

## 2023-11-24 RX ORDER — GABAPENTIN 100 MG/1
100 CAPSULE ORAL 2 TIMES DAILY
Status: DISCONTINUED | OUTPATIENT
Start: 2023-11-24 | End: 2023-11-30 | Stop reason: HOSPADM

## 2023-11-24 RX ORDER — DEXTROSE MONOHYDRATE 100 MG/ML
0.3 INJECTION, SOLUTION INTRAVENOUS ONCE AS NEEDED
Status: DISCONTINUED | OUTPATIENT
Start: 2023-11-24 | End: 2023-11-30 | Stop reason: HOSPADM

## 2023-11-24 RX ORDER — ENOXAPARIN SODIUM 100 MG/ML
40 INJECTION SUBCUTANEOUS
Status: DISCONTINUED | OUTPATIENT
Start: 2023-11-24 | End: 2023-11-30 | Stop reason: HOSPADM

## 2023-11-24 RX ORDER — INSULIN GLARGINE 100 [IU]/ML
10 INJECTION, SOLUTION SUBCUTANEOUS NIGHTLY
Status: DISCONTINUED | OUTPATIENT
Start: 2023-11-24 | End: 2023-11-30 | Stop reason: HOSPADM

## 2023-11-24 RX ORDER — ACETAMINOPHEN 325 MG/1
650 TABLET ORAL EVERY 6 HOURS PRN
Status: DISCONTINUED | OUTPATIENT
Start: 2023-11-24 | End: 2023-11-30 | Stop reason: HOSPADM

## 2023-11-24 RX ORDER — ACETAMINOPHEN 650 MG/1
650 SUPPOSITORY RECTAL EVERY 4 HOURS PRN
Status: DISCONTINUED | OUTPATIENT
Start: 2023-11-24 | End: 2023-11-30 | Stop reason: HOSPADM

## 2023-11-24 RX ORDER — CLOPIDOGREL BISULFATE 75 MG/1
75 TABLET ORAL DAILY
Status: DISCONTINUED | OUTPATIENT
Start: 2023-11-24 | End: 2023-11-30 | Stop reason: HOSPADM

## 2023-11-24 RX ORDER — GUAIFENESIN 600 MG/1
600 TABLET, EXTENDED RELEASE ORAL EVERY 12 HOURS PRN
Status: DISCONTINUED | OUTPATIENT
Start: 2023-11-24 | End: 2023-11-30 | Stop reason: HOSPADM

## 2023-11-24 RX ORDER — POLYETHYLENE GLYCOL 3350 17 G/17G
17 POWDER, FOR SOLUTION ORAL 2 TIMES DAILY
Status: DISCONTINUED | OUTPATIENT
Start: 2023-11-24 | End: 2023-11-30 | Stop reason: HOSPADM

## 2023-11-24 RX ORDER — GUAIFENESIN/DEXTROMETHORPHAN 100-10MG/5
5 SYRUP ORAL EVERY 4 HOURS PRN
Status: DISCONTINUED | OUTPATIENT
Start: 2023-11-24 | End: 2023-11-30 | Stop reason: HOSPADM

## 2023-11-24 RX ORDER — ASPIRIN 81 MG/1
162 TABLET ORAL DAILY
Status: DISCONTINUED | OUTPATIENT
Start: 2023-11-24 | End: 2023-11-30 | Stop reason: HOSPADM

## 2023-11-24 RX ORDER — LEVETIRACETAM 250 MG/1
250 TABLET ORAL 2 TIMES DAILY
COMMUNITY

## 2023-11-24 RX ORDER — TAMSULOSIN HYDROCHLORIDE 0.4 MG/1
0.4 CAPSULE ORAL 2 TIMES DAILY
Status: DISCONTINUED | OUTPATIENT
Start: 2023-11-24 | End: 2023-11-30 | Stop reason: HOSPADM

## 2023-11-24 RX ORDER — DOCUSATE SODIUM 100 MG/1
100 CAPSULE, LIQUID FILLED ORAL 2 TIMES DAILY
Status: DISCONTINUED | OUTPATIENT
Start: 2023-11-24 | End: 2023-11-30 | Stop reason: HOSPADM

## 2023-11-24 RX ORDER — INSULIN LISPRO 100 [IU]/ML
0-15 INJECTION, SOLUTION INTRAVENOUS; SUBCUTANEOUS
Status: DISCONTINUED | OUTPATIENT
Start: 2023-11-24 | End: 2023-11-30 | Stop reason: HOSPADM

## 2023-11-24 RX ORDER — ACETAMINOPHEN 325 MG/1
650 TABLET ORAL EVERY 4 HOURS PRN
Status: DISCONTINUED | OUTPATIENT
Start: 2023-11-24 | End: 2023-11-24 | Stop reason: SDUPTHER

## 2023-11-24 RX ORDER — MEROPENEM 1 G/1
1 INJECTION, POWDER, FOR SOLUTION INTRAVENOUS EVERY 8 HOURS
Status: COMPLETED | OUTPATIENT
Start: 2023-11-24 | End: 2023-11-30

## 2023-11-24 RX ORDER — ATORVASTATIN CALCIUM 40 MG/1
40 TABLET, FILM COATED ORAL DAILY
Status: DISCONTINUED | OUTPATIENT
Start: 2023-11-24 | End: 2023-11-30 | Stop reason: HOSPADM

## 2023-11-24 RX ORDER — INSULIN LISPRO 100 [IU]/ML
0-10 INJECTION, SOLUTION INTRAVENOUS; SUBCUTANEOUS
Status: DISCONTINUED | OUTPATIENT
Start: 2023-11-24 | End: 2023-11-24 | Stop reason: DRUGHIGH

## 2023-11-24 RX ORDER — FERROUS SULFATE 325(65) MG
65 TABLET ORAL DAILY
Status: DISCONTINUED | OUTPATIENT
Start: 2023-11-24 | End: 2023-11-30 | Stop reason: HOSPADM

## 2023-11-24 RX ORDER — DEXTROSE 50 % IN WATER (D50W) INTRAVENOUS SYRINGE
25
Status: DISCONTINUED | OUTPATIENT
Start: 2023-11-24 | End: 2023-11-30 | Stop reason: HOSPADM

## 2023-11-24 RX ORDER — LEVETIRACETAM 250 MG/1
250 TABLET ORAL 2 TIMES DAILY
Status: DISCONTINUED | OUTPATIENT
Start: 2023-11-24 | End: 2023-11-30 | Stop reason: HOSPADM

## 2023-11-24 RX ORDER — ONDANSETRON 4 MG/1
4 TABLET, ORALLY DISINTEGRATING ORAL EVERY 8 HOURS PRN
Status: DISCONTINUED | OUTPATIENT
Start: 2023-11-24 | End: 2023-11-30 | Stop reason: HOSPADM

## 2023-11-24 RX ADMIN — ATORVASTATIN CALCIUM 40 MG: 40 TABLET, FILM COATED ORAL at 14:32

## 2023-11-24 RX ADMIN — GABAPENTIN 100 MG: 100 CAPSULE ORAL at 14:33

## 2023-11-24 RX ADMIN — SODIUM CHLORIDE 1000 ML: 900 INJECTION, SOLUTION INTRAVENOUS at 10:55

## 2023-11-24 RX ADMIN — DOCUSATE SODIUM 100 MG: 100 CAPSULE, LIQUID FILLED ORAL at 20:32

## 2023-11-24 RX ADMIN — GABAPENTIN 100 MG: 100 CAPSULE ORAL at 20:32

## 2023-11-24 RX ADMIN — Medication 1 G: at 22:00

## 2023-11-24 RX ADMIN — INSULIN GLARGINE 10 UNITS: 100 INJECTION, SOLUTION SUBCUTANEOUS at 21:56

## 2023-11-24 RX ADMIN — SODIUM CHLORIDE 100 ML/HR: 900 INJECTION, SOLUTION INTRAVENOUS at 14:12

## 2023-11-24 RX ADMIN — ACETAMINOPHEN 650 MG: 500 TABLET ORAL at 14:33

## 2023-11-24 RX ADMIN — CLOPIDOGREL BISULFATE 75 MG: 75 TABLET ORAL at 14:33

## 2023-11-24 RX ADMIN — ACETAMINOPHEN 650 MG: 650 SUPPOSITORY RECTAL at 21:55

## 2023-11-24 RX ADMIN — INSULIN LISPRO 3 UNITS: 100 INJECTION, SOLUTION INTRAVENOUS; SUBCUTANEOUS at 17:06

## 2023-11-24 RX ADMIN — ENOXAPARIN SODIUM 40 MG: 100 INJECTION SUBCUTANEOUS at 14:33

## 2023-11-24 RX ADMIN — CEFEPIME 1 G: 1 INJECTION, POWDER, FOR SOLUTION INTRAMUSCULAR; INTRAVENOUS at 10:19

## 2023-11-24 RX ADMIN — TAMSULOSIN HYDROCHLORIDE 0.4 MG: 0.4 CAPSULE ORAL at 20:32

## 2023-11-24 RX ADMIN — Medication 1 G: at 14:33

## 2023-11-24 RX ADMIN — POLYETHYLENE GLYCOL 3350 17 G: 17 POWDER, FOR SOLUTION ORAL at 20:32

## 2023-11-24 RX ADMIN — LEVETIRACETAM 250 MG: 250 TABLET, FILM COATED ORAL at 14:32

## 2023-11-24 RX ADMIN — LEVETIRACETAM 250 MG: 250 TABLET, FILM COATED ORAL at 20:32

## 2023-11-24 RX ADMIN — FINASTERIDE 5 MG: 5 TABLET, FILM COATED ORAL at 14:33

## 2023-11-24 RX ADMIN — ASPIRIN 162 MG: 81 TABLET, COATED ORAL at 14:33

## 2023-11-24 SDOH — SOCIAL STABILITY: SOCIAL INSECURITY: DO YOU FEEL UNSAFE GOING BACK TO THE PLACE WHERE YOU ARE LIVING?: NO

## 2023-11-24 SDOH — HEALTH STABILITY: MENTAL HEALTH
STRESS IS WHEN SOMEONE FEELS TENSE, NERVOUS, ANXIOUS, OR CAN'T SLEEP AT NIGHT BECAUSE THEIR MIND IS TROUBLED. HOW STRESSED ARE YOU?: TO SOME EXTENT

## 2023-11-24 SDOH — HEALTH STABILITY: PHYSICAL HEALTH: ON AVERAGE, HOW MANY MINUTES DO YOU ENGAGE IN EXERCISE AT THIS LEVEL?: 0 MIN

## 2023-11-24 SDOH — SOCIAL STABILITY: SOCIAL INSECURITY: ARE THERE ANY APPARENT SIGNS OF INJURIES/BEHAVIORS THAT COULD BE RELATED TO ABUSE/NEGLECT?: NO

## 2023-11-24 SDOH — ECONOMIC STABILITY: INCOME INSECURITY: IN THE LAST 12 MONTHS, WAS THERE A TIME WHEN YOU WERE NOT ABLE TO PAY THE MORTGAGE OR RENT ON TIME?: NO

## 2023-11-24 SDOH — HEALTH STABILITY: MENTAL HEALTH: HOW OFTEN DO YOU HAVE 6 OR MORE DRINKS ON ONE OCCASION?: NEVER

## 2023-11-24 SDOH — ECONOMIC STABILITY: TRANSPORTATION INSECURITY
IN THE PAST 12 MONTHS, HAS THE LACK OF TRANSPORTATION KEPT YOU FROM MEDICAL APPOINTMENTS OR FROM GETTING MEDICATIONS?: NO

## 2023-11-24 SDOH — ECONOMIC STABILITY: FOOD INSECURITY: WITHIN THE PAST 12 MONTHS, THE FOOD YOU BOUGHT JUST DIDN'T LAST AND YOU DIDN'T HAVE MONEY TO GET MORE.: NEVER TRUE

## 2023-11-24 SDOH — ECONOMIC STABILITY: INCOME INSECURITY: IN THE PAST 12 MONTHS, HAS THE ELECTRIC, GAS, OIL, OR WATER COMPANY THREATENED TO SHUT OFF SERVICE IN YOUR HOME?: NO

## 2023-11-24 SDOH — SOCIAL STABILITY: SOCIAL INSECURITY
WITHIN THE LAST YEAR, HAVE TO BEEN RAPED OR FORCED TO HAVE ANY KIND OF SEXUAL ACTIVITY BY YOUR PARTNER OR EX-PARTNER?: NO

## 2023-11-24 SDOH — SOCIAL STABILITY: SOCIAL NETWORK
DO YOU BELONG TO ANY CLUBS OR ORGANIZATIONS SUCH AS CHURCH GROUPS UNIONS, FRATERNAL OR ATHLETIC GROUPS, OR SCHOOL GROUPS?: NO

## 2023-11-24 SDOH — SOCIAL STABILITY: SOCIAL NETWORK: HOW OFTEN DO YOU ATTEND CHURCH OR RELIGIOUS SERVICES?: NEVER

## 2023-11-24 SDOH — HEALTH STABILITY: PHYSICAL HEALTH: ON AVERAGE, HOW MANY DAYS PER WEEK DO YOU ENGAGE IN MODERATE TO STRENUOUS EXERCISE (LIKE A BRISK WALK)?: 0 DAYS

## 2023-11-24 SDOH — ECONOMIC STABILITY: INCOME INSECURITY: HOW HARD IS IT FOR YOU TO PAY FOR THE VERY BASICS LIKE FOOD, HOUSING, MEDICAL CARE, AND HEATING?: NOT HARD AT ALL

## 2023-11-24 SDOH — SOCIAL STABILITY: SOCIAL NETWORK: ARE YOU MARRIED, WIDOWED, DIVORCED, SEPARATED, NEVER MARRIED, OR LIVING WITH A PARTNER?: MARRIED

## 2023-11-24 SDOH — ECONOMIC STABILITY: HOUSING INSECURITY: IN THE LAST 12 MONTHS, HOW MANY PLACES HAVE YOU LIVED?: 1

## 2023-11-24 SDOH — ECONOMIC STABILITY: FOOD INSECURITY: WITHIN THE PAST 12 MONTHS, YOU WORRIED THAT YOUR FOOD WOULD RUN OUT BEFORE YOU GOT MONEY TO BUY MORE.: NEVER TRUE

## 2023-11-24 SDOH — SOCIAL STABILITY: SOCIAL INSECURITY: WITHIN THE LAST YEAR, HAVE YOU BEEN AFRAID OF YOUR PARTNER OR EX-PARTNER?: NO

## 2023-11-24 SDOH — SOCIAL STABILITY: SOCIAL NETWORK: HOW OFTEN DO YOU GET TOGETHER WITH FRIENDS OR RELATIVES?: THREE TIMES A WEEK

## 2023-11-24 SDOH — SOCIAL STABILITY: SOCIAL INSECURITY: DOES ANYONE TRY TO KEEP YOU FROM HAVING/CONTACTING OTHER FRIENDS OR DOING THINGS OUTSIDE YOUR HOME?: NO

## 2023-11-24 SDOH — SOCIAL STABILITY: SOCIAL INSECURITY: WITHIN THE LAST YEAR, HAVE YOU BEEN HUMILIATED OR EMOTIONALLY ABUSED IN OTHER WAYS BY YOUR PARTNER OR EX-PARTNER?: NO

## 2023-11-24 SDOH — HEALTH STABILITY: MENTAL HEALTH: HOW MANY STANDARD DRINKS CONTAINING ALCOHOL DO YOU HAVE ON A TYPICAL DAY?: PATIENT DOES NOT DRINK

## 2023-11-24 SDOH — ECONOMIC STABILITY: HOUSING INSECURITY
IN THE LAST 12 MONTHS, WAS THERE A TIME WHEN YOU DID NOT HAVE A STEADY PLACE TO SLEEP OR SLEPT IN A SHELTER (INCLUDING NOW)?: NO

## 2023-11-24 SDOH — SOCIAL STABILITY: SOCIAL INSECURITY: HAS ANYONE EVER THREATENED TO HURT YOUR FAMILY OR YOUR PETS?: NO

## 2023-11-24 SDOH — SOCIAL STABILITY: SOCIAL INSECURITY: HAVE YOU HAD THOUGHTS OF HARMING ANYONE ELSE?: NO

## 2023-11-24 SDOH — SOCIAL STABILITY: SOCIAL INSECURITY: ARE YOU OR HAVE YOU BEEN THREATENED OR ABUSED PHYSICALLY, EMOTIONALLY, OR SEXUALLY BY ANYONE?: NO

## 2023-11-24 SDOH — SOCIAL STABILITY: SOCIAL INSECURITY: ABUSE: ADULT

## 2023-11-24 SDOH — HEALTH STABILITY: MENTAL HEALTH: HOW OFTEN DO YOU HAVE A DRINK CONTAINING ALCOHOL?: NEVER

## 2023-11-24 SDOH — SOCIAL STABILITY: SOCIAL NETWORK: HOW OFTEN DO YOU ATTENT MEETINGS OF THE CLUB OR ORGANIZATION YOU BELONG TO?: NEVER

## 2023-11-24 SDOH — ECONOMIC STABILITY: TRANSPORTATION INSECURITY
IN THE PAST 12 MONTHS, HAS LACK OF TRANSPORTATION KEPT YOU FROM MEETINGS, WORK, OR FROM GETTING THINGS NEEDED FOR DAILY LIVING?: NO

## 2023-11-24 SDOH — SOCIAL STABILITY: SOCIAL INSECURITY: DO YOU FEEL ANYONE HAS EXPLOITED OR TAKEN ADVANTAGE OF YOU FINANCIALLY OR OF YOUR PERSONAL PROPERTY?: NO

## 2023-11-24 SDOH — SOCIAL STABILITY: SOCIAL NETWORK
IN A TYPICAL WEEK, HOW MANY TIMES DO YOU TALK ON THE PHONE WITH FAMILY, FRIENDS, OR NEIGHBORS?: MORE THAN THREE TIMES A WEEK

## 2023-11-24 ASSESSMENT — COGNITIVE AND FUNCTIONAL STATUS - GENERAL
TURNING FROM BACK TO SIDE WHILE IN FLAT BAD: A LOT
TOILETING: A LOT
STANDING UP FROM CHAIR USING ARMS: A LOT
TURNING FROM BACK TO SIDE WHILE IN FLAT BAD: A LOT
TURNING FROM BACK TO SIDE WHILE IN FLAT BAD: A LOT
MOBILITY SCORE: 12
MOVING FROM LYING ON BACK TO SITTING ON SIDE OF FLAT BED WITH BEDRAILS: A LOT
MOBILITY SCORE: 12
DRESSING REGULAR LOWER BODY CLOTHING: A LOT
WALKING IN HOSPITAL ROOM: A LOT
MOVING FROM LYING ON BACK TO SITTING ON SIDE OF FLAT BED WITH BEDRAILS: A LOT
CLIMB 3 TO 5 STEPS WITH RAILING: A LOT
HELP NEEDED FOR BATHING: A LOT
HELP NEEDED FOR BATHING: A LOT
DRESSING REGULAR LOWER BODY CLOTHING: A LOT
MOVING TO AND FROM BED TO CHAIR: A LOT
DAILY ACTIVITIY SCORE: 12
PERSONAL GROOMING: A LOT
DRESSING REGULAR LOWER BODY CLOTHING: A LOT
DAILY ACTIVITIY SCORE: 12
CLIMB 3 TO 5 STEPS WITH RAILING: A LOT
PERSONAL GROOMING: A LOT
STANDING UP FROM CHAIR USING ARMS: A LOT
DRESSING REGULAR UPPER BODY CLOTHING: A LOT
MOBILITY SCORE: 12
EATING MEALS: A LOT
MOVING TO AND FROM BED TO CHAIR: A LOT
DRESSING REGULAR UPPER BODY CLOTHING: A LOT
TOILETING: A LOT
HELP NEEDED FOR BATHING: A LOT
DRESSING REGULAR LOWER BODY CLOTHING: A LOT
HELP NEEDED FOR BATHING: A LOT
TURNING FROM BACK TO SIDE WHILE IN FLAT BAD: A LOT
EATING MEALS: A LOT
MOVING FROM LYING ON BACK TO SITTING ON SIDE OF FLAT BED WITH BEDRAILS: A LOT
STANDING UP FROM CHAIR USING ARMS: A LOT
TOILETING: A LOT
MOBILITY SCORE: 12
TOILETING: A LOT
MOVING FROM LYING ON BACK TO SITTING ON SIDE OF FLAT BED WITH BEDRAILS: A LOT
DRESSING REGULAR UPPER BODY CLOTHING: A LOT
CLIMB 3 TO 5 STEPS WITH RAILING: A LOT
STANDING UP FROM CHAIR USING ARMS: A LOT
EATING MEALS: A LOT
WALKING IN HOSPITAL ROOM: A LOT
MOVING TO AND FROM BED TO CHAIR: A LOT
DAILY ACTIVITIY SCORE: 12
EATING MEALS: A LOT
CLIMB 3 TO 5 STEPS WITH RAILING: A LOT
MOVING TO AND FROM BED TO CHAIR: A LOT
DRESSING REGULAR UPPER BODY CLOTHING: A LOT
PERSONAL GROOMING: A LOT
WALKING IN HOSPITAL ROOM: A LOT
PATIENT BASELINE BEDBOUND: NO
WALKING IN HOSPITAL ROOM: A LOT
DAILY ACTIVITIY SCORE: 12
PERSONAL GROOMING: A LOT

## 2023-11-24 ASSESSMENT — LIFESTYLE VARIABLES
AUDIT-C TOTAL SCORE: 0
AUDIT-C TOTAL SCORE: 0
EVER HAD A DRINK FIRST THING IN THE MORNING TO STEADY YOUR NERVES TO GET RID OF A HANGOVER: NO
HOW OFTEN DO YOU HAVE 6 OR MORE DRINKS ON ONE OCCASION: NEVER
HAVE YOU EVER FELT YOU SHOULD CUT DOWN ON YOUR DRINKING: NO
HAVE PEOPLE ANNOYED YOU BY CRITICIZING YOUR DRINKING: NO
REASON UNABLE TO ASSESS: NO
HOW MANY STANDARD DRINKS CONTAINING ALCOHOL DO YOU HAVE ON A TYPICAL DAY: PATIENT DOES NOT DRINK
EVER FELT BAD OR GUILTY ABOUT YOUR DRINKING: NO
HOW OFTEN DO YOU HAVE A DRINK CONTAINING ALCOHOL: NEVER
AUDIT-C TOTAL SCORE: 0
SKIP TO QUESTIONS 9-10: 1
SKIP TO QUESTIONS 9-10: 1

## 2023-11-24 ASSESSMENT — ENCOUNTER SYMPTOMS
FEVER: 1
FATIGUE: 1
CHILLS: 1
MYALGIAS: 1
EYES NEGATIVE: 1
GASTROINTESTINAL NEGATIVE: 1
PSYCHIATRIC NEGATIVE: 1
COUGH: 1
ALLERGIC/IMMUNOLOGIC NEGATIVE: 1
HEMATOLOGIC/LYMPHATIC NEGATIVE: 1
CARDIOVASCULAR NEGATIVE: 1
NEUROLOGICAL NEGATIVE: 1

## 2023-11-24 ASSESSMENT — PAIN - FUNCTIONAL ASSESSMENT
PAIN_FUNCTIONAL_ASSESSMENT: FLACC (FACE, LEGS, ACTIVITY, CRY, CONSOLABILITY)
PAIN_FUNCTIONAL_ASSESSMENT: 0-10
PAIN_FUNCTIONAL_ASSESSMENT: FLACC (FACE, LEGS, ACTIVITY, CRY, CONSOLABILITY)
PAIN_FUNCTIONAL_ASSESSMENT: 0-10

## 2023-11-24 ASSESSMENT — ACTIVITIES OF DAILY LIVING (ADL)
WALKS IN HOME: NEEDS ASSISTANCE
ADEQUATE_TO_COMPLETE_ADL: YES
PATIENT'S MEMORY ADEQUATE TO SAFELY COMPLETE DAILY ACTIVITIES?: NO
HEARING - LEFT EAR: FUNCTIONAL
BATHING: NEEDS ASSISTANCE
TOILETING: NEEDS ASSISTANCE
GROOMING: NEEDS ASSISTANCE
DRESSING YOURSELF: NEEDS ASSISTANCE
LACK_OF_TRANSPORTATION: NO
HEARING - RIGHT EAR: FUNCTIONAL
LACK_OF_TRANSPORTATION: NO
FEEDING YOURSELF: NEEDS ASSISTANCE
JUDGMENT_ADEQUATE_SAFELY_COMPLETE_DAILY_ACTIVITIES: NO

## 2023-11-24 ASSESSMENT — PAIN SCALES - GENERAL
PAINLEVEL_OUTOF10: 0 - NO PAIN

## 2023-11-24 ASSESSMENT — COLUMBIA-SUICIDE SEVERITY RATING SCALE - C-SSRS
2. HAVE YOU ACTUALLY HAD ANY THOUGHTS OF KILLING YOURSELF?: NO
6. HAVE YOU EVER DONE ANYTHING, STARTED TO DO ANYTHING, OR PREPARED TO DO ANYTHING TO END YOUR LIFE?: NO
1. IN THE PAST MONTH, HAVE YOU WISHED YOU WERE DEAD OR WISHED YOU COULD GO TO SLEEP AND NOT WAKE UP?: NO

## 2023-11-24 ASSESSMENT — PAIN SCALES - WONG BAKER: WONGBAKER_NUMERICALRESPONSE: NO HURT

## 2023-11-24 NOTE — ED TRIAGE NOTES
Pt comes from [facility] pt has a hx of a stroke. Pt was febrile last night and lethargic. Pt also had a change in mental status. Pt is A&O x3 at baseline. Pt is A&O x2 upon ED arrival.

## 2023-11-24 NOTE — CARE PLAN
Problem: Pain  Goal: My pain/discomfort is manageable  Outcome: Progressing     Problem: Safety  Goal: Patient will be injury free during hospitalization  Outcome: Progressing  Goal: I will remain free of falls  Outcome: Progressing     Problem: Daily Care  Goal: Daily care needs are met  Outcome: Progressing     Problem: Psychosocial Needs  Goal: Demonstrates ability to cope with hospitalization/illness  Outcome: Progressing  Goal: Collaborate with me, my family, and caregiver to identify my specific goals  Outcome: Progressing     Problem: Discharge Barriers  Goal: My discharge needs are met  Outcome: Progressing   The patient's goals for the shift include      The clinical goals for the shift include      Over the shift, the patient did not make progress toward the following goals. Barriers to progression include . Recommendations to address these barriers include .

## 2023-11-24 NOTE — PROGRESS NOTES
11/24/23 1107   Physical Activity   On average, how many days per week do you engage in moderate to strenuous exercise (like a brisk walk)? 0 days   On average, how many minutes do you engage in exercise at this level? 0 min   Financial Resource Strain   How hard is it for you to pay for the very basics like food, housing, medical care, and heating? Not hard   Housing Stability   In the last 12 months, was there a time when you were not able to pay the mortgage or rent on time? N   In the last 12 months, how many places have you lived? 1   In the last 12 months, was there a time when you did not have a steady place to sleep or slept in a shelter (including now)? N   Transportation Needs   In the past 12 months, has lack of transportation kept you from medical appointments or from getting medications? no   In the past 12 months, has lack of transportation kept you from meetings, work, or from getting things needed for daily living? No   Food Insecurity   Within the past 12 months, you worried that your food would run out before you got the money to buy more. Never true   Within the past 12 months, the food you bought just didn't last and you didn't have money to get more. Never true   Stress   Do you feel stress - tense, restless, nervous, or anxious, or unable to sleep at night because your mind is troubled all the time - these days? To some exte   Social Connections   In a typical week, how many times do you talk on the phone with family, friends, or neighbors? More than 3   How often do you get together with friends or relatives? Three times   How often do you attend Scientologist or Presyb[employer]ian services? Never   Do you belong to any clubs or organizations such as Scientologist groups, unions, fraternal or athletic groups, or school groups? No   How often do you attend meetings of the clubs or organizations you belong to? Never   Are you , , , , never , or living with a partner?     Intimate Partner Violence   Within the last year, have you been afraid of your partner or ex-partner? No   Within the last year, have you been humiliated or emotionally abused in other ways by your partner or ex-partner? No   Within the last year, have you been raped or forced to have any kind of sexual activity by your partner or ex-partner? No   Alcohol Use   Q1: How often do you have a drink containing alcohol? Never   Q2: How many drinks containing alcohol do you have on a typical day when you are drinking? None   Q3: How often do you have six or more drinks on one occasion? Never   Utilities   In the past 12 months has the electric, gas, oil, or water company threatened to shut off services in your home? No

## 2023-11-24 NOTE — PROGRESS NOTES
11/24/23 1110   ACS Disability Status   Are you deaf or do you have serious difficulty hearing? Y   Are you blind or do you have serious difficulty seeing, even when wearing glasses? N   Because of a physical, mental, or emotional condition, do you have serious difficulty concentrating, remembering, or making decisions? (5 years old or older) Y   Do you have serious difficulty walking or climbing stairs? Y   Do you have serious difficulty dressing or bathing? Y   Because of a physical, mental, or emotional condition, do you have serious difficulty doing errands alone such as visiting the doctor? Y

## 2023-11-24 NOTE — H&P
Chief complaint Unobtainable from patient, presents from fever, altered mental status    History Of Present Illness  Luis Greene is a very pleasant 83 y.o. elderly  male presenting with fever and altered mental status.  History is obtained upon review of the medical record, discussion with the patient, his spouse and two daughters present at bedside in the emergency department.  Yesterday, his spouse visited around 1:30 pm and noted that he was confused, less interactive, lying in bed.  His spouse visited later in the afternoon and he was febrile with a temperature up to 104.  He had symptoms of congestion and cough.  COVID-19 was negative.  He was treated with Tylenol and his temperature improved.  A chest x-ray and urinalysis were ordered.  His daughter visited and his condition was not improved.  Around 10 PM his temperature was 98.9.  Around 7:30 this morning, he slipped out of bed.  He is awake alert and oriented x 1.  He states his name and recognizes his family present at bedside though he is unable to provide any history or details of the events leading to this hospitalization.  He unable to state if he struck his head.  He denies any pain or complaints to me though review of systems is limited.  911 was called.  He has a past medical history significant for BPH, urinary tract infections, recent past medical history significant for urinary retention and currently has a Tiwari catheter in place.  He had an outpatient appointment scheduled with his Urologist, Dr. Lozano today.  Please see the record for the details.  In the emergency department, initial workup was done.  12 EKG showed rapid A-fib with a rate 111, no acute ischemia.  WBC 11.7.  Lactic acid elevated 2.1.  Repeat lactic acid within normal limits.  Urinalysis showed 500 leukocytes, many white blood cells, bacteria.  Urine culture was sent.  Chest x-ray was unremarkable.  Blood cultures were obtained.  He was treated with IV fluids  and broad-spectrum IV antibiotics Cefepime and was admitted.     Past Medical History  Significant for coronary artery disease, CABG 2011, hyperlipidemia, atrial fibrillation status post Watchman procedure, history of TIA/CVA (On Aspirin, Plavix), hypercholesterolemia, type 2 diabetes mellitus, history of GI bleed, BPH, UTI, urinary retention status post Tiwari catheter  *10/23/2023 urine culture ESBL    Surgical History  Significant for CABG 2011, Watchman device 11/2023  History of right rotator cuff, repair 2019, laminectomy     Social History  Presents from skilled nursing rehabilitation  No tobacco alcohol or drug use      Lives with spouse    Daughter, Ni, holds durable healthcare power of     He is a full code    Family History  Father with cardiac disorder, hypertension, hyperlipidemia, diabetes     Allergies  Famotidine, Prednisone, Amoxicillin, Donepezil, Gabapentin, Hydrochlorothiazide, Ibuprofen, Oxycodone-acetaminophen, Tramadol hcl, and Oxycodone hcl    Review of Systems   Reason unable to perform ROS: Review of systems limited due to altered mentation.   Constitutional:  Positive for chills, fatigue and fever.   HENT:  Positive for congestion and postnasal drip.    Eyes: Negative.    Respiratory:  Positive for cough.    Cardiovascular: Negative.    Gastrointestinal: Negative.         Last bowel movement yesterday per daughter   Musculoskeletal:  Positive for myalgias.   Allergic/Immunologic: Negative.    Neurological: Negative.    Hematological: Negative.    Psychiatric/Behavioral: Negative.     All other systems reviewed and are negative.       Physical Exam  Vitals and nursing note reviewed.   Constitutional:       General: He is not in acute distress.     Appearance: Normal appearance. He is normal weight. He is ill-appearing.   HENT:      Head: Normocephalic and atraumatic.      Right Ear: Tympanic membrane normal.      Left Ear: Tympanic membrane normal.      Nose: Nose normal.  "No congestion.      Mouth/Throat:      Mouth: Mucous membranes are moist.      Pharynx: Oropharynx is clear.   Eyes:      Extraocular Movements: Extraocular movements intact.      Conjunctiva/sclera: Conjunctivae normal.      Pupils: Pupils are equal, round, and reactive to light.   Cardiovascular:      Rate and Rhythm: Tachycardia present. Rhythm irregular.      Pulses: Normal pulses.      Heart sounds: Normal heart sounds.   Pulmonary:      Effort: Pulmonary effort is normal. No respiratory distress.      Breath sounds: Normal breath sounds. No wheezing, rhonchi or rales.      Comments: On room air. No cough  Abdominal:      General: Bowel sounds are normal. There is no distension.      Palpations: Abdomen is soft.      Tenderness: There is no abdominal tenderness.   Genitourinary:     Comments: : Tiwari catheter draining cloudy dark yellow urine. Rectal examination deferred  Musculoskeletal:         General: No swelling. Normal range of motion.      Cervical back: Normal range of motion and neck supple.   Skin:     General: Skin is warm and dry.      Capillary Refill: Capillary refill takes less than 2 seconds.   Neurological:      General: No focal deficit present.      Mental Status: He is alert. He is disoriented.      Comments: Awake alert oriented x 1-2.  States name.  Recognizes family at bedside.  Follows simple commands.  Generalized weakness.  No focal weakness.  Cranial nerves II through XII grossly intact   Psychiatric:         Mood and Affect: Mood normal.         Behavior: Behavior normal.       Last Recorded Vitals  Blood pressure 127/64, pulse (!) 112, temperature 38 °C (100.4 °F), temperature source Oral, resp. rate 22, height 1.803 m (5' 11\"), weight 77.2 kg (170 lb 3.1 oz), SpO2 100 %.    Telemetry rate 1 teens    Relevant Results  Results for orders placed or performed during the hospital encounter of 11/24/23 (from the past 24 hour(s))   CBC and Auto Differential   Result Value Ref Range    " WBC 11.7 (H) 4.4 - 11.3 x10*3/uL    nRBC 0.0 0.0 - 0.0 /100 WBCs    RBC 3.42 (L) 4.50 - 5.90 x10*6/uL    Hemoglobin 12.0 (L) 13.5 - 17.5 g/dL    Hematocrit 34.3 (L) 41.0 - 52.0 %     80 - 100 fL    MCH 35.1 (H) 26.0 - 34.0 pg    MCHC 35.0 32.0 - 36.0 g/dL    RDW 13.0 11.5 - 14.5 %    Platelets 209 150 - 450 x10*3/uL    Neutrophils % 84.8 40.0 - 80.0 %    Immature Granulocytes %, Automated 0.5 0.0 - 0.9 %    Lymphocytes % 5.0 13.0 - 44.0 %    Monocytes % 9.4 2.0 - 10.0 %    Eosinophils % 0.0 0.0 - 6.0 %    Basophils % 0.3 0.0 - 2.0 %    Neutrophils Absolute 9.88 (H) 1.60 - 5.50 x10*3/uL    Immature Granulocytes Absolute, Automated 0.06 0.00 - 0.50 x10*3/uL    Lymphocytes Absolute 0.58 (L) 0.80 - 3.00 x10*3/uL    Monocytes Absolute 1.10 (H) 0.05 - 0.80 x10*3/uL    Eosinophils Absolute 0.00 0.00 - 0.40 x10*3/uL    Basophils Absolute 0.03 0.00 - 0.10 x10*3/uL   Comprehensive metabolic panel   Result Value Ref Range    Glucose 199 (H) 65 - 99 mg/dL    Sodium 132 (L) 133 - 145 mmol/L    Potassium 5.2 (H) 3.4 - 5.1 mmol/L    Chloride 94 (L) 97 - 107 mmol/L    Bicarbonate 21 (L) 24 - 31 mmol/L    Urea Nitrogen 26 (H) 8 - 25 mg/dL    Creatinine 1.30 0.40 - 1.60 mg/dL    eGFR 55 (L) >60 mL/min/1.73m*2    Calcium 9.1 8.5 - 10.4 mg/dL    Albumin 3.7 3.5 - 5.0 g/dL    Alkaline Phosphatase 68 35 - 125 U/L    Total Protein 6.8 5.9 - 7.9 g/dL    AST 38 5 - 40 U/L    Bilirubin, Total 1.6 (H) 0.1 - 1.2 mg/dL    ALT 21 5 - 40 U/L    Anion Gap 17 <=19 mmol/L   Blood Gas Lactic Acid, Venous   Result Value Ref Range    POCT Lactate, Venous 2.1 (H) 0.4 - 2.0 mmol/L   Morphology   Result Value Ref Range    RBC Morphology See Below     Polychromasia Mild     Giovanny Cells Few    Urinalysis with Reflex Microscopic and Culture   Result Value Ref Range    Color, Urine Light-Orange (N) Light-Yellow, Yellow, Dark-Yellow    Appearance, Urine Ex.Turbid (N) Clear    Specific Gravity, Urine 1.013 1.005 - 1.035    pH, Urine 6.0 5.0, 5.5, 6.0,  6.5, 7.0, 7.5, 8.0    Protein, Urine 100 (2+) (A) NEGATIVE, 10 (TRACE), 20 (TRACE) mg/dL    Glucose, Urine Normal Normal mg/dL    Blood, Urine 0.5 (2+) (A) NEGATIVE    Ketones, Urine NEGATIVE NEGATIVE mg/dL    Bilirubin, Urine NEGATIVE NEGATIVE    Urobilinogen, Urine 2 (1+) (A) Normal mg/dL    Nitrite, Urine NEGATIVE NEGATIVE    Leukocyte Esterase, Urine 500 Akin/µL (A) NEGATIVE   Extra Urine Gray Tube   Result Value Ref Range    Extra Tube Hold for add-ons.    Microscopic Only, Urine   Result Value Ref Range    WBC, Urine >50 (A) 1-5, NONE /HPF    WBC Clumps, Urine MANY Reference range not established. /HPF    RBC, Urine 11-20 (A) NONE, 1-2, 3-5 /HPF    Bacteria, Urine 1+ (A) NONE SEEN /HPF   Sars-CoV-2 PCR, Screen Asymptomatic   Result Value Ref Range    Coronavirus 2019, PCR Not Detected Not Detected   Influenza A, and B PCR   Result Value Ref Range    Flu A Result Not Detected Not Detected    Flu B Result Not Detected Not Detected   Blood Gas Lactic Acid, Venous   Result Value Ref Range    POCT Lactate, Venous 1.6 0.4 - 2.0 mmol/L       XR chest 2 views    Addendum Date: 11/24/2023    Interpreted By:  Jesus Briggs, ADDENDUM: AP and lateral projection.   Signed by: Jesus Briggs 11/24/2023 10:55 AM   -------- ORIGINAL REPORT -------- Dictation workstation:   [id number]    Result Date: 11/24/2023  Interpreted By:  Jesus Briggs, STUDY: XR CHEST 2 VIEWS; 11/24/2023 10:40 am   INDICATION: Signs/Symptoms:fever   COMPARISON: 11/17/2023   ACCESSION NUMBER(S): [id number]   ORDERING CLINICIAN: DOUGLAS PIERSON   FINDINGS: Watchman device is seen. Sternotomy wires are noted. Overlying cardiac monitoring device is seen.   Rotator cuff anchor is noted on the right.   The cardiomediastinal silhouette is enlarged but stable. The lungs are clear. No pleural effusion is identified.   The osseous structures are intact. Thoracic spondylosis is noted.       No acute cardiopulmonary process.   MACRO: None   Signed by:  Jesus Briggs 11/24/2023 10:52 AM Dictation workstation:   [id number]     Scheduled medications  aspirin, 162 mg, oral, Daily  atorvastatin, 40 mg, oral, Daily  clopidogrel, 75 mg, oral, Daily  docusate sodium, 100 mg, oral, BID  enoxaparin, 40 mg, subcutaneous, q24h  ferrous sulfate (325 mg ferrous sulfate), 65 mg of iron, oral, Daily  finasteride, 5 mg, oral, Daily  gabapentin, 100 mg, oral, BID  insulin glargine, 10 Units, subcutaneous, Nightly  insulin lispro, 0-15 Units, subcutaneous, TID with meals  levETIRAcetam, 250 mg, oral, BID  meropenem, 1 g, intravenous, q8h  [START ON 11/25/2023] pantoprazole, 40 mg, oral, Daily before breakfast  polyethylene glycol, 17 g, oral, BID  tamsulosin, 0.4 mg, oral, BID      Continuous medications  sodium chloride 0.9%, 100 mL/hr, Last Rate: 100 mL/hr (11/24/23 1412)      PRN medications  PRN medications: acetaminophen **OR** acetaminophen **OR** acetaminophen, acetaminophen, benzocaine-menthol, dextromethorphan-guaifenesin, dextrose 10 % in water (D10W), dextrose, glucagon, guaiFENesin, ondansetron ODT **OR** ondansetron, polyethylene glycol    ASSESSMENT:  Sepsis  Fever  Leukocytosis  Urinary tract infection, secondary to indwelling guidry catheter  History of urinary retention - Guidry catheter  BPH  Hyponatremia  Hyperkalemia  Acute kidney injury  Altered mental status  Toxic encephalopathy secondary to urinary tract infection  Coronary artery disease  History of CABG  Hypertension  Hyperlipidemia  Atrial fibrillation, rapid ventricular rate  History of TIA/CVA  History of stroke like episodes  Type 2 diabetes mellitus  History of gastrointestinal bleed    PLAN:  Discussed with Dr. Oneill.  IV fluids normal saline at 100 mL/h.  IV Meropenem.  Follow urine culture, blood cultures.  Monitor temperature, white blood cell count.  Consult Infectious disease, Urology.  Hold nephrotoxic medications.  Monitor electrolytes and renal function.  Atrial fibrillation rate teens.   Blood pressure stable.  History of low rate, off Toprol XL, history of hypotension, off Amlodipine.  Hold Lisinopril due to acute kidney injury.  Consult Cardiology, Dr. Grant.  Monitor telemetry heart rate, blood pressure.  Altered mental status.  Examination limited though no focal deficits.  Status post fall prior to admission.  CT brain without contrast.  Suspect toxic encephalopathy secondary to infection.  Monitor.  PT/OT evaluation.  Fall precautions.  Up with assistance only.  Bed and chair alarm at all times.  We will take DVT, fall, aspiration and decubitus precautions during his stay in the hospital.  The plan has been discussed with the patient, spouse, daughters present at bedside.  Daughter, Ni, is durable health care power of .  Discussed CODE STATUS.  He is a full code.      ADDENDUM:  CT Brain without contrast radiology report reviewed.  No acute findings.  Reviewed with patient, family, nursing, Dr. Oneill.    Martina Haynes, APRN-CNP

## 2023-11-24 NOTE — PROGRESS NOTES
11/24/23 1111   Current Planned Discharge Disposition   Current Planned Discharge Disposition SNF

## 2023-11-24 NOTE — ED PROVIDER NOTES
HPI   Chief Complaint   Patient presents with    Altered Mental Status       This is a 83-year-old male presenting from [facility]care facility with a chief complaint of fever, general not feeling well.  He is at the rehab facility for recent stroke.  He has not been feeling well for the last 1 to 2 days.  He has a chronic indwelling Tiwari catheter.  He denies cough congestion.  Denies chest pain shortness of breath.  He is febrile on arrival.                          No data recorded                Patient History   Past Medical History:   Diagnosis Date    Coronary artery disease     Hyperlipidemia     Personal history of other diseases of the circulatory system     History of cardiac disorder    Personal history of other endocrine, nutritional and metabolic disease     History of hypercholesterolemia     Past Surgical History:   Procedure Laterality Date    CARDIAC CATHETERIZATION N/A 11/10/2023    Procedure: LAAO (Left Atrial Appendage Occlusion);  Surgeon: Louie Avitia MD;  Location: [place] [address] Cardiac Cath Lab;  Service: Cardiovascular;  Laterality: N/A;  Last Eliquis 11/06/SD /[employer]    MR HEAD ANGIO WO IV CONTRAST  2/17/2019    MR HEAD ANGIO WO IV CONTRAST [facility]    MR HEAD ANGIO WO IV CONTRAST  10/28/2022    MR HEAD ANGIO WO IV CONTRAST [facility]    MR HEAD ANGIO WO IV CONTRAST  8/15/2023    MR HEAD ANGIO WO IV CONTRAST [facility]    OTHER SURGICAL HISTORY  03/11/2019    Heart surgery    OTHER SURGICAL HISTORY  03/11/2019    Knee surgery    OTHER SURGICAL HISTORY  03/11/2019    Tonsillectomy     Family History   Problem Relation Name Age of Onset    Other (cardiac disorder) Father      Diabetes Father      Hyperlipidemia Father      Hypertension Father      Other (liver carcinoma) Father      Other (hypercholesterolemia) Father      Hypertension Daughter       Social History     Tobacco Use    Smoking status: Never    Smokeless tobacco: Never   Vaping  Use    Vaping Use: Never used   Substance Use Topics    Alcohol use: Never    Drug use: Never       Physical Exam   ED Triage Vitals [11/24/23 0951]   Temp Heart Rate Resp BP   39 °C (102.2 °F) (!) 114 16 (!) 193/83      SpO2 Temp Source Heart Rate Source Patient Position   97 % Oral -- --      BP Location FiO2 (%)     -- --       Physical Exam  Vitals and nursing note reviewed.   Constitutional:       Appearance: He is well-developed.   HENT:      Head: Normocephalic and atraumatic.   Cardiovascular:      Rate and Rhythm: Regular rhythm. Tachycardia present.      Heart sounds: Normal heart sounds.   Pulmonary:      Effort: Pulmonary effort is normal.   Abdominal:      General: Bowel sounds are normal.      Palpations: Abdomen is soft.   Musculoskeletal:         General: Normal range of motion.      Cervical back: Normal range of motion.   Skin:     General: Skin is warm.   Neurological:      Mental Status: He is alert and oriented to person, place, and time.      Cranial Nerves: No cranial nerve deficit.      Sensory: No sensory deficit.      Motor: No weakness.   Psychiatric:         Mood and Affect: Mood normal.         ED Course & MDM   ED Course as of 11/24/23 1150   Fri Nov 24, 2023   1101 EKG: A-fib with RVR with rate of 111 beats minute.  Normal axis.  Right bundle branch block is present.  No ST elevation or pression, no acute ischemic pattern.  No STEMI.  QTc 481 ms. [NT]      ED Course User Index  [NT] Jg Singh DO         Diagnoses as of 11/24/23 1150   Sepsis, due to unspecified organism, unspecified whether acute organ dysfunction present (CMS/HCC)   Urinary tract infection without hematuria, site unspecified       Medical Decision Making  I have seen and evaluated this patient.  The attending physician has also seen and evaluated this patient.  Vital signs, laboratory testing and diagnostic images if applicable have been reviewed.  All laboratory and imaging is interpreted by myself unless  otherwise stated.  Radiology studies are also formally interpreted by radiologist.    BC with 11.7 white count, no significant anemia, lactic 2.1.  Patient was febrile and tachycardic.  Blood cultures were sent.  Broad-spectrum antibiotic initiated.  Overall impression is sepsis likely due to urinary tract infection.  Patient admitted for further treatment and management.  30 cc/kg fluid bolus not required given lactic less than 4 no evidence of endorgan function.  Admitted to internal medicine service.      Labs Reviewed   CBC WITH AUTO DIFFERENTIAL - Abnormal       Result Value    WBC 11.7 (*)     nRBC 0.0      RBC 3.42 (*)     Hemoglobin 12.0 (*)     Hematocrit 34.3 (*)           MCH 35.1 (*)     MCHC 35.0      RDW 13.0      Platelets 209      Neutrophils % 84.8      Immature Granulocytes %, Automated 0.5      Lymphocytes % 5.0      Monocytes % 9.4      Eosinophils % 0.0      Basophils % 0.3      Neutrophils Absolute 9.88 (*)     Immature Granulocytes Absolute, Automated 0.06      Lymphocytes Absolute 0.58 (*)     Monocytes Absolute 1.10 (*)     Eosinophils Absolute 0.00      Basophils Absolute 0.03     COMPREHENSIVE METABOLIC PANEL - Abnormal    Glucose 199 (*)     Sodium 132 (*)     Potassium 5.2 (*)     Chloride 94 (*)     Bicarbonate 21 (*)     Urea Nitrogen 26 (*)     Creatinine 1.30      eGFR 55 (*)     Calcium 9.1      Albumin 3.7      Alkaline Phosphatase 68      Total Protein 6.8      AST 38      Bilirubin, Total 1.6 (*)     ALT 21      Anion Gap 17     BLOOD GAS LACTIC ACID, VENOUS - Abnormal    POCT Lactate, Venous 2.1 (*)    URINALYSIS WITH REFLEX MICROSCOPIC AND CULTURE - Abnormal    Color, Urine Light-Orange (*)     Appearance, Urine Ex.Turbid (*)     Specific Gravity, Urine 1.013      pH, Urine 6.0      Protein, Urine 100 (2+) (*)     Glucose, Urine Normal      Blood, Urine 0.5 (2+) (*)     Ketones, Urine NEGATIVE      Bilirubin, Urine NEGATIVE      Urobilinogen, Urine 2 (1+) (*)      Nitrite, Urine NEGATIVE      Leukocyte Esterase, Urine 500 Akin/µL (*)    MICROSCOPIC ONLY, URINE - Abnormal    WBC, Urine >50 (*)     WBC Clumps, Urine MANY      RBC, Urine 11-20 (*)     Bacteria, Urine 1+ (*)    SARS-COV-2 PCR, SCREEN ASYMPTOMATIC - Normal    Coronavirus 2019, PCR Not Detected      Narrative:     This assay has received FDA Emergency Use Authorization (EUA) and is only authorized for the duration of time that circumstances exist to justify the authorization of the emergency use of in vitro diagnostic tests for the detection of SARS-CoV-2 virus and/or diagnosis of COVID-19 infection under section 564(b)(1) of the Act, 21 U.S.C. 360bbb-3(b)(1). This assay is an in vitro diagnostic nucleic acid amplification test for the qualitative detection of SARS-CoV-2 from nasopharyngeal specimens and has been validated for use at [facility]. Negative results do not preclude COVID-19 infections and should not be used as the sole basis for diagnosis, treatment, or other management decisions.     INFLUENZA A AND B PCR - Normal    Flu A Result Not Detected      Flu B Result Not Detected      Narrative:     This assay is an in vitro diagnostic multiplex nucleic acid amplification test for the detection and discrimination of Influenza A & B from nasopharyngeal specimens, and has been validated for use at [facility]. Negative results do not preclude Influenza A/B infections, and should not be used as the sole basis for diagnosis, treatment, or other management decisions. If Influenza A/B and RSV PCR results are negative, testing for Parainfluenza virus, Adenovirus and Metapneumovirus is routinely performed for [facility] pediatric oncology and intensive care inpatients, and is available on other patients by placing an add-on request.   BLOOD CULTURE   BLOOD CULTURE   URINE CULTURE   URINALYSIS WITH REFLEX MICROSCOPIC AND CULTURE    Narrative:     The following orders were  created for panel order Urinalysis with Reflex Microscopic and Culture.  Procedure                               Abnormality         Status                     ---------                               -----------         ------                     Urinalysis with Reflex M...[[id number]]  Abnormal            Final result               Extra Urine Gray Tube[[id number]]                            In process                   Please view results for these tests on the individual orders.   EXTRA URINE GRAY TUBE   BLOOD GAS LACTIC ACID, VENOUS   MORPHOLOGY    RBC Morphology See Below      Polychromasia Mild      Giovanny Cells Few       XR chest 2 views   Final Result   Addendum (preliminary) 1 of 1   Interpreted By:  Jesus Briggs,    ADDENDUM:   AP and lateral projection.        Signed by: Jesus Briggs 11/24/2023 10:55 AM        -------- ORIGINAL REPORT --------   Dictation workstation:   [id number]      Final   No acute cardiopulmonary process.        MACRO:   None        Signed by: Jesus Briggs 11/24/2023 10:52 AM   Dictation workstation:   [id number]        Medications   sodium chloride 0.9 % bolus 1,000 mL (1,000 mL intravenous New Bag 11/24/23 1055)   cefepime (Maxipime) 1 g in dextrose 5 % 50 mL IV (0 g intravenous Stopped 11/24/23 1049)     New Prescriptions    No medications on file       Procedure  Procedures     Gildardo Wiley PA-C  11/24/23 1153

## 2023-11-24 NOTE — PROGRESS NOTES
11/24/23 1109   Discharge Planning   Living Arrangements Spouse/significant other   Support Systems Spouse/significant other   Type of Residence Private residence   Do you have animals or pets at home? No   Who is requesting discharge planning? Provider   Home or Post Acute Services None   Patient expects to be discharged to: Return to [facility] for rehab   Does the patient need discharge transport arranged? Yes   RoundTrip coordination needed? Yes   Has discharge transport been arranged? No   Financial Resource Strain   How hard is it for you to pay for the very basics like food, housing, medical care, and heating? Not hard   Housing Stability   In the last 12 months, was there a time when you were not able to pay the mortgage or rent on time? N   In the last 12 months, how many places have you lived? 1   In the last 12 months, was there a time when you did not have a steady place to sleep or slept in a shelter (including now)? N   Transportation Needs   In the past 12 months, has lack of transportation kept you from medical appointments or from getting medications? no   In the past 12 months, has lack of transportation kept you from meetings, work, or from getting things needed for daily living? No   Patient Choice   Patient / Family choosing to utilize agency / facility established prior to hospitalization Yes

## 2023-11-24 NOTE — CARE PLAN
Pt has a POA and Living Will    ADOD: 2-3 days    Pt is here for a change of mental status; from [facility]. Information gathered from dtr Chantel and her number is [phone]    Pt lives at home with his wife Evelyn and her number is [phone]  He normally can dress himself and shower. His wife Evelyn does all of the cooking, shopping, cleaning and care for him at home; pt no longer drives.  He does not use home 02, no cpap or bipap. He uses a walker and had a fall last night at [facility]. Hx of UTI's and he has a guidry that was placed last week for urinary retention. He also has a hx of TIA symptoms.  Pt wears readers, no hearing aids but he has mitra hearing loss.  No ETOH, no tobacco products.  Pt is here for fever and change of mental status. Plan is to return to [facility] for rehab    17:17 Referral placed earlier to [facility]; just sent the [facility]    DISCHARGE PLAN: RETURN TO [facility]

## 2023-11-24 NOTE — CARE PLAN
Problem: Skin  Goal: Decreased wound size/increased tissue granulation at next dressing change  Outcome: Progressing  Flowsheets (Taken 11/24/2023 1627)  Decreased wound size/increased tissue granulation at next dressing change:   Promote sleep for wound healing   Protective dressings over bony prominences   The patient's goals for the shift include      The clinical goals for the shift include      Over the shift, the patient did not make progress toward the following goals. Barriers to progression include . Recommendations to address these barriers include .

## 2023-11-25 ENCOUNTER — APPOINTMENT (OUTPATIENT)
Dept: RADIOLOGY | Facility: HOSPITAL | Age: 83
DRG: 698 | End: 2023-11-25
Payer: MEDICARE

## 2023-11-25 LAB
ALBUMIN SERPL-MCNC: 3 G/DL (ref 3.5–5)
ALP BLD-CCNC: 56 U/L (ref 35–125)
ALT SERPL-CCNC: 20 U/L (ref 5–40)
ANION GAP SERPL CALC-SCNC: 11 MMOL/L
AST SERPL-CCNC: 59 U/L (ref 5–40)
BILIRUB SERPL-MCNC: 0.9 MG/DL (ref 0.1–1.2)
BUN SERPL-MCNC: 28 MG/DL (ref 8–25)
CALCIUM SERPL-MCNC: 8.9 MG/DL (ref 8.5–10.4)
CHLORIDE SERPL-SCNC: 105 MMOL/L (ref 97–107)
CO2 SERPL-SCNC: 21 MMOL/L (ref 24–31)
CREAT SERPL-MCNC: 1.2 MG/DL (ref 0.4–1.6)
ERYTHROCYTE [DISTWIDTH] IN BLOOD BY AUTOMATED COUNT: 13 % (ref 11.5–14.5)
GFR SERPL CREATININE-BSD FRML MDRD: 60 ML/MIN/1.73M*2
GLUCOSE BLD MANUAL STRIP-MCNC: 149 MG/DL (ref 74–99)
GLUCOSE BLD MANUAL STRIP-MCNC: 307 MG/DL (ref 74–99)
GLUCOSE BLD MANUAL STRIP-MCNC: 380 MG/DL (ref 74–99)
GLUCOSE SERPL-MCNC: 159 MG/DL (ref 65–99)
HCT VFR BLD AUTO: 31.6 % (ref 41–52)
HGB BLD-MCNC: 10.9 G/DL (ref 13.5–17.5)
MCH RBC QN AUTO: 35.2 PG (ref 26–34)
MCHC RBC AUTO-ENTMCNC: 34.5 G/DL (ref 32–36)
MCV RBC AUTO: 102 FL (ref 80–100)
NRBC BLD-RTO: 0 /100 WBCS (ref 0–0)
PLATELET # BLD AUTO: 171 X10*3/UL (ref 150–450)
POTASSIUM SERPL-SCNC: 4.3 MMOL/L (ref 3.4–5.1)
PROT SERPL-MCNC: 5.7 G/DL (ref 5.9–7.9)
RBC # BLD AUTO: 3.1 X10*6/UL (ref 4.5–5.9)
SODIUM SERPL-SCNC: 137 MMOL/L (ref 133–145)
WBC # BLD AUTO: 7.7 X10*3/UL (ref 4.4–11.3)

## 2023-11-25 PROCEDURE — 85027 COMPLETE CBC AUTOMATED: CPT | Performed by: INTERNAL MEDICINE

## 2023-11-25 PROCEDURE — 74176 CT ABD & PELVIS W/O CONTRAST: CPT

## 2023-11-25 PROCEDURE — 2500000002 HC RX 250 W HCPCS SELF ADMINISTERED DRUGS (ALT 637 FOR MEDICARE OP, ALT 636 FOR OP/ED): Performed by: INTERNAL MEDICINE

## 2023-11-25 PROCEDURE — 2500000004 HC RX 250 GENERAL PHARMACY W/ HCPCS (ALT 636 FOR OP/ED): Performed by: NURSE PRACTITIONER

## 2023-11-25 PROCEDURE — 2500000004 HC RX 250 GENERAL PHARMACY W/ HCPCS (ALT 636 FOR OP/ED): Performed by: INTERNAL MEDICINE

## 2023-11-25 PROCEDURE — 96372 THER/PROPH/DIAG INJ SC/IM: CPT | Performed by: INTERNAL MEDICINE

## 2023-11-25 PROCEDURE — 82947 ASSAY GLUCOSE BLOOD QUANT: CPT

## 2023-11-25 PROCEDURE — 97162 PT EVAL MOD COMPLEX 30 MIN: CPT | Mod: GP | Performed by: PHYSICAL THERAPIST

## 2023-11-25 PROCEDURE — 2500000001 HC RX 250 WO HCPCS SELF ADMINISTERED DRUGS (ALT 637 FOR MEDICARE OP): Performed by: INTERNAL MEDICINE

## 2023-11-25 PROCEDURE — 99222 1ST HOSP IP/OBS MODERATE 55: CPT | Performed by: INTERNAL MEDICINE

## 2023-11-25 PROCEDURE — 36415 COLL VENOUS BLD VENIPUNCTURE: CPT | Performed by: INTERNAL MEDICINE

## 2023-11-25 PROCEDURE — 2060000001 HC INTERMEDIATE ICU ROOM DAILY

## 2023-11-25 PROCEDURE — 97530 THERAPEUTIC ACTIVITIES: CPT | Mod: GP | Performed by: PHYSICAL THERAPIST

## 2023-11-25 PROCEDURE — 80053 COMPREHEN METABOLIC PANEL: CPT | Performed by: INTERNAL MEDICINE

## 2023-11-25 RX ORDER — LISINOPRIL 20 MG/1
20 TABLET ORAL DAILY
Status: DISCONTINUED | OUTPATIENT
Start: 2023-11-25 | End: 2023-11-30 | Stop reason: HOSPADM

## 2023-11-25 RX ORDER — AMLODIPINE BESYLATE 10 MG/1
10 TABLET ORAL DAILY
Status: DISCONTINUED | OUTPATIENT
Start: 2023-11-25 | End: 2023-11-26

## 2023-11-25 RX ADMIN — GUAIFENESIN 600 MG: 600 TABLET ORAL at 20:56

## 2023-11-25 RX ADMIN — TAMSULOSIN HYDROCHLORIDE 0.4 MG: 0.4 CAPSULE ORAL at 08:23

## 2023-11-25 RX ADMIN — FERROUS SULFATE TAB 325 MG (65 MG ELEMENTAL FE) 1 TABLET: 325 (65 FE) TAB at 08:23

## 2023-11-25 RX ADMIN — LISINOPRIL 20 MG: 20 TABLET ORAL at 20:53

## 2023-11-25 RX ADMIN — TAMSULOSIN HYDROCHLORIDE 0.4 MG: 0.4 CAPSULE ORAL at 20:54

## 2023-11-25 RX ADMIN — FINASTERIDE 5 MG: 5 TABLET, FILM COATED ORAL at 08:23

## 2023-11-25 RX ADMIN — ATORVASTATIN CALCIUM 40 MG: 40 TABLET, FILM COATED ORAL at 08:23

## 2023-11-25 RX ADMIN — Medication 1 G: at 14:17

## 2023-11-25 RX ADMIN — ACETAMINOPHEN 650 MG: 500 TABLET ORAL at 19:51

## 2023-11-25 RX ADMIN — INSULIN LISPRO 15 UNITS: 100 INJECTION, SOLUTION INTRAVENOUS; SUBCUTANEOUS at 21:11

## 2023-11-25 RX ADMIN — ENOXAPARIN SODIUM 40 MG: 100 INJECTION SUBCUTANEOUS at 08:23

## 2023-11-25 RX ADMIN — AMLODIPINE BESYLATE 10 MG: 10 TABLET ORAL at 20:50

## 2023-11-25 RX ADMIN — GABAPENTIN 100 MG: 100 CAPSULE ORAL at 20:51

## 2023-11-25 RX ADMIN — INSULIN GLARGINE 10 UNITS: 100 INJECTION, SOLUTION SUBCUTANEOUS at 21:03

## 2023-11-25 RX ADMIN — LEVETIRACETAM 250 MG: 250 TABLET, FILM COATED ORAL at 08:23

## 2023-11-25 RX ADMIN — SODIUM CHLORIDE 100 ML/HR: 900 INJECTION, SOLUTION INTRAVENOUS at 03:06

## 2023-11-25 RX ADMIN — Medication 1 G: at 06:04

## 2023-11-25 RX ADMIN — CLOPIDOGREL BISULFATE 75 MG: 75 TABLET ORAL at 08:23

## 2023-11-25 RX ADMIN — PANTOPRAZOLE SODIUM 40 MG: 40 TABLET, DELAYED RELEASE ORAL at 08:23

## 2023-11-25 RX ADMIN — Medication 1 G: at 21:16

## 2023-11-25 RX ADMIN — POLYETHYLENE GLYCOL 3350 17 G: 17 POWDER, FOR SOLUTION ORAL at 20:45

## 2023-11-25 RX ADMIN — GABAPENTIN 100 MG: 100 CAPSULE ORAL at 08:23

## 2023-11-25 RX ADMIN — DOCUSATE SODIUM 100 MG: 100 CAPSULE, LIQUID FILLED ORAL at 08:23

## 2023-11-25 RX ADMIN — POLYETHYLENE GLYCOL 3350 17 G: 17 POWDER, FOR SOLUTION ORAL at 08:22

## 2023-11-25 RX ADMIN — LEVETIRACETAM 250 MG: 250 TABLET, FILM COATED ORAL at 20:51

## 2023-11-25 RX ADMIN — ACETAMINOPHEN 650 MG: 500 TABLET ORAL at 08:33

## 2023-11-25 RX ADMIN — DOCUSATE SODIUM 100 MG: 100 CAPSULE, LIQUID FILLED ORAL at 20:45

## 2023-11-25 RX ADMIN — ASPIRIN 162 MG: 81 TABLET, COATED ORAL at 08:23

## 2023-11-25 ASSESSMENT — PAIN SCALES - GENERAL
PAINLEVEL_OUTOF10: 0 - NO PAIN
PAINLEVEL_OUTOF10: 0 - NO PAIN
PAINLEVEL_OUTOF10: 5 - MODERATE PAIN

## 2023-11-25 ASSESSMENT — COGNITIVE AND FUNCTIONAL STATUS - GENERAL
TURNING FROM BACK TO SIDE WHILE IN FLAT BAD: A LOT
MOVING FROM LYING ON BACK TO SITTING ON SIDE OF FLAT BED WITH BEDRAILS: A LOT
EATING MEALS: A LOT
WALKING IN HOSPITAL ROOM: A LOT
DRESSING REGULAR LOWER BODY CLOTHING: A LOT
MOVING TO AND FROM BED TO CHAIR: A LOT
MOVING TO AND FROM BED TO CHAIR: A LOT
STANDING UP FROM CHAIR USING ARMS: A LOT
DRESSING REGULAR UPPER BODY CLOTHING: A LOT
PERSONAL GROOMING: A LOT
DAILY ACTIVITIY SCORE: 12
DAILY ACTIVITIY SCORE: 12
MOVING TO AND FROM BED TO CHAIR: A LOT
CLIMB 3 TO 5 STEPS WITH RAILING: A LOT
WALKING IN HOSPITAL ROOM: A LITTLE
EATING MEALS: A LOT
MOBILITY SCORE: 14
DRESSING REGULAR LOWER BODY CLOTHING: A LOT
HELP NEEDED FOR BATHING: A LOT
MOBILITY SCORE: 12
CLIMB 3 TO 5 STEPS WITH RAILING: A LOT
STANDING UP FROM CHAIR USING ARMS: A LITTLE
TURNING FROM BACK TO SIDE WHILE IN FLAT BAD: A LOT
STANDING UP FROM CHAIR USING ARMS: A LITTLE
PERSONAL GROOMING: A LOT
TOILETING: A LOT
MOVING FROM LYING ON BACK TO SITTING ON SIDE OF FLAT BED WITH BEDRAILS: A LOT
WALKING IN HOSPITAL ROOM: A LITTLE
MOBILITY SCORE: 14
TOILETING: A LOT
HELP NEEDED FOR BATHING: A LOT
DRESSING REGULAR UPPER BODY CLOTHING: A LOT
CLIMB 3 TO 5 STEPS WITH RAILING: A LOT
MOVING FROM LYING ON BACK TO SITTING ON SIDE OF FLAT BED WITH BEDRAILS: A LOT
TURNING FROM BACK TO SIDE WHILE IN FLAT BAD: A LOT

## 2023-11-25 ASSESSMENT — PAIN - FUNCTIONAL ASSESSMENT
PAIN_FUNCTIONAL_ASSESSMENT: 0-10

## 2023-11-25 ASSESSMENT — ACTIVITIES OF DAILY LIVING (ADL): ADL_ASSISTANCE: INDEPENDENT

## 2023-11-25 ASSESSMENT — PAIN SCALES - WONG BAKER: WONGBAKER_NUMERICALRESPONSE: HURTS LITTLE MORE

## 2023-11-25 NOTE — CONSULTS
Reason For Consult  Urinary retention, UTI    History Of Present Illness  Luis Greene is a 83 y.o. male presenting with a fever, confusion, likely from a catheter associated urinary tract infection.  Patient has a history of BPH and is followed by Dr. Lozano.  He apparently was in urinary retention approximately a month ago and was seen at [facility] and had a Tiwrai catheter placed.  He was recently discharged from the hospital after a strokelike episode and has been in a skilled nursing facility for rehab.  He then was brought back to the hospital because of a change in mental status and evaluation revealed significant pyuria though he had a normal white blood cell count.  He does have positive blood cultures for gram-negative bacilli.  He is on intravenous meropenem.  CT of the abdomen and pelvis has been ordered.     Past Medical History  He has a past medical history of Coronary artery disease, Hyperlipidemia, Personal history of other diseases of the circulatory system, and Personal history of other endocrine, nutritional and metabolic disease.    Surgical History  He has a past surgical history that includes Other surgical history (03/11/2019); Other surgical history (03/11/2019); Other surgical history (03/11/2019); MR angio head wo IV contrast (2/17/2019); MR angio head wo IV contrast (10/28/2022); MR angio head wo IV contrast (8/15/2023); and Cardiac catheterization (N/A, 11/10/2023).     Social History  He reports that he has never smoked. He has never used smokeless tobacco. He reports that he does not drink alcohol and does not use drugs.    Family History  Family History   Problem Relation Name Age of Onset    Other (cardiac disorder) Father      Diabetes Father      Hyperlipidemia Father      Hypertension Father      Other (liver carcinoma) Father      Other (hypercholesterolemia) Father      Hypertension Daughter          Allergies  Famotidine, Prednisone, Amoxicillin, Donepezil,  "Gabapentin, Hydrochlorothiazide, Ibuprofen, Oxycodone-acetaminophen, Tramadol hcl, and Oxycodone hcl    Review of Systems  As per admission HPI     Physical Exam  Awake and alert but not completely oriented  HEENT: Normal extraocular movements  Neck: Supple  Lungs: Normal respiratory pattern  Abdomen: Soft  : Tiwari catheter in place     Last Recorded Vitals  Blood pressure 173/68, pulse 95, temperature 36.8 °C (98.2 °F), temperature source Oral, resp. rate 18, height 1.803 m (5' 11\"), weight 77.2 kg (170 lb 3.1 oz), SpO2 96 %.    Relevant Results  Results for orders placed or performed during the hospital encounter of 11/24/23 (from the past 24 hour(s))   CBC and Auto Differential   Result Value Ref Range    WBC 11.7 (H) 4.4 - 11.3 x10*3/uL    nRBC 0.0 0.0 - 0.0 /100 WBCs    RBC 3.42 (L) 4.50 - 5.90 x10*6/uL    Hemoglobin 12.0 (L) 13.5 - 17.5 g/dL    Hematocrit 34.3 (L) 41.0 - 52.0 %     80 - 100 fL    MCH 35.1 (H) 26.0 - 34.0 pg    MCHC 35.0 32.0 - 36.0 g/dL    RDW 13.0 11.5 - 14.5 %    Platelets 209 150 - 450 x10*3/uL    Neutrophils % 84.8 40.0 - 80.0 %    Immature Granulocytes %, Automated 0.5 0.0 - 0.9 %    Lymphocytes % 5.0 13.0 - 44.0 %    Monocytes % 9.4 2.0 - 10.0 %    Eosinophils % 0.0 0.0 - 6.0 %    Basophils % 0.3 0.0 - 2.0 %    Neutrophils Absolute 9.88 (H) 1.60 - 5.50 x10*3/uL    Immature Granulocytes Absolute, Automated 0.06 0.00 - 0.50 x10*3/uL    Lymphocytes Absolute 0.58 (L) 0.80 - 3.00 x10*3/uL    Monocytes Absolute 1.10 (H) 0.05 - 0.80 x10*3/uL    Eosinophils Absolute 0.00 0.00 - 0.40 x10*3/uL    Basophils Absolute 0.03 0.00 - 0.10 x10*3/uL   Comprehensive metabolic panel   Result Value Ref Range    Glucose 199 (H) 65 - 99 mg/dL    Sodium 132 (L) 133 - 145 mmol/L    Potassium 5.2 (H) 3.4 - 5.1 mmol/L    Chloride 94 (L) 97 - 107 mmol/L    Bicarbonate 21 (L) 24 - 31 mmol/L    Urea Nitrogen 26 (H) 8 - 25 mg/dL    Creatinine 1.30 0.40 - 1.60 mg/dL    eGFR 55 (L) >60 mL/min/1.73m*2    Calcium " 9.1 8.5 - 10.4 mg/dL    Albumin 3.7 3.5 - 5.0 g/dL    Alkaline Phosphatase 68 35 - 125 U/L    Total Protein 6.8 5.9 - 7.9 g/dL    AST 38 5 - 40 U/L    Bilirubin, Total 1.6 (H) 0.1 - 1.2 mg/dL    ALT 21 5 - 40 U/L    Anion Gap 17 <=19 mmol/L   Blood Culture    Specimen: Peripheral Venipuncture; Blood culture   Result Value Ref Range    Gram Stain Gram negative bacilli (AA)     Gram Stain Gram negative bacilli (AA)    Blood Gas Lactic Acid, Venous   Result Value Ref Range    POCT Lactate, Venous 2.1 (H) 0.4 - 2.0 mmol/L   Blood Culture    Specimen: Peripheral Venipuncture; Blood culture   Result Value Ref Range    Gram Stain Gram negative bacilli (AA)     Gram Stain Gram negative bacilli (AA)    Morphology   Result Value Ref Range    RBC Morphology See Below     Polychromasia Mild     Giovanny Cells Few    Urinalysis with Reflex Microscopic and Culture   Result Value Ref Range    Color, Urine Light-Orange (N) Light-Yellow, Yellow, Dark-Yellow    Appearance, Urine Ex.Turbid (N) Clear    Specific Gravity, Urine 1.013 1.005 - 1.035    pH, Urine 6.0 5.0, 5.5, 6.0, 6.5, 7.0, 7.5, 8.0    Protein, Urine 100 (2+) (A) NEGATIVE, 10 (TRACE), 20 (TRACE) mg/dL    Glucose, Urine Normal Normal mg/dL    Blood, Urine 0.5 (2+) (A) NEGATIVE    Ketones, Urine NEGATIVE NEGATIVE mg/dL    Bilirubin, Urine NEGATIVE NEGATIVE    Urobilinogen, Urine 2 (1+) (A) Normal mg/dL    Nitrite, Urine NEGATIVE NEGATIVE    Leukocyte Esterase, Urine 500 Akin/µL (A) NEGATIVE   Extra Urine Gray Tube   Result Value Ref Range    Extra Tube Hold for add-ons.    Microscopic Only, Urine   Result Value Ref Range    WBC, Urine >50 (A) 1-5, NONE /HPF    WBC Clumps, Urine MANY Reference range not established. /HPF    RBC, Urine 11-20 (A) NONE, 1-2, 3-5 /HPF    Bacteria, Urine 1+ (A) NONE SEEN /HPF   Sars-CoV-2 PCR, Screen Asymptomatic   Result Value Ref Range    Coronavirus 2019, PCR Not Detected Not Detected   Influenza A, and B PCR   Result Value Ref Range    Flu A  Result Not Detected Not Detected    Flu B Result Not Detected Not Detected   Blood Gas Lactic Acid, Venous   Result Value Ref Range    POCT Lactate, Venous 1.6 0.4 - 2.0 mmol/L   POCT GLUCOSE   Result Value Ref Range    POCT Glucose 190 (H) 74 - 99 mg/dL   POCT GLUCOSE   Result Value Ref Range    POCT Glucose 219 (H) 74 - 99 mg/dL   Comprehensive metabolic panel   Result Value Ref Range    Glucose 159 (H) 65 - 99 mg/dL    Sodium 137 133 - 145 mmol/L    Potassium 4.3 3.4 - 5.1 mmol/L    Chloride 105 97 - 107 mmol/L    Bicarbonate 21 (L) 24 - 31 mmol/L    Urea Nitrogen 28 (H) 8 - 25 mg/dL    Creatinine 1.20 0.40 - 1.60 mg/dL    eGFR 60 (L) >60 mL/min/1.73m*2    Calcium 8.9 8.5 - 10.4 mg/dL    Albumin 3.0 (L) 3.5 - 5.0 g/dL    Alkaline Phosphatase 56 35 - 125 U/L    Total Protein 5.7 (L) 5.9 - 7.9 g/dL    AST 59 (H) 5 - 40 U/L    Bilirubin, Total 0.9 0.1 - 1.2 mg/dL    ALT 20 5 - 40 U/L    Anion Gap 11 <=19 mmol/L   CBC   Result Value Ref Range    WBC 7.7 4.4 - 11.3 x10*3/uL    nRBC 0.0 0.0 - 0.0 /100 WBCs    RBC 3.10 (L) 4.50 - 5.90 x10*6/uL    Hemoglobin 10.9 (L) 13.5 - 17.5 g/dL    Hematocrit 31.6 (L) 41.0 - 52.0 %     (H) 80 - 100 fL    MCH 35.2 (H) 26.0 - 34.0 pg    MCHC 34.5 32.0 - 36.0 g/dL    RDW 13.0 11.5 - 14.5 %    Platelets 171 150 - 450 x10*3/uL   POCT GLUCOSE   Result Value Ref Range    POCT Glucose 149 (H) 74 - 99 mg/dL       CT head wo IV contrast    Result Date: 11/24/2023  Interpreted By:  González Quintanilla, STUDY: CT HEAD WO IV CONTRAST;  11/24/2023 1:20 pm   INDICATION: Signs/Symptoms:Fall, altered mental status.   COMPARISON: Brain MRI dated 11/13/2023.   ACCESSION NUMBER(S): [id number]   ORDERING CLINICIAN: GINGER STRANGE   TECHNIQUE: Noncontrast axial CT scan of head was performed. Angled reformats in brain and bone windows were generated. The images were reviewed in bone, brain, blood and soft tissue windows.   FINDINGS: CSF Spaces: Ex vacuo dilation of the ventricles. The sulci and basal  cisterns are within normal limits. There is no extraaxial fluid collection.   Parenchyma: Chronic bilateral white matter microvascular disease. There is no mass effect or midline shift.  There is no intracranial hemorrhage.   Calvarium: The calvarium is unremarkable.   Paranasal sinuses and mastoids: Visualized paranasal sinuses and mastoids are clear.       No acute intracranial findings.   MACRO: None   Signed by: González Quintanilla 11/24/2023 1:42 PM Dictation workstation:   [id number]    XR chest 2 views    Addendum Date: 11/24/2023    Interpreted By:  Jesus Briggs, ADDENDUM: AP and lateral projection.   Signed by: Jesus Briggs 11/24/2023 10:55 AM   -------- ORIGINAL REPORT -------- Dictation workstation:   [id number]    Result Date: 11/24/2023  Interpreted By:  Jesus Briggs, STUDY: XR CHEST 2 VIEWS; 11/24/2023 10:40 am   INDICATION: Signs/Symptoms:fever   COMPARISON: 11/17/2023   ACCESSION NUMBER(S): [id number]   ORDERING CLINICIAN: DOUGLAS PIERSON   FINDINGS: Watchman device is seen. Sternotomy wires are noted. Overlying cardiac monitoring device is seen.   Rotator cuff anchor is noted on the right.   The cardiomediastinal silhouette is enlarged but stable. The lungs are clear. No pleural effusion is identified.   The osseous structures are intact. Thoracic spondylosis is noted.       No acute cardiopulmonary process.   MACRO: None   Signed by: Jesus Briggs 11/24/2023 10:52 AM Dictation workstation:   [id number]    ECG 12 lead daily    Result Date: 11/17/2023  Atrial fibrillation Right bundle branch block T wave abnormality, consider inferior ischemia Abnormal ECG When compared with ECG of 02-MAY-2023 14:53, Previous ECG has undetermined rhythm, needs review Confirmed by Jam Wang ([id number]) on 11/17/2023 4:15:05 PM    XR chest 2 views    Result Date: 11/17/2023  Interpreted By:  Low Camejo, STUDY: XR CHEST 2 VIEWS; 11/17/2023 1:52 pm   INDICATION: Signs/Symptoms:Cough   COMPARISON: 11/10/2023.    ACCESSION NUMBER(S): [id number]   ORDERING CLINICIAN: GINGER STRANGE   TECHNIQUE: Number of films: Two-view radiographs of the chest were obtained.   FINDINGS: Median sternotomy wires and surgical clips are again present, consistent with previous coronary artery bypass graft. The heart and mediastinum are normal. Electronic device is again implanted in the left anterior chest wall. The lungs are clear. No pleural effusions are seen. Degenerative changes involve the thoracic spine.       No acute cardiopulmonary disease.   Signed by: Low Camejo 11/17/2023 2:40 PM Dictation workstation:   [id number]    EEG    IMPRESSION Impression This routine EEG is indicative of a moderate diffuse encephalopathy. No epileptiform discharges or lateralizing signs are recorded. A full report will be scanned into the patient's chart at a later time. This report has been interpreted and electronically signed by    ECG 12 lead daily    Result Date: 11/15/2023  Atrial fibrillation Right bundle branch block Abnormal ECG No previous ECGs available Confirmed by Jailyn Boyer ([id number]) on 11/15/2023 8:53:14 AM    ECG 12 lead daily    Result Date: 11/15/2023  Undetermined rhythm Likely AFIB Right bundle branch block Septal infarct , age undetermined Abnormal ECG No previous ECGs available Confirmed by Jailyn Boyer ([id number]) on 11/15/2023 8:50:34 AM    ECG 12 lead    Result Date: 11/15/2023  Atrial fibrillation Right bundle branch block Abnormal ECG When compared with ECG of 10-NOV-2023 08:49, (unconfirmed) No significant change was found Confirmed by Jailyn Boyer ([id number]) on 11/15/2023 8:46:33 AM    Structural heart procedure    Result Date: 11/14/2023   [facility], Cath Lab, [address] [place] [address] Cardiovascular Catheterization Report Patient Name:     ASHKAN OLIVEROS      Performing Physician:  [id number] Louie Avitia MD Study Date:       11/10/2023           Verifying Physician:   [id number] Jamil Avitia MD MRN/PID:          [id number]            Cardiologist/Co-scrub: Accession#:       [id number]        Ordering Physician:    [id number] JAMIL AVITIA Date of           1940 / 83      Fellow:                [id number] Donnie Kendrick Birth/Age:        years                                      MD Gender:           M                   Fellow: Encounter#:       [id number]  Study: Left Atrial Appendage Closure  Left Atrial Appendage Closure (LAAC):  Sterile prep and appropriate procedure timeout were performed. Using ultrasound guidance and micropuncture technique dual access was obtained in the right common femoral vein. Two 8F sheaths were placed using a modified Seldinger technique. The patient was administered IV Heparin and ACT was confirmed to be therapeutic. An AcuNav ICE probe was then advanced through one of the sheaths and under ICE guidance, transseptal puncture was with a versacross (access solutions), accessing the left atrium. The transseptal tract was then dilated with a Watchman Double Curve access sheath and the ICE probe was advanced through the dilated tract into the left atrium. Next, a 6 Faroese angled pigtail was advanced through the delivery sheath into the left atrial appendage and angiography was performed via the pigtail. Sizing of the device was confirmed by ICE and angiography. We then advanced a 24 mm Watchman Closure Device. At this stage it was evidence to us that the trans-septal puncture height was sub-optimal. Multiple attempts were made to optimize the position of the WM device, which were all unsuccessful (3 recaptures were performed total). At this stage a decision was made to optimize the trans-septal puncture. A second TSP was performed at lower location on the septum. Then, following the approprate steps a 27 mm Watchman  FLX was advance and deployed successfully., and confirmed placement both by ICE and angiography. A 'tug test' was performed and confirmed stability. No color Doppler flow around the device was appreciated. 24% device compression was also confirmed. Having satisfied position, anchoring, size and seal criteria, the device was successfully deployed. All equipment was then removed and hemostasis was facilitated by Perclose for 14 Fr and Vascade for 8 Fr. Patient was enrolled in the [facility] registry and data entered for research purposes.  Hemo Personnel: +----------------+---------+ Name            Duty      +----------------+---------+ Louie Avitia MD 1 +----------------+---------+  Hemodynamic Pressures:  +----+----------------------+----------+---------+-----------+-----------+ Site      Date Time       Phase NameMean mmHgA-Wave mmHgV-Wave mmHg +----+----------------------+----------+---------+-----------+-----------+   LA11/10/2023 10:52:30 AM      Rest       18         14         31 +----+----------------------+----------+---------+-----------+-----------+  Complications: No in-lab complications observed.  Cardiac Cath Post Procedure Notes: Post Procedure Diagnosis: Successful LAAC with a 27 mm WM FLX. Blood Loss:               Estimated blood loss during the procedure was 20 mls. Specimens Removed:        Number of specimen(s) removed: none. ____________________________________________________________________________________ CONCLUSIONS:  1. Successful LAAC with a 27 mm WM FLX. ICD 10 Codes: Paroxysmal atrial fibrillation-I48.0  CPT Codes: Perc left atrial appendage closure (LAAC)-52493  [id number] Louie Avitia MD Performing Physician Electronically signed by [id number] Louie Avitia MD on 11/14/2023 at 3:57:51 PM  ** Final **     MR brain wo IV contrast    Result Date: 11/13/2023  Interpreted By:  Galdino Monet, STUDY: MR BRAIN WO IV CONTRAST; 11/13/2023 8:20 am   INDICATION:  Signs/Symptoms:stroke-like sympt's.   COMPARISON: None.   ACCESSION NUMBER(S): [id number]   ORDERING CLINICIAN: SAMUEL GAITAN   TECHNIQUE: Multiecho multiplanar imaging of the brain without intravenous contrast.   FINDINGS: Extra-axial spaces and ventricular system: Prominent, reflecting atrophy Cerebral parenchyma: No restricted diffusion. No sign of intracranial hemorrhage. Small patchy periventricular hyperintensity and small hyperintensity in inferior left temporal lobe Midline shift: No mass effect or midline shift. Cerebellum: Normal. Brainstem: Normal. Pituitary gland: Normal.   Visualized paranasal sinuses: Small retention cyst left maxillary sinus. Visualized orbits: Sign of cataract surgery bilaterally. Visualized upper cervical spine: Normal.       No acute finding.   Signed by: Galdino Monet 11/13/2023 10:32 AM Dictation workstation:   [id number]    CT angio brain attack head w IV contrast and post procedure    Result Date: 11/10/2023  Interpreted By:  Martha Coleman, STUDY: CT ANGIO BRAIN ATTACK HEAD W IV CONTRAST AND POST PROCEDURE; CT ANGIO BRAIN ATTACK NECK W IV CONTRAST AND POST PROCEDURE; 11/10/2023 5:13 pm   INDICATION: Signs/Symptoms:Stroke Evaluation with VAN Positive;   COMPARISON: CT head same day /   ACCESSION NUMBER(S): [id number]; [id number]   ORDERING CLINICIAN: JUDY CHACON   TECHNIQUE: CT arteriogram of the head and neck with 75 ml of Omnipaque 350 was injected intravenously. 3D MIP images were created, processed, and interpreted on an independent workstation.   FINDINGS: CTA HEAD:   Basilar: Patent without aneurysm, dissection or thrombus. PCOM: Patent without aneurysm, dissection or thrombus. ACOM: Patent without aneurysm, dissection or thrombus. ICA: Patent without aneurysm, dissection or thrombus.   LEFT:   MCA: Patent without aneurysm, dissection or thrombus. YOVANY: Patent without aneurysm, dissection or thrombus. PCA: Patent without aneurysm, dissection or thrombus.  ICA: Patent without aneurysm, dissection or thrombus. VERT: Patent without aneurysm, dissection or thrombus.   RIGHT:   MCA: Patent without aneurysm, dissection or thrombus. YOVANY: Patent without aneurysm, dissection or thrombus. PCA: Patent without aneurysm, dissection or thrombus. ICA: Patent without aneurysm, dissection or thrombus. VERT: Patent without aneurysm, dissection or thrombus.     CTA NECK:     The estimate of the degree of stenosis included in this report is based on the NASCET CRITERIA for calculating stenosis by using the internal carotid artery distal to the stenosis as the reference point. Normal is no stenosis. Mild is less than 50% stenosis. Moderate is 50-69% stenosis. Severe is 70-99% stenosis. Total occlusion is no detectable patent lumen.   LEFT:   CCA: Patent without aneurysm, dissection or thrombus. Atherosclerotic plaque at the carotid bulb which contributes to less than 50% narrowing. ICA: Patent without aneurysm, dissection or thrombus. ECA: Patent without aneurysm, dissection or thrombus. VERT: Patent without aneurysm, dissection or thrombus.     RIGHT:   CCA: Patent without aneurysm, dissection or thrombus. Atherosclerotic plaque at the carotid bulb extending into the proximal right ICA and contributing to approximately 50% narrowing. ICA: Patent without aneurysm, dissection or thrombus. ECA: Patent without aneurysm, dissection or thrombus. VERT: Patent without aneurysm, dissection or thrombus.   AORTIC ARCH AND BRANCHES: Patent without aneurysm, dissection or thrombus.   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           1. No large vessel occlusion or high-grade stenosis. 2. Atherosclerotic plaques in the bilateral carotid bulbs contributing to mild (less than 50%) stenosis. Atherosclerotic plaque in the right proximal ICA contributing to approximately 50% narrowing.   Signed by: Martha Coleman 11/10/2023 6:19 PM Dictation workstation:   [id number]    CT  angio brain attack neck w IV contrast and post procedure    Result Date: 11/10/2023  Interpreted By:  Martha Coleman, STUDY: CT ANGIO BRAIN ATTACK HEAD W IV CONTRAST AND POST PROCEDURE; CT ANGIO BRAIN ATTACK NECK W IV CONTRAST AND POST PROCEDURE; 11/10/2023 5:13 pm   INDICATION: Signs/Symptoms:Stroke Evaluation with VAN Positive;   COMPARISON: CT head same day /   ACCESSION NUMBER(S): [id number]; [id number]   ORDERING CLINICIAN: JUDY CHACON   TECHNIQUE: CT arteriogram of the head and neck with 75 ml of Omnipaque 350 was injected intravenously. 3D MIP images were created, processed, and interpreted on an independent workstation.   FINDINGS: CTA HEAD:   Basilar: Patent without aneurysm, dissection or thrombus. PCOM: Patent without aneurysm, dissection or thrombus. ACOM: Patent without aneurysm, dissection or thrombus. ICA: Patent without aneurysm, dissection or thrombus.   LEFT:   MCA: Patent without aneurysm, dissection or thrombus. YOVANY: Patent without aneurysm, dissection or thrombus. PCA: Patent without aneurysm, dissection or thrombus. ICA: Patent without aneurysm, dissection or thrombus. VERT: Patent without aneurysm, dissection or thrombus.   RIGHT:   MCA: Patent without aneurysm, dissection or thrombus. YOVANY: Patent without aneurysm, dissection or thrombus. PCA: Patent without aneurysm, dissection or thrombus. ICA: Patent without aneurysm, dissection or thrombus. VERT: Patent without aneurysm, dissection or thrombus.     CTA NECK:     The estimate of the degree of stenosis included in this report is based on the NASCET CRITERIA for calculating stenosis by using the internal carotid artery distal to the stenosis as the reference point. Normal is no stenosis. Mild is less than 50% stenosis. Moderate is 50-69% stenosis. Severe is 70-99% stenosis. Total occlusion is no detectable patent lumen.   LEFT:   CCA: Patent without aneurysm, dissection or thrombus. Atherosclerotic plaque at the carotid bulb which  contributes to less than 50% narrowing. ICA: Patent without aneurysm, dissection or thrombus. ECA: Patent without aneurysm, dissection or thrombus. VERT: Patent without aneurysm, dissection or thrombus.     RIGHT:   CCA: Patent without aneurysm, dissection or thrombus. Atherosclerotic plaque at the carotid bulb extending into the proximal right ICA and contributing to approximately 50% narrowing. ICA: Patent without aneurysm, dissection or thrombus. ECA: Patent without aneurysm, dissection or thrombus. VERT: Patent without aneurysm, dissection or thrombus.   AORTIC ARCH AND BRANCHES: Patent without aneurysm, dissection or thrombus.   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           1. No large vessel occlusion or high-grade stenosis. 2. Atherosclerotic plaques in the bilateral carotid bulbs contributing to mild (less than 50%) stenosis. Atherosclerotic plaque in the right proximal ICA contributing to approximately 50% narrowing.   Signed by: Martha Coleman 11/10/2023 6:19 PM Dictation workstation:   [id number]    XR chest 1 view    Result Date: 11/10/2023  Interpreted By:  Martha Coleman, STUDY: XR CHEST 1 VIEW; 11/10/2023 4:42 pm   INDICATION: Signs/Symptoms:brain attack/cough   COMPARISON: 06/20/2023   ACCESSION NUMBER(S): [id number]   ORDERING CLINICIAN: JUDY CHACON   TECHNIQUE: Frontal  chest radiographs.   FINDINGS: The cardiomediastinal silhouette is unremarkable. The lungs are clear. No pleural effusion is identified.   The osseous structures are intact.       No acute cardiopulmonary process.       Signed by: Martha Coleman 11/10/2023 4:46 PM Dictation workstation:   [id number]    CT brain attack head wo IV contrast    Result Date: 11/10/2023  Interpreted By:  Martha Coleman, STUDY: CT BRAIN ATTACK HEAD WO IV CONTRAST;  11/10/2023 4:38 pm   INDICATION: Signs/Symptoms:Stroke Evaluation.   COMPARISON: [id number]   ACCESSION NUMBER(S): [id number]   ORDERING CLINICIAN: JUDY  OSWALDO   TECHNIQUE: CT axial images through the Brain were obtained without contrast. All CT examinations are performed with 1 or more of the following dose reduction techniques: Automated exposure control, adjustment of mA and/or kv according to patient's size, or use of iterative reconstruction techniques.   FINDINGS: There is no mass effect, hemorrhage, or infarct. The ventricles appear normal. Gray-white differentiation is maintained. Patchy areas of hypodense attenuation are seen in the subcortical and periventricular white matter; compatible with small vessel ischemic disease. The visualized paranasal sinuses appear clear.       No acute intracranial abnormality.   MACRO: Martha Coleman discussed the significance and urgency of this critical finding by telephone with  JUDY CHACON on 11/10/2023 at 4:45 pm.  (**-RCF-**) Findings:  See findings.     Signed by: Martha Coleman 11/10/2023 4:45 PM Dictation workstation:   [id number]    Transthoracic Echo (TTE) Limited    Result Date: 11/10/2023   [facility], [address] [place] [address]                Tel [phone] and Fax [phone] TRANSTHORACIC ECHOCARDIOGRAM REPORT  Patient Name:      ASHKAN Schmidt Physician:    [id number] Kamar Doyle MD Study Date:        11/10/2023           Ordering Provider:    [id number] DAWNA AUGUSTIN MRN/PID:           [id number]             Fellow:               [id number] Yobany Strong MD Accession#:        [id number]         Nurse: Date of Birth/Age: 1940 / 83 years Sonographer:          Bruna Merlos RDCS Gender:            M                    Additional Staff: Height:            180.34 cm            Admit Date:           11/10/2023 Weight:            78.47 kg             Admission Status:     Inpatient - Time                                                                Critical BSA:               1.98 m2              Encounter#:           [id number]                                         Department Location:  [facility]                                                               Cath Lab Blood Pressure: 129 /65 mmHg Study Type:    TRANSTHORACIC ECHO (TTE) LIMITED Diagnosis/ICD: Presence of other cardiac implants and grafts-Z95.818 Indication:    Post-Watchman Procedure CPT Code:      Echo Limited-61591 Patient History: Pertinent History: S/p LAAO. Study Detail: The following Echo studies were performed: 2D. Technically               challenging study due to patient lying in supine position.  PHYSICIAN INTERPRETATION: Left Ventricle: The left ventricular systolic function is normal, with an estimated ejection fraction of 60-65%. The left ventricular cavity size is normal. There is left ventricular concentric remodeling. Abnormal (paradoxical) septal motion consistent with post-operative status. Left ventricular diastolic filling was not assessed. Left Atrium: The left atrium is enlarged. Right Ventricle: The right ventricle was not well visualized. Unable to determine right ventricular systolic function. Right Atrium: The right atrium is moderately dilated. Aortic Valve: The aortic valve appears normal. Aortic valve regurgitation was not assessed. Mitral Valve: The mitral valve is normal in structure. Mitral valve regurgitation was not assessed. Tricuspid Valve: The tricuspid valve is structurally normal. Tricuspid regurgitation was not assessed. Pulmonic Valve: The pulmonic valve is not well visualized. Pulmonic valve regurgitation was not assessed. Pericardium: There is no pericardial effusion noted. Aorta: The aortic root is normal. Systemic Veins: The inferior vena cava was not well visualized. In comparison to the previous echocardiogram(s): Compared with study from 11/10/2023, no significant change. Note that this was a limited study. Compared to prior full echo from  8/16/23, there is no significant change from available data.  CONCLUSIONS:  1. Left ventricular systolic function is normal with a 60-65% estimated ejection fraction.  2. Abnormal septal motion consistent with post-operative status.  3. The left atrium is enlarged.  4. The right atrium is moderately dilated. QUANTITATIVE DATA SUMMARY: 2D MEASUREMENTS:                           Normal Ranges: IVSd:          1.27 cm    (0.6-1.1cm) LVPWd:         1.25 cm    (0.6-1.1cm) LVIDd:         4.36 cm    (3.9-5.9cm) LV Mass Index: 102.2 g/m2 RA VOLUME BY A/L METHOD:                       Normal Ranges: RA Area A4C: 22.0 cm2  [id number] Kamar Doyle MD Electronically signed on 11/10/2023 at 3:02:52 PM  ** Final **     Transthoracic Echo (TTE) Limited    Result Date: 11/10/2023   [facility], [address] [place] [address]                Tel [phone] and Fax [phone] TRANSTHORACIC ECHOCARDIOGRAM REPORT  Patient Name:     ASHKAN OLIVEROS      Reading Physician:  [id number] Varghese Robles MD Study Date:       11/10/2023          Ordering Provider:  [id number] DAWNA AUGUSTIN MRN/PID:          [id number]            Fellow: Accession#:       [id number]        Nurse: Date of           1940 / 83      Sonographer:        Bruna Merlos RDCS Birth/Age:        years Gender:           M                   Additional Staff: Height:           180.00 cm           Admit Date:         11/10/2023 Weight:           79.00 kg            Admission Status:   Inpatient - Routine BSA:              1.99 m2             Encounter#:         [id number]                                       [facility] Cath                                       Location:           Lab Blood Pressure: 149 /69 mmHg Study Type:    TRANSTHORACIC ECHO (TTE) LIMITED Diagnosis/ICD: Encounter for preprocedural cardiovascular examination-Z01.810 Indication:    Pre-Watchman Procedure CPT Code:      Echo Limited-29925 Patient History: Pertinent  History: A-Fib, Chest Pain, HTN, Hyperlipidemia, LE Edema and                    Palpitations. ALEJANDRINA, DMII, CAD s/p CABG. Study Detail: The following Echo studies were performed: 2D. Technically               challenging study due to patient lying in supine position.  PHYSICIAN INTERPRETATION: Left Ventricle: The left ventricular systolic function is normal, with an estimated ejection fraction of 60-65%. There are no regional wall motion abnormalities. The left ventricular cavity size is normal. Left ventricular diastolic filling was not assessed. Left Atrium: The left atrium is enlarged. Right Ventricle: The right ventricle is normal in size. There is normal right ventricular global systolic function. Right Atrium: The right atrium is enlarged. Aortic Valve: The aortic valve is probably trileaflet. Aortic valve regurgitation was not assessed. Mitral Valve: The mitral valve was not well visualized. Mitral valve regurgitation was not assessed. Tricuspid Valve: The tricuspid valve was not well visualized. Tricuspid regurgitation was not assessed. The right ventricular systolic pressure is unable to be estimated. Pulmonic Valve: The pulmonic valve was not assessed. Pulmonic valve regurgitation was not assessed. Pericardium: There is a trivial pericardial effusion. Aorta: The aortic root is normal.  CONCLUSIONS:  1. Left ventricular systolic function is normal with a 60-65% estimated ejection fraction.  2. The left atrium is enlarged.  3. There is a trivial pericardial effusion. QUANTITATIVE DATA SUMMARY:  [id number] Varghese Robles MD Electronically signed on 11/10/2023 at 2:16:20 PM  ** Final **     CT watchman full contrast    Result Date: 11/10/2023  Interpreted By:  Russ Avila, STUDY: CT WATCHMAN FULL CONTRAST;  11/10/2023 8:28 am   INDICATION: Signs/Symptoms:Preprocedural RICHARDSON sizing and thrombus detection.   COMPARISON: None.   ACCESSION NUMBER(S): [id number]   ORDERING CLINICIAN: JAMIL RAMIREZ   TECHNIQUE: Using multi  detector CT technology,axial imaging with prospective gating was performed of the chest following the intravenous administration of contrast material.  A low-osmolar contrast agent was used (70 mL Omnipaque 350). Next, the imaging was repeated with prospective gating after 10 sec delay as per watchman protocol. Delete   For optimization of anatomic evaluation, multiplanar reconstruction, maximum intensity projections, and advanced 3-D off-line postprocessing were performed on a dedicated stand-alone workstation under the direct supervision of the interpreting physician.   CT Dose-Length Product (DLP):  1943 mGy*cm CT Dose Reduction Employed: Yes (Prospective triggering, iterative reconstruction)   FINDINGS: POTENTIAL STUDY LIMITATIONS:  Motion artifact limits accurate assessment of the left atrial appendage.   CORONARY ARTERIES:   CORONARY ANATOMY: There is normal origin of the coronary arteries. Right dominant system. Severe coronary artery calcifications are seen. Please note,the study is not optimized for evaluation of coronary arteries.Status post coronary artery bypass grafting (CABG) with bypass grafts not optimally evaluated on present study.   CARDIAC CHAMBERS: The cardiac chambers demonstrate normal atrioventricular and ventriculoarterial concordance, and systemic and pulmonary venous return.   LEFT ATRIUM: Dilated (34.2-cm2). There is no evidence of left atrium or left atrial appendage thrombus. The left atrial appendage orifice is round in shape, measuring 1.9 x 1.8 cm in orthogonal dimensions and with an area of  2.8 cm. sq. Left atrial appendage depth is 2.2 cm.   RIGHT ATRIUM: Dilated (28.4-cm2)   VENTRICLES: The left and right ventricles appear normal in appearance, although accurate size measurements are not possible on systolic phase imaging.   INTERATRIAL SEPTUM: Intact.   INTERVENTRICULAR SEPTUM: Intact.   AORTIC VALVE: The aortic valve is trileaflet in morphology. Mild calcifications.   MITRAL  VALVE: No valvular thickening/calcification.   THORACIC AORTA: The thoracic aorta normal in course and caliber.There are mild scattered atherosclerosis present, including calcified and noncalcified plaques.There is no evidence for acute aortic pathology, such as dissection, intramural hematoma, or contained rupture. The aortic arch is not included on this examination.   PERICARDIUM: There is no pericardial effusion seen.   CHEST: The trachea and central airways are patent. No endobronchial lesion is seen. There is mild diffuse bronchial wall thickening, which is a non-specific finding. The bilateral lungs are clear without evidence of focal consolidation, pleural effusion, or pneumothorax. Minimal emphysematous changes. No evidence of lymphadenopathy within included mediastinum. Visualized esophagus appears within normal limits as seen.   UPPER ABDOMEN: The visualized subdiaphragmatic structures demonstrate no remarkable findings.     CHEST WALL AND OSSEOUS STRUCTURES: Status post median sternotomy. No acute osseous pathology.There are no suspicious osseous lesions within included chest.Multilevel degenerative changes of the spine. Osteopenia.       1. No evidence of left atrial/left atrial appendage thrombus. 2. Limited evaluation secondary to motion artifact which may affect the accuracy of left atrial appendage measurements. The left atrial appendage orifice is round in shape, measuring 1.9 x 1.8 cm in orthogonal dimensions and with an area of 2.8 cm. sq. Left atrial appendage depth is 2.2 cm. 3. Severe coronary artery calcifications are seen in the setting of prior bypass grafting. Please note,the study is not optimized for evaluation of coronary arteries/grafts. 4. Biatrial enlargement. 5. Mild aortic valve calcifications.   MACRO: None   Signed by: Russ Avila 11/10/2023 9:44 AM Dictation workstation:   [id number]     Assessment/Plan     Catheter associated UTI: He does have positive blood cultures for  gram-negative bacilli.  Urine cultures are pending but likely this will be the same organism.  He currently is on meropenem pending the final culture results.  Ultimate antibiotic therapy will be determined by infectious disease.    Urinary retention: This is likely from BPH.  He is followed by Dr. Daryl Lozano.  He should have the catheter removed and replaced but ultimately the catheter should be kept in until he follows up with Dr. Lozano for outpatient treatment.    I spent 30 minutes in the professional and overall care of this patient.      Grant Stephens MD

## 2023-11-25 NOTE — CONSULTS
.Reason For Consult  Acute kidney injury    History Of Present Illness  Luis Greene is a 83 y.o. male who is known to have history of enlarged prostate that is followed by urology Dr. Valentin at the [facility] mentor he also had a history of recent urinary retention requiring Tiwari catheter currently the patient was admitted to hospital because of altered mental status confusion and fever chills found to have bacteremia he is growing E. coli in his urine and hit his blood had a Tiwari catheter placed at  patient for acute kidney injury he does feel better today he is more awake and responsive son by the bedside     Review of Systems  Point review of system was done all negative except was positive with history of present illness    Past Medical History  He has a past medical history of Coronary artery disease, Hyperlipidemia, Personal history of other diseases of the circulatory system, and Personal history of other endocrine, nutritional and metabolic disease.    Surgical History  He has a past surgical history that includes Other surgical history (03/11/2019); Other surgical history (03/11/2019); Other surgical history (03/11/2019); MR angio head wo IV contrast (2/17/2019); MR angio head wo IV contrast (10/28/2022); MR angio head wo IV contrast (8/15/2023); and Cardiac catheterization (N/A, 11/10/2023).     Social History  He reports that he has never smoked. He has never used smokeless tobacco. He reports that he does not drink alcohol and does not use drugs.    Family History  Family History   Problem Relation Name Age of Onset    Other (cardiac disorder) Father      Diabetes Father      Hyperlipidemia Father      Hypertension Father      Other (liver carcinoma) Father      Other (hypercholesterolemia) Father      Hypertension Daughter          Current Facility-Administered Medications:     [DISCONTINUED] acetaminophen (Tylenol) tablet 650 mg, 650 mg, oral, q4h PRN **OR** acetaminophen (Tylenol)  oral liquid 650 mg, 650 mg, oral, q4h PRN **OR** acetaminophen (Tylenol) suppository 650 mg, 650 mg, rectal, q4h PRN, Pete Oneill MD, 650 mg at 11/24/23 2155    acetaminophen (Tylenol) tablet 650 mg, 650 mg, oral, q6h PRN, Martina Haynes, APRN-CNP, 650 mg at 11/25/23 0833    aspirin EC tablet 162 mg, 162 mg, oral, Daily, Pete Oneill MD, 162 mg at 11/25/23 0823    atorvastatin (Lipitor) tablet 40 mg, 40 mg, oral, Daily, Pete Oneill MD, 40 mg at 11/25/23 0823    benzocaine-menthol (Cepastat Sore Throat) 15-3.6 mg lozenge 1 lozenge, 1 lozenge, Mouth/Throat, q2h PRN, Pete Oneill MD    clopidogrel (Plavix) tablet 75 mg, 75 mg, oral, Daily, Pete Oneill MD, 75 mg at 11/25/23 0823    dextromethorphan-guaifenesin (Robitussin DM)  mg/5 mL oral liquid 5 mL, 5 mL, oral, q4h PRN, Pete Oneill MD    dextrose 10 % in water (D10W) infusion, 0.3 g/kg/hr, intravenous, Once PRN, Pete Oneill MD    dextrose 50 % injection 25 g, 25 g, intravenous, q15 min PRN, Pete Oneill MD    docusate sodium (Colace) capsule 100 mg, 100 mg, oral, BID, Pete Oneill MD, 100 mg at 11/25/23 0823    enoxaparin (Lovenox) syringe 40 mg, 40 mg, subcutaneous, q24h TAYLOR, Pete Oneill MD, 40 mg at 11/25/23 0823    ferrous sulfate (325 mg ferrous sulfate) tablet 1 tablet, 65 mg of iron, oral, Daily, Pete Oneill MD, 1 tablet at 11/25/23 0823    finasteride (Proscar) tablet 5 mg, 5 mg, oral, Daily, Pete Oneill MD, 5 mg at 11/25/23 0823    gabapentin (Neurontin) capsule 100 mg, 100 mg, oral, BID, Pete Oneill MD, 100 mg at 11/25/23 0823    glucagon (Glucagen) injection 1 mg, 1 mg, intramuscular, q15 min PRN, Pete Oneill MD    guaiFENesin (Mucinex) 12 hr tablet 600 mg, 600 mg, oral, q12h PRN, Pete Oneill MD    insulin glargine (Lantus) injection 10 Units, 10 Units, subcutaneous, Nightly, Pete Oneill MD, 10 Units at 11/24/23 9910    insulin  lispro (HumaLOG) injection 0-15 Units, 0-15 Units, subcutaneous, TID with meals, Pete Oneill MD, 3 Units at 11/24/23 1706    levETIRAcetam (Keppra) tablet 250 mg, 250 mg, oral, BID, Pete Oneill MD, 250 mg at 11/25/23 0823    meropenem (Merrem) in sodium chloride 0.9 % 100 mL IV 1 g, 1 g, intravenous, q8h, SUSAN Man, Stopped at 11/25/23 1447    ondansetron ODT (Zofran-ODT) disintegrating tablet 4 mg, 4 mg, oral, q8h PRN **OR** ondansetron (Zofran) injection 4 mg, 4 mg, intravenous, q8h PRN, Pete Oneill MD    pantoprazole (ProtoNix) EC tablet 40 mg, 40 mg, oral, Daily before breakfast, Pete Oneill MD, 40 mg at 11/25/23 0823    polyethylene glycol (Glycolax, Miralax) packet 17 g, 17 g, oral, BID, Pete Oneill MD, 17 g at 11/25/23 0822    polyethylene glycol (Glycolax, Miralax) packet 17 g, 17 g, oral, Daily PRN, Pete Oneill MD    sodium chloride 0.9% infusion, 100 mL/hr, intravenous, Continuous, SUSAN Man, Last Rate: 100 mL/hr at 11/25/23 1417, 100 mL/hr at 11/25/23 1417    tamsulosin (Flomax) 24 hr capsule 0.4 mg, 0.4 mg, oral, BID, Pete Oneill MD, 0.4 mg at 11/25/23 0823   Allergies  Famotidine, Prednisone, Amoxicillin, Donepezil, Gabapentin, Hydrochlorothiazide, Ibuprofen, Oxycodone-acetaminophen, Tramadol hcl, and Oxycodone hcl         Physical Exam  Physical Exam  Constitutional:       General: He is not in acute distress.     Appearance: He is not toxic-appearing.   HENT:      Head: Normocephalic and atraumatic.   Eyes:      Extraocular Movements: Extraocular movements intact.      Pupils: Pupils are equal, round, and reactive to light.   Neck:      Vascular: No carotid bruit.   Cardiovascular:      Rate and Rhythm: Normal rate and regular rhythm.   Pulmonary:      Effort: No respiratory distress.      Breath sounds: No stridor. No wheezing, rhonchi or rales.   Chest:      Chest wall: No tenderness.   Abdominal:      General:  There is no distension.      Palpations: There is no mass.      Tenderness: There is no abdominal tenderness. There is no right CVA tenderness, left CVA tenderness or guarding.      Hernia: No hernia is present.   Musculoskeletal:         General: No swelling or tenderness.      Cervical back: No rigidity.      Right lower leg: No edema.      Left lower leg: No edema.   Lymphadenopathy:      Cervical: No cervical adenopathy.   Skin:     General: Skin is warm and dry.      Coloration: Skin is not jaundiced or pale.      Findings: No bruising or erythema.   Neurological:      General: No focal deficit present.      Mental Status: He is alert and oriented to person, place, and time.              I&O 24HR    Intake/Output Summary (Last 24 hours) at 11/25/2023 1628  Last data filed at 11/25/2023 1417  Gross per 24 hour   Intake 2185.3 ml   Output 1800 ml   Net 385.3 ml       Vitals 24HR  Heart Rate:  [87-96]   Temp:  [36.6 °C (97.9 °F)-38 °C (100.4 °F)]   Resp:  [17-18]   BP: (115-173)/(42-90)   SpO2:  [93 %-96 %]         Relevant Results        Results for orders placed or performed during the hospital encounter of 11/24/23 (from the past 96 hour(s))   CBC and Auto Differential   Result Value Ref Range    WBC 11.7 (H) 4.4 - 11.3 x10*3/uL    nRBC 0.0 0.0 - 0.0 /100 WBCs    RBC 3.42 (L) 4.50 - 5.90 x10*6/uL    Hemoglobin 12.0 (L) 13.5 - 17.5 g/dL    Hematocrit 34.3 (L) 41.0 - 52.0 %     80 - 100 fL    MCH 35.1 (H) 26.0 - 34.0 pg    MCHC 35.0 32.0 - 36.0 g/dL    RDW 13.0 11.5 - 14.5 %    Platelets 209 150 - 450 x10*3/uL    Neutrophils % 84.8 40.0 - 80.0 %    Immature Granulocytes %, Automated 0.5 0.0 - 0.9 %    Lymphocytes % 5.0 13.0 - 44.0 %    Monocytes % 9.4 2.0 - 10.0 %    Eosinophils % 0.0 0.0 - 6.0 %    Basophils % 0.3 0.0 - 2.0 %    Neutrophils Absolute 9.88 (H) 1.60 - 5.50 x10*3/uL    Immature Granulocytes Absolute, Automated 0.06 0.00 - 0.50 x10*3/uL    Lymphocytes Absolute 0.58 (L) 0.80 - 3.00 x10*3/uL     Monocytes Absolute 1.10 (H) 0.05 - 0.80 x10*3/uL    Eosinophils Absolute 0.00 0.00 - 0.40 x10*3/uL    Basophils Absolute 0.03 0.00 - 0.10 x10*3/uL   Comprehensive metabolic panel   Result Value Ref Range    Glucose 199 (H) 65 - 99 mg/dL    Sodium 132 (L) 133 - 145 mmol/L    Potassium 5.2 (H) 3.4 - 5.1 mmol/L    Chloride 94 (L) 97 - 107 mmol/L    Bicarbonate 21 (L) 24 - 31 mmol/L    Urea Nitrogen 26 (H) 8 - 25 mg/dL    Creatinine 1.30 0.40 - 1.60 mg/dL    eGFR 55 (L) >60 mL/min/1.73m*2    Calcium 9.1 8.5 - 10.4 mg/dL    Albumin 3.7 3.5 - 5.0 g/dL    Alkaline Phosphatase 68 35 - 125 U/L    Total Protein 6.8 5.9 - 7.9 g/dL    AST 38 5 - 40 U/L    Bilirubin, Total 1.6 (H) 0.1 - 1.2 mg/dL    ALT 21 5 - 40 U/L    Anion Gap 17 <=19 mmol/L   Blood Culture    Specimen: Peripheral Venipuncture; Blood culture   Result Value Ref Range    Blood Culture Escherichia coli (AA)     BLOOD CULTURE BOTTLE  Positive Aerobic and Anaerobic Bottle     Gram Stain Gram negative bacilli (AA)     Gram Stain Gram negative bacilli (AA)    Blood Gas Lactic Acid, Venous   Result Value Ref Range    POCT Lactate, Venous 2.1 (H) 0.4 - 2.0 mmol/L   Blood Culture    Specimen: Peripheral Venipuncture; Blood culture   Result Value Ref Range    Blood Culture Escherichia coli (AA)     BLOOD CULTURE BOTTLE  Positive Aerobic and Anaerobic Bottle     Gram Stain Gram negative bacilli (AA)     Gram Stain Gram negative bacilli (AA)    Morphology   Result Value Ref Range    RBC Morphology See Below     Polychromasia Mild     Giovanny Cells Few    Urinalysis with Reflex Microscopic and Culture   Result Value Ref Range    Color, Urine Light-Orange (N) Light-Yellow, Yellow, Dark-Yellow    Appearance, Urine Ex.Turbid (N) Clear    Specific Gravity, Urine 1.013 1.005 - 1.035    pH, Urine 6.0 5.0, 5.5, 6.0, 6.5, 7.0, 7.5, 8.0    Protein, Urine 100 (2+) (A) NEGATIVE, 10 (TRACE), 20 (TRACE) mg/dL    Glucose, Urine Normal Normal mg/dL    Blood, Urine 0.5 (2+) (A) NEGATIVE     Ketones, Urine NEGATIVE NEGATIVE mg/dL    Bilirubin, Urine NEGATIVE NEGATIVE    Urobilinogen, Urine 2 (1+) (A) Normal mg/dL    Nitrite, Urine NEGATIVE NEGATIVE    Leukocyte Esterase, Urine 500 Akin/µL (A) NEGATIVE   Extra Urine Gray Tube   Result Value Ref Range    Extra Tube Hold for add-ons.    Microscopic Only, Urine   Result Value Ref Range    WBC, Urine >50 (A) 1-5, NONE /HPF    WBC Clumps, Urine MANY Reference range not established. /HPF    RBC, Urine 11-20 (A) NONE, 1-2, 3-5 /HPF    Bacteria, Urine 1+ (A) NONE SEEN /HPF   Urine Culture    Specimen: Clean Catch/Voided; Urine   Result Value Ref Range    Urine Culture >100,000 Enteric bacilli (A)    Sars-CoV-2 PCR, Screen Asymptomatic   Result Value Ref Range    Coronavirus 2019, PCR Not Detected Not Detected   Influenza A, and B PCR   Result Value Ref Range    Flu A Result Not Detected Not Detected    Flu B Result Not Detected Not Detected   Blood Gas Lactic Acid, Venous   Result Value Ref Range    POCT Lactate, Venous 1.6 0.4 - 2.0 mmol/L   POCT GLUCOSE   Result Value Ref Range    POCT Glucose 190 (H) 74 - 99 mg/dL   POCT GLUCOSE   Result Value Ref Range    POCT Glucose 219 (H) 74 - 99 mg/dL   Comprehensive metabolic panel   Result Value Ref Range    Glucose 159 (H) 65 - 99 mg/dL    Sodium 137 133 - 145 mmol/L    Potassium 4.3 3.4 - 5.1 mmol/L    Chloride 105 97 - 107 mmol/L    Bicarbonate 21 (L) 24 - 31 mmol/L    Urea Nitrogen 28 (H) 8 - 25 mg/dL    Creatinine 1.20 0.40 - 1.60 mg/dL    eGFR 60 (L) >60 mL/min/1.73m*2    Calcium 8.9 8.5 - 10.4 mg/dL    Albumin 3.0 (L) 3.5 - 5.0 g/dL    Alkaline Phosphatase 56 35 - 125 U/L    Total Protein 5.7 (L) 5.9 - 7.9 g/dL    AST 59 (H) 5 - 40 U/L    Bilirubin, Total 0.9 0.1 - 1.2 mg/dL    ALT 20 5 - 40 U/L    Anion Gap 11 <=19 mmol/L   CBC   Result Value Ref Range    WBC 7.7 4.4 - 11.3 x10*3/uL    nRBC 0.0 0.0 - 0.0 /100 WBCs    RBC 3.10 (L) 4.50 - 5.90 x10*6/uL    Hemoglobin 10.9 (L) 13.5 - 17.5 g/dL    Hematocrit 31.6  (L) 41.0 - 52.0 %     (H) 80 - 100 fL    MCH 35.2 (H) 26.0 - 34.0 pg    MCHC 34.5 32.0 - 36.0 g/dL    RDW 13.0 11.5 - 14.5 %    Platelets 171 150 - 450 x10*3/uL   POCT GLUCOSE   Result Value Ref Range    POCT Glucose 149 (H) 74 - 99 mg/dL          Assessment/Plan     CT abdomen pelvis wo IV contrast    Result Date: 11/25/2023  Interpreted By:  Martha Coleman, STUDY: CT ABDOMEN PELVIS WO IV CONTRAST; 11/25/2023 2:57 pm   INDICATION: Signs/Symptoms:sepsis, gram negative bacteremia;   COMPARISON: None   ACCESSION NUMBER(S): [id number]   ORDERING CLINICIAN: MICH SMITH   TECHNIQUE: Contiguous axial images of the abdomen/pelvis were performed. All CT examinations are performed with 1 or more of the following dose reduction techniques: Automated exposure control, adjustment of mA and/or kv according to patient's size, or use of iterative reconstruction techniques.   FINDINGS: Evaluation of the solid and hollow viscera are somewhat limited without the administration of intravenous or oral contrast.   The liver,  common bile duct, pancreas, spleen, and adrenal glands are unremarkable. Cholelithiasis without evidence of cholecystitis.   The kidneys are grossly unremarkable. No urolithiasis is seen.  No hydroureteronephrosis is seen.   The visualized aorta is unremarkable.   The small bowel is not dilated. Moderate fecal burden.   No free intraperitoneal air or fluid is seen.   There is a Tiwari catheter in place.   The visualized osseous structures are intact. Moderate degenerative changes of the lumbosacral spine.   Limited images of the lower thorax are unremarkable.       No acute abdominal or pelvic pathology.   MACRO: None.   Signed by: Martha Coleman 11/25/2023 3:24 PM Dictation workstation:   [id number]    CT head wo IV contrast    Result Date: 11/24/2023  Interpreted By:  González Quintanilla, STUDY: CT HEAD WO IV CONTRAST;  11/24/2023 1:20 pm   INDICATION: Signs/Symptoms:Fall, altered mental status.    COMPARISON: Brain MRI dated 11/13/2023.   ACCESSION NUMBER(S): [id number]   ORDERING CLINICIAN: GINGER STRANGE   TECHNIQUE: Noncontrast axial CT scan of head was performed. Angled reformats in brain and bone windows were generated. The images were reviewed in bone, brain, blood and soft tissue windows.   FINDINGS: CSF Spaces: Ex vacuo dilation of the ventricles. The sulci and basal cisterns are within normal limits. There is no extraaxial fluid collection.   Parenchyma: Chronic bilateral white matter microvascular disease. There is no mass effect or midline shift.  There is no intracranial hemorrhage.   Calvarium: The calvarium is unremarkable.   Paranasal sinuses and mastoids: Visualized paranasal sinuses and mastoids are clear.       No acute intracranial findings.   MACRO: None   Signed by: González Quintanilla 11/24/2023 1:42 PM Dictation workstation:   [id number]    XR chest 2 views    Addendum Date: 11/24/2023    Interpreted By:  Jesus Briggs, ADDENDUM: AP and lateral projection.   Signed by: Jesus Briggs 11/24/2023 10:55 AM   -------- ORIGINAL REPORT -------- Dictation workstation:   [id number]    Result Date: 11/24/2023  Interpreted By:  Jesus Briggs, STUDY: XR CHEST 2 VIEWS; 11/24/2023 10:40 am   INDICATION: Signs/Symptoms:fever   COMPARISON: 11/17/2023   ACCESSION NUMBER(S): [id number]   ORDERING CLINICIAN: DOUGLAS PIERSON   FINDINGS: Watchman device is seen. Sternotomy wires are noted. Overlying cardiac monitoring device is seen.   Rotator cuff anchor is noted on the right.   The cardiomediastinal silhouette is enlarged but stable. The lungs are clear. No pleural effusion is identified.   The osseous structures are intact. Thoracic spondylosis is noted.       No acute cardiopulmonary process.   MACRO: None   Signed by: Jesus Briggs 11/24/2023 10:52 AM Dictation workstation:   [id number]  Impression:  Acute kidney injury secondary to prerenal azotemia from sepsis  Sepsis with E. coli source is  urine  Acute urinary tract infection  Urinary retention  Large prostate  Diabetes mellitus  Hypertension    Recommendations:  We will current management, will continue with IV hydration, continue with IV antibiotics, continue 40 to CD, is to follow-up with his urologist to address the recurrent urinary tract infections and retention as well as large prostate.   Discussed with the son and the daughter    Thank you for your consultation    Nadeem Valladares MDInpatient consult to Nephrology  Consult performed by: Nadeem Valladares MD  Consult ordered by: JONA Man-CNP

## 2023-11-25 NOTE — CARE PLAN
The patient's goals for the shift include      The clinical goals for the shift include relief of pressure points    Problem: Skin  Goal: Prevent/minimize sheer/friction injuries  Outcome: Progressing  Flowsheets (Taken 11/25/2023 0606)  Prevent/minimize sheer/friction injuries:   Complete micro-shifts as needed if patient unable. Adjust patient position to relieve pressure points, not a full turn   HOB 30 degrees or less   Turn/reposition every 2 hours/use positioning/transfer devices   Use pull sheet     Problem: Daily Care  Goal: Daily care needs are met  Outcome: Progressing     Problem: Safety  Goal: Patient will be injury free during hospitalization  Outcome: Progressing

## 2023-11-25 NOTE — CARE PLAN
Problem: Pain  Goal: My pain/discomfort is manageable  Outcome: Progressing     Problem: Safety  Goal: Patient will be injury free during hospitalization  Outcome: Progressing  Goal: I will remain free of falls  Outcome: Progressing     Problem: Daily Care  Goal: Daily care needs are met  11/25/2023 0822 by Mike Mendoza RN  Flowsheets (Taken 11/25/2023 0822)  Daily care needs are met:   Assess and monitor ability to perform self care and identify potential discharge needs   Assess skin integrity/risk for skin breakdown   Assist patient with activities of daily living as needed  11/25/2023 0822 by Mike Mendoza RN  Outcome: Progressing  Flowsheets (Taken 11/25/2023 0822)  Daily care needs are met:   Assess and monitor ability to perform self care and identify potential discharge needs   Assess skin integrity/risk for skin breakdown   Assist patient with activities of daily living as needed     Problem: Psychosocial Needs  Goal: Demonstrates ability to cope with hospitalization/illness  Outcome: Progressing  Goal: Collaborate with me, my family, and caregiver to identify my specific goals  Outcome: Progressing     Problem: Discharge Barriers  Goal: My discharge needs are met  Outcome: Progressing     Problem: Skin  Goal: Decreased wound size/increased tissue granulation at next dressing change  Outcome: Progressing  Goal: Participates in plan/prevention/treatment measures  Outcome: Progressing  Goal: Prevent/manage excess moisture  Outcome: Progressing  Goal: Prevent/minimize sheer/friction injuries  Outcome: Progressing  Goal: Promote/optimize nutrition  Outcome: Progressing

## 2023-11-25 NOTE — CONSULTS
"Infectious Diseases Initial Consultation    Referred by: Pete Oneill   Physician seeing patient: Kathie Green  Primary MD: Christopher D'Amico, DO  Reason For Consult: Bacteremia     History Of Present Illness  Luis Greene is a pleasant, 83 y.o. male with a past medical history of CAD, BPH with urinary retention and chronic guidry who presented to the ED 11/24 with fever and confusion. Temp 102.2 on presentation. Patient is awake this AM, but he is slow to answer questions. He endorses \"pain all over\". Denies any other issues. He has a guidry in place. Blood cultures were obtained that are now positive for gram negative bacilli. Patient is on IV meropenem.      Past Medical History  He has a past medical history of Coronary artery disease, Hyperlipidemia, Personal history of other diseases of the circulatory system, and Personal history of other endocrine, nutritional and metabolic disease.    Surgical History  He has a past surgical history that includes Other surgical history (03/11/2019); Other surgical history (03/11/2019); Other surgical history (03/11/2019); MR angio head wo IV contrast (2/17/2019); MR angio head wo IV contrast (10/28/2022); MR angio head wo IV contrast (8/15/2023); and Cardiac catheterization (N/A, 11/10/2023).     Social History  He reports that he has never smoked. He has never used smokeless tobacco. He reports that he does not drink alcohol and does not use drugs.   Travel Screening       Question Response    Do you have any of the following new or worsening symptoms? Fever    In the last 10 days, have you been in contact with someone who was confirmed or suspected to have Coronavirus/COVID-19? No / Unsure    Have you had a COVID-19 viral test in the last 10 days? No    Have you traveled internationally or domestically in the last month? No          Travel History   Travel since 10/25/23    No documented travel since 10/25/23         Family History  Family History   Problem " Relation Name Age of Onset    Other (cardiac disorder) Father      Diabetes Father      Hyperlipidemia Father      Hypertension Father      Other (liver carcinoma) Father      Other (hypercholesterolemia) Father      Hypertension Daughter         Allergies  Famotidine, Prednisone, Amoxicillin, Donepezil, Gabapentin, Hydrochlorothiazide, Ibuprofen, Oxycodone-acetaminophen, Tramadol hcl, and Oxycodone hcl   Immunization History   Administered Date(s) Administered    Flu vaccine, quadrivalent, high-dose, preservative free, age 65y+ (FLUZONE) 10/07/2020, 10/18/2021    Influenza, High Dose Seasonal, Preservative Free 02/17/2019, 01/06/2020    Influenza, Seasonal, Quadrivalent, Adjuvanted 11/03/2022    Influenza, Unspecified 10/26/2010    Influenza, seasonal, injectable 10/23/2014    Influenza, seasonal, injectable, preservative free 12/09/2015    Moderna COVID-19 vaccine, Fall 2023, 12 yeasrs and older (50mcg/0.5mL) 10/15/2023    Moderna SARS-CoV-2 Vaccination 02/05/2021, 03/05/2021, 11/26/2021, 06/17/2022    PPD Test 08/19/2023, 08/26/2023    Pfizer COVID-19 vaccine, bivalent, age 12 years and older (30 mcg/0.3 mL) 12/08/2022    Pneumococcal conjugate vaccine, 13-valent (PREVNAR 13) 01/13/2016    Pneumococcal polysaccharide vaccine, 23-valent, age 2 years and older (PNEUMOVAX 23) 05/24/2006    RSV, 60 Years And Older (AREXVY) 10/15/2023    Td (adult), unspecified 05/24/2006    Tdap vaccine, age 7 year and older (BOOSTRIX) 03/21/2014       Review of Systems  No chest pain, headache, sore throat, dysuria. Otherwise ROS is negative     Physical Exam  Vitals: Reviewed   General: awake, seems confused, slow to answer questions   Eyes: PERRL, no scleral icterus  ENT: no oral thrush, no cervical adenopathy   CV: tachycardic   Resp: lungs clear to auscultation bilaterally, normal respiratory effort   Abd: soft, nontender, nondistended  Ext: no joint swelling   Skin: no rashes     Range of Vitals (last 24 hours)  Heart Rate:   "[]   Temp:  [36.6 °C (97.9 °F)-39 °C (102.2 °F)]   Resp:  [16-26]   BP: (115-193)/(42-90)   Height:  [180.3 cm (5' 11\")]   Weight:  [77.2 kg (170 lb 3.1 oz)]   SpO2:  [93 %-100 %]     Relevant Results  Lab Results   Component Value Date    WBC 7.7 2023    HGB 10.9 (L) 2023    HCT 31.6 (L) 2023     (H) 2023     2023      Results from last 72 hours   Lab Units 23  0437   SODIUM mmol/L 137   POTASSIUM mmol/L 4.3   CHLORIDE mmol/L 105   CO2 mmol/L 21*   BUN mg/dL 28*   CREATININE mg/dL 1.20   GLUCOSE mg/dL 159*   CALCIUM mg/dL 8.9   ANION GAP mmol/L 11   EGFR mL/min/1.73m*2 60*     Results from last 72 hours   Lab Units 23  0437   ALK PHOS U/L 56   BILIRUBIN TOTAL mg/dL 0.9   PROTEIN TOTAL g/dL 5.7*   ALT U/L 20   AST U/L 59*   ALBUMIN g/dL 3.0*     Estimated Creatinine Clearance: 50.5 mL/min (by C-G formula based on SCr of 1.2 mg/dL).    Cultures/Micro   blood cultures x 2 + GNB   urine culture pending     Imagin/24 CXR:  No acute cardiopulmonary process.     Assessment:  Sepsis due to gram negative bacteremia, present on admission  Catheter-associated UTI, present on admission    Plan/Recommendations:  Continue IV meropenem for now. Monitor for adverse effects, incl rash or diarrhea  CT abd/pelvis  Follow-up pending blood and urine culture results    Kathie Green MD  [facility], [place]  Phone: [phone]  Fax: [phone]   "

## 2023-11-25 NOTE — PROGRESS NOTES
Occupational Therapy                 Therapy Communication Note    Patient Name: Luis Greene  MRN: [id number]  Today's Date: 11/25/2023     Discipline: Occupational Therapy    Missed Visit Reason: Missed Visit Reason: Patient sleeping (Attempted OT eval, but pt sleeping upon arrival and dtr. at bedside requesting that OT return at next available day/time and allow pt to sleep.)    Missed Time: Attempt

## 2023-11-25 NOTE — CONSULTS
Consults  History Of Present Illness:    Luis Greene is a 83 y.o. male presenting with altered mental status.     1. Coronary artery disease with remote CABG Ã--2 2011. This patient did develop recurrent angina in 2013 and underwent a repeat cardiac catheterization demonstrating closure of the original bypass grafts but with collateral circulation and no PCI was required.     2. History of TIA. This patient did have a TIA following his cardiac catheterization with transient confusion on the day of discharge. The patient was admitted to [facility] on 02/17/2019 with numbness of his left arm hand and ultimately jaw. The numbness of the left hand and jaw resolved but persisted somewhat longer within the left arm. Evaluation at that time included a chest x-ray showing previous sternotomy with clear lung fields. Carotid ultrasound showed mild bilateral plaque less than 50% stenoses. CT angiogram of the head and neck was unremarkable with nonstenotic calcification of the right common carotid artery without stenosis. The left common carotid artery had a nonstenotic calcification with a patent internal carotid artery. His intracerebral vessels were unremarkable with no aneurysm. An MRI of the brain on 02/17/2019 was unremarkable. He had an MRI of the cervical spine that showed minimal disc bulging at C2-C3 with moderate disc osteophyte complex at C3-C4 causing moderate canals narrowing and cord impingement along with bilateral neural foraminal stenoses. There was an osteophyte complex at C4-C5 causing mild anterior cord impingement and bilateral moderate neural foraminal narrowing and disc bulging at C5-C6 without cord impingement along with anterior subluxation of C7 upon T1. EKG showed sinus rhythm with first-degree AV block and no ST segment change. There was no evidence of atrial fibrillation by telemetry monitoring. His high sensitivity troponins were negative. He had an echocardiogram performed on  02/17/2019 showing an estimated LV ejection fraction at 55â€“59 percent with mild hypokinesis of the anteroseptal wall due to previous thoracotomy. Left atrial size was in the upper range of normal with mild aortic root calcification and a mildly dilated right atrium. The patient was readmitted to [facility] on 06/04/2019 with recurrent neurological complaints. He had pins and needles paresthesias of the left hand and arm along with perceived weakness of the left hand and arm with loss of coordination. The patient underwent repeat evaluation including MRI of the brain which was unremarkable. Negligible carotid vascular disease by ultrasound and CT angiogram in the past. Blood pressure readings were acceptable at that time with lisinopril 40 mg daily amlodipine 5 mg daily metoprolol extended release 5 mg daily. He was on aspirin at that time along with the atorvastatin 40 mg daily. More recently the patient was driving home from a physician appointment on the [place] side when he began to develop paresthesias of the right hand and fingertips. Initially he thought that an Ace wrap of his right shoulder was too tight and continued driving. The sense of tingling began to get worse and he developed a funny sensation around the right side of his face involving the lip. The patient pulled his car off to the side of the road. At that point in time he was having trouble communicating verbally due to the inability to formulate words. He was taken by EMS to [facility] and observed but then released without admission. He is subsequently seen by neurology as an outpatient. He did have a extra no cardiac monitor performed on 02/05/2020â€“02/14/2020. This did not demonstrate atrial fibrillation. It did demonstrate some brief episodes of Mobitz 1 second degree AV block. He also had one 8 beat run of nonsustained ventricular tachycardia. The patient has never had presyncopal or syncopal events but more notably paresthesias.  The results of this external cardiac monitor were discussed with electrophysiology and at this point the patient will be scheduled to have a loop recorder implanted to ensure that he is not having clinically silent paroxysmal atrial fibrillation not detected on previous telemetry monitoring. The patient has been seen by neurology and was switched to Plavix 75 mg daily which will be continued unless there is documentation of paroxysmal atrial fibrillation by the loop recorder. Echocardiogram performed at time of today's visit 03/17/2020 demonstrates an LV ejection fraction of 55â€“60 percent with mild hypokinesis of the anteroseptal wall due to previous thoracotomy with oneâ€“2+ mitral valve regurgitation. The findings are very similar to the echo that was performed at [facility] in 2/2019. Patient had loop recorder placed 07/2020 by Dr Steven Flores. Had negative pharmacologic nuclear stress test done 01/2022.       T        Code Status:  Full Code     I spent 20 minutes in the professional and overall care of this patient.           Luis Grant MD                      3. Hypertension. Adequate control with present management which includes lisinopril 20 mg daily, amlodipine 5 mg daily and metoprolol ER 50 mg daily.     4. Hyperlipidemia. Good response to atorvastatin 40 mg daily. Lipid panel on 08/2022 includes cholesterol 77 LDL 37 HDL 29 [id number].      5. Type 2 diabetes.     6. Lifetime nonsmoking.     5. Status post right rotator cuff repair 11/11/2019. The patient had a right rotator cuff operation on 11 11/20/1990 evidently developed an infection that required surgical debridement.     6. Hx of covid-19 vaccine #1 and #2.      7. Afib noted to loop recorder after back surgery 02/2022. Patient had laminectomy with Dr Allred 02/22/2022 and developed afib on his loop recorder. Has been started on Xarelto. ECG done prior shows NSR with long first degree AVB.      8. TIA. Patient has been admitted to  [facility] on 4 occasions, the first being February 2019 for complaints of left hand numbness and difficulty finding words and left leg heaviness.Carotid ultrasound showed less than 50% stenosis bilaterally. CT angiogram of the head and neck was unremarkable. MRI MRA of the brain was negative for acute stroke. Later in 2019 the patient was again admitted with similar complaints while driving. He had a loop recorder implanted which did show 2 episodes of atrial tachycardia or atrial fibrillation the longest lasting 6 minutes. The burden was only 0.06%. In October 2022 he was treated for UTI with antibiotics and again admitted for similar complaints. Neurology recommended outpatient physical therapy for inner ear issue. CT angiogram was again negative, carotid ultrasound showed less than 50% stenosis bilaterally, MRI MRA did not show any acute infarct or intracerebral vascular stenosis. Echo done October 2022 showed an EF of 60%, RAD, mild MR, mild to moderate TR, mildly elevated RVSP, PASP 43 mmHg. He was seen by neurology for possible alternative mechanisms to his TIA and treated with magnesium citrate and possibly verapamil. Alternative explanation may be migraine equivalent or focal seizure. Neurology discharged him with referral for vestibular studies and PT for balance. Patient currently will follow-up with Dr. Garvey.      The patient underwent his transesophageal echo guided Watchman implantation device performed at [facility] 11/10/2023 that was performed uneventfully.  The patient's left atrial appendage was negative for any visible thrombus at the time of implantation.  The patient was released on the same day.  After returning home he evidently had an episode of confusion and incomprehensible speech that lasted for approximately 20 to 30 minutes.  His other daughter was present at the time and drove him to the [facility], [place], operated by [facility] emergency room and the symptoms had nearly resolved by the time  of his arrival.  His initial head CT was negative for CVA.  His MRI of the brain has recently been completed.  His telemetry monitor initially demonstrated atrial fibrillation with a slow ventricular rate as low as the mid 30s per minute range and his metoprolol succinate 50 mg daily was held.  Amlodipine was also temporarily held.  I am still concerned about the possibility of recurring focal seizures given the fact that the patient has had at least 5 transient neurological events all attributed to TIAs but never confirmed CVA.  Patient is currently on Plavix protocol as per watchman glide lines which will be continued for now.  The patient has had intermittent GI bleeding in the past when placed on Eliquis.  The patient's atrial fibrillation currently has a ventricular rate of 65/min and will observe for now in the absence of metoprolol.  The patient may have enough intrinsic AV charity conduction delay to provide rate control without the need for beta-blockade.  Patient's of blood pressure appears to be satisfactory on lisinopril 20 mg daily without previously prescribed amlodipine. He did have a EEG performed yesterday which was negative for epileptiform activity or lateralizing signs.  The recurrence of his episodes however does suggest the possibility of seizure clinically and he has been started empirically on Keppra 250 mg twice daily.  He will have additional outpatient neurology follow-up in approximately 4 weeks.  From the cardiac perspective the patient is stable.  The ventricular rate of his atrial fibrillation is controlled in the 60 to 70/min range in the absence of previously prescribed metoprolol which will not be utilized.  The patient should continue with lisinopril 20 mg daily for his hypertension but previously utilized amlodipine at this point is not required.  Patient will be instructed to put both the metoprolol and amlodipine aside but most likely neither will be restarted.  Patient will  remain on Plavix therapy along with aspirin 81 mg daily.  As per protocol for his Watchman device.  Patient's electrolyte panel from today is unremarkable creatinine is 0.90.  Hemoglobin unchanged at 10.4.  Patient stable for discharge today and will be seen in 2 to 3 weeks for outpatient cardiology follow-up.     Patient is currently admitted with low-grade fever and confusion.  This temperature was evidently 102.2 on presentation to the emergency room.  His blood cultures on admission were positive for gram-negative bacilli and the patient was started on IV meropenem.  The patient did have recurrent chronic Tiwari catheter in place at the time of his presentation to the emergency room.    Vitals:    11/24/23 2356 11/25/23 0400 11/25/23 0805 11/25/23 1224   BP: 138/76 (!) 115/42 173/68 (!) 115/48   BP Location: Right arm Right arm Right arm Right arm   Patient Position: Lying Lying Lying Lying   Pulse: 96 90 95 87   Resp: 17 18 18 18   Temp: 36.8 °C (98.2 °F) 36.6 °C (97.9 °F) 36.8 °C (98.2 °F) 36.7 °C (98.1 °F)   TempSrc: Temporal Temporal Oral Oral   SpO2: 95% 93% 96% 96%   Weight:       Height:           Last Labs:  CBC - 11/25/2023:  4:37 AM  7.7 10.9 171    31.6      CMP - 11/25/2023:  4:37 AM  8.9 5.7 59 --- 0.9   _ 3.0 20 56      PTT - 11/10/2023:  4:59 PM  1.2   12.3 22.2     Hemoglobin A1C   Date/Time Value Ref Range Status   11/15/2023 11:08 AM 6.7 (H) See below % Final   09/14/2023 12:14 PM 6.8 (A) % Final     Comment:          Diagnosis of Diabetes-Adults   Non-Diabetic: < or = 5.6%   Increased risk for developing diabetes: 5.7-6.4%   Diagnostic of diabetes: > or = 6.5%  .       Monitoring of Diabetes                Age (y)     Therapeutic Goal (%)   Adults:          >18           <7.0   Pediatrics:    13-18           <7.5                   7-12           <8.0                   0- 6            7.5-8.5   American Diabetes Association. Diabetes Care 33(S1), Jan 2010.   08/14/2023 02:45 PM 8.1 (H) 4.0 - 6.0  "% Final     Comment:     Hemoglobin A1C levels are related to mean blood glucose during the   preceding 2-3 months. The relationship table below may be used as a   general guide. Each 1% increase in HGB A1C is a reflection of an   increase in mean glucose of approximately 30 mg/dl.   Reference: Diabetes Care, volume 29, supplement 1 Jan. 2006                        HGB A1C ................. Approx. Mean Glucose   _______________________________________________   6%   ...............................  120 mg/dl   7%   ...............................  150 mg/dl   8%   ...............................  180 mg/dl   9%   ...............................  210 mg/dl   10%  ...............................  240 mg/dl  Performed at [address] [address]     04/19/2023 04:03 PM 9.7 (H) 4.0 - 5.6 % Final   02/03/2023 09:12 AM 9.6 (H) 4.0 - 5.6 % Final     LDL Calculated   Date/Time Value Ref Range Status   11/15/2023 11:08 AM 30 (L) 65 - 130 mg/dL Final   08/15/2023 05:50 AM 31 (L) 65 - 130 MG/DL Final   10/28/2022 05:39 AM 32 (L) 65 - 130 MG/DL Final   07/06/2021 09:50 AM 39 (L) 65 - 130 MG/DL Final     VLDL   Date/Time Value Ref Range Status   08/22/2022 10:17 AM 11 0 - 40 mg/dL Final   09/17/2019 01:20 PM 16 0 - 40 mg/dL Final      Last I/O:  I/O last 3 completed shifts:  In: 2490.3 (32.3 mL/kg) [I.V.:1290.3 (16.7 mL/kg); IV Piggyback:1200]  Out: 1250 (16.2 mL/kg) [Urine:1250 (0.4 mL/kg/hr)]  Weight: 77.2 kg     Past Cardiology Tests (Last 3 Years):  EKG:  ECG 12 lead daily 11/17/2023      ECG 12 lead daily 11/15/2023      ECG 12 lead daily 11/15/2023      ECG 12 lead 11/15/2023    Echo:  Transthoracic Echo (TTE) Limited 11/10/2023      Transthoracic Echo (TTE) Limited 11/10/2023    Ejection Fractions:  No results found for: \"EF\"  Cath:  No results found for this or any previous visit from the past 1095 days.    Stress Test:  Nuclear Stress Test 2/13/2023    Cardiac Imaging:  No results found for this " or any previous visit from the past 1095 days.      Past Medical History:  He has a past medical history of Coronary artery disease, Hyperlipidemia, Personal history of other diseases of the circulatory system, and Personal history of other endocrine, nutritional and metabolic disease.    Past Surgical History:  He has a past surgical history that includes Other surgical history (03/11/2019); Other surgical history (03/11/2019); Other surgical history (03/11/2019); MR angio head wo IV contrast (2/17/2019); MR angio head wo IV contrast (10/28/2022); MR angio head wo IV contrast (8/15/2023); and Cardiac catheterization (N/A, 11/10/2023).      Social History:  He reports that he has never smoked. He has never used smokeless tobacco. He reports that he does not drink alcohol and does not use drugs.    Family History:  Family History   Problem Relation Name Age of Onset    Other (cardiac disorder) Father      Diabetes Father      Hyperlipidemia Father      Hypertension Father      Other (liver carcinoma) Father      Other (hypercholesterolemia) Father      Hypertension Daughter          Allergies:  Famotidine, Prednisone, Amoxicillin, Donepezil, Gabapentin, Hydrochlorothiazide, Ibuprofen, Oxycodone-acetaminophen, Tramadol hcl, and Oxycodone hcl    Inpatient Medications:  Scheduled medications   Medication Dose Route Frequency    aspirin  162 mg oral Daily    atorvastatin  40 mg oral Daily    clopidogrel  75 mg oral Daily    docusate sodium  100 mg oral BID    enoxaparin  40 mg subcutaneous q24h TAYLOR    ferrous sulfate (325 mg ferrous sulfate)  65 mg of iron oral Daily    finasteride  5 mg oral Daily    gabapentin  100 mg oral BID    insulin glargine  10 Units subcutaneous Nightly    insulin lispro  0-15 Units subcutaneous TID with meals    levETIRAcetam  250 mg oral BID    meropenem  1 g intravenous q8h    pantoprazole  40 mg oral Daily before breakfast    polyethylene glycol  17 g oral BID    tamsulosin  0.4 mg oral BID      PRN medications   Medication    acetaminophen    Or    acetaminophen    acetaminophen    benzocaine-menthol    dextromethorphan-guaifenesin    dextrose 10 % in water (D10W)    dextrose    glucagon    guaiFENesin    ondansetron ODT    Or    ondansetron    polyethylene glycol     Continuous Medications   Medication Dose Last Rate    sodium chloride 0.9%  100 mL/hr 100 mL/hr (11/25/23 1417)     Outpatient Medications:  Current Outpatient Medications   Medication Instructions    aspirin 81 mg EC tablet 2 tablets, oral, Daily    atorvastatin (Lipitor) 40 mg tablet 1 tablet, oral, Daily    clopidogrel (PLAVIX) 75 mg, oral, Daily    docusate sodium (COLACE) 100 mg, oral, 2 times daily    ferrous sulfate 325 (65 Fe) MG EC tablet 1 tablet, oral, Daily    finasteride (PROSCAR) 5 mg, oral, Daily    folic acid-vitamin B complex-vitamin C-selenium-zinc (Dialyvite) 3-70-15 mg-mcg-mg tablet 1 tablet, oral, Daily    furosemide (LASIX) 40 mg, oral, Daily    gabapentin (Neurontin) 100 mg capsule TAKE 1 CAPSULE BY MOUTH ONCE DAILY FOR NEUROPATHY PAIN    insulin glargine-lixisenatide (Soliqua 100/33) 100 unit-33 mcg/mL insulin pen 15 Units, subcutaneous, Nightly    insulin lispro (HUMALOG) 0-15 Units, subcutaneous, 3 times daily with meals, Take as directed per insulin instructions.    levETIRAcetam (KEPPRA) 250 mg, oral, 2 times daily    lisinopril 20 mg, oral, Daily, Hold for systolic blood pressure < 140 mmhg    metFORMIN (Glucophage) 500 mg tablet 2 tablets, oral, 2 times daily    pantoprazole (PROTONIX) 40 mg, oral, Daily before breakfast, Do not crush, chew, or split.    polyethylene glycol (GLYCOLAX, MIRALAX) 17 g, oral, 2 times daily, Hold for loose stool    tamsulosin (FLOMAX) 0.4 mg, oral, 2 times daily       Physical Exam:  General: alert, oriented x2-3, very pleasant; son at bedside  HEENT: normal cephalic, atraumatic  Neck: No JVD, bruit or thrill, masses or tenderness   Heart: S1/S2, Rate 50, Rhythm irregular, no s3  or s4, no murmur, thrill, or heaves at PMI.   Lungs: Clear, equal expansion and excursion, no wheezes, crackles, rhales or rhonci.  Room air.  No conversational dyspnea appreciated.  No tachypnea.  No pain with deep aspiration   Abdomen: Overweight, bowel sounds x 4, soft, non-tender   Genitourinary: deferred   Extremities: No significant upper or lower extremity daylin appreciated.     Assessment/Plan   11/25: This elderly white male with an extensive past medical history including coronary artery disease, remote CABG, hypertension, hyperlipidemia, type 2 diabetes, paroxysmal atrial fibrillation, history of?  TIA versus recurrent localized focal seizures, history of GI bleeding on Eliquis anticoagulation, status post Watchman implantation is currently admitted with change in mental status with a 2-day period of low-grade fever somnolence confusion inability to eat ultimately identified on presentation as being a probable urinary tract infection with urosepsis from his chronic indwelling Tiwari catheter.  His renal parameters are stable with a creatinine of 1.2.  No leukocytosis currently with a WBC of only 11,700 on admission.  Initial urine culture is positive for 100,000 gram-negative rods as are 2 sets of blood cultures now identified as E. coli.  Patient has improved rapidly clinically following institution of IV antibiotic therapy with IV meropenem.  He will be maintained on his aspirin and Plavix as per watchman protocol.  Patient's preadmission lisinopril on hold to avoid hypotension related to urosepsis.  His atrial fibrillation has an adequate ventricular rate control in the absence of previously prescribed metoprolol.  Peripheral IV 11/24/23 20 G Right Antecubital (Active)   Site Assessment Intact;Dry;Clean 11/25/23 1519   Dressing Status Clean;Dry 11/25/23 1519   Number of days: 1       Peripheral IV 11/24/23 20 G Left;Anterior Hand (Active)   Site Assessment Intact;Dry;Clean 11/25/23 1518   Dressing Status  Clean;Dry 11/25/23 1518   Number of days: 1       Urethral Catheter (Active)   Site Assessment Clean;Skin intact 11/25/23 0845   Number of days: 15       Code Status:  Full Code    I spent 30 minutes in the professional and overall care of this patient.        Luis Grant MD

## 2023-11-25 NOTE — PROGRESS NOTES
Physical Therapy    Physical Therapy Evaluation & Treatment    Patient Name: Luis Greene  MRN: [id number]  Today's Date: 11/25/2023   Time Calculation  Start Time: 1008  Stop Time: 1058  Time Calculation (min): 50 min    Assessment/Plan   PT Assessment  PT Assessment Results: Decreased strength, Decreased range of motion, Impaired balance, Decreased mobility, Decreased cognition, Impaired hearing, Impaired tone (hypertonic bilat hamstrings,)  Rehab Prognosis: Good  Evaluation/Treatment Tolerance: Patient tolerated treatment well  Medical Staff Made Aware: Yes  Strengths: Attitude of self, Premorbid level of function, Rehab experience, Support of extended family/friends, Access to adaptive/assistive products  Barriers to Participation: Comorbidities  End of Session Communication: Bedside nurse  End of Session Patient Position: Up in chair, Alarm off, not on at start of session (dtr in room; call light & needs within reach)     IP OR SWING BED PT PLAN  Inpatient or Swing Bed: Inpatient    PT Plan  Treatment/Interventions: Bed mobility, Transfer training, Gait training, Stair training, Balance training, Strengthening, Therapeutic exercise, Therapeutic activity  PT Plan: Skilled PT  PT Frequency: 4 times per week  PT Discharge Recommendations: Moderate intensity level of continued care  PT Recommended Transfer Status: Assist x1 (standing/amb with FWW)  PT - OK to Discharge: Yes      Subjective     General Visit Information:  General  Reason for Referral: impaired functional mobility. Pt sent from SNF to ED for fever, altered mental status & fall/slip out of bed. Pt admitted for sepsis, catheter associated UTI with toxic encephalopathy, fever, and LUZ MARIA. Per dtr (Hedy), pt at SNF for rehab after seizure.  Past Medical History Relevant to Rehab: BPH, UTIs, urinary retention now with chronic Tiwari, CAD, CABG, A-fib s/p Watchman device placement, HTN, CHF, TIAs, DM-II, GI bleed, R RTC repair, laminectomy, anxiety, GERD, R  "hip trochanteric bursitis, essential tremor, lumbar spinal stenosis, chronic R shoulder pain, cervical radiculitis, obstructive sleep apnea, lumbar spondylosis, polyneuropathy  Family/Caregiver Present: Yes (dtr Hedy who helped clarify some home set up & prior level of function information)  Prior to Session Communication: Bedside nurse (Mike MCLEANcleared pt for participation)  Patient Position Received: Bed, 3 rail up (alarm off; HOB elevated)  General Comment: Pt received asleep and aroused the tactile & aural stim. He agreed to session, participated fully, and appeared motivated.    Home Living:  Home Living  Type of Home: House (ranch with lunadry in basement; pt does not go to basement)  Lives With: Spouse  Home Adaptive Equipment:  (Rollator walker)  Home Access:  (3 stairs to enter with bilateral rails (far apart))  Bathroom Shower/Tub: Tub/shower unit  Bathroom Equipment: Shower chair without back, Grab bars in shower  Home Living Comments: spouse able to provide limited assist due to her age. His 3 dtrs & 1 son live in the area and are involved as their schedules permit.     Prior Level of Function:  Prior Function Per Pt/Caregiver Report  Level of [place]: Independent with ADLs and functional transfers  Receives Help From:  (spouse)  ADL Assistance: Independent  Homemaking Assistance: Needs assistance (BADLs)  Ambulatory Assistance:  (Amb with Rollator walker at mod I level. Stairs with rail and S. Used bilat AFOs.)    Precautions:  Precautions  Medical Precautions: Fall precautions  Prosthesis/Orthosis Used: Ankle/Foot orthosis (bilat 2° to foot drop)      Objective   Pain:  Pain Assessment  Pain Score:  (\"a little\" in bilateral soles of feet he attributed to neuropathy)    Cognition:  Cognition  Overall Cognitive Status: Impaired (oriented to person, place & year. Stated month as \"January\", knew he was incorrect but could not state correct month without 1 verbal cue.       Followed 1 step verbal " commands 100% of the time.)  Memory:  (Denied numbness/tingling but agreed with dtr who stated he had peripheral neuropathy in bilat feet)  Impulsive:  (not this session)    General Assessments:  General Observation  General Observation: Tiwari catheter in place; LUE PIV (running)     Activity Tolerance  Endurance: Endurance does not limit participation in activity    Sensation  Sensation Comment: initially denied paresthesias; dtr reported bilat foot neuropathy, pt stated it was in soles of feet    Strength:  BLE grossly >/= 3+/5 **except** bilat: DF 0-1/5 (baseline drop foot) & quads 3-/5.        AROM: BLE WFL **except** DF 0° & shortened bilat hamstrings.     Tone: mild to moderate hypertonicity (grossly).    Perception  Motor Planning: Appears intact      Coordination  Movements are Fluid and Coordinated:  (not tested; appeared WFL; noted slower pacing of movements)    Postural Control  Postural Control: Within Functional Limits  Posture Comment: fwd head posture, excessive cervical flex, protracted shoulders, excessive hip/knee flex in stance (crouched posture); increased muscular tone    Static Sitting Balance  Static Sitting-Balance Support: Feet supported, Bilateral upper extremity supported  Static Sitting-Level of Assistance: Independent (mod I)  Static Sitting-Comment/Number of Minutes: <5 minutes  Dynamic Sitting Balance  Dynamic Sitting-Balance Support: Bilateral upper extremity supported (unilateral LE support)  Dynamic Sitting-Comments: close S    Static Standing Balance  Static Standing-Balance Support: Bilateral upper extremity supported (at FWW)  Static Standing-Level of Assistance: Contact guard  Dynamic Standing Balance  Dynamic Standing-Balance Support: Bilateral upper extremity supported (at FWW)  Dynamic Standing-Balance:  (fwd & retro stepping)  Dynamic Standing-Comments: CGA/Jeanne x1    Functional Assessments:  Bed Mobility  Bed Mobility: Yes  Bed Mobility 1  Bed Mobility 1: Rolling right,  Rolling left  Level of Assistance 1: Modified independent (dependent for rear hygiene)  Bed Mobility Comments 1: using bed rail; HOB minimally elevated  Bed Mobility 2  Bed Mobility  2: Supine to sitting  Level of Assistance 2: Maximum assistance (assist to BLE, pelvis (via disposable pad) & trunk (via draw sheet). Verbal cues for sequencing (except for bed rail use). Able to initiate BLE & BUE movements.)  Bed Mobility Comments 2: HOB elevated & used L bed rail.    Transfers  Transfer: Yes  Transfer 1  Technique 1: Sit to stand  Transfer Device 1:  (EOB (minimally elevated) or armrests; FWW)  Transfer Level of Assistance 1:  (ModA x1 from EOB; Jeanne x1 from bedside chair. Cued for fwd scoot to attain start position(EOB only) and trunk rocking for momentum. At chair, cued for safe/strategic BUE placement in start positon. Cued for increased hip/upper trunk extension.)  Trials/Comments 1: x2 trials  Transfers 2  Technique 2: Stand to sit  Transfer Device 2:  (armrests; FWW)  Transfer Level of Assistance 2: Minimum assistance (assist for chair stabilization; exlcellent eccentric control; good walker safety awareness demo'd)    Ambulation/Gait Training  Ambulation/Gait Training Performed: Yes  Ambulation/Gait Training 1  Surface 1: Level tile  Device 1: Rolling walker  Gait Support Devices:  (bilat AFOs (in sneakers))  Assistance 1: Minimum assistance (assist for guarding at trunk and occasional walker management)  Comments/Distance (ft) 1: 2 lateral steps toward L side at EOB, 4 fwd steps, & turn then retro steps (to bedside chair). Minimal step through pattern with equal step lengths, crouched posture with downward gaze, slow velocity, and disontinuous movement. Steady gait.  Ambulation/Gait Training 2  Device 2:  (surface, devices & assist level as above)  Comments/Distance (ft) 2: 3 ft fwd then retro;cued for sequencin with walker during retro stepping    Stairs  Stairs: No    Treatments:  Therapeutic  Activity  Therapeutic Activity Performed: Yes  Therapeutic Activity 1: see transfers & gait training for miltiple trials of each  Therapeutic Activity 2: Pt educated in fall safety precautions. Pt able to state & demo use of call light for nursing staff assist prior to any mobility. Educated him & his dtr in POC & frequency of visits.    Outcome Measures:  [facility] Basic Mobility  Turning from your back to your side while in a flat bed without using bedrails: A lot  Moving from lying on your back to sitting on the side of a flat bed without using bedrails: A lot  Moving to and from bed to chair (including a wheelchair): A lot  Standing up from a chair using your arms (e.g. wheelchair or bedside chair): A little  To walk in hospital room: A little  Climbing 3-5 steps with railing: A lot  Basic Mobility - Total Score: 14    Encounter Problems       Encounter Problems (Active)       Mobility       STG - Patient will ambulate >/= 50 ft with walker & close S.       Start:  11/25/23    Expected End:  12/10/23            STG - Patient will negotiate 3 stairs with unilateral hand rail with or without least restrictive cane at close S level.       Start:  11/25/23    Expected End:  12/10/23               Transfers       STG - Patient to transfer supine<>sit using hospital bed at mod I level.       Start:  11/25/23    Expected End:  12/10/23            STG - Patient will transfer sit <>stand with walker at mod I level.       Start:  11/25/23    Expected End:  12/10/23                   Education Documentation  Body Mechanics, taught by Anne Guerra PT at 11/25/2023  2:52 PM.  Learner: Patient  Readiness: Eager  Method: Explanation  Response: Verbalizes Understanding, Demonstrated Understanding, Needs Reinforcement    Mobility Training, taught by Anne Guerra PT at 11/25/2023  2:52 PM.  Learner: Patient  Readiness: Eager  Method: Explanation  Response: Verbalizes Understanding, Demonstrated Understanding, Needs  Reinforcement    Education Comments  No comments found.

## 2023-11-25 NOTE — ED PROCEDURE NOTE
Procedure  Critical Care    Performed by: Jg Singh DO  Authorized by: Jg Singh DO    Critical care provider statement:     Critical care time (minutes):  30    Critical care time was exclusive of:  Separately billable procedures and treating other patients    Critical care was necessary to treat or prevent imminent or life-threatening deterioration of the following conditions:  Sepsis    Critical care was time spent personally by me on the following activities:  Ordering and review of laboratory studies, ordering and review of radiographic studies, discussions with primary provider, evaluation of patient's response to treatment and examination of patient    I assumed direction of critical care for this patient from another provider in my specialty: no      Care discussed with: admitting provider                 Jg Singh DO  11/24/23 1947

## 2023-11-26 ENCOUNTER — APPOINTMENT (OUTPATIENT)
Dept: CARDIOLOGY | Facility: HOSPITAL | Age: 83
DRG: 698 | End: 2023-11-26
Payer: MEDICARE

## 2023-11-26 LAB
ANION GAP SERPL CALC-SCNC: 12 MMOL/L
BACTERIA UR CULT: ABNORMAL
BASOPHILS # BLD AUTO: 0.03 X10*3/UL (ref 0–0.1)
BASOPHILS NFR BLD AUTO: 0.6 %
BUN SERPL-MCNC: 31 MG/DL (ref 8–25)
CALCIUM SERPL-MCNC: 8.8 MG/DL (ref 8.5–10.4)
CHLORIDE SERPL-SCNC: 102 MMOL/L (ref 97–107)
CO2 SERPL-SCNC: 21 MMOL/L (ref 24–31)
CREAT SERPL-MCNC: 1.1 MG/DL (ref 0.4–1.6)
EOSINOPHIL # BLD AUTO: 0.03 X10*3/UL (ref 0–0.4)
EOSINOPHIL NFR BLD AUTO: 0.6 %
ERYTHROCYTE [DISTWIDTH] IN BLOOD BY AUTOMATED COUNT: 13 % (ref 11.5–14.5)
GFR SERPL CREATININE-BSD FRML MDRD: 67 ML/MIN/1.73M*2
GLUCOSE BLD MANUAL STRIP-MCNC: 155 MG/DL (ref 74–99)
GLUCOSE BLD MANUAL STRIP-MCNC: 186 MG/DL (ref 74–99)
GLUCOSE BLD MANUAL STRIP-MCNC: 195 MG/DL (ref 74–99)
GLUCOSE BLD MANUAL STRIP-MCNC: 214 MG/DL (ref 74–99)
GLUCOSE SERPL-MCNC: 152 MG/DL (ref 65–99)
HCT VFR BLD AUTO: 33 % (ref 41–52)
HGB BLD-MCNC: 11 G/DL (ref 13.5–17.5)
IMM GRANULOCYTES # BLD AUTO: 0.05 X10*3/UL (ref 0–0.5)
IMM GRANULOCYTES NFR BLD AUTO: 1.1 % (ref 0–0.9)
LYMPHOCYTES # BLD AUTO: 0.81 X10*3/UL (ref 0.8–3)
LYMPHOCYTES NFR BLD AUTO: 17.5 %
MCH RBC QN AUTO: 34.6 PG (ref 26–34)
MCHC RBC AUTO-ENTMCNC: 33.3 G/DL (ref 32–36)
MCV RBC AUTO: 104 FL (ref 80–100)
MONOCYTES # BLD AUTO: 0.67 X10*3/UL (ref 0.05–0.8)
MONOCYTES NFR BLD AUTO: 14.5 %
NEUTROPHILS # BLD AUTO: 3.03 X10*3/UL (ref 1.6–5.5)
NEUTROPHILS NFR BLD AUTO: 65.7 %
NRBC BLD-RTO: 0 /100 WBCS (ref 0–0)
PLATELET # BLD AUTO: 147 X10*3/UL (ref 150–450)
POTASSIUM SERPL-SCNC: 4.1 MMOL/L (ref 3.4–5.1)
Q ONSET: 224 MS
QRS COUNT: 18 BEATS
QRS DURATION: 138 MS
QT INTERVAL: 354 MS
QTC CALCULATION(BAZETT): 481 MS
QTC FREDERICIA: 434 MS
R AXIS: 107 DEGREES
RBC # BLD AUTO: 3.18 X10*6/UL (ref 4.5–5.9)
SODIUM SERPL-SCNC: 135 MMOL/L (ref 133–145)
T AXIS: 19 DEGREES
T OFFSET: 401 MS
VENTRICULAR RATE: 111 BPM
WBC # BLD AUTO: 4.6 X10*3/UL (ref 4.4–11.3)

## 2023-11-26 PROCEDURE — 82947 ASSAY GLUCOSE BLOOD QUANT: CPT

## 2023-11-26 PROCEDURE — 93005 ELECTROCARDIOGRAM TRACING: CPT

## 2023-11-26 PROCEDURE — 2500000004 HC RX 250 GENERAL PHARMACY W/ HCPCS (ALT 636 FOR OP/ED): Performed by: INTERNAL MEDICINE

## 2023-11-26 PROCEDURE — 99232 SBSQ HOSP IP/OBS MODERATE 35: CPT | Performed by: INTERNAL MEDICINE

## 2023-11-26 PROCEDURE — 2060000001 HC INTERMEDIATE ICU ROOM DAILY

## 2023-11-26 PROCEDURE — 87075 CULTR BACTERIA EXCEPT BLOOD: CPT | Mod: WESLAB | Performed by: INTERNAL MEDICINE

## 2023-11-26 PROCEDURE — 36415 COLL VENOUS BLD VENIPUNCTURE: CPT | Performed by: INTERNAL MEDICINE

## 2023-11-26 PROCEDURE — 2500000004 HC RX 250 GENERAL PHARMACY W/ HCPCS (ALT 636 FOR OP/ED): Performed by: NURSE PRACTITIONER

## 2023-11-26 PROCEDURE — 85025 COMPLETE CBC W/AUTO DIFF WBC: CPT | Performed by: INTERNAL MEDICINE

## 2023-11-26 PROCEDURE — 96372 THER/PROPH/DIAG INJ SC/IM: CPT | Performed by: INTERNAL MEDICINE

## 2023-11-26 PROCEDURE — 2500000001 HC RX 250 WO HCPCS SELF ADMINISTERED DRUGS (ALT 637 FOR MEDICARE OP): Performed by: INTERNAL MEDICINE

## 2023-11-26 PROCEDURE — 80048 BASIC METABOLIC PNL TOTAL CA: CPT | Performed by: INTERNAL MEDICINE

## 2023-11-26 PROCEDURE — 2500000002 HC RX 250 W HCPCS SELF ADMINISTERED DRUGS (ALT 637 FOR MEDICARE OP, ALT 636 FOR OP/ED): Performed by: INTERNAL MEDICINE

## 2023-11-26 RX ORDER — AMLODIPINE BESYLATE 5 MG/1
5 TABLET ORAL DAILY
Status: DISCONTINUED | OUTPATIENT
Start: 2023-11-27 | End: 2023-11-30 | Stop reason: HOSPADM

## 2023-11-26 RX ADMIN — GABAPENTIN 100 MG: 100 CAPSULE ORAL at 20:15

## 2023-11-26 RX ADMIN — LEVETIRACETAM 250 MG: 250 TABLET, FILM COATED ORAL at 20:16

## 2023-11-26 RX ADMIN — INSULIN LISPRO 3 UNITS: 100 INJECTION, SOLUTION INTRAVENOUS; SUBCUTANEOUS at 17:11

## 2023-11-26 RX ADMIN — ASPIRIN 162 MG: 81 TABLET, COATED ORAL at 09:23

## 2023-11-26 RX ADMIN — PANTOPRAZOLE SODIUM 40 MG: 40 TABLET, DELAYED RELEASE ORAL at 06:18

## 2023-11-26 RX ADMIN — Medication 1 G: at 13:43

## 2023-11-26 RX ADMIN — FERROUS SULFATE TAB 325 MG (65 MG ELEMENTAL FE) 1 TABLET: 325 (65 FE) TAB at 09:23

## 2023-11-26 RX ADMIN — TAMSULOSIN HYDROCHLORIDE 0.4 MG: 0.4 CAPSULE ORAL at 20:17

## 2023-11-26 RX ADMIN — INSULIN LISPRO 3 UNITS: 100 INJECTION, SOLUTION INTRAVENOUS; SUBCUTANEOUS at 09:23

## 2023-11-26 RX ADMIN — CLOPIDOGREL BISULFATE 75 MG: 75 TABLET ORAL at 09:22

## 2023-11-26 RX ADMIN — AMLODIPINE BESYLATE 10 MG: 10 TABLET ORAL at 09:23

## 2023-11-26 RX ADMIN — INSULIN GLARGINE 10 UNITS: 100 INJECTION, SOLUTION SUBCUTANEOUS at 21:29

## 2023-11-26 RX ADMIN — ENOXAPARIN SODIUM 40 MG: 100 INJECTION SUBCUTANEOUS at 09:23

## 2023-11-26 RX ADMIN — ATORVASTATIN CALCIUM 40 MG: 40 TABLET, FILM COATED ORAL at 09:22

## 2023-11-26 RX ADMIN — GABAPENTIN 100 MG: 100 CAPSULE ORAL at 09:22

## 2023-11-26 RX ADMIN — DOCUSATE SODIUM 100 MG: 100 CAPSULE, LIQUID FILLED ORAL at 20:15

## 2023-11-26 RX ADMIN — POLYETHYLENE GLYCOL 3350 17 G: 17 POWDER, FOR SOLUTION ORAL at 09:22

## 2023-11-26 RX ADMIN — TAMSULOSIN HYDROCHLORIDE 0.4 MG: 0.4 CAPSULE ORAL at 09:23

## 2023-11-26 RX ADMIN — FINASTERIDE 5 MG: 5 TABLET, FILM COATED ORAL at 09:23

## 2023-11-26 RX ADMIN — LISINOPRIL 20 MG: 20 TABLET ORAL at 09:00

## 2023-11-26 RX ADMIN — Medication 1 G: at 06:17

## 2023-11-26 RX ADMIN — LEVETIRACETAM 250 MG: 250 TABLET, FILM COATED ORAL at 09:23

## 2023-11-26 RX ADMIN — Medication 1 G: at 21:34

## 2023-11-26 RX ADMIN — DOCUSATE SODIUM 100 MG: 100 CAPSULE, LIQUID FILLED ORAL at 09:22

## 2023-11-26 RX ADMIN — INSULIN LISPRO 6 UNITS: 100 INJECTION, SOLUTION INTRAVENOUS; SUBCUTANEOUS at 13:36

## 2023-11-26 RX ADMIN — POLYETHYLENE GLYCOL 3350 17 G: 17 POWDER, FOR SOLUTION ORAL at 20:16

## 2023-11-26 ASSESSMENT — COGNITIVE AND FUNCTIONAL STATUS - GENERAL
DRESSING REGULAR LOWER BODY CLOTHING: A LOT
MOVING TO AND FROM BED TO CHAIR: A LITTLE
WALKING IN HOSPITAL ROOM: A LITTLE
TOILETING: A LOT
EATING MEALS: A LOT
TOILETING: A LOT
CLIMB 3 TO 5 STEPS WITH RAILING: A LOT
PERSONAL GROOMING: A LOT
STANDING UP FROM CHAIR USING ARMS: A LITTLE
HELP NEEDED FOR BATHING: A LOT
DRESSING REGULAR UPPER BODY CLOTHING: A LOT
STANDING UP FROM CHAIR USING ARMS: A LITTLE
CLIMB 3 TO 5 STEPS WITH RAILING: A LOT
WALKING IN HOSPITAL ROOM: A LITTLE
MOVING TO AND FROM BED TO CHAIR: A LOT
MOBILITY SCORE: 14
DAILY ACTIVITIY SCORE: 12
HELP NEEDED FOR BATHING: A LOT
EATING MEALS: A LOT
DAILY ACTIVITIY SCORE: 12
DRESSING REGULAR LOWER BODY CLOTHING: A LOT
TURNING FROM BACK TO SIDE WHILE IN FLAT BAD: A LITTLE
TURNING FROM BACK TO SIDE WHILE IN FLAT BAD: A LOT
MOBILITY SCORE: 17
PERSONAL GROOMING: A LOT
DRESSING REGULAR UPPER BODY CLOTHING: A LOT
MOVING FROM LYING ON BACK TO SITTING ON SIDE OF FLAT BED WITH BEDRAILS: A LITTLE
MOVING FROM LYING ON BACK TO SITTING ON SIDE OF FLAT BED WITH BEDRAILS: A LOT

## 2023-11-26 ASSESSMENT — PAIN SCALES - GENERAL
PAINLEVEL_OUTOF10: 0 - NO PAIN
PAINLEVEL_OUTOF10: 0 - NO PAIN

## 2023-11-26 ASSESSMENT — PAIN SCALES - WONG BAKER: WONGBAKER_NUMERICALRESPONSE: NO HURT

## 2023-11-26 ASSESSMENT — PAIN - FUNCTIONAL ASSESSMENT: PAIN_FUNCTIONAL_ASSESSMENT: 0-10

## 2023-11-26 NOTE — PROGRESS NOTES
Luis Greene is a 83 y.o. male on day 2 of admission presenting with Sepsis (CMS/HCC).    Subjective   Patient was seen and examined.  Patient is lying in the bed.  Comfortable.  Patient denies any headache or dizziness.  No nausea or vomiting.  No diarrhea, dysuria, hematuria or frequency.  Is more alert as compared to yesterday.    Objective     Physical Exam  HEENT:  Head externally atraumatic, no pallor, no icterus, extraocular movements intact, pupils reacting to light, oral mucosa moist and throat clear.  Neck:  Supple, no JVP, no palpable adenopathy or thyromegaly.  No carotid bruit.  Chest:  Clear to auscultation and resonant.  Heart:  Regular rate and rhythm, no murmur or gallop could be appreciated.  Abdomen:  Soft, nontender, bowel sounds present, normoactive, no palpable hepatosplenomegaly.  Extremities:  No edema, pulses present, no cyanosis or clubbing.  CNS:  Patient alert, oriented to time, place and person.  Power 5/5 all over and deep tendon reflexes symmetrical, cranial nerves 2-12 grossly intact.  Skin:  No active rash.  Musculoskeletal:  No joint swelling or erythema, range of movement normal.  Last Recorded Vitals  Heart Rate:  [66-85]   Temp:  [36.2 °C (97.2 °F)-38 °C (100.4 °F)]   Resp:  [16-18]   BP: (118-202)/(42-73)   SpO2:  [94 %-99 %]     Intake/Output last 3 Shifts:  I/O last 3 completed shifts:  In: 2085.3 (27 mL/kg) [I.V.:2085.3 (27 mL/kg)]  Out: 2150 (27.9 mL/kg) [Urine:2150 (0.8 mL/kg/hr)]  Weight: 77.2 kg     Relevant Results  Susceptibility data from last 90 days.  Collected Specimen Info Organism Amoxicillin/Clavulanate Ampicillin Ampicillin/Sulbactam Aztreonam Cefazolin Cefazolin (uncomplicated UTIs only) Cefepime Cefotaxime Ceftazidime Ceftriaxone Ciprofloxacin Ertapenem   11/24/23 Urine from Clean Catch/Voided Escherichia coli S R S R R R R R R R R S   11/24/23 Blood culture from Peripheral Venipuncture Escherichia coli S R S R R  R R R R R S   11/24/23 Blood culture from  Peripheral Venipuncture Escherichia coli               10/17/23 Urine from Clean Catch/Voided Aerococcus urinae          S S      Collected Specimen Info Organism ESBL Gentamicin Levofloxacin Meropenem Moxifloxacin Nitrofurantoin Penicillin Piperacillin/Tazobactam Tetracycline Trimethoprim/Sulfamethoxazole Vancomycin   11/24/23 Urine from Clean Catch/Voided Escherichia coli R S  S  S  S  R    11/24/23 Blood culture from Peripheral Venipuncture Escherichia coli  S R S R   S  R    11/24/23 Blood culture from Peripheral Venipuncture Escherichia coli              10/17/23 Urine from Clean Catch/Voided Aerococcus urinae   S    S  S  S     Results for orders placed or performed during the hospital encounter of 11/24/23 (from the past 24 hour(s))   POCT GLUCOSE   Result Value Ref Range    POCT Glucose 307 (H) 74 - 99 mg/dL   POCT GLUCOSE   Result Value Ref Range    POCT Glucose 380 (H) 74 - 99 mg/dL   POCT GLUCOSE   Result Value Ref Range    POCT Glucose 186 (H) 74 - 99 mg/dL   CBC and Auto Differential   Result Value Ref Range    WBC 4.6 4.4 - 11.3 x10*3/uL    nRBC 0.0 0.0 - 0.0 /100 WBCs    RBC 3.18 (L) 4.50 - 5.90 x10*6/uL    Hemoglobin 11.0 (L) 13.5 - 17.5 g/dL    Hematocrit 33.0 (L) 41.0 - 52.0 %     (H) 80 - 100 fL    MCH 34.6 (H) 26.0 - 34.0 pg    MCHC 33.3 32.0 - 36.0 g/dL    RDW 13.0 11.5 - 14.5 %    Platelets 147 (L) 150 - 450 x10*3/uL    Neutrophils % 65.7 40.0 - 80.0 %    Immature Granulocytes %, Automated 1.1 (H) 0.0 - 0.9 %    Lymphocytes % 17.5 13.0 - 44.0 %    Monocytes % 14.5 2.0 - 10.0 %    Eosinophils % 0.6 0.0 - 6.0 %    Basophils % 0.6 0.0 - 2.0 %    Neutrophils Absolute 3.03 1.60 - 5.50 x10*3/uL    Immature Granulocytes Absolute, Automated 0.05 0.00 - 0.50 x10*3/uL    Lymphocytes Absolute 0.81 0.80 - 3.00 x10*3/uL    Monocytes Absolute 0.67 0.05 - 0.80 x10*3/uL    Eosinophils Absolute 0.03 0.00 - 0.40 x10*3/uL    Basophils Absolute 0.03 0.00 - 0.10 x10*3/uL   Basic Metabolic Panel   Result  Value Ref Range    Glucose 152 (H) 65 - 99 mg/dL    Sodium 135 133 - 145 mmol/L    Potassium 4.1 3.4 - 5.1 mmol/L    Chloride 102 97 - 107 mmol/L    Bicarbonate 21 (L) 24 - 31 mmol/L    Urea Nitrogen 31 (H) 8 - 25 mg/dL    Creatinine 1.10 0.40 - 1.60 mg/dL    eGFR 67 >60 mL/min/1.73m*2    Calcium 8.8 8.5 - 10.4 mg/dL    Anion Gap 12 <=19 mmol/L   POCT GLUCOSE   Result Value Ref Range    POCT Glucose 155 (H) 74 - 99 mg/dL   POCT GLUCOSE   Result Value Ref Range    POCT Glucose 195 (H) 74 - 99 mg/dL        Current Facility-Administered Medications:     [DISCONTINUED] acetaminophen (Tylenol) tablet 650 mg, 650 mg, oral, q4h PRN **OR** acetaminophen (Tylenol) oral liquid 650 mg, 650 mg, oral, q4h PRN **OR** acetaminophen (Tylenol) suppository 650 mg, 650 mg, rectal, q4h PRN, Pete Oneill MD, 650 mg at 11/24/23 2155    acetaminophen (Tylenol) tablet 650 mg, 650 mg, oral, q6h PRN, Martina Haynes, APRN-CNP, 650 mg at 11/25/23 1951    [START ON 11/27/2023] amLODIPine (Norvasc) tablet 5 mg, 5 mg, oral, Daily, Luis Grant MD    aspirin EC tablet 162 mg, 162 mg, oral, Daily, Pete Oneill MD, 162 mg at 11/26/23 0923    atorvastatin (Lipitor) tablet 40 mg, 40 mg, oral, Daily, Pete Oneill MD, 40 mg at 11/26/23 0922    benzocaine-menthol (Cepastat Sore Throat) 15-3.6 mg lozenge 1 lozenge, 1 lozenge, Mouth/Throat, q2h PRN, Pete Oneill MD    clopidogrel (Plavix) tablet 75 mg, 75 mg, oral, Daily, Pete Oneill MD, 75 mg at 11/26/23 0922    dextromethorphan-guaifenesin (Robitussin DM)  mg/5 mL oral liquid 5 mL, 5 mL, oral, q4h PRN, Pete Oneill MD    dextrose 10 % in water (D10W) infusion, 0.3 g/kg/hr, intravenous, Once PRN, Pete Oneill MD    dextrose 50 % injection 25 g, 25 g, intravenous, q15 min PRN, Pete Oneill MD    docusate sodium (Colace) capsule 100 mg, 100 mg, oral, BID, Pete Oneill MD, 100 mg at 11/26/23 0922    enoxaparin (Lovenox) syringe 40  mg, 40 mg, subcutaneous, q24h TAYLOR, Pete Oneill MD, 40 mg at 11/26/23 0923    ferrous sulfate (325 mg ferrous sulfate) tablet 1 tablet, 65 mg of iron, oral, Daily, Pete Oneill MD, 1 tablet at 11/26/23 0923    finasteride (Proscar) tablet 5 mg, 5 mg, oral, Daily, Pete Oneill MD, 5 mg at 11/26/23 0923    gabapentin (Neurontin) capsule 100 mg, 100 mg, oral, BID, Pete Oneill MD, 100 mg at 11/26/23 0922    glucagon (Glucagen) injection 1 mg, 1 mg, intramuscular, q15 min PRN, Pete Oneill MD    guaiFENesin (Mucinex) 12 hr tablet 600 mg, 600 mg, oral, q12h PRN, Pete Oneill MD, 600 mg at 11/25/23 2056    insulin glargine (Lantus) injection 10 Units, 10 Units, subcutaneous, Nightly, Pete Oneill MD, 10 Units at 11/25/23 2103    insulin lispro (HumaLOG) injection 0-15 Units, 0-15 Units, subcutaneous, TID with meals, Pete Oneill MD, 6 Units at 11/26/23 1336    levETIRAcetam (Keppra) tablet 250 mg, 250 mg, oral, BID, Pete Oneill MD, 250 mg at 11/26/23 0923    lisinopril tablet 20 mg, 20 mg, oral, Daily, Pete Oneill MD, 20 mg at 11/26/23 0900    meropenem (Merrem) in sodium chloride 0.9 % 100 mL IV 1 g, 1 g, intravenous, q8h, Martina Haynes APRN-CNP, Stopped at 11/26/23 1413    ondansetron ODT (Zofran-ODT) disintegrating tablet 4 mg, 4 mg, oral, q8h PRN **OR** ondansetron (Zofran) injection 4 mg, 4 mg, intravenous, q8h PRN, Pete Oneill MD    pantoprazole (ProtoNix) EC tablet 40 mg, 40 mg, oral, Daily before breakfast, Pete Oneill MD, 40 mg at 11/26/23 0618    polyethylene glycol (Glycolax, Miralax) packet 17 g, 17 g, oral, BID, Pete Oneill MD, 17 g at 11/26/23 0922    polyethylene glycol (Glycolax, Miralax) packet 17 g, 17 g, oral, Daily PRN, Pete Oneill MD    sodium chloride 0.9% infusion, 100 mL/hr, intravenous, Continuous, Martina Haynes, APRN-CNP, Last Rate: 100 mL/hr at 11/25/23 1417, 100 mL/hr at 11/25/23  1417    tamsulosin (Flomax) 24 hr capsule 0.4 mg, 0.4 mg, oral, BID, Pete Oneill MD, 0.4 mg at 11/26/23 0963   Assessment/Plan   Principal Problem:    Sepsis (CMS/[facility])  Active Problems:    Sepsis, due to unspecified organism, unspecified whether acute organ dysfunction present (CMS/[facility])  UTI  Hypertension  Hyperlipidemia  Paroxysmal atrial fibrillation status post Watchman procedure    Plan: Continue current medication.  Supportive care.  Physical therapy and Occupational Therapy to monitor CBC and BMP.  Continue antibiotics.  We will continue to golf-hole, aspiration decubitus pressure.  Discharge been discussed with patient and agreeable to it.      Pete Oneill MD

## 2023-11-26 NOTE — NURSING NOTE
Assumed care of patient.Repositioned.  Report done, Daughter at bedside Assessed and charted, Denies pain or discomfort. Assessment as charted. 2151- Complained of dry ,moist cough, Mucinex 1 tablet Po given as ordered. Will monitor.

## 2023-11-26 NOTE — CARE PLAN
Problem: Pain  Goal: My pain/discomfort is manageable  Outcome: Progressing     Problem: Safety  Goal: Patient will be injury free during hospitalization  Outcome: Progressing  Goal: I will remain free of falls  Outcome: Progressing     Problem: Daily Care  Goal: Daily care needs are met  Outcome: Progressing     Problem: Psychosocial Needs  Goal: Demonstrates ability to cope with hospitalization/illness  Outcome: Progressing  Goal: Collaborate with me, my family, and caregiver to identify my specific goals  Outcome: Progressing     Problem: Discharge Barriers  Goal: My discharge needs are met  Outcome: Progressing     Problem: Skin  Goal: Decreased wound size/increased tissue granulation at next dressing change  Outcome: Progressing  Goal: Participates in plan/prevention/treatment measures  Outcome: Progressing  Goal: Prevent/manage excess moisture  Outcome: Progressing  Goal: Prevent/minimize sheer/friction injuries  Outcome: Progressing  Goal: Promote/optimize nutrition  Outcome: Progressing   The patient's goals for the shift include      The clinical goals for the shift include ebrile    Over the shift, the patient did not make progress toward the following goals. Barriers to progression include . Recommendations to address these barriers include .

## 2023-11-26 NOTE — PROGRESS NOTES
Subjective Data:  Patient currently without complaints resting comfortably visiting with family member.    Overnight Events:    As described below.     Objective Data:  Last Recorded Vitals:  Vitals:    11/25/23 1937 11/26/23 0002 11/26/23 0353 11/26/23 0908   BP: (!) 202/54 (!) 118/42 153/65 149/72   BP Location: Right arm Left arm Left arm Left arm   Patient Position: Lying Lying Lying Lying   Pulse:  66 80 82   Resp: 18 16 16 18   Temp: 38 °C (100.4 °F) 36.6 °C (97.9 °F) 36.2 °C (97.2 °F) 37 °C (98.6 °F)   TempSrc: Oral Temporal Temporal Oral   SpO2:  94% 99% 98%   Weight:       Height:           Last Labs:  CBC - 11/26/2023:  5:11 AM  4.6 11.0 147    33.0      CMP - 11/26/2023:  5:11 AM  8.8 5.7 59 --- 0.9   _ 3.0 20 56      PTT - 11/10/2023:  4:59 PM  1.2   12.3 22.2     HGBA1C   Date/Time Value Ref Range Status   11/15/2023 11:08 AM 6.7 See below % Final   09/14/2023 12:14 PM 6.8 % Final     Comment:          Diagnosis of Diabetes-Adults   Non-Diabetic: < or = 5.6%   Increased risk for developing diabetes: 5.7-6.4%   Diagnostic of diabetes: > or = 6.5%  .       Monitoring of Diabetes                Age (y)     Therapeutic Goal (%)   Adults:          >18           <7.0   Pediatrics:    13-18           <7.5                   7-12           <8.0                   0- 6            7.5-8.5   American Diabetes Association. Diabetes Care 33(S1), Jan 2010.   08/14/2023 02:45 PM 8.1 4.0 - 6.0 % Final     Comment:     Hemoglobin A1C levels are related to mean blood glucose during the   preceding 2-3 months. The relationship table below may be used as a   general guide. Each 1% increase in HGB A1C is a reflection of an   increase in mean glucose of approximately 30 mg/dl.   Reference: Diabetes Care, volume 29, supplement 1 Jan. 2006                        HGB A1C ................. Approx. Mean Glucose   _______________________________________________   6%   ...............................  120 mg/dl   7%    "...............................  150 mg/dl   8%   ...............................  180 mg/dl   9%   ...............................  210 mg/dl   10%  ...............................  240 mg/dl  Performed at [address] [address]     04/19/2023 04:03 PM 9.7 4.0 - 5.6 % Final   02/03/2023 09:12 AM 9.6 4.0 - 5.6 % Final     LDLCALC   Date/Time Value Ref Range Status   11/15/2023 11:08 AM 30 65 - 130 mg/dL Final   08/15/2023 05:50 AM 31 65 - 130 MG/DL Final   10/28/2022 05:39 AM 32 65 - 130 MG/DL Final   07/06/2021 09:50 AM 39 65 - 130 MG/DL Final     VLDL   Date/Time Value Ref Range Status   08/22/2022 10:17 AM 11 0 - 40 mg/dL Final   09/17/2019 01:20 PM 16 0 - 40 mg/dL Final      Last I/O:  I/O last 3 completed shifts:  In: 2085.3 (27 mL/kg) [I.V.:2085.3 (27 mL/kg)]  Out: 2150 (27.9 mL/kg) [Urine:2150 (0.8 mL/kg/hr)]  Weight: 77.2 kg     Past Cardiology Tests (Last 3 Years):  EKG:  ECG 12 lead daily 11/17/2023      ECG 12 lead daily 11/15/2023      ECG 12 lead daily 11/15/2023      ECG 12 lead 11/15/2023    Echo:  Transthoracic Echo (TTE) Limited 11/10/2023      Transthoracic Echo (TTE) Limited 11/10/2023    Ejection Fractions:  No results found for: \"EF\"  Cath:  No results found for this or any previous visit from the past 1095 days.    Stress Test:  Nuclear Stress Test 2/13/2023    Cardiac Imaging:  No results found for this or any previous visit from the past 1095 days.      Inpatient Medications:  Scheduled medications   Medication Dose Route Frequency    amLODIPine  10 mg oral Daily    aspirin  162 mg oral Daily    atorvastatin  40 mg oral Daily    clopidogrel  75 mg oral Daily    docusate sodium  100 mg oral BID    enoxaparin  40 mg subcutaneous q24h TAYLOR    ferrous sulfate (325 mg ferrous sulfate)  65 mg of iron oral Daily    finasteride  5 mg oral Daily    gabapentin  100 mg oral BID    insulin glargine  10 Units subcutaneous Nightly    insulin lispro  0-15 Units subcutaneous TID with " meals    levETIRAcetam  250 mg oral BID    lisinopril  20 mg oral Daily    meropenem  1 g intravenous q8h    pantoprazole  40 mg oral Daily before breakfast    polyethylene glycol  17 g oral BID    tamsulosin  0.4 mg oral BID     PRN medications   Medication    acetaminophen    Or    acetaminophen    acetaminophen    benzocaine-menthol    dextromethorphan-guaifenesin    dextrose 10 % in water (D10W)    dextrose    glucagon    guaiFENesin    ondansetron ODT    Or    ondansetron    polyethylene glycol     Continuous Medications   Medication Dose Last Rate    sodium chloride 0.9%  100 mL/hr 100 mL/hr (11/25/23 1417)       Physical Exam:  General: alert, oriented x2-3, very pleasant; son at bedside  HEENT: normal cephalic, atraumatic  Neck: No JVD, bruit or thrill, masses or tenderness   Heart: S1/S2, Rate 50, Rhythm irregular, no s3 or s4, no murmur, thrill, or heaves at PMI.   Lungs: Clear, equal expansion and excursion, no wheezes, crackles, rhales or rhonci.  Room air.  No conversational dyspnea appreciated.  No tachypnea.  No pain with deep aspiration   Abdomen: Overweight, bowel sounds x 4, soft, non-tender   Genitourinary: deferred   Extremities: No significant upper or lower extremity daylin appreciated.     Assessment/Plan   11/25: This elderly white male with an extensive past medical history including coronary artery disease, remote CABG, hypertension, hyperlipidemia, type 2 diabetes, paroxysmal atrial fibrillation, history of?  TIA versus recurrent localized focal seizures, history of GI bleeding on Eliquis anticoagulation, status post Watchman implantation is currently admitted with change in mental status with a 2-day period of low-grade fever somnolence confusion inability to eat ultimately identified on presentation as being a probable urinary tract infection with urosepsis from his chronic indwelling Tiwari catheter.  His renal parameters are stable with a creatinine of 1.2.  No leukocytosis currently with  a WBC of only 11,700 on admission.  Initial urine culture is positive for 100,000 gram-negative rods as are 2 sets of blood cultures now identified as E. coli.  Patient has improved rapidly clinically following institution of IV antibiotic therapy with IV meropenem.  He will be maintained on his aspirin and Plavix as per watchman protocol.  Patient's preadmission lisinopril on hold to avoid hypotension related to urosepsis.  His atrial fibrillation has an adequate ventricular rate control in the absence of previously prescribed metoprolol.     11/26: The patient evidently developed a low-grade fever early yesterday evening 100.6 °F associated with a transient increase in blood pressure and heart rate.  He was given Tylenol and currently is afebrile.  He is lab values continuing to improve WBC now only 4600 hematocrit 33.0.  Renal renal parameters are at baseline with a creatinine of 1.10.  2 additional blood cultures were drawn earlier today with the urine and blood cultures done on 1124 been positive for E. coli.  The patient has been restarted on amlodipine and lisinopril for resumption of treatment for his systolic hypertension.  He does remain on aspirin and Plavix for his recently placed Watchman device.  He also remains on the Keppra 250 mg twice daily for suspected underlying seizure disorder.  Peripheral IV 11/24/23 20 G Right Antecubital (Active)   Site Assessment Clean;Dry;Intact 11/26/23 0800   Dressing Status Dry;Clean 11/26/23 0800   Number of days: 2       Peripheral IV 11/24/23 20 G Left;Anterior Hand (Active)   Site Assessment Intact;Dry;Clean 11/26/23 0800   Dressing Status Clean;Dry 11/26/23 0800   Number of days: 2       Urethral Catheter (Active)   Site Assessment Clean;Skin intact 11/26/23 0908   Number of days: 16       Code Status:  Full Code    I spent 20 minutes in the professional and overall care of this patient.        Luis Grant MD

## 2023-11-26 NOTE — PROGRESS NOTES
Luis Greene is a 83 y.o. male on day 1 of admission presenting with Sepsis (CMS/HCC).    Subjective   Patient was seen and examined.  Lying in the bed.  Comfortable.  No acute distress.  Patient denies any headache or dizziness.  No nausea or vomiting.  Patient is slightly better looking today as compared to yesterday.  Blood pressure running high.  Daughter is sitting in bedside       Objective     Physical Exam  HEENT:  Head externally atraumatic, no pallor, no icterus, extraocular movements intact, pupils reacting to light, oral mucosa moist and throat clear.  Neck:  Supple, no JVP, no palpable adenopathy or thyromegaly.  No carotid bruit.  Chest:  Clear to auscultation and resonant.  Heart:  Regular rate and rhythm, no murmur or gallop could be appreciated.  Abdomen:  Soft, nontender, bowel sounds present, normoactive, no palpable hepatosplenomegaly.  Extremities:  No edema, pulses present, no cyanosis or clubbing.  CNS:  Patient alert, oriented to time, place and person.  Power 5/5 all over and deep tendon reflexes symmetrical, cranial nerves 2-12 grossly intact.  Skin:  No active rash.  Musculoskeletal:  No joint swelling or erythema, range of movement normal.  Last Recorded Vitals  Heart Rate:  [85-96]   Temp:  [36.6 °C (97.9 °F)-38 °C (100.4 °F)]   Resp:  [17-18]   BP: (115-202)/(42-76)   SpO2:  [93 %-99 %]     Intake/Output last 3 Shifts:  I/O last 3 completed shifts:  In: 3608.6 (46.7 mL/kg) [I.V.:2408.6 (31.2 mL/kg); IV Piggyback:1200]  Out: 1800 (23.3 mL/kg) [Urine:1800 (0.6 mL/kg/hr)]  Weight: 77.2 kg     Relevant Results  Susceptibility data from last 90 days.  Collected Specimen Info Organism Ceftriaxone Ciprofloxacin Levofloxacin Penicillin Tetracycline Vancomycin   11/24/23 Urine from Clean Catch/Voided Enteric bacilli         11/24/23 Blood culture from Peripheral Venipuncture Escherichia coli         11/24/23 Blood culture from Peripheral Venipuncture Escherichia coli         10/17/23 Urine  from Clean Catch/Voided Aerococcus urinae S S S S S S     Results for orders placed or performed during the hospital encounter of 11/24/23 (from the past 24 hour(s))   Comprehensive metabolic panel   Result Value Ref Range    Glucose 159 (H) 65 - 99 mg/dL    Sodium 137 133 - 145 mmol/L    Potassium 4.3 3.4 - 5.1 mmol/L    Chloride 105 97 - 107 mmol/L    Bicarbonate 21 (L) 24 - 31 mmol/L    Urea Nitrogen 28 (H) 8 - 25 mg/dL    Creatinine 1.20 0.40 - 1.60 mg/dL    eGFR 60 (L) >60 mL/min/1.73m*2    Calcium 8.9 8.5 - 10.4 mg/dL    Albumin 3.0 (L) 3.5 - 5.0 g/dL    Alkaline Phosphatase 56 35 - 125 U/L    Total Protein 5.7 (L) 5.9 - 7.9 g/dL    AST 59 (H) 5 - 40 U/L    Bilirubin, Total 0.9 0.1 - 1.2 mg/dL    ALT 20 5 - 40 U/L    Anion Gap 11 <=19 mmol/L   CBC   Result Value Ref Range    WBC 7.7 4.4 - 11.3 x10*3/uL    nRBC 0.0 0.0 - 0.0 /100 WBCs    RBC 3.10 (L) 4.50 - 5.90 x10*6/uL    Hemoglobin 10.9 (L) 13.5 - 17.5 g/dL    Hematocrit 31.6 (L) 41.0 - 52.0 %     (H) 80 - 100 fL    MCH 35.2 (H) 26.0 - 34.0 pg    MCHC 34.5 32.0 - 36.0 g/dL    RDW 13.0 11.5 - 14.5 %    Platelets 171 150 - 450 x10*3/uL   POCT GLUCOSE   Result Value Ref Range    POCT Glucose 149 (H) 74 - 99 mg/dL   POCT GLUCOSE   Result Value Ref Range    POCT Glucose 307 (H) 74 - 99 mg/dL        Current Facility-Administered Medications:     [DISCONTINUED] acetaminophen (Tylenol) tablet 650 mg, 650 mg, oral, q4h PRN **OR** acetaminophen (Tylenol) oral liquid 650 mg, 650 mg, oral, q4h PRN **OR** acetaminophen (Tylenol) suppository 650 mg, 650 mg, rectal, q4h PRN, Pete Oneill MD, 650 mg at 11/24/23 2155    acetaminophen (Tylenol) tablet 650 mg, 650 mg, oral, q6h PRN, Martina Haynes, APRN-CNP, 650 mg at 11/25/23 1951    amLODIPine (Norvasc) tablet 10 mg, 10 mg, oral, Daily, Pete Oneill MD    aspirin EC tablet 162 mg, 162 mg, oral, Daily, Pete Oneill MD, 162 mg at 11/25/23 0823    atorvastatin (Lipitor) tablet 40 mg, 40 mg, oral,  Daily, Pete Oneill MD, 40 mg at 11/25/23 0823    benzocaine-menthol (Cepastat Sore Throat) 15-3.6 mg lozenge 1 lozenge, 1 lozenge, Mouth/Throat, q2h PRN, Pete Oneill MD    clopidogrel (Plavix) tablet 75 mg, 75 mg, oral, Daily, Pete Oneill MD, 75 mg at 11/25/23 0823    dextromethorphan-guaifenesin (Robitussin DM)  mg/5 mL oral liquid 5 mL, 5 mL, oral, q4h PRN, Pete Oneill MD    dextrose 10 % in water (D10W) infusion, 0.3 g/kg/hr, intravenous, Once PRN, Pete Oneill MD    dextrose 50 % injection 25 g, 25 g, intravenous, q15 min PRN, Pete Oneill MD    docusate sodium (Colace) capsule 100 mg, 100 mg, oral, BID, Pete Oneill MD, 100 mg at 11/25/23 0823    enoxaparin (Lovenox) syringe 40 mg, 40 mg, subcutaneous, q24h TAYLOR, Pete Oneill MD, 40 mg at 11/25/23 0823    ferrous sulfate (325 mg ferrous sulfate) tablet 1 tablet, 65 mg of iron, oral, Daily, Pete Oneill MD, 1 tablet at 11/25/23 0823    finasteride (Proscar) tablet 5 mg, 5 mg, oral, Daily, Pete Oneill MD, 5 mg at 11/25/23 0823    gabapentin (Neurontin) capsule 100 mg, 100 mg, oral, BID, Pete Oneill MD, 100 mg at 11/25/23 0823    glucagon (Glucagen) injection 1 mg, 1 mg, intramuscular, q15 min PRN, Pete Oneill MD    guaiFENesin (Mucinex) 12 hr tablet 600 mg, 600 mg, oral, q12h PRN, Pete Oneill MD    insulin glargine (Lantus) injection 10 Units, 10 Units, subcutaneous, Nightly, Pete Oneill MD, 10 Units at 11/24/23 2156    insulin lispro (HumaLOG) injection 0-15 Units, 0-15 Units, subcutaneous, TID with meals, Pete Oneill MD, 3 Units at 11/24/23 1706    levETIRAcetam (Keppra) tablet 250 mg, 250 mg, oral, BID, Pete Oneill MD, 250 mg at 11/25/23 0823    lisinopril tablet 20 mg, 20 mg, oral, Daily, Pete Oneill MD    meropenem (Merrem) in sodium chloride 0.9 % 100 mL IV 1 g, 1 g, intravenous, q8h, Martina Haynes, APRN-CNP, Stopped at  11/25/23 1447    ondansetron ODT (Zofran-ODT) disintegrating tablet 4 mg, 4 mg, oral, q8h PRN **OR** ondansetron (Zofran) injection 4 mg, 4 mg, intravenous, q8h PRN, Pete Oneill MD    pantoprazole (ProtoNix) EC tablet 40 mg, 40 mg, oral, Daily before breakfast, Pete Oneill MD, 40 mg at 11/25/23 0823    polyethylene glycol (Glycolax, Miralax) packet 17 g, 17 g, oral, BID, Pete Oneill MD, 17 g at 11/25/23 0822    polyethylene glycol (Glycolax, Miralax) packet 17 g, 17 g, oral, Daily PRN, Pete Oneill MD    sodium chloride 0.9% infusion, 100 mL/hr, intravenous, Continuous, Martina Haynes, APRN-CNP, Last Rate: 100 mL/hr at 11/25/23 1417, 100 mL/hr at 11/25/23 1417    tamsulosin (Flomax) 24 hr capsule 0.4 mg, 0.4 mg, oral, BID, Pete Oneill MD, 0.4 mg at 11/25/23 0823   Assessment/Plan   Principal Problem:    Sepsis (CMS/[facility])  Active Problems:    Sepsis, due to unspecified organism, unspecified whether acute organ dysfunction present (CMS/[facility])  Sepsis  Fever  Leukocytosis  UTI  Bacteremia  BPH  Hyponatremia  Hyperkalemia  Acute renal failure  Altered mental status  Toxic and metabolic encephalopathy  Coronary artery disease status post CABG  Hyperlipidemia  Atrial fibrillation with RVR  History of TIA  Diabetes mellitus type 2    Plan: Continue current medication.  Supportive care.  Physical therapy and Occupational Therapy metathesis and BMP.  Continue IV antibiotics.  Check blood culture and urine culture and change antibiotics accordingly.  Blood pressures running high at this time.  Start patient lisinopril and Norvasc.  Renal function is back to normal.  Blood sugars fairly controlled.  We will take, DVT, fall, aspiration and decubitus precautions.       Pete Oneill MD

## 2023-11-26 NOTE — CONSULTS
He is clinically stable.  Urine and blood cultures are positive for E. Coli sensitive to the Meropenem he is on.  CT shows no significant abnormality.    PLAN:  Continue Tiwari catheter and antibiotics.  Follow-up with Dr. Lozano after discharge

## 2023-11-26 NOTE — NURSING NOTE
Blood sugar==380. Insulin coverage for dinner not given. Talked to Dr. Oneill regarding  his blood sugar and claimed to give schedule insulin coverage- Carried out. Will continue to monitor.

## 2023-11-26 NOTE — PROGRESS NOTES
Patient was seen yesterday for LUZ MARIA and sepsis his renal function is back to normal creatinine 1.1 mg/dL I will sign off please call me if needed

## 2023-11-26 NOTE — NURSING NOTE
Blood Sugar [id number]. Wife called and updated patient condition. Resting comfortably, Call light in reach. Same previous assessment.

## 2023-11-27 ENCOUNTER — APPOINTMENT (OUTPATIENT)
Dept: RADIOLOGY | Facility: HOSPITAL | Age: 83
DRG: 698 | End: 2023-11-27
Payer: MEDICARE

## 2023-11-27 LAB
ANION GAP SERPL CALC-SCNC: 10 MMOL/L
BACTERIA BLD AEROBE CULT: ABNORMAL
BACTERIA BLD AEROBE CULT: ABNORMAL
BACTERIA BLD CULT: ABNORMAL
BACTERIA BLD CULT: ABNORMAL
BUN SERPL-MCNC: 28 MG/DL (ref 8–25)
CALCIUM SERPL-MCNC: 8.2 MG/DL (ref 8.5–10.4)
CHLORIDE SERPL-SCNC: 101 MMOL/L (ref 97–107)
CO2 SERPL-SCNC: 20 MMOL/L (ref 24–31)
CREAT SERPL-MCNC: 0.9 MG/DL (ref 0.4–1.6)
ERYTHROCYTE [DISTWIDTH] IN BLOOD BY AUTOMATED COUNT: 13.1 % (ref 11.5–14.5)
FERRITIN SERPL-MCNC: 485 NG/ML (ref 30–400)
FOLATE SERPL-MCNC: 9.3 NG/ML (ref 4.2–19.9)
GFR SERPL CREATININE-BSD FRML MDRD: 85 ML/MIN/1.73M*2
GLUCOSE BLD MANUAL STRIP-MCNC: 157 MG/DL (ref 74–99)
GLUCOSE BLD MANUAL STRIP-MCNC: 170 MG/DL (ref 74–99)
GLUCOSE BLD MANUAL STRIP-MCNC: 176 MG/DL (ref 74–99)
GLUCOSE BLD MANUAL STRIP-MCNC: 249 MG/DL (ref 74–99)
GLUCOSE BLD MANUAL STRIP-MCNC: 259 MG/DL (ref 74–99)
GLUCOSE SERPL-MCNC: 180 MG/DL (ref 65–99)
GRAM STN SPEC: ABNORMAL
HCT VFR BLD AUTO: 27.9 % (ref 41–52)
HGB BLD-MCNC: 9.5 G/DL (ref 13.5–17.5)
IRON SATN MFR SERPL: 39 % (ref 12–50)
IRON SERPL-MCNC: 62 UG/DL (ref 45–160)
MCH RBC QN AUTO: 34.5 PG (ref 26–34)
MCHC RBC AUTO-ENTMCNC: 34.1 G/DL (ref 32–36)
MCV RBC AUTO: 102 FL (ref 80–100)
NRBC BLD-RTO: 0 /100 WBCS (ref 0–0)
PLATELET # BLD AUTO: 129 X10*3/UL (ref 150–450)
POTASSIUM SERPL-SCNC: 4 MMOL/L (ref 3.4–5.1)
RBC # BLD AUTO: 2.75 X10*6/UL (ref 4.5–5.9)
SODIUM SERPL-SCNC: 131 MMOL/L (ref 133–145)
TIBC SERPL-MCNC: 158 UG/DL (ref 228–428)
UIBC SERPL-MCNC: 96 UG/DL (ref 110–370)
VIT B12 SERPL-MCNC: 814 PG/ML (ref 211–946)
WBC # BLD AUTO: 5.2 X10*3/UL (ref 4.4–11.3)

## 2023-11-27 PROCEDURE — 2500000002 HC RX 250 W HCPCS SELF ADMINISTERED DRUGS (ALT 637 FOR MEDICARE OP, ALT 636 FOR OP/ED): Performed by: INTERNAL MEDICINE

## 2023-11-27 PROCEDURE — 82728 ASSAY OF FERRITIN: CPT | Performed by: NURSE PRACTITIONER

## 2023-11-27 PROCEDURE — 2500000001 HC RX 250 WO HCPCS SELF ADMINISTERED DRUGS (ALT 637 FOR MEDICARE OP): Performed by: NURSE PRACTITIONER

## 2023-11-27 PROCEDURE — 2500000005 HC RX 250 GENERAL PHARMACY W/O HCPCS: Performed by: NURSE PRACTITIONER

## 2023-11-27 PROCEDURE — 2500000004 HC RX 250 GENERAL PHARMACY W/ HCPCS (ALT 636 FOR OP/ED): Performed by: INTERNAL MEDICINE

## 2023-11-27 PROCEDURE — 96372 THER/PROPH/DIAG INJ SC/IM: CPT | Performed by: INTERNAL MEDICINE

## 2023-11-27 PROCEDURE — 36430 TRANSFUSION BLD/BLD COMPNT: CPT

## 2023-11-27 PROCEDURE — 82947 ASSAY GLUCOSE BLOOD QUANT: CPT

## 2023-11-27 PROCEDURE — 2500000001 HC RX 250 WO HCPCS SELF ADMINISTERED DRUGS (ALT 637 FOR MEDICARE OP): Performed by: INTERNAL MEDICINE

## 2023-11-27 PROCEDURE — 2060000001 HC INTERMEDIATE ICU ROOM DAILY

## 2023-11-27 PROCEDURE — 82607 VITAMIN B-12: CPT | Performed by: NURSE PRACTITIONER

## 2023-11-27 PROCEDURE — 36415 COLL VENOUS BLD VENIPUNCTURE: CPT | Performed by: INTERNAL MEDICINE

## 2023-11-27 PROCEDURE — 73502 X-RAY EXAM HIP UNI 2-3 VIEWS: CPT | Mod: RT

## 2023-11-27 PROCEDURE — 2500000004 HC RX 250 GENERAL PHARMACY W/ HCPCS (ALT 636 FOR OP/ED): Performed by: NURSE PRACTITIONER

## 2023-11-27 PROCEDURE — 83540 ASSAY OF IRON: CPT | Performed by: NURSE PRACTITIONER

## 2023-11-27 PROCEDURE — 80048 BASIC METABOLIC PNL TOTAL CA: CPT | Performed by: INTERNAL MEDICINE

## 2023-11-27 PROCEDURE — 85027 COMPLETE CBC AUTOMATED: CPT | Performed by: INTERNAL MEDICINE

## 2023-11-27 PROCEDURE — 97110 THERAPEUTIC EXERCISES: CPT | Mod: GP,CQ

## 2023-11-27 PROCEDURE — 97116 GAIT TRAINING THERAPY: CPT | Mod: GP,CQ

## 2023-11-27 PROCEDURE — 99232 SBSQ HOSP IP/OBS MODERATE 35: CPT | Performed by: INTERNAL MEDICINE

## 2023-11-27 PROCEDURE — 82746 ASSAY OF FOLIC ACID SERUM: CPT | Performed by: NURSE PRACTITIONER

## 2023-11-27 RX ORDER — LIDOCAINE 560 MG/1
1 PATCH PERCUTANEOUS; TOPICAL; TRANSDERMAL DAILY
Status: DISCONTINUED | OUTPATIENT
Start: 2023-11-27 | End: 2023-11-30 | Stop reason: HOSPADM

## 2023-11-27 RX ADMIN — INSULIN LISPRO 3 UNITS: 100 INJECTION, SOLUTION INTRAVENOUS; SUBCUTANEOUS at 17:04

## 2023-11-27 RX ADMIN — LIDOCAINE 1 PATCH: 4 PATCH TOPICAL at 10:01

## 2023-11-27 RX ADMIN — ENOXAPARIN SODIUM 40 MG: 100 INJECTION SUBCUTANEOUS at 09:47

## 2023-11-27 RX ADMIN — ACETAMINOPHEN 650 MG: 500 TABLET ORAL at 09:41

## 2023-11-27 RX ADMIN — POLYETHYLENE GLYCOL 3350 17 G: 17 POWDER, FOR SOLUTION ORAL at 09:44

## 2023-11-27 RX ADMIN — Medication 1 G: at 21:41

## 2023-11-27 RX ADMIN — TAMSULOSIN HYDROCHLORIDE 0.4 MG: 0.4 CAPSULE ORAL at 21:39

## 2023-11-27 RX ADMIN — ATORVASTATIN CALCIUM 40 MG: 40 TABLET, FILM COATED ORAL at 09:45

## 2023-11-27 RX ADMIN — DOCUSATE SODIUM 100 MG: 100 CAPSULE, LIQUID FILLED ORAL at 21:39

## 2023-11-27 RX ADMIN — FERROUS SULFATE TAB 325 MG (65 MG ELEMENTAL FE) 1 TABLET: 325 (65 FE) TAB at 09:47

## 2023-11-27 RX ADMIN — ACETAMINOPHEN 650 MG: 500 TABLET ORAL at 21:47

## 2023-11-27 RX ADMIN — TAMSULOSIN HYDROCHLORIDE 0.4 MG: 0.4 CAPSULE ORAL at 09:41

## 2023-11-27 RX ADMIN — FINASTERIDE 5 MG: 5 TABLET, FILM COATED ORAL at 09:46

## 2023-11-27 RX ADMIN — DOCUSATE SODIUM 100 MG: 100 CAPSULE, LIQUID FILLED ORAL at 09:46

## 2023-11-27 RX ADMIN — PANTOPRAZOLE SODIUM 40 MG: 40 TABLET, DELAYED RELEASE ORAL at 06:49

## 2023-11-27 RX ADMIN — Medication 1 G: at 14:14

## 2023-11-27 RX ADMIN — LEVETIRACETAM 250 MG: 250 TABLET, FILM COATED ORAL at 21:39

## 2023-11-27 RX ADMIN — AMLODIPINE BESYLATE 5 MG: 5 TABLET ORAL at 09:44

## 2023-11-27 RX ADMIN — LEVETIRACETAM 250 MG: 250 TABLET, FILM COATED ORAL at 09:44

## 2023-11-27 RX ADMIN — ASPIRIN 162 MG: 81 TABLET, COATED ORAL at 10:01

## 2023-11-27 RX ADMIN — GABAPENTIN 100 MG: 100 CAPSULE ORAL at 10:01

## 2023-11-27 RX ADMIN — LISINOPRIL 20 MG: 20 TABLET ORAL at 09:41

## 2023-11-27 RX ADMIN — POLYETHYLENE GLYCOL 3350 17 G: 17 POWDER, FOR SOLUTION ORAL at 21:39

## 2023-11-27 RX ADMIN — GABAPENTIN 100 MG: 100 CAPSULE ORAL at 21:39

## 2023-11-27 RX ADMIN — Medication 1 G: at 05:13

## 2023-11-27 RX ADMIN — CLOPIDOGREL BISULFATE 75 MG: 75 TABLET ORAL at 09:46

## 2023-11-27 RX ADMIN — INSULIN LISPRO 6 UNITS: 100 INJECTION, SOLUTION INTRAVENOUS; SUBCUTANEOUS at 13:44

## 2023-11-27 RX ADMIN — INSULIN GLARGINE 10 UNITS: 100 INJECTION, SOLUTION SUBCUTANEOUS at 21:39

## 2023-11-27 ASSESSMENT — COGNITIVE AND FUNCTIONAL STATUS - GENERAL
MOVING TO AND FROM BED TO CHAIR: A LOT
MOBILITY SCORE: 15
CLIMB 3 TO 5 STEPS WITH RAILING: A LOT
STANDING UP FROM CHAIR USING ARMS: A LITTLE
TURNING FROM BACK TO SIDE WHILE IN FLAT BAD: A LITTLE
DRESSING REGULAR UPPER BODY CLOTHING: A LITTLE
TURNING FROM BACK TO SIDE WHILE IN FLAT BAD: A LOT
WALKING IN HOSPITAL ROOM: A LOT
TOILETING: A LITTLE
STANDING UP FROM CHAIR USING ARMS: A LITTLE
MOVING TO AND FROM BED TO CHAIR: A LOT
DRESSING REGULAR LOWER BODY CLOTHING: A LOT
CLIMB 3 TO 5 STEPS WITH RAILING: A LOT
MOVING FROM LYING ON BACK TO SITTING ON SIDE OF FLAT BED WITH BEDRAILS: A LITTLE
DAILY ACTIVITIY SCORE: 17
PERSONAL GROOMING: A LITTLE
HELP NEEDED FOR BATHING: A LOT
MOBILITY SCORE: 16
WALKING IN HOSPITAL ROOM: A LITTLE

## 2023-11-27 ASSESSMENT — PAIN DESCRIPTION - LOCATION: LOCATION: HIP

## 2023-11-27 ASSESSMENT — PAIN DESCRIPTION - ORIENTATION: ORIENTATION: LEFT

## 2023-11-27 ASSESSMENT — PAIN SCALES - GENERAL
PAINLEVEL_OUTOF10: 0 - NO PAIN
PAINLEVEL_OUTOF10: 3
PAINLEVEL_OUTOF10: 0 - NO PAIN
PAINLEVEL_OUTOF10: 0 - NO PAIN

## 2023-11-27 NOTE — PROGRESS NOTES
Subjective Data:  Patient awake alert no new complaints.    Overnight Events:    No significant overnight events.     Objective Data:  Last Recorded Vitals:  Vitals:    11/27/23 0001 11/27/23 0348 11/27/23 0427 11/27/23 0725   BP: 154/62  154/77 151/69   BP Location: Right arm  Right arm Right arm   Patient Position: Lying  Lying Lying   Pulse: 69 68 77 69   Resp: 16 18 18 16   Temp: 36.5 °C (97.7 °F) 36.4 °C (97.5 °F) 36.4 °C (97.5 °F) 36.2 °C (97.2 °F)   TempSrc: Temporal Temporal Temporal Temporal   SpO2: 98%  93% 99%   Weight:       Height:           Last Labs:  CBC - 11/27/2023:  4:41 AM  5.2 9.5 129    27.9      CMP - 11/27/2023:  4:41 AM  8.2 5.7 59 --- 0.9   _ 3.0 20 56      PTT - 11/10/2023:  4:59 PM  1.2   12.3 22.2     HGBA1C   Date/Time Value Ref Range Status   11/15/2023 11:08 AM 6.7 See below % Final   09/14/2023 12:14 PM 6.8 % Final     Comment:          Diagnosis of Diabetes-Adults   Non-Diabetic: < or = 5.6%   Increased risk for developing diabetes: 5.7-6.4%   Diagnostic of diabetes: > or = 6.5%  .       Monitoring of Diabetes                Age (y)     Therapeutic Goal (%)   Adults:          >18           <7.0   Pediatrics:    13-18           <7.5                   7-12           <8.0                   0- 6            7.5-8.5   American Diabetes Association. Diabetes Care 33(S1), Jan 2010.   08/14/2023 02:45 PM 8.1 4.0 - 6.0 % Final     Comment:     Hemoglobin A1C levels are related to mean blood glucose during the   preceding 2-3 months. The relationship table below may be used as a   general guide. Each 1% increase in HGB A1C is a reflection of an   increase in mean glucose of approximately 30 mg/dl.   Reference: Diabetes Care, volume 29, supplement 1 Jan. 2006                        HGB A1C ................. Approx. Mean Glucose   _______________________________________________   6%   ...............................  120 mg/dl   7%   ...............................  150 mg/dl   8%    "...............................  180 mg/dl   9%   ...............................  210 mg/dl   10%  ...............................  240 mg/dl  Performed at [address] [address]     04/19/2023 04:03 PM 9.7 4.0 - 5.6 % Final   02/03/2023 09:12 AM 9.6 4.0 - 5.6 % Final     LDLCALC   Date/Time Value Ref Range Status   11/15/2023 11:08 AM 30 65 - 130 mg/dL Final   08/15/2023 05:50 AM 31 65 - 130 MG/DL Final   10/28/2022 05:39 AM 32 65 - 130 MG/DL Final   07/06/2021 09:50 AM 39 65 - 130 MG/DL Final     VLDL   Date/Time Value Ref Range Status   08/22/2022 10:17 AM 11 0 - 40 mg/dL Final   09/17/2019 01:20 PM 16 0 - 40 mg/dL Final      Last I/O:  I/O last 3 completed shifts:  In: 220 (2.8 mL/kg) [P.O.:120; I.V.:100 (1.3 mL/kg)]  Out: 2450 (31.7 mL/kg) [Urine:2450 (0.9 mL/kg/hr)]  Weight: 77.2 kg     Past Cardiology Tests (Last 3 Years):  EKG:  ECG 12 lead 11/26/2023      ECG 12 lead daily 11/17/2023      ECG 12 lead daily 11/15/2023      ECG 12 lead daily 11/15/2023      ECG 12 lead 11/15/2023    Echo:  Transthoracic Echo (TTE) Limited 11/10/2023      Transthoracic Echo (TTE) Limited 11/10/2023    Ejection Fractions:  No results found for: \"EF\"  Cath:  No results found for this or any previous visit from the past 1095 days.    Stress Test:  Nuclear Stress Test 2/13/2023    Cardiac Imaging:  No results found for this or any previous visit from the past 1095 days.      Inpatient Medications:  Scheduled medications   Medication Dose Route Frequency    amLODIPine  5 mg oral Daily    aspirin  162 mg oral Daily    atorvastatin  40 mg oral Daily    clopidogrel  75 mg oral Daily    docusate sodium  100 mg oral BID    enoxaparin  40 mg subcutaneous q24h TAYLOR    ferrous sulfate (325 mg ferrous sulfate)  65 mg of iron oral Daily    finasteride  5 mg oral Daily    gabapentin  100 mg oral BID    insulin glargine  10 Units subcutaneous Nightly    insulin lispro  0-15 Units subcutaneous TID with meals    " levETIRAcetam  250 mg oral BID    lidocaine  1 patch transdermal Daily    lisinopril  20 mg oral Daily    meropenem  1 g intravenous q8h    pantoprazole  40 mg oral Daily before breakfast    polyethylene glycol  17 g oral BID    tamsulosin  0.4 mg oral BID     PRN medications   Medication    acetaminophen    Or    acetaminophen    acetaminophen    benzocaine-menthol    dextromethorphan-guaifenesin    dextrose 10 % in water (D10W)    dextrose    glucagon    guaiFENesin    ondansetron ODT    Or    ondansetron    polyethylene glycol     Continuous Medications   Medication Dose Last Rate    sodium chloride 0.9%  100 mL/hr 100 mL/hr (11/25/23 1417)       Physical Exam:  General: alert, oriented x2-3, very pleasant; son at bedside  HEENT: normal cephalic, atraumatic  Neck: No JVD, bruit or thrill, masses or tenderness   Heart: S1/S2, Rate 50, Rhythm irregular, no s3 or s4, no murmur, thrill, or heaves at PMI.   Lungs: Clear, equal expansion and excursion, no wheezes, crackles, rhales or rhonci.  Room air.  No conversational dyspnea appreciated.  No tachypnea.  No pain with deep aspiration   Abdomen: Overweight, bowel sounds x 4, soft, non-tender   Genitourinary: deferred   Extremities: No significant upper or lower extremity daylin appreciated.        Assessment/Plan   11/25: This elderly white male with an extensive past medical history including coronary artery disease, remote CABG, hypertension, hyperlipidemia, type 2 diabetes, paroxysmal atrial fibrillation, history of?  TIA versus recurrent localized focal seizures, history of GI bleeding on Eliquis anticoagulation, status post Watchman implantation is currently admitted with change in mental status with a 2-day period of low-grade fever somnolence confusion inability to eat ultimately identified on presentation as being a probable urinary tract infection with urosepsis from his chronic indwelling Tiwari catheter.  His renal parameters are stable with a creatinine of  1.2.  No leukocytosis currently with a WBC of only 11,700 on admission.  Initial urine culture is positive for 100,000 gram-negative rods as are 2 sets of blood cultures now identified as E. coli.  Patient has improved rapidly clinically following institution of IV antibiotic therapy with IV meropenem.  He will be maintained on his aspirin and Plavix as per watchman protocol.  Patient's preadmission lisinopril on hold to avoid hypotension related to urosepsis.  His atrial fibrillation has an adequate ventricular rate control in the absence of previously prescribed metoprolol.      11/26: The patient evidently developed a low-grade fever early yesterday evening 100.6 °F associated with a transient increase in blood pressure and heart rate.  He was given Tylenol and currently is afebrile.  He is lab values continuing to improve WBC now only 4600 hematocrit 33.0.  Renal renal parameters are at baseline with a creatinine of 1.10.  2 additional blood cultures were drawn earlier today with the urine and blood cultures done on 1124 been positive for E. coli.  The patient has been restarted on amlodipine and lisinopril for resumption of treatment for his systolic hypertension.  He does remain on aspirin and Plavix for his recently placed Watchman device.  He also remains on the Keppra 250 mg twice daily for suspected underlying seizure disorder.    11/27: Patient is lying in bed resting comfortably visiting with a family member.  He is awake alert conversant.  His atrial fibrillation has a controlled ventricular rate of approximately 80/min in the absence of any therapy.  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  He has had 5+ episodes of transient neurological symptoms with no evidence ever documented of CVA.  He did have the  Watchman device placed several weeks ago and will continue on with his aspirin and Plavix therapy.  Systolic blood pressures are in the 150 mmHg range with the reinstitution of the lisinopril plus amlodipine.  Patient has been seen by urology recommendation is to change Tiwari catheter.  Anticipate patient will be ready for transfer back to the rehab facility in 48-72 hours.  Electrolytes unremarkable creatinine 0.9 potassium of 4.0.  Hemoglobin is 9.5 currently.  Serum iron is 62 and ferritin 485.  Peripheral IV 11/24/23 20 G Right Antecubital (Active)   Site Assessment Clean;Dry;Intact 11/27/23 0000   Dressing Status Clean;Dry;Occlusive 11/27/23 0000   N       Peripheral IV 11/24/23 20 G Left;Anterior Hand (Active)   Site Assessment Clean;Dry;Intact 11/27/23 0000   Dressing Status Clean;Dry;Occlusive 11/27/23 0000   Number of days: 3       Urethral Catheter (Active)   Site Assessment Clean;Skin intact 11/27/23 0127   Number of days: 17       Code Status:  Full Code    I spent 20 minutes in the professional and overall care of this patient.        Luis Grant MD

## 2023-11-27 NOTE — PROGRESS NOTES
Luis Greene is a 83 y.o. male on day 3 of admission presenting with Sepsis (CMS/HCC).    Patient not medically clear at this time. IV ATB through 11/30 cardiology remains on consult. [facility] can accept pt if bed available once ready for discharge     **Patient does not have safe discharge plan, do not discharge without speaking to care coordination**     Tess Ravi RN

## 2023-11-27 NOTE — PROGRESS NOTES
INFECTIOUS DISEASES PROGRESS NOTE    Consulted / following patient for:  ESBL E. coli urinary tract infection with septicemia    Subjective   Interval History:   Feels well.  Denies complaints.  History reviewed with the patient and his wife, and I have also reviewed the [facility] admission notes from 11/6/23.  He was admitted to the hospital on that occasion with the same organism in his urine and was seen in consultation by [facility], and was treated with a 2-day course of a carbapenem, followed by 2 doses of oral fosfomycin.  He was discharged with the catheter in place.  Neither the patient nor his wife recall any episode of urinary catheter dysfunction or obstruction associated with the events leading up to this admission       Objective   PHYSICAL EXAMINATION  Vital signs:  Visit Vitals  /69 (BP Location: Right arm, Patient Position: Lying)   Pulse 69   Temp 36.2 °C (97.2 °F) (Temporal)   Resp 16      General: Awake, alert, not toxic  Lungs:  Clear to auscultation  Heart:  S1, S2 normal, no pathologic murmur appreciated  Abdomen:  Soft, nontender. No palpable organs or masses  Back:  No spinal or CVA tenderness  Genitalia: Fully catheter in place, draining clear urine  Extremities:  No cords, phlebitis, cellulitis  Neurologic:  Alert.  Grossly non-focal.      Antibiotics:  Meropenem day 4    Relevant Results  WBC: 5200    Results from last 72 hours   Lab Units 11/27/23  0441   CREATININE mg/dL 0.90   ANION GAP mmol/L 10   EGFR mL/min/1.73m*2 85     Results from last 72 hours   Lab Units 11/25/23  0437   AST U/L 59*   ALT U/L 20   ALK PHOS U/L 56   BILIRUBIN TOTAL mg/dL 0.9     Microbiology:  Urine and blood 11/24 growing MDR ESBL-producing E. coli, not susceptible to appropriate oral antimicrobial therapy  Blood 11/26: No growth    Imaging:  CT abdomen and pelvis: No hydronephrosis or obstruction      Assessment:  Sepsis due to ESBL E. coli bacteremia, present on  admission  Catheter-associated ESBL E. coli, present on admission  It is unusual for bacteremic urinary tract infection to occur in the presence of a well-functioning Tiwari catheter.  With negative CT of the abdomen 1 wonders about a transient, unrecognized episode of Tiwari catheter obstruction leading to recurrent retention and subsequent bacteremia.  Patient is doing well now, but will clearly need close urologic follow-up and should be discharged with the functioning Tiwari catheter in place.  I have discussed this in detail today with the patient and his wife, and then I have also spoken with Dr. Daryl Choudhary, who will be following him after this admission     Plan/Recommendations:  Continue IV meropenem through 11/30 at 1200. Monitor for adverse effects, incl rash or diarrhea  Change Tiwari catheter, discharge with it in place  He will follow up with Dr Choudhary    Discussed in detail with Ms. Dallas Edwards MD  [facility]  Office:  [phone]

## 2023-11-27 NOTE — PROGRESS NOTES
Physical Therapy    Physical Therapy Treatment    Patient Name: Luis Greene  MRN: [id number]  Today's Date: 11/27/2023  Time Calculation  Start Time: 1309  Stop Time: 1334  Time Calculation (min): 25 min       Assessment/Plan   PT Assessment  End of Session Communication: Bedside nurse  End of Session Patient Position: Up in chair  PT Plan  Inpatient/Swing Bed or Outpatient: Inpatient  PT Plan  Treatment/Interventions: Bed mobility, Transfer training, Gait training, Stair training, Balance training, Strengthening, Therapeutic exercise, Therapeutic activity  PT Plan: Skilled PT  PT Frequency: 4 times per week  PT Discharge Recommendations: Moderate intensity level of continued care  PT Recommended Transfer Status: Assist x1 (standing/amb with FWW)  PT - OK to Discharge: Yes      General Visit Information:   PT  Visit  PT Received On: 11/27/23  General  Prior to Session Communication: Bedside nurse  Patient Position Received: Bed, 3 rail up  General Comment: Cleared by nursing to be seen for therapy, pt agreeable with tx, supine in bed upon arrival.    Subjective        Objective   Pain:  Pain Assessment  Pain Assessment: 0-10  Pain Score: 0 - No pain       Treatments:  Therapeutic Exercise  Therapeutic Exercise Performed: Yes  Therapeutic Exercise Activity 1: Bilateral ankle pumps x15  Therapeutic Exercise Activity 2: Bilateral hip flexion x15  Therapeutic Exercise Activity 3: Bilateral knee extension x15  Therapeutic Exercise Activity 4: Resisted hip abd/add 15    Therapeutic Activity  Therapeutic Activity Performed: No    Balance/Neuromuscular Re-Education  Balance/Neuromuscular Re-Education Activity Performed: No    Bed Mobility  Bed Mobility: Yes  Bed Mobility 1  Bed Mobility 1: Supine to sitting  Level of Assistance 1: Moderate assistance  Bed Mobility Comments 1: Mod assist for trunk, pt able to bring LE's off EOB with increased time and effort.    Ambulation/Gait Training  Ambulation/Gait Training Performed:  Yes  Ambulation/Gait Training 1  Surface 1: Level tile  Device 1: Rollator  Assistance 1: Minimum assistance  Comments/Distance (ft) 1: 50' with rollator, presents with bilateral foot drop, bilateral flexed knees, decreased bilateral step length, min assist for balance.  Transfers  Transfer: Yes  Transfer 1  Transfer From 1: Sit to  Transfer to 1: Stand  Transfer Level of Assistance 1: Minimum assistance  Trials/Comments 1: Min assist for trunk up during sit to stand, cues for proper hand placement, decreased eccentric control in sitting.    Stairs  Stairs: No    Outcome Measures:  [facility] Basic Mobility  Turning from your back to your side while in a flat bed without using bedrails: A little  Moving from lying on your back to sitting on the side of a flat bed without using bedrails: A lot  Moving to and from bed to chair (including a wheelchair): A lot  Standing up from a chair using your arms (e.g. wheelchair or bedside chair): A little  To walk in hospital room: A little  Climbing 3-5 steps with railing: A lot  Basic Mobility - Total Score: 15         Encounter Problems       Encounter Problems (Active)       Mobility       STG - Patient will ambulate >/= 50 ft with walker & close S. (Progressing)       Start:  11/25/23    Expected End:  12/10/23            STG - Patient will negotiate 3 stairs with unilateral hand rail with or without least restrictive cane at close S level. (Progressing)       Start:  11/25/23    Expected End:  12/10/23               Transfers       STG - Patient to transfer supine<>sit using hospital bed at mod I level. (Progressing)       Start:  11/25/23    Expected End:  12/10/23            STG - Patient will transfer sit <>stand with walker at mod I level. (Progressing)       Start:  11/25/23    Expected End:  12/10/23

## 2023-11-27 NOTE — CARE PLAN
Problem: Pain  Goal: My pain/discomfort is manageable  11/27/2023 0149 by Brenda Jennings RN  Outcome: Progressing  11/27/2023 0148 by Brenda Jennings RN  Outcome: Progressing  11/26/2023 2136 by Brenda Jennings RN  Outcome: Progressing     Problem: Safety  Goal: Patient will be injury free during hospitalization  11/27/2023 0149 by Brenda Jennings, WILY  Outcome: Progressing  11/27/2023 0148 by Brenda Jennings, RN  Outcome: Progressing  11/26/2023 2136 by Brenda Jennings RN  Outcome: Progressing  Goal: I will remain free of falls  11/27/2023 0149 by Brenda Jennings RN  Outcome: Progressing  11/27/2023 0148 by Brenda Jennings RN  Outcome: Progressing  11/26/2023 2136 by Brenda Jennings RN  Outcome: Progressing     Problem: Daily Care  Goal: Daily care needs are met  11/27/2023 0149 by Brenda Jennings RN  Outcome: Progressing  11/27/2023 0148 by Brenda Jennings RN  Outcome: Progressing  11/26/2023 2136 by Brenda Jennings RN  Outcome: Progressing     Problem: Psychosocial Needs  Goal: Demonstrates ability to cope with hospitalization/illness  11/27/2023 0149 by Brenda Jennings, WILY  Outcome: Progressing  11/27/2023 0148 by Brenda Jennings, RN  Outcome: Progressing  11/26/2023 2136 by Brenda Jennings RN  Outcome: Progressing  Goal: Collaborate with me, my family, and caregiver to identify my specific goals  11/27/2023 0149 by Brenda Jennings RN  Outcome: Progressing  11/27/2023 0148 by Brenda Jennings RN  Outcome: Progressing  11/26/2023 2136 by Brenda Jennings RN  Outcome: Progressing     Problem: Discharge Barriers  Goal: My discharge needs are met  11/27/2023 0149 by Brenda Jennings RN  Outcome: Progressing  11/27/2023 0148 by Brenda Jennings RN  Outcome: Progressing  11/26/2023 2136 by Brenda Jennings RN  Outcome: Progressing     Problem: Skin  Goal:  Decreased wound size/increased tissue granulation at next dressing change  11/27/2023 0149 by Brenda Jennings RN  Flowsheets (Taken 11/26/2023 2000)  Decreased wound size/increased tissue granulation at next dressing change:   Promote sleep for wound healing   Utilize specialty bed per algorithm   Protective dressings over bony prominences  11/27/2023 0149 by Brenda Jennings RN  Outcome: Progressing  11/27/2023 0148 by Brenda Jennings RN  Outcome: Progressing  11/26/2023 2136 by Brenda Jennings RN  Outcome: Progressing  Flowsheets (Taken 11/26/2023 2000)  Decreased wound size/increased tissue granulation at next dressing change:   Promote sleep for wound healing   Utilize specialty bed per algorithm   Protective dressings over bony prominences  Goal: Participates in plan/prevention/treatment measures  11/27/2023 0149 by Brenda Jennings RN  Flowsheets (Taken 11/26/2023 2000)  Participates in plan/prevention/treatment measures: Discuss with provider PT/OT consult  11/27/2023 0149 by Brenda Jennings RN  Outcome: Progressing  11/27/2023 0148 by Brenda Jennings RN  Outcome: Progressing  11/26/2023 2136 by Brenda Jennings RN  Outcome: Progressing  Flowsheets (Taken 11/26/2023 2000)  Participates in plan/prevention/treatment measures: Discuss with provider PT/OT consult  Goal: Prevent/manage excess moisture  11/27/2023 0149 by Brenda Jennings RN  Flowsheets (Taken 11/26/2023 2000)  Prevent/manage excess moisture:   Moisturize dry skin   Cleanse incontinence/protect with barrier cream  11/27/2023 0149 by Brenda Jennings RN  Outcome: Progressing  11/27/2023 0148 by Brenda Jennings RN  Outcome: Progressing  11/26/2023 2136 by Brenda Jennings RN  Outcome: Progressing  Flowsheets (Taken 11/26/2023 2000)  Prevent/manage excess moisture:   Moisturize dry skin   Cleanse incontinence/protect with barrier cream  Goal: Prevent/minimize  sheer/friction injuries  11/27/2023 0149 by Brenda Jennings RN  Outcome: Progressing  11/27/2023 0148 by Brenda Jennings RN  Outcome: Progressing  11/26/2023 2136 by Brenda Jennings RN  Outcome: Progressing  Goal: Promote/optimize nutrition  11/27/2023 0149 by Brenda Jennings RN  Flowsheets (Taken 11/26/2023 2000)  Promote/optimize nutrition:   Assist with feeding   Monitor/record intake including meals  11/27/2023 0149 by Brenda Jennings RN  Outcome: Progressing  11/27/2023 0148 by Brenda Jennings RN  Outcome: Progressing  11/26/2023 2136 by Brenda Jennings RN  Outcome: Progressing  Flowsheets (Taken 11/26/2023 2000)  Promote/optimize nutrition:   Assist with feeding   Monitor/record intake including meals   The patient's goals for the shift include      The clinical goals for the shift include Infection free, Patient Safety    Over the shift, the patient did not make progress toward the following goals. Barriers to progression include . Recommendations to address these barriers include .

## 2023-11-27 NOTE — PROGRESS NOTES
Luis Greene is a 83 y.o. male on day 3 of admission presenting with Sepsis (CMS/HCC).    Subjective   Patient seen and examined.  Resting in bed in no acute distress.  Awake alert oriented x 3.  Complains of right hip pain with therapy.  No other complaints    Objective     Physical Exam  Vitals and nursing note reviewed.   Constitutional:       General: He is not in acute distress.     Appearance: Normal appearance. He is normal weight. He is not ill-appearing, toxic-appearing or diaphoretic.   HENT:      Head: Normocephalic and atraumatic.      Right Ear: Tympanic membrane normal.      Left Ear: Tympanic membrane normal.      Nose: Nose normal.      Mouth/Throat:      Mouth: Mucous membranes are moist.      Pharynx: Oropharynx is clear.   Eyes:      Extraocular Movements: Extraocular movements intact.      Conjunctiva/sclera: Conjunctivae normal.      Pupils: Pupils are equal, round, and reactive to light.   Cardiovascular:      Rate and Rhythm: Normal rate. Rhythm irregular.      Pulses: Normal pulses.      Heart sounds: Normal heart sounds.   Pulmonary:      Effort: Pulmonary effort is normal. No respiratory distress.      Breath sounds: Normal breath sounds. No wheezing, rhonchi or rales.   Abdominal:      General: Bowel sounds are normal. There is no distension.      Palpations: Abdomen is soft.      Tenderness: There is no abdominal tenderness.   Genitourinary:     Comments:  Tiwari catheter in place draining clear dark yellow urine. Rectal examination deferred  Musculoskeletal:         General: No swelling. Normal range of motion.      Cervical back: Normal range of motion and neck supple.      Comments: Right hip tenderness   Skin:     General: Skin is warm and dry.      Capillary Refill: Capillary refill takes less than 2 seconds.      Findings: Bruising present.   Neurological:      General: No focal deficit present.      Mental Status: He is alert and oriented to person, place, and time.  "  Psychiatric:         Mood and Affect: Mood normal.         Behavior: Behavior normal.       Last Recorded Vitals  Blood pressure 151/69, pulse 69, temperature 36.2 °C (97.2 °F), temperature source Temporal, resp. rate 16, height 1.803 m (5' 11\"), weight 77.2 kg (170 lb 3.1 oz), SpO2 99 %.    Intake/Output last 3 Shifts:  I/O last 3 completed shifts:  In: 220 (2.8 mL/kg) [P.O.:120; I.V.:100 (1.3 mL/kg)]  Out: 2450 (31.7 mL/kg) [Urine:2450 (0.9 mL/kg/hr)]  Weight: 77.2 kg     Telemetry atrial fibrillation rate 70's    Relevant Results  Results for orders placed or performed during the hospital encounter of 11/24/23 (from the past 24 hour(s))   POCT GLUCOSE   Result Value Ref Range    POCT Glucose 195 (H) 74 - 99 mg/dL   POCT GLUCOSE   Result Value Ref Range    POCT Glucose 214 (H) 74 - 99 mg/dL   ECG 12 lead   Result Value Ref Range    Ventricular Rate 111 BPM    QRS Duration 138 ms    QT Interval 354 ms    QTC Calculation(Bazett) 481 ms    R Axis 107 degrees    T Axis 19 degrees    QRS Count 18 beats    Q Onset 224 ms    T Offset 401 ms    QTC Fredericia 434 ms   POCT GLUCOSE   Result Value Ref Range    POCT Glucose 259 (H) 74 - 99 mg/dL   Basic Metabolic Panel   Result Value Ref Range    Glucose 180 (H) 65 - 99 mg/dL    Sodium 131 (L) 133 - 145 mmol/L    Potassium 4.0 3.4 - 5.1 mmol/L    Chloride 101 97 - 107 mmol/L    Bicarbonate 20 (L) 24 - 31 mmol/L    Urea Nitrogen 28 (H) 8 - 25 mg/dL    Creatinine 0.90 0.40 - 1.60 mg/dL    eGFR 85 >60 mL/min/1.73m*2    Calcium 8.2 (L) 8.5 - 10.4 mg/dL    Anion Gap 10 <=19 mmol/L   CBC   Result Value Ref Range    WBC 5.2 4.4 - 11.3 x10*3/uL    nRBC 0.0 0.0 - 0.0 /100 WBCs    RBC 2.75 (L) 4.50 - 5.90 x10*6/uL    Hemoglobin 9.5 (L) 13.5 - 17.5 g/dL    Hematocrit 27.9 (L) 41.0 - 52.0 %     (H) 80 - 100 fL    MCH 34.5 (H) 26.0 - 34.0 pg    MCHC 34.1 32.0 - 36.0 g/dL    RDW 13.1 11.5 - 14.5 %    Platelets 129 (L) 150 - 450 x10*3/uL   Folate   Result Value Ref Range    " Folate, Serum 9.3 4.2 - 19.9 ng/mL   Vitamin B12   Result Value Ref Range    Vitamin B12 814 211 - 946 pg/mL   Iron and TIBC   Result Value Ref Range    Iron 62 45 - 160 ug/dL    UIBC 96 (L) 110 - 370 ug/dL    TIBC 158 (L) 228 - 428 ug/dL    % Saturation 39 12 - 50 %   Ferritin   Result Value Ref Range    Ferritin 485 (H) 30 - 400 ng/mL   POCT GLUCOSE   Result Value Ref Range    POCT Glucose 157 (H) 74 - 99 mg/dL     Susceptibility data from last 90 days.  Collected Specimen Info Organism Amoxicillin/Clavulanate Ampicillin Ampicillin/Sulbactam Aztreonam Cefazolin Cefazolin (uncomplicated UTIs only) Cefepime Cefotaxime Ceftazidime Ceftriaxone Ciprofloxacin Ertapenem   11/24/23 Urine from Clean Catch/Voided Escherichia coli S R S R R R R R R R R S   11/24/23 Blood culture from Peripheral Venipuncture Escherichia coli S R S R R  R R R R R S   11/24/23 Blood culture from Peripheral Venipuncture Escherichia coli               10/17/23 Urine from Clean Catch/Voided Aerococcus urinae          S S      Collected Specimen Info Organism ESBL Gentamicin Levofloxacin Meropenem Moxifloxacin Nitrofurantoin Penicillin Piperacillin/Tazobactam Tetracycline Trimethoprim/Sulfamethoxazole Vancomycin   11/24/23 Urine from Clean Catch/Voided Escherichia coli R S  S  S  S  R    11/24/23 Blood culture from Peripheral Venipuncture Escherichia coli  S R S R   S  R    11/24/23 Blood culture from Peripheral Venipuncture Escherichia coli              10/17/23 Urine from Clean Catch/Voided Aerococcus urinae   S    S  S  S     ECG 12 lead    Result Date: 11/26/2023  Atrial fibrillation with rapid ventricular response Right bundle branch block Abnormal ECG When compared with ECG of 12-NOV-2023 06:22, Vent. rate has increased BY  58 BPM Confirmed by Adelso Phillips ([id number]) on 11/26/2023 4:14:33 PM    CT abdomen pelvis wo IV contrast    Result Date: 11/25/2023  Interpreted By:  Martha Coleman, STUDY: CT ABDOMEN PELVIS WO IV CONTRAST; 11/25/2023  2:57 pm   INDICATION: Signs/Symptoms:sepsis, gram negative bacteremia;   COMPARISON: None   ACCESSION NUMBER(S): [id number]   ORDERING CLINICIAN: MICH SMITH   TECHNIQUE: Contiguous axial images of the abdomen/pelvis were performed. All CT examinations are performed with 1 or more of the following dose reduction techniques: Automated exposure control, adjustment of mA and/or kv according to patient's size, or use of iterative reconstruction techniques.   FINDINGS: Evaluation of the solid and hollow viscera are somewhat limited without the administration of intravenous or oral contrast.   The liver,  common bile duct, pancreas, spleen, and adrenal glands are unremarkable. Cholelithiasis without evidence of cholecystitis.   The kidneys are grossly unremarkable. No urolithiasis is seen.  No hydroureteronephrosis is seen.   The visualized aorta is unremarkable.   The small bowel is not dilated. Moderate fecal burden.   No free intraperitoneal air or fluid is seen.   There is a Tiwari catheter in place.   The visualized osseous structures are intact. Moderate degenerative changes of the lumbosacral spine.   Limited images of the lower thorax are unremarkable.       No acute abdominal or pelvic pathology.   MACRO: None.   Signed by: Martha Coleman 11/25/2023 3:24 PM Dictation workstation:   [id number]     Scheduled medications  amLODIPine, 5 mg, oral, Daily  aspirin, 162 mg, oral, Daily  atorvastatin, 40 mg, oral, Daily  clopidogrel, 75 mg, oral, Daily  docusate sodium, 100 mg, oral, BID  enoxaparin, 40 mg, subcutaneous, q24h TAYLOR  ferrous sulfate (325 mg ferrous sulfate), 65 mg of iron, oral, Daily  finasteride, 5 mg, oral, Daily  gabapentin, 100 mg, oral, BID  insulin glargine, 10 Units, subcutaneous, Nightly  insulin lispro, 0-15 Units, subcutaneous, TID with meals  levETIRAcetam, 250 mg, oral, BID  lidocaine, 1 patch, transdermal, Daily  lisinopril, 20 mg, oral, Daily  meropenem, 1 g, intravenous,  q8h  pantoprazole, 40 mg, oral, Daily before breakfast  polyethylene glycol, 17 g, oral, BID  tamsulosin, 0.4 mg, oral, BID      Continuous medications  sodium chloride 0.9%, 100 mL/hr, Last Rate: 100 mL/hr (11/25/23 1417)      PRN medications  PRN medications: [DISCONTINUED] acetaminophen **OR** acetaminophen **OR** acetaminophen, acetaminophen, benzocaine-menthol, dextromethorphan-guaifenesin, dextrose 10 % in water (D10W), dextrose, glucagon, guaiFENesin, ondansetron ODT **OR** ondansetron, polyethylene glycol    ASSESSMENT:  Sepsis  Fever resolved  Leukocytosis resolved  Urinary tract infection E. Coli ESBL - secondary to indwelling guidry catheter   Bacteremia E. Coli ESBL - secondary to urinary tract infection  Urinary retention  BPH  Hyponatremia resolved  Hyperkalemia resolved  Acute kidney injury resolved  Altered mental status improved  Toxic encephalopathy secondary to UTI, bacteremia improved  Coronary artery disease  History of CABG  Hypertension  Hyperlipidemia  Atrial fibrillation  History of TIA/CVA  History of stroke like episodes  Type 2 diabetes mellitus  History of gastrointestinal bleed  Anemia normocytic  Right hip pain    PLAN:  Patient is clinically doing much better this morning.  Urine cultures grew E. coli ESBL, secondary to indwelling Guidry catheter.  Blood cultures also grew E. coli ESBL, secondary to urinary source.  Continue IV Meropenem per Infectious disease.  Fever resolved.  Leukocytosis resolved.  Mentation improved.  Suspect toxic encephalopathy secondary to UTI, bacteremia, metabolic encephalopathy.  Urology input appreciated.  Exchange Guidry catheter today if okay with Infectious disease.  Outpatient follow-up with Urologist, Dr. Lozano.  Labs reviewed.  Electrolytes improved.  Renal function improved.  Nephrology signed off.  Continue to monitor.  Vital signs reviewed.  Blood pressures noted.  Telemetry heart rate atrial fibrillation rate 70s, controlled.  Continue  antihypertensives per Cardiology Lisinopril, and Amlodipine.  Continue to monitor.  He slipped out of bed prior to admission, now complains of right hip pain with therapy.  Check right hip x-ray.  Follow up results.  Add Lidocaine patch for pain.  Apply ice.  P.r.n Tylenol.  Continue medications as prescribed, as appropriate.  PT/OT.  Fall precautions.  Up with assistance only.  Bed and chair alarm at all times.  DVT prophylaxis Lovenox subcutaneous.  Supportive care.  Patient reassured.  Case management following for discharge planning.  Discharge plan to [facility] nursing rehabilitation facility when medically cleared.  Discussed with patient, nursing, Dr. Oneill    Spoke with Infectious disease, Dr. Luana Ho to exchange guidry catheter.  Discussed with patient, spouse at bedside.  Orders placed.        JONA Man-CNP

## 2023-11-27 NOTE — NURSING NOTE
Care continued, Repositioned. No changes in assessment. Awake, quiet on bed, No complaint. Will continue to monitor.

## 2023-11-27 NOTE — CARE PLAN
Problem: Pain  Goal: My pain/discomfort is manageable  11/27/2023 0149 by Brenda Jennings RN  Outcome: Progressing  11/27/2023 0148 by Brenda Jennings RN  Outcome: Progressing  11/26/2023 2136 by Brenda Jennings RN  Outcome: Progressing     Problem: Safety  Goal: Patient will be injury free during hospitalization  11/27/2023 0149 by Brenda Jennings, WILY  Outcome: Progressing  11/27/2023 0148 by Brenda Jennings, RN  Outcome: Progressing  11/26/2023 2136 by Brenda Jennings RN  Outcome: Progressing  Goal: I will remain free of falls  11/27/2023 0149 by Brenda Jennings RN  Outcome: Progressing  11/27/2023 0148 by Brenda Jennings RN  Outcome: Progressing  11/26/2023 2136 by Brenda Jennings RN  Outcome: Progressing     Problem: Daily Care  Goal: Daily care needs are met  11/27/2023 0149 by Brenda Jennings RN  Outcome: Progressing  11/27/2023 0148 by Brenda Jennings RN  Outcome: Progressing  11/26/2023 2136 by Brenda Jennings RN  Outcome: Progressing     Problem: Psychosocial Needs  Goal: Demonstrates ability to cope with hospitalization/illness  11/27/2023 0149 by Brenda Jennings, WILY  Outcome: Progressing  11/27/2023 0148 by Brenda Jennings, RN  Outcome: Progressing  11/26/2023 2136 by Brenda Jennings RN  Outcome: Progressing  Goal: Collaborate with me, my family, and caregiver to identify my specific goals  11/27/2023 0149 by Brenda Jennings RN  Outcome: Progressing  11/27/2023 0148 by Brenda Jennings RN  Outcome: Progressing  11/26/2023 2136 by Brenda Jennings RN  Outcome: Progressing     Problem: Discharge Barriers  Goal: My discharge needs are met  11/27/2023 0149 by Brenda Jennings RN  Outcome: Progressing  11/27/2023 0148 by Brenda Jennings RN  Outcome: Progressing  11/26/2023 2136 by Brenda Jennings RN  Outcome: Progressing     Problem: Skin  Goal:  Decreased wound size/increased tissue granulation at next dressing change  11/27/2023 0149 by Brenda Jennings RN  Outcome: Progressing  11/27/2023 0148 by Brenda Jennings RN  Outcome: Progressing  11/26/2023 2136 by Brenda Jennings RN  Outcome: Progressing  Goal: Participates in plan/prevention/treatment measures  11/27/2023 0149 by Brenda Jennings RN  Outcome: Progressing  11/27/2023 0148 by Brenda Jennings RN  Outcome: Progressing  11/26/2023 2136 by Brenda Jennings RN  Outcome: Progressing  Goal: Prevent/manage excess moisture  11/27/2023 0149 by Brenda Jennings RN  Outcome: Progressing  11/27/2023 0148 by Brenda Jennings RN  Outcome: Progressing  11/26/2023 2136 by Brenda Jennings RN  Outcome: Progressing  Goal: Prevent/minimize sheer/friction injuries  11/27/2023 0149 by Brenda Jennings RN  Outcome: Progressing  11/27/2023 0148 by Brenda Jennings RN  Outcome: Progressing  11/26/2023 2136 by Brenda Jennings RN  Outcome: Progressing  Goal: Promote/optimize nutrition  11/27/2023 0149 by Brenda Jennings RN  Outcome: Progressing  11/27/2023 0148 by Brenda Jennings RN  Outcome: Progressing  11/26/2023 2136 by Brenda Jennings RN  Outcome: Progressing   The patient's goals for the shift include      The clinical goals for the shift include Infection free, Patient Safety    Over the shift, the patient did not make progress toward the following goals. Barriers to progression include . Recommendations to address these barriers include .

## 2023-11-27 NOTE — CARE PLAN
Problem: Pain  Goal: My pain/discomfort is manageable  11/27/2023 0148 by Brenda Jennings RN  Outcome: Progressing  11/26/2023 2136 by Brenda Jennings RN  Outcome: Progressing     Problem: Safety  Goal: Patient will be injury free during hospitalization  11/27/2023 0148 by Brenda Jennings RN  Outcome: Progressing  11/26/2023 2136 by Brenda Jennings RN  Outcome: Progressing  Goal: I will remain free of falls  11/27/2023 0148 by Brenda Jennings RN  Outcome: Progressing  11/26/2023 2136 by Brenda Jennings RN  Outcome: Progressing     Problem: Daily Care  Goal: Daily care needs are met  11/27/2023 0148 by Brenda Jennings RN  Outcome: Progressing  11/26/2023 2136 by Brenda Jennings RN  Outcome: Progressing     Problem: Psychosocial Needs  Goal: Demonstrates ability to cope with hospitalization/illness  11/27/2023 0148 by Brenda Jennings RN  Outcome: Progressing  11/26/2023 2136 by Brenda Jennings RN  Outcome: Progressing  Goal: Collaborate with me, my family, and caregiver to identify my specific goals  11/27/2023 0148 by Brenda Jennings RN  Outcome: Progressing  11/26/2023 2136 by Brenda Jennings RN  Outcome: Progressing     Problem: Discharge Barriers  Goal: My discharge needs are met  11/27/2023 0148 by Brenda Jennings RN  Outcome: Progressing  11/26/2023 2136 by Brenda Jennings RN  Outcome: Progressing     Problem: Skin  Goal: Decreased wound size/increased tissue granulation at next dressing change  11/27/2023 0148 by Brenda Jennings RN  Outcome: Progressing  11/26/2023 2136 by Brenda Jennings RN  Outcome: Progressing  Goal: Participates in plan/prevention/treatment measures  11/27/2023 0148 by Brenda Jennings RN  Outcome: Progressing  11/26/2023 2136 by Brenda A Shoemaker, RN  Outcome: Progressing  Goal: Prevent/manage excess moisture  11/27/2023 0148 by Brenda MCCULLOUGH  WILY Jennings  Outcome: Progressing  11/26/2023 2136 by Brenda Jennings RN  Outcome: Progressing  Goal: Prevent/minimize sheer/friction injuries  11/27/2023 0148 by Brenda Jennings RN  Outcome: Progressing  11/26/2023 2136 by Brenda Jennings RN  Outcome: Progressing  Goal: Promote/optimize nutrition  11/27/2023 0148 by Brenda Jennings RN  Outcome: Progressing  11/26/2023 2136 by Brenda Jennings RN  Outcome: Progressing   The patient's goals for the shift include      The clinical goals for the shift include Infection free, Patient Safety    Over the shift, the patient did not make progress toward the following goals. Barriers to progression include . Recommendations to address these barriers include .

## 2023-11-27 NOTE — CARE PLAN
Problem: Pain  Goal: My pain/discomfort is manageable  Outcome: Progressing     Problem: Safety  Goal: Patient will be injury free during hospitalization  Outcome: Progressing  Goal: I will remain free of falls  Outcome: Progressing     Problem: Daily Care  Goal: Daily care needs are met  Outcome: Progressing     Problem: Psychosocial Needs  Goal: Demonstrates ability to cope with hospitalization/illness  Outcome: Progressing  Goal: Collaborate with me, my family, and caregiver to identify my specific goals  Outcome: Progressing     Problem: Discharge Barriers  Goal: My discharge needs are met  Outcome: Progressing     Problem: Skin  Goal: Decreased wound size/increased tissue granulation at next dressing change  Outcome: Progressing  Goal: Participates in plan/prevention/treatment measures  Outcome: Progressing  Goal: Prevent/manage excess moisture  Outcome: Progressing  Goal: Prevent/minimize sheer/friction injuries  Outcome: Progressing  Goal: Promote/optimize nutrition  Outcome: Progressing   The patient's goals for the shift include      The clinical goals for the shift include vss afebrile    Over the shift, the patient did not make progress toward the following goals. Barriers to progression include . Recommendations to address these barriers include .

## 2023-11-27 NOTE — PROGRESS NOTES
Occupational Therapy                 Therapy Communication Note    Patient Name: Luis Greene  MRN: [id number]  Today's Date: 11/27/2023     Discipline: Occupational Therapy    Missed Visit Reason: Missed Visit Reason: Other (Comment) (pt awaiting XR right hip)    Missed Time: Attempt

## 2023-11-27 NOTE — NURSING NOTE
Received patient conversant to daughter during rounds. Report done. Assessed and charted, No complaint. Will monitor.

## 2023-11-28 LAB
ANION GAP SERPL CALC-SCNC: 10 MMOL/L
BUN SERPL-MCNC: 18 MG/DL (ref 8–25)
CALCIUM SERPL-MCNC: 8.2 MG/DL (ref 8.5–10.4)
CHLORIDE SERPL-SCNC: 105 MMOL/L (ref 97–107)
CO2 SERPL-SCNC: 21 MMOL/L (ref 24–31)
CREAT SERPL-MCNC: 0.7 MG/DL (ref 0.4–1.6)
ERYTHROCYTE [DISTWIDTH] IN BLOOD BY AUTOMATED COUNT: 12.5 % (ref 11.5–14.5)
GFR SERPL CREATININE-BSD FRML MDRD: >90 ML/MIN/1.73M*2
GLUCOSE BLD MANUAL STRIP-MCNC: 175 MG/DL (ref 74–99)
GLUCOSE BLD MANUAL STRIP-MCNC: 302 MG/DL (ref 74–99)
GLUCOSE SERPL-MCNC: 121 MG/DL (ref 65–99)
HCT VFR BLD AUTO: 28.7 % (ref 41–52)
HGB BLD-MCNC: 10 G/DL (ref 13.5–17.5)
MCH RBC QN AUTO: 34.7 PG (ref 26–34)
MCHC RBC AUTO-ENTMCNC: 34.8 G/DL (ref 32–36)
MCV RBC AUTO: 100 FL (ref 80–100)
NRBC BLD-RTO: 0 /100 WBCS (ref 0–0)
PLATELET # BLD AUTO: 143 X10*3/UL (ref 150–450)
POTASSIUM SERPL-SCNC: 3.7 MMOL/L (ref 3.4–5.1)
RBC # BLD AUTO: 2.88 X10*6/UL (ref 4.5–5.9)
SODIUM SERPL-SCNC: 136 MMOL/L (ref 133–145)
WBC # BLD AUTO: 5.6 X10*3/UL (ref 4.4–11.3)

## 2023-11-28 PROCEDURE — 97530 THERAPEUTIC ACTIVITIES: CPT | Mod: GO

## 2023-11-28 PROCEDURE — 36415 COLL VENOUS BLD VENIPUNCTURE: CPT | Performed by: NURSE PRACTITIONER

## 2023-11-28 PROCEDURE — 80048 BASIC METABOLIC PNL TOTAL CA: CPT | Performed by: NURSE PRACTITIONER

## 2023-11-28 PROCEDURE — 96372 THER/PROPH/DIAG INJ SC/IM: CPT | Performed by: INTERNAL MEDICINE

## 2023-11-28 PROCEDURE — 99232 SBSQ HOSP IP/OBS MODERATE 35: CPT | Performed by: INTERNAL MEDICINE

## 2023-11-28 PROCEDURE — 2060000001 HC INTERMEDIATE ICU ROOM DAILY

## 2023-11-28 PROCEDURE — 2500000001 HC RX 250 WO HCPCS SELF ADMINISTERED DRUGS (ALT 637 FOR MEDICARE OP): Performed by: INTERNAL MEDICINE

## 2023-11-28 PROCEDURE — 2500000004 HC RX 250 GENERAL PHARMACY W/ HCPCS (ALT 636 FOR OP/ED): Performed by: NURSE PRACTITIONER

## 2023-11-28 PROCEDURE — 2500000004 HC RX 250 GENERAL PHARMACY W/ HCPCS (ALT 636 FOR OP/ED): Performed by: INTERNAL MEDICINE

## 2023-11-28 PROCEDURE — 85027 COMPLETE CBC AUTOMATED: CPT | Performed by: NURSE PRACTITIONER

## 2023-11-28 PROCEDURE — 2500000002 HC RX 250 W HCPCS SELF ADMINISTERED DRUGS (ALT 637 FOR MEDICARE OP, ALT 636 FOR OP/ED): Performed by: INTERNAL MEDICINE

## 2023-11-28 PROCEDURE — 82947 ASSAY GLUCOSE BLOOD QUANT: CPT

## 2023-11-28 PROCEDURE — 2500000001 HC RX 250 WO HCPCS SELF ADMINISTERED DRUGS (ALT 637 FOR MEDICARE OP)

## 2023-11-28 PROCEDURE — 97166 OT EVAL MOD COMPLEX 45 MIN: CPT | Mod: GO

## 2023-11-28 RX ADMIN — PANTOPRAZOLE SODIUM 40 MG: 40 TABLET, DELAYED RELEASE ORAL at 05:35

## 2023-11-28 RX ADMIN — LEVETIRACETAM 250 MG: 250 TABLET, FILM COATED ORAL at 20:52

## 2023-11-28 RX ADMIN — CLOPIDOGREL BISULFATE 75 MG: 75 TABLET ORAL at 09:09

## 2023-11-28 RX ADMIN — LEVETIRACETAM 250 MG: 250 TABLET, FILM COATED ORAL at 09:08

## 2023-11-28 RX ADMIN — DOCUSATE SODIUM 100 MG: 100 CAPSULE, LIQUID FILLED ORAL at 09:09

## 2023-11-28 RX ADMIN — TAMSULOSIN HYDROCHLORIDE 0.4 MG: 0.4 CAPSULE ORAL at 20:51

## 2023-11-28 RX ADMIN — DOCUSATE SODIUM 100 MG: 100 CAPSULE, LIQUID FILLED ORAL at 20:50

## 2023-11-28 RX ADMIN — Medication 1 G: at 05:35

## 2023-11-28 RX ADMIN — GABAPENTIN 100 MG: 100 CAPSULE ORAL at 09:09

## 2023-11-28 RX ADMIN — INSULIN LISPRO 3 UNITS: 100 INJECTION, SOLUTION INTRAVENOUS; SUBCUTANEOUS at 12:00

## 2023-11-28 RX ADMIN — POLYETHYLENE GLYCOL 3350 17 G: 17 POWDER, FOR SOLUTION ORAL at 20:50

## 2023-11-28 RX ADMIN — INSULIN GLARGINE 10 UNITS: 100 INJECTION, SOLUTION SUBCUTANEOUS at 20:46

## 2023-11-28 RX ADMIN — FERROUS SULFATE TAB 325 MG (65 MG ELEMENTAL FE) 1 TABLET: 325 (65 FE) TAB at 09:09

## 2023-11-28 RX ADMIN — ASPIRIN 162 MG: 81 TABLET, COATED ORAL at 09:08

## 2023-11-28 RX ADMIN — LISINOPRIL 20 MG: 20 TABLET ORAL at 09:08

## 2023-11-28 RX ADMIN — ENOXAPARIN SODIUM 40 MG: 100 INJECTION SUBCUTANEOUS at 09:08

## 2023-11-28 RX ADMIN — POLYETHYLENE GLYCOL 3350 17 G: 17 POWDER, FOR SOLUTION ORAL at 09:08

## 2023-11-28 RX ADMIN — TAMSULOSIN HYDROCHLORIDE 0.4 MG: 0.4 CAPSULE ORAL at 09:08

## 2023-11-28 RX ADMIN — AMLODIPINE BESYLATE 5 MG: 5 TABLET ORAL at 09:09

## 2023-11-28 RX ADMIN — FINASTERIDE 5 MG: 5 TABLET, FILM COATED ORAL at 09:08

## 2023-11-28 RX ADMIN — Medication 1 G: at 16:27

## 2023-11-28 RX ADMIN — Medication 1 G: at 21:11

## 2023-11-28 RX ADMIN — SODIUM CHLORIDE 100 ML/HR: 900 INJECTION, SOLUTION INTRAVENOUS at 03:44

## 2023-11-28 RX ADMIN — GABAPENTIN 100 MG: 100 CAPSULE ORAL at 20:52

## 2023-11-28 RX ADMIN — ATORVASTATIN CALCIUM 40 MG: 40 TABLET, FILM COATED ORAL at 09:09

## 2023-11-28 ASSESSMENT — COGNITIVE AND FUNCTIONAL STATUS - GENERAL
STANDING UP FROM CHAIR USING ARMS: A LITTLE
DRESSING REGULAR LOWER BODY CLOTHING: A LOT
HELP NEEDED FOR BATHING: A LOT
TOILETING: A LOT
DAILY ACTIVITIY SCORE: 17
PERSONAL GROOMING: A LITTLE
MOBILITY SCORE: 17
STANDING UP FROM CHAIR USING ARMS: A LITTLE
DRESSING REGULAR LOWER BODY CLOTHING: A LOT
PERSONAL GROOMING: A LITTLE
PERSONAL GROOMING: A LITTLE
HELP NEEDED FOR BATHING: A LOT
MOVING TO AND FROM BED TO CHAIR: A LITTLE
DRESSING REGULAR UPPER BODY CLOTHING: A LITTLE
TOILETING: A LOT
DRESSING REGULAR LOWER BODY CLOTHING: A LOT
MOVING TO AND FROM BED TO CHAIR: A LOT
DAILY ACTIVITIY SCORE: 16
HELP NEEDED FOR BATHING: A LOT
CLIMB 3 TO 5 STEPS WITH RAILING: A LOT
TURNING FROM BACK TO SIDE WHILE IN FLAT BAD: A LITTLE
MOBILITY SCORE: 16
TOILETING: A LITTLE
WALKING IN HOSPITAL ROOM: A LOT
CLIMB 3 TO 5 STEPS WITH RAILING: A LOT
DRESSING REGULAR UPPER BODY CLOTHING: A LITTLE
DRESSING REGULAR UPPER BODY CLOTHING: A LITTLE
DAILY ACTIVITIY SCORE: 16
TURNING FROM BACK TO SIDE WHILE IN FLAT BAD: A LITTLE
WALKING IN HOSPITAL ROOM: A LOT

## 2023-11-28 ASSESSMENT — PAIN - FUNCTIONAL ASSESSMENT: PAIN_FUNCTIONAL_ASSESSMENT: 0-10

## 2023-11-28 ASSESSMENT — PAIN SCALES - WONG BAKER: WONGBAKER_NUMERICALRESPONSE: NO HURT

## 2023-11-28 ASSESSMENT — PAIN SCALES - GENERAL
PAINLEVEL_OUTOF10: 0 - NO PAIN
PAINLEVEL_OUTOF10: 0 - NO PAIN
PAINLEVEL_OUTOF10: 5 - MODERATE PAIN

## 2023-11-28 ASSESSMENT — ACTIVITIES OF DAILY LIVING (ADL): BATHING_ASSISTANCE: MODERATE

## 2023-11-28 NOTE — PROGRESS NOTES
Subjective Data:  Patient resting comfortably not short of breath    Overnight Events:    No significant overnight events.     Objective Data:  Last Recorded Vitals:  Vitals:    11/27/23 1218 11/27/23 1958 11/28/23 0358 11/28/23 0900   BP: 135/59 147/57 (!) 135/48 155/67   BP Location: Right arm Left arm Right arm Left arm   Patient Position: Lying Lying Lying Lying   Pulse: 76 98 63 70   Resp: 17 18 16 16   Temp: 36.9 °C (98.4 °F) 36.6 °C (97.9 °F) 36.2 °C (97.2 °F) 36.7 °C (98.1 °F)   TempSrc: Oral Temporal Temporal Oral   SpO2: 98% 96% 99% 97%   Weight:       Height:           Last Labs:  CBC - 11/28/2023:  5:51 AM  5.6 10.0 143    28.7      CMP - 11/28/2023:  5:51 AM  8.2 5.7 59 --- 0.9   _ 3.0 20 56      PTT - 11/10/2023:  4:59 PM  1.2   12.3 22.2     HGBA1C   Date/Time Value Ref Range Status   11/15/2023 11:08 AM 6.7 See below % Final   09/14/2023 12:14 PM 6.8 % Final     Comment:          Diagnosis of Diabetes-Adults   Non-Diabetic: < or = 5.6%   Increased risk for developing diabetes: 5.7-6.4%   Diagnostic of diabetes: > or = 6.5%  .       Monitoring of Diabetes                Age (y)     Therapeutic Goal (%)   Adults:          >18           <7.0   Pediatrics:    13-18           <7.5                   7-12           <8.0                   0- 6            7.5-8.5   American Diabetes Association. Diabetes Care 33(S1), Jan 2010.   08/14/2023 02:45 PM 8.1 4.0 - 6.0 % Final     Comment:     Hemoglobin A1C levels are related to mean blood glucose during the   preceding 2-3 months. The relationship table below may be used as a   general guide. Each 1% increase in HGB A1C is a reflection of an   increase in mean glucose of approximately 30 mg/dl.   Reference: Diabetes Care, volume 29, supplement 1 Jan. 2006                        HGB A1C ................. Approx. Mean Glucose   _______________________________________________   6%   ...............................  120 mg/dl   7%   ...............................   "150 mg/dl   8%   ...............................  180 mg/dl   9%   ...............................  210 mg/dl   10%  ...............................  240 mg/dl  Performed at [address] [address]     04/19/2023 04:03 PM 9.7 4.0 - 5.6 % Final   02/03/2023 09:12 AM 9.6 4.0 - 5.6 % Final     LDLCALC   Date/Time Value Ref Range Status   11/15/2023 11:08 AM 30 65 - 130 mg/dL Final   08/15/2023 05:50 AM 31 65 - 130 MG/DL Final   10/28/2022 05:39 AM 32 65 - 130 MG/DL Final   07/06/2021 09:50 AM 39 65 - 130 MG/DL Final     VLDL   Date/Time Value Ref Range Status   08/22/2022 10:17 AM 11 0 - 40 mg/dL Final   09/17/2019 01:20 PM 16 0 - 40 mg/dL Final      Last I/O:  I/O last 3 completed shifts:  In: 1261.4 (16.3 mL/kg) [P.O.:570; I.V.:691.4 (9 mL/kg)]  Out: 3300 (42.7 mL/kg) [Urine:3300 (1.2 mL/kg/hr)]  Weight: 77.2 kg     Past Cardiology Tests (Last 3 Years):  EKG:  ECG 12 lead 11/26/2023      ECG 12 lead daily 11/17/2023      ECG 12 lead daily 11/15/2023      ECG 12 lead daily 11/15/2023      ECG 12 lead 11/15/2023    Echo:  Transthoracic Echo (TTE) Limited 11/10/2023      Transthoracic Echo (TTE) Limited 11/10/2023    Ejection Fractions:  No results found for: \"EF\"  Cath:  No results found for this or any previous visit from the past 1095 days.    Stress Test:  Nuclear Stress Test 2/13/2023    Cardiac Imaging:  No results found for this or any previous visit from the past 1095 days.      Inpatient Medications:  Scheduled medications   Medication Dose Route Frequency    amLODIPine  5 mg oral Daily    aspirin  162 mg oral Daily    atorvastatin  40 mg oral Daily    clopidogrel  75 mg oral Daily    docusate sodium  100 mg oral BID    enoxaparin  40 mg subcutaneous q24h TAYLOR    ferrous sulfate (325 mg ferrous sulfate)  65 mg of iron oral Daily    finasteride  5 mg oral Daily    gabapentin  100 mg oral BID    insulin glargine  10 Units subcutaneous Nightly    insulin lispro  0-15 Units subcutaneous TID " with meals    levETIRAcetam  250 mg oral BID    lidocaine  1 patch transdermal Daily    lisinopril  20 mg oral Daily    meropenem  1 g intravenous q8h    pantoprazole  40 mg oral Daily before breakfast    polyethylene glycol  17 g oral BID    tamsulosin  0.4 mg oral BID     PRN medications   Medication    acetaminophen    Or    acetaminophen    acetaminophen    benzocaine-menthol    dextromethorphan-guaifenesin    dextrose 10 % in water (D10W)    dextrose    glucagon    guaiFENesin    ondansetron ODT    Or    ondansetron    polyethylene glycol     Continuous Medications   Medication Dose Last Rate    sodium chloride 0.9%  100 mL/hr 100 mL/hr (11/28/23 0344)       Physical Exam:  General: alert, oriented x2-3, very pleasant; son at bedside  HEENT: normal cephalic, atraumatic  Neck: No JVD, bruit or thrill, masses or tenderness   Heart: S1/S2, Rate 50, Rhythm irregular, no s3 or s4, no murmur, thrill, or heaves at PMI.   Lungs: Clear, equal expansion and excursion, no wheezes, crackles, rhales or rhonci.  Room air.  No conversational dyspnea appreciated.  No tachypnea.  No pain with deep aspiration   Abdomen: Overweight, bowel sounds x 4, soft, non-tender   Genitourinary: deferred   Extremities: No significant upper or lower extremity daylin appreciated.     Assessment/Plan   11/25: This elderly white male with an extensive past medical history including coronary artery disease, remote CABG, hypertension, hyperlipidemia, type 2 diabetes, paroxysmal atrial fibrillation, history of?  TIA versus recurrent localized focal seizures, history of GI bleeding on Eliquis anticoagulation, status post Watchman implantation is currently admitted with change in mental status with a 2-day period of low-grade fever somnolence confusion inability to eat ultimately identified on presentation as being a probable urinary tract infection with urosepsis from his chronic indwelling Tiwari catheter.  His renal parameters are stable with a  creatinine of 1.2.  No leukocytosis currently with a WBC of only 11,700 on admission.  Initial urine culture is positive for 100,000 gram-negative rods as are 2 sets of blood cultures now identified as E. coli.  Patient has improved rapidly clinically following institution of IV antibiotic therapy with IV meropenem.  He will be maintained on his aspirin and Plavix as per watchman protocol.  Patient's preadmission lisinopril on hold to avoid hypotension related to urosepsis.  His atrial fibrillation has an adequate ventricular rate control in the absence of previously prescribed metoprolol.      11/26: The patient evidently developed a low-grade fever early yesterday evening 100.6 °F associated with a transient increase in blood pressure and heart rate.  He was given Tylenol and currently is afebrile.  He is lab values continuing to improve WBC now only 4600 hematocrit 33.0.  Renal renal parameters are at baseline with a creatinine of 1.10.  2 additional blood cultures were drawn earlier today with the urine and blood cultures done on 1124 been positive for E. coli.  The patient has been restarted on amlodipine and lisinopril for resumption of treatment for his systolic hypertension.  He does remain on aspirin and Plavix for his recently placed Watchman device.  He also remains on the Keppra 250 mg twice daily for suspected underlying seizure disorder.     11/27: Patient is lying in bed resting comfortably visiting with a family member.  He is awake alert conversant.  His atrial fibrillation has a controlled ventricular rate of approximately 80/min in the absence of any therapy.  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  He has had 5+ episodes of transient neurological symptoms with no evidence ever documented of CVA.  He  did have the Watchman device placed several weeks ago and will continue on with his aspirin and Plavix therapy.  Systolic blood pressures are in the 150 mmHg range with the reinstitution of the lisinopril plus amlodipine.  Patient has been seen by urology recommendation is to change Tiwari catheter.  Anticipate patient will be ready for transfer back to the rehab facility in 48-72 hours.  Electrolytes unremarkable creatinine 0.9 potassium of 4.0.  Hemoglobin is 9.5 currently.  Serum iron is 62 and ferritin 485.    11/28: Patient is awake alert conversant pleasant without any complaints.  His atrial fibrillation remains with a controlled ventricular response in the absence of any treatment.  His Tiwari catheter was exchanged yesterday.  Patient is being followed by infectious disease for his sepsis related to ESBL E. coli bacteremia currently on IV meropenem.  It is thought that the patient may have had a transient unrecognized obstruction of the original Tiwari catheter resulting in the recurrent urinary retention and subsequent bacteremia.  The patient will be following with urology as an outpatient.  Blood pressure under acceptable control systolic values 135-155 mmHg with the resumption of amlodipine and lisinopril.  Renal parameters stable creatinine 0.7 potassium 3.7.  No leukocytosis hemoglobin stable at 10.0.  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  Anticipate transfer back to rehab facility in the next 48 hours.  Peripheral IV 11/24/23 20 G Right Antecubital (Active)   Site Assessment Dry;Intact;Clean 11/28/23 0000   Dressing Status Clean;Dry;Occlusive 11/28/23 0000   Number of days: 4       Peripheral IV 11/24/23 20 G Left;Anterior Hand (Active)   Site Assessment Clean;Dry;Intact 11/28/23 0000   Dressing Status Dry;Occlusive;Clean 11/28/23 0000   Number of days: 4       Urethral Catheter (Active)   Site Assessment Clean;Skin intact 11/28/23 0344   Collection Container Standard drainage  bag 11/28/23 0344   Securement Method Securing device (Describe) 11/28/23 0344   Reason for Continuing Urinary Catheterization chronic urinary retention 11/28/23 0344   Output (mL) 1000 mL 11/28/23 0344   Number of days: 18       Code Status:  Full Code    I spent 20 minutes in the professional and overall care of this patient.        Luis Grant MD

## 2023-11-28 NOTE — CARE PLAN
The patient's goals for the shift include Patient Safety    The clinical goals for the shift include safety      Problem: Pain  Goal: My pain/discomfort is manageable  Outcome: Progressing     Problem: Safety  Goal: Patient will be injury free during hospitalization  Outcome: Progressing  Goal: I will remain free of falls  Outcome: Progressing     Problem: Daily Care  Goal: Daily care needs are met  Outcome: Progressing     Problem: Psychosocial Needs  Goal: Demonstrates ability to cope with hospitalization/illness  Outcome: Progressing  Goal: Collaborate with me, my family, and caregiver to identify my specific goals  Outcome: Progressing     Problem: Discharge Barriers  Goal: My discharge needs are met  Outcome: Progressing     Problem: Skin  Goal: Decreased wound size/increased tissue granulation at next dressing change  Outcome: Progressing  Goal: Participates in plan/prevention/treatment measures  Outcome: Progressing  Goal: Prevent/manage excess moisture  Outcome: Progressing  Goal: Prevent/minimize sheer/friction injuries  Outcome: Progressing  Goal: Promote/optimize nutrition  Outcome: Progressing     Problem: Pain - Adult  Goal: Verbalizes/displays adequate comfort level or baseline comfort level  Outcome: Progressing     Problem: Safety - Adult  Goal: Free from fall injury  Outcome: Progressing     Problem: Discharge Planning  Goal: Discharge to home or other facility with appropriate resources  Outcome: Progressing     Problem: Chronic Conditions and Co-morbidities  Goal: Patient's chronic conditions and co-morbidity symptoms are monitored and maintained or improved  Outcome: Progressing     Problem: Diabetes  Goal: Achieve decreasing blood glucose levels by end of shift  Outcome: Progressing  Goal: Increase stability of blood glucose readings by end of shift  Outcome: Progressing  Goal: Decrease in ketones present in urine by end of shift  Outcome: Progressing  Goal: Maintain electrolyte levels within  acceptable range throughout shift  Outcome: Progressing  Goal: Maintain glucose levels >70mg/dl to <250mg/dl throughout shift  Outcome: Progressing  Goal: No changes in neurological exam by end of shift  Outcome: Progressing  Goal: Learn about and adhere to nutrition recommendations by end of shift  Outcome: Progressing  Goal: Vital signs within normal range for age by end of shift  Outcome: Progressing  Goal: Increase self care and/or family involovement by end of shift  Outcome: Progressing  Goal: Receive DSME education by end of shift  Outcome: Progressing

## 2023-11-28 NOTE — NURSING NOTE
Bedside nursing report received from [place].  No incidents over night reported.   Pt has no immediate needs or complaints.

## 2023-11-28 NOTE — PROGRESS NOTES
Occupational Therapy    Evaluation/Treatment    Patient Name: Luis Greene  MRN: [id number]  : 1940  Today's Date: 23  Time Calculation  Start Time: 1138  Stop Time: 1201  Time Calculation (min): 23 min       Assessment:  OT Assessment: Referral received, chart reviewed, and evaluation complete.  Patient displays BLE weakness, BLE foot drop, impaired  sitting/standing balance which adversely effect mobility and ADL performance  Prognosis: Good  Barriers to Discharge: None  Evaluation/Treatment Tolerance: Patient tolerated treatment well  Medical Staff Made Aware: Yes  End of Session Communication: Bedside nurse  End of Session Patient Position: Bed, 3 rail up, Alarm on  Prognosis: Good  Barriers to Discharge: None  Evaluation/Treatment Tolerance: Patient tolerated treatment well  Medical Staff Made Aware: Yes  Plan:  Treatment Interventions: ADL retraining, Functional transfer training, UE strengthening/ROM, Patient/family training  OT Frequency: 4 times per week  OT Discharge Recommendations: Moderate intensity level of continued care  OT Recommended Transfer Status: Assist of 1  OT - OK to Discharge: Yes (to recommended level of assist)      Subjective   Current Problem:  1. Sepsis, due to unspecified organism, unspecified whether acute organ dysfunction present (CMS/HCC)        2. Urinary tract infection without hematuria, site unspecified          General:   OT Received On: 23  General  Reason for Referral: decreased functional status  Referred By: Pete Oneill MD  Past Medical History Relevant to Rehab: Sepsis, UTI,  Family/Caregiver Present: No  Prior to Session Communication: Bedside nurse  Patient Position Received: Bed, 3 rail up, Alarm on  General Comment: 83 year old WM admitted with c/o fever and AMS  Precautions:  Medical Precautions: Fall precautions     Pain:  Pain Assessment  Pain Assessment: 0-10  Pain Score: 5 - Moderate pain  Pain Type: Chronic pain  Pain Location:  Hip  Pain Orientation: Right    Objective   Cognition:  Overall Cognitive Status: Within Functional Limits  Orientation Level: Oriented X4     Home Living:  Type of Home: House  Lives With: Spouse  Home Layout: One level  Home Access: Stairs to enter with rails  Entrance Stairs-Rails: Right  Entrance Stairs-Number of Steps: 3  Bathroom Shower/Tub: Tub/shower unit  Bathroom Toilet: Standard  Prior Function:  Level of Patrick: Independent with ADLs and functional transfers  Vocational: Retired  Hand Dominance: Right  Prior Function Comments: uses a rollator with a seat at baseline  IADL History:  Homemaking Responsibilities: No  IADL Comments: Patient reports that his spouse does all IADLs  ADL:  Eating Assistance: Independent  Grooming Assistance:  (set up)  Bathing Assistance: Moderate  UE Dressing Assistance: Minimal  LE Dressing Assistance: Moderate  Toileting Assistance with Device: Moderate  Activities of Daily Living: LE Dressing  LE Dressing: Yes  Sock Level of Assistance: Moderate assistance  LE Dressing Where Assessed: Edge of bed  Activity Tolerance:  Endurance: Endurance does not limit participation in activity       Bed Mobility/Transfers: Bed Mobility  Bed Mobility: Yes  Bed Mobility 1  Bed Mobility 1: Supine to sitting  Level of Assistance 1: Minimum assistance  Bed Mobility Comments 1:  (Had a LOB x2 posteriorly while attempting to sit up)  Bed Mobility 2  Bed Mobility  2: Sitting to supine  Level of Assistance 2: Minimum assistance    Transfers  Transfer: Yes  Transfer 1  Transfer From 1: Bed to  Transfer to 1: Stand  Technique 1: Sit to stand  Transfer Device 1: Walker (rollator)  Transfer Level of Assistance 1: Moderate assistance  Trials/Comments 1: Attempted sit to stand 3x before patient was successful in standing. (Impaired balance leans posteriorly, and BLE weakness, bilateral foot drop noted as well)  Transfers 2  Transfer From 2: Stand to  Transfer to 2: Bed  Technique 2: Stand to  sit  Transfer Device 2: Walker  Transfer Level of Assistance 2: Minimum assistance  Trials/Comments 2:  (forward flexed posture needed verbal cues to elongate the spine and achieve full upright posture)    Sitting Balance:  Static Sitting Balance  Static Sitting-Balance Support: Feet supported  Static Sitting-Level of Assistance: Close supervision    Therapy/Activity: patient peformed functional mobility a short distance with 4ww and min-mod assist.  Demonstrates drop foot bilaterally and reports he has AFO's at baseline     Sensation:  Light Touch: No apparent deficits  Strength:  Strength Comments: BUE 3+/5 grossly     Coordination:  Movements are Fluid and Coordinated: Yes   Hand Function:  Hand Function  Gross Grasp: Functional  Coordination: Functional    Outcome Measures: [facility] Daily Activity  Putting on and taking off regular lower body clothing: A lot  Bathing (including washing, rinsing, drying): A lot  Putting on and taking off regular upper body clothing: A little  Toileting, which includes using toilet, bedpan or urinal: A lot  Taking care of personal grooming such as brushing teeth: A little  Eating Meals: None  Daily Activity - Total Score: 16    Goals:    Problem: Bathing  Goal: STG - Patient will bathe body UB/LB with minimal assist and AE  Outcome: Progressing     Problem: Dressings Lower Extremities  Goal: LTG - Patient will dress lower body with close supervision and AE as needed  Outcome: Progressing     Problem: Dressing Upper Extremities  Goal: LTG - Patient will complete upper body dressing indepdnent  Outcome: Progressing     Problem: Grooming  Goal:  Patient completes grooming independent  Outcome: Progressing     Problem: Mobility  Goal: perform functional mobility to and from the bathroom with 4ww and close supervision  Outcome: Progressing     Problem: Toileting  Goal: STG - Patient will complete toileting tasks with close supervision and 4ww  Outcome: Progressing     Problem:  Transfers  Goal: Patient will transfer from one surface to another with close supervision and 4ww  Outcome: Progressing     Problem: Therapeutic Activity  Goal: Perform BUE exercise with close supervision  Outcome: Progressing

## 2023-11-28 NOTE — PROGRESS NOTES
Luis Greene is a 83 y.o. male on day 4 of admission presenting with Sepsis (CMS/HCC).    Subjective   Patient seen and examined.  Resting in bed in no acute distress.  Awake alert oriented x 3.  No new complaints.  Right hip pain improved.  Intermittent bladder spasms, prior to guidry catheter exchange yesterday.  Guidry catheter exchanged yesterday without incident per patient, nursing    Objective     Physical Exam  Vitals reviewed.   Constitutional:       General: He is not in acute distress.     Appearance: Normal appearance. He is normal weight. He is not ill-appearing, toxic-appearing or diaphoretic.   HENT:      Head: Normocephalic and atraumatic.      Right Ear: Tympanic membrane normal.      Left Ear: Tympanic membrane normal.      Nose: Nose normal.      Mouth/Throat:      Mouth: Mucous membranes are moist.      Pharynx: Oropharynx is clear.   Eyes:      Extraocular Movements: Extraocular movements intact.      Conjunctiva/sclera: Conjunctivae normal.      Pupils: Pupils are equal, round, and reactive to light.   Cardiovascular:      Rate and Rhythm: Normal rate. Rhythm irregular.      Pulses: Normal pulses.      Heart sounds: Normal heart sounds.   Pulmonary:      Effort: Pulmonary effort is normal. No respiratory distress.      Breath sounds: Normal breath sounds. No wheezing, rhonchi or rales.   Abdominal:      General: Bowel sounds are normal. There is no distension.      Palpations: Abdomen is soft.      Tenderness: There is no abdominal tenderness.   Genitourinary:     Comments:  Guidry catheter in place draining clear yellow urine. Rectal examination deferred  Musculoskeletal:         General: No swelling. Normal range of motion.      Cervical back: Normal range of motion and neck supple.      Comments: Right hip less tender, improved range of motion   Skin:     General: Skin is warm and dry.      Capillary Refill: Capillary refill takes less than 2 seconds.      Findings: Bruising present.  "  Neurological:      General: No focal deficit present.      Mental Status: He is alert and oriented to person, place, and time.   Psychiatric:         Mood and Affect: Mood normal.         Behavior: Behavior normal.       Last Recorded Vitals  Blood pressure 155/67, pulse 70, temperature 36.7 °C (98.1 °F), temperature source Oral, resp. rate 16, height 1.803 m (5' 11\"), weight 77.2 kg (170 lb 3.1 oz), SpO2 97 %.    Intake/Output last 3 Shifts:  I/O last 3 completed shifts:  In: 1261.4 (16.3 mL/kg) [P.O.:570; I.V.:691.4 (9 mL/kg)]  Out: 3300 (42.7 mL/kg) [Urine:3300 (1.2 mL/kg/hr)]  Weight: 77.2 kg     Telemetry atrial fibrillation rate 70's    Relevant Results  Results for orders placed or performed during the hospital encounter of 11/24/23 (from the past 24 hour(s))   POCT GLUCOSE   Result Value Ref Range    POCT Glucose 249 (H) 74 - 99 mg/dL   POCT GLUCOSE   Result Value Ref Range    POCT Glucose 176 (H) 74 - 99 mg/dL   POCT GLUCOSE   Result Value Ref Range    POCT Glucose 170 (H) 74 - 99 mg/dL   Basic Metabolic Panel   Result Value Ref Range    Glucose 121 (H) 65 - 99 mg/dL    Sodium 136 133 - 145 mmol/L    Potassium 3.7 3.4 - 5.1 mmol/L    Chloride 105 97 - 107 mmol/L    Bicarbonate 21 (L) 24 - 31 mmol/L    Urea Nitrogen 18 8 - 25 mg/dL    Creatinine 0.70 0.40 - 1.60 mg/dL    eGFR >90 >60 mL/min/1.73m*2    Calcium 8.2 (L) 8.5 - 10.4 mg/dL    Anion Gap 10 <=19 mmol/L   CBC   Result Value Ref Range    WBC 5.6 4.4 - 11.3 x10*3/uL    nRBC 0.0 0.0 - 0.0 /100 WBCs    RBC 2.88 (L) 4.50 - 5.90 x10*6/uL    Hemoglobin 10.0 (L) 13.5 - 17.5 g/dL    Hematocrit 28.7 (L) 41.0 - 52.0 %     80 - 100 fL    MCH 34.7 (H) 26.0 - 34.0 pg    MCHC 34.8 32.0 - 36.0 g/dL    RDW 12.5 11.5 - 14.5 %    Platelets 143 (L) 150 - 450 x10*3/uL     XR hip right 2 or 3 views    Result Date: 11/27/2023  Interpreted By:  Diya Pérez, STUDY: XR HIP RIGHT 2 OR 3 VIEWS; ;  11/27/2023 4:19 pm   INDICATION: Signs/Symptoms:pain s/p fall. Pain "   COMPARISON: 07/28/2021   ACCESSION NUMBER(S): [id number]   ORDERING CLINICIAN: GINGER STRANGE   TECHNIQUE: Single view AP pelvis and AP and lateral oblique views of the right hip   FINDINGS: Right hip demonstrates osteophytosis about the acetabular rim with subcortical sclerosis demonstrated. No significant cystic change. There is mild osteophytosis about the right femoral head neck junction. Cortical and trabecular lines are maintained within the right femoral head and neck. Subtle curvilinear area of sclerosis along the right femoral head neck junction relation to rim osteophyte formation about the femoral head neck junction. Intertrochanteric femur is unremarkable. Diffuse vascular calcification is demonstrated.   Osseous pelvis demonstrates intact iliopectineal and ilioischial lines. Visualized iliac wings are unremarkable. Laminectomy changes lower lumbar spine.       1. No acute osseous right hip     MACRO: None   Signed by: Diya Pérez 11/27/2023 4:27 PM Dictation workstation:   [id number]    ECG 12 lead    Result Date: 11/26/2023  Atrial fibrillation with rapid ventricular response Right bundle branch block Abnormal ECG When compared with ECG of 12-NOV-2023 06:22, Vent. rate has increased BY  58 BPM Confirmed by Adelso Phillips ([id number]) on 11/26/2023 4:14:33 PM     Scheduled medications  amLODIPine, 5 mg, oral, Daily  aspirin, 162 mg, oral, Daily  atorvastatin, 40 mg, oral, Daily  clopidogrel, 75 mg, oral, Daily  docusate sodium, 100 mg, oral, BID  enoxaparin, 40 mg, subcutaneous, q24h TAYLOR  ferrous sulfate (325 mg ferrous sulfate), 65 mg of iron, oral, Daily  finasteride, 5 mg, oral, Daily  gabapentin, 100 mg, oral, BID  insulin glargine, 10 Units, subcutaneous, Nightly  insulin lispro, 0-15 Units, subcutaneous, TID with meals  levETIRAcetam, 250 mg, oral, BID  lidocaine, 1 patch, transdermal, Daily  lisinopril, 20 mg, oral, Daily  meropenem, 1 g, intravenous, q8h  pantoprazole, 40 mg, oral, Daily  before breakfast  polyethylene glycol, 17 g, oral, BID  tamsulosin, 0.4 mg, oral, BID      Continuous medications  sodium chloride 0.9%, 100 mL/hr, Last Rate: 100 mL/hr (11/28/23 1046)      PRN medications  PRN medications: [DISCONTINUED] acetaminophen **OR** acetaminophen **OR** acetaminophen, acetaminophen, benzocaine-menthol, dextromethorphan-guaifenesin, dextrose 10 % in water (D10W), dextrose, glucagon, guaiFENesin, ondansetron ODT **OR** ondansetron, polyethylene glycol    ASSESSMENT:  Sepsis  Fever resolved  Leukocytosis resolved  Urinary tract infection E. Coli ESBL - secondary to indwelling guidry catheter  Bacteremia E. Coli ESBL - secondary to urinary tract infection  Urinary retention - status post guidry catheter exchange  BPH  Hyponatremia resolved  Hyperkalemia resolved  Acute kidney injury resolved  Altered mental status improved  Toxic encephalopathy secondary to UTI, bacteremia improved  Coronary artery disease  History of CABG  Hypertension  Hyperlipidemia  Atrial fibrillation  History of TIA/CVA  History of stroke like episodes  Type 2 diabetes mellitus  History of gastrointestinal bleed  Anemia normocytic   Right hip pain improved    PLAN:  Patient is doing well this morning.  Right hip pain improved.  Xray radiology report reviewed, no acute findings.  Continue analgesics, ice, therapy.  Monitor.  Fall precautions.  Intermittent bladder spasms.  Monitor.  Add medication for management if needed.  Status post guidry exchange yesterday without incident per patient and nursing staff.  Monitor.  Maintain guidry catheter.  Outpatient follow up with Dr. Lozano.  Continue IV antibiotics Meropenem thru 11/30/2023 per Infectious disease Dr. Edwards.  Labs reviewed.  Renal function stable.  Blood pressures reviewed.  Telemetry atrial fibrillation, heart rate controlled.  Continue medical management per Cardiology, Dr. Grant.  Continue facility medications as appropriate.  PT/OT.  Fall precautions.  Up with  assistance only.  Bed and chair alarm at all times.  DVT prophylaxis Lovenox subcutaneous.  Supportive care.  Patient reassured.  Case management following for discharge planning.  Discharge plan to [facility] nursing rehabilitation facility when IV antibiotic course is complete.  Discussed with patient, nursing and Dr. Oneill.    Martina Haynes, APRN-CNP

## 2023-11-28 NOTE — CARE PLAN
Problem: Pain  Goal: My pain/discomfort is manageable  11/28/2023 0714 by Drew Grant RN  Outcome: Progressing  11/28/2023 0713 by Drew Grant RN  Outcome: Progressing     Problem: Safety  Goal: Patient will be injury free during hospitalization  11/28/2023 0714 by Drew Grant RN  Outcome: Progressing  11/28/2023 0713 by Drew Grant RN  Outcome: Progressing  Goal: I will remain free of falls  11/28/2023 0714 by Drew Grant RN  Outcome: Progressing  11/28/2023 0713 by Drew Grant RN  Outcome: Progressing     Problem: Daily Care  Goal: Daily care needs are met  11/28/2023 0714 by Drew Grant RN  Outcome: Progressing  11/28/2023 0713 by Drew Grant RN  Outcome: Progressing     Problem: Psychosocial Needs  Goal: Demonstrates ability to cope with hospitalization/illness  11/28/2023 0714 by Drew Grant RN  Outcome: Progressing  11/28/2023 0713 by Drew Grant RN  Outcome: Progressing  Goal: Collaborate with me, my family, and caregiver to identify my specific goals  11/28/2023 0714 by Drew Grant RN  Outcome: Progressing  11/28/2023 0713 by Drew Grant RN  Outcome: Progressing     Problem: Discharge Barriers  Goal: My discharge needs are met  11/28/2023 0714 by Drew Grant RN  Outcome: Progressing  11/28/2023 0713 by Drew Grant RN  Outcome: Progressing     Problem: Skin  Goal: Decreased wound size/increased tissue granulation at next dressing change  11/28/2023 0714 by Drew Grant RN  Outcome: Progressing  11/28/2023 0713 by Drew Grant RN  Outcome: Progressing  Goal: Participates in plan/prevention/treatment measures  11/28/2023 0714 by Drew Grant RN  Outcome: Progressing  11/28/2023 0713 by Drew Grant RN  Outcome: Progressing  Goal: Prevent/manage excess moisture  11/28/2023 0714 by Drew Grant RN  Outcome: Progressing  11/28/2023 0713 by Drew Grant RN  Outcome: Progressing  Goal: Prevent/minimize sheer/friction injuries  11/28/2023 0714 by Drew Grant  RN  Outcome: Progressing  11/28/2023 0713 by Drew Grant RN  Outcome: Progressing  Goal: Promote/optimize nutrition  11/28/2023 0714 by Drew Grant RN  Outcome: Progressing  11/28/2023 0713 by Drew Grant RN  Outcome: Progressing     Problem: Pain - Adult  Goal: Verbalizes/displays adequate comfort level or baseline comfort level  11/28/2023 0714 by Drew Grant RN  Outcome: Progressing  11/28/2023 0713 by Drew Grant RN  Outcome: Progressing     Problem: Safety - Adult  Goal: Free from fall injury  11/28/2023 0714 by Drew Grant RN  Outcome: Progressing  11/28/2023 0713 by Drew Grant RN  Outcome: Progressing     Problem: Discharge Planning  Goal: Discharge to home or other facility with appropriate resources  11/28/2023 0714 by Drew Grant RN  Outcome: Progressing  11/28/2023 0713 by Drew Grant RN  Outcome: Progressing     Problem: Chronic Conditions and Co-morbidities  Goal: Patient's chronic conditions and co-morbidity symptoms are monitored and maintained or improved  11/28/2023 0714 by Drew Grant RN  Outcome: Progressing  11/28/2023 0713 by Drew Grant RN  Outcome: Progressing     Problem: Diabetes  Goal: Achieve decreasing blood glucose levels by end of shift  11/28/2023 0714 by Drew Grant RN  Outcome: Progressing  11/28/2023 0713 by Drew Grant RN  Outcome: Progressing  Goal: Increase stability of blood glucose readings by end of shift  11/28/2023 0714 by Drew Grant RN  Outcome: Progressing  11/28/2023 0713 by Drew Grant RN  Outcome: Progressing  Goal: Decrease in ketones present in urine by end of shift  11/28/2023 0714 by Drew Grant RN  Outcome: Progressing  11/28/2023 0713 by Drew Grant RN  Outcome: Progressing  Goal: Maintain electrolyte levels within acceptable range throughout shift  11/28/2023 0714 by Drew Grant RN  Outcome: Progressing  11/28/2023 0713 by Drew Grant RN  Outcome: Progressing  Goal: Maintain glucose levels >70mg/dl to  <250mg/dl throughout shift  11/28/2023 0714 by Drew Grant RN  Outcome: Progressing  11/28/2023 0713 by Drew Grant RN  Outcome: Progressing  Goal: No changes in neurological exam by end of shift  11/28/2023 0714 by Drew Grant RN  Outcome: Progressing  11/28/2023 0713 by Drew Grant RN  Outcome: Progressing  Goal: Learn about and adhere to nutrition recommendations by end of shift  11/28/2023 0714 by Drew Grant RN  Outcome: Progressing  11/28/2023 0713 by Drew Grant RN  Outcome: Progressing  Goal: Vital signs within normal range for age by end of shift  11/28/2023 0714 by Drew Grant RN  Outcome: Progressing  11/28/2023 0713 by Drew Grant RN  Outcome: Progressing  Goal: Increase self care and/or family involovement by end of shift  11/28/2023 0714 by Drew Grant RN  Outcome: Progressing  11/28/2023 0713 by Drew Grant RN  Outcome: Progressing  Goal: Receive DSME education by end of shift  11/28/2023 0714 by Drew Grant RN  Outcome: Progressing  11/28/2023 0713 by Drew Grant RN  Outcome: Progressing   The patient's goals for the shift include Patient Safety    The clinical goals for the shift include No falls this shift.    Over the shift, the patient did not make progress toward the following goals. Barriers to progression include weakness. Recommendations to address these barriers include pt/ot in the morning.

## 2023-11-28 NOTE — PROGRESS NOTES
INFECTIOUS DISEASES PROGRESS NOTE    Consulted / following patient for:  ESBL E. coli urinary tract infection and bacteremia    Subjective   Interval History:   Doing well, Tiwari catheter was replaced.  Was pleased that he received a visit from Dr. Choudhary       Objective   PHYSICAL EXAMINATION  Vital signs:  Visit Vitals  /67 (BP Location: Left arm, Patient Position: Lying)   Pulse 70   Temp 36.7 °C (98.1 °F) (Oral)   Resp 16      General: No acute distress  Heart:  S1, S2 normal  Abdomen:  Soft, nontender. No palpable organs or masses  Back:  No spinal or CVA tenderness  Genitalia: New Tiwari catheter draining clear urine     Antibiotics:  Meropenem day 5    Relevant Results  WBC: 5600    Results from last 72 hours   Lab Units 11/28/23  0551   CREATININE mg/dL 0.70   ANION GAP mmol/L 10   EGFR mL/min/1.73m*2 >90         Microbiology:  Urine and blood 11/24 growing MDR ESBL-producing E. coli, not susceptible to appropriate oral antimicrobial therapy  Blood 11/26: No growth     Imaging:  CT abdomen and pelvis: No hydronephrosis or obstruction      Assessment:  Sepsis due to ESBL E. coli bacteremia, present on admission  Catheter-associated ESBL E. coli, present on admission  It is unusual for bacteremic urinary tract infection to occur in the presence of a well-functioning Tiwari catheter.  With negative CT of the abdomen one wonders about a transient, unrecognized episode of Tiwari catheter obstruction leading to recurrent retention and subsequent bacteremia.  Patient is doing well now, but will clearly need close urologic follow-up and should be discharged with the functioning Tiwari catheter in place.      Plan/Recommendations:  Continue IV meropenem through 11/30 at 1200. Monitor for adverse effects, incl rash or diarrhea  He will follow up with Dr Funmi Edwards MD  [facility]  Office:  [phone]

## 2023-11-28 NOTE — CARE PLAN
Problem: Bathing  Goal: STG - Patient will bathe body UB/LB with minimal assist and AE  Outcome: Progressing     Problem: Dressings Lower Extremities  Goal: LTG - Patient will dress lower body with close supervision and AE as needed  Outcome: Progressing     Problem: Dressing Upper Extremities  Goal: LTG - Patient will complete upper body dressing indepdnent  Outcome: Progressing     Problem: Grooming  Goal:  Patient completes grooming independent  Outcome: Progressing     Problem: Mobility  Goal: perform functional mobility to and from the bathroom with 4ww and close supervision  Outcome: Progressing     Problem: Toileting  Goal: STG - Patient will complete toileting tasks with close supervision and 4ww  Outcome: Progressing     Problem: Transfers  Goal: Patient will transfer from one surface to another with close supervision and 4ww  Outcome: Progressing     Problem: Therapeutic Activity  Goal: Perform BUE exercise with close supervision  Outcome: Progressing

## 2023-11-29 LAB
ANION GAP SERPL CALC-SCNC: 10 MMOL/L
BUN SERPL-MCNC: 12 MG/DL (ref 8–25)
CALCIUM SERPL-MCNC: 8 MG/DL (ref 8.5–10.4)
CHLORIDE SERPL-SCNC: 107 MMOL/L (ref 97–107)
CO2 SERPL-SCNC: 22 MMOL/L (ref 24–31)
CREAT SERPL-MCNC: 0.7 MG/DL (ref 0.4–1.6)
ERYTHROCYTE [DISTWIDTH] IN BLOOD BY AUTOMATED COUNT: 12.9 % (ref 11.5–14.5)
GFR SERPL CREATININE-BSD FRML MDRD: >90 ML/MIN/1.73M*2
GLUCOSE BLD MANUAL STRIP-MCNC: 156 MG/DL (ref 74–99)
GLUCOSE BLD MANUAL STRIP-MCNC: 216 MG/DL (ref 74–99)
GLUCOSE BLD MANUAL STRIP-MCNC: 217 MG/DL (ref 74–99)
GLUCOSE BLD MANUAL STRIP-MCNC: 296 MG/DL (ref 74–99)
GLUCOSE SERPL-MCNC: 159 MG/DL (ref 65–99)
HCT VFR BLD AUTO: 28.1 % (ref 41–52)
HGB BLD-MCNC: 9.7 G/DL (ref 13.5–17.5)
MCH RBC QN AUTO: 34.4 PG (ref 26–34)
MCHC RBC AUTO-ENTMCNC: 34.5 G/DL (ref 32–36)
MCV RBC AUTO: 100 FL (ref 80–100)
NRBC BLD-RTO: 0 /100 WBCS (ref 0–0)
PLATELET # BLD AUTO: 151 X10*3/UL (ref 150–450)
POTASSIUM SERPL-SCNC: 3.6 MMOL/L (ref 3.4–5.1)
RBC # BLD AUTO: 2.82 X10*6/UL (ref 4.5–5.9)
SODIUM SERPL-SCNC: 139 MMOL/L (ref 133–145)
WBC # BLD AUTO: 6.5 X10*3/UL (ref 4.4–11.3)

## 2023-11-29 PROCEDURE — 82947 ASSAY GLUCOSE BLOOD QUANT: CPT

## 2023-11-29 PROCEDURE — 97116 GAIT TRAINING THERAPY: CPT | Mod: GP

## 2023-11-29 PROCEDURE — 80048 BASIC METABOLIC PNL TOTAL CA: CPT | Performed by: NURSE PRACTITIONER

## 2023-11-29 PROCEDURE — 2500000001 HC RX 250 WO HCPCS SELF ADMINISTERED DRUGS (ALT 637 FOR MEDICARE OP): Performed by: INTERNAL MEDICINE

## 2023-11-29 PROCEDURE — 2500000004 HC RX 250 GENERAL PHARMACY W/ HCPCS (ALT 636 FOR OP/ED): Performed by: INTERNAL MEDICINE

## 2023-11-29 PROCEDURE — 99232 SBSQ HOSP IP/OBS MODERATE 35: CPT | Performed by: INTERNAL MEDICINE

## 2023-11-29 PROCEDURE — 97110 THERAPEUTIC EXERCISES: CPT | Mod: GP

## 2023-11-29 PROCEDURE — 96372 THER/PROPH/DIAG INJ SC/IM: CPT | Performed by: INTERNAL MEDICINE

## 2023-11-29 PROCEDURE — 36415 COLL VENOUS BLD VENIPUNCTURE: CPT | Performed by: NURSE PRACTITIONER

## 2023-11-29 PROCEDURE — 85027 COMPLETE CBC AUTOMATED: CPT | Performed by: NURSE PRACTITIONER

## 2023-11-29 PROCEDURE — 2060000001 HC INTERMEDIATE ICU ROOM DAILY

## 2023-11-29 PROCEDURE — 2500000001 HC RX 250 WO HCPCS SELF ADMINISTERED DRUGS (ALT 637 FOR MEDICARE OP)

## 2023-11-29 PROCEDURE — 2500000002 HC RX 250 W HCPCS SELF ADMINISTERED DRUGS (ALT 637 FOR MEDICARE OP, ALT 636 FOR OP/ED): Performed by: INTERNAL MEDICINE

## 2023-11-29 RX ADMIN — FINASTERIDE 5 MG: 5 TABLET, FILM COATED ORAL at 09:04

## 2023-11-29 RX ADMIN — GABAPENTIN 100 MG: 100 CAPSULE ORAL at 09:04

## 2023-11-29 RX ADMIN — LEVETIRACETAM 250 MG: 250 TABLET, FILM COATED ORAL at 21:10

## 2023-11-29 RX ADMIN — INSULIN LISPRO 6 UNITS: 100 INJECTION, SOLUTION INTRAVENOUS; SUBCUTANEOUS at 12:11

## 2023-11-29 RX ADMIN — AMLODIPINE BESYLATE 5 MG: 5 TABLET ORAL at 09:04

## 2023-11-29 RX ADMIN — ENOXAPARIN SODIUM 40 MG: 100 INJECTION SUBCUTANEOUS at 09:04

## 2023-11-29 RX ADMIN — LEVETIRACETAM 250 MG: 250 TABLET, FILM COATED ORAL at 09:04

## 2023-11-29 RX ADMIN — DOCUSATE SODIUM 100 MG: 100 CAPSULE, LIQUID FILLED ORAL at 21:09

## 2023-11-29 RX ADMIN — ATORVASTATIN CALCIUM 40 MG: 40 TABLET, FILM COATED ORAL at 09:04

## 2023-11-29 RX ADMIN — POLYETHYLENE GLYCOL 3350 17 G: 17 POWDER, FOR SOLUTION ORAL at 09:03

## 2023-11-29 RX ADMIN — INSULIN GLARGINE 10 UNITS: 100 INJECTION, SOLUTION SUBCUTANEOUS at 21:19

## 2023-11-29 RX ADMIN — Medication 1 G: at 15:22

## 2023-11-29 RX ADMIN — FERROUS SULFATE TAB 325 MG (65 MG ELEMENTAL FE) 1 TABLET: 325 (65 FE) TAB at 09:04

## 2023-11-29 RX ADMIN — GABAPENTIN 100 MG: 100 CAPSULE ORAL at 21:09

## 2023-11-29 RX ADMIN — LISINOPRIL 20 MG: 20 TABLET ORAL at 09:04

## 2023-11-29 RX ADMIN — TAMSULOSIN HYDROCHLORIDE 0.4 MG: 0.4 CAPSULE ORAL at 21:10

## 2023-11-29 RX ADMIN — CLOPIDOGREL BISULFATE 75 MG: 75 TABLET ORAL at 09:04

## 2023-11-29 RX ADMIN — Medication 1 G: at 21:12

## 2023-11-29 RX ADMIN — DOCUSATE SODIUM 100 MG: 100 CAPSULE, LIQUID FILLED ORAL at 09:04

## 2023-11-29 RX ADMIN — INSULIN LISPRO 3 UNITS: 100 INJECTION, SOLUTION INTRAVENOUS; SUBCUTANEOUS at 09:02

## 2023-11-29 RX ADMIN — PANTOPRAZOLE SODIUM 40 MG: 40 TABLET, DELAYED RELEASE ORAL at 06:33

## 2023-11-29 RX ADMIN — Medication 1 G: at 05:14

## 2023-11-29 RX ADMIN — INSULIN LISPRO 6 UNITS: 100 INJECTION, SOLUTION INTRAVENOUS; SUBCUTANEOUS at 17:55

## 2023-11-29 RX ADMIN — ASPIRIN 162 MG: 81 TABLET, COATED ORAL at 09:04

## 2023-11-29 RX ADMIN — TAMSULOSIN HYDROCHLORIDE 0.4 MG: 0.4 CAPSULE ORAL at 09:04

## 2023-11-29 RX ADMIN — POLYETHYLENE GLYCOL 3350 17 G: 17 POWDER, FOR SOLUTION ORAL at 21:11

## 2023-11-29 ASSESSMENT — COGNITIVE AND FUNCTIONAL STATUS - GENERAL
DRESSING REGULAR LOWER BODY CLOTHING: A LOT
STANDING UP FROM CHAIR USING ARMS: A LOT
MOBILITY SCORE: 15
MOBILITY SCORE: 17
PERSONAL GROOMING: A LITTLE
DRESSING REGULAR UPPER BODY CLOTHING: A LITTLE
MOVING TO AND FROM BED TO CHAIR: A LITTLE
TURNING FROM BACK TO SIDE WHILE IN FLAT BAD: A LITTLE
TURNING FROM BACK TO SIDE WHILE IN FLAT BAD: A LOT
CLIMB 3 TO 5 STEPS WITH RAILING: A LOT
DAILY ACTIVITIY SCORE: 17
HELP NEEDED FOR BATHING: A LOT
MOVING TO AND FROM BED TO CHAIR: A LITTLE
WALKING IN HOSPITAL ROOM: A LITTLE
STANDING UP FROM CHAIR USING ARMS: A LITTLE
WALKING IN HOSPITAL ROOM: A LITTLE
CLIMB 3 TO 5 STEPS WITH RAILING: A LOT
TOILETING: A LITTLE
MOVING FROM LYING ON BACK TO SITTING ON SIDE OF FLAT BED WITH BEDRAILS: A LITTLE
MOVING FROM LYING ON BACK TO SITTING ON SIDE OF FLAT BED WITH BEDRAILS: A LITTLE

## 2023-11-29 ASSESSMENT — PAIN SCALES - GENERAL
PAINLEVEL_OUTOF10: 0 - NO PAIN
PAINLEVEL_OUTOF10: 0 - NO PAIN

## 2023-11-29 ASSESSMENT — PAIN SCALES - WONG BAKER: WONGBAKER_NUMERICALRESPONSE: NO HURT

## 2023-11-29 NOTE — PROGRESS NOTES
Subjective Data:  No new complaints    Overnight Events:    As described below     Objective Data:  Last Recorded Vitals:  Vitals:    11/28/23 1924 11/29/23 0044 11/29/23 0408 11/29/23 0741   BP: 156/61 149/53 159/66 171/56   BP Location: Left arm Right arm Right arm Right arm   Patient Position: Lying Lying Lying Lying   Pulse: 78 72 74 84   Resp: 18 16 16 18   Temp: 36.6 °C (97.9 °F) 36.5 °C (97.7 °F) 36.9 °C (98.4 °F) 36.5 °C (97.7 °F)   TempSrc: Temporal Temporal Temporal Temporal   SpO2: 97% 98% 97% 97%   Weight:       Height:           Last Labs:  CBC - 11/29/2023:  4:39 AM  6.5 9.7 151    28.1      CMP - 11/29/2023:  4:39 AM  8.0 5.7 59 --- 0.9   _ 3.0 20 56      PTT - 11/10/2023:  4:59 PM  1.2   12.3 22.2     HGBA1C   Date/Time Value Ref Range Status   11/15/2023 11:08 AM 6.7 See below % Final   09/14/2023 12:14 PM 6.8 % Final     Comment:          Diagnosis of Diabetes-Adults   Non-Diabetic: < or = 5.6%   Increased risk for developing diabetes: 5.7-6.4%   Diagnostic of diabetes: > or = 6.5%  .       Monitoring of Diabetes                Age (y)     Therapeutic Goal (%)   Adults:          >18           <7.0   Pediatrics:    13-18           <7.5                   7-12           <8.0                   0- 6            7.5-8.5   American Diabetes Association. Diabetes Care 33(S1), Jan 2010.   08/14/2023 02:45 PM 8.1 4.0 - 6.0 % Final     Comment:     Hemoglobin A1C levels are related to mean blood glucose during the   preceding 2-3 months. The relationship table below may be used as a   general guide. Each 1% increase in HGB A1C is a reflection of an   increase in mean glucose of approximately 30 mg/dl.   Reference: Diabetes Care, volume 29, supplement 1 Jan. 2006                        HGB A1C ................. Approx. Mean Glucose   _______________________________________________   6%   ...............................  120 mg/dl   7%   ...............................  150 mg/dl   8%    "...............................  180 mg/dl   9%   ...............................  210 mg/dl   10%  ...............................  240 mg/dl  Performed at [address] [address]     04/19/2023 04:03 PM 9.7 4.0 - 5.6 % Final   02/03/2023 09:12 AM 9.6 4.0 - 5.6 % Final     LDLCALC   Date/Time Value Ref Range Status   11/15/2023 11:08 AM 30 65 - 130 mg/dL Final   08/15/2023 05:50 AM 31 65 - 130 MG/DL Final   10/28/2022 05:39 AM 32 65 - 130 MG/DL Final   07/06/2021 09:50 AM 39 65 - 130 MG/DL Final     VLDL   Date/Time Value Ref Range Status   08/22/2022 10:17 AM 11 0 - 40 mg/dL Final   09/17/2019 01:20 PM 16 0 - 40 mg/dL Final      Last I/O:  I/O last 3 completed shifts:  In: 1141.4 (14.8 mL/kg) [P.O.:450; I.V.:691.4 (9 mL/kg)]  Out: 3800 (49.2 mL/kg) [Urine:3800 (1.4 mL/kg/hr)]  Weight: 77.2 kg     Past Cardiology Tests (Last 3 Years):  EKG:  ECG 12 lead 11/26/2023      ECG 12 lead daily 11/17/2023      ECG 12 lead daily 11/15/2023      ECG 12 lead daily 11/15/2023      ECG 12 lead 11/15/2023    Echo:  Transthoracic Echo (TTE) Limited 11/10/2023      Transthoracic Echo (TTE) Limited 11/10/2023    Ejection Fractions:  No results found for: \"EF\"  Cath:  No results found for this or any previous visit from the past 1095 days.    Stress Test:  Nuclear Stress Test 2/13/2023    Cardiac Imaging:  No results found for this or any previous visit from the past 1095 days.      Inpatient Medications:  Scheduled medications   Medication Dose Route Frequency    amLODIPine  5 mg oral Daily    aspirin  162 mg oral Daily    atorvastatin  40 mg oral Daily    clopidogrel  75 mg oral Daily    docusate sodium  100 mg oral BID    enoxaparin  40 mg subcutaneous q24h TAYLOR    ferrous sulfate (325 mg ferrous sulfate)  65 mg of iron oral Daily    finasteride  5 mg oral Daily    gabapentin  100 mg oral BID    insulin glargine  10 Units subcutaneous Nightly    insulin lispro  0-15 Units subcutaneous TID with meals    " levETIRAcetam  250 mg oral BID    lidocaine  1 patch transdermal Daily    lisinopril  20 mg oral Daily    meropenem  1 g intravenous q8h    pantoprazole  40 mg oral Daily before breakfast    polyethylene glycol  17 g oral BID    tamsulosin  0.4 mg oral BID     PRN medications   Medication    acetaminophen    Or    acetaminophen    acetaminophen    benzocaine-menthol    dextromethorphan-guaifenesin    dextrose 10 % in water (D10W)    dextrose    glucagon    guaiFENesin    ondansetron ODT    Or    ondansetron    polyethylene glycol     Continuous Medications   Medication Dose Last Rate    sodium chloride 0.9%  100 mL/hr 100 mL/hr (11/28/23 9328)       Physical Exam:  General: alert, oriented x2-3, very pleasant; son at bedside  HEENT: normal cephalic, atraumatic  Neck: No JVD, bruit or thrill, masses or tenderness   Heart: S1/S2, Rate 50, Rhythm irregular, no s3 or s4, no murmur, thrill, or heaves at PMI.   Lungs: Clear, equal expansion and excursion, no wheezes, crackles, rhales or rhonci.  Room air.  No conversational dyspnea appreciated.  No tachypnea.  No pain with deep aspiration   Abdomen: Overweight, bowel sounds x 4, soft, non-tender   Genitourinary: deferred   Extremities: No significant upper or lower extremity daylin appreciated.     Assessment/Plan   11/25: This elderly white male with an extensive past medical history including coronary artery disease, remote CABG, hypertension, hyperlipidemia, type 2 diabetes, paroxysmal atrial fibrillation, history of?  TIA versus recurrent localized focal seizures, history of GI bleeding on Eliquis anticoagulation, status post Watchman implantation is currently admitted with change in mental status with a 2-day period of low-grade fever somnolence confusion inability to eat ultimately identified on presentation as being a probable urinary tract infection with urosepsis from his chronic indwelling Tiwari catheter.  His renal parameters are stable with a creatinine of 1.2.   No leukocytosis currently with a WBC of only 11,700 on admission.  Initial urine culture is positive for 100,000 gram-negative rods as are 2 sets of blood cultures now identified as E. coli.  Patient has improved rapidly clinically following institution of IV antibiotic therapy with IV meropenem.  He will be maintained on his aspirin and Plavix as per watchman protocol.  Patient's preadmission lisinopril on hold to avoid hypotension related to urosepsis.  His atrial fibrillation has an adequate ventricular rate control in the absence of previously prescribed metoprolol.      11/26: The patient evidently developed a low-grade fever early yesterday evening 100.6 °F associated with a transient increase in blood pressure and heart rate.  He was given Tylenol and currently is afebrile.  He is lab values continuing to improve WBC now only 4600 hematocrit 33.0.  Renal renal parameters are at baseline with a creatinine of 1.10.  2 additional blood cultures were drawn earlier today with the urine and blood cultures done on 1124 been positive for E. coli.  The patient has been restarted on amlodipine and lisinopril for resumption of treatment for his systolic hypertension.  He does remain on aspirin and Plavix for his recently placed Watchman device.  He also remains on the Keppra 250 mg twice daily for suspected underlying seizure disorder.     11/27: Patient is lying in bed resting comfortably visiting with a family member.  He is awake alert conversant.  His atrial fibrillation has a controlled ventricular rate of approximately 80/min in the absence of any therapy.  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  He has had 5+ episodes of transient neurological symptoms with no evidence ever documented of CVA.  He did have the Watchman  device placed several weeks ago and will continue on with his aspirin and Plavix therapy.  Systolic blood pressures are in the 150 mmHg range with the reinstitution of the lisinopril plus amlodipine.  Patient has been seen by urology recommendation is to change Tiwari catheter.  Anticipate patient will be ready for transfer back to the rehab facility in 48-72 hours.  Electrolytes unremarkable creatinine 0.9 potassium of 4.0.  Hemoglobin is 9.5 currently.  Serum iron is 62 and ferritin 485.     11/28: Patient is awake alert conversant pleasant without any complaints.  His atrial fibrillation remains with a controlled ventricular response in the absence of any treatment.  His Tiwari catheter was exchanged yesterday.  Patient is being followed by infectious disease for his sepsis related to ESBL E. coli bacteremia currently on IV meropenem.  It is thought that the patient may have had a transient unrecognized obstruction of the original Tiwari catheter resulting in the recurrent urinary retention and subsequent bacteremia.  The patient will be following with urology as an outpatient.  Blood pressure under acceptable control systolic values 135-155 mmHg with the resumption of amlodipine and lisinopril.  Renal parameters stable creatinine 0.7 potassium 3.7.  No leukocytosis hemoglobin stable at 10.0.  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  Anticipate transfer back to rehab facility in the next 48 hours.    11/29: Patient resting comfortably visiting with family members.  There apparently was a miscommunication amongst the nurses such that the patient had his Tiwari catheter changed a second time yesterday.  The patient developed some paris blood after the repeat catheter insertion and it was removed once again and replaced with a new catheter.  The subsequent urine drainage has been clear.  The patient's atrial fibrillation has a controlled rate due to simple intrinsic AV charity conduction.  The  majority of the patient's systolic blood pressure readings are in the 140-150 mmHg range since the resumption of amlodipine and lisinopril.  The patient remains on his aspirin and Plavix for the Watchman device.  Electrolytes are unremarkable creatinine 0.7 potassium 3.6.  CBC stable hematocrit 28.1 WBC 6100.  Patient prepared for transfer back to his rehab facility.  Peripheral IV 11/24/23 20 G Right Antecubital (Active)   Site Assessment Clean;Dry;Intact 11/29/23 0000   Dressing Status Clean;Dry;Occlusive 11/29/23 0000   Number of days: 5       Peripheral IV 11/24/23 20 G Left;Anterior Hand (Active)   Site Assessment Clean;Dry;Intact 11/29/23 0000   Dressing Status Clean;Dry;Occlusive 11/29/23 0000   Number of days: 5       Urethral Catheter (Active)   Site Assessment Clean;Skin intact 11/29/23 0127   Number of days: 19       Code Status:  Full Code    I spent 20 minutes in the professional and overall care of this patient.        Luis Grant MD

## 2023-11-29 NOTE — PROGRESS NOTES
Physical Therapy    Physical Therapy Treatment    Patient Name: Luis Greene  MRN: [id number]  Today's Date: 11/29/2023  Time Calculation  Start Time: 1352  Stop Time: 1420  Time Calculation (min): 28 min       Assessment/Plan   PT Assessment  PT Assessment Results: Decreased strength, Decreased range of motion, Impaired balance, Decreased mobility, Decreased cognition, Impaired hearing, Impaired tone  Evaluation/Treatment Tolerance: Patient tolerated treatment well  End of Session Communication: Bedside nurse  Assessment Comment: pt progressing well; mild unsteadiness during gait training; no major LOB  End of Session Patient Position: Up in chair (call button in reach)  PT Plan  Inpatient/Swing Bed or Outpatient: Inpatient  PT Plan  Treatment/Interventions: Bed mobility, Transfer training, Gait training, Stair training, Balance training, Strengthening, Therapeutic exercise, Therapeutic activity  PT Plan: Skilled PT  PT Frequency: 4 times per week  PT Discharge Recommendations: Moderate intensity level of continued care  Equipment Recommended upon Discharge: Wheeled walker  PT Recommended Transfer Status: Assist x1 (standing/amb with FWW)  PT - OK to Discharge: Yes      General Visit Information:   PT  Visit  PT Received On: 11/29/23  General  Reason for Referral: sepsis; decreased functional status  Prior to Session Communication: Bedside nurse  Patient Position Received: Bed, 2 rail up  General Comment: pt pleasant and eager to walk    Subjective   Precautions:  Hearing/Visual Limitations: mild [place]  Medical Precautions: Fall precautions  Braces Applied: B AFO's and shoes.      Objective   Pain:  Pain Assessment:  (0/10)    Cognition:  Overall Cognitive Status: Within Functional Limits    Postural Control:  Posture Comment: mild head down posture with soft flexed knees and     Activity Tolerance:  Endurance:  (GOOD activity tolerance)    Treatments:  Therapeutic Exercise Performed:  B LE AROM therex: AP, GS, QS,  HS, ABD, SAQ, hip flexion x 15 reps    Bed Mobility:  (supine to sit with MIN A for trunk up. pt able to initiate B LE toward EOB. good sitting balance noted)    Ambulation/Gait Training Performed:  pt amb 65' x 1 using personal rollator with close MIN A. pt tends to shuffle B feet especially while manuevering turns. VC to increase step length, widen BETH and to keep head upright    Transfer:  donned B AFO/shoes prior to standing. sit to stand with MOD A. stand to sit MIN A       Outcome Measures:  [facility] Basic Mobility  Turning from your back to your side while in a flat bed without using bedrails: A little  Moving from lying on your back to sitting on the side of a flat bed without using bedrails: A lot  Moving to and from bed to chair (including a wheelchair): A little  Standing up from a chair using your arms (e.g. wheelchair or bedside chair): A lot  To walk in hospital room: A little  Climbing 3-5 steps with railing: A lot  Basic Mobility - Total Score: 15    OP EDUCATION:  Outpatient Education  Education Comment: safety techniques during mobility    Encounter Problems       Encounter Problems (Active)       Mobility       STG - Patient will ambulate >/= 50 ft with walker & close S. (Progressing)       Start:  11/25/23    Expected End:  12/10/23            STG - Patient will negotiate 3 stairs with unilateral hand rail with or without least restrictive cane at close S level. (Progressing)       Start:  11/25/23    Expected End:  12/10/23               Mobility       perform functional mobility to and from the bathroom with 4ww and close supervision (Progressing)       Start:  11/28/23    Expected End:  12/28/23               Pain - Adult          Transfers       STG - Patient to transfer supine<>sit using hospital bed at mod I level. (Progressing)       Start:  11/25/23    Expected End:  12/10/23            STG - Patient will transfer sit <>stand with walker at mod I level. (Progressing)       Start:  11/25/23     Expected End:  12/10/23               Transfers       Patient will transfer from one surface to another with close supervision and 4ww (Progressing)       Start:  11/28/23    Expected End:  12/28/23

## 2023-11-29 NOTE — NURSING NOTE
Continued care of patient, Repositioned. Still with infusion going on for hydration. Nothing new noted. Call light in reach.

## 2023-11-29 NOTE — PROGRESS NOTES
Luis Greene is a 83 y.o. male on day 5 of admission presenting with Sepsis (CMS/HCC).    Plan to discharge tomorrow after last dose of IV ATB, sole updated they are checking if they will have bed available.     **Patient does not have safe discharge plan, do not discharge without speaking to care coordination**     Tess Ravi RN

## 2023-11-29 NOTE — PROGRESS NOTES
INFECTIOUS DISEASES PROGRESS NOTE    Consulted / following patient for:  ESBL E. coli urinary tract infection and bacteremia    Subjective   Interval History:   (Sleeping soundly, not awakened)       Objective   PHYSICAL EXAMINATION  Vital signs:  Visit Vitals  BP (!) 132/44 (BP Location: Right arm, Patient Position: Lying)   Pulse 70   Temp 36.4 °C (97.5 °F) (Temporal)   Resp 24      General: No acute distress  Genitalia: Catheter draining clear urine     Antibiotics:  Meropenem day 6    Relevant Results  WBC: 6500    Results from last 72 hours   Lab Units 11/29/23  0439   CREATININE mg/dL 0.70   ANION GAP mmol/L 10   EGFR mL/min/1.73m*2 >90         Microbiology:  Urine and blood 11/24 growing MDR ESBL-producing E. coli, not susceptible to appropriate oral antimicrobial therapy  Blood 11/26: No growth     Imaging:  CT abdomen and pelvis: No hydronephrosis or obstruction      Assessment:  Sepsis due to ESBL E. coli bacteremia, present on admission  Catheter-associated ESBL E. coli, present on admission    Per review with Ms. Haynes, patient apparently had an additional urethral catheter change followed by some hematuria and question of malposition.  Catheter appears to be in good position and functioning well, with clear urine.  No evidence for recurrent sepsis     Plan/Recommendations:  Continue IV meropenem through 11/30 at 1200. Monitor for adverse effects, incl rash or diarrhea  He will follow up with Dr Funmi Edwards MD  [facility]  Office:  [phone]

## 2023-11-29 NOTE — CARE PLAN
Problem: Pain  Goal: My pain/discomfort is manageable  Outcome: Progressing     Problem: Safety  Goal: Patient will be injury free during hospitalization  Outcome: Progressing  Goal: I will remain free of falls  Outcome: Progressing     Problem: Daily Care  Goal: Daily care needs are met  Outcome: Progressing     Problem: Psychosocial Needs  Goal: Demonstrates ability to cope with hospitalization/illness  Outcome: Progressing  Goal: Collaborate with me, my family, and caregiver to identify my specific goals  Outcome: Progressing     Problem: Discharge Barriers  Goal: My discharge needs are met  Outcome: Progressing     Problem: Skin  Goal: Decreased wound size/increased tissue granulation at next dressing change  Outcome: Progressing  Goal: Participates in plan/prevention/treatment measures  Outcome: Progressing  Goal: Prevent/manage excess moisture  Outcome: Progressing  Goal: Prevent/minimize sheer/friction injuries  Outcome: Progressing  Goal: Promote/optimize nutrition  Outcome: Progressing     Problem: Pain - Adult  Goal: Verbalizes/displays adequate comfort level or baseline comfort level  Outcome: Progressing     Problem: Safety - Adult  Goal: Free from fall injury  Outcome: Progressing     Problem: Discharge Planning  Goal: Discharge to home or other facility with appropriate resources  Outcome: Progressing     Problem: Chronic Conditions and Co-morbidities  Goal: Patient's chronic conditions and co-morbidity symptoms are monitored and maintained or improved  Outcome: Progressing     Problem: Diabetes  Goal: Achieve decreasing blood glucose levels by end of shift  Outcome: Progressing  Goal: Increase stability of blood glucose readings by end of shift  Outcome: Progressing  Goal: Decrease in ketones present in urine by end of shift  Outcome: Progressing  Goal: Maintain electrolyte levels within acceptable range throughout shift  Outcome: Progressing  Goal: Maintain glucose levels >70mg/dl to <250mg/dl  throughout shift  Outcome: Progressing  Goal: No changes in neurological exam by end of shift  Outcome: Progressing  Goal: Learn about and adhere to nutrition recommendations by end of shift  Outcome: Progressing  Goal: Vital signs within normal range for age by end of shift  Outcome: Progressing  Goal: Increase self care and/or family involovement by end of shift  Outcome: Progressing  Goal: Receive DSME education by end of shift  Outcome: Progressing     Problem: Bathing  Goal: STG - Patient will bathe body UB/LB with minimal assist and AE  Outcome: Progressing     Problem: Dressings Lower Extremities  Goal: LTG - Patient will dress lower body with close supervision and AE as needed  Outcome: Progressing     Problem: Dressing Upper Extremities  Goal: LTG - Patient will complete upper body dressing indepdnent  Outcome: Progressing     Problem: Grooming  Goal:  Patient completes grooming independent  Outcome: Progressing     Problem: Toileting  Goal: STG - Patient will complete toileting tasks with close supervision and 4ww  Outcome: Progressing   The patient's goals for the shift include Patient Safety    The clinical goals for the shift include safety    Over the shift, the patient did not make progress toward the following goals. Barriers to progression include . Recommendations to address these barriers include .

## 2023-11-29 NOTE — NURSING NOTE
Patient seen for Wound Wednesday Skin assessment. Patient has skin tear to gluteal fissure, periwound red, blanchable. No other skin issues. Recommend Barrier cream and Border dressing, Waffle mattress and cushion. Patient on Advanta 2 bedsystem with Accumax mattress. Boston Haque RN notified. Waffle mattress left in room to be applied after patient finishes lunch.      Visit Date: 11/29/2023      Patient Name: Luis Greene         MRN: [id number]           YOB: 1940          Pertinent Labs:   Albumin   Date Value Ref Range Status   11/25/2023 3.0 (L) 3.5 - 5.0 g/dL Final   07/14/2023 4.1 3.4 - 5.0 g/dL Final       Wound Assessment:  Wound 11/29/23 Skin Tear Coccyx Mid (Active)   Wound Image   11/29/23 1100   Site Assessment Red 11/29/23 1100   [place]reea-Wound Assessment Blanchable erythema 11/29/23 1100   Non-staged Wound Description Partial thickness 11/29/23 1100   Wound Length (cm) 1.5 cm 11/29/23 1100   Wound Width (cm) 0.1 cm 11/29/23 1100   Wound Surface Area (cm^2) 0.15 cm^2 11/29/23 1100   Drainage Description Serous 11/29/23 1100   Drainage Amount Scant 11/29/23 1100   Dressing Open to air 11/29/23 1100          Nancy Jaime RN  11/29/2023  2:13 PM

## 2023-11-29 NOTE — NURSING NOTE
Care continued. Same previous assessment. Awake and quiet on bed. No complaint. Possible discharge if stable. Call light in reach.

## 2023-11-30 VITALS
TEMPERATURE: 97.7 F | SYSTOLIC BLOOD PRESSURE: 165 MMHG | WEIGHT: 170.19 LBS | RESPIRATION RATE: 16 BRPM | DIASTOLIC BLOOD PRESSURE: 56 MMHG | HEIGHT: 71 IN | OXYGEN SATURATION: 96 % | HEART RATE: 55 BPM | BODY MASS INDEX: 23.83 KG/M2

## 2023-11-30 PROBLEM — N39.0 URINARY TRACT INFECTION DUE TO EXTENDED-SPECTRUM BETA LACTAMASE (ESBL) PRODUCING ESCHERICHIA COLI: Status: ACTIVE | Noted: 2023-11-30

## 2023-11-30 PROBLEM — G92.9 TOXIC ENCEPHALOPATHY: Status: ACTIVE | Noted: 2023-11-30

## 2023-11-30 PROBLEM — Z16.12 URINARY TRACT INFECTION DUE TO EXTENDED-SPECTRUM BETA LACTAMASE (ESBL) PRODUCING ESCHERICHIA COLI: Status: ACTIVE | Noted: 2023-11-30

## 2023-11-30 PROBLEM — B96.29 URINARY TRACT INFECTION DUE TO EXTENDED-SPECTRUM BETA LACTAMASE (ESBL) PRODUCING ESCHERICHIA COLI: Status: ACTIVE | Noted: 2023-11-30

## 2023-11-30 PROBLEM — B96.20 BACTEREMIA DUE TO ESCHERICHIA COLI: Status: ACTIVE | Noted: 2023-11-30

## 2023-11-30 PROBLEM — R78.81 BACTEREMIA DUE TO ESCHERICHIA COLI: Status: ACTIVE | Noted: 2023-11-30

## 2023-11-30 PROBLEM — G93.41 METABOLIC ENCEPHALOPATHY: Status: ACTIVE | Noted: 2023-11-30

## 2023-11-30 PROBLEM — N17.9 ACUTE KIDNEY INJURY (CMS-HCC): Status: ACTIVE | Noted: 2023-11-30

## 2023-11-30 LAB
ANION GAP SERPL CALC-SCNC: 10 MMOL/L
BACTERIA BLD CULT: NORMAL
BACTERIA BLD CULT: NORMAL
BUN SERPL-MCNC: 12 MG/DL (ref 8–25)
CALCIUM SERPL-MCNC: 8 MG/DL (ref 8.5–10.4)
CHLORIDE SERPL-SCNC: 105 MMOL/L (ref 97–107)
CO2 SERPL-SCNC: 23 MMOL/L (ref 24–31)
CREAT SERPL-MCNC: 0.7 MG/DL (ref 0.4–1.6)
ERYTHROCYTE [DISTWIDTH] IN BLOOD BY AUTOMATED COUNT: 13.3 % (ref 11.5–14.5)
GFR SERPL CREATININE-BSD FRML MDRD: >90 ML/MIN/1.73M*2
GLUCOSE BLD MANUAL STRIP-MCNC: 182 MG/DL (ref 74–99)
GLUCOSE BLD MANUAL STRIP-MCNC: 192 MG/DL (ref 74–99)
GLUCOSE SERPL-MCNC: 229 MG/DL (ref 65–99)
HCT VFR BLD AUTO: 26.6 % (ref 41–52)
HGB BLD-MCNC: 9.2 G/DL (ref 13.5–17.5)
MAGNESIUM SERPL-MCNC: 1.6 MG/DL (ref 1.6–3.1)
MCH RBC QN AUTO: 34.8 PG (ref 26–34)
MCHC RBC AUTO-ENTMCNC: 34.6 G/DL (ref 32–36)
MCV RBC AUTO: 101 FL (ref 80–100)
NRBC BLD-RTO: 0 /100 WBCS (ref 0–0)
PLATELET # BLD AUTO: 155 X10*3/UL (ref 150–450)
POTASSIUM SERPL-SCNC: 3.3 MMOL/L (ref 3.4–5.1)
RBC # BLD AUTO: 2.64 X10*6/UL (ref 4.5–5.9)
SODIUM SERPL-SCNC: 138 MMOL/L (ref 133–145)
WBC # BLD AUTO: 6.3 X10*3/UL (ref 4.4–11.3)

## 2023-11-30 PROCEDURE — 2500000004 HC RX 250 GENERAL PHARMACY W/ HCPCS (ALT 636 FOR OP/ED): Performed by: NURSE PRACTITIONER

## 2023-11-30 PROCEDURE — 82374 ASSAY BLOOD CARBON DIOXIDE: CPT | Performed by: NURSE PRACTITIONER

## 2023-11-30 PROCEDURE — 97535 SELF CARE MNGMENT TRAINING: CPT | Mod: GO,CO

## 2023-11-30 PROCEDURE — 85027 COMPLETE CBC AUTOMATED: CPT | Performed by: NURSE PRACTITIONER

## 2023-11-30 PROCEDURE — 2500000004 HC RX 250 GENERAL PHARMACY W/ HCPCS (ALT 636 FOR OP/ED): Performed by: INTERNAL MEDICINE

## 2023-11-30 PROCEDURE — 2500000001 HC RX 250 WO HCPCS SELF ADMINISTERED DRUGS (ALT 637 FOR MEDICARE OP)

## 2023-11-30 PROCEDURE — 82947 ASSAY GLUCOSE BLOOD QUANT: CPT

## 2023-11-30 PROCEDURE — 2500000001 HC RX 250 WO HCPCS SELF ADMINISTERED DRUGS (ALT 637 FOR MEDICARE OP): Performed by: INTERNAL MEDICINE

## 2023-11-30 PROCEDURE — 99232 SBSQ HOSP IP/OBS MODERATE 35: CPT | Performed by: INTERNAL MEDICINE

## 2023-11-30 PROCEDURE — 97530 THERAPEUTIC ACTIVITIES: CPT | Mod: GO,CO

## 2023-11-30 PROCEDURE — 36415 COLL VENOUS BLD VENIPUNCTURE: CPT | Performed by: NURSE PRACTITIONER

## 2023-11-30 PROCEDURE — 96372 THER/PROPH/DIAG INJ SC/IM: CPT | Performed by: INTERNAL MEDICINE

## 2023-11-30 PROCEDURE — 83735 ASSAY OF MAGNESIUM: CPT | Performed by: NURSE PRACTITIONER

## 2023-11-30 RX ORDER — AMLODIPINE BESYLATE 5 MG/1
5 TABLET ORAL DAILY
Start: 2023-11-30 | End: 2023-12-26 | Stop reason: HOSPADM

## 2023-11-30 RX ORDER — ACETAMINOPHEN 325 MG/1
650 TABLET ORAL EVERY 6 HOURS PRN
COMMUNITY

## 2023-11-30 RX ORDER — POTASSIUM CHLORIDE 20 MEQ/1
40 TABLET, EXTENDED RELEASE ORAL ONCE
Status: COMPLETED | OUTPATIENT
Start: 2023-11-30 | End: 2023-11-30

## 2023-11-30 RX ORDER — LANOLIN ALCOHOL/MO/W.PET/CERES
400 CREAM (GRAM) TOPICAL DAILY
Start: 2023-11-30

## 2023-11-30 RX ORDER — LANOLIN ALCOHOL/MO/W.PET/CERES
1000 CREAM (GRAM) TOPICAL DAILY
Status: DISCONTINUED | OUTPATIENT
Start: 2023-11-30 | End: 2023-11-30 | Stop reason: HOSPADM

## 2023-11-30 RX ORDER — LIDOCAINE 560 MG/1
1 PATCH PERCUTANEOUS; TOPICAL; TRANSDERMAL DAILY
Start: 2023-11-30 | End: 2024-01-17 | Stop reason: ALTCHOICE

## 2023-11-30 RX ORDER — LANOLIN ALCOHOL/MO/W.PET/CERES
400 CREAM (GRAM) TOPICAL DAILY
Status: DISCONTINUED | OUTPATIENT
Start: 2023-11-30 | End: 2023-11-30 | Stop reason: HOSPADM

## 2023-11-30 RX ORDER — GUAIFENESIN 600 MG/1
600 TABLET, EXTENDED RELEASE ORAL 2 TIMES DAILY
Status: ON HOLD | COMMUNITY
End: 2023-12-18

## 2023-11-30 RX ORDER — CRANBERRY FRUIT 500 MG
TABLET,CHEWABLE ORAL
COMMUNITY
End: 2024-01-17 | Stop reason: ALTCHOICE

## 2023-11-30 RX ORDER — UBIDECARENONE 75 MG
500 CAPSULE ORAL 2 TIMES WEEKLY
COMMUNITY

## 2023-11-30 RX ADMIN — GABAPENTIN 100 MG: 100 CAPSULE ORAL at 09:18

## 2023-11-30 RX ADMIN — FERROUS SULFATE TAB 325 MG (65 MG ELEMENTAL FE) 1 TABLET: 325 (65 FE) TAB at 09:18

## 2023-11-30 RX ADMIN — CLOPIDOGREL BISULFATE 75 MG: 75 TABLET ORAL at 09:17

## 2023-11-30 RX ADMIN — TAMSULOSIN HYDROCHLORIDE 0.4 MG: 0.4 CAPSULE ORAL at 09:17

## 2023-11-30 RX ADMIN — LISINOPRIL 20 MG: 20 TABLET ORAL at 09:17

## 2023-11-30 RX ADMIN — POTASSIUM CHLORIDE 40 MEQ: 1500 TABLET, EXTENDED RELEASE ORAL at 09:17

## 2023-11-30 RX ADMIN — Medication 1 G: at 05:49

## 2023-11-30 RX ADMIN — INSULIN LISPRO 3 UNITS: 100 INJECTION, SOLUTION INTRAVENOUS; SUBCUTANEOUS at 09:24

## 2023-11-30 RX ADMIN — FINASTERIDE 5 MG: 5 TABLET, FILM COATED ORAL at 09:18

## 2023-11-30 RX ADMIN — LEVETIRACETAM 250 MG: 250 TABLET, FILM COATED ORAL at 09:17

## 2023-11-30 RX ADMIN — PANTOPRAZOLE SODIUM 40 MG: 40 TABLET, DELAYED RELEASE ORAL at 06:20

## 2023-11-30 RX ADMIN — ENOXAPARIN SODIUM 40 MG: 100 INJECTION SUBCUTANEOUS at 09:00

## 2023-11-30 RX ADMIN — AMLODIPINE BESYLATE 5 MG: 5 TABLET ORAL at 09:18

## 2023-11-30 RX ADMIN — ASPIRIN 162 MG: 81 TABLET, COATED ORAL at 09:18

## 2023-11-30 RX ADMIN — ATORVASTATIN CALCIUM 40 MG: 40 TABLET, FILM COATED ORAL at 09:17

## 2023-11-30 ASSESSMENT — COGNITIVE AND FUNCTIONAL STATUS - GENERAL
DRESSING REGULAR LOWER BODY CLOTHING: A LOT
DAILY ACTIVITIY SCORE: 20
HELP NEEDED FOR BATHING: A LITTLE
TOILETING: A LITTLE

## 2023-11-30 ASSESSMENT — ACTIVITIES OF DAILY LIVING (ADL): HOME_MANAGEMENT_TIME_ENTRY: 16

## 2023-11-30 ASSESSMENT — PAIN SCALES - GENERAL: PAINLEVEL_OUTOF10: 0 - NO PAIN

## 2023-11-30 ASSESSMENT — PAIN - FUNCTIONAL ASSESSMENT: PAIN_FUNCTIONAL_ASSESSMENT: 0-10

## 2023-11-30 NOTE — PROGRESS NOTES
Subjective Data:  The patient is resting comfortably    Overnight Events:    No significant overnight events.     Objective Data:  Last Recorded Vitals:  Vitals:    11/29/23 2017 11/29/23 2350 11/30/23 0359 11/30/23 0720   BP: (!) 153/48 141/55 156/54 160/57   BP Location: Left arm Right arm Left arm Left arm   Patient Position: Lying Lying Lying Lying   Pulse: 94 72 67 61   Resp: 19 17 18 16   Temp: 36.9 °C (98.4 °F) 36.7 °C (98.1 °F) 36.6 °C (97.9 °F) 36.7 °C (98.1 °F)   TempSrc: Temporal Temporal Temporal Temporal   SpO2: 98% 96% 99% 98%   Weight:       Height:           Last Labs:  CBC - 11/30/2023:  5:05 AM  6.3 9.2 155    26.6      CMP - 11/30/2023:  5:05 AM  8.0 5.7 59 --- 0.9   _ 3.0 20 56      PTT - 11/10/2023:  4:59 PM  1.2   12.3 22.2     HGBA1C   Date/Time Value Ref Range Status   11/15/2023 11:08 AM 6.7 See below % Final   09/14/2023 12:14 PM 6.8 % Final     Comment:          Diagnosis of Diabetes-Adults   Non-Diabetic: < or = 5.6%   Increased risk for developing diabetes: 5.7-6.4%   Diagnostic of diabetes: > or = 6.5%  .       Monitoring of Diabetes                Age (y)     Therapeutic Goal (%)   Adults:          >18           <7.0   Pediatrics:    13-18           <7.5                   7-12           <8.0                   0- 6            7.5-8.5   American Diabetes Association. Diabetes Care 33(S1), Jan 2010.   08/14/2023 02:45 PM 8.1 4.0 - 6.0 % Final     Comment:     Hemoglobin A1C levels are related to mean blood glucose during the   preceding 2-3 months. The relationship table below may be used as a   general guide. Each 1% increase in HGB A1C is a reflection of an   increase in mean glucose of approximately 30 mg/dl.   Reference: Diabetes Care, volume 29, supplement 1 Jan. 2006                        HGB A1C ................. Approx. Mean Glucose   _______________________________________________   6%   ...............................  120 mg/dl   7%   ...............................  150  "mg/dl   8%   ...............................  180 mg/dl   9%   ...............................  210 mg/dl   10%  ...............................  240 mg/dl  Performed at [address] [address]     04/19/2023 04:03 PM 9.7 4.0 - 5.6 % Final   02/03/2023 09:12 AM 9.6 4.0 - 5.6 % Final     LDLCALC   Date/Time Value Ref Range Status   11/15/2023 11:08 AM 30 65 - 130 mg/dL Final   08/15/2023 05:50 AM 31 65 - 130 MG/DL Final   10/28/2022 05:39 AM 32 65 - 130 MG/DL Final   07/06/2021 09:50 AM 39 65 - 130 MG/DL Final     VLDL   Date/Time Value Ref Range Status   08/22/2022 10:17 AM 11 0 - 40 mg/dL Final   09/17/2019 01:20 PM 16 0 - 40 mg/dL Final      Last I/O:  I/O last 3 completed shifts:  In: 300 (3.9 mL/kg) [I.V.:300 (3.9 mL/kg)]  Out: 2335 (30.2 mL/kg) [Urine:2335 (0.8 mL/kg/hr)]  Weight: 77.2 kg     Past Cardiology Tests (Last 3 Years):  EKG:  ECG 12 lead 11/26/2023      ECG 12 lead daily 11/17/2023      ECG 12 lead daily 11/15/2023      ECG 12 lead daily 11/15/2023      ECG 12 lead 11/15/2023    Echo:  Transthoracic Echo (TTE) Limited 11/10/2023      Transthoracic Echo (TTE) Limited 11/10/2023    Ejection Fractions:  No results found for: \"EF\"  Cath:  No results found for this or any previous visit from the past 1095 days.    Stress Test:  Nuclear Stress Test 01/24/2022    Cardiac Imaging:  No results found for this or any previous visit from the past 1095 days.      Inpatient Medications:  Scheduled medications   Medication Dose Route Frequency    amLODIPine  5 mg oral Daily    aspirin  162 mg oral Daily    atorvastatin  40 mg oral Daily    clopidogrel  75 mg oral Daily    docusate sodium  100 mg oral BID    enoxaparin  40 mg subcutaneous q24h TAYLOR    ferrous sulfate (325 mg ferrous sulfate)  65 mg of iron oral Daily    finasteride  5 mg oral Daily    gabapentin  100 mg oral BID    insulin glargine  10 Units subcutaneous Nightly    insulin lispro  0-15 Units subcutaneous TID with meals    " levETIRAcetam  250 mg oral BID    lidocaine  1 patch transdermal Daily    lisinopril  20 mg oral Daily    pantoprazole  40 mg oral Daily before breakfast    polyethylene glycol  17 g oral BID    tamsulosin  0.4 mg oral BID     PRN medications   Medication    acetaminophen    Or    acetaminophen    acetaminophen    benzocaine-menthol    dextromethorphan-guaifenesin    dextrose 10 % in water (D10W)    dextrose    glucagon    guaiFENesin    ondansetron ODT    Or    ondansetron    polyethylene glycol     Continuous Medications   Medication Dose Last Rate       Physical Exam:  General: alert, oriented x2-3, very pleasant; son at bedside  HEENT: normal cephalic, atraumatic  Neck: No JVD, bruit or thrill, masses or tenderness   Heart: S1/S2, Rate 50, Rhythm irregular, no s3 or s4, no murmur, thrill, or heaves at PMI.   Lungs: Clear, equal expansion and excursion, no wheezes, crackles, rhales or rhonci.  Room air.  No conversational dyspnea appreciated.  No tachypnea.  No pain with deep aspiration   Abdomen: Overweight, bowel sounds x 4, soft, non-tender   Genitourinary: deferred   Extremities: No significant upper or lower extremity daylin appreciated.     Assessment/Plan   11/25: This elderly white male with an extensive past medical history including coronary artery disease, remote CABG, hypertension, hyperlipidemia, type 2 diabetes, paroxysmal atrial fibrillation, history of?  TIA versus recurrent localized focal seizures, history of GI bleeding on Eliquis anticoagulation, status post Watchman implantation is currently admitted with change in mental status with a 2-day period of low-grade fever somnolence confusion inability to eat ultimately identified on presentation as being a probable urinary tract infection with urosepsis from his chronic indwelling Tiwari catheter.  His renal parameters are stable with a creatinine of 1.2.  No leukocytosis currently with a WBC of only 11,700 on admission.  Initial urine culture is  positive for 100,000 gram-negative rods as are 2 sets of blood cultures now identified as E. coli.  Patient has improved rapidly clinically following institution of IV antibiotic therapy with IV meropenem.  He will be maintained on his aspirin and Plavix as per watchman protocol.  Patient's preadmission lisinopril on hold to avoid hypotension related to urosepsis.  His atrial fibrillation has an adequate ventricular rate control in the absence of previously prescribed metoprolol.      11/26: The patient evidently developed a low-grade fever early yesterday evening 100.6 °F associated with a transient increase in blood pressure and heart rate.  He was given Tylenol and currently is afebrile.  He is lab values continuing to improve WBC now only 4600 hematocrit 33.0.  Renal renal parameters are at baseline with a creatinine of 1.10.  2 additional blood cultures were drawn earlier today with the urine and blood cultures done on 1124 been positive for E. coli.  The patient has been restarted on amlodipine and lisinopril for resumption of treatment for his systolic hypertension.  He does remain on aspirin and Plavix for his recently placed Watchman device.  He also remains on the Keppra 250 mg twice daily for suspected underlying seizure disorder.     11/27: Patient is lying in bed resting comfortably visiting with a family member.  He is awake alert conversant.  His atrial fibrillation has a controlled ventricular rate of approximately 80/min in the absence of any therapy.  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  He has had 5+ episodes of transient neurological symptoms with no evidence ever documented of CVA.  He did have the Watchman device placed several weeks ago and will continue on with his aspirin and Plavix therapy.   Systolic blood pressures are in the 150 mmHg range with the reinstitution of the lisinopril plus amlodipine.  Patient has been seen by urology recommendation is to change Tiwari catheter.  Anticipate patient will be ready for transfer back to the rehab facility in 48-72 hours.  Electrolytes unremarkable creatinine 0.9 potassium of 4.0.  Hemoglobin is 9.5 currently.  Serum iron is 62 and ferritin 485.     11/28: Patient is awake alert conversant pleasant without any complaints.  His atrial fibrillation remains with a controlled ventricular response in the absence of any treatment.  His Tiwari catheter was exchanged yesterday.  Patient is being followed by infectious disease for his sepsis related to ESBL E. coli bacteremia currently on IV meropenem.  It is thought that the patient may have had a transient unrecognized obstruction of the original Tiwari catheter resulting in the recurrent urinary retention and subsequent bacteremia.  The patient will be following with urology as an outpatient.  Blood pressure under acceptable control systolic values 135-155 mmHg with the resumption of amlodipine and lisinopril.  Renal parameters stable creatinine 0.7 potassium 3.7.  No leukocytosis hemoglobin stable at 10.0.  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  Anticipate transfer back to rehab facility in the next 48 hours.     11/29: Patient resting comfortably visiting with family members.  There apparently was a miscommunication amongst the nurses such that the patient had his Tiwari catheter changed a second time yesterday.  The patient developed some paris blood after the repeat catheter insertion and it was removed once again and replaced with a new catheter.  The subsequent urine drainage has been clear.  The patient's atrial fibrillation has a controlled rate due to simple intrinsic AV charity conduction.  The majority of the patient's systolic blood pressure readings are in the 140-150 mmHg range since  the resumption of amlodipine and lisinopril.  The patient remains on his aspirin and Plavix for the Watchman device.  Electrolytes are unremarkable creatinine 0.7 potassium 3.6.  CBC stable hematocrit 28.1 WBC 6100.  Patient prepared for transfer back to his rehab facility.    11/30: Patient is resting comfortably without any new complaints.  No recurrence of hematuria with current Tiwari catheter in place.  Patient has had some intermittent bladder spasms but medication for such has been deferred due to the reluctance to potentially promote urinary retention.  As per urology the patient may have trial of voiding done as an outpatient.  He is remained on IV meropenem.  Mental status remains at usual baseline and he is the Keppra empirically for suspected focal seizure activity and will be seeing neurology on an outpatient basis. The patient is tentatively being scheduled for transfer back to [facility] for rehabilitation.  Continue present cardiac therapies.  Peripheral IV 11/24/23 20 G Left;Anterior Hand (Active)   Site Assessment Clean;Dry;Intact 11/30/23 0800   Dressing Status Dry;Clean 11/30/23 0800   Number of days: 6       Urethral Catheter (Active)   Site Assessment Clean;Skin intact 11/30/23 0838   Number of days: 20       Code Status:  Full Code    I spent 20 minutes in the professional and overall care of this patient.        Luis Grant MD

## 2023-11-30 NOTE — PROGRESS NOTES
"Luis Greene is a 83 y.o. male on day 5 of admission presenting with Sepsis (CMS/HCC).    Subjective   Reports some mild bladder spasms.  Tiwari in place.         Objective     Physical Exam  Alert, oriented  Normal resp effort  Abdomen soft, NT, ND    Last Recorded Vitals  Blood pressure (!) 153/48, pulse 94, temperature 36.9 °C (98.4 °F), temperature source Temporal, resp. rate 19, height 1.803 m (5' 11\"), weight 77.2 kg (170 lb 3.1 oz), SpO2 98 %.  Intake/Output last 3 Shifts:  I/O last 3 completed shifts:  In: 100 (1.3 mL/kg) [I.V.:100 (1.3 mL/kg)]  Out: 2785 (36.1 mL/kg) [Urine:2785 (1 mL/kg/hr)]  Weight: 77.2 kg     Relevant Results  Scheduled medications  amLODIPine, 5 mg, oral, Daily  aspirin, 162 mg, oral, Daily  atorvastatin, 40 mg, oral, Daily  clopidogrel, 75 mg, oral, Daily  docusate sodium, 100 mg, oral, BID  enoxaparin, 40 mg, subcutaneous, q24h TAYLOR  ferrous sulfate (325 mg ferrous sulfate), 65 mg of iron, oral, Daily  finasteride, 5 mg, oral, Daily  gabapentin, 100 mg, oral, BID  insulin glargine, 10 Units, subcutaneous, Nightly  insulin lispro, 0-15 Units, subcutaneous, TID with meals  levETIRAcetam, 250 mg, oral, BID  lidocaine, 1 patch, transdermal, Daily  lisinopril, 20 mg, oral, Daily  meropenem, 1 g, intravenous, q8h  pantoprazole, 40 mg, oral, Daily before breakfast  polyethylene glycol, 17 g, oral, BID  tamsulosin, 0.4 mg, oral, BID      Continuous medications     PRN medications  PRN medications: [DISCONTINUED] acetaminophen **OR** acetaminophen **OR** acetaminophen, acetaminophen, benzocaine-menthol, dextromethorphan-guaifenesin, dextrose 10 % in water (D10W), dextrose, glucagon, guaiFENesin, ondansetron ODT **OR** ondansetron, polyethylene glycol    Results for orders placed or performed during the hospital encounter of 11/24/23 (from the past 24 hour(s))   Basic Metabolic Panel   Result Value Ref Range    Glucose 159 (H) 65 - 99 mg/dL    Sodium 139 133 - 145 mmol/L    Potassium 3.6 3.4 - " 5.1 mmol/L    Chloride 107 97 - 107 mmol/L    Bicarbonate 22 (L) 24 - 31 mmol/L    Urea Nitrogen 12 8 - 25 mg/dL    Creatinine 0.70 0.40 - 1.60 mg/dL    eGFR >90 >60 mL/min/1.73m*2    Calcium 8.0 (L) 8.5 - 10.4 mg/dL    Anion Gap 10 <=19 mmol/L   CBC   Result Value Ref Range    WBC 6.5 4.4 - 11.3 x10*3/uL    nRBC 0.0 0.0 - 0.0 /100 WBCs    RBC 2.82 (L) 4.50 - 5.90 x10*6/uL    Hemoglobin 9.7 (L) 13.5 - 17.5 g/dL    Hematocrit 28.1 (L) 41.0 - 52.0 %     80 - 100 fL    MCH 34.4 (H) 26.0 - 34.0 pg    MCHC 34.5 32.0 - 36.0 g/dL    RDW 12.9 11.5 - 14.5 %    Platelets 151 150 - 450 x10*3/uL   POCT GLUCOSE   Result Value Ref Range    POCT Glucose 156 (H) 74 - 99 mg/dL   POCT GLUCOSE   Result Value Ref Range    POCT Glucose 217 (H) 74 - 99 mg/dL   POCT GLUCOSE   Result Value Ref Range    POCT Glucose 216 (H) 74 - 99 mg/dL   POCT GLUCOSE   Result Value Ref Range    POCT Glucose 296 (H) 74 - 99 mg/dL       XR hip right 2 or 3 views    Result Date: 11/27/2023  Interpreted By:  Diya Pérez, STUDY: XR HIP RIGHT 2 OR 3 VIEWS; ;  11/27/2023 4:19 pm   INDICATION: Signs/Symptoms:pain s/p fall. Pain   COMPARISON: 07/28/2021   ACCESSION NUMBER(S): [id number]   ORDERING CLINICIAN: GINGER STRANGE   TECHNIQUE: Single view AP pelvis and AP and lateral oblique views of the right hip   FINDINGS: Right hip demonstrates osteophytosis about the acetabular rim with subcortical sclerosis demonstrated. No significant cystic change. There is mild osteophytosis about the right femoral head neck junction. Cortical and trabecular lines are maintained within the right femoral head and neck. Subtle curvilinear area of sclerosis along the right femoral head neck junction relation to rim osteophyte formation about the femoral head neck junction. Intertrochanteric femur is unremarkable. Diffuse vascular calcification is demonstrated.   Osseous pelvis demonstrates intact iliopectineal and ilioischial lines. Visualized iliac wings are  unremarkable. Laminectomy changes lower lumbar spine.       1. No acute osseous right hip     MACRO: None   Signed by: Diya Pérez 11/27/2023 4:27 PM Dictation workstation:   [id number]    ECG 12 lead    Result Date: 11/26/2023  Atrial fibrillation with rapid ventricular response Right bundle branch block Abnormal ECG When compared with ECG of 12-NOV-2023 06:22, Vent. rate has increased BY  58 BPM Confirmed by Adelso Phillips ([id number]) on 11/26/2023 4:14:33 PM    CT abdomen pelvis wo IV contrast    Result Date: 11/25/2023  Interpreted By:  Martha Coleman, STUDY: CT ABDOMEN PELVIS WO IV CONTRAST; 11/25/2023 2:57 pm   INDICATION: Signs/Symptoms:sepsis, gram negative bacteremia;   COMPARISON: None   ACCESSION NUMBER(S): [id number]   ORDERING CLINICIAN: MICH SMITH   TECHNIQUE: Contiguous axial images of the abdomen/pelvis were performed. All CT examinations are performed with 1 or more of the following dose reduction techniques: Automated exposure control, adjustment of mA and/or kv according to patient's size, or use of iterative reconstruction techniques.   FINDINGS: Evaluation of the solid and hollow viscera are somewhat limited without the administration of intravenous or oral contrast.   The liver,  common bile duct, pancreas, spleen, and adrenal glands are unremarkable. Cholelithiasis without evidence of cholecystitis.   The kidneys are grossly unremarkable. No urolithiasis is seen.  No hydroureteronephrosis is seen.   The visualized aorta is unremarkable.   The small bowel is not dilated. Moderate fecal burden.   No free intraperitoneal air or fluid is seen.   There is a Tiwari catheter in place.   The visualized osseous structures are intact. Moderate degenerative changes of the lumbosacral spine.   Limited images of the lower thorax are unremarkable.       No acute abdominal or pelvic pathology.   MACRO: None.   Signed by: Martha Coleman 11/25/2023 3:24 PM Dictation workstation:    [id number]    CT head wo IV contrast    Result Date: 11/24/2023  Interpreted By:  González Quintanilla, STUDY: CT HEAD WO IV CONTRAST;  11/24/2023 1:20 pm   INDICATION: Signs/Symptoms:Fall, altered mental status.   COMPARISON: Brain MRI dated 11/13/2023.   ACCESSION NUMBER(S): [id number]   ORDERING CLINICIAN: GINGER STRANGE   TECHNIQUE: Noncontrast axial CT scan of head was performed. Angled reformats in brain and bone windows were generated. The images were reviewed in bone, brain, blood and soft tissue windows.   FINDINGS: CSF Spaces: Ex vacuo dilation of the ventricles. The sulci and basal cisterns are within normal limits. There is no extraaxial fluid collection.   Parenchyma: Chronic bilateral white matter microvascular disease. There is no mass effect or midline shift.  There is no intracranial hemorrhage.   Calvarium: The calvarium is unremarkable.   Paranasal sinuses and mastoids: Visualized paranasal sinuses and mastoids are clear.       No acute intracranial findings.   MACRO: None   Signed by: González Quintanilla 11/24/2023 1:42 PM Dictation workstation:   [id number]    XR chest 2 views    Addendum Date: 11/24/2023    Interpreted By:  Jesus Briggs, ADDENDUM: AP and lateral projection.   Signed by: Jesus Briggs 11/24/2023 10:55 AM   -------- ORIGINAL REPORT -------- Dictation workstation:   [id number]    Result Date: 11/24/2023  Interpreted By:  Jesus Briggs, STUDY: XR CHEST 2 VIEWS; 11/24/2023 10:40 am   INDICATION: Signs/Symptoms:fever   COMPARISON: 11/17/2023   ACCESSION NUMBER(S): [id number]   ORDERING CLINICIAN: DOUGLAS PIERSON   FINDINGS: Watchman device is seen. Sternotomy wires are noted. Overlying cardiac monitoring device is seen.   Rotator cuff anchor is noted on the right.   The cardiomediastinal silhouette is enlarged but stable. The lungs are clear. No pleural effusion is identified.   The osseous structures are intact. Thoracic spondylosis is noted.       No acute cardiopulmonary process.    MACRO: None   Signed by: Jesus Briggs 11/24/2023 10:52 AM Dictation workstation:   [id number]    ECG 12 lead daily    Result Date: 11/17/2023  Atrial fibrillation Right bundle branch block T wave abnormality, consider inferior ischemia Abnormal ECG When compared with ECG of 02-MAY-2023 14:53, Previous ECG has undetermined rhythm, needs review Confirmed by Jam Wang ([id number]) on 11/17/2023 4:15:05 PM    XR chest 2 views    Result Date: 11/17/2023  Interpreted By:  Low Camejo, STUDY: XR CHEST 2 VIEWS; 11/17/2023 1:52 pm   INDICATION: Signs/Symptoms:Cough   COMPARISON: 11/10/2023.   ACCESSION NUMBER(S): [id number]   ORDERING CLINICIAN: GINGER STRANGE   TECHNIQUE: Number of films: Two-view radiographs of the chest were obtained.   FINDINGS: Median sternotomy wires and surgical clips are again present, consistent with previous coronary artery bypass graft. The heart and mediastinum are normal. Electronic device is again implanted in the left anterior chest wall. The lungs are clear. No pleural effusions are seen. Degenerative changes involve the thoracic spine.       No acute cardiopulmonary disease.   Signed by: Low Camejo 11/17/2023 2:40 PM Dictation workstation:   [id number]    EEG    IMPRESSION Impression This routine EEG is indicative of a moderate diffuse encephalopathy. No epileptiform discharges or lateralizing signs are recorded. A full report will be scanned into the patient's chart at a later time. This report has been interpreted and electronically signed by    ECG 12 lead daily    Result Date: 11/15/2023  Atrial fibrillation Right bundle branch block Abnormal ECG No previous ECGs available Confirmed by Jailyn Boyer ([id number]) on 11/15/2023 8:53:14 AM    ECG 12 lead daily    Result Date: 11/15/2023  Undetermined rhythm Likely AFIB Right bundle branch block Septal infarct , age undetermined Abnormal ECG No previous ECGs available Confirmed by Jailyn Boyer ([id number]) on 11/15/2023 8:50:34  AM    ECG 12 lead    Result Date: 11/15/2023  Atrial fibrillation Right bundle branch block Abnormal ECG When compared with ECG of 10-NOV-2023 08:49, (unconfirmed) No significant change was found Confirmed by Jailyn Boyer ([id number]) on 11/15/2023 8:46:33 AM    Structural heart procedure    Result Date: 11/14/2023   [facility], Cath Lab, [address] [place] [address] Cardiovascular Catheterization Report Patient Name:     ASHKAN OLIVEROS      Performing Physician:  [id number] Avitia MD Study Date:       11/10/2023          Verifying Physician:   [id number] Avitia MD MRN/PID:          [id number]            Cardiologist/Co-scrub: Accession#:       [id number]        Ordering Physician:    [id number] AVITIA Date of           1940 / 83      Fellow:                [id number] Donnie Kendrick Birth/Age:        years                                      MD Gender:           M                   Fellow: Encounter#:       [id number]  Study: Left Atrial Appendage Closure  Left Atrial Appendage Closure (LAAC):  Sterile prep and appropriate procedure timeout were performed. Using ultrasound guidance and micropuncture technique dual access was obtained in the right common femoral vein. Two 8F sheaths were placed using a modified Seldinger technique. The patient was administered IV Heparin and ACT was confirmed to be therapeutic. An AcuNav ICE probe was then advanced through one of the sheaths and under ICE guidance, transseptal puncture was with a versacross (access solutions), accessing the left atrium. The transseptal tract was then dilated with a Watchman Double Curve access sheath and the ICE probe was advanced through the dilated tract into the left atrium. Next, a 6 Divehi angled pigtail was advanced through the delivery  sheath into the left atrial appendage and angiography was performed via the pigtail. Sizing of the device was confirmed by ICE and angiography. We then advanced a 24 mm Watchman Closure Device. At this stage it was evidence to us that the trans-septal puncture height was sub-optimal. Multiple attempts were made to optimize the position of the WM device, which were all unsuccessful (3 recaptures were performed total). At this stage a decision was made to optimize the trans-septal puncture. A second TSP was performed at lower location on the septum. Then, following the approprate steps a 27 mm Watchman FLX was advance and deployed successfully., and confirmed placement both by ICE and angiography. A 'tug test' was performed and confirmed stability. No color Doppler flow around the device was appreciated. 24% device compression was also confirmed. Having satisfied position, anchoring, size and seal criteria, the device was successfully deployed. All equipment was then removed and hemostasis was facilitated by Perclose for 14 Fr and Vascade for 8 Fr. Patient was enrolled in the [facility] registry and data entered for research purposes.  Hemo Personnel: +----------------+---------+ Name            Duty      +----------------+---------+ Louie Avitia MD 1 +----------------+---------+  Hemodynamic Pressures:  +----+----------------------+----------+---------+-----------+-----------+ Site      Date Time       Phase NameMean mmHgA-Wave mmHgV-Wave mmHg +----+----------------------+----------+---------+-----------+-----------+   LA11/10/2023 10:52:30 AM      Rest       18         14         31 +----+----------------------+----------+---------+-----------+-----------+  Complications: No in-lab complications observed.  Cardiac Cath Post Procedure Notes: Post Procedure Diagnosis: Successful LAAC with a 27 mm WM FLX. Blood Loss:               Estimated blood loss during the procedure was 20 mls. Specimens  Removed:        Number of specimen(s) removed: none. ____________________________________________________________________________________ CONCLUSIONS:  1. Successful LAAC with a 27 mm WM FLX. ICD 10 Codes: Paroxysmal atrial fibrillation-I48.0  CPT Codes: Perc left atrial appendage closure (LAAC)-76904  [id number] Louie Avitia MD Performing Physician Electronically signed by [id number] Louie Avitia MD on 11/14/2023 at 3:57:51 PM  ** Final **     MR brain wo IV contrast    Result Date: 11/13/2023  Interpreted By:  Galdino Monet, STUDY: MR BRAIN WO IV CONTRAST; 11/13/2023 8:20 am   INDICATION: Signs/Symptoms:stroke-like sympt's.   COMPARISON: None.   ACCESSION NUMBER(S): [id number]   ORDERING CLINICIAN: SAMUEL GAITAN   TECHNIQUE: Multiecho multiplanar imaging of the brain without intravenous contrast.   FINDINGS: Extra-axial spaces and ventricular system: Prominent, reflecting atrophy Cerebral parenchyma: No restricted diffusion. No sign of intracranial hemorrhage. Small patchy periventricular hyperintensity and small hyperintensity in inferior left temporal lobe Midline shift: No mass effect or midline shift. Cerebellum: Normal. Brainstem: Normal. Pituitary gland: Normal.   Visualized paranasal sinuses: Small retention cyst left maxillary sinus. Visualized orbits: Sign of cataract surgery bilaterally. Visualized upper cervical spine: Normal.       No acute finding.   Signed by: Galdino Monet 11/13/2023 10:32 AM Dictation workstation:   [id number]    CT angio brain attack head w IV contrast and post procedure    Result Date: 11/10/2023  Interpreted By:  Martha Coleman, STUDY: CT ANGIO BRAIN ATTACK HEAD W IV CONTRAST AND POST PROCEDURE; CT ANGIO BRAIN ATTACK NECK W IV CONTRAST AND POST PROCEDURE; 11/10/2023 5:13 pm   INDICATION: Signs/Symptoms:Stroke Evaluation with VAN Positive;   COMPARISON: CT head same day /   ACCESSION NUMBER(S): [id number]; [id number]   ORDERING CLINICIAN: JUDY CHACON   TECHNIQUE: CT  arteriogram of the head and neck with 75 ml of Omnipaque 350 was injected intravenously. 3D MIP images were created, processed, and interpreted on an independent workstation.   FINDINGS: CTA HEAD:   Basilar: Patent without aneurysm, dissection or thrombus. PCOM: Patent without aneurysm, dissection or thrombus. ACOM: Patent without aneurysm, dissection or thrombus. ICA: Patent without aneurysm, dissection or thrombus.   LEFT:   MCA: Patent without aneurysm, dissection or thrombus. YOVANY: Patent without aneurysm, dissection or thrombus. PCA: Patent without aneurysm, dissection or thrombus. ICA: Patent without aneurysm, dissection or thrombus. VERT: Patent without aneurysm, dissection or thrombus.   RIGHT:   MCA: Patent without aneurysm, dissection or thrombus. YOVANY: Patent without aneurysm, dissection or thrombus. PCA: Patent without aneurysm, dissection or thrombus. ICA: Patent without aneurysm, dissection or thrombus. VERT: Patent without aneurysm, dissection or thrombus.     CTA NECK:     The estimate of the degree of stenosis included in this report is based on the NASCET CRITERIA for calculating stenosis by using the internal carotid artery distal to the stenosis as the reference point. Normal is no stenosis. Mild is less than 50% stenosis. Moderate is 50-69% stenosis. Severe is 70-99% stenosis. Total occlusion is no detectable patent lumen.   LEFT:   CCA: Patent without aneurysm, dissection or thrombus. Atherosclerotic plaque at the carotid bulb which contributes to less than 50% narrowing. ICA: Patent without aneurysm, dissection or thrombus. ECA: Patent without aneurysm, dissection or thrombus. VERT: Patent without aneurysm, dissection or thrombus.     RIGHT:   CCA: Patent without aneurysm, dissection or thrombus. Atherosclerotic plaque at the carotid bulb extending into the proximal right ICA and contributing to approximately 50% narrowing. ICA: Patent without aneurysm, dissection or thrombus. ECA: Patent  without aneurysm, dissection or thrombus. VERT: Patent without aneurysm, dissection or thrombus.   AORTIC ARCH AND BRANCHES: Patent without aneurysm, dissection or thrombus.   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           1. No large vessel occlusion or high-grade stenosis. 2. Atherosclerotic plaques in the bilateral carotid bulbs contributing to mild (less than 50%) stenosis. Atherosclerotic plaque in the right proximal ICA contributing to approximately 50% narrowing.   Signed by: Martha Coleman 11/10/2023 6:19 PM Dictation workstation:   [id number]    CT angio brain attack neck w IV contrast and post procedure    Result Date: 11/10/2023  Interpreted By:  Martha Coleman, STUDY: CT ANGIO BRAIN ATTACK HEAD W IV CONTRAST AND POST PROCEDURE; CT ANGIO BRAIN ATTACK NECK W IV CONTRAST AND POST PROCEDURE; 11/10/2023 5:13 pm   INDICATION: Signs/Symptoms:Stroke Evaluation with VAN Positive;   COMPARISON: CT head same day /   ACCESSION NUMBER(S): [id number]; [id number]   ORDERING CLINICIAN: JUDY CHACON   TECHNIQUE: CT arteriogram of the head and neck with 75 ml of Omnipaque 350 was injected intravenously. 3D MIP images were created, processed, and interpreted on an independent workstation.   FINDINGS: CTA HEAD:   Basilar: Patent without aneurysm, dissection or thrombus. PCOM: Patent without aneurysm, dissection or thrombus. ACOM: Patent without aneurysm, dissection or thrombus. ICA: Patent without aneurysm, dissection or thrombus.   LEFT:   MCA: Patent without aneurysm, dissection or thrombus. YOVANY: Patent without aneurysm, dissection or thrombus. PCA: Patent without aneurysm, dissection or thrombus. ICA: Patent without aneurysm, dissection or thrombus. VERT: Patent without aneurysm, dissection or thrombus.   RIGHT:   MCA: Patent without aneurysm, dissection or thrombus. YOVANY: Patent without aneurysm, dissection or thrombus. PCA: Patent without aneurysm, dissection or thrombus. ICA:  Patent without aneurysm, dissection or thrombus. VERT: Patent without aneurysm, dissection or thrombus.     CTA NECK:     The estimate of the degree of stenosis included in this report is based on the NASCET CRITERIA for calculating stenosis by using the internal carotid artery distal to the stenosis as the reference point. Normal is no stenosis. Mild is less than 50% stenosis. Moderate is 50-69% stenosis. Severe is 70-99% stenosis. Total occlusion is no detectable patent lumen.   LEFT:   CCA: Patent without aneurysm, dissection or thrombus. Atherosclerotic plaque at the carotid bulb which contributes to less than 50% narrowing. ICA: Patent without aneurysm, dissection or thrombus. ECA: Patent without aneurysm, dissection or thrombus. VERT: Patent without aneurysm, dissection or thrombus.     RIGHT:   CCA: Patent without aneurysm, dissection or thrombus. Atherosclerotic plaque at the carotid bulb extending into the proximal right ICA and contributing to approximately 50% narrowing. ICA: Patent without aneurysm, dissection or thrombus. ECA: Patent without aneurysm, dissection or thrombus. VERT: Patent without aneurysm, dissection or thrombus.   AORTIC ARCH AND BRANCHES: Patent without aneurysm, dissection or thrombus.   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           1. No large vessel occlusion or high-grade stenosis. 2. Atherosclerotic plaques in the bilateral carotid bulbs contributing to mild (less than 50%) stenosis. Atherosclerotic plaque in the right proximal ICA contributing to approximately 50% narrowing.   Signed by: Martha Coleman 11/10/2023 6:19 PM Dictation workstation:   [id number]    XR chest 1 view    Result Date: 11/10/2023  Interpreted By:  Martha Coleman, STUDY: XR CHEST 1 VIEW; 11/10/2023 4:42 pm   INDICATION: Signs/Symptoms:brain attack/cough   COMPARISON: 06/20/2023   ACCESSION NUMBER(S): [id number]   ORDERING CLINICIAN: JUDY CHACON   TECHNIQUE: Frontal   chest radiographs.   FINDINGS: The cardiomediastinal silhouette is unremarkable. The lungs are clear. No pleural effusion is identified.   The osseous structures are intact.       No acute cardiopulmonary process.       Signed by: Martha Coleman 11/10/2023 4:46 PM Dictation workstation:   [id number]    CT brain attack head wo IV contrast    Result Date: 11/10/2023  Interpreted By:  Martha Coleman, STUDY: CT BRAIN ATTACK HEAD WO IV CONTRAST;  11/10/2023 4:38 pm   INDICATION: Signs/Symptoms:Stroke Evaluation.   COMPARISON: [id number]   ACCESSION NUMBER(S): [id number]   ORDERING CLINICIAN: JUDY CHACON   TECHNIQUE: CT axial images through the Brain were obtained without contrast. All CT examinations are performed with 1 or more of the following dose reduction techniques: Automated exposure control, adjustment of mA and/or kv according to patient's size, or use of iterative reconstruction techniques.   FINDINGS: There is no mass effect, hemorrhage, or infarct. The ventricles appear normal. Gray-white differentiation is maintained. Patchy areas of hypodense attenuation are seen in the subcortical and periventricular white matter; compatible with small vessel ischemic disease. The visualized paranasal sinuses appear clear.       No acute intracranial abnormality.   MACRO: Martha Coleman discussed the significance and urgency of this critical finding by telephone with  JUDY CHACON on 11/10/2023 at 4:45 pm.  (**-RCF-**) Findings:  See findings.     Signed by: Martha Coleman 11/10/2023 4:45 PM Dictation workstation:   [id number]    Transthoracic Echo (TTE) Limited    Result Date: 11/10/2023   [facility] [address] [place] [address]                Tel [phone] and Fax [phone] TRANSTHORACIC ECHOCARDIOGRAM REPORT  Patient Name:      ASHKAN OLIVEROS       Reading Physician:    [id number] Kamar Doyle MD Study Date:         11/10/2023           Ordering Provider:    [id number] DAWNA AUGUSTIN MRN/PID:           [id number]             Fellow:               [id number] Yobany Strong MD Accession#:        [id number]         Nurse: Date of Birth/Age: 1940 / 83 years Sonographer:          Bruna Merlos RDCS Gender:            M                    Additional Staff: Height:            180.34 cm            Admit Date:           11/10/2023 Weight:            78.47 kg             Admission Status:     Inpatient - Time                                                               Critical BSA:               1.98 m2              Encounter#:           [id number]                                         Department Location:  [facility]                                                               Cath Lab Blood Pressure: 129 /65 mmHg Study Type:    TRANSTHORACIC ECHO (TTE) LIMITED Diagnosis/ICD: Presence of other cardiac implants and grafts-Z95.818 Indication:    Post-Watchman Procedure CPT Code:      Echo Limited-01804 Patient History: Pertinent History: S/p LAAO. Study Detail: The following Echo studies were performed: 2D. Technically               challenging study due to patient lying in supine position.  PHYSICIAN INTERPRETATION: Left Ventricle: The left ventricular systolic function is normal, with an estimated ejection fraction of 60-65%. The left ventricular cavity size is normal. There is left ventricular concentric remodeling. Abnormal (paradoxical) septal motion consistent with post-operative status. Left ventricular diastolic filling was not assessed. Left Atrium: The left atrium is enlarged. Right Ventricle: The right ventricle was not well visualized. Unable to determine right ventricular systolic function. Right Atrium: The right atrium is moderately dilated. Aortic Valve: The aortic valve appears normal. Aortic valve regurgitation was not assessed. Mitral Valve: The mitral valve is  normal in structure. Mitral valve regurgitation was not assessed. Tricuspid Valve: The tricuspid valve is structurally normal. Tricuspid regurgitation was not assessed. Pulmonic Valve: The pulmonic valve is not well visualized. Pulmonic valve regurgitation was not assessed. Pericardium: There is no pericardial effusion noted. Aorta: The aortic root is normal. Systemic Veins: The inferior vena cava was not well visualized. In comparison to the previous echocardiogram(s): Compared with study from 11/10/2023, no significant change. Note that this was a limited study. Compared to prior full echo from 8/16/23, there is no significant change from available data.  CONCLUSIONS:  1. Left ventricular systolic function is normal with a 60-65% estimated ejection fraction.  2. Abnormal septal motion consistent with post-operative status.  3. The left atrium is enlarged.  4. The right atrium is moderately dilated. QUANTITATIVE DATA SUMMARY: 2D MEASUREMENTS:                           Normal Ranges: IVSd:          1.27 cm    (0.6-1.1cm) LVPWd:         1.25 cm    (0.6-1.1cm) LVIDd:         4.36 cm    (3.9-5.9cm) LV Mass Index: 102.2 g/m2 RA VOLUME BY A/L METHOD:                       Normal Ranges: RA Area A4C: 22.0 cm2  [id number] Kamar Doyle MD Electronically signed on 11/10/2023 at 3:02:52 PM  ** Final **     Transthoracic Echo (TTE) Limited    Result Date: 11/10/2023   [facility], [address] [place] [address]                Tel [phone] and Fax [phone] TRANSTHORACIC ECHOCARDIOGRAM REPORT  Patient Name:     ASHKAN Schmidt Physician:  [id number] Varghese Robles MD Study Date:       11/10/2023          Ordering Provider:  [id number] DAWNA AUGUSTIN MRN/PID:          [id number]            Fellow: Accession#:       [id number]        Nurse: Date of           1940 / 83      Sonographer:        Bruna Merlos RDCS Birth/Age:        years Gender:           M                   Additional Staff: Height:            180.00 cm           Admit Date:         11/10/2023 Weight:           79.00 kg            Admission Status:   Inpatient - Routine BSA:              1.99 m2             Encounter#:         [id number]                                       Department          [facility] Cath                                       Location:           Lab Blood Pressure: 149 /69 mmHg Study Type:    TRANSTHORACIC ECHO (TTE) LIMITED Diagnosis/ICD: Encounter for preprocedural cardiovascular examination-Z01.810 Indication:    Pre-Watchman Procedure CPT Code:      Echo Limited-16465 Patient History: Pertinent History: A-Fib, Chest Pain, HTN, Hyperlipidemia, LE Edema and                    Palpitations. ALEJANDRINA, DMII, CAD s/p CABG. Study Detail: The following Echo studies were performed: 2D. Technically               challenging study due to patient lying in supine position.  PHYSICIAN INTERPRETATION: Left Ventricle: The left ventricular systolic function is normal, with an estimated ejection fraction of 60-65%. There are no regional wall motion abnormalities. The left ventricular cavity size is normal. Left ventricular diastolic filling was not assessed. Left Atrium: The left atrium is enlarged. Right Ventricle: The right ventricle is normal in size. There is normal right ventricular global systolic function. Right Atrium: The right atrium is enlarged. Aortic Valve: The aortic valve is probably trileaflet. Aortic valve regurgitation was not assessed. Mitral Valve: The mitral valve was not well visualized. Mitral valve regurgitation was not assessed. Tricuspid Valve: The tricuspid valve was not well visualized. Tricuspid regurgitation was not assessed. The right ventricular systolic pressure is unable to be estimated. Pulmonic Valve: The pulmonic valve was not assessed. Pulmonic valve regurgitation was not assessed. Pericardium: There is a trivial pericardial effusion. Aorta: The aortic root is normal.  CONCLUSIONS:  1. Left ventricular  systolic function is normal with a 60-65% estimated ejection fraction.  2. The left atrium is enlarged.  3. There is a trivial pericardial effusion. QUANTITATIVE DATA SUMMARY:  [id number] Varghese Robles MD Electronically signed on 11/10/2023 at 2:16:20 PM  ** Final **     CT watchman full contrast    Result Date: 11/10/2023  Interpreted By:  Russ Avila, STUDY: CT WATCHMAN FULL CONTRAST;  11/10/2023 8:28 am   INDICATION: Signs/Symptoms:Preprocedural RICHARDSON sizing and thrombus detection.   COMPARISON: None.   ACCESSION NUMBER(S): [id number]   ORDERING CLINICIAN: JAMIL RAMIREZ   TECHNIQUE: Using multi detector CT technology,axial imaging with prospective gating was performed of the chest following the intravenous administration of contrast material.  A low-osmolar contrast agent was used (70 mL Omnipaque 350). Next, the imaging was repeated with prospective gating after 10 sec delay as per watchman protocol. Delete   For optimization of anatomic evaluation, multiplanar reconstruction, maximum intensity projections, and advanced 3-D off-line postprocessing were performed on a dedicated stand-alone workstation under the direct supervision of the interpreting physician.   CT Dose-Length Product (DLP):  1943 mGy*cm CT Dose Reduction Employed: Yes (Prospective triggering, iterative reconstruction)   FINDINGS: POTENTIAL STUDY LIMITATIONS:  Motion artifact limits accurate assessment of the left atrial appendage.   CORONARY ARTERIES:   CORONARY ANATOMY: There is normal origin of the coronary arteries. Right dominant system. Severe coronary artery calcifications are seen. Please note,the study is not optimized for evaluation of coronary arteries.Status post coronary artery bypass grafting (CABG) with bypass grafts not optimally evaluated on present study.   CARDIAC CHAMBERS: The cardiac chambers demonstrate normal atrioventricular and ventriculoarterial concordance, and systemic and pulmonary venous return.   LEFT ATRIUM: Dilated  (34.2-cm2). There is no evidence of left atrium or left atrial appendage thrombus. The left atrial appendage orifice is round in shape, measuring 1.9 x 1.8 cm in orthogonal dimensions and with an area of  2.8 cm. sq. Left atrial appendage depth is 2.2 cm.   RIGHT ATRIUM: Dilated (28.4-cm2)   VENTRICLES: The left and right ventricles appear normal in appearance, although accurate size measurements are not possible on systolic phase imaging.   INTERATRIAL SEPTUM: Intact.   INTERVENTRICULAR SEPTUM: Intact.   AORTIC VALVE: The aortic valve is trileaflet in morphology. Mild calcifications.   MITRAL VALVE: No valvular thickening/calcification.   THORACIC AORTA: The thoracic aorta normal in course and caliber.There are mild scattered atherosclerosis present, including calcified and noncalcified plaques.There is no evidence for acute aortic pathology, such as dissection, intramural hematoma, or contained rupture. The aortic arch is not included on this examination.   PERICARDIUM: There is no pericardial effusion seen.   CHEST: The trachea and central airways are patent. No endobronchial lesion is seen. There is mild diffuse bronchial wall thickening, which is a non-specific finding. The bilateral lungs are clear without evidence of focal consolidation, pleural effusion, or pneumothorax. Minimal emphysematous changes. No evidence of lymphadenopathy within included mediastinum. Visualized esophagus appears within normal limits as seen.   UPPER ABDOMEN: The visualized subdiaphragmatic structures demonstrate no remarkable findings.     CHEST WALL AND OSSEOUS STRUCTURES: Status post median sternotomy. No acute osseous pathology.There are no suspicious osseous lesions within included chest.Multilevel degenerative changes of the spine. Osteopenia.       1. No evidence of left atrial/left atrial appendage thrombus. 2. Limited evaluation secondary to motion artifact which may affect the accuracy of left atrial appendage  measurements. The left atrial appendage orifice is round in shape, measuring 1.9 x 1.8 cm in orthogonal dimensions and with an area of 2.8 cm. sq. Left atrial appendage depth is 2.2 cm. 3. Severe coronary artery calcifications are seen in the setting of prior bypass grafting. Please note,the study is not optimized for evaluation of coronary arteries/grafts. 4. Biatrial enlargement. 5. Mild aortic valve calcifications.   MACRO: None   Signed by: Russ Avila 11/10/2023 9:44 AM Dictation workstation:   [id number]               Assessment/Plan   Principal Problem:    Sepsis (CMS/HCC)  Active Problems:    Sepsis, due to unspecified organism, unspecified whether acute organ dysfunction present (CMS/HCC)    Urinary retention,  He has failed voiding trials.  He will follow up with his urologist after discharge for further evaluation.  Discharge with [facility].  He will hold on treatment for the spasms due to effects on retention.           Lewis Chavez MD

## 2023-11-30 NOTE — PROGRESS NOTES
Luis Greene is a 83 y.o. male on day 6 of admission presenting with Sepsis (CMS/HCC).    Patient set to discharge today to [place], 7000 completed at this time transport set up for 1730 RN updated at this time   Tess Ravi RN

## 2023-11-30 NOTE — PROGRESS NOTES
INFECTIOUS DISEASES PROGRESS NOTE    Consulted / following patient for:  ESBL E. coli urinary tract infection and bacteremia    Subjective   Interval History:   Doing well, no abdominal pain or hematuria, anticipates discharge today       Objective   PHYSICAL EXAMINATION  Vital signs:  Visit Vitals  /56 (BP Location: Left arm, Patient Position: Lying)   Pulse 55   Temp 36.5 °C (97.7 °F) (Temporal)   Resp 16      General: No acute distress  Genitalia: Catheter draining clear urine     Antibiotics:  Meropenem day 7    Relevant Results  WBC: 6300    Results from last 72 hours   Lab Units 11/30/23  0505   CREATININE mg/dL 0.70   ANION GAP mmol/L 10   EGFR mL/min/1.73m*2 >90         Microbiology:  Urine and blood 11/24 growing MDR ESBL-producing E. coli, not susceptible to appropriate oral antimicrobial therapy  Blood 11/26: No growth     Imaging:  CT abdomen and pelvis: No hydronephrosis or obstruction      Assessment:  Sepsis due to ESBL E. coli bacteremia, present on admission  Catheter-associated ESBL E. coli, present on admission  Completed intended course of antibiotic therapy     Plan/Recommendations:  Dissipate discharge today  He will follow up with Dr Choudhary    No additional suggestions    WILL SIGN OFF.  PLEASE RE-CONSULT PRN.  THANK YOU.  Raul Edwards MD  [facility] [employer]  Office:  [phone]

## 2023-11-30 NOTE — PROGRESS NOTES
Occupational Therapy    OT Treatment    Patient Name: Luis Greene  MRN: [id number]  Today's Date: 11/30/2023  Time Calculation  Start Time: 1430  Stop Time: 1501  Time Calculation (min): 31 min         Assessment:  Evaluation/Treatment Tolerance: Patient tolerated treatment well  Medical Staff Made Aware: Yes  End of Session Communication: Bedside nurse  End of Session Patient Position: Up in chair (Call light in reach all needs met daughters present)  Evaluation/Treatment Tolerance: Patient tolerated treatment well  Medical Staff Made Aware: Yes  Strengths: Attitude of self, Ability to acquire knowledge, Support of extended family/friends, Support of Caregivers  Barriers to Participation: Comorbidities  Plan:  Treatment Interventions: ADL retraining, Functional transfer training, Endurance training, Patient/family training, Equipment evaluation/education, Compensatory technique education  OT Frequency: 4 times per week  OT Discharge Recommendations: Moderate intensity level of continued care  Equipment Recommended upon Discharge: Wheeled walker  OT Recommended Transfer Status: Assist of 1  OT - OK to Discharge: Yes  Treatment Interventions: ADL retraining, Functional transfer training, Endurance training, Patient/family training, Equipment evaluation/education, Compensatory technique education    Subjective   Previous Visit Info:  OT Last Visit  OT Received On: 11/30/23  General:  General  Reason for Referral: sepsis; decreased functional status  Referred By: Pete Oneill MD  Past Medical History Relevant to Rehab: Sepsis, UTI,  Missed Visit: No  Family/Caregiver Present: Yes  Caregiver Feedback: 2 daughter present  Prior to Session Communication: Bedside nurse  Patient Position Received: Bed, 2 rail up  Preferred Learning Style: verbal  General Comment: Confered with NSG.  Pt agreeable to skilled therapeutic intervention.  Precautions:  Hearing/Visual Limitations: mild [place]  Medical Precautions: Fall  precautions  Braces Applied: B AFO's and shoes.  Prosthesis/Orthosis Used: Ankle/Foot orthosis  Vital Signs:     Pain:  Pain Assessment  Pain Assessment: 0-10  Pain Score: 0 - No pain    Objective    Cognition:  Cognition  Overall Cognitive Status: Within Functional Limits  Orientation Level: Oriented X4  Coordination:     Activities of Daily Living:      Grooming  Grooming Level of Assistance: Modified independent  Grooming Where Assessed: Chair  Grooming Comments: Pt brush teeth  items in reach    UE Dressing  UE Dressing Level of Assistance: Setup  UE Dressing Where Assessed: Edge of bed  UE Dressing Comments: Pt don [facility] gown    LE Dressing  LE Dressing: Yes  LE Dressing Adaptive Equipment: Reacher  Pants Level of Assistance: Moderate assistance  Orthotics Level of Assistance: Maximum assistance  LE Dressing Where Assessed: Edge of bed  LE Dressing Comments: Pt partial assist B orthotices, thread pants LH recher  RW in stance waist mabagement       Functional Standing Tolerance:  Time: 2 minutes  Activity: waist management at RW  Functional Standing Tolerance Comments: no LOB  Bed Mobility/Transfers: Bed Mobility  Bed Mobility: Yes  Bed Mobility 1  Bed Mobility 1: Supine to sitting  Level of Assistance 1: Close supervision  Bed Mobility Comments 1: bed to neutral use side transfer bar    Transfers  Transfer: Yes  Transfer 1  Transfer From 1: Bed to  Transfer to 1: Stand  Technique 1: Sit to stand  Transfer Device 1: Walker (rolling)  Transfer Level of Assistance 1: Minimum assistance  Trials/Comments 1: cues hand placement  Transfers 2  Transfer From 2: Stand to  Transfer to 2: Sit  Technique 2: Stand to sit  Transfer Device 2: Walker (rolling)  Transfer Level of Assistance 2: Minimum assistance  Trials/Comments 2: vc  hand placement to chair    Sitting Balance:    Therapy/Activity: Therapeutic Exercise  Therapeutic Exercise Performed: Yes (BUE 15 reps x 1 set red t band instructedwith proper  pacing, joint alignment and counting reps aloud with good follow through.)  Therapeutic Exercise Activity 1: shoulder flexion/extension  Therapeutic Exercise Activity 2: elbow flexion/extension  Therapeutic Exercise Activity 3: triceps extension      Outcome Measures:[facility] Daily Activity  Putting on and taking off regular lower body clothing: A lot  Bathing (including washing, rinsing, drying): A little  Putting on and taking off regular upper body clothing: None  Toileting, which includes using toilet, bedpan or urinal: A little  Taking care of personal grooming such as brushing teeth: None  Eating Meals: None  Daily Activity - Total Score: 20        Education Documentation  ADL Training, taught by JF Angeles at 11/30/2023  3:15 PM.  Learner: Family, Patient  Readiness: Acceptance  Method: Explanation, Demonstration, Teach-back  Response: Demonstrated Understanding, Verbalizes Understanding    Education Comments  No comments found.        OP EDUCATION:       Goals:  Encounter Problems       Encounter Problems (Active)       Bathing       STG - Patient will bathe body UB/LB with minimal assist and AE (Progressing)       Start:  11/28/23    Expected End:  12/28/23               Dressing Upper Extremities       LTG - Patient will complete upper body dressing indepdnent (Progressing)       Start:  11/28/23    Expected End:  12/28/23               Dressings Lower Extremities       LTG - Patient will dress lower body with close supervision and AE as needed (Progressing)       Start:  11/28/23    Expected End:  12/28/23               Grooming        Patient completes grooming independent (Progressing)       Start:  11/28/23    Expected End:  12/28/23               Mobility       STG - Patient will ambulate >/= 50 ft with walker & close S. (Progressing)       Start:  11/25/23    Expected End:  12/10/23            STG - Patient will negotiate 3 stairs with unilateral hand rail with or without least restrictive cane  at close S level. (Progressing)       Start:  11/25/23    Expected End:  12/10/23               Mobility       perform functional mobility to and from the bathroom with 4ww and close supervision (Progressing)       Start:  11/28/23    Expected End:  12/28/23               Therapeutic Activity       Perform BUE exercise with close supervision (Progressing)       Start:  11/28/23    Expected End:  12/28/23               Toileting       STG - Patient will complete toileting tasks with close supervision and 4ww (Progressing)       Start:  11/28/23    Expected End:  12/28/23               Transfers       STG - Patient to transfer supine<>sit using hospital bed at mod I level. (Progressing)       Start:  11/25/23    Expected End:  12/10/23            STG - Patient will transfer sit <>stand with walker at mod I level. (Progressing)       Start:  11/25/23    Expected End:  12/10/23               Transfers       Patient will transfer from one surface to another with close supervision and 4ww (Progressing)       Start:  11/28/23    Expected End:  12/28/23

## 2023-11-30 NOTE — DISCHARGE SUMMARY
Discharge Diagnosis  Sepsis  Fever resolved  Leukocytosis resolved  Urinary tract infection E. Coli ESBL secondary to indwelling guidry catheter  Bacteremia E. Coli ESBL secondary to urinary tract infection  Urinary retention  BPH  Hyponatremia, hyperkalemia, acute kidney injury resolved  Hypokalemia  Altered mental status resolved  Toxic encephalopathy secondary to UTI, bacteremia resolved  Metabolic encephalopathy resolved  Hypertension  Hyperlipidemia  Atrial fibrillation  History of TIA/CVA  History of stroke like episodes  Type 2 diabetes mellitus  History of gastrointestinal bleed  Anemia   Right hip pain chronic  Spinal stenosis    Discharge medications  See discharge medication reconciliation    Hospital Course  This is a very pleasant 83-year-old  male who presented to the emergency department with fever and altered mental status.  He has a past medical history significant for urinary retention with a Guidry catheter in place.  He was in his usual state of health until the date of admission.  Per family, he was confused and febrile, outpatient temperature was 104.  COVID-19 was negative.  Chest x-ray and urinalysis are ordered.  He sustained a fall out of bed prior to admission.  He was unable to state if he struck his head.  911 was called.  In the emergency department, initial workup was done.  12 EKG showed rapid atrial fibrillation with a rate of 111, no acute ischemia.  White blood cell count 11.7.  Lactic acid 2.1.  Repeat lactic acid within normal limits.  Creatinine was above baseline.  Urinalysis showed 500 leukocytes, many white blood cells, bacteria.  Urine culture was sent.  Chest x-ray was unremarkable.  Blood cultures were obtained.  He was treated IV Cefepime and was admitted.  CT Brain without contrast per radiology showed nothing acute.  He was treated with broad-spectrum IV antibiotics.  He was evaluated and treated by Infectious disease, Urology, Nephrology and Cardiology.  Urine  and blood cultures grew E. coli, ESBL.  The guidry catheter was exchanged.  His mentation improved to baseline.  Suspect toxic encephalopathy secondary to UTI.  His condition improved.  He was evaluated physical, occupational therapy.  Case management was consulted for discharge planning.  He is stable for discharge to skilled nursing rehabilitation.    Pertinent Physical Exam At Time of Discharge  See physical examination    Outpatient Follow-Up  Future Appointments   Date Time Provider Department Center   12/6/2023  4:30 PM Luis Grant MD [id number] [facility]   12/14/2023  9:15 AM Louie Avitia MD [id number] [facility]   12/14/2023 12:00 PM Christopher D'Amico, DO [id number] [facility]   1/16/2024  1:30 PM JONA Mccann-CNP [address]     Follow up with primary care provider in 1 week    Follow up with Neurology, Dr. Mare Haynes, APRALBINO-CNP

## 2023-11-30 NOTE — PROGRESS NOTES
Luis Greene is a 83 y.o. male on day 6 of admission presenting with Sepsis (CMS/HCC).    Subjective   Patient seen and examined.  Resting in bed in no acute distress.  Awake alert oriented x 3.  No new complaints.  Intermittent bladder spasms tolerable.  No fevers chills or sweats.  Ready for discharge today    Spoke with nursing, no issues    Objective     Physical Exam  Vitals reviewed.   Constitutional:       General: He is not in acute distress.     Appearance: Normal appearance. He is normal weight. He is not ill-appearing, toxic-appearing or diaphoretic.   HENT:      Head: Normocephalic and atraumatic.      Right Ear: Tympanic membrane normal.      Left Ear: Tympanic membrane normal.      Nose: Nose normal.      Mouth/Throat:      Mouth: Mucous membranes are moist.      Pharynx: Oropharynx is clear.   Eyes:      Extraocular Movements: Extraocular movements intact.      Conjunctiva/sclera: Conjunctivae normal.      Pupils: Pupils are equal, round, and reactive to light.   Cardiovascular:      Rate and Rhythm: Normal rate. Rhythm irregular.      Pulses: Normal pulses.      Heart sounds: Normal heart sounds.   Pulmonary:      Effort: Pulmonary effort is normal. No respiratory distress.      Breath sounds: Normal breath sounds. No wheezing, rhonchi or rales.   Abdominal:      General: Bowel sounds are normal. There is no distension.      Palpations: Abdomen is soft.      Tenderness: There is no abdominal tenderness.   Genitourinary:     Comments:  Tiwari catheter in place draining clear yellow urine. Rectal examination deferred  Musculoskeletal:         General: No swelling. Normal range of motion.      Cervical back: Normal range of motion and neck supple.      Comments: Right hip less tender, improved range of motion   Skin:     General: Skin is warm and dry.      Capillary Refill: Capillary refill takes less than 2 seconds.      Findings: Bruising present.   Neurological:      General: No focal deficit  "present.      Mental Status: He is alert and oriented to person, place, and time.   Psychiatric:         Mood and Affect: Mood normal.         Behavior: Behavior normal.       Last Recorded Vitals  Blood pressure 165/56, pulse 55, temperature 36.5 °C (97.7 °F), temperature source Temporal, resp. rate 16, height 1.803 m (5' 11\"), weight 77.2 kg (170 lb 3.1 oz), SpO2 96 %.    Intake/Output last 3 Shifts:  I/O last 3 completed shifts:  In: 300 (3.9 mL/kg) [I.V.:300 (3.9 mL/kg)]  Out: 2335 (30.2 mL/kg) [Urine:2335 (0.8 mL/kg/hr)]  Weight: 77.2 kg     Relevant Results  Results for orders placed or performed during the hospital encounter of 11/24/23 (from the past 24 hour(s))   POCT GLUCOSE   Result Value Ref Range    POCT Glucose 216 (H) 74 - 99 mg/dL   POCT GLUCOSE   Result Value Ref Range    POCT Glucose 296 (H) 74 - 99 mg/dL   Basic Metabolic Panel   Result Value Ref Range    Glucose 229 (H) 65 - 99 mg/dL    Sodium 138 133 - 145 mmol/L    Potassium 3.3 (L) 3.4 - 5.1 mmol/L    Chloride 105 97 - 107 mmol/L    Bicarbonate 23 (L) 24 - 31 mmol/L    Urea Nitrogen 12 8 - 25 mg/dL    Creatinine 0.70 0.40 - 1.60 mg/dL    eGFR >90 >60 mL/min/1.73m*2    Calcium 8.0 (L) 8.5 - 10.4 mg/dL    Anion Gap 10 <=19 mmol/L   CBC   Result Value Ref Range    WBC 6.3 4.4 - 11.3 x10*3/uL    nRBC 0.0 0.0 - 0.0 /100 WBCs    RBC 2.64 (L) 4.50 - 5.90 x10*6/uL    Hemoglobin 9.2 (L) 13.5 - 17.5 g/dL    Hematocrit 26.6 (L) 41.0 - 52.0 %     (H) 80 - 100 fL    MCH 34.8 (H) 26.0 - 34.0 pg    MCHC 34.6 32.0 - 36.0 g/dL    RDW 13.3 11.5 - 14.5 %    Platelets 155 150 - 450 x10*3/uL   Magnesium   Result Value Ref Range    Magnesium 1.60 1.60 - 3.10 mg/dL   POCT GLUCOSE   Result Value Ref Range    POCT Glucose 192 (H) 74 - 99 mg/dL   POCT GLUCOSE   Result Value Ref Range    POCT Glucose 182 (H) 74 - 99 mg/dL     No results found.    ASSESSMENT:  Sepsis  Fever resolved  Leukocytosis resolved  Urinary tract infection E. Coli ESBL - secondary to " indwelling guidry catheter  Bacteremia E. Coli ESBL - secondary to urinary tract infection  Urinary retention  BPH  Hyponatremia, hyperkalemia, acute kidney injury resolved  Hypokalemia  Altered mental status resolved  Toxic encephalopathy secondary to UTI, bacteremia resolved  Metabolic encephalopathy resolved  Coronary artery disease  History of CABG  Hypertension  Hyperlipidemia  Atrial fibrillation  History of TIA/CVA  History of stroke like episodes  Type 2 diabetes mellitus  History of gastrointestinal bleed  Anemia microcytic   Right hip pain improved  Spinal stenosis    PLAN:  Patient is doing well this afternoon.  No new issues.  IV Meropenem course complete.  Maintain guidry catheter.  Outpatient follow up with Urologist, Dr. Lozano.  Labs reviewed.  Replace potassium 40 meq.  Magnesium 400 milligrams p.o daily.  Creatinine stable.  Outpatient bmp in 1 week.  Telemetry atrial fibrillation heart rate controlled.  Vital signs reviewed, stable.  Continue current medications Amlodipine, Lisinopril per Cardiology.  Continue Aspirin, Plavix.  Continue Keppra per request, outpatient follow up with Neurology, Dr. Garvey.  PT/OT.  Fall precautions.  Up with assistance only.  DVT prophylaxis Lovenox subcutaneous.  Supportive care.  Patient reassured.  Case management following for discharge planning.  Discharge plan to [facility] rehabilitation.  Discussed with Dr. Oneill.  Okay to discharge when arrangements are made by case management.      Martina Haynes, JONA-CNP

## 2023-11-30 NOTE — NURSING NOTE
Assuming care of patient, Repositioned. Report done. Daughter at bedside. Assessed and charted, No complaint. Will monitor.

## 2023-11-30 NOTE — PROGRESS NOTES
Luis Greene is a 83 y.o. male on day 5 of admission presenting with Sepsis (CMS/HCC).    Subjective   Patient seen and examined.  Resting in bed in no acute distress.  Spouse, daughter at bedside.  Awake alert oriented x 3.  No new complaints.  Intermittent bladder spasms, present prior to initial catheter replacement.  Spoke with nursing catheter replaced last night, question of malposition, complaints of pressure 300 ml urine retained, hematuria initially since resolved, catheter replaced this am without issues, draining    Objective     Physical Exam  Vitals reviewed.   Constitutional:       General: He is not in acute distress.     Appearance: Normal appearance. He is normal weight. He is not ill-appearing, toxic-appearing or diaphoretic.   HENT:      Head: Normocephalic and atraumatic.      Right Ear: Tympanic membrane normal.      Left Ear: Tympanic membrane normal.      Nose: Nose normal.      Mouth/Throat:      Mouth: Mucous membranes are moist.      Pharynx: Oropharynx is clear.   Eyes:      Extraocular Movements: Extraocular movements intact.      Conjunctiva/sclera: Conjunctivae normal.      Pupils: Pupils are equal, round, and reactive to light.   Cardiovascular:      Rate and Rhythm: Normal rate. Rhythm irregular.      Pulses: Normal pulses.      Heart sounds: Normal heart sounds.   Pulmonary:      Effort: Pulmonary effort is normal. No respiratory distress.      Breath sounds: Normal breath sounds. No wheezing, rhonchi or rales.   Abdominal:      General: Bowel sounds are normal. There is no distension.      Palpations: Abdomen is soft.      Tenderness: There is no abdominal tenderness.   Genitourinary:     Comments:  Tiwari catheter in place draining clear yellow urine. Rectal examination deferred  Musculoskeletal:         General: No swelling. Normal range of motion.      Cervical back: Normal range of motion and neck supple.      Comments: Right hip less tender, improved range of motion  "  Skin:     General: Skin is warm and dry.      Capillary Refill: Capillary refill takes less than 2 seconds.      Findings: Bruising present.   Neurological:      General: No focal deficit present.      Mental Status: He is alert and oriented to person, place, and time.   Psychiatric:         Mood and Affect: Mood normal.         Behavior: Behavior normal.       Last Recorded Vitals  Blood pressure (!) 153/48, pulse 94, temperature 36.9 °C (98.4 °F), temperature source Temporal, resp. rate 19, height 1.803 m (5' 11\"), weight 77.2 kg (170 lb 3.1 oz), SpO2 98 %.    Intake/Output last 3 Shifts:  I/O last 3 completed shifts:  In: 100 (1.3 mL/kg) [I.V.:100 (1.3 mL/kg)]  Out: 2785 (36.1 mL/kg) [Urine:2785 (1 mL/kg/hr)]  Weight: 77.2 kg     Telemetry atrial fibrillation rate 70's    Relevant Results  Results for orders placed or performed during the hospital encounter of 11/24/23 (from the past 24 hour(s))   Basic Metabolic Panel   Result Value Ref Range    Glucose 159 (H) 65 - 99 mg/dL    Sodium 139 133 - 145 mmol/L    Potassium 3.6 3.4 - 5.1 mmol/L    Chloride 107 97 - 107 mmol/L    Bicarbonate 22 (L) 24 - 31 mmol/L    Urea Nitrogen 12 8 - 25 mg/dL    Creatinine 0.70 0.40 - 1.60 mg/dL    eGFR >90 >60 mL/min/1.73m*2    Calcium 8.0 (L) 8.5 - 10.4 mg/dL    Anion Gap 10 <=19 mmol/L   CBC   Result Value Ref Range    WBC 6.5 4.4 - 11.3 x10*3/uL    nRBC 0.0 0.0 - 0.0 /100 WBCs    RBC 2.82 (L) 4.50 - 5.90 x10*6/uL    Hemoglobin 9.7 (L) 13.5 - 17.5 g/dL    Hematocrit 28.1 (L) 41.0 - 52.0 %     80 - 100 fL    MCH 34.4 (H) 26.0 - 34.0 pg    MCHC 34.5 32.0 - 36.0 g/dL    RDW 12.9 11.5 - 14.5 %    Platelets 151 150 - 450 x10*3/uL   POCT GLUCOSE   Result Value Ref Range    POCT Glucose 156 (H) 74 - 99 mg/dL   POCT GLUCOSE   Result Value Ref Range    POCT Glucose 217 (H) 74 - 99 mg/dL   POCT GLUCOSE   Result Value Ref Range    POCT Glucose 216 (H) 74 - 99 mg/dL   POCT GLUCOSE   Result Value Ref Range    POCT Glucose 296 (H) 74 " - 99 mg/dL     No results found.    Scheduled medications  amLODIPine, 5 mg, oral, Daily  aspirin, 162 mg, oral, Daily  atorvastatin, 40 mg, oral, Daily  clopidogrel, 75 mg, oral, Daily  docusate sodium, 100 mg, oral, BID  enoxaparin, 40 mg, subcutaneous, q24h TAYLOR  ferrous sulfate (325 mg ferrous sulfate), 65 mg of iron, oral, Daily  finasteride, 5 mg, oral, Daily  gabapentin, 100 mg, oral, BID  insulin glargine, 10 Units, subcutaneous, Nightly  insulin lispro, 0-15 Units, subcutaneous, TID with meals  levETIRAcetam, 250 mg, oral, BID  lidocaine, 1 patch, transdermal, Daily  lisinopril, 20 mg, oral, Daily  meropenem, 1 g, intravenous, q8h  pantoprazole, 40 mg, oral, Daily before breakfast  polyethylene glycol, 17 g, oral, BID  tamsulosin, 0.4 mg, oral, BID      Continuous medications     PRN medications  PRN medications: [DISCONTINUED] acetaminophen **OR** acetaminophen **OR** acetaminophen, acetaminophen, benzocaine-menthol, dextromethorphan-guaifenesin, dextrose 10 % in water (D10W), dextrose, glucagon, guaiFENesin, ondansetron ODT **OR** ondansetron, polyethylene glycol      This patient has a urinary catheter   Reason for the urinary catheter remaining today? urinary retention/bladder outlet obstruction, acute or chronic    ASSESSMENT:  Sepsis  Fever resolved  Leukocytosis resolved  Urinary tract infection E. Coli ESBL - secondary to indwelling guidry catheter  Bacteremia E. Coli ESBL - secondary to urinary tract infection  Urinary retention   BPH  Hyponatremia, hyperkalemia, acute kidney injury resolved  Altered mental status resolved  Toxic encephalopathy secondary to UTI, bacteremia resolved  Metabolic encephalopathy resolved  Coronary artery disease  History of CABG  Hypertension  Hyperlipidemia  Atrial fibrillation  History of TIA/CVA  History of stroke like episodes  Type 2 diabetes mellitus  History of gastrointestinal bleed  Anemia normocytic   Right hip pain improved    PLAN:  Maintain guidry catheter.   I's and O's.  Intermittent bladder spasms - management per Urology, discussed with team.  Avoid anti-spasm medications due to effects of urinary retention in anticipation of potential trial void outpatient.  Maintain catheter with close attention to I's and O's and symptoms.  Monitor.  IV Meropenem last dose tomorrow 6 am.  Renal function stable.  Monitor outpatient.  Telemetry rate control.  Blood pressure stable.  Medical management per Cardiology.  Continue Aspirin, Plavix.  Discussed medications, Neurology recommendations, patient spouse and daughter/dpoa would like to continue Keppra until follow up with Dr. Garvey.  Continue.  Mentation is at baseline.  Toxic, metabolic encephalopathy resolved.  Right hip pain, improving.  Continue analgesics.  PT/OT.  Fall precautions.  Up with assistance only.  Bed and chair alarm at all times.  DVT prophylaxis Lovenox subcutaneous.  Supportive care.  Patient reassured.  Case management following for discharge planning.  Discharge plan to [facility] nursing rehabilitation facility tomorrow.  Discussed with patient, nursing, family spouse and daughter at bedside.  Discussed with Dr. Oneill.     Martina Haynes, JONA-CNP

## 2023-12-06 ENCOUNTER — APPOINTMENT (OUTPATIENT)
Dept: CARDIOLOGY | Facility: CLINIC | Age: 83
End: 2023-12-06
Payer: MEDICARE

## 2023-12-12 ENCOUNTER — TELEPHONE (OUTPATIENT)
Dept: CARDIOLOGY | Facility: HOSPITAL | Age: 83
End: 2023-12-12

## 2023-12-12 DIAGNOSIS — I48.91 ATRIAL FIBRILLATION, UNSPECIFIED TYPE (MULTI): ICD-10-CM

## 2023-12-14 ENCOUNTER — APPOINTMENT (OUTPATIENT)
Dept: CARDIOLOGY | Facility: HOSPITAL | Age: 83
End: 2023-12-14
Payer: MEDICARE

## 2023-12-14 ENCOUNTER — APPOINTMENT (OUTPATIENT)
Dept: PRIMARY CARE | Facility: CLINIC | Age: 83
End: 2023-12-14
Payer: MEDICARE

## 2023-12-18 ENCOUNTER — HOSPITAL ENCOUNTER (OUTPATIENT)
Dept: CARDIOLOGY | Facility: HOSPITAL | Age: 83
Discharge: HOME | DRG: 698 | End: 2023-12-18
Payer: MEDICARE

## 2023-12-18 ENCOUNTER — TELEPHONE (OUTPATIENT)
Dept: PRIMARY CARE | Facility: CLINIC | Age: 83
End: 2023-12-18
Payer: MEDICARE

## 2023-12-18 ENCOUNTER — APPOINTMENT (OUTPATIENT)
Dept: RADIOLOGY | Facility: HOSPITAL | Age: 83
DRG: 698 | End: 2023-12-18
Payer: MEDICARE

## 2023-12-18 ENCOUNTER — HOSPITAL ENCOUNTER (INPATIENT)
Facility: HOSPITAL | Age: 83
LOS: 8 days | Discharge: HOME | DRG: 698 | End: 2023-12-26
Attending: EMERGENCY MEDICINE | Admitting: INTERNAL MEDICINE
Payer: MEDICARE

## 2023-12-18 DIAGNOSIS — E11.9 TYPE 2 DIABETES MELLITUS WITHOUT COMPLICATION, WITH LONG-TERM CURRENT USE OF INSULIN (MULTI): ICD-10-CM

## 2023-12-18 DIAGNOSIS — G93.41 SEPSIS WITH ENCEPHALOPATHY WITHOUT SEPTIC SHOCK, DUE TO UNSPECIFIED ORGANISM (MULTI): ICD-10-CM

## 2023-12-18 DIAGNOSIS — R26.81 GENERAL UNSTEADINESS: ICD-10-CM

## 2023-12-18 DIAGNOSIS — T83.511D URINARY TRACT INFECTION ASSOCIATED WITH INDWELLING URETHRAL CATHETER, SUBSEQUENT ENCOUNTER: ICD-10-CM

## 2023-12-18 DIAGNOSIS — Z16.12 URINARY TRACT INFECTION DUE TO EXTENDED-SPECTRUM BETA LACTAMASE (ESBL) PRODUCING ESCHERICHIA COLI: ICD-10-CM

## 2023-12-18 DIAGNOSIS — N39.0 URINARY TRACT INFECTION DUE TO EXTENDED-SPECTRUM BETA LACTAMASE (ESBL) PRODUCING ESCHERICHIA COLI: ICD-10-CM

## 2023-12-18 DIAGNOSIS — N39.0 URINARY TRACT INFECTION ASSOCIATED WITH INDWELLING URETHRAL CATHETER, SUBSEQUENT ENCOUNTER: ICD-10-CM

## 2023-12-18 DIAGNOSIS — N39.0 URINARY TRACT INFECTION ASSOCIATED WITH INDWELLING URETHRAL CATHETER, INITIAL ENCOUNTER (CMS-HCC): Primary | ICD-10-CM

## 2023-12-18 DIAGNOSIS — T83.511A URINARY TRACT INFECTION ASSOCIATED WITH INDWELLING URETHRAL CATHETER, INITIAL ENCOUNTER (CMS-HCC): Primary | ICD-10-CM

## 2023-12-18 DIAGNOSIS — B96.20 BACTEREMIA DUE TO ESCHERICHIA COLI: ICD-10-CM

## 2023-12-18 DIAGNOSIS — I10 BENIGN ESSENTIAL HYPERTENSION: ICD-10-CM

## 2023-12-18 DIAGNOSIS — R65.20 SEPSIS WITH ENCEPHALOPATHY WITHOUT SEPTIC SHOCK, DUE TO UNSPECIFIED ORGANISM (MULTI): ICD-10-CM

## 2023-12-18 DIAGNOSIS — B96.29 URINARY TRACT INFECTION DUE TO EXTENDED-SPECTRUM BETA LACTAMASE (ESBL) PRODUCING ESCHERICHIA COLI: ICD-10-CM

## 2023-12-18 DIAGNOSIS — R78.81 BACTEREMIA DUE TO ESCHERICHIA COLI: ICD-10-CM

## 2023-12-18 DIAGNOSIS — A41.9 SEPSIS WITH ENCEPHALOPATHY WITHOUT SEPTIC SHOCK, DUE TO UNSPECIFIED ORGANISM (MULTI): ICD-10-CM

## 2023-12-18 DIAGNOSIS — R79.89 ELEVATED TROPONIN: ICD-10-CM

## 2023-12-18 DIAGNOSIS — Z79.4 TYPE 2 DIABETES MELLITUS WITHOUT COMPLICATION, WITH LONG-TERM CURRENT USE OF INSULIN (MULTI): ICD-10-CM

## 2023-12-18 DIAGNOSIS — D64.9 ANEMIA, UNSPECIFIED TYPE: ICD-10-CM

## 2023-12-18 LAB
ALBUMIN SERPL-MCNC: 3.6 G/DL (ref 3.5–5)
ALP BLD-CCNC: 76 U/L (ref 35–125)
ALT SERPL-CCNC: 10 U/L (ref 5–40)
ANION GAP SERPL CALC-SCNC: 14 MMOL/L
APPEARANCE UR: ABNORMAL
AST SERPL-CCNC: 19 U/L (ref 5–40)
BACTERIA #/AREA URNS AUTO: ABNORMAL /HPF
BASOPHILS # BLD AUTO: 0.02 X10*3/UL (ref 0–0.1)
BASOPHILS NFR BLD AUTO: 0.2 %
BILIRUB SERPL-MCNC: 1.1 MG/DL (ref 0.1–1.2)
BILIRUB UR STRIP.AUTO-MCNC: NEGATIVE MG/DL
BUN SERPL-MCNC: 26 MG/DL (ref 8–25)
CALCIUM SERPL-MCNC: 9.4 MG/DL (ref 8.5–10.4)
CHLORIDE SERPL-SCNC: 95 MMOL/L (ref 97–107)
CO2 SERPL-SCNC: 22 MMOL/L (ref 24–31)
COLOR UR: YELLOW
CREAT SERPL-MCNC: 1.2 MG/DL (ref 0.4–1.6)
EOSINOPHIL # BLD AUTO: 0 X10*3/UL (ref 0–0.4)
EOSINOPHIL NFR BLD AUTO: 0 %
ERYTHROCYTE [DISTWIDTH] IN BLOOD BY AUTOMATED COUNT: 14.1 % (ref 11.5–14.5)
FLUAV RNA RESP QL NAA+PROBE: NOT DETECTED
FLUBV RNA RESP QL NAA+PROBE: NOT DETECTED
GFR SERPL CREATININE-BSD FRML MDRD: 60 ML/MIN/1.73M*2
GLUCOSE BLD MANUAL STRIP-MCNC: 184 MG/DL (ref 74–99)
GLUCOSE BLD MANUAL STRIP-MCNC: 278 MG/DL (ref 74–99)
GLUCOSE SERPL-MCNC: 141 MG/DL (ref 65–99)
GLUCOSE UR STRIP.AUTO-MCNC: NORMAL MG/DL
HCT VFR BLD AUTO: 32.2 % (ref 41–52)
HGB BLD-MCNC: 10.9 G/DL (ref 13.5–17.5)
IMM GRANULOCYTES # BLD AUTO: 0.05 X10*3/UL (ref 0–0.5)
IMM GRANULOCYTES NFR BLD AUTO: 0.5 % (ref 0–0.9)
KETONES UR STRIP.AUTO-MCNC: NEGATIVE MG/DL
LACTATE BLDV-SCNC: 2.1 MMOL/L (ref 0.4–2)
LACTATE BLDV-SCNC: 2.2 MMOL/L (ref 0.4–2)
LEUKOCYTE ESTERASE UR QL STRIP.AUTO: ABNORMAL
LEVETIRACETAM SERPL-MCNC: 19 UG/ML (ref 10–40)
LIPASE SERPL-CCNC: 29 U/L (ref 16–63)
LYMPHOCYTES # BLD AUTO: 1.04 X10*3/UL (ref 0.8–3)
LYMPHOCYTES NFR BLD AUTO: 10.7 %
MCH RBC QN AUTO: 34 PG (ref 26–34)
MCHC RBC AUTO-ENTMCNC: 33.9 G/DL (ref 32–36)
MCV RBC AUTO: 100 FL (ref 80–100)
MONOCYTES # BLD AUTO: 0.94 X10*3/UL (ref 0.05–0.8)
MONOCYTES NFR BLD AUTO: 9.7 %
NEUTROPHILS # BLD AUTO: 7.63 X10*3/UL (ref 1.6–5.5)
NEUTROPHILS NFR BLD AUTO: 78.9 %
NITRITE UR QL STRIP.AUTO: NEGATIVE
NRBC BLD-RTO: 0 /100 WBCS (ref 0–0)
PH UR STRIP.AUTO: 6.5 [PH]
PLATELET # BLD AUTO: 145 X10*3/UL (ref 150–450)
POTASSIUM SERPL-SCNC: 4.7 MMOL/L (ref 3.4–5.1)
PROT SERPL-MCNC: 6.7 G/DL (ref 5.9–7.9)
PROT UR STRIP.AUTO-MCNC: ABNORMAL MG/DL
RBC # BLD AUTO: 3.21 X10*6/UL (ref 4.5–5.9)
RBC # UR STRIP.AUTO: ABNORMAL /UL
RBC #/AREA URNS AUTO: ABNORMAL /HPF
SARS-COV-2 RNA RESP QL NAA+PROBE: NOT DETECTED
SODIUM SERPL-SCNC: 131 MMOL/L (ref 133–145)
SP GR UR STRIP.AUTO: 1.01
TROPONIN T SERPL-MCNC: 212 NG/L
TROPONIN T SERPL-MCNC: 222 NG/L
TROPONIN T SERPL-MCNC: 244 NG/L
UROBILINOGEN UR STRIP.AUTO-MCNC: ABNORMAL MG/DL
WBC # BLD AUTO: 9.7 X10*3/UL (ref 4.4–11.3)
WBC #/AREA URNS AUTO: >50 /HPF
WBC CLUMPS #/AREA URNS AUTO: ABNORMAL /HPF

## 2023-12-18 PROCEDURE — 87086 URINE CULTURE/COLONY COUNT: CPT | Mod: WESLAB | Performed by: CLINICAL NURSE SPECIALIST

## 2023-12-18 PROCEDURE — 87636 SARSCOV2 & INF A&B AMP PRB: CPT | Performed by: CLINICAL NURSE SPECIALIST

## 2023-12-18 PROCEDURE — 94760 N-INVAS EAR/PLS OXIMETRY 1: CPT

## 2023-12-18 PROCEDURE — 70450 CT HEAD/BRAIN W/O DYE: CPT

## 2023-12-18 PROCEDURE — 81001 URINALYSIS AUTO W/SCOPE: CPT | Performed by: CLINICAL NURSE SPECIALIST

## 2023-12-18 PROCEDURE — 80177 DRUG SCRN QUAN LEVETIRACETAM: CPT | Mod: WESLAB | Performed by: CLINICAL NURSE SPECIALIST

## 2023-12-18 PROCEDURE — 2500000001 HC RX 250 WO HCPCS SELF ADMINISTERED DRUGS (ALT 637 FOR MEDICARE OP): Performed by: INTERNAL MEDICINE

## 2023-12-18 PROCEDURE — 93010 ELECTROCARDIOGRAM REPORT: CPT | Performed by: INTERNAL MEDICINE

## 2023-12-18 PROCEDURE — 84484 ASSAY OF TROPONIN QUANT: CPT | Performed by: CLINICAL NURSE SPECIALIST

## 2023-12-18 PROCEDURE — 96372 THER/PROPH/DIAG INJ SC/IM: CPT | Performed by: INTERNAL MEDICINE

## 2023-12-18 PROCEDURE — 2500000004 HC RX 250 GENERAL PHARMACY W/ HCPCS (ALT 636 FOR OP/ED): Performed by: CLINICAL NURSE SPECIALIST

## 2023-12-18 PROCEDURE — 83605 ASSAY OF LACTIC ACID: CPT | Performed by: CLINICAL NURSE SPECIALIST

## 2023-12-18 PROCEDURE — 36415 COLL VENOUS BLD VENIPUNCTURE: CPT | Performed by: CLINICAL NURSE SPECIALIST

## 2023-12-18 PROCEDURE — 85025 COMPLETE CBC W/AUTO DIFF WBC: CPT | Performed by: CLINICAL NURSE SPECIALIST

## 2023-12-18 PROCEDURE — 87040 BLOOD CULTURE FOR BACTERIA: CPT | Mod: WESLAB | Performed by: CLINICAL NURSE SPECIALIST

## 2023-12-18 PROCEDURE — 99291 CRITICAL CARE FIRST HOUR: CPT | Mod: 27 | Performed by: CLINICAL NURSE SPECIALIST

## 2023-12-18 PROCEDURE — 2500000004 HC RX 250 GENERAL PHARMACY W/ HCPCS (ALT 636 FOR OP/ED): Performed by: INTERNAL MEDICINE

## 2023-12-18 PROCEDURE — 80053 COMPREHEN METABOLIC PANEL: CPT | Performed by: CLINICAL NURSE SPECIALIST

## 2023-12-18 PROCEDURE — 82947 ASSAY GLUCOSE BLOOD QUANT: CPT

## 2023-12-18 PROCEDURE — 93005 ELECTROCARDIOGRAM TRACING: CPT

## 2023-12-18 PROCEDURE — 2060000001 HC INTERMEDIATE ICU ROOM DAILY

## 2023-12-18 PROCEDURE — 96365 THER/PROPH/DIAG IV INF INIT: CPT | Mod: 59

## 2023-12-18 PROCEDURE — 2500000001 HC RX 250 WO HCPCS SELF ADMINISTERED DRUGS (ALT 637 FOR MEDICARE OP)

## 2023-12-18 PROCEDURE — 71046 X-RAY EXAM CHEST 2 VIEWS: CPT | Mod: FY

## 2023-12-18 PROCEDURE — 83690 ASSAY OF LIPASE: CPT | Performed by: CLINICAL NURSE SPECIALIST

## 2023-12-18 RX ORDER — FERROUS SULFATE 325(65) MG
65 TABLET ORAL 2 TIMES DAILY
Status: DISCONTINUED | OUTPATIENT
Start: 2023-12-18 | End: 2023-12-19

## 2023-12-18 RX ORDER — LANOLIN ALCOHOL/MO/W.PET/CERES
1000 CREAM (GRAM) TOPICAL DAILY
Status: DISCONTINUED | OUTPATIENT
Start: 2023-12-18 | End: 2023-12-26 | Stop reason: HOSPADM

## 2023-12-18 RX ORDER — DOCUSATE SODIUM 100 MG/1
100 CAPSULE, LIQUID FILLED ORAL 2 TIMES DAILY
Status: DISCONTINUED | OUTPATIENT
Start: 2023-12-18 | End: 2023-12-24

## 2023-12-18 RX ORDER — LISINOPRIL 20 MG/1
20 TABLET ORAL DAILY
Status: DISCONTINUED | OUTPATIENT
Start: 2023-12-19 | End: 2023-12-19

## 2023-12-18 RX ORDER — ACETAMINOPHEN 650 MG/1
650 SUPPOSITORY RECTAL EVERY 4 HOURS PRN
Status: DISCONTINUED | OUTPATIENT
Start: 2023-12-18 | End: 2023-12-26 | Stop reason: HOSPADM

## 2023-12-18 RX ORDER — ACETAMINOPHEN 160 MG/5ML
650 SOLUTION ORAL EVERY 4 HOURS PRN
Status: DISCONTINUED | OUTPATIENT
Start: 2023-12-18 | End: 2023-12-26 | Stop reason: HOSPADM

## 2023-12-18 RX ORDER — METFORMIN HYDROCHLORIDE 1000 MG/1
1000 TABLET ORAL
Status: DISCONTINUED | OUTPATIENT
Start: 2023-12-18 | End: 2023-12-26 | Stop reason: HOSPADM

## 2023-12-18 RX ORDER — LIDOCAINE 560 MG/1
1 PATCH PERCUTANEOUS; TOPICAL; TRANSDERMAL DAILY
Status: DISCONTINUED | OUTPATIENT
Start: 2023-12-18 | End: 2023-12-19

## 2023-12-18 RX ORDER — TAMSULOSIN HYDROCHLORIDE 0.4 MG/1
0.4 CAPSULE ORAL 2 TIMES DAILY
Status: DISCONTINUED | OUTPATIENT
Start: 2023-12-18 | End: 2023-12-26 | Stop reason: HOSPADM

## 2023-12-18 RX ORDER — LANOLIN ALCOHOL/MO/W.PET/CERES
400 CREAM (GRAM) TOPICAL DAILY
Status: DISCONTINUED | OUTPATIENT
Start: 2023-12-19 | End: 2023-12-26 | Stop reason: HOSPADM

## 2023-12-18 RX ORDER — GUAIFENESIN/DEXTROMETHORPHAN 100-10MG/5
5 SYRUP ORAL EVERY 4 HOURS PRN
Status: DISCONTINUED | OUTPATIENT
Start: 2023-12-18 | End: 2023-12-26 | Stop reason: HOSPADM

## 2023-12-18 RX ORDER — ASPIRIN 81 MG/1
162 TABLET ORAL DAILY
Status: DISCONTINUED | OUTPATIENT
Start: 2023-12-19 | End: 2023-12-26 | Stop reason: HOSPADM

## 2023-12-18 RX ORDER — AMLODIPINE BESYLATE 5 MG/1
5 TABLET ORAL DAILY
Status: DISCONTINUED | OUTPATIENT
Start: 2023-12-19 | End: 2023-12-26 | Stop reason: HOSPADM

## 2023-12-18 RX ORDER — FINASTERIDE 5 MG/1
5 TABLET, FILM COATED ORAL NIGHTLY
Status: DISCONTINUED | OUTPATIENT
Start: 2023-12-18 | End: 2023-12-26 | Stop reason: HOSPADM

## 2023-12-18 RX ORDER — ACETAMINOPHEN 325 MG/1
650 TABLET ORAL EVERY 6 HOURS PRN
Status: DISCONTINUED | OUTPATIENT
Start: 2023-12-18 | End: 2023-12-18 | Stop reason: SDUPTHER

## 2023-12-18 RX ORDER — PANTOPRAZOLE SODIUM 40 MG/1
40 TABLET, DELAYED RELEASE ORAL
Status: DISCONTINUED | OUTPATIENT
Start: 2023-12-19 | End: 2023-12-26 | Stop reason: HOSPADM

## 2023-12-18 RX ORDER — ACETAMINOPHEN 325 MG/1
650 TABLET ORAL EVERY 4 HOURS PRN
Status: DISCONTINUED | OUTPATIENT
Start: 2023-12-18 | End: 2023-12-26 | Stop reason: HOSPADM

## 2023-12-18 RX ORDER — POLYETHYLENE GLYCOL 3350 17 G/17G
17 POWDER, FOR SOLUTION ORAL 2 TIMES DAILY
Status: DISCONTINUED | OUTPATIENT
Start: 2023-12-18 | End: 2023-12-24

## 2023-12-18 RX ORDER — GABAPENTIN 100 MG/1
100 CAPSULE ORAL 2 TIMES DAILY
Status: DISCONTINUED | OUTPATIENT
Start: 2023-12-18 | End: 2023-12-26 | Stop reason: HOSPADM

## 2023-12-18 RX ORDER — FUROSEMIDE 40 MG/1
40 TABLET ORAL DAILY
Status: DISCONTINUED | OUTPATIENT
Start: 2023-12-19 | End: 2023-12-26 | Stop reason: HOSPADM

## 2023-12-18 RX ORDER — FINASTERIDE 5 MG/1
5 TABLET, FILM COATED ORAL DAILY
Status: DISCONTINUED | OUTPATIENT
Start: 2023-12-18 | End: 2023-12-18

## 2023-12-18 RX ORDER — POLYETHYLENE GLYCOL 3350 17 G/17G
17 POWDER, FOR SOLUTION ORAL DAILY PRN
Status: DISCONTINUED | OUTPATIENT
Start: 2023-12-18 | End: 2023-12-18 | Stop reason: SDUPTHER

## 2023-12-18 RX ORDER — HEPARIN SODIUM 5000 [USP'U]/ML
5000 INJECTION, SOLUTION INTRAVENOUS; SUBCUTANEOUS EVERY 8 HOURS SCHEDULED
Status: DISCONTINUED | OUTPATIENT
Start: 2023-12-18 | End: 2023-12-26 | Stop reason: HOSPADM

## 2023-12-18 RX ORDER — MEROPENEM 1 G/1
1 INJECTION, POWDER, FOR SOLUTION INTRAVENOUS EVERY 8 HOURS
Status: COMPLETED | OUTPATIENT
Start: 2023-12-18 | End: 2023-12-21

## 2023-12-18 RX ORDER — ATORVASTATIN CALCIUM 40 MG/1
40 TABLET, FILM COATED ORAL NIGHTLY
Status: DISCONTINUED | OUTPATIENT
Start: 2023-12-18 | End: 2023-12-26 | Stop reason: HOSPADM

## 2023-12-18 RX ORDER — CLOPIDOGREL BISULFATE 75 MG/1
75 TABLET ORAL DAILY
Status: DISCONTINUED | OUTPATIENT
Start: 2023-12-19 | End: 2023-12-26 | Stop reason: HOSPADM

## 2023-12-18 RX ORDER — GUAIFENESIN 600 MG/1
600 TABLET, EXTENDED RELEASE ORAL EVERY 12 HOURS PRN
Status: DISCONTINUED | OUTPATIENT
Start: 2023-12-18 | End: 2023-12-26 | Stop reason: HOSPADM

## 2023-12-18 RX ORDER — MEROPENEM 1 G/1
1 INJECTION, POWDER, FOR SOLUTION INTRAVENOUS ONCE
Status: COMPLETED | OUTPATIENT
Start: 2023-12-18 | End: 2023-12-18

## 2023-12-18 RX ORDER — LEVETIRACETAM 250 MG/1
250 TABLET ORAL 2 TIMES DAILY
Status: DISCONTINUED | OUTPATIENT
Start: 2023-12-18 | End: 2023-12-26 | Stop reason: HOSPADM

## 2023-12-18 RX ADMIN — GABAPENTIN 100 MG: 100 CAPSULE ORAL at 21:15

## 2023-12-18 RX ADMIN — ACETAMINOPHEN 650 MG: 325 TABLET ORAL at 17:27

## 2023-12-18 RX ADMIN — METFORMIN HYDROCHLORIDE 1000 MG: 1000 TABLET ORAL at 19:45

## 2023-12-18 RX ADMIN — TAMSULOSIN HYDROCHLORIDE 0.4 MG: 0.4 CAPSULE ORAL at 21:16

## 2023-12-18 RX ADMIN — MEROPENEM 1 G: 1 INJECTION, POWDER, FOR SOLUTION INTRAVENOUS at 21:44

## 2023-12-18 RX ADMIN — FINASTERIDE 5 MG: 5 TABLET, FILM COATED ORAL at 21:16

## 2023-12-18 RX ADMIN — HEPARIN SODIUM 5000 UNITS: 5000 INJECTION, SOLUTION INTRAVENOUS; SUBCUTANEOUS at 21:16

## 2023-12-18 RX ADMIN — LEVETIRACETAM 250 MG: 250 TABLET, FILM COATED ORAL at 21:16

## 2023-12-18 RX ADMIN — ATORVASTATIN CALCIUM 40 MG: 40 TABLET, FILM COATED ORAL at 21:16

## 2023-12-18 RX ADMIN — MEROPENEM 1 G: 1 INJECTION, POWDER, FOR SOLUTION INTRAVENOUS at 12:56

## 2023-12-18 RX ADMIN — SODIUM CHLORIDE 2316 ML: 900 INJECTION, SOLUTION INTRAVENOUS at 12:03

## 2023-12-18 SDOH — ECONOMIC STABILITY: FOOD INSECURITY: WITHIN THE PAST 12 MONTHS, YOU WORRIED THAT YOUR FOOD WOULD RUN OUT BEFORE YOU GOT MONEY TO BUY MORE.: NEVER TRUE

## 2023-12-18 SDOH — SOCIAL STABILITY: SOCIAL INSECURITY: WITHIN THE LAST YEAR, HAVE YOU BEEN AFRAID OF YOUR PARTNER OR EX-PARTNER?: NO

## 2023-12-18 SDOH — SOCIAL STABILITY: SOCIAL INSECURITY: DO YOU FEEL ANYONE HAS EXPLOITED OR TAKEN ADVANTAGE OF YOU FINANCIALLY OR OF YOUR PERSONAL PROPERTY?: NO

## 2023-12-18 SDOH — ECONOMIC STABILITY: INCOME INSECURITY: IN THE LAST 12 MONTHS, WAS THERE A TIME WHEN YOU WERE NOT ABLE TO PAY THE MORTGAGE OR RENT ON TIME?: NO

## 2023-12-18 SDOH — HEALTH STABILITY: PHYSICAL HEALTH: ON AVERAGE, HOW MANY MINUTES DO YOU ENGAGE IN EXERCISE AT THIS LEVEL?: 10 MIN

## 2023-12-18 SDOH — HEALTH STABILITY: MENTAL HEALTH
STRESS IS WHEN SOMEONE FEELS TENSE, NERVOUS, ANXIOUS, OR CAN'T SLEEP AT NIGHT BECAUSE THEIR MIND IS TROUBLED. HOW STRESSED ARE YOU?: NOT AT ALL

## 2023-12-18 SDOH — SOCIAL STABILITY: SOCIAL INSECURITY: WITHIN THE LAST YEAR, HAVE YOU BEEN HUMILIATED OR EMOTIONALLY ABUSED IN OTHER WAYS BY YOUR PARTNER OR EX-PARTNER?: NO

## 2023-12-18 SDOH — HEALTH STABILITY: PHYSICAL HEALTH: ON AVERAGE, HOW MANY DAYS PER WEEK DO YOU ENGAGE IN MODERATE TO STRENUOUS EXERCISE (LIKE A BRISK WALK)?: 2 DAYS

## 2023-12-18 SDOH — SOCIAL STABILITY: SOCIAL INSECURITY: HAVE YOU HAD THOUGHTS OF HARMING ANYONE ELSE?: NO

## 2023-12-18 SDOH — SOCIAL STABILITY: SOCIAL INSECURITY: DO YOU FEEL UNSAFE GOING BACK TO THE PLACE WHERE YOU ARE LIVING?: NO

## 2023-12-18 SDOH — ECONOMIC STABILITY: HOUSING INSECURITY: IN THE LAST 12 MONTHS, HOW MANY PLACES HAVE YOU LIVED?: 1

## 2023-12-18 SDOH — SOCIAL STABILITY: SOCIAL INSECURITY
WITHIN THE LAST YEAR, HAVE YOU BEEN KICKED, HIT, SLAPPED, OR OTHERWISE PHYSICALLY HURT BY YOUR PARTNER OR EX-PARTNER?: NO

## 2023-12-18 SDOH — ECONOMIC STABILITY: INCOME INSECURITY: HOW HARD IS IT FOR YOU TO PAY FOR THE VERY BASICS LIKE FOOD, HOUSING, MEDICAL CARE, AND HEATING?: NOT HARD AT ALL

## 2023-12-18 SDOH — SOCIAL STABILITY: SOCIAL NETWORK: ARE YOU MARRIED, WIDOWED, DIVORCED, SEPARATED, NEVER MARRIED, OR LIVING WITH A PARTNER?: PATIENT UNABLE TO ANSWER

## 2023-12-18 SDOH — SOCIAL STABILITY: SOCIAL INSECURITY: ARE THERE ANY APPARENT SIGNS OF INJURIES/BEHAVIORS THAT COULD BE RELATED TO ABUSE/NEGLECT?: NO

## 2023-12-18 SDOH — SOCIAL STABILITY: SOCIAL INSECURITY: ABUSE: ADULT

## 2023-12-18 SDOH — SOCIAL STABILITY: SOCIAL INSECURITY: DOES ANYONE TRY TO KEEP YOU FROM HAVING/CONTACTING OTHER FRIENDS OR DOING THINGS OUTSIDE YOUR HOME?: NO

## 2023-12-18 SDOH — ECONOMIC STABILITY: FOOD INSECURITY: WITHIN THE PAST 12 MONTHS, THE FOOD YOU BOUGHT JUST DIDN'T LAST AND YOU DIDN'T HAVE MONEY TO GET MORE.: NEVER TRUE

## 2023-12-18 SDOH — SOCIAL STABILITY: SOCIAL NETWORK: HOW OFTEN DO YOU ATTENT MEETINGS OF THE CLUB OR ORGANIZATION YOU BELONG TO?: NEVER

## 2023-12-18 SDOH — SOCIAL STABILITY: SOCIAL INSECURITY: ARE YOU OR HAVE YOU BEEN THREATENED OR ABUSED PHYSICALLY, EMOTIONALLY, OR SEXUALLY BY ANYONE?: NO

## 2023-12-18 SDOH — SOCIAL STABILITY: SOCIAL NETWORK: HOW OFTEN DO YOU ATTEND CHURCH OR RELIGIOUS SERVICES?: NEVER

## 2023-12-18 SDOH — SOCIAL STABILITY: SOCIAL INSECURITY: HAS ANYONE EVER THREATENED TO HURT YOUR FAMILY OR YOUR PETS?: NO

## 2023-12-18 SDOH — SOCIAL STABILITY: SOCIAL NETWORK: HOW OFTEN DO YOU GET TOGETHER WITH FRIENDS OR RELATIVES?: THREE TIMES A WEEK

## 2023-12-18 SDOH — SOCIAL STABILITY: SOCIAL NETWORK: IN A TYPICAL WEEK, HOW MANY TIMES DO YOU TALK ON THE PHONE WITH FAMILY, FRIENDS, OR NEIGHBORS?: THREE TIMES A WEEK

## 2023-12-18 SDOH — SOCIAL STABILITY: SOCIAL INSECURITY: WERE YOU ABLE TO COMPLETE ALL THE BEHAVIORAL HEALTH SCREENINGS?: YES

## 2023-12-18 ASSESSMENT — ACTIVITIES OF DAILY LIVING (ADL)
ASSISTIVE_DEVICE: WALKER
BATHING: NEEDS ASSISTANCE
HEARING - LEFT EAR: DIFFICULTY WITH NOISE
HEARING - RIGHT EAR: DIFFICULTY WITH NOISE
JUDGMENT_ADEQUATE_SAFELY_COMPLETE_DAILY_ACTIVITIES: YES
DRESSING YOURSELF: NEEDS ASSISTANCE
ADEQUATE_TO_COMPLETE_ADL: YES
TOILETING: NEEDS ASSISTANCE
GROOMING: NEEDS ASSISTANCE
PATIENT'S MEMORY ADEQUATE TO SAFELY COMPLETE DAILY ACTIVITIES?: YES
WALKS IN HOME: NEEDS ASSISTANCE
FEEDING YOURSELF: NEEDS ASSISTANCE

## 2023-12-18 ASSESSMENT — COGNITIVE AND FUNCTIONAL STATUS - GENERAL
PATIENT BASELINE BEDBOUND: NO
TOILETING: A LITTLE
EATING MEALS: A LITTLE
DRESSING REGULAR LOWER BODY CLOTHING: A LITTLE
MOVING FROM LYING ON BACK TO SITTING ON SIDE OF FLAT BED WITH BEDRAILS: A LITTLE
STANDING UP FROM CHAIR USING ARMS: A LITTLE
TURNING FROM BACK TO SIDE WHILE IN FLAT BAD: A LITTLE
MOBILITY SCORE: 18
CLIMB 3 TO 5 STEPS WITH RAILING: A LITTLE
MOVING TO AND FROM BED TO CHAIR: A LITTLE
DRESSING REGULAR UPPER BODY CLOTHING: A LITTLE
PERSONAL GROOMING: A LITTLE
HELP NEEDED FOR BATHING: A LITTLE
DAILY ACTIVITIY SCORE: 18
WALKING IN HOSPITAL ROOM: A LITTLE

## 2023-12-18 ASSESSMENT — PATIENT HEALTH QUESTIONNAIRE - PHQ9
SUM OF ALL RESPONSES TO PHQ9 QUESTIONS 1 & 2: 0
2. FEELING DOWN, DEPRESSED OR HOPELESS: NOT AT ALL
1. LITTLE INTEREST OR PLEASURE IN DOING THINGS: NOT AT ALL

## 2023-12-18 ASSESSMENT — LIFESTYLE VARIABLES
AUDIT-C TOTAL SCORE: 0
HOW OFTEN DO YOU HAVE A DRINK CONTAINING ALCOHOL: NEVER
HOW MANY STANDARD DRINKS CONTAINING ALCOHOL DO YOU HAVE ON A TYPICAL DAY: PATIENT DOES NOT DRINK
AUDIT-C TOTAL SCORE: 0
HOW OFTEN DO YOU HAVE 6 OR MORE DRINKS ON ONE OCCASION: NEVER
SKIP TO QUESTIONS 9-10: 1

## 2023-12-18 ASSESSMENT — COLUMBIA-SUICIDE SEVERITY RATING SCALE - C-SSRS
6. HAVE YOU EVER DONE ANYTHING, STARTED TO DO ANYTHING, OR PREPARED TO DO ANYTHING TO END YOUR LIFE?: NO
2. HAVE YOU ACTUALLY HAD ANY THOUGHTS OF KILLING YOURSELF?: NO
1. IN THE PAST MONTH, HAVE YOU WISHED YOU WERE DEAD OR WISHED YOU COULD GO TO SLEEP AND NOT WAKE UP?: NO

## 2023-12-18 ASSESSMENT — PAIN - FUNCTIONAL ASSESSMENT
PAIN_FUNCTIONAL_ASSESSMENT: 0-10
PAIN_FUNCTIONAL_ASSESSMENT: 0-10

## 2023-12-18 ASSESSMENT — PAIN SCALES - GENERAL
PAINLEVEL_OUTOF10: 0 - NO PAIN

## 2023-12-18 NOTE — ED PROVIDER NOTES
Department of Emergency Medicine   ED  Provider Note  Admit Date/RoomTime: 12/18/2023 11:14 AM  ED Room: AC13/AC13        History of Present Illness:  Chief Complaint   Patient presents with    Altered Mental Status     Pt was recently admitted for sepsis. Pt is a/ox4 at this time. Denies confusion.         Luis Greene is a 83 y.o. male history of leukocytosis, hyponatremia,, toxic encephalopathy secondary to UTI, hypertension, atrial fibs, history of TIA CVA, seizure diabetes, anemia presenting to the ED for confusion, fever, .  Patient's family present at bedside.  Reports patient was admitted about 3 to 4 weeks ago for UTI with sepsis for weakness altered mental status.  Required rehab was discharged on Saturday.  Over the past couple of days has had decreased appetite poor oral intake yesterday developed a fever of 101 and was confused patient reports he felt confused as well.  He does have a Tiwari in place.  Family states today when the home health nurse came to evaluate the patient felt he was septic again because of the confusion and fevers.  Family reports increased weakness.  Reports he was ambulating fine when he was in the rehab but now he is having trouble even getting up from the couch.  No fever today.  Was given Tylenol yesterday.  They did a COVID test at home that was negative.  Denies cough congestion runny nose sore throat.  Denies any fall or injury.  No headache      Review of Systems:   Pertinent positives and negatives are stated within HPI, all other systems reviewed and are negative.        --------------------------------------------- PAST HISTORY ---------------------------------------------  Past Medical History:  has a past medical history of Coronary artery disease, Hyperlipidemia, Personal history of other diseases of the circulatory system, and Personal history of other endocrine, nutritional and metabolic disease.  Past Surgical History:  has a past surgical history that includes  Other surgical history (03/11/2019); Other surgical history (03/11/2019); Other surgical history (03/11/2019); MR angio head wo IV contrast (2/17/2019); MR angio head wo IV contrast (10/28/2022); MR angio head wo IV contrast (8/15/2023); and Cardiac catheterization (N/A, 11/10/2023).  Social History:  reports that he has never smoked. He has never used smokeless tobacco. He reports that he does not drink alcohol and does not use drugs.  Family History: family history includes Diabetes in his father; Hyperlipidemia in his father; Hypertension in his daughter and father; cardiac disorder in his father; hypercholesterolemia in his father; liver carcinoma in his father.. Unless otherwise noted, family history is non contributory  The patient’s home medications have been reviewed.  Allergies: Famotidine, Prednisone, Amoxicillin, Donepezil, Gabapentin, Hydrochlorothiazide, Ibuprofen, Oxycodone-acetaminophen, Tramadol hcl, and Oxycodone hcl        ---------------------------------------------------PHYSICAL EXAM--------------------------------------    GENERAL APPEARANCE: Awake and alert.   VITAL SIGNS: As per the nurses' triage record.   HEENT: Normocephalic, atraumatic.  No raccoon eyes or mars signs noted.  Extraocular muscles are intact. Pupils equal round and reactive to light. Conjunctiva are pink. Negative scleral icterus. Mucous membranes are moist. Tongue in the midline. Pharynx was without erythema or exudates, uvula midline  NECK: Soft Nontender and supple, full gross ROM, no meningeal signs.  CHEST: Nontender to palpation. Clear to auscultation bilaterally. No rales, rhonchi, or wheezing.   HEART: S1, S2.  Irregular rate and rhythm.  Rate controlled no murmurs, gallops or rubs.  Strong and equal pulses in the extremities.   ABDOMEN: Soft, nontender, nondistended, positive bowel sounds, no palpable masses.   catheter in place cloudy urine  MUSCULCSKELETAL: The calves are nontender to palpation. Full gross  "active range of motion. Ambulating on own with no acute difficulties no peripheral edema peripheral pulses intact  NEUROLOGICAL: Awake, alert and oriented x 3.  Forgetful power intact in the upper and lower extremities. Sensation is intact to light touch in the upper and lower extremities.  Strong x 4.  IMMUNOLOGICAL: No lymphatic streaking noted   DERM: No petechiae, rashes, or ecchymoses.          ------------------------- NURSING NOTES AND VITALS REVIEWED ---------------------------  The nursing notes within the ED encounter and vital signs as below have been reviewed by myself  /62   Pulse 91   Temp 38.5 °C (101.3 °F)   Resp 17   Ht 1.803 m (5' 11\")   Wt 77.2 kg (170 lb 3.1 oz)   SpO2 95%   BMI 23.74 kg/m²     Oxygen Saturation Interpretation: Normal    The cardiac monitor revealed atrial fibs with a heart rate in the 77s as interpreted by me. The cardiac monitor was ordered secondary to the patient's heart rate and to monitor the patient for dysrhythmia.       The patient’s available past medical records and past encounters were reviewed.          -----------------------DIAGNOSTIC RESULTS------------------------  LABS:    Labs Reviewed   CBC WITH AUTO DIFFERENTIAL - Abnormal       Result Value    WBC 9.7      nRBC 0.0      RBC 3.21 (*)     Hemoglobin 10.9 (*)     Hematocrit 32.2 (*)           MCH 34.0      MCHC 33.9      RDW 14.1      Platelets 145 (*)     Neutrophils % 78.9      Immature Granulocytes %, Automated 0.5      Lymphocytes % 10.7      Monocytes % 9.7      Eosinophils % 0.0      Basophils % 0.2      Neutrophils Absolute 7.63 (*)     Immature Granulocytes Absolute, Automated 0.05      Lymphocytes Absolute 1.04      Monocytes Absolute 0.94 (*)     Eosinophils Absolute 0.00      Basophils Absolute 0.02     COMPREHENSIVE METABOLIC PANEL - Abnormal    Glucose 141 (*)     Sodium 131 (*)     Potassium 4.7      Chloride 95 (*)     Bicarbonate 22 (*)     Urea Nitrogen 26 (*)     Creatinine " 1.20      eGFR 60 (*)     Calcium 9.4      Albumin 3.6      Alkaline Phosphatase 76      Total Protein 6.7      AST 19      Bilirubin, Total 1.1      ALT 10      Anion Gap 14     URINALYSIS WITH REFLEX MICROSCOPIC - Abnormal    Color, Urine Yellow      Appearance, Urine Turbid (*)     Specific Gravity, Urine 1.013      pH, Urine 6.5      Protein, Urine 50 (1+) (*)     Glucose, Urine Normal      Blood, Urine 0.03 (TRACE) (*)     Ketones, Urine NEGATIVE      Bilirubin, Urine NEGATIVE      Urobilinogen, Urine 3 (1+) (*)     Nitrite, Urine NEGATIVE      Leukocyte Esterase, Urine 500 Akin/µL (*)    BLOOD GAS LACTIC ACID, VENOUS - Abnormal    POCT Lactate, Venous 2.2 (*)    SERIAL TROPONIN, INITIAL ([facility]) - Abnormal    Troponin T, High Sensitivity 244 (*)    SERIAL TROPONIN,  2 HOUR ([facility]) - Abnormal    Troponin T, High Sensitivity 222 (*)    MICROSCOPIC ONLY, URINE - Abnormal    WBC, Urine >50 (*)     WBC Clumps, Urine MANY      RBC, Urine 3-5      Bacteria, Urine 1+ (*)    BLOOD GAS LACTIC ACID, VENOUS - Abnormal    POCT Lactate, Venous 2.1 (*)    LIPASE - Normal    Lipase 29     SARS-COV-2 PCR, SYMPTOMATIC - Normal    Coronavirus 2019, PCR Not Detected      Narrative:     This assay has received FDA Emergency Use Authorization (EUA) and is only authorized for the duration of time that circumstances exist to justify the authorization of the emergency use of in vitro diagnostic tests for the detection of SARS-CoV-2 virus and/or diagnosis of COVID-19 infection under section 564(b)(1) of the Act, 21 U.S.C. 360bbb-3(b)(1). This assay is an in vitro diagnostic nucleic acid amplification test for the qualitative detection of SARS-CoV-2 from nasopharyngeal specimens and has been validated for use at [facility]. Negative results do not preclude COVID-19 infections and should not be used as the sole basis for diagnosis, treatment, or other management decisions.     INFLUENZA A AND B PCR - Normal    Flu A  Result Not Detected      Flu B Result Not Detected      Narrative:     This assay is an in vitro diagnostic multiplex nucleic acid amplification test for the detection and discrimination of Influenza A & B from nasopharyngeal specimens, and has been validated for use at [facility]. Negative results do not preclude Influenza A/B infections, and should not be used as the sole basis for diagnosis, treatment, or other management decisions. If Influenza A/B and RSV PCR results are negative, testing for Parainfluenza virus, Adenovirus and Metapneumovirus is routinely performed for [facility] inpatients, and is available on other patients by placing an add-on request.   BLOOD CULTURE   BLOOD CULTURE   URINE CULTURE   TROPONIN T SERIES, HIGH SENSITIVITY (0, 2 HR, 6 HR)    Narrative:     The following orders were created for panel order Troponin T Series, High Sensitivity (0, 2HR, 6HR).  Procedure                               Abnormality         Status                     ---------                               -----------         ------                     Serial Troponin, Initial...[[id number]]  Abnormal            Final result               Serial Troponin, 2 Hour ...[[id number]]  Abnormal            Final result               Serial Troponin, 6 Hour ...[[id number]]                                                   Please view results for these tests on the individual orders.   LEVETIRACETAM   SERIAL TROPONIN, 6 HOUR ([facility])       As interpreted by me, the above displayed labs are abnormal. All other labs obtained during this visit were within normal range or not returned as of this dictation.      EKG Interpretation  1218 demonstrates an atrial fibrillation with ventricular rate 96 frequent unifocal PVCs no acute ischemic changes [EF] per attending note         CT head wo IV contrast   Final Result   No CT evidence for acute intracranial pathology. Stable appearing    examination from 11/24/2023        Signed by: Robby Lara 12/18/2023 12:17 PM   Dictation workstation:   [id number]      XR chest 2 views   Final Result   No acute cardiopulmonary process.             Signed by: Robby Lara 12/18/2023 1:33 PM   Dictation workstation:   [id number]              CT head wo IV contrast   Final Result   No CT evidence for acute intracranial pathology. Stable appearing   examination from 11/24/2023        Signed by: Robby Lara 12/18/2023 12:17 PM   Dictation workstation:   [id number]      XR chest 2 views   Final Result   No acute cardiopulmonary process.             Signed by: Robby Lara 12/18/2023 1:33 PM   Dictation workstation:   [id number]              ------------------------------ ED COURSE/MEDICAL DECISION MAKING----------------------  Medical Decision Making:   Exam: A medically appropriate exam performed, outlined above, given the known history and presentation.    History obtained from: Review of medical records.  Patient patient's family      Social Determinants of Health considered during this visit: Lives at home with family  Recently discharged from nursing home facility 3 days ago      PAST MEDICAL HISTORY/Chronic Conditions Affecting Care     has a past medical history of Coronary artery disease, Hyperlipidemia, Personal history of other diseases of the circulatory system, and Personal history of other endocrine, nutritional and metabolic disease.       CC/HPI Summary, Social Determinants of health, Records Reviewed, DDx, testing done/not done, ED Course, Reassessment, disposition considerations/shared decision making with patient, consults, disposition:   Presents with altered mental status fever  Plan:  Normal saline  CT head-No CT evidence for acute intracranial pathology. Stable appearing  examination from 11/24/2023    Chest x-ray-No acute cardiopulmonary process.     EKG  Blood cultures  Lactic acid  CBC  CMP  Influenza  Keppra  level  Lipase  COVID  Troponin  Urine  NIH 0  Medical Decision Making/Differential Diagnosis:  Differential include but not limited to UTI versus viral illness versus urosepsis versus coronary syndrome versus electrolyte abnormality versus anemia versus dehydration versus COVID versus flu versus pneumonia NIH negative low suspicion for stroke patient does have significant history of stroke and seizure CT ordered versus acute coronary syndrome    Lactic acid 2.2 blood cultures pending  Urine culture pending  COVID-negative  Flu negative  White blood cell count 9.7  Hemoglobin 10.9 no signs of active bleeding  Urine leukocytes 500 WBCs 50 bacteria +1  Lipase 29  Keppra level pending upon admission  Glucose 141  Sodium 131  Chloride 95  Bicarb 22  BUN 26  Creatinine 1.2  LFTs within normal limits  [id number] repeat troponin 222    Patient presented with altered mental status fever.  Clinical presentation has a UTI was given meropenem after review of previous cultures positive for E. coli susceptible to meropenem.  Patient does have allergy to amoxicillin described as rash with no reported anaphylaxis.  Tolerated medication here he did receive fluids 30 mg/kg.  Blood cultures are pending urine cultures pending lactic acid was elevated 2.2.  No signs of fluid overload.  Chest x-ray showed no acute cardiopulmonary process.  CT of the head showed no evidence for acute intracranial pathology stable examination from previous CT.  No elevation white blood cell count.  Patient has mild anemia with no signs of bleeding.  Suspect that his altered mental status and fever are secondary to urosepsis.  Troponin is also elevated at 240 4 repeat troponin 222 EKG per attending note showed atrial fibs with history of the same.  Discussed case with patient's primary care physician is agreed to admit the patient to his service impression   Urosepsis   UTI indwelling Tiwari catheter  Anemia no sign of bleeding  Elevated  troponin  PROCEDURES  Unless otherwise noted below, none  Patient seen and evaluated with attending physician Dr. Mendiola    CONSULTS:   None      ED Course as of 12/18/23 1538   Mon Dec 18, 2023   1229 Urine positive for UTI with WBCs bacteria and leukocytes.  Reviewed his previous culture bacteria was susceptible to meropenem.  Meropenem ordered.  Culture pending [TB]   1259 EKG personally interpreted by me performed at 1218 demonstrates an atrial fibrillation with ventricular rate 96 frequent unifocal PVCs no acute ischemic changes [EF]   1314 With Dr. Oneill is agreed to admit the patient under his service stepdown no further orders [TB]      ED Course User Index  [EF] Yanira Mendiola DO  [TB] SUSAN Sierra         Diagnoses as of 12/18/23 1538   Urinary tract infection associated with indwelling urethral catheter, initial encounter (CMS/[facility])   Sepsis with encephalopathy without septic shock, due to unspecified organism (CMS/[facility])   Elevated troponin   Anemia, unspecified type         This patient has remained hemodynamically stable during their ED course.      Critical Care: 31  Critical Care    Performed by: SUSAN Sierra  Authorized by: Yanira Mendiola DO    Critical care provider statement:     Critical care time (minutes):  31    Critical care time was exclusive of:  Separately billable procedures and treating other patients and teaching time    Critical care was necessary to treat or prevent imminent or life-threatening deterioration of the following conditions:  Sepsis    Critical care was time spent personally by me on the following activities:  Blood draw for specimens, development of treatment plan with patient or surrogate, discussions with primary provider, evaluation of patient's response to treatment, examination of patient, interpretation of cardiac output measurements, obtaining history from patient or surrogate, ordering and performing treatments and interventions, ordering and  review of laboratory studies, ordering and review of radiographic studies, pulse oximetry, review of old charts and re-evaluation of patient's condition          Counseling:  The emergency provider has spoken with the patient and family and discussed today’s results, in addition to providing specific details for the plan of care and counseling regarding the diagnosis and prognosis.  Questions are answered at this time and they are agreeable with the plan.         --------------------------------- IMPRESSION AND DISPOSITION ---------------------------------    IMPRESSION  1. Urinary tract infection associated with indwelling urethral catheter, initial encounter (CMS/[facility])    2. Sepsis with encephalopathy without septic shock, due to unspecified organism (CMS/[facility])    3. Elevated troponin    4. Anemia, unspecified type        DISPOSITION  Disposition: Admit stepdown  Patient condition is stable improved        NOTE: This report was transcribed using voice recognition software. Every effort was made to ensure accuracy; however, inadvertent computerized transcription errors may be present      Shakila Stone, JONA-THERESA  12/18/23 1538

## 2023-12-18 NOTE — TELEPHONE ENCOUNTER
Visiting WILY Carpenter  visited the patient this am and states that he seemed very lethargic so she opted to send him to [facility].

## 2023-12-19 LAB
ALBUMIN SERPL-MCNC: 3 G/DL (ref 3.5–5)
ALP BLD-CCNC: 60 U/L (ref 35–125)
ALT SERPL-CCNC: 9 U/L (ref 5–40)
ANION GAP SERPL CALC-SCNC: 11 MMOL/L
APPEARANCE UR: ABNORMAL
AST SERPL-CCNC: 18 U/L (ref 5–40)
ATRIAL RATE: 70 BPM
BACTERIA #/AREA URNS AUTO: ABNORMAL /HPF
BILIRUB SERPL-MCNC: 0.7 MG/DL (ref 0.1–1.2)
BILIRUB UR STRIP.AUTO-MCNC: NEGATIVE MG/DL
BUN SERPL-MCNC: 22 MG/DL (ref 8–25)
CALCIUM SERPL-MCNC: 8.5 MG/DL (ref 8.5–10.4)
CHLORIDE SERPL-SCNC: 100 MMOL/L (ref 97–107)
CO2 SERPL-SCNC: 22 MMOL/L (ref 24–31)
COLOR UR: YELLOW
CREAT SERPL-MCNC: 1 MG/DL (ref 0.4–1.6)
ERYTHROCYTE [DISTWIDTH] IN BLOOD BY AUTOMATED COUNT: 14 % (ref 11.5–14.5)
FERRITIN SERPL-MCNC: 339 NG/ML (ref 30–400)
GFR SERPL CREATININE-BSD FRML MDRD: 75 ML/MIN/1.73M*2
GLUCOSE BLD MANUAL STRIP-MCNC: 126 MG/DL (ref 74–99)
GLUCOSE BLD MANUAL STRIP-MCNC: 177 MG/DL (ref 74–99)
GLUCOSE BLD MANUAL STRIP-MCNC: 215 MG/DL (ref 74–99)
GLUCOSE BLD MANUAL STRIP-MCNC: 247 MG/DL (ref 74–99)
GLUCOSE SERPL-MCNC: 133 MG/DL (ref 65–99)
GLUCOSE UR STRIP.AUTO-MCNC: NORMAL MG/DL
HCT VFR BLD AUTO: 26.1 % (ref 41–52)
HCT VFR BLD AUTO: 26.7 % (ref 41–52)
HGB BLD-MCNC: 8.8 G/DL (ref 13.5–17.5)
HGB BLD-MCNC: 8.9 G/DL (ref 13.5–17.5)
HOLD SPECIMEN: NORMAL
HOLD SPECIMEN: NORMAL
HYALINE CASTS #/AREA URNS AUTO: ABNORMAL /LPF
IRON SATN MFR SERPL: ABNORMAL %
IRON SERPL-MCNC: <20 UG/DL (ref 45–160)
KETONES UR STRIP.AUTO-MCNC: NEGATIVE MG/DL
LEUKOCYTE ESTERASE UR QL STRIP.AUTO: ABNORMAL
MCH RBC QN AUTO: 33.7 PG (ref 26–34)
MCHC RBC AUTO-ENTMCNC: 33.7 G/DL (ref 32–36)
MCV RBC AUTO: 100 FL (ref 80–100)
MUCOUS THREADS #/AREA URNS AUTO: ABNORMAL /LPF
NITRITE UR QL STRIP.AUTO: NEGATIVE
NRBC BLD-RTO: 0 /100 WBCS (ref 0–0)
PH UR STRIP.AUTO: 5 [PH]
PLATELET # BLD AUTO: 125 X10*3/UL (ref 150–450)
POTASSIUM SERPL-SCNC: 4.3 MMOL/L (ref 3.4–5.1)
PROT SERPL-MCNC: 5.5 G/DL (ref 5.9–7.9)
PROT UR STRIP.AUTO-MCNC: ABNORMAL MG/DL
Q ONSET: 221 MS
QRS COUNT: 16 BEATS
QRS DURATION: 138 MS
QT INTERVAL: 362 MS
QTC CALCULATION(BAZETT): 457 MS
QTC FREDERICIA: 423 MS
R AXIS: 74 DEGREES
RBC # BLD AUTO: 2.61 X10*6/UL (ref 4.5–5.9)
RBC # UR STRIP.AUTO: ABNORMAL /UL
RBC #/AREA URNS AUTO: >20 /HPF
SODIUM SERPL-SCNC: 133 MMOL/L (ref 133–145)
SP GR UR STRIP.AUTO: 1.02
SQUAMOUS #/AREA URNS AUTO: ABNORMAL /HPF
T AXIS: 21 DEGREES
T OFFSET: 402 MS
TIBC SERPL-MCNC: ABNORMAL UG/DL
UIBC SERPL-MCNC: 149 UG/DL (ref 110–370)
UROBILINOGEN UR STRIP.AUTO-MCNC: ABNORMAL MG/DL
VENTRICULAR RATE: 96 BPM
VIT B12 SERPL-MCNC: 805 PG/ML (ref 211–946)
WBC # BLD AUTO: 7.3 X10*3/UL (ref 4.4–11.3)
WBC #/AREA URNS AUTO: >50 /HPF
WBC CLUMPS #/AREA URNS AUTO: ABNORMAL /HPF

## 2023-12-19 PROCEDURE — 36415 COLL VENOUS BLD VENIPUNCTURE: CPT | Performed by: INTERNAL MEDICINE

## 2023-12-19 PROCEDURE — 96372 THER/PROPH/DIAG INJ SC/IM: CPT | Performed by: INTERNAL MEDICINE

## 2023-12-19 PROCEDURE — 2500000005 HC RX 250 GENERAL PHARMACY W/O HCPCS: Performed by: INTERNAL MEDICINE

## 2023-12-19 PROCEDURE — 82947 ASSAY GLUCOSE BLOOD QUANT: CPT

## 2023-12-19 PROCEDURE — 97530 THERAPEUTIC ACTIVITIES: CPT | Mod: GP | Performed by: PHYSICAL THERAPIST

## 2023-12-19 PROCEDURE — 2060000001 HC INTERMEDIATE ICU ROOM DAILY

## 2023-12-19 PROCEDURE — 82607 VITAMIN B-12: CPT | Performed by: NURSE PRACTITIONER

## 2023-12-19 PROCEDURE — 80053 COMPREHEN METABOLIC PANEL: CPT | Performed by: INTERNAL MEDICINE

## 2023-12-19 PROCEDURE — 2500000004 HC RX 250 GENERAL PHARMACY W/ HCPCS (ALT 636 FOR OP/ED): Performed by: INTERNAL MEDICINE

## 2023-12-19 PROCEDURE — 97165 OT EVAL LOW COMPLEX 30 MIN: CPT | Mod: GO

## 2023-12-19 PROCEDURE — 81001 URINALYSIS AUTO W/SCOPE: CPT | Performed by: INTERNAL MEDICINE

## 2023-12-19 PROCEDURE — 85014 HEMATOCRIT: CPT | Performed by: NURSE PRACTITIONER

## 2023-12-19 PROCEDURE — 97161 PT EVAL LOW COMPLEX 20 MIN: CPT | Mod: GP | Performed by: PHYSICAL THERAPIST

## 2023-12-19 PROCEDURE — 85027 COMPLETE CBC AUTOMATED: CPT | Performed by: INTERNAL MEDICINE

## 2023-12-19 PROCEDURE — 36415 COLL VENOUS BLD VENIPUNCTURE: CPT | Performed by: NURSE PRACTITIONER

## 2023-12-19 PROCEDURE — 2500000001 HC RX 250 WO HCPCS SELF ADMINISTERED DRUGS (ALT 637 FOR MEDICARE OP): Performed by: INTERNAL MEDICINE

## 2023-12-19 PROCEDURE — 2500000001 HC RX 250 WO HCPCS SELF ADMINISTERED DRUGS (ALT 637 FOR MEDICARE OP)

## 2023-12-19 PROCEDURE — 2500000002 HC RX 250 W HCPCS SELF ADMINISTERED DRUGS (ALT 637 FOR MEDICARE OP, ALT 636 FOR OP/ED): Performed by: INTERNAL MEDICINE

## 2023-12-19 PROCEDURE — 82728 ASSAY OF FERRITIN: CPT | Performed by: NURSE PRACTITIONER

## 2023-12-19 PROCEDURE — 99222 1ST HOSP IP/OBS MODERATE 55: CPT | Performed by: INTERNAL MEDICINE

## 2023-12-19 PROCEDURE — 83540 ASSAY OF IRON: CPT | Performed by: NURSE PRACTITIONER

## 2023-12-19 RX ORDER — INSULIN GLARGINE 100 [IU]/ML
10 INJECTION, SOLUTION SUBCUTANEOUS NIGHTLY
Status: DISCONTINUED | OUTPATIENT
Start: 2023-12-19 | End: 2023-12-26 | Stop reason: HOSPADM

## 2023-12-19 RX ORDER — FERROUS SULFATE 325(65) MG
65 TABLET ORAL DAILY
Status: DISCONTINUED | OUTPATIENT
Start: 2023-12-20 | End: 2023-12-26 | Stop reason: HOSPADM

## 2023-12-19 RX ORDER — DEXTROSE MONOHYDRATE 100 MG/ML
0.3 INJECTION, SOLUTION INTRAVENOUS ONCE AS NEEDED
Status: DISCONTINUED | OUTPATIENT
Start: 2023-12-19 | End: 2023-12-26 | Stop reason: HOSPADM

## 2023-12-19 RX ORDER — DEXTROSE 50 % IN WATER (D50W) INTRAVENOUS SYRINGE
25
Status: DISCONTINUED | OUTPATIENT
Start: 2023-12-19 | End: 2023-12-26 | Stop reason: HOSPADM

## 2023-12-19 RX ADMIN — TAMSULOSIN HYDROCHLORIDE 0.4 MG: 0.4 CAPSULE ORAL at 21:45

## 2023-12-19 RX ADMIN — HEPARIN SODIUM 5000 UNITS: 5000 INJECTION, SOLUTION INTRAVENOUS; SUBCUTANEOUS at 05:08

## 2023-12-19 RX ADMIN — ASPIRIN 162 MG: 81 TABLET, COATED ORAL at 08:51

## 2023-12-19 RX ADMIN — GABAPENTIN 100 MG: 100 CAPSULE ORAL at 08:51

## 2023-12-19 RX ADMIN — HEPARIN SODIUM 5000 UNITS: 5000 INJECTION, SOLUTION INTRAVENOUS; SUBCUTANEOUS at 13:21

## 2023-12-19 RX ADMIN — MEROPENEM 1 G: 1 INJECTION, POWDER, FOR SOLUTION INTRAVENOUS at 04:36

## 2023-12-19 RX ADMIN — MEROPENEM 1 G: 1 INJECTION, POWDER, FOR SOLUTION INTRAVENOUS at 13:21

## 2023-12-19 RX ADMIN — CYANOCOBALAMIN TAB 1000 MCG 1000 MCG: 1000 TAB at 08:51

## 2023-12-19 RX ADMIN — GABAPENTIN 100 MG: 100 CAPSULE ORAL at 21:45

## 2023-12-19 RX ADMIN — HEPARIN SODIUM 5000 UNITS: 5000 INJECTION, SOLUTION INTRAVENOUS; SUBCUTANEOUS at 21:43

## 2023-12-19 RX ADMIN — POLYETHYLENE GLYCOL 3350 17 G: 17 POWDER, FOR SOLUTION ORAL at 08:51

## 2023-12-19 RX ADMIN — TAMSULOSIN HYDROCHLORIDE 0.4 MG: 0.4 CAPSULE ORAL at 08:51

## 2023-12-19 RX ADMIN — FINASTERIDE 5 MG: 5 TABLET, FILM COATED ORAL at 21:45

## 2023-12-19 RX ADMIN — DOCUSATE SODIUM 100 MG: 100 CAPSULE, LIQUID FILLED ORAL at 21:45

## 2023-12-19 RX ADMIN — MEROPENEM 1 G: 1 INJECTION, POWDER, FOR SOLUTION INTRAVENOUS at 21:42

## 2023-12-19 RX ADMIN — Medication 400 MG: at 08:51

## 2023-12-19 RX ADMIN — FUROSEMIDE 40 MG: 40 TABLET ORAL at 08:51

## 2023-12-19 RX ADMIN — AMLODIPINE BESYLATE 5 MG: 5 TABLET ORAL at 08:51

## 2023-12-19 RX ADMIN — FERROUS SULFATE TAB 325 MG (65 MG ELEMENTAL FE) 1 TABLET: 325 (65 FE) TAB at 08:51

## 2023-12-19 RX ADMIN — DOCUSATE SODIUM 100 MG: 100 CAPSULE, LIQUID FILLED ORAL at 08:51

## 2023-12-19 RX ADMIN — INSULIN GLARGINE 10 UNITS: 100 INJECTION, SOLUTION SUBCUTANEOUS at 22:42

## 2023-12-19 RX ADMIN — LEVETIRACETAM 250 MG: 250 TABLET, FILM COATED ORAL at 21:45

## 2023-12-19 RX ADMIN — PANTOPRAZOLE SODIUM 40 MG: 40 TABLET, DELAYED RELEASE ORAL at 08:51

## 2023-12-19 RX ADMIN — CLOPIDOGREL BISULFATE 75 MG: 75 TABLET ORAL at 08:51

## 2023-12-19 RX ADMIN — ATORVASTATIN CALCIUM 40 MG: 40 TABLET, FILM COATED ORAL at 21:45

## 2023-12-19 RX ADMIN — POLYETHYLENE GLYCOL 3350 17 G: 17 POWDER, FOR SOLUTION ORAL at 21:44

## 2023-12-19 RX ADMIN — METFORMIN HYDROCHLORIDE 1000 MG: 1000 TABLET ORAL at 18:10

## 2023-12-19 RX ADMIN — METFORMIN HYDROCHLORIDE 1000 MG: 1000 TABLET ORAL at 08:51

## 2023-12-19 RX ADMIN — LIDOCAINE 1 PATCH: 4 PATCH TOPICAL at 08:51

## 2023-12-19 RX ADMIN — LISINOPRIL 20 MG: 20 TABLET ORAL at 08:51

## 2023-12-19 RX ADMIN — LEVETIRACETAM 250 MG: 250 TABLET, FILM COATED ORAL at 08:51

## 2023-12-19 RX ADMIN — ACETAMINOPHEN 650 MG: 325 TABLET ORAL at 22:42

## 2023-12-19 ASSESSMENT — PAIN SCALES - GENERAL
PAINLEVEL_OUTOF10: 0 - NO PAIN

## 2023-12-19 ASSESSMENT — ENCOUNTER SYMPTOMS
CARDIOVASCULAR NEGATIVE: 1
APPETITE CHANGE: 1
EYES NEGATIVE: 1
HEMATOLOGIC/LYMPHATIC NEGATIVE: 1
NAUSEA: 1
FEVER: 1
PSYCHIATRIC NEGATIVE: 1
RESPIRATORY NEGATIVE: 1
ENDOCRINE NEGATIVE: 1
FATIGUE: 1
ACTIVITY CHANGE: 1
DYSURIA: 1
ALLERGIC/IMMUNOLOGIC NEGATIVE: 1
VOMITING: 1
MUSCULOSKELETAL NEGATIVE: 1
NEUROLOGICAL NEGATIVE: 1

## 2023-12-19 ASSESSMENT — COGNITIVE AND FUNCTIONAL STATUS - GENERAL
CLIMB 3 TO 5 STEPS WITH RAILING: A LITTLE
WALKING IN HOSPITAL ROOM: A LITTLE
STANDING UP FROM CHAIR USING ARMS: A LITTLE
STANDING UP FROM CHAIR USING ARMS: A LITTLE
MOVING FROM LYING ON BACK TO SITTING ON SIDE OF FLAT BED WITH BEDRAILS: A LITTLE
MOVING TO AND FROM BED TO CHAIR: A LITTLE
HELP NEEDED FOR BATHING: A LOT
CLIMB 3 TO 5 STEPS WITH RAILING: A LITTLE
WALKING IN HOSPITAL ROOM: A LITTLE
TOILETING: A LITTLE
TURNING FROM BACK TO SIDE WHILE IN FLAT BAD: A LITTLE
HELP NEEDED FOR BATHING: A LOT
MOBILITY SCORE: 18
DRESSING REGULAR UPPER BODY CLOTHING: A LITTLE
DRESSING REGULAR LOWER BODY CLOTHING: A LOT
DAILY ACTIVITIY SCORE: 18
MOVING TO AND FROM BED TO CHAIR: A LITTLE
TURNING FROM BACK TO SIDE WHILE IN FLAT BAD: A LITTLE
MOVING FROM LYING ON BACK TO SITTING ON SIDE OF FLAT BED WITH BEDRAILS: A LITTLE
DAILY ACTIVITIY SCORE: 18
DRESSING REGULAR UPPER BODY CLOTHING: A LITTLE
MOBILITY SCORE: 18
TOILETING: A LITTLE
DRESSING REGULAR LOWER BODY CLOTHING: A LOT

## 2023-12-19 ASSESSMENT — PAIN - FUNCTIONAL ASSESSMENT
PAIN_FUNCTIONAL_ASSESSMENT: 0-10

## 2023-12-19 ASSESSMENT — ACTIVITIES OF DAILY LIVING (ADL): BATHING_ASSISTANCE: MINIMAL

## 2023-12-19 ASSESSMENT — PAIN DESCRIPTION - LOCATION: LOCATION: GENERALIZED

## 2023-12-19 NOTE — CARE PLAN
The patient's goals for the shift include      The clinical goals for the shift include reorient    Over the shift, the patient did not make progress toward the following goals. Barriers to progression include n/. Recommendations to address these barriers include rest  .

## 2023-12-19 NOTE — PROGRESS NOTES
Occupational Therapy    Evaluation    Patient Name: Luis Greene  MRN: [id number]  : 1940  Today's Date: 23  Time Calculation  Start Time: 1455  Stop Time: 1510  Time Calculation (min): 15 min       Assessment:  OT Assessment: Referral received,chart reviewed, and evaluaion complete.  Patient presents with weakness, and  impaired standing balance which adversely effects ADLs and mobility  Prognosis: Good  Barriers to Discharge: None  Evaluation/Treatment Tolerance: Patient tolerated treatment well  Medical Staff Made Aware: Yes  End of Session Communication: Bedside nurse  End of Session Patient Position: Up in chair    Plan:  Treatment Interventions: ADL retraining, Functional transfer training, UE strengthening/ROM, Patient/family training  OT Frequency: 4 times per week  OT Discharge Recommendations: Low intensity level of continued care  OT Recommended Transfer Status: Assist of 1, Minimal assist  OT - OK to Discharge: Yes (when medically appropriate)  Treatment Interventions: ADL retraining, Functional transfer training, UE strengthening/ROM, Patient/family training    Subjective   Current Problem:  1. Urinary tract infection associated with indwelling urethral catheter, initial encounter (CMS/[facility])        2. Sepsis with encephalopathy without septic shock, due to unspecified organism (CMS/[facility])        3. Elevated troponin        4. Anemia, unspecified type          General:   OT Received On: 23  General  Reason for Referral: decreased funcitonal status  Referred By: Pete Oneill MD  Past Medical History Relevant to Rehab: UTI, indwelling catheter, Afib, HTN  Family/Caregiver Present: No  Prior to Session Communication: Bedside nurse  Patient Position Received: Up in chair  General Comment: 83 year old WM admitted wih c/o GNR septicemia    Precautions:  Hearing/Visual Limitations: Hearing WFL, wears glasses  Medical Precautions: Fall precautions     Pain:  Pain Assessment  Pain  Assessment: 0-10  Pain Score: 0 - No pain    Objective   Cognition:  Overall Cognitive Status: Within Functional Limits  Orientation Level: Oriented X4     Home Living:  Type of Home: House  Lives With: Spouse  Home Layout: Two level  Home Access: Stairs to enter with rails  Entrance Stairs-Number of Steps: 3  Bathroom Shower/Tub: Tub/shower unit  Bathroom Toilet: Standard  Bathroom Equipment: Grab bars in shower    Prior Function:  Level of [place]: Independent with ADLs and functional transfers  Vocational: Retired  Hand Dominance: Right    IADL History:  Homemaking Responsibilities: No  IADL Comments: spouse performs all IADLs    ADL:  Eating Assistance: Independent  Grooming Assistance: Independent  Bathing Assistance: Minimal  UE Dressing Assistance: Independent  LE Dressing Assistance: Moderate  Toileting Assistance with Device: Moderate    Activities of Daily Living: LE Dressing  LE Dressing: Yes  Sock Level of Assistance: Moderate assistance  LE Dressing Where Assessed: Chair  LE Dressing Comments: reports use of sock aide at baseline    Activity Tolerance:  Endurance: Endurance does not limit participation in activity     Bed Mobility/Transfers: Transfers  Transfer: Yes  Transfer 1  Transfer From 1: Chair with arms to  Transfer to 1: Stand  Technique 1: Sit to stand  Transfer Device 1: Walker  Transfer Level of Assistance 1: Minimum assistance    Sitting Balance:  Static Sitting Balance  Static Sitting-Balance Support: Feet supported  Static Sitting-Level of Assistance: Independent  Dynamic Sitting Balance  Dynamic Sitting-Balance Support: Feet supported  Dynamic Sitting-Balance: Forward lean  Dynamic Sitting-Comments: leaned forward in chair to don/doff socks without LOB    Coordination:  Movements are Fluid and Coordinated: Yes     Hand Function:  Hand Function  Gross Grasp: Functional  Coordination: Functional    Extremities: has bilateral foot drop and AFOs     Outcome Measures: [facility] Daily  Activity  Putting on and taking off regular lower body clothing: A lot  Bathing (including washing, rinsing, drying): A lot  Putting on and taking off regular upper body clothing: A little  Toileting, which includes using toilet, bedpan or urinal: A little  Taking care of personal grooming such as brushing teeth: None  Eating Meals: None  Daily Activity - Total Score: 18    Goals:         Problem: Bathing  Goal: STG - Patient will bathe lower body with close supervision and AE  Outcome: Progressing     Problem: Dressings Lower Extremities  Goal: LTG - Patient will dress lower body independent with AE  Outcome: Progressing     Problem: Mobility  Goal: perform functional mobility to and from the bathroom independent with 2ww  Outcome: Progressing     Problem: Transfers  Goal: LTG - Patient perform all functional transfers independent with 2ww  Outcome: Progressing     Problem: Therapeutic Activity  Goal: Patient will perform BUE exercise with close supervision  Outcome: Progressing

## 2023-12-19 NOTE — CONSULTS
INFECTIOUS DISEASES CONSULTATION NOTE      Referred by JOHN Oneill MD    Reason For Consult  GNR septicemia    History Of Present Illness  Luis Greene is a 83 y.o. male presenting with GNR septicemia.  I am very well acquainted with this patient from his hospitalization here in November 2023.  He has a significant history of urinary outflow obstruction and urinary incontinence and recurrent urinary tract infection.  When he was in this hospital last month he had an MDR-ESBL E. coli in urine and blood and was treated with 7 days of meropenem and a Tiwari catheter was left in place.  CT scan did not show any obstruction or upper tract abnormality.  He was discharged to an extended care facility and Tiwari catheter was kept in place.  He followed up with Dr. Choudhary, who elected another voiding trial on 12/11.  This voiding trial failed and catheter was reinserted on 12/13 and the patient was referred to Dr. Starkey for UroLift procedure.    Patient was subsequently discharged home, several days ago, with the Tiwari catheter in place and functioning well.  Patient is quite clear that there were no episodes of catheter obstruction or disruption or trauma.  No hematuria.  No abdominal or flank pain.  He presented to the hospital again yesterday with confusion and weakness and urinary tract infection with sepsis was once again suspected.  Cultures were collected, the Tiwari catheter was left in place, and he has been started on meropenem.  He says he feels significantly better today     Past Medical History  Please see Infectious Disease consultation from November 2023 admission     Social History  Please see Infectious Disease consultation from November 2023 admission    Family History  Please see Infectious Disease consultation from November 2023 admission    Allergies  Famotidine, Prednisone, Amoxicillin, Donepezil, Gabapentin, Hydrochlorothiazide, Ibuprofen, Oxycodone-acetaminophen, Tramadol hcl, and Oxycodone hcl      Review of Systems  Detailed review of systems completed.  No significant additional positives beyond what is mentioned above    Physical Exam  Vital signs:  Visit Vitals  BP (!) 112/49 (BP Location: Left arm, Patient Position: Lying)   Pulse 86   Temp 36.7 °C (98.1 °F) (Temporal)   Resp 18      General: Sitting up in a bedside chair, awake, alert, appropriate, cogent  HEENT:  No scleral icterus or conjunctival suffusion, oral mucosa moist  Nodes:  Negative  Lungs:  Clear to auscultation  Heart:  S1, S2 normal, no pathologic murmur appreciated  Abdomen:  Soft, nontender. No palpable organs or masses  Back:  No spinal or CVA tenderness  Genitalia: Male external genitalia, Tiwari catheter in place draining slightly cloudy urine  Extremities:  No cords, phlebitis, cellulitis  Neurologic:  Alert.  Grossly non-focal.  No meningismus    Relevant Results  Results from last 72 hours   Lab Units 12/19/23  1212 12/19/23  0641 12/18/23  1153   WBC AUTO x10*3/uL  --  7.3 9.7   HEMOGLOBIN g/dL 8.9* 8.8* 10.9*   HEMATOCRIT % 26.7* 26.1* 32.2*   PLATELETS AUTO x10*3/uL  --  125* 145*   NEUTROS PCT AUTO %  --   --  78.9   LYMPHS PCT AUTO %  --   --  10.7   MONOS PCT AUTO %  --   --  9.7   EOS PCT AUTO %  --   --  0.0     Results from last 72 hours   Lab Units 12/19/23  0641   CREATININE mg/dL 1.00   ANION GAP mmol/L 11   EGFR mL/min/1.73m*2 75     Results from last 72 hours   Lab Units 12/19/23  0641   AST U/L 18   ALT U/L 9   ALK PHOS U/L 60   BILIRUBIN TOTAL mg/dL 0.7     Urinalysis: Heavy pyuria  Microbiology:  Blood (12/18): 1 of 2 growing enteric GNR  Urine: Greater than 100,000 colonies per mL enteric GNR  Imaging:  CXR: No significant infiltrate      ASSESSMENT:  GNR septicemia  Patient is admitted once again with what appears to be an episode of catheter-associated GNR urinary tract infection with septicemia.  Once again, I cannot obtain a history that suggests that there was trauma or mechanical disruption involving the  catheter.  On the last admission there was no evidence for upper tract obstruction.  He seems to be, once again, responding well to systemic antibiotic therapy.  History and examination do not suggest an alternative source for GNR septicemia      PLANS:  -   Continue empiric meropenem pending further observation and incubation of cultures and antimicrobial susceptibility testing  -   When this information is more complete, will confer with Dr. Choudhary  -   Do not feel that Tiwari catheter needs to be changed at this time    Reviewed directly and by text message with Ms. Haynes     THANK YOU FOR ASKING ME TO ASSIST YOU AGAIN IN THE CARE OF YOUR PATIENT    Raul Edwards MD  [facility]  Office:  [phone]

## 2023-12-19 NOTE — CARE PLAN
Problem: Bathing  Goal: STG - Patient will bathe lower body with close supervision and AE  Outcome: Progressing     Problem: Dressings Lower Extremities  Goal: LTG - Patient will dress lower body independent with AE  Outcome: Progressing     Problem: Mobility  Goal: perform functional mobility to and from the bathroom independent with 2ww  Outcome: Progressing     Problem: Transfers  Goal: LTG - Patient perform all functional transfers independent with 2ww  Outcome: Progressing     Problem: Therapeutic Activity  Goal: Patient will perform BUE exercise with close supervision  Outcome: Progressing

## 2023-12-19 NOTE — PROGRESS NOTES
Physical Therapy    Physical Therapy Evaluation & Treatment    Patient Name: Luis Greene  MRN: [id number]  Today's Date: 12/19/2023   Time Calculation  Start Time: 0958  Stop Time: 1015  Time Calculation (min): 17 min    Assessment/Plan   PT Assessment  PT Assessment Results: Decreased strength, Decreased range of motion, Impaired balance, Decreased mobility  Rehab Prognosis: Good  Strengths: Premorbid level of function, Housing layout  Barriers to Participation: Comorbidities  Assessment Comment: Pt presented with decreased muscular strength/endurance, impairef bilateral ankle AROM, and increased assist required for functional mobility. He would benefit from continued skilled PT services prior to and after discharge to maximize independence and safety with functional mobility within his home environment.  End of Session Patient Position: Bed, 3 rail up, Alarm on (call light and needs within reach)     IP OR SWING BED PT PLAN  Inpatient or Swing Bed: Inpatient    PT Plan  Treatment/Interventions: Bed mobility, Transfer training, Gait training, Stair training, Strengthening, Balance training, Therapeutic exercise, Therapeutic activity  PT Plan: Skilled PT  PT Frequency: 4 times per week  PT Discharge Recommendations: Low intensity level of continued care  PT Recommended Transfer Status: Assist x1 (FWW)  PT - OK to Discharge: Yes (with continued skilled PT services at next level of care)      Subjective     General Visit Information:  General  Reason for Referral: impaired functional mobility. This 83 year old male presented to the ED from home with c/o fever and confusion. He was admitted for UTI associated with indwelling urinary catheter.  Past Medical History Relevant to Rehab: A-fib, HTN, TIA, CVA, DM, seizure, CAD, anemia, BPH, lumbar spinal stenosis, leukocytosis, hyponatremia, toxic encephalopathy 2° to UTI  Family/Caregiver Present: No  Prior to Session Communication: Bedside nurse (RN cleared pt for  participation.)  Patient Position Received: Bed, 3 rail up, Alarm on (HOB elevated)  General Comment: Pt agreed to session and participated fully.    Home Living:  Home Living  Type of Home: House  Lives With: Spouse  Home Adaptive Equipment:  (Rollator walker)  Home Layout: One level  Home Access: Stairs to enter with rails  Entrance Stairs-Rails: Both  Entrance Stairs-Number of Steps: 3  Bathroom Shower/Tub: Tub/shower unit  Bathroom Equipment: Grab bars in shower, Shower chair with back    Prior Level of Function:  Prior Function Per Pt/Caregiver Report  Level of [place]: Independent with ADLs and functional transfers, Independent with homemaking with ambulation  Ambulatory Assistance: Independent (amb: mod I with Rollator walker; stairs: mod I with rail. 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    Strength  Strength Comments: grossly >/=3+/5 based on functional mobility except bilat DF due to foot drop  Strength  Strength Comments: grossly >/=3+/5 based on functional mobility except bilat DF due to foot drop    Perception  Motor Planning: Appears intact    Coordination  Movements are Fluid and Coordinated: Yes    Postural Control  Postural Control: Within Functional Limits  Posture Comment: fwd head posture with protracted shoulders        Functional Assessments:  Bed Mobility  Bed Mobility: Yes  Bed Mobility 1  Bed Mobility 1: Supine to  sitting  Level of Assistance 1: Close supervision  Bed Mobility Comments 1: HOB elevated; toward L side  Bed Mobility 2  Bed Mobility  2: Sitting to supine  Level of Assistance 2: Close supervision  Bed Mobility Comments 2: bed flat/no rail    Transfers  Transfer: Yes  Transfer 1  Technique 1: Sit to stand  Transfer Device 1: Walker  Transfer Level of Assistance 1: Minimum assistance (assist for lifting torque; cued for walker safety/BUE placement)  Trials/Comments 1: x2 trials at EOB  Transfers 2  Technique 2: Stand to sit  Transfer Device 2: Walker  Transfer Level of Assistance 2: Close supervision (cued for walker safety/BUE placement)    Ambulation/Gait Training  Ambulation/Gait Training Performed: Yes  Ambulation/Gait Training 1  Surface 1: Level tile  Device 1: Rolling walker  Assistance 1: Contact guard (cued to keep BETH within frame of walker)  Comments/Distance (ft) 1: 50 ft with step through pattern, slowed velocity, and continuous movement. Unable to clear bilateral feet in swing due to foot drop. Steady gait.    Extremity/Trunk Assessments:  RLE   RLE : Within Functional Limits (except no DF (2° to foot drop))  LLE   LLE : Within Functional Limits (except no DF (2° to foot drop))    Treatments:  Therapeutic Exercise  Therapeutic Exercise Performed: Yes (seated EOB, BLE x10 each. Cued as needed for proper form to obtain maximal benefits.)  Therapeutic Exercise Activity 1: knee ext  Therapeutic Exercise Activity 2: hip abd  Therapeutic Exercise Activity 3: hip flex    Outcome Measures:  [facility] Basic Mobility  Turning from your back to your side while in a flat bed without using bedrails: A little  Moving from lying on your back to sitting on the side of a flat bed without using bedrails: A little  Moving to and from bed to chair (including a wheelchair): A little  Standing up from a chair using your arms (e.g. wheelchair or bedside chair): A little  To walk in hospital room: A little  Climbing 3-5 steps  with railing: A little  Basic Mobility - Total Score: 18    Encounter Problems       Encounter Problems (Active)       Mobility       STG - Patient will ambulate >/=100 ft with FWW at mod I level.       Start:  12/19/23    Expected End:  01/03/24            STG - Patient will ascend and descend 3 stairs with unilateral rail and close S.       Start:  12/19/23    Expected End:  01/03/24               Pain - Adult          Transfers       STG - Patient to transfer supine<>sit at mod I level.       Start:  12/19/23    Expected End:  01/03/24            STG - Patient will transfer sit<>stand with FWW and mod I.       Start:  12/19/23    Expected End:  01/03/24                   Education Documentation  Mobility Training, taught by Anne Guerra PT at 12/19/2023 11:06 AM.  Learner: Patient  Readiness: Eager  Method: Explanation  Response: Verbalizes Understanding, Demonstrated Understanding, Needs Reinforcement    Education Comments  No comments found.

## 2023-12-19 NOTE — CARE PLAN
The patient's goals for the shift include      The clinical goals for the shift include reorient    Over the shift, the patient did not make progress toward the following goals. Barriers to progression include . Recommendations to address these barriers include .

## 2023-12-19 NOTE — NURSING NOTE
Assumed care of patient, BSR received, VSS at this time, remains on tele, guidry draining appropriately, visibile chest rise and fall, Bed low and locked, call lgiht and belongings within reach, bed alarm on, cont plan of care, update as needed.

## 2023-12-19 NOTE — CONSULTS
Consults  History Of Present Illness:    Luis Greene is a 83 y.o. male presenting with urosepsis.     1. Coronary artery disease with remote CABG Ã--2 2011. This patient did develop recurrent angina in 2013 and underwent a repeat cardiac catheterization demonstrating closure of the original bypass grafts but with collateral circulation and no PCI was required.     2. History of TIA. This patient did have a TIA following his cardiac catheterization with transient confusion on the day of discharge. The patient was admitted to [facility] on 02/17/2019 with numbness of his left arm hand and ultimately jaw. The numbness of the left hand and jaw resolved but persisted somewhat longer within the left arm. Evaluation at that time included a chest x-ray showing previous sternotomy with clear lung fields. Carotid ultrasound showed mild bilateral plaque less than 50% stenoses. CT angiogram of the head and neck was unremarkable with nonstenotic calcification of the right common carotid artery without stenosis. The left common carotid artery had a nonstenotic calcification with a patent internal carotid artery. His intracerebral vessels were unremarkable with no aneurysm. An MRI of the brain on 02/17/2019 was unremarkable. He had an MRI of the cervical spine that showed minimal disc bulging at C2-C3 with moderate disc osteophyte complex at C3-C4 causing moderate canals narrowing and cord impingement along with bilateral neural foraminal stenoses. There was an osteophyte complex at C4-C5 causing mild anterior cord impingement and bilateral moderate neural foraminal narrowing and disc bulging at C5-C6 without cord impingement along with anterior subluxation of C7 upon T1. EKG showed sinus rhythm with first-degree AV block and no ST segment change. There was no evidence of atrial fibrillation by telemetry monitoring. His high sensitivity troponins were negative. He had an echocardiogram performed on 02/17/2019 showing an  estimated LV ejection fraction at 55â€“59 percent with mild hypokinesis of the anteroseptal wall due to previous thoracotomy. Left atrial size was in the upper range of normal with mild aortic root calcification and a mildly dilated right atrium. The patient was readmitted to [facility] on 06/04/2019 with recurrent neurological complaints. He had pins and needles paresthesias of the left hand and arm along with perceived weakness of the left hand and arm with loss of coordination. The patient underwent repeat evaluation including MRI of the brain which was unremarkable. Negligible carotid vascular disease by ultrasound and CT angiogram in the past. Blood pressure readings were acceptable at that time with lisinopril 40 mg daily amlodipine 5 mg daily metoprolol extended release 5 mg daily. He was on aspirin at that time along with the atorvastatin 40 mg daily. More recently the patient was driving home from a physician appointment on the [place] side when he began to develop paresthesias of the right hand and fingertips. Initially he thought that an Ace wrap of his right shoulder was too tight and continued driving. The sense of tingling began to get worse and he developed a funny sensation around the right side of his face involving the lip. The patient pulled his car off to the side of the road. At that point in time he was having trouble communicating verbally due to the inability to formulate words. He was taken by EMS to [facility] and observed but then released without admission. He is subsequently seen by neurology as an outpatient. He did have a extra no cardiac monitor performed on 02/05/2020â€“02/14/2020. This did not demonstrate atrial fibrillation. It did demonstrate some brief episodes of Mobitz 1 second degree AV block. He also had one 8 beat run of nonsustained ventricular tachycardia. The patient has never had presyncopal or syncopal events but more notably paresthesias. The results of this  external cardiac monitor were discussed with electrophysiology and at this point the patient will be scheduled to have a loop recorder implanted to ensure that he is not having clinically silent paroxysmal atrial fibrillation not detected on previous telemetry monitoring. The patient has been seen by neurology and was switched to Plavix 75 mg daily which will be continued unless there is documentation of paroxysmal atrial fibrillation by the loop recorder. Echocardiogram performed at time of today's visit 03/17/2020 demonstrates an LV ejection fraction of 55â€“60 percent with mild hypokinesis of the anteroseptal wall due to previous thoracotomy with oneâ€“2+ mitral valve regurgitation. The findings are very similar to the echo that was performed at [facility], [place] in 2/2019. Patient had loop recorder placed 07/2020 by Dr Steven Flores. Had negative pharmacologic nuclear stress test done 01/2022.     The patient was admitted to [facility] on 11/24/2023 with a change in mental status somnolence confusion inability to eat low-grade fever shortly after having a Watchman procedure performed.  Patient was noted to have a urinary tract infection with urosepsis for chronic indwelling Tiwari catheter.  Urine and blood cultures were positive for E. coli.  The patient was maintained on aspirin and Plavix as per watchman protocol.  Lisinopril was stopped to avoid hypotension due to urosepsis.  His atrial fibrillation remains with an adequate ventricular rate control in the absence of previously prescribed metoprolol.  The patient remained on Keppra 250 mg twice daily for prophylaxis of his underlying seizure disorder.  Head CT was negative for CVA.  He was treated with IV meropenem for ESBL E. coli bacteremia.  Ultimately amlodipine and lisinopril restarted and his systolic blood pressures were between 135-155.  His creatinine was 0.7 at time of discharge during the [facility].  He had several  exchanges of his Tiwari catheter because of hematuria.    The patient had been at a local rehab facility [place] from his recent hospitalization.  Approximately 1 week ago on 12/11/2023 he was given a trial of void with his Tiwari catheter removed.  The patient was unable to void and subsequently the next the catheter was reinserted. The patient was released from the rehab facility at the end of the week on 12/15/2023.  2 days later he developed high-grade fever and was brought back to the [facility] emergency room where he was found to have urinary tract infection.  Of note the patient is being followed by urology and evidently is now being considered for surgical procedure because of his ongoing urinary retention.  Upon presentation to the emergency room he was noted to have a temperature of 101.3.  His O2 saturation was 97% on room air and his hemodynamics are stable.  Electrolyte panel notable for baseline creatinine 1.2 sodium 131.  CBC included WBC of 9700 hematocrit 32.2.  High-sensitivity troponins were elevated at 244, 222, and repeat at 212.  The patient's initial urine and blood cultures were positive for gram-negative bacilli and he was placed on IV antibiotic therapy.    Last Recorded Vitals:  Vitals:    12/18/23 2323 12/19/23 0345 12/19/23 0700 12/19/23 1100   BP: 122/57 (!) 147/34 (!) 116/47 (!) 112/49   BP Location: Right arm Right arm Left arm Left arm   Patient Position: Lying Lying Lying Lying   Pulse: 81 95 90 86   Resp: 17 22 16 18   Temp: 36.6 °C (97.9 °F) 37.3 °C (99.1 °F) 37 °C (98.6 °F) 36.7 °C (98.1 °F)   TempSrc: Temporal Temporal Temporal Temporal   SpO2: 99% 97% 100% 99%   Weight:  76.5 kg (168 lb 10.4 oz)     Height:           Last Labs:  CBC - 12/19/2023:  6:41 AM  7.3 8.8 125    26.1      CMP - 12/19/2023:  6:41 AM  8.5 5.5 18 --- 0.7   _ 3.0 9 60      PTT - 11/10/2023:  4:59 PM  1.2   12.3 22.2     Hemoglobin A1C   Date/Time Value Ref Range Status   11/15/2023 11:08 AM 6.7 (H) See  below % Final   09/14/2023 12:14 PM 6.8 (A) % Final     Comment:          Diagnosis of Diabetes-Adults   Non-Diabetic: < or = 5.6%   Increased risk for developing diabetes: 5.7-6.4%   Diagnostic of diabetes: > or = 6.5%  .       Monitoring of Diabetes                Age (y)     Therapeutic Goal (%)   Adults:          >18           <7.0   Pediatrics:    13-18           <7.5                   7-12           <8.0                   0- 6            7.5-8.5   American Diabetes Association. Diabetes Care 33(S1), Jan 2010.   08/14/2023 02:45 PM 8.1 (H) 4.0 - 6.0 % Final     Comment:     Hemoglobin A1C levels are related to mean blood glucose during the   preceding 2-3 months. The relationship table below may be used as a   general guide. Each 1% increase in HGB A1C is a reflection of an   increase in mean glucose of approximately 30 mg/dl.   Reference: Diabetes Care, volume 29, supplement 1 Jan. 2006                        HGB A1C ................. Approx. Mean Glucose   _______________________________________________   6%   ...............................  120 mg/dl   7%   ...............................  150 mg/dl   8%   ...............................  180 mg/dl   9%   ...............................  210 mg/dl   10%  ...............................  240 mg/dl  Performed at [address] [address]     04/19/2023 04:03 PM 9.7 (H) 4.0 - 5.6 % Final   02/03/2023 09:12 AM 9.6 (H) 4.0 - 5.6 % Final     LDL Calculated   Date/Time Value Ref Range Status   11/15/2023 11:08 AM 30 (L) 65 - 130 mg/dL Final   08/15/2023 05:50 AM 31 (L) 65 - 130 MG/DL Final   10/28/2022 05:39 AM 32 (L) 65 - 130 MG/DL Final   07/06/2021 09:50 AM 39 (L) 65 - 130 MG/DL Final     VLDL   Date/Time Value Ref Range Status   08/22/2022 10:17 AM 11 0 - 40 mg/dL Final   09/17/2019 01:20 PM 16 0 - 40 mg/dL Final      Last I/O:  I/O last 3 completed shifts:  In: 420 (5.5 mL/kg) [P.O.:220; IV Piggyback:200]  Out: 1100 (14.4 mL/kg)  "[Urine:1100 (0.4 mL/kg/hr)]  Weight: 76.5 kg     Past Cardiology Tests (Last 3 Years):  EKG:  Electrocardiogram, 12-lead PRN ACS symptoms       ECG 12 lead 11/26/2023      ECG 12 Lead       ECG 12 lead daily 11/17/2023      ECG 12 lead daily 11/15/2023      ECG 12 lead daily 11/15/2023      ECG 12 lead 11/15/2023    Echo:  Transthoracic Echo (TTE) Limited 11/10/2023      Transthoracic Echo (TTE) Limited 11/10/2023    Ejection Fractions:  No results found for: \"EF\"  Cath:  No results found for this or any previous visit from the past 1095 days.    Stress Test:  Nuclear Stress Test 01/24/2022    Cardiac Imaging:  No results found for this or any previous visit from the past 1095 days.      Past Medical History:  He has a past medical history of A-fib (CMS/HCC), CHF (congestive heart failure) (CMS/HCC), Cognitive communication deficit, Coronary artery disease, Diabetes (CMS/HCC), Hyperlipidemia, Iron deficiency anemia, Muscle weakness (generalized), Osteoarthritis, Personal history of other diseases of the circulatory system, Personal history of other endocrine, nutritional and metabolic disease, Unspecified convulsions (CMS/HCC), Urinary retention, and Urinary tract infection.    Past Surgical History:  He has a past surgical history that includes Other surgical history (03/11/2019); Other surgical history (03/11/2019); Other surgical history (03/11/2019); MR angio head wo IV contrast (2/17/2019); MR angio head wo IV contrast (10/28/2022); MR angio head wo IV contrast (8/15/2023); and Cardiac catheterization (N/A, 11/10/2023).      Social History:  He reports that he has never smoked. He has never used smokeless tobacco. He reports that he does not drink alcohol and does not use drugs.    Family History:  Family History   Problem Relation Name Age of Onset    Other (cardiac disorder) Father      Diabetes Father      Hyperlipidemia Father      Hypertension Father      Other (liver carcinoma) Father      Other " (hypercholesterolemia) Father      Hypertension Daughter          Allergies:  Famotidine, Prednisone, Amoxicillin, Donepezil, Gabapentin, Hydrochlorothiazide, Ibuprofen, Oxycodone-acetaminophen, Tramadol hcl, and Oxycodone hcl    Inpatient Medications:  Scheduled medications   Medication Dose Route Frequency    amLODIPine  5 mg oral Daily    aspirin  162 mg oral Daily    atorvastatin  40 mg oral Nightly    clopidogrel  75 mg oral Daily    cyanocobalamin  1,000 mcg oral Daily    docusate sodium  100 mg oral BID    [START ON 12/20/2023] ferrous sulfate (325 mg ferrous sulfate)  65 mg of iron oral Daily    finasteride  5 mg oral Nightly    furosemide  40 mg oral Daily    gabapentin  100 mg oral BID    heparin (porcine)  5,000 Units subcutaneous q8h TAYLOR    levETIRAcetam  250 mg oral BID    magnesium oxide  400 mg oral Daily    meropenem  1 g intravenous q8h    metFORMIN  1,000 mg oral BID with meals    pantoprazole  40 mg oral Daily before breakfast    polyethylene glycol  17 g oral BID    tamsulosin  0.4 mg oral BID     PRN medications   Medication    acetaminophen    Or    acetaminophen    Or    acetaminophen    benzocaine-menthol    dextromethorphan-guaifenesin    guaiFENesin     Continuous Medications   Medication Dose Last Rate     Outpatient Medications:  Current Outpatient Medications   Medication Instructions    acetaminophen (TYLENOL) 650 mg, oral, Every 6 hours PRN    amLODIPine (NORVASC) 5 mg, oral, Daily    aspirin 81 mg EC tablet 2 tablets, oral, Daily    atorvastatin (Lipitor) 40 mg tablet 1 tablet, oral, Daily    BISA-LAX, BISACODYL, RECT 10 mg, rectal, Daily PRN    clopidogrel (PLAVIX) 75 mg, oral, Daily    cranberry fruit 500 mg tablet,chewable oral, Cranberry gummies    cyanocobalamin (VITAMIN B-12) 500 mcg, oral, 2 times weekly, Tuesday and Friday     docusate sodium (COLACE) 100 mg, oral, 2 times daily    ferrous sulfate 325 (65 Fe) MG EC tablet 1 tablet, oral, Daily    finasteride (PROSCAR) 5 mg,  oral, Daily    furosemide (LASIX) 40 mg, oral, Daily    gabapentin (Neurontin) 100 mg capsule TAKE 1 CAPSULE BY MOUTH ONCE DAILY FOR NEUROPATHY PAIN    insulin glargine-lixisenatide (Soliqua 100/33) 100 unit-33 mcg/mL insulin pen 15 Units, subcutaneous, Nightly    levETIRAcetam (KEPPRA) 250 mg, oral, 2 times daily    lidocaine 4 % patch 1 patch, transdermal, Daily, Remove & discard patch within 12 hours or as directed by MD.<BR>Apply to right hip at site of pain    lisinopril 20 mg, oral, Daily, Hold for systolic blood pressure < 140 mmhg    magnesium oxide (MAG-OX) 400 mg, oral, Daily    metFORMIN (Glucophage) 500 mg tablet 2 tablets, oral, 2 times daily    pantoprazole (PROTONIX) 40 mg, oral, Daily before breakfast, Do not crush, chew, or split.    polyethylene glycol (GLYCOLAX, MIRALAX) 17 g, oral, 2 times daily, Hold for loose stool    tamsulosin (FLOMAX) 0.4 mg, oral, 2 times daily       Physical Exam:  General: alert, oriented x2-3, very pleasant; son at bedside  HEENT: normal cephalic, atraumatic  Neck: No JVD, bruit or thrill, masses or tenderness   Heart: S1/S2, Rate 50, Rhythm irregular, no s3 or s4, no murmur, thrill, or heaves at PMI.   Lungs: Clear, equal expansion and excursion, no wheezes, crackles, rhales or rhonci.  Room air.  No conversational dyspnea appreciated.  No tachypnea.  No pain with deep aspiration   Abdomen: Overweight, bowel sounds x 4, soft, non-tender   Genitourinary: deferred   Extremities: No significant upper or lower extremity daylin appreciated.     Assessment/Plan   12/19: This elderly white male with an extensive past medical history including coronary artery disease, remote CABG, hypertension, hyperlipidemia, type 2 diabetes, paroxysmal atrial fibrillation, history of?  TIA versus recurrent localized focal seizures, history of GI bleeding on Eliquis anticoagulation, status post Watchman implantation is currently admitted with low-grade fever and evidence of recurrent urinary  tract infection urosepsis from recent change in his Tiwari catheter after trial of void.  Clinically is improved rather quickly with the institution of IV antibiotics.  The elevated high-sensitivity troponins are presumably not diagnostic of acute coronary syndrome more likely due to minor degree of demand ischemia from the urosepsis.  The patient has been restarted on IV meropenem.  The patient has been continued on the aspirin and Plavix as per the watchman protocol.  His atrial fibrillation is controlled ventricular rates in the absence of beta-blockade.  Amlodipine restarted for treatment of hypertension and will hold ACE inhibitor for now given urosepsis and potential for fluctuating renal parameters.  Peripheral IV 12/18/23 20 G Left Hand (Active)   Site Assessment Clean;Dry;Intact 12/19/23 0000   Dressing Status Clean;Dry;Occlusive 12/19/23 0000   Number of days: 1       Peripheral IV 12/18/23 22 G Proximal;Right Forearm (Active)   Site Assessment Intact;Dry;Clean 12/19/23 0000   Dressing Status Clean;Dry;Occlusive 12/19/23 0000   Number of days: 1       Urethral Catheter (Active)   Site Assessment Clean;Skin intact 12/19/23 0619   Number of days: 7       Code Status:  Full Code    I spent 30 minutes in the professional and overall care of this patient.        Luis Grant MD

## 2023-12-19 NOTE — PROGRESS NOTES
[facility] called and spoke with pts daughter Ni [phone] who states she is pts medical POA and sending copy of document via email to this worker. Discussion had around dc planning with Ni confirming pt just discharged from SNF 3 days ago. [facility] made Ni aware that during nursing rounds this morning it was relayed to this worker that the pts family wants the pt to go to SNF at dc, she expressed surprise at this and stated that they hoped the pt could return and they would base their decision therapy evals. Therapy did evaluate the pt on this day and [facility] went over their finding with Ni, therapy indicating pt will need ongoing therapy at dc however could do so at home. Ni is agreeable for plan for home and to resume with [facility], she is confident once pts UTI starts to clear he will be doing much better. [facility] sending referral information to [facility], pt will need an external referral for homecare from attending     12/19/23 1614   Discharge Planning   Patient expects to be discharged to: [facility]

## 2023-12-19 NOTE — H&P
Chief Complaint Fever    History Of Present Illness  Luis Greene is a very pleasant 83 y.o.  male presenting with complaints of a fever and generalized weakness.  History is obtained upon review of the medical record and discussion with the patient and his spouse present at bedside.  Briefly, he has a past medical history significant for urinary retention and chronic Guirdy catheter, follows Urology Dr. Lozano outpatient.  He was recently diagnosed with ESBL urinary tract infection and bacteremia, treated with IV Meropenem.   He was discharged  from [facility] to [facility] with a guidry catheter in place.  He completed a trial void on Monday.  He had urinary incontinence and failed the trial void and a new guidry catheter was inserted on Tuesday.  He was referred to Urology Dr. Starkey for surgery.  He developed a burning sensation following guidry catheter placement, which resolved.  The guidry catheter has since been working well without any issues.  He was discharged home from skilled nursing rehabilitation on Saturday.  On Sunday, he developed generalized weakness, confusion and a fever of 101.  The home health care nurse came to the home yesterday and advised that he come to the hospital.  He presented to the emergency department.  In the emergency department, initial workup was done.  Temperature was 101.3.  Pulse 77.  RR 16.  /59.  97% on room air.  CMP showed a sodium 131.  Potassium 4.7.  Bicarbonate 22.  BUN 26.  Creatinine 1.2.  Glucose 141.  CBC showed a WBC 9.7.  Hemoglobin 10.9.  Hematocrit 32.2.  Urinalysis was consistent with a urinary tract infection 500 leukocytes.  Lactic acid 2.2 -- 2.1.  Urine culture was sent.  Blood cultures were sent.  Troponin 244 - 222 - 212.  He was treated with IV fluids and broad-spectrum IV antibiotics and was admitted.  The urine and blood cultures are growing gram negative bacilli.      Past Medical History  Past Medical History:   Diagnosis Date    A-fib (CMS/HCC)     CHF (congestive heart failure) (CMS/HCC)     Cognitive communication deficit     Coronary artery disease     Diabetes (CMS/HCC)     Hyperlipidemia     Iron deficiency anemia     Muscle weakness (generalized)     Osteoarthritis     Personal history of other diseases of the circulatory system     History of cardiac disorder    Personal history of other endocrine, nutritional and metabolic disease     History of hypercholesterolemia    Unspecified convulsions (CMS/HCC)     Urinary retention     Urinary tract infection        Surgical History  Past Surgical History:   Procedure Laterality Date    CARDIAC CATHETERIZATION N/A 11/10/2023    Procedure: LAAO (Left Atrial Appendage Occlusion);  Surgeon: Louie Avitia MD;  Location: [place] [address] Cardiac Cath Lab;  Service: Cardiovascular;  Laterality: N/A;  Last Eliquis 11/06/SD /Highlight    MR HEAD ANGIO WO IV CONTRAST  2/17/2019    MR HEAD ANGIO WO IV CONTRAST [facility]    MR HEAD ANGIO WO IV CONTRAST  10/28/2022    MR HEAD ANGIO WO IV CONTRAST [facility]    MR HEAD ANGIO WO IV CONTRAST  8/15/2023    MR HEAD ANGIO WO IV CONTRAST [facility]    OTHER SURGICAL HISTORY  03/11/2019    Heart surgery    OTHER SURGICAL HISTORY  03/11/2019    Knee surgery    OTHER SURGICAL HISTORY  03/11/2019    Tonsillectomy        Social History  Presents from home with spouse  Daughter is durable health care power of     He reports that he has never smoked. He has never used smokeless tobacco. He reports that he does not drink alcohol and does not use drugs.    Family History  Family History   Problem Relation Name Age of Onset    Other (cardiac disorder) Father      Diabetes Father      Hyperlipidemia Father      Hypertension Father      Other (liver carcinoma) Father      Other (hypercholesterolemia) Father      Hypertension Daughter           Allergies  Famotidine, Prednisone, Amoxicillin, Donepezil, Gabapentin, Hydrochlorothiazide, Ibuprofen, Oxycodone-acetaminophen, Tramadol hcl, and Oxycodone hcl    Review of Systems   Constitutional:  Positive for activity change, appetite change, fatigue and fever.   HENT: Negative.     Eyes: Negative.    Respiratory: Negative.     Cardiovascular: Negative.    Gastrointestinal:  Positive for nausea and vomiting.   Endocrine: Negative.    Genitourinary:  Positive for dysuria.   Musculoskeletal: Negative.    Skin: Negative.    Allergic/Immunologic: Negative.    Neurological: Negative.    Hematological: Negative.    Psychiatric/Behavioral: Negative.     All other systems reviewed and are negative.    Physical Exam  Vitals and nursing note reviewed.   Constitutional:       General: He is not in acute distress.     Appearance: Normal appearance. He is normal weight. He is ill-appearing. He is not toxic-appearing or diaphoretic.   HENT:      Head: Normocephalic and atraumatic.      Right Ear: Tympanic membrane normal.      Left Ear: Tympanic membrane normal.      Nose: Nose normal.      Mouth/Throat:      Mouth: Mucous membranes are moist.      Pharynx: Oropharynx is clear.   Eyes:      Extraocular Movements: Extraocular movements intact.      Conjunctiva/sclera: Conjunctivae normal.      Pupils: Pupils are equal, round, and reactive to light.   Cardiovascular:      Rate and Rhythm: Normal rate. Rhythm irregular.      Pulses: Normal pulses.      Heart sounds: Normal heart sounds.   Pulmonary:      Effort: Pulmonary effort is normal. No respiratory distress.      Breath sounds: Normal breath sounds. No wheezing, rhonchi or rales.      Comments: Room air  Abdominal:      General: Bowel sounds are normal. There is no distension.      Palpations: Abdomen is soft.      Tenderness: There is no abdominal tenderness.   Genitourinary:     Comments:  Tiwari catheter in place draining clear yellow urine with sediment in tubing.  "Rectal examination deferred  Musculoskeletal:         General: No swelling or tenderness. Normal range of motion.      Cervical back: Normal range of motion and neck supple.   Skin:     General: Skin is warm and dry.      Capillary Refill: Capillary refill takes less than 2 seconds.      Findings: Bruising present.   Neurological:      General: No focal deficit present.      Mental Status: He is alert and oriented to person, place, and time.   Psychiatric:         Mood and Affect: Mood normal.         Behavior: Behavior normal.            Last Recorded Vitals  Blood pressure (!) 112/49, pulse 86, temperature 36.7 °C (98.1 °F), temperature source Temporal, resp. rate 18, height 1.803 m (5' 10.98\"), weight 76.5 kg (168 lb 10.4 oz), SpO2 99 %.    Telemetry atrial fibrillation rate 80's    Relevant Results  Results for orders placed or performed during the hospital encounter of 12/18/23 (from the past 24 hour(s))   CBC and Auto Differential   Result Value Ref Range    WBC 9.7 4.4 - 11.3 x10*3/uL    nRBC 0.0 0.0 - 0.0 /100 WBCs    RBC 3.21 (L) 4.50 - 5.90 x10*6/uL    Hemoglobin 10.9 (L) 13.5 - 17.5 g/dL    Hematocrit 32.2 (L) 41.0 - 52.0 %     80 - 100 fL    MCH 34.0 26.0 - 34.0 pg    MCHC 33.9 32.0 - 36.0 g/dL    RDW 14.1 11.5 - 14.5 %    Platelets 145 (L) 150 - 450 x10*3/uL    Neutrophils % 78.9 40.0 - 80.0 %    Immature Granulocytes %, Automated 0.5 0.0 - 0.9 %    Lymphocytes % 10.7 13.0 - 44.0 %    Monocytes % 9.7 2.0 - 10.0 %    Eosinophils % 0.0 0.0 - 6.0 %    Basophils % 0.2 0.0 - 2.0 %    Neutrophils Absolute 7.63 (H) 1.60 - 5.50 x10*3/uL    Immature Granulocytes Absolute, Automated 0.05 0.00 - 0.50 x10*3/uL    Lymphocytes Absolute 1.04 0.80 - 3.00 x10*3/uL    Monocytes Absolute 0.94 (H) 0.05 - 0.80 x10*3/uL    Eosinophils Absolute 0.00 0.00 - 0.40 x10*3/uL    Basophils Absolute 0.02 0.00 - 0.10 x10*3/uL   Comprehensive metabolic panel   Result Value Ref Range    Glucose 141 (H) 65 - 99 mg/dL    Sodium 131 " (L) 133 - 145 mmol/L    Potassium 4.7 3.4 - 5.1 mmol/L    Chloride 95 (L) 97 - 107 mmol/L    Bicarbonate 22 (L) 24 - 31 mmol/L    Urea Nitrogen 26 (H) 8 - 25 mg/dL    Creatinine 1.20 0.40 - 1.60 mg/dL    eGFR 60 (L) >60 mL/min/1.73m*2    Calcium 9.4 8.5 - 10.4 mg/dL    Albumin 3.6 3.5 - 5.0 g/dL    Alkaline Phosphatase 76 35 - 125 U/L    Total Protein 6.7 5.9 - 7.9 g/dL    AST 19 5 - 40 U/L    Bilirubin, Total 1.1 0.1 - 1.2 mg/dL    ALT 10 5 - 40 U/L    Anion Gap 14 <=19 mmol/L   Lipase   Result Value Ref Range    Lipase 29 16 - 63 U/L   BLOOD GAS LACTIC ACID, VENOUS   Result Value Ref Range    POCT Lactate, Venous 2.2 (H) 0.4 - 2.0 mmol/L   Blood Culture    Specimen: Peripheral Venipuncture; Blood culture   Result Value Ref Range    Blood Culture       Identification and susceptibility testing to follow    Gram Stain Gram negative bacilli (AA)    Blood Culture    Specimen: Peripheral Venipuncture; Blood culture   Result Value Ref Range    Blood Culture Loaded on Instrument - Culture in progress    Levetiracetam   Result Value Ref Range    Keppra 19 10 - 40 ug/mL   Serial Troponin, Initial ([facility])   Result Value Ref Range    Troponin T, High Sensitivity 244 (H) <=15 ng/L   SARS-CoV-2 RT PCR   Result Value Ref Range    Coronavirus 2019, PCR Not Detected Not Detected   Influenza A, and B PCR   Result Value Ref Range    Flu A Result Not Detected Not Detected    Flu B Result Not Detected Not Detected   Urinalysis with Reflex Microscopic   Result Value Ref Range    Color, Urine Yellow Light-Yellow, Yellow, Dark-Yellow    Appearance, Urine Turbid (N) Clear    Specific Gravity, Urine 1.013 1.005 - 1.035    pH, Urine 6.5 5.0, 5.5, 6.0, 6.5, 7.0, 7.5, 8.0    Protein, Urine 50 (1+) (A) NEGATIVE, 10 (TRACE), 20 (TRACE) mg/dL    Glucose, Urine Normal Normal mg/dL    Blood, Urine 0.03 (TRACE) (A) NEGATIVE    Ketones, Urine NEGATIVE NEGATIVE mg/dL    Bilirubin, Urine NEGATIVE NEGATIVE    Urobilinogen, Urine 3 (1+) (A) Normal mg/dL     Nitrite, Urine NEGATIVE NEGATIVE    Leukocyte Esterase, Urine 500 Akin/µL (A) NEGATIVE   Microscopic Only, Urine   Result Value Ref Range    WBC, Urine >50 (A) 1-5, NONE /HPF    WBC Clumps, Urine MANY Reference range not established. /HPF    RBC, Urine 3-5 NONE, 1-2, 3-5 /HPF    Bacteria, Urine 1+ (A) NONE SEEN /HPF   Electrocardiogram, 12-lead PRN ACS symptoms   Result Value Ref Range    Ventricular Rate 96 BPM    Atrial Rate 70 BPM    QRS Duration 138 ms    QT Interval 362 ms    QTC Calculation(Bazett) 457 ms    R Axis 74 degrees    T Axis 21 degrees    QRS Count 16 beats    Q Onset 221 ms    T Offset 402 ms    QTC Fredericia 423 ms   Serial Troponin, 2 Hour ([facility])   Result Value Ref Range    Troponin T, High Sensitivity 222 (H) <=15 ng/L   Blood Gas Lactic Acid, Venous   Result Value Ref Range    POCT Lactate, Venous 2.1 (H) 0.4 - 2.0 mmol/L   POCT GLUCOSE   Result Value Ref Range    POCT Glucose 184 (H) 74 - 99 mg/dL   Serial Troponin, 6 Hour ([facility])   Result Value Ref Range    Troponin T, High Sensitivity 212 (H) <=15 ng/L   POCT GLUCOSE   Result Value Ref Range    POCT Glucose 278 (H) 74 - 99 mg/dL   Comprehensive metabolic panel   Result Value Ref Range    Glucose 133 (H) 65 - 99 mg/dL    Sodium 133 133 - 145 mmol/L    Potassium 4.3 3.4 - 5.1 mmol/L    Chloride 100 97 - 107 mmol/L    Bicarbonate 22 (L) 24 - 31 mmol/L    Urea Nitrogen 22 8 - 25 mg/dL    Creatinine 1.00 0.40 - 1.60 mg/dL    eGFR 75 >60 mL/min/1.73m*2    Calcium 8.5 8.5 - 10.4 mg/dL    Albumin 3.0 (L) 3.5 - 5.0 g/dL    Alkaline Phosphatase 60 35 - 125 U/L    Total Protein 5.5 (L) 5.9 - 7.9 g/dL    AST 18 5 - 40 U/L    Bilirubin, Total 0.7 0.1 - 1.2 mg/dL    ALT 9 5 - 40 U/L    Anion Gap 11 <=19 mmol/L   CBC   Result Value Ref Range    WBC 7.3 4.4 - 11.3 x10*3/uL    nRBC 0.0 0.0 - 0.0 /100 WBCs    RBC 2.61 (L) 4.50 - 5.90 x10*6/uL    Hemoglobin 8.8 (L) 13.5 - 17.5 g/dL    Hematocrit 26.1 (L) 41.0 - 52.0 %     80 - 100 fL    MCH 33.7 26.0  - 34.0 pg    MCHC 33.7 32.0 - 36.0 g/dL    RDW 14.0 11.5 - 14.5 %    Platelets 125 (L) 150 - 450 x10*3/uL   POCT GLUCOSE   Result Value Ref Range    POCT Glucose 126 (H) 74 - 99 mg/dL     Susceptibility data from last 90 days.  Collected Specimen Info Organism Amoxicillin/Clavulanate Ampicillin Ampicillin/Sulbactam Aztreonam Cefazolin Cefazolin (uncomplicated UTIs only) Cefepime Cefotaxime Ceftazidime Ceftriaxone Ciprofloxacin Ertapenem   11/24/23 Urine from Clean Catch/Voided Escherichia coli S R S R R R R R R R R S   11/24/23 Blood culture from Peripheral Venipuncture Escherichia coli S R S R R  R R R R R S   11/24/23 Blood culture from Peripheral Venipuncture Escherichia coli               10/17/23 Urine from Clean Catch/Voided Aerococcus urinae          S S      Collected Specimen Info Organism ESBL Gentamicin Levofloxacin Meropenem Moxifloxacin Nitrofurantoin Penicillin Piperacillin/Tazobactam Tetracycline Trimethoprim/Sulfamethoxazole Vancomycin   11/24/23 Urine from Clean Catch/Voided Escherichia coli R S  S  S  S  R    11/24/23 Blood culture from Peripheral Venipuncture Escherichia coli  S R S R   S  R    11/24/23 Blood culture from Peripheral Venipuncture Escherichia coli              10/17/23 Urine from Clean Catch/Voided Aerococcus urinae   S    S  S  S     Electrocardiogram, 12-lead PRN ACS symptoms    Result Date: 12/19/2023  Atrial fibrillation with premature ventricular or aberrantly conducted complexes Right bundle branch block Abnormal ECG When compared with ECG of 18-DEC-2023 12:17, (unconfirmed) Previous ECG has undetermined rhythm, needs review Confirmed by Brendon Galdamez ([id number]) on 12/19/2023 10:07:32 AM    XR chest 2 views    Result Date: 12/18/2023  Interpreted By:  Robby Lara, STUDY: XR CHEST 2 VIEWS; 12/18/2023 11:40 am   INDICATION: Signs/Symptoms:alteredmental status   COMPARISON: None   ACCESSION NUMBER(S): [id number]   ORDERING CLINICIAN: CANDELARIO HOUSE   TECHNIQUE: PA and LAT views  of the chest were obtained.   FINDINGS: The cardiomediastinal silhouette is unremarkable. The lungs are clear. No pleural effusion is identified. Median sternotomy wires are intact.   The osseous structures are intact.       No acute cardiopulmonary process.     Signed by: Robby Lara 12/18/2023 1:33 PM Dictation workstation:   [id number]    CT head wo IV contrast    Result Date: 12/18/2023  Interpreted By:  Robby Lara, STUDY: CANDELARIO HOUSE; 12/18/2023 12:14 pm   INDICATION: altered mental status;   COMPARISON: 11/24/2023   ACCESSION NUMBER(S): [id number]   ORDERING CLINICIAN: CANDELARIO HOUSE   TECHNIQUE: Contiguous axial images were acquired from the vertex through the posterior fossa without IV contrast. All CT examinations are performed with 1 or more of the following dose reduction techniques: Automated exposure control, adjustment of mA and/or kv according to patient's size, or use of iterative reconstruction techniques.   FINDINGS: No focal mass effect or midline shift is identified. The ventricles and sulci are symmetric and appropriate for the patient's age.   Mild-moderate degree of nonspecific white matter change most consistent with chronic small-vessel ischemic disease, unchanged. The gray white matter differentiation is preserved.   No acute intracranial hemorrhage is seen. No intra-axial or extra-axial fluid collection is seen.   The visualized paranasal sinuses and mastoid air cells are clear.       No CT evidence for acute intracranial pathology. Stable appearing examination from 11/24/2023   Signed by: Robby Lara 12/18/2023 12:17 PM Dictation workstation:   [id number]     Scheduled medications  amLODIPine, 5 mg, oral, Daily  aspirin, 162 mg, oral, Daily  atorvastatin, 40 mg, oral, Nightly  clopidogrel, 75 mg, oral, Daily  cyanocobalamin, 1,000 mcg, oral, Daily  docusate sodium, 100 mg, oral, BID  ferrous sulfate (325 mg ferrous sulfate), 65 mg of iron, oral, BID  finasteride, 5  mg, oral, Nightly  furosemide, 40 mg, oral, Daily  gabapentin, 100 mg, oral, BID  heparin (porcine), 5,000 Units, subcutaneous, q8h TAYLOR  levETIRAcetam, 250 mg, oral, BID  lidocaine, 1 patch, transdermal, Daily  lisinopril, 20 mg, oral, Daily  magnesium oxide, 400 mg, oral, Daily  meropenem, 1 g, intravenous, q8h  metFORMIN, 1,000 mg, oral, BID with meals  pantoprazole, 40 mg, oral, Daily before breakfast  polyethylene glycol, 17 g, oral, BID  tamsulosin, 0.4 mg, oral, BID      Continuous medications     PRN medications  PRN medications: acetaminophen **OR** acetaminophen **OR** acetaminophen, benzocaine-menthol, dextromethorphan-guaifenesin, guaiFENesin    ASSESSMENT:  Sepsis  Fever  Urinary tract infection gram negative bacilli - secondary to chronic indwelling guidry catheter  Bacteremia gram negative bacilli secondary to urinary tract infection  Chronic urinary retention  BPH  Elevated troponin  Atrial fibrillation - status post Watchman device  Coronary artery disease  History of CABG  Hypertension  History of TIA/CVA  History of stroke - like episodes  Abnormal renal function  Normocytic anemia  History of GI bleed  Chronic right hip pain  Spinal stenosis    PLAN:  Infectious disease consultation.  IV Meropenem.  Follow urine and blood cultures.  Monitor temperature and white blood cell count.  Maintain guidry catheter.  Continue current medications.  No chest pain, shortness of breath.  Suspect elevated troponin secondary to sepsis, chronic.  Cardiology consultation.  Telemetry.  Labs reviewed.  Creatinine above baseline.  Hold Lisinopril.  Monitor renal function.  Monitor blood pressure.  H&H noted.  No acute blood loss.  Guaiac stools.  Iron panel, ferritin, vitamin b12, folic acid.  Monitor H&H.  Continue home medications as prescribed as appropriate.  ADA diet.  Monitor blood glucose AC/HS.  Metformin.  Insulin if needed.  PT/OT.  Fall precautions.  Up with assistance only.  Bed and chair alarm at all times.   Supportive care.  Patient reassured.  Discussed CODE STATUS.  He is a full code.  Case management consultation for discharge planning.  We will take DVT, fall, aspiration and decubitus precautions during his stay in the hospital.  The plan has been discussed with the patient and his spouse present at bedside.  They are agreeable.  Orders written per Dr. Oneill.  Any additions or modifications at his discretion.      Discussed with Dr. Oneill and Dr. Edwards.      Martina Haynes, JONA-CNP

## 2023-12-19 NOTE — NURSING NOTE
This nurse reported that this patient has a result of a gram negative bacilli in an aerobic vial per lab from [facility] on collected on 12/18. NNO at this time was given

## 2023-12-20 ENCOUNTER — APPOINTMENT (OUTPATIENT)
Dept: RADIOLOGY | Facility: HOSPITAL | Age: 83
DRG: 698 | End: 2023-12-20
Payer: MEDICARE

## 2023-12-20 LAB
ANION GAP SERPL CALC-SCNC: 11 MMOL/L
BACTERIA UR CULT: ABNORMAL
BUN SERPL-MCNC: 25 MG/DL (ref 8–25)
CALCIUM SERPL-MCNC: 8.5 MG/DL (ref 8.5–10.4)
CHLORIDE SERPL-SCNC: 97 MMOL/L (ref 97–107)
CO2 SERPL-SCNC: 22 MMOL/L (ref 24–31)
CREAT SERPL-MCNC: 1 MG/DL (ref 0.4–1.6)
ERYTHROCYTE [DISTWIDTH] IN BLOOD BY AUTOMATED COUNT: 13.9 % (ref 11.5–14.5)
GFR SERPL CREATININE-BSD FRML MDRD: 75 ML/MIN/1.73M*2
GLUCOSE BLD MANUAL STRIP-MCNC: 124 MG/DL (ref 74–99)
GLUCOSE BLD MANUAL STRIP-MCNC: 165 MG/DL (ref 74–99)
GLUCOSE BLD MANUAL STRIP-MCNC: 175 MG/DL (ref 74–99)
GLUCOSE BLD MANUAL STRIP-MCNC: 188 MG/DL (ref 74–99)
GLUCOSE SERPL-MCNC: 133 MG/DL (ref 65–99)
HCT VFR BLD AUTO: 26.4 % (ref 41–52)
HGB BLD-MCNC: 8.6 G/DL (ref 13.5–17.5)
MCH RBC QN AUTO: 32.6 PG (ref 26–34)
MCHC RBC AUTO-ENTMCNC: 32.6 G/DL (ref 32–36)
MCV RBC AUTO: 100 FL (ref 80–100)
NRBC BLD-RTO: 0 /100 WBCS (ref 0–0)
PLATELET # BLD AUTO: 139 X10*3/UL (ref 150–450)
POTASSIUM SERPL-SCNC: 3.9 MMOL/L (ref 3.4–5.1)
RBC # BLD AUTO: 2.64 X10*6/UL (ref 4.5–5.9)
SODIUM SERPL-SCNC: 130 MMOL/L (ref 133–145)
WBC # BLD AUTO: 5.4 X10*3/UL (ref 4.4–11.3)

## 2023-12-20 PROCEDURE — 74177 CT ABD & PELVIS W/CONTRAST: CPT

## 2023-12-20 PROCEDURE — 2500000001 HC RX 250 WO HCPCS SELF ADMINISTERED DRUGS (ALT 637 FOR MEDICARE OP): Performed by: INTERNAL MEDICINE

## 2023-12-20 PROCEDURE — 2500000004 HC RX 250 GENERAL PHARMACY W/ HCPCS (ALT 636 FOR OP/ED): Performed by: NURSE PRACTITIONER

## 2023-12-20 PROCEDURE — 96372 THER/PROPH/DIAG INJ SC/IM: CPT | Performed by: INTERNAL MEDICINE

## 2023-12-20 PROCEDURE — 2550000001 HC RX 255 CONTRASTS: Performed by: INTERNAL MEDICINE

## 2023-12-20 PROCEDURE — 2500000002 HC RX 250 W HCPCS SELF ADMINISTERED DRUGS (ALT 637 FOR MEDICARE OP, ALT 636 FOR OP/ED): Performed by: INTERNAL MEDICINE

## 2023-12-20 PROCEDURE — 2500000001 HC RX 250 WO HCPCS SELF ADMINISTERED DRUGS (ALT 637 FOR MEDICARE OP): Performed by: NURSE PRACTITIONER

## 2023-12-20 PROCEDURE — 97116 GAIT TRAINING THERAPY: CPT | Mod: GP,CQ

## 2023-12-20 PROCEDURE — 2500000001 HC RX 250 WO HCPCS SELF ADMINISTERED DRUGS (ALT 637 FOR MEDICARE OP)

## 2023-12-20 PROCEDURE — 97530 THERAPEUTIC ACTIVITIES: CPT | Mod: GP,CQ

## 2023-12-20 PROCEDURE — 85027 COMPLETE CBC AUTOMATED: CPT | Performed by: NURSE PRACTITIONER

## 2023-12-20 PROCEDURE — 82947 ASSAY GLUCOSE BLOOD QUANT: CPT | Mod: 91

## 2023-12-20 PROCEDURE — 2060000001 HC INTERMEDIATE ICU ROOM DAILY

## 2023-12-20 PROCEDURE — 99232 SBSQ HOSP IP/OBS MODERATE 35: CPT | Performed by: INTERNAL MEDICINE

## 2023-12-20 PROCEDURE — 80048 BASIC METABOLIC PNL TOTAL CA: CPT | Performed by: NURSE PRACTITIONER

## 2023-12-20 PROCEDURE — 36415 COLL VENOUS BLD VENIPUNCTURE: CPT | Performed by: NURSE PRACTITIONER

## 2023-12-20 PROCEDURE — 2500000004 HC RX 250 GENERAL PHARMACY W/ HCPCS (ALT 636 FOR OP/ED): Performed by: INTERNAL MEDICINE

## 2023-12-20 RX ADMIN — Medication 400 MG: at 08:10

## 2023-12-20 RX ADMIN — IRON SUCROSE 200 MG: 20 INJECTION, SOLUTION INTRAVENOUS at 08:10

## 2023-12-20 RX ADMIN — HEPARIN SODIUM 5000 UNITS: 5000 INJECTION, SOLUTION INTRAVENOUS; SUBCUTANEOUS at 21:15

## 2023-12-20 RX ADMIN — MEROPENEM 1 G: 1 INJECTION, POWDER, FOR SOLUTION INTRAVENOUS at 05:22

## 2023-12-20 RX ADMIN — IOHEXOL 75 ML: 350 INJECTION, SOLUTION INTRAVENOUS at 14:24

## 2023-12-20 RX ADMIN — POLYETHYLENE GLYCOL 3350 17 G: 17 POWDER, FOR SOLUTION ORAL at 08:09

## 2023-12-20 RX ADMIN — HEPARIN SODIUM 5000 UNITS: 5000 INJECTION, SOLUTION INTRAVENOUS; SUBCUTANEOUS at 13:12

## 2023-12-20 RX ADMIN — TAMSULOSIN HYDROCHLORIDE 0.4 MG: 0.4 CAPSULE ORAL at 21:14

## 2023-12-20 RX ADMIN — TAMSULOSIN HYDROCHLORIDE 0.4 MG: 0.4 CAPSULE ORAL at 08:10

## 2023-12-20 RX ADMIN — GABAPENTIN 100 MG: 100 CAPSULE ORAL at 21:15

## 2023-12-20 RX ADMIN — CLOPIDOGREL BISULFATE 75 MG: 75 TABLET ORAL at 08:10

## 2023-12-20 RX ADMIN — ACETAMINOPHEN 650 MG: 325 TABLET ORAL at 21:24

## 2023-12-20 RX ADMIN — INSULIN GLARGINE 10 UNITS: 100 INJECTION, SOLUTION SUBCUTANEOUS at 21:12

## 2023-12-20 RX ADMIN — LEVETIRACETAM 250 MG: 250 TABLET, FILM COATED ORAL at 08:10

## 2023-12-20 RX ADMIN — ATORVASTATIN CALCIUM 40 MG: 40 TABLET, FILM COATED ORAL at 21:15

## 2023-12-20 RX ADMIN — FERROUS SULFATE TAB 325 MG (65 MG ELEMENTAL FE) 1 TABLET: 325 (65 FE) TAB at 08:10

## 2023-12-20 RX ADMIN — ASPIRIN 162 MG: 81 TABLET, COATED ORAL at 08:10

## 2023-12-20 RX ADMIN — DOCUSATE SODIUM 100 MG: 100 CAPSULE, LIQUID FILLED ORAL at 08:10

## 2023-12-20 RX ADMIN — FUROSEMIDE 40 MG: 40 TABLET ORAL at 08:10

## 2023-12-20 RX ADMIN — METFORMIN HYDROCHLORIDE 1000 MG: 1000 TABLET ORAL at 17:43

## 2023-12-20 RX ADMIN — POLYETHYLENE GLYCOL 3350 17 G: 17 POWDER, FOR SOLUTION ORAL at 21:14

## 2023-12-20 RX ADMIN — MEROPENEM 1 G: 1 INJECTION, POWDER, FOR SOLUTION INTRAVENOUS at 13:12

## 2023-12-20 RX ADMIN — METFORMIN HYDROCHLORIDE 1000 MG: 1000 TABLET ORAL at 08:10

## 2023-12-20 RX ADMIN — DOCUSATE SODIUM 100 MG: 100 CAPSULE, LIQUID FILLED ORAL at 21:15

## 2023-12-20 RX ADMIN — FINASTERIDE 5 MG: 5 TABLET, FILM COATED ORAL at 21:15

## 2023-12-20 RX ADMIN — LEVETIRACETAM 250 MG: 250 TABLET, FILM COATED ORAL at 21:15

## 2023-12-20 RX ADMIN — CYANOCOBALAMIN TAB 1000 MCG 1000 MCG: 1000 TAB at 08:10

## 2023-12-20 RX ADMIN — GABAPENTIN 100 MG: 100 CAPSULE ORAL at 08:10

## 2023-12-20 RX ADMIN — AMLODIPINE BESYLATE 5 MG: 5 TABLET ORAL at 08:10

## 2023-12-20 RX ADMIN — MEROPENEM 1 G: 1 INJECTION, POWDER, FOR SOLUTION INTRAVENOUS at 21:15

## 2023-12-20 RX ADMIN — PANTOPRAZOLE SODIUM 40 MG: 40 TABLET, DELAYED RELEASE ORAL at 06:33

## 2023-12-20 RX ADMIN — HEPARIN SODIUM 5000 UNITS: 5000 INJECTION, SOLUTION INTRAVENOUS; SUBCUTANEOUS at 05:21

## 2023-12-20 ASSESSMENT — PAIN SCALES - GENERAL
PAINLEVEL_OUTOF10: 0 - NO PAIN
PAINLEVEL_OUTOF10: 0 - NO PAIN
PAINLEVEL_OUTOF10: 1
PAINLEVEL_OUTOF10: 6
PAINLEVEL_OUTOF10: 0 - NO PAIN

## 2023-12-20 ASSESSMENT — COGNITIVE AND FUNCTIONAL STATUS - GENERAL
STANDING UP FROM CHAIR USING ARMS: A LITTLE
TOILETING: A LITTLE
TURNING FROM BACK TO SIDE WHILE IN FLAT BAD: A LITTLE
CLIMB 3 TO 5 STEPS WITH RAILING: A LOT
STANDING UP FROM CHAIR USING ARMS: A LITTLE
MOVING FROM LYING ON BACK TO SITTING ON SIDE OF FLAT BED WITH BEDRAILS: A LITTLE
CLIMB 3 TO 5 STEPS WITH RAILING: A LITTLE
MOVING TO AND FROM BED TO CHAIR: A LITTLE
WALKING IN HOSPITAL ROOM: A LITTLE
MOBILITY SCORE: 18
HELP NEEDED FOR BATHING: A LOT
DAILY ACTIVITIY SCORE: 18
WALKING IN HOSPITAL ROOM: A LITTLE
DRESSING REGULAR UPPER BODY CLOTHING: A LITTLE
MOVING TO AND FROM BED TO CHAIR: A LITTLE
MOBILITY SCORE: 19
DRESSING REGULAR LOWER BODY CLOTHING: A LOT

## 2023-12-20 ASSESSMENT — PAIN - FUNCTIONAL ASSESSMENT
PAIN_FUNCTIONAL_ASSESSMENT: 0-10
PAIN_FUNCTIONAL_ASSESSMENT: WONG-BAKER FACES
PAIN_FUNCTIONAL_ASSESSMENT: FLACC (FACE, LEGS, ACTIVITY, CRY, CONSOLABILITY)

## 2023-12-20 ASSESSMENT — PAIN SCALES - WONG BAKER: WONGBAKER_NUMERICALRESPONSE: HURTS LITTLE MORE

## 2023-12-20 NOTE — PROGRESS NOTES
Luis Greene is a 83 y.o. male on day 2 of admission presenting with Urinary tract infection associated with indwelling urethral catheter, initial encounter (CMS/Formerly [facility]).    Subjective   Patient seen and examined.  Resting in bed in no acute distress.  Awake alert oriented x 3.  No new complaints.  No urinary symptoms.  No fevers chills or sweats    Objective     Physical Exam  Constitutional:       General: He is not in acute distress.     Appearance: Normal appearance. He is normal weight. He is not ill-appearing or toxic-appearing.   HENT:      Head: Normocephalic and atraumatic.      Right Ear: Tympanic membrane normal.      Left Ear: Tympanic membrane normal.      Nose: Nose normal.      Mouth/Throat:      Mouth: Mucous membranes are moist.      Pharynx: Oropharynx is clear.   Eyes:      Extraocular Movements: Extraocular movements intact.      Conjunctiva/sclera: Conjunctivae normal.      Pupils: Pupils are equal, round, and reactive to light.   Cardiovascular:      Rate and Rhythm: Normal rate and regular rhythm.      Pulses: Normal pulses.      Heart sounds: Normal heart sounds.   Pulmonary:      Effort: Pulmonary effort is normal. No respiratory distress.      Breath sounds: Normal breath sounds. No wheezing, rhonchi or rales.      Comments: Room air  Abdominal:      General: Bowel sounds are normal. There is no distension.      Palpations: Abdomen is soft.      Tenderness: There is no abdominal tenderness.   Genitourinary:     Comments:  Tiwari catheter in place draining clear yellow urine trace sediment. Rectal examination deferred  Musculoskeletal:         General: Swelling present. Normal range of motion.      Cervical back: Normal range of motion and neck supple.      Comments: 1+ Bipedal edema   Skin:     General: Skin is warm and dry.      Capillary Refill: Capillary refill takes less than 2 seconds.   Neurological:      General: No focal deficit present.      Mental Status: He is alert and oriented  "to person, place, and time.   Psychiatric:         Mood and Affect: Mood normal.         Behavior: Behavior normal.       Last Recorded Vitals  Blood pressure (!) 135/44, pulse 90, temperature 36.6 °C (97.9 °F), temperature source Temporal, resp. rate 16, height 1.803 m (5' 10.98\"), weight 78.9 kg (173 lb 15.1 oz), SpO2 97 %.    Intake/Output last 3 Shifts:  I/O last 3 completed shifts:  In: 1960 (24.8 mL/kg) [P.O.:1560; IV Piggyback:400]  Out: 1775 (22.5 mL/kg) [Urine:1775 (0.6 mL/kg/hr)]  Weight: 78.9 kg     Telemetry atrial fibrillation rate 80's    Relevant Results  Results for orders placed or performed during the hospital encounter of 12/18/23 (from the past 24 hour(s))   POCT GLUCOSE   Result Value Ref Range    POCT Glucose 247 (H) 74 - 99 mg/dL   POCT GLUCOSE   Result Value Ref Range    POCT Glucose 215 (H) 74 - 99 mg/dL   Basic Metabolic Panel   Result Value Ref Range    Glucose 133 (H) 65 - 99 mg/dL    Sodium 130 (L) 133 - 145 mmol/L    Potassium 3.9 3.4 - 5.1 mmol/L    Chloride 97 97 - 107 mmol/L    Bicarbonate 22 (L) 24 - 31 mmol/L    Urea Nitrogen 25 8 - 25 mg/dL    Creatinine 1.00 0.40 - 1.60 mg/dL    eGFR 75 >60 mL/min/1.73m*2    Calcium 8.5 8.5 - 10.4 mg/dL    Anion Gap 11 <=19 mmol/L   CBC   Result Value Ref Range    WBC 5.4 4.4 - 11.3 x10*3/uL    nRBC 0.0 0.0 - 0.0 /100 WBCs    RBC 2.64 (L) 4.50 - 5.90 x10*6/uL    Hemoglobin 8.6 (L) 13.5 - 17.5 g/dL    Hematocrit 26.4 (L) 41.0 - 52.0 %     80 - 100 fL    MCH 32.6 26.0 - 34.0 pg    MCHC 32.6 32.0 - 36.0 g/dL    RDW 13.9 11.5 - 14.5 %    Platelets 139 (L) 150 - 450 x10*3/uL   POCT GLUCOSE   Result Value Ref Range    POCT Glucose 124 (H) 74 - 99 mg/dL   POCT GLUCOSE   Result Value Ref Range    POCT Glucose 188 (H) 74 - 99 mg/dL   POCT GLUCOSE   Result Value Ref Range    POCT Glucose 175 (H) 74 - 99 mg/dL     No results found.    Scheduled medications  amLODIPine, 5 mg, oral, Daily  aspirin, 162 mg, oral, Daily  atorvastatin, 40 mg, oral, " Nightly  clopidogrel, 75 mg, oral, Daily  cyanocobalamin, 1,000 mcg, oral, Daily  docusate sodium, 100 mg, oral, BID  ferrous sulfate (325 mg ferrous sulfate), 65 mg of iron, oral, Daily  finasteride, 5 mg, oral, Nightly  furosemide, 40 mg, oral, Daily  gabapentin, 100 mg, oral, BID  heparin (porcine), 5,000 Units, subcutaneous, q8h TAYLOR  insulin glargine, 10 Units, subcutaneous, Nightly  iron sucrose, 200 mg, intravenous, Daily  levETIRAcetam, 250 mg, oral, BID  magnesium oxide, 400 mg, oral, Daily  meropenem, 1 g, intravenous, q8h  metFORMIN, 1,000 mg, oral, BID with meals  pantoprazole, 40 mg, oral, Daily before breakfast  polyethylene glycol, 17 g, oral, BID  tamsulosin, 0.4 mg, oral, BID      Continuous medications     PRN medications  PRN medications: acetaminophen **OR** acetaminophen **OR** acetaminophen, benzocaine-menthol, dextromethorphan-guaifenesin, dextrose 10 % in water (D10W), dextrose, glucagon, guaiFENesin    ASSESSMENT:  Sepsis  Fever  Urinary tract infection secondary to chronic indwelling guidry catheter - E. Coli  Bacteremia secondary to urinary tract infection - E. Coli  Chronic urinary retention  BPH  Elevated troponin  Atrial fibrillation - status post Watchman device  Coronary artery disease  History of CABG  Hypertension  History of TIA/CVA  History of stroke - like episodes  Abnormal renal function  Normocytic anemia  Iron deficiency  History GI bleed  Chronic right hip pain  Spinal stenosis    PLAN:  Patient is doing well this morning.  No new issues.  Infectious disease input appreciated.  Urine culture E. coli.  Blood culture E. coli.  Follow cultures.  Monitor temperature and white blood cell count.  Continue IV antibiotics Meropenem per Infectious disease.  Maintain guidry catheter.  Urology Dr. Lozano to evaluate inpatient this admission.  Input appreciated.  Cardiology input appreciated.  Telemetry.  Medical management per cardiology.  Iron deficient.  No evidence of acute blood  loss.  Guaiac stools.  IV Venofer.  Monitor H&H.  Point-of-care glucose reviewed.  Monitor blood glucose AC/HS.  ADA diet. Metformin.  Lantus at bedtime.   PT/OT.  Fall precautions.  Up with assistance only.  Supportive care.  Patient reassured.  Case management following for discharge planning.  Discussed with patient and Dr. Oneill.    Martina Haynes, APRALBINO-CNP

## 2023-12-20 NOTE — NURSING NOTE
Pt lying quietly in bed with eyes closed. Even unlabored breaths noted. No change from previous assessment. No needs identified at this time. Call light and belongings within reach. Bed alarm engaged. Continuing with plan of care.

## 2023-12-20 NOTE — PROGRESS NOTES
Subjective Data:  Patient feeling somewhat improved no major complaints    Overnight Events:    No new overnight issues     Objective Data:  Last Recorded Vitals:  Vitals:    12/19/23 2015 12/19/23 2314 12/20/23 0425 12/20/23 0749   BP: (!) 129/42 132/51 (!) 123/49 134/60   BP Location: Right arm Left arm Left arm Left arm   Patient Position: Lying Lying Lying Lying   Pulse: 82 81 76 73   Resp: 16 18 18 18   Temp: 37.3 °C (99.1 °F) 36.9 °C (98.4 °F) 36.7 °C (98.1 °F) 36.6 °C (97.9 °F)   TempSrc: Temporal Oral Oral Temporal   SpO2: 98% 99% 96% 97%   Weight:   78.9 kg (173 lb 15.1 oz)    Height:           Last Labs:  CBC - 12/20/2023:  6:09 AM  5.4 8.6 139    26.4      CMP - 12/20/2023:  6:09 AM  8.5 5.5 18 --- 0.7   _ 3.0 9 60      PTT - 11/10/2023:  4:59 PM  1.2   12.3 22.2     HGBA1C   Date/Time Value Ref Range Status   11/15/2023 11:08 AM 6.7 See below % Final   09/14/2023 12:14 PM 6.8 % Final     Comment:          Diagnosis of Diabetes-Adults   Non-Diabetic: < or = 5.6%   Increased risk for developing diabetes: 5.7-6.4%   Diagnostic of diabetes: > or = 6.5%  .       Monitoring of Diabetes                Age (y)     Therapeutic Goal (%)   Adults:          >18           <7.0   Pediatrics:    13-18           <7.5                   7-12           <8.0                   0- 6            7.5-8.5   American Diabetes Association. Diabetes Care 33(S1), Jan 2010.   08/14/2023 02:45 PM 8.1 4.0 - 6.0 % Final     Comment:     Hemoglobin A1C levels are related to mean blood glucose during the   preceding 2-3 months. The relationship table below may be used as a   general guide. Each 1% increase in HGB A1C is a reflection of an   increase in mean glucose of approximately 30 mg/dl.   Reference: Diabetes Care, volume 29, supplement 1 Jan. 2006                        HGB A1C ................. Approx. Mean Glucose   _______________________________________________   6%   ...............................  120 mg/dl   7%    "...............................  150 mg/dl   8%   ...............................  180 mg/dl   9%   ...............................  210 mg/dl   10%  ...............................  240 mg/dl  Performed at [address] [address]     04/19/2023 04:03 PM 9.7 4.0 - 5.6 % Final   02/03/2023 09:12 AM 9.6 4.0 - 5.6 % Final     LDLCALC   Date/Time Value Ref Range Status   11/15/2023 11:08 AM 30 65 - 130 mg/dL Final   08/15/2023 05:50 AM 31 65 - 130 MG/DL Final   10/28/2022 05:39 AM 32 65 - 130 MG/DL Final   07/06/2021 09:50 AM 39 65 - 130 MG/DL Final     VLDL   Date/Time Value Ref Range Status   08/22/2022 10:17 AM 11 0 - 40 mg/dL Final   09/17/2019 01:20 PM 16 0 - 40 mg/dL Final      Last I/O:  I/O last 3 completed shifts:  In: 1960 (24.8 mL/kg) [P.O.:1560; IV Piggyback:400]  Out: 1775 (22.5 mL/kg) [Urine:1775 (0.6 mL/kg/hr)]  Weight: 78.9 kg     Past Cardiology Tests (Last 3 Years):  EKG:  Electrocardiogram, 12-lead PRN ACS symptoms       ECG 12 lead 11/26/2023      ECG 12 Lead       ECG 12 lead daily 11/17/2023      ECG 12 lead daily 11/15/2023      ECG 12 lead daily 11/15/2023      ECG 12 lead 11/15/2023    Echo:  Transthoracic Echo (TTE) Limited 11/10/2023      Transthoracic Echo (TTE) Limited 11/10/2023    Ejection Fractions:  No results found for: \"EF\"  Cath:  No results found for this or any previous visit from the past 1095 days.    Stress Test:  Nuclear Stress Test 01/24/2022    Cardiac Imaging:  No results found for this or any previous visit from the past 1095 days.      Inpatient Medications:  Scheduled medications   Medication Dose Route Frequency    amLODIPine  5 mg oral Daily    aspirin  162 mg oral Daily    atorvastatin  40 mg oral Nightly    clopidogrel  75 mg oral Daily    cyanocobalamin  1,000 mcg oral Daily    docusate sodium  100 mg oral BID    ferrous sulfate (325 mg ferrous sulfate)  65 mg of iron oral Daily    finasteride  5 mg oral Nightly    furosemide  40 mg oral Daily "    gabapentin  100 mg oral BID    heparin (porcine)  5,000 Units subcutaneous q8h TAYLOR    insulin glargine  10 Units subcutaneous Nightly    iron sucrose  200 mg intravenous Daily    levETIRAcetam  250 mg oral BID    magnesium oxide  400 mg oral Daily    meropenem  1 g intravenous q8h    metFORMIN  1,000 mg oral BID with meals    pantoprazole  40 mg oral Daily before breakfast    polyethylene glycol  17 g oral BID    tamsulosin  0.4 mg oral BID     PRN medications   Medication    acetaminophen    Or    acetaminophen    Or    acetaminophen    benzocaine-menthol    dextromethorphan-guaifenesin    dextrose 10 % in water (D10W)    dextrose    glucagon    guaiFENesin     Continuous Medications   Medication Dose Last Rate       Physical Exam:  General: alert, oriented x2-3, very pleasant; son at bedside  HEENT: normal cephalic, atraumatic  Neck: No JVD, bruit or thrill, masses or tenderness   Heart: S1/S2, Rate 50, Rhythm irregular, no s3 or s4, no murmur, thrill, or heaves at PMI.   Lungs: Clear, equal expansion and excursion, no wheezes, crackles, rhales or rhonci.  Room air.  No conversational dyspnea appreciated.  No tachypnea.  No pain with deep aspiration   Abdomen: Overweight, bowel sounds x 4, soft, non-tender   Genitourinary: deferred   Extremities: No significant upper or lower extremity daylin appreciated.     Assessment/Plan   12/19: This elderly white male with an extensive past medical history including coronary artery disease, remote CABG, hypertension, hyperlipidemia, type 2 diabetes, paroxysmal atrial fibrillation, history of?  TIA versus recurrent localized focal seizures, history of GI bleeding on Eliquis anticoagulation, status post Watchman implantation is currently admitted with low-grade fever and evidence of recurrent urinary tract infection urosepsis from recent change in his Tiwari catheter after trial of void.  Clinically is improved rather quickly with the institution of IV antibiotics.  The  elevated high-sensitivity troponins are presumably not diagnostic of acute coronary syndrome more likely due to minor degree of demand ischemia from the urosepsis.  The patient has been restarted on IV meropenem.  The patient has been continued on the aspirin and Plavix as per the watchman protocol.  His atrial fibrillation is controlled ventricular rates in the absence of beta-blockade.  Amlodipine restarted for treatment of hypertension and will hold ACE inhibitor for now given urosepsis and potential for fluctuating renal parameters.     12/20: The patient is sitting in bedside chair feeling relatively well somewhat discouraged about needing to be readmitted to the hospital but otherwise in reasonable spirits.  He did has the chronic atrial fibrillation with a controlled rate in the 70s per minute in the absence of any AV charity blocking agents.  His blood pressure is acceptable in the 120 mmHg range on the amlodipine 5 mg daily.  Renal parameters and improved/stable creatinine 1.0.  CBC generally unremarkable hematocrit 26.4 hemoglobin 8.6.  Patient has been seen by infectious disease.  Peripheral IV 12/19/23 20 G Right;Upper;Anterior Arm (Active)   Site Assessment Clean;Dry;Intact 12/20/23 0806   Dressing Status Clean;Dry 12/20/23 0806   Number of days: 1       Urethral Catheter (Active)   Reason for Continuing Urinary Catheterization chronic urinary retention 12/20/23 0806   Number of days: 8       Code Status:  Full Code    I spent 20 minutes in the professional and overall care of this patient.        Luis Grant MD

## 2023-12-20 NOTE — CARE PLAN
The patient's goals for the shift include      The clinical goals for the shift include no falls or injury    Over the shift, the patient did not make progress toward the following goals. Barriers to progression include pt being forgetful. Recommendations to address these barriers include frequent checks on patient, assist with ambulation.

## 2023-12-20 NOTE — CARE PLAN
The patient's goals for the shift include      The clinical goals for the shift include safety, no falls    Over the shift, the patient did not make progress toward the following goals. Barriers to progression include none. Recommendations to address these barriers include assist with ADLs  Problem: Fall/Injury  Goal: Not fall by end of shift  Outcome: Progressing  Goal: Be free from injury by end of the shift  Outcome: Progressing  Goal: Verbalize understanding of personal risk factors for fall in the hospital  Outcome: Progressing  Goal: Verbalize understanding of risk factor reduction measures to prevent injury from fall in the home  Outcome: Progressing  Goal: Use assistive devices by end of the shift  Outcome: Progressing  Goal: Pace activities to prevent fatigue by end of the shift  Outcome: Progressing     Problem: Diabetes  Goal: Achieve decreasing blood glucose levels by end of shift  Outcome: Progressing  Goal: Increase stability of blood glucose readings by end of shift  Outcome: Progressing  Goal: Decrease in ketones present in urine by end of shift  Outcome: Progressing  Goal: Maintain electrolyte levels within acceptable range throughout shift  Outcome: Progressing  Goal: Maintain glucose levels >70mg/dl to <250mg/dl throughout shift  Outcome: Progressing  Goal: No changes in neurological exam by end of shift  Outcome: Progressing  Goal: Learn about and adhere to nutrition recommendations by end of shift  Outcome: Progressing  Goal: Vital signs within normal range for age by end of shift  Outcome: Progressing  Goal: Increase self care and/or family involovement by end of shift  Outcome: Progressing  Goal: Receive DSME education by end of shift  Outcome: Progressing     Problem: Pain - Adult  Goal: Verbalizes/displays adequate comfort level or baseline comfort level  Outcome: Progressing     Problem: Safety - Adult  Goal: Free from fall injury  Outcome: Progressing     Problem: Discharge Planning  Goal:  Discharge to home or other facility with appropriate resources  Outcome: Progressing     Problem: Chronic Conditions and Co-morbidities  Goal: Patient's chronic conditions and co-morbidity symptoms are monitored and maintained or improved  Outcome: Progressing     Problem: Bathing  Goal: STG - Patient will bathe lower body with close supervision and AE  Outcome: Progressing     Problem: Dressings Lower Extremities  Goal: LTG - Patient will dress lower body independent with AE  Outcome: Progressing   .

## 2023-12-20 NOTE — PROGRESS NOTES
INFECTIOUS DISEASES PROGRESS NOTE    Consulted / following patient for:  Recurrent bacteremic E. coli urinary tract infection    Subjective   Interval History:   Denies abdominal pain, back pain, urethral discomfort, hematuria, nausea, vomiting, fever, chills       Objective   PHYSICAL EXAMINATION  Vital signs:  Visit Vitals  /58 (BP Location: Left arm, Patient Position: Lying)   Pulse 70   Temp 36.5 °C (97.7 °F) (Temporal)   Resp 18      General: Resting comfortably in bed, no acute distress  Lungs:  Clear to auscultation  Heart:  S1, S2 normal  Abdomen:  Soft, nontender.  Back:  No spinal or CVA tenderness  Genitalia: Tiwari catheter draining clear urine      Antibiotics:  Meropenem day 2      Relevant Results  WBC: 5400     Results from last 72 hours   Lab Units 12/20/23  0609   CREATININE mg/dL 1.00   ANION GAP mmol/L 11   EGFR mL/min/1.73m*2 75     Results from last 72 hours   Lab Units 12/19/23  0641   AST U/L 18   ALT U/L 9   ALK PHOS U/L 60   BILIRUBIN TOTAL mg/dL 0.7   UA: Pyuria  Microbiology:  Blood (12/18): 1 of 2 ESBL E. coli, resistant to oral therapy  Urine: Greater than 100,000 colonies per mL ESBL E. coli, resistant to oral therapy  Imaging:  CXR: No significant infiltrate        ASSESSMENT:  GNR septicemia  Patient is admitted once again with what appears to be an episode of catheter-associated ESBL E. coli urinary tract infection with septicemia.  Once again, I cannot obtain a history that suggests that there was trauma or mechanical disruption involving the catheter.  On the last admission there was no evidence for upper tract obstruction.  He seems to be, once again, responding well to systemic antibiotic therapy.  History and examination do not suggest an alternative source for GNR septicemia.    I have spoken today in detail with Dr. Choudhary, as I am puzzled by the recurrent episodes of sepsis without a clear anatomic basis.  Furthermore, I am concerned about the potential for  recurrent infection in the setting of placing a foreign body in the region of the prostatic urethra as part of the UroLift procedure        PLANS:  -   Continue meropenem   -   Consult Dr. Choudhary (I have notified)  -   Repeat CT abdomen and pelvis  -   Do not feel that Tiwari catheter needs to be changed at this time     Updated Ms. Haynes by text message, acknowledged                  Raul Edwards MD  [facility]  Office:  [phone]

## 2023-12-20 NOTE — PROGRESS NOTES
Physical Therapy    Physical Therapy Treatment    Patient Name: Luis Greene  MRN: [id number]  Today's Date: 12/20/2023  Time Calculation  Start Time: 0935  Stop Time: 1000  Time Calculation (min): 25 min       Assessment/Plan   PT Assessment  PT Assessment Results: Decreased strength, Decreased range of motion, Impaired balance, Decreased mobility  Rehab Prognosis: Good  Evaluation/Treatment Tolerance: Patient limited by fatigue  Medical Staff Made Aware: Yes  End of Session Communication: Bedside nurse  Assessment Comment: Pt presented with decreased muscular strength/endurance, impairef bilateral ankle AROM, and increased assist required for functional mobility. He would benefit from continued skilled PT services prior to and after discharge to maximize independence and safety with functional mobility within his home environment.  End of Session Patient Position: Up in chair  PT Plan  Inpatient/Swing Bed or Outpatient: Inpatient  PT Plan  Treatment/Interventions: Bed mobility, Transfer training, Gait training, Strengthening, Endurance training, Therapeutic exercise, Therapeutic activity, Home exercise program  PT Plan: Skilled PT  PT Frequency: 4 times per week  PT Discharge Recommendations: Low intensity level of continued care  PT Recommended Transfer Status: Assist x1  PT - OK to Discharge: Yes    General Visit Information:   PT  Visit  PT Received On: 12/20/23  Response to Previous Treatment: Patient with no complaints from previous session.  General  Reason for Referral: impaired functional mobility. This 83 year old male presented to the ED from home with c/o fever and confusion. He was admitted for UTI associated with indwelling urinary catheter.  Referred By: Pete Oneill MD  Past Medical History Relevant to Rehab: UTI, indwelling catheter, Afib, HTN  Missed Visit: No  Family/Caregiver Present: No  Prior to Session Communication: Bedside nurse  Patient Position Received: Bed, 2 rail  up    Subjective   Precautions:  Precautions  Hearing/Visual Limitations: Hearing WFL, wears glasses  Medical Precautions: Fall precautions  Braces Applied: B AFO's and shoes.      Objective   Pain:  Pain Assessment  Pain Score: 0 - No pain  Cognition:  Cognition  Orientation Level: Oriented X4  Postural Control:  Postural Control  Postural Control: Within Functional Limits  Posture Comment: forward head rounded shoulders posture    Activity Tolerance:  Activity Tolerance  Endurance: Tolerates 10 - 20 min exercise with multiple rests  Activity Tolerance Comments: Occassional rest breaks for fatigue with good recovery.  Treatments:  Therapeutic Activity  Therapeutic Activity Performed: Yes  Therapeutic Activity 1: Bed mobility; transfers; pt doffed bilat non skid socks and donned bilat AFO's with mod I; education for importance of early mobility and energy conservation techniques.    Bed Mobility  Bed Mobility: Yes  Bed Mobility 1  Bed Mobility 1: Supine to sitting, Scooting  Level of Assistance 1: Close supervision  Bed Mobility Comments 1: HOB elevated; side rail as needed.    Ambulation/Gait Training  Ambulation/Gait Training Performed: Yes  Ambulation/Gait Training 1  Surface 1: Level tile  Device 1: Rollator  Assistance 1: Contact guard  Comments/Distance (ft) 1: 300'x1 decreased jose david  Transfers  Transfer: Yes  Transfer 1  Transfer From 1: Bed to  Transfer to 1: Chair with arms  Technique 1: Sit to stand, Stand to sit  Transfer Level of Assistance 1: Minimum assistance    Outcome Measures:  [facility] Basic Mobility  Turning from your back to your side while in a flat bed without using bedrails: None  Moving from lying on your back to sitting on the side of a flat bed without using bedrails: None  Moving to and from bed to chair (including a wheelchair): A little  Standing up from a chair using your arms (e.g. wheelchair or bedside chair): A little  To walk in hospital room: A little  Climbing 3-5 steps with  railing: A lot  Basic Mobility - Total Score: 19    OP EDUCATION:  Outpatient Education  Individual(s) Educated: Patient  Education Provided: Anatomy, Body Mechanics, Fall Risk, Home Safety  Patient Response to Education: Patient/Caregiver Verbalized Understanding of Information, Patient/Caregiver Performed Return Demonstration of Exercises/Activities    Encounter Problems       Encounter Problems (Active)       Mobility       STG - Patient will ambulate >/=100 ft with FWW at mod I level. (Progressing)       Start:  12/19/23    Expected End:  01/03/24            STG - Patient will ascend and descend 3 stairs with unilateral rail and close S. (Progressing)       Start:  12/19/23    Expected End:  01/03/24               Mobility       perform functional mobility to and from the bathroom independent with 2ww (Progressing)       Start:  12/19/23    Expected End:  01/19/24               Pain - Adult          Transfers       STG - Patient to transfer supine<>sit at mod I level. (Progressing)       Start:  12/19/23    Expected End:  01/03/24            STG - Patient will transfer sit<>stand with FWW and mod I. (Progressing)       Start:  12/19/23    Expected End:  01/03/24               Transfers       LTG - Patient perform all functional transfers independent with 2ww (Progressing)       Start:  12/19/23    Expected End:  01/19/24

## 2023-12-20 NOTE — CONSULTS
"Nutrition Assessment Note    Nutrition Assessment    Reason for Assessment: Admission nursing screening (MST x 2)    Reason for Hospital Admission:  Luis Greene is a 83 y.o. male who is admitted for  UTI with sepsis for weakness altered mental status.  Required rehab was discharged on Saturday.  Over the past couple of days has had decreased appetite poor oral intake yesterday developed a fever of 101 and was confused patient reports he felt confused as well. Pt stated that he has been between 3-4 institutions and stated it has effected his appetite.     Nutrition History:  Food and Nutrient History: Pt stated that his appetite now is good and hat he is eating well.  Energy Intake: Good > 75 %    Anthropometrics:  Ht: 180.3 cm (5' 10.98\"), Wt: 78.9 kg (173 lb 15.1 oz), BMI: 24.27  IBW/kg (Dietitian Calculated): 78.18 kg          Weight Change:  Weight History / % Weight Change: Pt stated UBW to be 185#. Pt stated that he lost at least 10 # over 2 months.  Significant Weight Loss: No (5% in 2 months)          Nutrition Focused Physical Exam Findings:   Subcutaneous Fat Loss  Orbital Fat Pads: Well nourshed (slightly bulging fat pads)  Buccal Fat Pads: Well nourished (full, rounded cheeks)  Triceps: Defer  Ribs: Defer    Muscle Wasting  Temporalis: Well nourished (well-defined muscle)  Pectoralis (Clavicular Region): Well nourished (clavicle not visible)  Deltoid/Trapezius: Well nourished (rounded appearance at arm, shoulder, neck)  Interosseous: Defer  Trapezius/Infraspinatus/Supraspinatus (Scapular Region): Defer  Quadriceps: Defer  Gastrocnemius: Defer              Nutrition Significant Labs:  Lab Results   Component Value Date    WBC 5.4 12/20/2023    HGB 8.6 (L) 12/20/2023    HCT 26.4 (L) 12/20/2023     (L) 12/20/2023    CHOL 81 (L) 11/15/2023    TRIG 110 11/15/2023    HDL 29.0 (L) 11/15/2023    ALT 9 12/19/2023    AST 18 12/19/2023     (L) 12/20/2023    K 3.9 12/20/2023    CL 97 12/20/2023    " CREATININE 1.00 12/20/2023    BUN 25 12/20/2023    CO2 22 (L) 12/20/2023    TSH 6.44 (H) 07/14/2023    INR 1.2 11/11/2023    HGBA1C 6.7 (H) 11/15/2023       Current Facility-Administered Medications:     acetaminophen (Tylenol) tablet 650 mg, 650 mg, oral, q4h PRN, 650 mg at 12/19/23 2242 **OR** acetaminophen (Tylenol) oral liquid 650 mg, 650 mg, oral, q4h PRN **OR** acetaminophen (Tylenol) suppository 650 mg, 650 mg, rectal, q4h PRN, Pete Oneill MD    amLODIPine (Norvasc) tablet 5 mg, 5 mg, oral, Daily, Pete Oneill MD, 5 mg at 12/20/23 0810    aspirin EC tablet 162 mg, 162 mg, oral, Daily, Pete Oneill MD, 162 mg at 12/20/23 0810    atorvastatin (Lipitor) tablet 40 mg, 40 mg, oral, Nightly, Pete Oneill MD, 40 mg at 12/19/23 2145    benzocaine-menthol (Cepastat Sore Throat) 15-3.6 mg lozenge 1 lozenge, 1 lozenge, Mouth/Throat, q2h PRN, Pete Oneill MD    clopidogrel (Plavix) tablet 75 mg, 75 mg, oral, Daily, Pete Oneill MD, 75 mg at 12/20/23 0810    cyanocobalamin (Vitamin B-12) tablet 1,000 mcg, 1,000 mcg, oral, Daily, Pete Oneill MD, 1,000 mcg at 12/20/23 0810    dextromethorphan-guaifenesin (Robitussin DM)  mg/5 mL oral liquid 5 mL, 5 mL, oral, q4h PRN, Pete Oneill MD    dextrose 10 % in water (D10W) infusion, 0.3 g/kg/hr, intravenous, Once PRN, Pete Oneill MD    dextrose 50 % injection 25 g, 25 g, intravenous, q15 min PRN, Pete Oneill MD    docusate sodium (Colace) capsule 100 mg, 100 mg, oral, BID, Pete Oneill MD, 100 mg at 12/20/23 0810    ferrous sulfate (325 mg ferrous sulfate) tablet 1 tablet, 65 mg of iron, oral, Daily, Martina Haynes APRN-CNP, 1 tablet at 12/20/23 0810    finasteride (Proscar) tablet 5 mg, 5 mg, oral, Nightly, Raul Fine [facility], 5 mg at 12/19/23 2145    furosemide (Lasix) tablet 40 mg, 40 mg, oral, Daily, Pete Oneill MD, 40 mg at 12/20/23 0810    gabapentin (Neurontin) capsule 100  mg, 100 mg, oral, BID, Pete Oneill MD, 100 mg at 12/20/23 0810    glucagon (Glucagen) injection 1 mg, 1 mg, intramuscular, q15 min PRN, Pete Oneill MD    guaiFENesin (Mucinex) 12 hr tablet 600 mg, 600 mg, oral, q12h PRN, Pete Oneill MD    heparin (porcine) injection 5,000 Units, 5,000 Units, subcutaneous, q8h TAYLOR, Pete Oneill MD, 5,000 Units at 12/20/23 1312    insulin glargine (Lantus) injection 10 Units, 10 Units, subcutaneous, Nightly, Pete Oneill MD, 10 Units at 12/19/23 2242    iron sucrose (Venofer) injection 200 mg, 200 mg, intravenous, Daily, Martina Haynes, JONA-CNP, 200 mg at 12/20/23 0810    levETIRAcetam (Keppra) tablet 250 mg, 250 mg, oral, BID, Pete Oneill MD, 250 mg at 12/20/23 0810    magnesium oxide (Mag-Ox) tablet 400 mg, 400 mg, oral, Daily, Pete Oneill MD, 400 mg at 12/20/23 0810    meropenem (Merrem) in sodium chloride 0.9 % 100 mL IV 1 g, 1 g, intravenous, q8h, Pete Oneill MD, Last Rate: 200 mL/hr at 12/20/23 1312, 1 g at 12/20/23 1312    metFORMIN (Glucophage) tablet 1,000 mg, 1,000 mg, oral, BID with meals, Pete Oneill MD, 1,000 mg at 12/20/23 0810    pantoprazole (ProtoNix) EC tablet 40 mg, 40 mg, oral, Daily before breakfast, Pete Oneill MD, 40 mg at 12/20/23 0633    polyethylene glycol (Glycolax, Miralax) packet 17 g, 17 g, oral, BID, Pete Oneill MD, 17 g at 12/20/23 0809    tamsulosin (Flomax) 24 hr capsule 0.4 mg, 0.4 mg, oral, BID, Pete Oneill MD, 0.4 mg at 12/20/23 0810      Dietary Orders (From admission, onward)       Start     Ordered    12/18/23 1710  Adult diet Regular  Diet effective now        Question:  Diet type  Answer:  Regular    12/18/23 1709                     Estimated Needs:   Estimated Energy Needs  Total Energy Estimated Needs (kCal): 1973 kCal  Total Estimated Energy Need per Day (kCal/kg): 25 kCal/kg  Method for Estimating Needs: actual wt    Estimated Protein  Needs  Total Protein Estimated Needs (g): 79 g  Total Protein Estimated Needs (g/kg): 1 g/kg  Method for Estimating Needs: actual wt    Estimated Fluid Needs  Total Fluid Estimated Needs (mL): 1973 mL  Method for Estimating Needs: 1 mL/kcal        Nutrition Diagnosis   Nutrition Diagnosis:  Malnutrition Diagnosis  Patient has Malnutrition Diagnosis: No    Nutrition Diagnosis  Patient has Nutrition Diagnosis: Yes  Diagnosis Status (1): New  Nutrition Diagnosis 1: Unintended weight loss  Related to (1): poor appetite  As Evidenced by (1): > 10# (5%) in 2 months       Nutrition Interventions/Recommendations   Nutrition Interventions and Recommendations:    Nutrition Prescription:  Individualized Nutrition Prescription Provided for : 1973 kcals, 79 gm protein via diet    Nutrition Interventions:   Food and/or Nutrient Delivery Interventions  Interventions: Meals and snacks, Medical food supplement  Meals and Snacks: General healthful diet  Goal: Provide diet as ordered  Medical Food Supplement: Commercial beverage  Goal: will provide Mighty Shake 1x/ d to provide an additional 200 kcals and 7 gm protein per serving to halt further weight loss.    Education Documentation  No documentation found.           Nutrition Monitoring and Evaluation   Monitoring/Evaluation:   Food/Nutrient Related History Monitoring  Monitoring and Evaluation Plan: Energy intake  Energy Intake: Estimated energy intake  Criteria: Pt to consume >/= 75% of estimated needs    Body Composition/Growth/Weight History  Monitoring and Evaluation Plan: Weight  Weight: Measured weight  Criteria: Pt to maintain weight         Time Spent/Follow-up:   Follow Up  Time Spent (min): 30 minutes  Last Date of Nutrition Visit: 12/20/23  Nutrition Follow-Up Needed?: 3-8 days  Follow up Comment: 12/27/23

## 2023-12-20 NOTE — CONSULTS
Reason For Consult  Urinary tract infection    History Of Present Illness  Luis Greene is a 83 y.o. male presenting with urinary tract infection with indwelling Tiwari catheter for urinary retention and urinary incontinence.  He presented with confusion and was again found to have a urinary tract infection with a resistant E. coli now on intravenous antibiotics.  He has had trials of voiding which have been unsuccessful and the plans are to perform surgical procedure to get rid of the outlet obstruction.  He has been scheduled for a UroLift procedure by Dr. Starkey.     Past Medical History  He has a past medical history of A-fib (CMS/[facility]), CHF (congestive heart failure) (CMS/[facility]), Cognitive communication deficit, Coronary artery disease, Diabetes (CMS/[facility]), Hyperlipidemia, Iron deficiency anemia, Muscle weakness (generalized), Osteoarthritis, Personal history of other diseases of the circulatory system, Personal history of other endocrine, nutritional and metabolic disease, Unspecified convulsions (CMS/[facility]), Urinary retention, and Urinary tract infection.    Surgical History  He has a past surgical history that includes Other surgical history (03/11/2019); Other surgical history (03/11/2019); Other surgical history (03/11/2019); MR angio head wo IV contrast (2/17/2019); MR angio head wo IV contrast (10/28/2022); MR angio head wo IV contrast (8/15/2023); and Cardiac catheterization (N/A, 11/10/2023).     Social History  He reports that he has never smoked. He has never used smokeless tobacco. He reports that he does not drink alcohol and does not use drugs.    Family History  Family History   Problem Relation Name Age of Onset    Other (cardiac disorder) Father      Diabetes Father      Hyperlipidemia Father      Hypertension Father      Other (liver carcinoma) Father      Other (hypercholesterolemia) Father      Hypertension Daughter          Allergies  Famotidine, Prednisone, Amoxicillin, Donepezil, Gabapentin,  "Hydrochlorothiazide, Ibuprofen, Oxycodone-acetaminophen, Tramadol hcl, and Oxycodone hcl    Review of Systems       Physical Exam  On examination he is a 83-year-old male appearing his stated age and in no acute distress.  His affect is normal his respirations unlabored his temperature is 36.6 as recorded below.  He is resting comfortably in bed.     Last Recorded Vitals  Blood pressure (!) 135/44, pulse 90, temperature 36.6 °C (97.9 °F), temperature source Temporal, resp. rate 16, height 1.803 m (5' 10.98\"), weight 78.9 kg (173 lb 15.1 oz), SpO2 97 %.    Relevant Results  A CT scan of the abdomen and pelvis with IV contrast did not show any significant  abnormality except for an enlarged prostate gland and an indwelling Tiwari catheter.    His creatinine is 1.0, his hemoglobin is 8.6 and his white count is 5400.     Assessment/Plan     #1 urinary retention due to BPH #2 urinary tract infection likely due to the indwelling Tiwari catheter #3 urinary incontinence    Plan: Dr. Edwards is concerned about the UroLift procedure leaving a foreign body in his prostate gland and the possibly that it will cause persistent urinary tract infection.  I will review this with  to see if he has any concern.  The alternative is to perform a TUR of the prostate gland when he is medically better.          Daryl Choudhary MD    "

## 2023-12-21 ENCOUNTER — APPOINTMENT (OUTPATIENT)
Dept: RADIOLOGY | Facility: HOSPITAL | Age: 83
DRG: 698 | End: 2023-12-21
Payer: MEDICARE

## 2023-12-21 LAB
ANION GAP SERPL CALC-SCNC: 12 MMOL/L
BUN SERPL-MCNC: 22 MG/DL (ref 8–25)
CALCIUM SERPL-MCNC: 8.4 MG/DL (ref 8.5–10.4)
CHLORIDE SERPL-SCNC: 97 MMOL/L (ref 97–107)
CO2 SERPL-SCNC: 22 MMOL/L (ref 24–31)
CREAT SERPL-MCNC: 0.9 MG/DL (ref 0.4–1.6)
ERYTHROCYTE [DISTWIDTH] IN BLOOD BY AUTOMATED COUNT: 13.7 % (ref 11.5–14.5)
GFR SERPL CREATININE-BSD FRML MDRD: 85 ML/MIN/1.73M*2
GLUCOSE BLD MANUAL STRIP-MCNC: 101 MG/DL (ref 74–99)
GLUCOSE BLD MANUAL STRIP-MCNC: 149 MG/DL (ref 74–99)
GLUCOSE BLD MANUAL STRIP-MCNC: 152 MG/DL (ref 74–99)
GLUCOSE BLD MANUAL STRIP-MCNC: 220 MG/DL (ref 74–99)
GLUCOSE SERPL-MCNC: 98 MG/DL (ref 65–99)
HCT VFR BLD AUTO: 27.3 % (ref 41–52)
HGB BLD-MCNC: 9.2 G/DL (ref 13.5–17.5)
MCH RBC QN AUTO: 33.6 PG (ref 26–34)
MCHC RBC AUTO-ENTMCNC: 33.7 G/DL (ref 32–36)
MCV RBC AUTO: 100 FL (ref 80–100)
NRBC BLD-RTO: 0 /100 WBCS (ref 0–0)
PLATELET # BLD AUTO: 148 X10*3/UL (ref 150–450)
POTASSIUM SERPL-SCNC: 4.2 MMOL/L (ref 3.4–5.1)
RBC # BLD AUTO: 2.74 X10*6/UL (ref 4.5–5.9)
SODIUM SERPL-SCNC: 131 MMOL/L (ref 133–145)
WBC # BLD AUTO: 4.2 X10*3/UL (ref 4.4–11.3)

## 2023-12-21 PROCEDURE — 2780000003 HC OR 278 NO HCPCS

## 2023-12-21 PROCEDURE — 82947 ASSAY GLUCOSE BLOOD QUANT: CPT

## 2023-12-21 PROCEDURE — 2060000001 HC INTERMEDIATE ICU ROOM DAILY

## 2023-12-21 PROCEDURE — 36415 COLL VENOUS BLD VENIPUNCTURE: CPT | Performed by: NURSE PRACTITIONER

## 2023-12-21 PROCEDURE — 97530 THERAPEUTIC ACTIVITIES: CPT | Mod: GO,CO

## 2023-12-21 PROCEDURE — 2500000005 HC RX 250 GENERAL PHARMACY W/O HCPCS: Performed by: INTERNAL MEDICINE

## 2023-12-21 PROCEDURE — 97530 THERAPEUTIC ACTIVITIES: CPT | Mod: GP | Performed by: PHYSICAL THERAPIST

## 2023-12-21 PROCEDURE — 2500000001 HC RX 250 WO HCPCS SELF ADMINISTERED DRUGS (ALT 637 FOR MEDICARE OP)

## 2023-12-21 PROCEDURE — 2500000004 HC RX 250 GENERAL PHARMACY W/ HCPCS (ALT 636 FOR OP/ED): Performed by: INTERNAL MEDICINE

## 2023-12-21 PROCEDURE — 2500000004 HC RX 250 GENERAL PHARMACY W/ HCPCS (ALT 636 FOR OP/ED): Performed by: NURSE PRACTITIONER

## 2023-12-21 PROCEDURE — 97535 SELF CARE MNGMENT TRAINING: CPT | Mod: GO,CO

## 2023-12-21 PROCEDURE — 97110 THERAPEUTIC EXERCISES: CPT | Mod: GP | Performed by: PHYSICAL THERAPIST

## 2023-12-21 PROCEDURE — 80048 BASIC METABOLIC PNL TOTAL CA: CPT | Performed by: NURSE PRACTITIONER

## 2023-12-21 PROCEDURE — 2500000001 HC RX 250 WO HCPCS SELF ADMINISTERED DRUGS (ALT 637 FOR MEDICARE OP): Performed by: INTERNAL MEDICINE

## 2023-12-21 PROCEDURE — 87081 CULTURE SCREEN ONLY: CPT | Mod: WESLAB | Performed by: INTERNAL MEDICINE

## 2023-12-21 PROCEDURE — 2500000002 HC RX 250 W HCPCS SELF ADMINISTERED DRUGS (ALT 637 FOR MEDICARE OP, ALT 636 FOR OP/ED): Performed by: INTERNAL MEDICINE

## 2023-12-21 PROCEDURE — 71045 X-RAY EXAM CHEST 1 VIEW: CPT

## 2023-12-21 PROCEDURE — C1751 CATH, INF, PER/CENT/MIDLINE: HCPCS

## 2023-12-21 PROCEDURE — 99232 SBSQ HOSP IP/OBS MODERATE 35: CPT | Performed by: INTERNAL MEDICINE

## 2023-12-21 PROCEDURE — 36569 INSJ PICC 5 YR+ W/O IMAGING: CPT

## 2023-12-21 PROCEDURE — 96372 THER/PROPH/DIAG INJ SC/IM: CPT | Performed by: INTERNAL MEDICINE

## 2023-12-21 PROCEDURE — 2500000001 HC RX 250 WO HCPCS SELF ADMINISTERED DRUGS (ALT 637 FOR MEDICARE OP): Performed by: NURSE PRACTITIONER

## 2023-12-21 PROCEDURE — 02H633Z INSERTION OF INFUSION DEVICE INTO RIGHT ATRIUM, PERCUTANEOUS APPROACH: ICD-10-PCS | Performed by: INTERNAL MEDICINE

## 2023-12-21 PROCEDURE — 85027 COMPLETE CBC AUTOMATED: CPT | Performed by: NURSE PRACTITIONER

## 2023-12-21 RX ORDER — LIDOCAINE HYDROCHLORIDE 10 MG/ML
5 INJECTION INFILTRATION; PERINEURAL ONCE
Status: COMPLETED | OUTPATIENT
Start: 2023-12-21 | End: 2023-12-21

## 2023-12-21 RX ADMIN — HEPARIN SODIUM 5000 UNITS: 5000 INJECTION, SOLUTION INTRAVENOUS; SUBCUTANEOUS at 22:42

## 2023-12-21 RX ADMIN — MEROPENEM 1 G: 1 INJECTION, POWDER, FOR SOLUTION INTRAVENOUS at 05:15

## 2023-12-21 RX ADMIN — DOCUSATE SODIUM 100 MG: 100 CAPSULE, LIQUID FILLED ORAL at 21:51

## 2023-12-21 RX ADMIN — ATORVASTATIN CALCIUM 40 MG: 40 TABLET, FILM COATED ORAL at 21:51

## 2023-12-21 RX ADMIN — METFORMIN HYDROCHLORIDE 1000 MG: 1000 TABLET ORAL at 18:06

## 2023-12-21 RX ADMIN — HEPARIN SODIUM 5000 UNITS: 5000 INJECTION, SOLUTION INTRAVENOUS; SUBCUTANEOUS at 06:05

## 2023-12-21 RX ADMIN — PANTOPRAZOLE SODIUM 40 MG: 40 TABLET, DELAYED RELEASE ORAL at 06:05

## 2023-12-21 RX ADMIN — IRON SUCROSE 200 MG: 20 INJECTION, SOLUTION INTRAVENOUS at 06:04

## 2023-12-21 RX ADMIN — CYANOCOBALAMIN TAB 1000 MCG 1000 MCG: 1000 TAB at 09:31

## 2023-12-21 RX ADMIN — POLYETHYLENE GLYCOL 3350 17 G: 17 POWDER, FOR SOLUTION ORAL at 09:31

## 2023-12-21 RX ADMIN — AMLODIPINE BESYLATE 5 MG: 5 TABLET ORAL at 09:31

## 2023-12-21 RX ADMIN — GABAPENTIN 100 MG: 100 CAPSULE ORAL at 09:32

## 2023-12-21 RX ADMIN — TAMSULOSIN HYDROCHLORIDE 0.4 MG: 0.4 CAPSULE ORAL at 09:31

## 2023-12-21 RX ADMIN — FERROUS SULFATE TAB 325 MG (65 MG ELEMENTAL FE) 1 TABLET: 325 (65 FE) TAB at 09:31

## 2023-12-21 RX ADMIN — ASPIRIN 162 MG: 81 TABLET, COATED ORAL at 09:31

## 2023-12-21 RX ADMIN — GABAPENTIN 100 MG: 100 CAPSULE ORAL at 21:51

## 2023-12-21 RX ADMIN — LEVETIRACETAM 250 MG: 250 TABLET, FILM COATED ORAL at 09:31

## 2023-12-21 RX ADMIN — MEROPENEM 1 G: 1 INJECTION, POWDER, FOR SOLUTION INTRAVENOUS at 13:12

## 2023-12-21 RX ADMIN — TAMSULOSIN HYDROCHLORIDE 0.4 MG: 0.4 CAPSULE ORAL at 21:51

## 2023-12-21 RX ADMIN — LEVETIRACETAM 250 MG: 250 TABLET, FILM COATED ORAL at 21:51

## 2023-12-21 RX ADMIN — DOCUSATE SODIUM 100 MG: 100 CAPSULE, LIQUID FILLED ORAL at 09:31

## 2023-12-21 RX ADMIN — FUROSEMIDE 40 MG: 40 TABLET ORAL at 09:32

## 2023-12-21 RX ADMIN — METFORMIN HYDROCHLORIDE 1000 MG: 1000 TABLET ORAL at 09:31

## 2023-12-21 RX ADMIN — CLOPIDOGREL BISULFATE 75 MG: 75 TABLET ORAL at 09:31

## 2023-12-21 RX ADMIN — MEROPENEM 1 G: 1 INJECTION, POWDER, FOR SOLUTION INTRAVENOUS at 21:52

## 2023-12-21 RX ADMIN — HEPARIN SODIUM 5000 UNITS: 5000 INJECTION, SOLUTION INTRAVENOUS; SUBCUTANEOUS at 13:12

## 2023-12-21 RX ADMIN — POLYETHYLENE GLYCOL 3350 17 G: 17 POWDER, FOR SOLUTION ORAL at 21:51

## 2023-12-21 RX ADMIN — LIDOCAINE HYDROCHLORIDE 50 MG: 10 INJECTION, SOLUTION INFILTRATION; PERINEURAL at 14:00

## 2023-12-21 RX ADMIN — Medication 400 MG: at 09:31

## 2023-12-21 RX ADMIN — FINASTERIDE 5 MG: 5 TABLET, FILM COATED ORAL at 21:51

## 2023-12-21 RX ADMIN — INSULIN GLARGINE 10 UNITS: 100 INJECTION, SOLUTION SUBCUTANEOUS at 21:52

## 2023-12-21 ASSESSMENT — COGNITIVE AND FUNCTIONAL STATUS - GENERAL
DRESSING REGULAR LOWER BODY CLOTHING: A LOT
HELP NEEDED FOR BATHING: A LITTLE
DRESSING REGULAR LOWER BODY CLOTHING: A LITTLE
DAILY ACTIVITIY SCORE: 17
DRESSING REGULAR UPPER BODY CLOTHING: A LITTLE
WALKING IN HOSPITAL ROOM: A LITTLE
MOVING TO AND FROM BED TO CHAIR: A LITTLE
DAILY ACTIVITIY SCORE: 18
DRESSING REGULAR UPPER BODY CLOTHING: A LITTLE
TOILETING: TOTAL
MOVING FROM LYING ON BACK TO SITTING ON SIDE OF FLAT BED WITH BEDRAILS: A LITTLE
STANDING UP FROM CHAIR USING ARMS: A LITTLE
CLIMB 3 TO 5 STEPS WITH RAILING: A LITTLE
PERSONAL GROOMING: A LITTLE
HELP NEEDED FOR BATHING: A LOT
WALKING IN HOSPITAL ROOM: A LITTLE
MOBILITY SCORE: 18
STANDING UP FROM CHAIR USING ARMS: A LITTLE
MOVING TO AND FROM BED TO CHAIR: A LITTLE
TURNING FROM BACK TO SIDE WHILE IN FLAT BAD: A LITTLE
CLIMB 3 TO 5 STEPS WITH RAILING: A LOT
TOILETING: A LITTLE
MOBILITY SCORE: 19

## 2023-12-21 ASSESSMENT — PAIN - FUNCTIONAL ASSESSMENT
PAIN_FUNCTIONAL_ASSESSMENT: 0-10

## 2023-12-21 ASSESSMENT — PAIN SCALES - GENERAL
PAINLEVEL_OUTOF10: 0 - NO PAIN

## 2023-12-21 ASSESSMENT — ACTIVITIES OF DAILY LIVING (ADL): HOME_MANAGEMENT_TIME_ENTRY: 10

## 2023-12-21 NOTE — CARE PLAN
Problem: Fall/Injury  Goal: Not fall by end of shift  Outcome: Progressing  Goal: Be free from injury by end of the shift  Outcome: Progressing  Goal: Verbalize understanding of personal risk factors for fall in the hospital  Outcome: Progressing  Goal: Verbalize understanding of risk factor reduction measures to prevent injury from fall in the home  Outcome: Progressing  Goal: Use assistive devices by end of the shift  Outcome: Progressing  Goal: Pace activities to prevent fatigue by end of the shift  Outcome: Progressing     Problem: Diabetes  Goal: Achieve decreasing blood glucose levels by end of shift  Outcome: Progressing  Goal: Increase stability of blood glucose readings by end of shift  Outcome: Progressing  Goal: Decrease in ketones present in urine by end of shift  Outcome: Progressing  Goal: Maintain electrolyte levels within acceptable range throughout shift  Outcome: Progressing  Goal: Maintain glucose levels >70mg/dl to <250mg/dl throughout shift  Outcome: Progressing  Goal: No changes in neurological exam by end of shift  Outcome: Progressing  Goal: Learn about and adhere to nutrition recommendations by end of shift  Outcome: Progressing  Goal: Vital signs within normal range for age by end of shift  Outcome: Progressing  Goal: Increase self care and/or family involovement by end of shift  Outcome: Progressing  Goal: Receive DSME education by end of shift  Outcome: Progressing     Problem: Pain - Adult  Goal: Verbalizes/displays adequate comfort level or baseline comfort level  Outcome: Progressing     Problem: Safety - Adult  Goal: Free from fall injury  Outcome: Progressing     Problem: Discharge Planning  Goal: Discharge to home or other facility with appropriate resources  Outcome: Progressing     Problem: Chronic Conditions and Co-morbidities  Goal: Patient's chronic conditions and co-morbidity symptoms are monitored and maintained or improved  Outcome: Progressing     Problem: Bathing  Goal:  STG - Patient will bathe lower body with close supervision and AE  Outcome: Progressing     Problem: Dressings Lower Extremities  Goal: LTG - Patient will dress lower body independent with AE  Outcome: Progressing   The patient's goals for the shift include      The clinical goals for the shift include monitor urinary output, vs.

## 2023-12-21 NOTE — PROGRESS NOTES
Subjective Data:      Overnight Events:         Objective Data:  Last Recorded Vitals:  Vitals:    12/20/23 1900 12/20/23 2300 12/21/23 0313 12/21/23 0700   BP: 138/51 (!) 114/48 (!) 99/46 (!) 137/44   BP Location: Left arm Left arm Left arm Right arm   Patient Position: Lying Lying Lying Lying   Pulse: 74 73 69 64   Resp: 22 21 19 19   Temp: 36.2 °C (97.2 °F) 36.6 °C (97.9 °F) 36 °C (96.8 °F) 36.3 °C (97.3 °F)   TempSrc: Temporal Temporal Temporal Temporal   SpO2: 96% 99% 98% 96%   Weight:   79.5 kg (175 lb 4.3 oz)    Height:           Last Labs:  CBC - 12/21/2023:  6:19 AM  4.2 9.2 148    27.3      CMP - 12/21/2023:  6:19 AM  8.4 5.5 18 --- 0.7   _ 3.0 9 60      PTT - 11/10/2023:  4:59 PM  1.2   12.3 22.2     HGBA1C   Date/Time Value Ref Range Status   11/15/2023 11:08 AM 6.7 See below % Final   09/14/2023 12:14 PM 6.8 % Final     Comment:          Diagnosis of Diabetes-Adults   Non-Diabetic: < or = 5.6%   Increased risk for developing diabetes: 5.7-6.4%   Diagnostic of diabetes: > or = 6.5%  .       Monitoring of Diabetes                Age (y)     Therapeutic Goal (%)   Adults:          >18           <7.0   Pediatrics:    13-18           <7.5                   7-12           <8.0                   0- 6            7.5-8.5   American Diabetes Association. Diabetes Care 33(S1), Jan 2010.   08/14/2023 02:45 PM 8.1 4.0 - 6.0 % Final     Comment:     Hemoglobin A1C levels are related to mean blood glucose during the   preceding 2-3 months. The relationship table below may be used as a   general guide. Each 1% increase in HGB A1C is a reflection of an   increase in mean glucose of approximately 30 mg/dl.   Reference: Diabetes Care, volume 29, supplement 1 Jan. 2006                        HGB A1C ................. Approx. Mean Glucose   _______________________________________________   6%   ...............................  120 mg/dl   7%   ...............................  150 mg/dl   8%   ...............................  " 180 mg/dl   9%   ...............................  210 mg/dl   10%  ...............................  240 mg/dl  Performed at [address] [address]     04/19/2023 04:03 PM 9.7 4.0 - 5.6 % Final   02/03/2023 09:12 AM 9.6 4.0 - 5.6 % Final     LDLCALC   Date/Time Value Ref Range Status   11/15/2023 11:08 AM 30 65 - 130 mg/dL Final   08/15/2023 05:50 AM 31 65 - 130 MG/DL Final   10/28/2022 05:39 AM 32 65 - 130 MG/DL Final   07/06/2021 09:50 AM 39 65 - 130 MG/DL Final     VLDL   Date/Time Value Ref Range Status   08/22/2022 10:17 AM 11 0 - 40 mg/dL Final   09/17/2019 01:20 PM 16 0 - 40 mg/dL Final      Last I/O:  I/O last 3 completed shifts:  In: 410 (5.2 mL/kg) [IV Piggyback:410]  Out: 2150 (27 mL/kg) [Urine:2150 (0.8 mL/kg/hr)]  Weight: 79.5 kg     Past Cardiology Tests (Last 3 Years):  EKG:  Electrocardiogram, 12-lead PRN ACS symptoms       ECG 12 lead 11/26/2023      ECG 12 Lead       ECG 12 lead daily 11/17/2023      ECG 12 lead daily 11/15/2023      ECG 12 lead daily 11/15/2023      ECG 12 lead 11/15/2023    Echo:  Transthoracic Echo (TTE) Limited 11/10/2023      Transthoracic Echo (TTE) Limited 11/10/2023    Ejection Fractions:  No results found for: \"EF\"  Cath:  No results found for this or any previous visit from the past 1095 days.    Stress Test:  Nuclear Stress Test 01/24/2022    Cardiac Imaging:  No results found for this or any previous visit from the past 1095 days.      Inpatient Medications:  Scheduled medications   Medication Dose Route Frequency    amLODIPine  5 mg oral Daily    aspirin  162 mg oral Daily    atorvastatin  40 mg oral Nightly    clopidogrel  75 mg oral Daily    cyanocobalamin  1,000 mcg oral Daily    docusate sodium  100 mg oral BID    ferrous sulfate (325 mg ferrous sulfate)  65 mg of iron oral Daily    finasteride  5 mg oral Nightly    furosemide  40 mg oral Daily    gabapentin  100 mg oral BID    heparin (porcine)  5,000 Units subcutaneous q8h [facility]    insulin " glargine  10 Units subcutaneous Nightly    iron sucrose  200 mg intravenous Daily    levETIRAcetam  250 mg oral BID    magnesium oxide  400 mg oral Daily    meropenem  1 g intravenous q8h    metFORMIN  1,000 mg oral BID with meals    pantoprazole  40 mg oral Daily before breakfast    polyethylene glycol  17 g oral BID    tamsulosin  0.4 mg oral BID     PRN medications   Medication    acetaminophen    Or    acetaminophen    Or    acetaminophen    benzocaine-menthol    dextromethorphan-guaifenesin    dextrose 10 % in water (D10W)    dextrose    glucagon    guaiFENesin     Continuous Medications   Medication Dose Last Rate       Physical Exam:  General: alert, oriented x2-3, very pleasant; son at bedside  HEENT: normal cephalic, atraumatic  Neck: No JVD, bruit or thrill, masses or tenderness   Heart: S1/S2, Rate 50, Rhythm irregular, no s3 or s4, no murmur, thrill, or heaves at PMI.   Lungs: Clear, equal expansion and excursion, no wheezes, crackles, rhales or rhonci.  Room air.  No conversational dyspnea appreciated.  No tachypnea.  No pain with deep aspiration   Abdomen: Overweight, bowel sounds x 4, soft, non-tender   Genitourinary: deferred   Extremities: No significant upper or lower extremity daylin appreciated.     Assessment/Plan   12/19: This elderly white male with an extensive past medical history including coronary artery disease, remote CABG, hypertension, hyperlipidemia, type 2 diabetes, paroxysmal atrial fibrillation, history of?  TIA versus recurrent localized focal seizures, history of GI bleeding on Eliquis anticoagulation, status post Watchman implantation is currently admitted with low-grade fever and evidence of recurrent urinary tract infection urosepsis from recent change in his Tiwari catheter after trial of void.  Clinically is improved rather quickly with the institution of IV antibiotics.  The elevated high-sensitivity troponins are presumably not diagnostic of acute coronary syndrome more  likely due to minor degree of demand ischemia from the urosepsis.  The patient has been restarted on IV meropenem.  The patient has been continued on the aspirin and Plavix as per the watchman protocol.  His atrial fibrillation is controlled ventricular rates in the absence of beta-blockade.  Amlodipine restarted for treatment of hypertension and will hold ACE inhibitor for now given urosepsis and potential for fluctuating renal parameters.      12/20: The patient is sitting in bedside chair feeling relatively well somewhat discouraged about needing to be readmitted to the hospital but otherwise in reasonable spirits.  He did has the chronic atrial fibrillation with a controlled rate in the 70s per minute in the absence of any AV charity blocking agents.  His blood pressure is acceptable in the 120 mmHg range on the amlodipine 5 mg daily.  Renal parameters and improved/stable creatinine 1.0.  CBC generally unremarkable hematocrit 26.4 hemoglobin 8.6.  Patient has been seen by infectious disease.    12/21: Patient sitting in bedside chair resting comfortably.  He appears awake alert conversant and better rested.  He was ambulating earlier in the morning with physical therapy without difficulty.  His renal parameters remain stable creatinine 0.90.  No leukocytosis WBC 4200.  Hematocrit unchanged at 27.3.  Blood pressure readings acceptable on amlodipine 5 mg daily.  Patient appears close to ready for discharge home having been at [facility] previously for rehab.  He is scheduled to see urology in approximately 2 weeks to discuss plans with respect to his prostate and catheter.  Peripheral IV 12/19/23 20 G Right;Upper;Anterior Arm (Active)   Site Assessment Clean;Dry;Intact 12/21/23 0800   Dressing Status Clean;Dry 12/21/23 0800   Number of days: 2       Urethral Catheter (Active)   Output (mL) 550 mL 12/21/23 0900   Number of days: 9       Code Status:  Full Code    I spent 20 minutes in the professional and overall  care of this patient.        Luis Grant MD

## 2023-12-21 NOTE — PROGRESS NOTES
INFECTIOUS DISEASES PROGRESS NOTE    Consulted / following patient for:  Recurrent bacteremic E. coli urinary tract infection    Subjective   Interval History:   Denies abdominal pain, back pain, urethral discomfort, hematuria, nausea, vomiting, fever, chills       Objective   PHYSICAL EXAMINATION  Vital signs:  Visit Vitals  /52 (BP Location: Left arm, Patient Position: Sitting)   Pulse 85   Temp 35.7 °C (96.3 °F) (Temporal)   Resp 19      General: Resting comfortably in bed, no acute distress  Lungs:  Clear to auscultation  Heart:  S1, S2 normal  Abdomen:  Soft, nontender.  Back:  No spinal or CVA tenderness  Genitalia: Tiwari catheter draining clear urine      Antibiotics:  Meropenem day 3      Relevant Results  WBC: 4200     Results from last 72 hours   Lab Units 12/21/23  0619   CREATININE mg/dL 0.90   ANION GAP mmol/L 12   EGFR mL/min/1.73m*2 85     Results from last 72 hours   Lab Units 12/19/23  0641   AST U/L 18   ALT U/L 9   ALK PHOS U/L 60   BILIRUBIN TOTAL mg/dL 0.7   UA: Pyuria  Microbiology:  Blood (12/18): 1 of 2 ESBL E. coli, resistant to oral therapy  Urine: Greater than 100,000 colonies per mL ESBL E. coli, resistant to oral therapy  Imaging:  CXR: No significant infiltrate  CT abdomen and pelvis: No urinary tract pathology or significant acute changes.  BPH noted        ASSESSMENT:  GNR septicemia  Patient is admitted once again with what appears to be an episode of catheter-associated ESBL E. coli urinary tract infection with septicemia.  Once again, I cannot obtain a history that suggests that there was trauma or mechanical disruption involving the catheter.  On the last admission there was no evidence for upper tract obstruction.  He seems to be, once again, responding well to systemic antibiotic therapy.  History and examination do not suggest an alternative source for GNR septicemia.    CT scan does not show any obstruction or upper tract disease.  I can only speculate that his relapse  may be due to a residual focus of infection in the enlarged prostate which did not respond to standard course of intravenous antibiotic therapy.  Will therefore make arrangements for placement of a PICC line and completion of 4 weeks of intravenous ertapenem.  Will defer to Dr. Starkey and Funmi regarding the ultimate choice of a surgical procedure, but I remain concerned about the possibility of an implanted foreign body        PLANS:  -   Tomorrow, will change from meropenem to ertapenem to facilitate once-daily outpatient treatment through 1/17/2024  -   Ordered placement of PICC line       Raul Edwards MD  [facility] [employer]  Office:  [phone]

## 2023-12-21 NOTE — CARE PLAN
The patient's goals for the shift include      The clinical goals for the shift include safety, no falls    Over the shift, the patient did not make progress toward the following goals. Barriers to progression include weakness BLE. Recommendations to address these barriers include frequent pt checks, bed alarm use.

## 2023-12-21 NOTE — PROGRESS NOTES
Physical Therapy    Physical Therapy Treatment    Patient Name: Luis Greene  MRN: [id number]  Today's Date: 12/21/2023  Time Calculation  Start Time: 0947  Stop Time: 1010  Time Calculation (min): 23 min       Assessment/Plan   PT Assessment  Assessment Comment: Pt made adequate progress toward their functional mobility goals. Pt required close S during functional mobility. Pt would benefit from continued skilled PT services prior to and after discharge to increase independence and safety with functional mobility within their home environment.  End of Session Patient Position: Up in chair, Alarm off, not on at start of session (call light and needs within reach; spouse in room)  PT Plan  Inpatient/Swing Bed or Outpatient: Inpatient  PT Plan  Treatment/Interventions: Bed mobility, Transfer training, Gait training, Strengthening, Endurance training, Therapeutic exercise, Therapeutic activity, Home exercise program  PT Plan: Skilled PT  PT Frequency: 4 times per week  PT Discharge Recommendations: Low intensity level of continued care  PT Recommended Transfer Status: Assist x1  PT - OK to Discharge: Yes      General Visit Information:   PT  Visit  PT Received On: 12/21/23  General  Family/Caregiver Present: Yes (spouse)  Prior to Session Communication: Bedside nurse (RN cleared pt for participation.)  Patient Position Received: Up in chair  General Comment: Pt agreed to tx and participated fully.    Subjective   Precautions:  Precautions  Medical Precautions: Fall precautions    Objective   Pain:  Pain Assessment  Pain Assessment: 0-10  Pain Score: 0 - No pain     Treatments:  Therapeutic Exercise  Therapeutic Exercise Performed: Yes (seated EOB, BLE x20 each. Cued as needed for proper form to obtain maximal benefits.)  Therapeutic Exercise Activity 1: knee ext  Therapeutic Exercise Activity 2: hip abd  Therapeutic Exercise Activity 3: hip flex    Ambulation/Gait Training  Ambulation/Gait Training Performed:  Yes  Ambulation/Gait Training 1  Surface 1: Level tile  Device 1: Rolling walker (Rllator walker, bilateral AFOs)  Assistance 1: Close supervision  Comments/Distance (ft) 1: 300 ft using step through pattern with slowed velocity and continuous movement. Steady gait.    Transfers  Transfer: Yes  Transfer 1  Technique 1: Sit to stand  Transfer Device 1: Walker (armrests)  Transfer Level of Assistance 1: Close supervision  Trials/Comments 1: x1 trial  Transfers 2  Technique 2: Stand to sit  Transfer Device 2: Walker (armrests)  Transfer Level of Assistance 2: Close supervision    Outcome Measures:  [facility] Basic Mobility  Turning from your back to your side while in a flat bed without using bedrails: A little  Moving from lying on your back to sitting on the side of a flat bed without using bedrails: A little  Moving to and from bed to chair (including a wheelchair): A little  Standing up from a chair using your arms (e.g. wheelchair or bedside chair): A little  To walk in hospital room: A little  Climbing 3-5 steps with railing: A little  Basic Mobility - Total Score: 18      OP EDUCATION:       Encounter Problems       Encounter Problems (Active)       Mobility       STG - Patient will ambulate >/=100 ft with FWW at mod I level. (Progressing)       Start:  12/19/23    Expected End:  01/03/24            STG - Patient will ascend and descend 3 stairs with unilateral rail and close S. (Not Progressing)       Start:  12/19/23    Expected End:  01/03/24               Mobility       perform functional mobility to and from the bathroom independent with 2ww (Progressing)       Start:  12/19/23    Expected End:  01/19/24               Pain - Adult          Transfers       STG - Patient to transfer supine<>sit at mod I level. (Progressing)       Start:  12/19/23    Expected End:  01/03/24            STG - Patient will transfer sit<>stand with FWW and mod I. (Progressing)       Start:  12/19/23    Expected End:  01/03/24                Transfers       LTG - Patient perform all functional transfers independent with 2ww (Progressing)       Start:  12/19/23    Expected End:  01/19/24

## 2023-12-21 NOTE — PROGRESS NOTES
Occupational Therapy    OT Treatment    Patient Name: Luis Greene  MRN: [id number]  Today's Date: 12/21/2023  Time Calculation  Start Time: 1307  Stop Time: 1330  Time Calculation (min): 23 min       Assessment:  OT Assessment: Pt tolerated session well and progressing towards POC. Pt would benefit from continued skilled OT services to improve strength, balance, and functional tolerance to increase independence with ADL tasks for home going.  End of Session Communication: Bedside nurse  End of Session Patient Position: Up in chair, Alarm off, not on at start of session     Plan:  Treatment Interventions: ADL retraining, Functional transfer training, UE strengthening/ROM, Patient/family training  OT Frequency: 4 times per week  OT Discharge Recommendations: Low intensity level of continued care  OT Recommended Transfer Status: Assist of 1, Minimal assist  OT - OK to Discharge: Yes (when medically appropriate)  Treatment Interventions: ADL retraining, Functional transfer training, UE strengthening/ROM, Patient/family training    Subjective   Previous Visit Info:  OT Last Visit  OT Received On: 12/21/23  General:  General  Reason for Referral: impaired functional mobility. This 83 year old male presented to the ED from home with c/o fever and confusion. He was admitted for UTI associated with indwelling urinary catheter.  Referred By: Pete Oneill MD  Past Medical History Relevant to Rehab: UTI, indwelling catheter, Afib, HTN  Prior to Session Communication: Bedside nurse  Patient Position Received: Up in chair  General Comment: Pt cleared for therapy per RN. Pt seated in chair upon arrival and agreeable/motivated for tx session.  Precautions:  Medical Precautions: Fall precautions    Pain:  Pain Assessment  Pain Assessment: 0-10  Pain Score: 0 - No pain    Objective    Cognition:  Cognition  Overall Cognitive Status: Within Functional Limits  Orientation Level: Oriented X4     Activities of Daily Living:       Toileting  Toileting Level of Assistance: Visual aid cues, Tactile cues, Minimal verbal cues  Where Assessed: Toilet  Toileting Comments: pt educated on proper guidry management for homegoing with pt demonstrating decreased pinch in SILVER thumbs, creating difficulty to push guidry emptying switch. Pt requiring min A with VC/TC for task initiation and problem solving.  Functional Standing Tolerance:  Time: 8 min  Functional Standing Tolerance Comments: tolerated standing ~8 mins during guidry management and functional mobility tasks  Bed Mobility/Transfers:      Transfers  Transfer: Yes  Transfer 1  Transfer From 1: Sit to  Transfer to 1: Stand  Technique 1: Sit to stand  Transfer Device 1: Walker  Transfer Level of Assistance 1: Close supervision  Trials/Comments 1: sit>stand from chair>FWW with VC to scoot hips forward with pt demonstrating slight LOB initially, able to self correct.  Transfers 2  Transfer From 2: Sit to  Transfer to 2: Stand  Technique 2: Sit to stand, Stand to sit  Transfer Level of Assistance 2: Distant supervision  Trials/Comments 2: sit>stand from chair x15 with distant S    Toilet Transfers  Toilet Transfer From: Walker  Toilet Transfer Type: To and from  Toilet Transfer to: Standard toilet  Toilet Transfer Technique: Lateral  Toilet Transfers: Supervision  Toilet Transfers Comments: S sit<>stand from walker<>toilet with VC for use of grab bar for UE support       Standing Balance:  Dynamic Standing Balance  Dynamic Standing-Balance Support: Left upper extremity supported  Dynamic Standing-Balance: Forward lean, Reaching across midline, Reaching for objects  Dynamic Standing-Comments: demonstrating fair standing balance at toilet during guidry management task       Therapy/Activity: Therapeutic Exercise  Therapeutic Exercise Performed: Yes  Therapeutic Exercise Activity 1: sit>stand x15 from chair to increase safety and BUE strength to incerase independence with ADL tasks and  transfers.    Therapeutic Activity  Therapeutic Activity Performed: Yes  Therapeutic Activity 1: Pt participated in functional mobility a short household distance at [facility] with S      Outcome Measures:[facility] Daily Activity  Putting on and taking off regular lower body clothing: A little  Bathing (including washing, rinsing, drying): A little  Putting on and taking off regular upper body clothing: A little  Toileting, which includes using toilet, bedpan or urinal: Total  Taking care of personal grooming such as brushing teeth: A little  Eating Meals: None  Daily Activity - Total Score: 17      Education Documentation  ADL Training, taught by JF Ta at 12/21/2023  1:52 PM.  Learner: Patient  Readiness: Acceptance  Method: Explanation, Demonstration  Response: Verbalizes Understanding, Demonstrated Understanding  Comment: Pt educated on proper transfer technique to increase safety awareness, with pt demonstrating understanding and good carryover throughout session.    Education Comments  No comments found.           Problem: Bathing  Goal: STG - Patient will bathe lower body with close supervision and AE  Outcome: Progressing     Problem: Dressings Lower Extremities  Goal: LTG - Patient will dress lower body independent with AE  Outcome: Progressing     Problem: Mobility  Goal: perform functional mobility to and from the bathroom independent with 2ww  Outcome: Progressing     Problem: Transfers  Goal: LTG - Patient perform all functional transfers independent with 2ww  Outcome: Progressing     Problem: Therapeutic Activity  Goal: Patient will perform BUE exercise with close supervision  Outcome: Progressing

## 2023-12-22 LAB
ANION GAP SERPL CALC-SCNC: 14 MMOL/L
ATRIAL RATE: 100 BPM
BACTERIA BLD CULT: NORMAL
BUN SERPL-MCNC: 21 MG/DL (ref 8–25)
CALCIUM SERPL-MCNC: 8.9 MG/DL (ref 8.5–10.4)
CHLORIDE SERPL-SCNC: 97 MMOL/L (ref 97–107)
CO2 SERPL-SCNC: 22 MMOL/L (ref 24–31)
CREAT SERPL-MCNC: 1 MG/DL (ref 0.4–1.6)
ERYTHROCYTE [DISTWIDTH] IN BLOOD BY AUTOMATED COUNT: 13.6 % (ref 11.5–14.5)
GFR SERPL CREATININE-BSD FRML MDRD: 75 ML/MIN/1.73M*2
GLUCOSE BLD MANUAL STRIP-MCNC: 109 MG/DL (ref 74–99)
GLUCOSE BLD MANUAL STRIP-MCNC: 150 MG/DL (ref 74–99)
GLUCOSE BLD MANUAL STRIP-MCNC: 172 MG/DL (ref 74–99)
GLUCOSE BLD MANUAL STRIP-MCNC: 176 MG/DL (ref 74–99)
GLUCOSE BLD MANUAL STRIP-MCNC: 99 MG/DL (ref 74–99)
GLUCOSE SERPL-MCNC: 108 MG/DL (ref 65–99)
HCT VFR BLD AUTO: 27 % (ref 41–52)
HGB BLD-MCNC: 9.3 G/DL (ref 13.5–17.5)
MCH RBC QN AUTO: 33.5 PG (ref 26–34)
MCHC RBC AUTO-ENTMCNC: 34.4 G/DL (ref 32–36)
MCV RBC AUTO: 97 FL (ref 80–100)
NRBC BLD-RTO: 0 /100 WBCS (ref 0–0)
PLATELET # BLD AUTO: 202 X10*3/UL (ref 150–450)
POTASSIUM SERPL-SCNC: 3.8 MMOL/L (ref 3.4–5.1)
Q ONSET: 224 MS
QRS COUNT: 17 BEATS
QRS DURATION: 140 MS
QT INTERVAL: 362 MS
QTC CALCULATION(BAZETT): 476 MS
QTC FREDERICIA: 435 MS
R AXIS: 75 DEGREES
RBC # BLD AUTO: 2.78 X10*6/UL (ref 4.5–5.9)
SODIUM SERPL-SCNC: 133 MMOL/L (ref 133–145)
T AXIS: 24 DEGREES
T OFFSET: 405 MS
VENTRICULAR RATE: 104 BPM
WBC # BLD AUTO: 5.9 X10*3/UL (ref 4.4–11.3)

## 2023-12-22 PROCEDURE — 2500000004 HC RX 250 GENERAL PHARMACY W/ HCPCS (ALT 636 FOR OP/ED): Performed by: INTERNAL MEDICINE

## 2023-12-22 PROCEDURE — 2060000001 HC INTERMEDIATE ICU ROOM DAILY

## 2023-12-22 PROCEDURE — 2500000001 HC RX 250 WO HCPCS SELF ADMINISTERED DRUGS (ALT 637 FOR MEDICARE OP): Performed by: INTERNAL MEDICINE

## 2023-12-22 PROCEDURE — 99232 SBSQ HOSP IP/OBS MODERATE 35: CPT | Performed by: INTERNAL MEDICINE

## 2023-12-22 PROCEDURE — 97530 THERAPEUTIC ACTIVITIES: CPT | Mod: GP | Performed by: PHYSICAL THERAPIST

## 2023-12-22 PROCEDURE — 97110 THERAPEUTIC EXERCISES: CPT | Mod: GO,CO

## 2023-12-22 PROCEDURE — 96372 THER/PROPH/DIAG INJ SC/IM: CPT | Performed by: INTERNAL MEDICINE

## 2023-12-22 PROCEDURE — 2500000004 HC RX 250 GENERAL PHARMACY W/ HCPCS (ALT 636 FOR OP/ED): Performed by: NURSE PRACTITIONER

## 2023-12-22 PROCEDURE — 97110 THERAPEUTIC EXERCISES: CPT | Mod: GP | Performed by: PHYSICAL THERAPIST

## 2023-12-22 PROCEDURE — 97535 SELF CARE MNGMENT TRAINING: CPT | Mod: GO,CO

## 2023-12-22 PROCEDURE — 2500000001 HC RX 250 WO HCPCS SELF ADMINISTERED DRUGS (ALT 637 FOR MEDICARE OP): Performed by: NURSE PRACTITIONER

## 2023-12-22 PROCEDURE — 85027 COMPLETE CBC AUTOMATED: CPT | Performed by: NURSE PRACTITIONER

## 2023-12-22 PROCEDURE — 2500000001 HC RX 250 WO HCPCS SELF ADMINISTERED DRUGS (ALT 637 FOR MEDICARE OP)

## 2023-12-22 PROCEDURE — 80048 BASIC METABOLIC PNL TOTAL CA: CPT | Performed by: NURSE PRACTITIONER

## 2023-12-22 PROCEDURE — 82947 ASSAY GLUCOSE BLOOD QUANT: CPT

## 2023-12-22 PROCEDURE — 2500000002 HC RX 250 W HCPCS SELF ADMINISTERED DRUGS (ALT 637 FOR MEDICARE OP, ALT 636 FOR OP/ED): Performed by: INTERNAL MEDICINE

## 2023-12-22 RX ADMIN — ERTAPENEM SODIUM 1 G: 1 INJECTION, POWDER, LYOPHILIZED, FOR SOLUTION INTRAMUSCULAR; INTRAVENOUS at 09:08

## 2023-12-22 RX ADMIN — FERROUS SULFATE TAB 325 MG (65 MG ELEMENTAL FE) 1 TABLET: 325 (65 FE) TAB at 09:07

## 2023-12-22 RX ADMIN — PANTOPRAZOLE SODIUM 40 MG: 40 TABLET, DELAYED RELEASE ORAL at 06:16

## 2023-12-22 RX ADMIN — METFORMIN HYDROCHLORIDE 1000 MG: 1000 TABLET ORAL at 16:37

## 2023-12-22 RX ADMIN — INSULIN GLARGINE 10 UNITS: 100 INJECTION, SOLUTION SUBCUTANEOUS at 22:26

## 2023-12-22 RX ADMIN — GABAPENTIN 100 MG: 100 CAPSULE ORAL at 09:06

## 2023-12-22 RX ADMIN — FINASTERIDE 5 MG: 5 TABLET, FILM COATED ORAL at 22:25

## 2023-12-22 RX ADMIN — CYANOCOBALAMIN TAB 1000 MCG 1000 MCG: 1000 TAB at 09:06

## 2023-12-22 RX ADMIN — CLOPIDOGREL BISULFATE 75 MG: 75 TABLET ORAL at 09:07

## 2023-12-22 RX ADMIN — ATORVASTATIN CALCIUM 40 MG: 40 TABLET, FILM COATED ORAL at 22:25

## 2023-12-22 RX ADMIN — Medication 400 MG: at 09:08

## 2023-12-22 RX ADMIN — HEPARIN SODIUM 5000 UNITS: 5000 INJECTION, SOLUTION INTRAVENOUS; SUBCUTANEOUS at 16:36

## 2023-12-22 RX ADMIN — FUROSEMIDE 40 MG: 40 TABLET ORAL at 09:07

## 2023-12-22 RX ADMIN — ASPIRIN 162 MG: 81 TABLET, COATED ORAL at 09:04

## 2023-12-22 RX ADMIN — TAMSULOSIN HYDROCHLORIDE 0.4 MG: 0.4 CAPSULE ORAL at 09:08

## 2023-12-22 RX ADMIN — LEVETIRACETAM 250 MG: 250 TABLET, FILM COATED ORAL at 22:25

## 2023-12-22 RX ADMIN — LEVETIRACETAM 250 MG: 250 TABLET, FILM COATED ORAL at 09:04

## 2023-12-22 RX ADMIN — HEPARIN SODIUM 5000 UNITS: 5000 INJECTION, SOLUTION INTRAVENOUS; SUBCUTANEOUS at 22:26

## 2023-12-22 RX ADMIN — METFORMIN HYDROCHLORIDE 1000 MG: 1000 TABLET ORAL at 09:04

## 2023-12-22 RX ADMIN — TAMSULOSIN HYDROCHLORIDE 0.4 MG: 0.4 CAPSULE ORAL at 22:25

## 2023-12-22 RX ADMIN — AMLODIPINE BESYLATE 5 MG: 5 TABLET ORAL at 09:06

## 2023-12-22 RX ADMIN — IRON SUCROSE 200 MG: 20 INJECTION, SOLUTION INTRAVENOUS at 06:16

## 2023-12-22 RX ADMIN — HEPARIN SODIUM 5000 UNITS: 5000 INJECTION, SOLUTION INTRAVENOUS; SUBCUTANEOUS at 06:16

## 2023-12-22 RX ADMIN — GABAPENTIN 100 MG: 100 CAPSULE ORAL at 22:25

## 2023-12-22 ASSESSMENT — COGNITIVE AND FUNCTIONAL STATUS - GENERAL
MOVING FROM LYING ON BACK TO SITTING ON SIDE OF FLAT BED WITH BEDRAILS: A LITTLE
DAILY ACTIVITIY SCORE: 17
WALKING IN HOSPITAL ROOM: A LITTLE
MOVING TO AND FROM BED TO CHAIR: A LITTLE
MOBILITY SCORE: 18
TOILETING: TOTAL
PERSONAL GROOMING: A LITTLE
STANDING UP FROM CHAIR USING ARMS: A LITTLE
DRESSING REGULAR LOWER BODY CLOTHING: A LITTLE
TURNING FROM BACK TO SIDE WHILE IN FLAT BAD: A LITTLE
DRESSING REGULAR UPPER BODY CLOTHING: A LITTLE
CLIMB 3 TO 5 STEPS WITH RAILING: A LITTLE
HELP NEEDED FOR BATHING: A LITTLE

## 2023-12-22 ASSESSMENT — ACTIVITIES OF DAILY LIVING (ADL): HOME_MANAGEMENT_TIME_ENTRY: 10

## 2023-12-22 ASSESSMENT — PAIN SCALES - GENERAL
PAINLEVEL_OUTOF10: 0 - NO PAIN
PAINLEVEL_OUTOF10: 0 - NO PAIN

## 2023-12-22 ASSESSMENT — PAIN - FUNCTIONAL ASSESSMENT
PAIN_FUNCTIONAL_ASSESSMENT: 0-10
PAIN_FUNCTIONAL_ASSESSMENT: 0-10

## 2023-12-22 NOTE — NURSING NOTE
Pt has a single lumen picc line to R upper arm, drsg dry and intact (dated 12/21) without any redness, swelling or drainage, lumen has brisk blood return and flushes easily with NS, curos cap applied.

## 2023-12-22 NOTE — PROGRESS NOTES
Occupational Therapy    OT Treatment    Patient Name: Luis Greene  MRN: [id number]  Today's Date: 12/22/2023  Time Calculation  Start Time: 1120  Stop Time: 1145  Time Calculation (min): 25 min         Assessment:  OT Assessment: Pt tolerated session well and progressing towards POC. Pt would benefit from continued skilled OT services to improve strength and functional tolerance to increase independence with ADL/IADL tasks for home going.  End of Session Communication: Bedside nurse  End of Session Patient Position: Up in chair, Alarm off, not on at start of session  OT Assessment Results: Decreased upper extremity strength, Decreased endurance, Decreased functional mobility  Strengths: Ability to acquire knowledge, Attitude of self, Insight into problems, Capable of completing ADLs semi/independent  Plan:  Treatment Interventions: ADL retraining, Functional transfer training, UE strengthening/ROM, Patient/family training  OT Frequency: 4 times per week  OT Discharge Recommendations: Low intensity level of continued care  OT Recommended Transfer Status: Assist of 1, Minimal assist  OT - OK to Discharge: Yes (when medically appropriate)  Treatment Interventions: ADL retraining, Functional transfer training, UE strengthening/ROM, Patient/family training    Subjective   Previous Visit Info:  OT Last Visit  OT Received On: 12/22/23  General:  General  Reason for Referral: impaired functional mobility. This 83 year old male presented to the ED from home with c/o fever and confusion. He was admitted for UTI associated with indwelling urinary catheter.  Referred By: Pete Oneill MD  Past Medical History Relevant to Rehab: UTI, indwelling catheter, Afib, HTN  Prior to Session Communication: Bedside nurse  Patient Position Received: Up in chair (alarm off; HOB elevated  )  General Comment: Pt cleared for therapy per RN. Pt seated in chair upon arrival and agreeable/motivated for tx session.  Precautions:  Medical  Precautions: Fall precautions  Prosthesis/Orthosis Used: Ankle/Foot orthosis    Pain:  Pain Assessment  Pain Assessment: 0-10  Pain Score: 0 - No pain    Objective    Cognition:  Cognition  Overall Cognitive Status: Within Functional Limits  Orientation Level: Oriented X4       Activities of Daily Living: Grooming  Grooming Level of Assistance: Close supervision  Grooming Where Assessed: Standing sinkside  Grooming Comments: hand hygeine performed standing sinkside with pt demonstrating slight LOB, able to self correct with BUE support on sink    Toileting  Toileting Level of Assistance: Visual aid cues, Tactile cues, Minimal verbal cues  Where Assessed: Toilet  Toileting Comments: guidry management reviewed from prior date with pt demonstrating increased pinch in SILVER thumbs, able to pinch/push guidry emptying switch x2 with increased time.  Functional Standing Tolerance:  Time: 6 min  Functional Standing Tolerance Comments: tolerated standing ~6 min during guidry management and hand hygiene tasks in bathroom  Bed Mobility/Transfers:      Transfers  Transfer: Yes  Transfer 1  Transfer From 1: Sit to  Transfer to 1: Stand  Technique 1: Sit to stand  Transfer Device 1: Walker  Transfer Level of Assistance 1: Close supervision  Trials/Comments 1: sit>stand from chair>FWW with VC to scoot hips forward before standing  Transfers 2  Transfer From 2: Stand to  Transfer to 2: Sit  Technique 2: Sit to stand, Stand to sit  Transfer Level of Assistance 2: Distant supervision  Trials/Comments 2: stand>sit from FWW>chair with VC for proper hand placement and eccentric control    Toilet Transfers  Toilet Transfer From: Walker  Toilet Transfer Type: To and from  Toilet Transfer to: Standard toilet  Toilet Transfer Technique: Lateral  Toilet Transfers: Supervision  Toilet Transfers Comments: S sit<>stand from walker<>toilet with VC for use of grab bar for UE support    Standing Balance:  Dynamic Standing Balance  Dynamic  Standing-Balance Support: Left upper extremity supported  Dynamic Standing-Balance: Forward lean, Reaching across midline, Reaching for objects  Dynamic Standing-Comments: demonstrating fair standing balance during guidry management task with use of grab bar for temporary LUE support. Pt demonstrating slight LOB at sink, able to self-correct with UE support on sink    Therapy/Activity: Therapeutic Exercise  Therapeutic Exercise Performed: Yes  Therapeutic Exercise Activity 1: educated on hand +  strengthening exercises with use of theraputty to improve strength to increase independence with guidry management for home going. (handout + theraputty provided)    Therapeutic Activity  Therapeutic Activity Performed: Yes  Therapeutic Activity 1: Pt participated in functional mobility a short household distance at [facility] with S with VC to pace task and widen BETH.      Outcome Measures:[facility] Daily Activity  Putting on and taking off regular lower body clothing: A little  Bathing (including washing, rinsing, drying): A little  Putting on and taking off regular upper body clothing: A little  Toileting, which includes using toilet, bedpan or urinal: Total (guidry)  Taking care of personal grooming such as brushing teeth: A little  Eating Meals: None  Daily Activity - Total Score: 17        Education Documentation  ADL Training, taught by JF Ta at 12/22/2023 12:01 PM.  Learner: Patient  Readiness: Acceptance  Method: Explanation, Demonstration  Response: Verbalizes Understanding, Demonstrated Understanding  Comment: Pt educated on BUE strengthening exercises to increase independence with guidry management, with pt demonstrating good carryover.    ADL Training, taught by JF Ta at 12/21/2023  1:52 PM.  Learner: Patient  Readiness: Acceptance  Method: Explanation, Demonstration  Response: Verbalizes Understanding, Demonstrated Understanding  Comment: Pt educated on proper transfer technique  to increase safety awareness, with pt demonstrating understanding and good carryover throughout session.    Education Comments  No comments found.      Problem: Bathing  Goal: STG - Patient will bathe lower body with close supervision and AE  Outcome: Progressing     Problem: Dressings Lower Extremities  Goal: LTG - Patient will dress lower body independent with AE  Outcome: Progressing     Problem: Mobility  Goal: perform functional mobility to and from the bathroom independent with 2ww  Outcome: Progressing     Problem: Transfers  Goal: LTG - Patient perform all functional transfers independent with 2ww  Outcome: Progressing     Problem: Therapeutic Activity  Goal: Patient will perform BUE exercise with close supervision  Outcome: Progressing

## 2023-12-22 NOTE — PROGRESS NOTES
Subjective Data:  Seen resting comfortably in his chair.  No complaints.    Overnight Events:    Nothing significant     Objective Data:  Last Recorded Vitals:  Vitals:    12/21/23 2329 12/22/23 0335 12/22/23 0712 12/22/23 1155   BP: (!) 134/47 (!) 110/40 118/52 119/54   BP Location: Left arm Left arm Left arm Left arm   Patient Position: Lying Lying Lying Sitting   Pulse: 76 78 65 72   Resp: 16 16 16 16   Temp: 37.1 °C (98.8 °F) 37.1 °C (98.8 °F) 36.4 °C (97.5 °F) 36.1 °C (97 °F)   TempSrc: Temporal Temporal Temporal Temporal   SpO2: 99% 99% 99% 100%   Weight:       Height:           Last Labs:  CBC - 12/21/2023:  6:19 AM  4.2 9.2 148    27.3      CMP - 12/21/2023:  6:19 AM  8.4 5.5 18 --- 0.7   _ 3.0 9 60      PTT - 11/10/2023:  4:59 PM  1.2   12.3 22.2     HGBA1C   Date/Time Value Ref Range Status   11/15/2023 11:08 AM 6.7 See below % Final   09/14/2023 12:14 PM 6.8 % Final     Comment:          Diagnosis of Diabetes-Adults   Non-Diabetic: < or = 5.6%   Increased risk for developing diabetes: 5.7-6.4%   Diagnostic of diabetes: > or = 6.5%  .       Monitoring of Diabetes                Age (y)     Therapeutic Goal (%)   Adults:          >18           <7.0   Pediatrics:    13-18           <7.5                   7-12           <8.0                   0- 6            7.5-8.5   American Diabetes Association. Diabetes Care 33(S1), Jan 2010.   08/14/2023 02:45 PM 8.1 4.0 - 6.0 % Final     Comment:     Hemoglobin A1C levels are related to mean blood glucose during the   preceding 2-3 months. The relationship table below may be used as a   general guide. Each 1% increase in HGB A1C is a reflection of an   increase in mean glucose of approximately 30 mg/dl.   Reference: Diabetes Care, volume 29, supplement 1 Jan. 2006                        HGB A1C ................. Approx. Mean Glucose   _______________________________________________   6%   ...............................  120 mg/dl   7%   ...............................   "150 mg/dl   8%   ...............................  180 mg/dl   9%   ...............................  210 mg/dl   10%  ...............................  240 mg/dl  Performed at [address] [id number]     04/19/2023 04:03 PM 9.7 4.0 - 5.6 % Final   02/03/2023 09:12 AM 9.6 4.0 - 5.6 % Final     LDLCALC   Date/Time Value Ref Range Status   11/15/2023 11:08 AM 30 65 - 130 mg/dL Final   08/15/2023 05:50 AM 31 65 - 130 MG/DL Final   10/28/2022 05:39 AM 32 65 - 130 MG/DL Final   07/06/2021 09:50 AM 39 65 - 130 MG/DL Final     VLDL   Date/Time Value Ref Range Status   08/22/2022 10:17 AM 11 0 - 40 mg/dL Final   09/17/2019 01:20 PM 16 0 - 40 mg/dL Final      Last I/O:  I/O last 3 completed shifts:  In: 920 (11.6 mL/kg) [P.O.:720; IV Piggyback:200]  Out: 2950 (37.1 mL/kg) [Urine:2950 (1 mL/kg/hr)]  Weight: 79.5 kg     Past Cardiology Tests (Last 3 Years):  EKG:  Electrocardiogram, 12-lead PRN ACS symptoms       ECG 12 lead 11/26/2023      ECG 12 Lead       ECG 12 lead daily 11/17/2023      ECG 12 lead daily 11/15/2023      ECG 12 lead daily 11/15/2023      ECG 12 lead 11/15/2023    Echo:  Transthoracic Echo (TTE) Limited 11/10/2023      Transthoracic Echo (TTE) Limited 11/10/2023    Ejection Fractions:  No results found for: \"EF\"  Cath:  No results found for this or any previous visit from the past 1095 days.    Stress Test:  Nuclear Stress Test 01/24/2022    Cardiac Imaging:  No results found for this or any previous visit from the past 1095 days.      Inpatient Medications:  Scheduled medications   Medication Dose Route Frequency    amLODIPine  5 mg oral Daily    aspirin  162 mg oral Daily    atorvastatin  40 mg oral Nightly    clopidogrel  75 mg oral Daily    cyanocobalamin  1,000 mcg oral Daily    docusate sodium  100 mg oral BID    ertapenem  1 g intravenous q24h    ferrous sulfate (325 mg ferrous sulfate)  65 mg of iron oral Daily    finasteride  5 mg oral Nightly    furosemide  40 mg oral Daily    " gabapentin  100 mg oral BID    heparin (porcine)  5,000 Units subcutaneous q8h TAYLOR    insulin glargine  10 Units subcutaneous Nightly    iron sucrose  200 mg intravenous Daily    levETIRAcetam  250 mg oral BID    magnesium oxide  400 mg oral Daily    metFORMIN  1,000 mg oral BID with meals    pantoprazole  40 mg oral Daily before breakfast    polyethylene glycol  17 g oral BID    tamsulosin  0.4 mg oral BID     PRN medications   Medication    acetaminophen    Or    acetaminophen    Or    acetaminophen    alteplase    benzocaine-menthol    dextromethorphan-guaifenesin    dextrose 10 % in water (D10W)    dextrose    glucagon    guaiFENesin     Continuous Medications   Medication Dose Last Rate       Physical Exam:   Gen: NAD   Neck: no JVD, carotid upstroke is brisk and without delay   Heart: rrr, s1s2+ no mrg   Lungs: CTA   Ext: warm no edema     Assessment/Plan   12/19: This elderly white male with an extensive past medical history including coronary artery disease, remote CABG, hypertension, hyperlipidemia, type 2 diabetes, paroxysmal atrial fibrillation, history of?  TIA versus recurrent localized focal seizures, history of GI bleeding on Eliquis anticoagulation, status post Watchman implantation is currently admitted with low-grade fever and evidence of recurrent urinary tract infection urosepsis from recent change in his Tiwari catheter after trial of void.  Clinically is improved rather quickly with the institution of IV antibiotics.  The elevated high-sensitivity troponins are presumably not diagnostic of acute coronary syndrome more likely due to minor degree of demand ischemia from the urosepsis.  The patient has been restarted on IV meropenem.  The patient has been continued on the aspirin and Plavix as per the watchman protocol.  His atrial fibrillation is controlled ventricular rates in the absence of beta-blockade.  Amlodipine restarted for treatment of hypertension and will hold ACE inhibitor for now  given urosepsis and potential for fluctuating renal parameters.      12/20: The patient is sitting in bedside chair feeling relatively well somewhat discouraged about needing to be readmitted to the hospital but otherwise in reasonable spirits.  He did has the chronic atrial fibrillation with a controlled rate in the 70s per minute in the absence of any AV charity blocking agents.  His blood pressure is acceptable in the 120 mmHg range on the amlodipine 5 mg daily.  Renal parameters and improved/stable creatinine 1.0.  CBC generally unremarkable hematocrit 26.4 hemoglobin 8.6.  Patient has been seen by infectious disease.    12/21: Patient sitting in bedside chair resting comfortably.  He appears awake alert conversant and better rested.  He was ambulating earlier in the morning with physical therapy without difficulty.  His renal parameters remain stable creatinine 0.90.  No leukocytosis WBC 4200.  Hematocrit unchanged at 27.3.  Blood pressure readings acceptable on amlodipine 5 mg daily.  Patient appears close to ready for discharge home having been at [facility] previously for rehab.  He is scheduled to see urology in approximately 2 weeks to discuss plans with respect to his prostate and catheter.    12/22: His cardiac status is rather stable.  He denies chest pain.  Telemetry shows atrial fibrillation with controlled rates.  He remains on dual antiplatelet therapy per the watchman protocol.  Will have him follow-up with Dr. Grant in 3 to 4 weeks.  Peripheral IV 12/19/23 20 G Right;Upper;Anterior Arm (Active)   Site Assessment Clean;Dry;Intact 12/21/23 0800   Dressing Status Clean;Dry 12/21/23 0800   Number of days: 2       Urethral Catheter (Active)   Output (mL) 550 mL 12/21/23 0900   Number of days: 9       Code Status:  Full Code    I spent 20 minutes in the professional and overall care of this patient.        Abdiel Liebreman,

## 2023-12-22 NOTE — PROGRESS NOTES
Update: Await [facility] to provide result from run of insurance benefits. Also, [facility] stating they cannot provide services anymore as they do not have enough staffing to assist with home infusion, also stating insurance will not cover their services if the pt is able to go to outpatient home infusion.    [facility] called [facility] to check on how best to get them the pts information to set up infusions outpatient if needed, received VM at extension [phone], message left with contact info.    Update: [facility] stating OOP cost is $372.16 per week. [facility] spoke with pts daughter Ni, made her aware pt cannot get HHC if he does outpatient infusion and also the OOP cost if going home with home infusion as well as that [facility] no longer able to accept and new [facility] agency is needing to be found. Ni spoke with the pt and his spouse about his options pt has opted for going home with [facility] so he can also get home health services. [facility] making [facility] aware however still searching for alternate C agency since [facility] does not have enough staff available. Referrals sent to [facility] - no not enough staffing, [facility] - sent to  and  for approval, [facility] - no not enough staffing, [facility] - await response, [facility] - await response, [facility] -  [facility] - no not enough staffing.     Pending:Accepting [facility] agency

## 2023-12-22 NOTE — PROGRESS NOTES
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  States he believes that he will be able to go home for IV antibiotic therapy.  Orders signed and on the chart and I have been in contact with Case Management.  Had some loose stool last night, semi-formed       Objective   PHYSICAL EXAMINATION  Vital signs:  Visit Vitals  /54 (BP Location: Left arm, Patient Position: Sitting)   Pulse 72   Temp 36.1 °C (97 °F) (Temporal)   Resp 16      General: Resting comfortably in bedside chair  Lungs:  Clear to auscultation  Heart:  S1, S2 normal  Abdomen:  Soft, nontender.  Back:  No spinal or CVA tenderness  Genitalia: Tiwari catheter draining clear urine      Antibiotics:  Ertapenem day 1  Carbapenem therapy day 4      Relevant Results  WBC: 5900     Results from last 72 hours   Lab Units 12/21/23  0619   CREATININE mg/dL 0.90   ANION GAP mmol/L 12   EGFR mL/min/1.73m*2 85         UA: Pyuria  Microbiology:  Blood (12/18): 1 of 2 ESBL E. coli, resistant to oral therapy  Urine: Greater than 100,000 colonies per mL ESBL E. coli, resistant to oral therapy  Imaging:  CXR: No significant infiltrate  CT abdomen and pelvis: No urinary tract pathology or significant acute changes.  BPH noted        ASSESSMENT:  GNR septicemia  Patient is admitted once again with what appears to be an episode of catheter-associated ESBL E. coli urinary tract infection with septicemia.  Once again, I cannot obtain a history that suggests that there was trauma or mechanical disruption involving the catheter.  On the last admission there was no evidence for upper tract obstruction.  He seems to be, once again, responding well to systemic antibiotic therapy.  History and examination do not suggest an alternative source for GNR septicemia.    CT  scan does not show any obstruction or upper tract disease.  I can only speculate that his relapse may be due to a residual focus of infection in the enlarged prostate which did not respond to standard course of intravenous antibiotic therapy.  Will therefore make arrangements for placement of a PICC line and completion of 4 weeks of intravenous ertapenem.  Will defer to Dr. Starkey and Funmi regarding the ultimate choice of a surgical procedure, but I remain concerned about the possibility of an implanted foreign body    Discussed in detail on 12/21 with the patient's daughter, Ni, [phone]        PLANS:  -   Ertapenem 1 g daily through 1/17/2024  -   Orders for outpatient IV antibiotic therapy on the chart  -   Anticipate discharge soon, will follow-up with me in the office on 1/17/2024 at 11:15 AM. I will see him prior to that prmayuri Edwards MD  [id number]  Office:  [phone]

## 2023-12-22 NOTE — PROGRESS NOTES
Pt to dc on IV abx, to have PICC placed today. [facility] communicating with ID who states will input script with wet signature, will leave this on pts chart. This worker will send this to [facility] when able, sending pertinent clinical info to them in the meantime.     Update: [facility] spoke with pts daughter Ni who believes pts insurance will not cover the cost of home IV abx, this worker is checking with [facility] on this and will let the dtr know, otherwise pt will need to go to outpatient infusion center daily.         12/22/23 1110   Discharge Planning   Patient expects to be discharged to: [facility]

## 2023-12-22 NOTE — PROGRESS NOTES
Luis Greene is a 83 y.o. male on day 4 of admission presenting with Urinary tract infection associated with indwelling urethral catheter, initial encounter (CMS/[facility]).    Subjective   The patient was seen and examined.  Lying in the bed.  Comfortable.  Did not have any acute distress.  Patient wants to go home.       Objective     Physical Exam  HEENT:  Head externally atraumatic, no pallor, no icterus, extraocular movements intact, pupils reacting to light, oral mucosa moist and throat clear.  Neck:  Supple, no JVP, no palpable adenopathy or thyromegaly.  No carotid bruit.  Chest:  Clear to auscultation and resonant.  Heart:  Regular rate and rhythm, no murmur or gallop could be appreciated.  Abdomen:  Soft, nontender, bowel sounds present, normoactive, no palpable hepatosplenomegaly.  Extremities:  No edema, pulses present, no cyanosis or clubbing.  CNS:  Patient alert, oriented to time, place and person.  Power 5/5 all over and deep tendon reflexes symmetrical, cranial nerves 2-12 grossly intact.  Skin:  No active rash.  Musculoskeletal:  No joint swelling or erythema, range of movement normal.  Last Recorded Vitals  Heart Rate:  [65-78]   Temp:  [36.1 °C (97 °F)-37.1 °C (98.8 °F)]   Resp:  [16-18]   BP: (110-142)/(40-54)   SpO2:  [99 %-100 %]     Intake/Output last 3 Shifts:  I/O last 3 completed shifts:  In: 920 (11.6 mL/kg) [P.O.:720; IV Piggyback:200]  Out: 2950 (37.1 mL/kg) [Urine:2950 (1 mL/kg/hr)]  Weight: 79.5 kg     Relevant Results  Susceptibility data from last 90 days.  Collected Specimen Info Organism Amoxicillin/Clavulanate Ampicillin Ampicillin/Sulbactam Aztreonam Cefazolin Cefazolin (uncomplicated UTIs only) Cefepime Cefotaxime Ceftazidime Ceftriaxone Ciprofloxacin Ertapenem   12/18/23 Urine from Clean Catch/Voided Escherichia coli S R S R R R R R R R R S   12/18/23 Blood culture from Peripheral Venipuncture Escherichia coli S R S R R  R R R R R S   11/24/23 Urine from Clean Catch/Voided  Escherichia coli S R S R R R R R R R R S   11/24/23 Blood culture from Peripheral Venipuncture Escherichia coli S R S R R  R R R R R S   11/24/23 Blood culture from Peripheral Venipuncture Escherichia coli               10/17/23 Urine from Clean Catch/Voided Aerococcus urinae          S S      Collected Specimen Info Organism ESBL Gentamicin Levofloxacin Meropenem Moxifloxacin Nitrofurantoin Penicillin Piperacillin/Tazobactam Tetracycline Trimethoprim/Sulfamethoxazole Vancomycin   12/18/23 Urine from Clean Catch/Voided Escherichia coli  S  S  S  S  R    12/18/23 Blood culture from Peripheral Venipuncture Escherichia coli  S R S R   S  R    11/24/23 Urine from Clean Catch/Voided Escherichia coli R S  S  S  S  R    11/24/23 Blood culture from Peripheral Venipuncture Escherichia coli  S R S R   S  R    11/24/23 Blood culture from Peripheral Venipuncture Escherichia coli              10/17/23 Urine from Clean Catch/Voided Aerococcus urinae   S    S  S  S     Results for orders placed or performed during the hospital encounter of 12/18/23 (from the past 24 hour(s))   POCT GLUCOSE   Result Value Ref Range    POCT Glucose 149 (H) 74 - 99 mg/dL   POCT GLUCOSE   Result Value Ref Range    POCT Glucose 99 74 - 99 mg/dL   POCT GLUCOSE   Result Value Ref Range    POCT Glucose 109 (H) 74 - 99 mg/dL   Basic Metabolic Panel   Result Value Ref Range    Glucose 108 (H) 65 - 99 mg/dL    Sodium 133 133 - 145 mmol/L    Potassium 3.8 3.4 - 5.1 mmol/L    Chloride 97 97 - 107 mmol/L    Bicarbonate 22 (L) 24 - 31 mmol/L    Urea Nitrogen 21 8 - 25 mg/dL    Creatinine 1.00 0.40 - 1.60 mg/dL    eGFR 75 >60 mL/min/1.73m*2    Calcium 8.9 8.5 - 10.4 mg/dL    Anion Gap 14 <=19 mmol/L   CBC   Result Value Ref Range    WBC 5.9 4.4 - 11.3 x10*3/uL    nRBC 0.0 0.0 - 0.0 /100 WBCs    RBC 2.78 (L) 4.50 - 5.90 x10*6/uL    Hemoglobin 9.3 (L) 13.5 - 17.5 g/dL    Hematocrit 27.0 (L) 41.0 - 52.0 %    MCV 97 80 - 100 fL    MCH 33.5 26.0 - 34.0 pg    MCHC 34.4  32.0 - 36.0 g/dL    RDW 13.6 11.5 - 14.5 %    Platelets 202 150 - 450 x10*3/uL        Current Facility-Administered Medications:     acetaminophen (Tylenol) tablet 650 mg, 650 mg, oral, q4h PRN, 650 mg at 12/20/23 2124 **OR** acetaminophen (Tylenol) oral liquid 650 mg, 650 mg, oral, q4h PRN **OR** acetaminophen (Tylenol) suppository 650 mg, 650 mg, rectal, q4h PRN, Pete Oneill MD    alteplase (Cathflo Activase) injection 2 mg, 2 mg, intra-catheter, PRN, Raul Edwards MD    amLODIPine (Norvasc) tablet 5 mg, 5 mg, oral, Daily, Pete Oneill MD, 5 mg at 12/22/23 0906    aspirin EC tablet 162 mg, 162 mg, oral, Daily, Pete Oneill MD, 162 mg at 12/22/23 0904    atorvastatin (Lipitor) tablet 40 mg, 40 mg, oral, Nightly, Pete Oneill MD, 40 mg at 12/21/23 2151    benzocaine-menthol (Cepastat Sore Throat) 15-3.6 mg lozenge 1 lozenge, 1 lozenge, Mouth/Throat, q2h PRN, Pete Oneill MD    clopidogrel (Plavix) tablet 75 mg, 75 mg, oral, Daily, Pete Oneill MD, 75 mg at 12/22/23 0907    cyanocobalamin (Vitamin B-12) tablet 1,000 mcg, 1,000 mcg, oral, Daily, Pete Oneill MD, 1,000 mcg at 12/22/23 0906    dextromethorphan-guaifenesin (Robitussin DM)  mg/5 mL oral liquid 5 mL, 5 mL, oral, q4h PRN, Pete Oneill MD    dextrose 10 % in water (D10W) infusion, 0.3 g/kg/hr, intravenous, Once PRN, Pete Oneill MD    dextrose 50 % injection 25 g, 25 g, intravenous, q15 min PRN, Pete Oneill MD    docusate sodium (Colace) capsule 100 mg, 100 mg, oral, BID, Pete Oneill MD, 100 mg at 12/21/23 2151    ertapenem (INVanz) 1 g in sodium chloride 0.9% 50 mL IV, 1 g, intravenous, q24h, Raul Edwards MD, Stopped at 12/22/23 0938    ferrous sulfate (325 mg ferrous sulfate) tablet 1 tablet, 65 mg of iron, oral, Daily, JONA Man-CNP, 1 tablet at 12/22/23 0907    finasteride (Proscar) tablet 5 mg, 5 mg, oral, Nightly, Raul Fine, [facility], 5 mg at 12/21/23  2151    furosemide (Lasix) tablet 40 mg, 40 mg, oral, Daily, Pete Oneill MD, 40 mg at 12/22/23 0907    gabapentin (Neurontin) capsule 100 mg, 100 mg, oral, BID, Pete Oneill MD, 100 mg at 12/22/23 0906    glucagon (Glucagen) injection 1 mg, 1 mg, intramuscular, q15 min PRN, Pete Oneill MD    guaiFENesin (Mucinex) 12 hr tablet 600 mg, 600 mg, oral, q12h PRN, Pete Oneill MD    heparin (porcine) injection 5,000 Units, 5,000 Units, subcutaneous, q8h TAYLOR, Pete Oneill MD, 5,000 Units at 12/22/23 1636    insulin glargine (Lantus) injection 10 Units, 10 Units, subcutaneous, Nightly, Pete Oneill MD, 10 Units at 12/21/23 2152    iron sucrose (Venofer) injection 200 mg, 200 mg, intravenous, Daily, Martina Haynes, APRALBINO-CNP, 200 mg at 12/22/23 0616    levETIRAcetam (Keppra) tablet 250 mg, 250 mg, oral, BID, Pete Oneill MD, 250 mg at 12/22/23 0904    magnesium oxide (Mag-Ox) tablet 400 mg, 400 mg, oral, Daily, Pete Oneill MD, 400 mg at 12/22/23 0908    metFORMIN (Glucophage) tablet 1,000 mg, 1,000 mg, oral, BID with meals, Pete Oneill MD, 1,000 mg at 12/22/23 1637    pantoprazole (ProtoNix) EC tablet 40 mg, 40 mg, oral, Daily before breakfast, Pete Oneill MD, 40 mg at 12/22/23 0616    polyethylene glycol (Glycolax, Miralax) packet 17 g, 17 g, oral, BID, Pete Oneill MD, 17 g at 12/21/23 2151    tamsulosin (Flomax) 24 hr capsule 0.4 mg, 0.4 mg, oral, BID, Pete Oneill MD, 0.4 mg at 12/22/23 0908   Assessment/Plan   Principal Problem:    Urinary tract infection associated with indwelling urethral catheter, initial encounter (CMS/[facility])  Active Problems:    Urinary tract infection associated with indwelling urethral catheter (CMS/[facility])    Elevated troponin  Bacteremia  Recurrent UTI  Neuropathy  Diabetes  Anemia    Plan: Continue current medication.  Supportive care.  Patient has been scheduled to have outpatient IV antibiotic test ertapenem.   ID input appreciated.  Patient medically stable for discharge when arrangements made.  We will take BMP, fall, aspiration decubitus present.  This has been discussed with the patient and he is agreeable to it.         Pete Oneill MD

## 2023-12-22 NOTE — PROGRESS NOTES
Luis Greene is a 83 y.o. male on day 4 of admission presenting with Urinary tract infection associated with indwelling urethral catheter, initial encounter (CMS/[facility]).    Subjective   Patient seen and examined.  Resting sitting up in the chair in no acute distress.  Awake alert oriented x 3.  Feels better.  No complaints.  Met with Urology.  Hopes to go home on discharge.  Discussed guidry catheter management at home    Spoke with nursing no issues    Objective     Physical Exam  Vitals reviewed.   Constitutional:       General: He is not in acute distress.     Appearance: Normal appearance. He is normal weight. He is not ill-appearing or toxic-appearing.   HENT:      Head: Normocephalic and atraumatic.      Right Ear: Tympanic membrane normal.      Left Ear: Tympanic membrane normal.      Nose: Nose normal.      Mouth/Throat:      Mouth: Mucous membranes are moist.      Pharynx: Oropharynx is clear.   Eyes:      Extraocular Movements: Extraocular movements intact.      Conjunctiva/sclera: Conjunctivae normal.      Pupils: Pupils are equal, round, and reactive to light.   Cardiovascular:      Rate and Rhythm: Normal rate. Rhythm irregular.      Pulses: Normal pulses.      Heart sounds: Normal heart sounds.   Pulmonary:      Effort: Pulmonary effort is normal. No respiratory distress.      Breath sounds: Normal breath sounds. No wheezing, rhonchi or rales.      Comments: Room air  Abdominal:      General: Bowel sounds are normal. There is no distension.      Palpations: Abdomen is soft.      Tenderness: There is no abdominal tenderness.   Genitourinary:     Comments:  Guidry catheter in place draining clear yellow urine trace sediment. Rectal examination deferred  Musculoskeletal:         General: Swelling present. Normal range of motion.      Cervical back: Normal range of motion and neck supple.      Comments: 1+ Bipedal edema improved   Skin:     General: Skin is warm and dry.      Capillary Refill: Capillary  refill takes less than 2 seconds.   Neurological:      General: No focal deficit present.      Mental Status: He is alert and oriented to person, place, and time.   Psychiatric:         Mood and Affect: Mood normal.         Behavior: Behavior normal.       Last Recorded Vitals  Reviewed    Intake/Output last 3 Shifts:  Reviewed    Telemetry atrial fibrillation    Relevant Results  Results for orders placed or performed during the hospital encounter of 12/18/23 (from the past 96 hour(s))   CBC and Auto Differential   Result Value Ref Range    WBC 9.7 4.4 - 11.3 x10*3/uL    nRBC 0.0 0.0 - 0.0 /100 WBCs    RBC 3.21 (L) 4.50 - 5.90 x10*6/uL    Hemoglobin 10.9 (L) 13.5 - 17.5 g/dL    Hematocrit 32.2 (L) 41.0 - 52.0 %     80 - 100 fL    MCH 34.0 26.0 - 34.0 pg    MCHC 33.9 32.0 - 36.0 g/dL    RDW 14.1 11.5 - 14.5 %    Platelets 145 (L) 150 - 450 x10*3/uL    Neutrophils % 78.9 40.0 - 80.0 %    Immature Granulocytes %, Automated 0.5 0.0 - 0.9 %    Lymphocytes % 10.7 13.0 - 44.0 %    Monocytes % 9.7 2.0 - 10.0 %    Eosinophils % 0.0 0.0 - 6.0 %    Basophils % 0.2 0.0 - 2.0 %    Neutrophils Absolute 7.63 (H) 1.60 - 5.50 x10*3/uL    Immature Granulocytes Absolute, Automated 0.05 0.00 - 0.50 x10*3/uL    Lymphocytes Absolute 1.04 0.80 - 3.00 x10*3/uL    Monocytes Absolute 0.94 (H) 0.05 - 0.80 x10*3/uL    Eosinophils Absolute 0.00 0.00 - 0.40 x10*3/uL    Basophils Absolute 0.02 0.00 - 0.10 x10*3/uL   Comprehensive metabolic panel   Result Value Ref Range    Glucose 141 (H) 65 - 99 mg/dL    Sodium 131 (L) 133 - 145 mmol/L    Potassium 4.7 3.4 - 5.1 mmol/L    Chloride 95 (L) 97 - 107 mmol/L    Bicarbonate 22 (L) 24 - 31 mmol/L    Urea Nitrogen 26 (H) 8 - 25 mg/dL    Creatinine 1.20 0.40 - 1.60 mg/dL    eGFR 60 (L) >60 mL/min/1.73m*2    Calcium 9.4 8.5 - 10.4 mg/dL    Albumin 3.6 3.5 - 5.0 g/dL    Alkaline Phosphatase 76 35 - 125 U/L    Total Protein 6.7 5.9 - 7.9 g/dL    AST 19 5 - 40 U/L    Bilirubin, Total 1.1 0.1 - 1.2  mg/dL    ALT 10 5 - 40 U/L    Anion Gap 14 <=19 mmol/L   Lipase   Result Value Ref Range    Lipase 29 16 - 63 U/L   BLOOD GAS LACTIC ACID, VENOUS   Result Value Ref Range    POCT Lactate, Venous 2.2 (H) 0.4 - 2.0 mmol/L   Blood Culture    Specimen: Peripheral Venipuncture; Blood culture   Result Value Ref Range    Blood Culture Escherichia coli (AA)     BLOOD CULTURE BOTTLE  Positive Aerobic Bottle     Gram Stain Gram negative bacilli (AA)        Susceptibility    Escherichia coli - MICROSCAN     Amoxicillin/Clavulanate  Susceptible      Ampicillin  Resistant      Ampicillin/Sulbactam  Susceptible      Aztreonam  Resistant      Cefazolin  Resistant      Cefepime  Resistant      Cefotaxime  Resistant      Ceftazidime  Resistant      Ceftriaxone  Resistant      Ciprofloxacin  Resistant      Ertapenem  Susceptible      Gentamicin  Susceptible      Levofloxacin  Resistant      Meropenem  Susceptible      Moxifloxacin  Resistant      Piperacillin/Tazobactam  Susceptible      Trimethoprim/Sulfamethoxazole  Resistant    Blood Culture    Specimen: Peripheral Venipuncture; Blood culture   Result Value Ref Range    Blood Culture No growth at 3 days    Levetiracetam   Result Value Ref Range    Keppra 19 10 - 40 ug/mL   Serial Troponin, Initial ([facility])   Result Value Ref Range    Troponin T, High Sensitivity 244 (H) <=15 ng/L   SARS-CoV-2 RT PCR   Result Value Ref Range    Coronavirus 2019, PCR Not Detected Not Detected   Influenza A, and B PCR   Result Value Ref Range    Flu A Result Not Detected Not Detected    Flu B Result Not Detected Not Detected   Urine culture    Specimen: Clean Catch/Voided; Urine   Result Value Ref Range    Urine Culture >100,000 Escherichia coli (A)        Susceptibility    Escherichia coli - MICROSCAN     Ampicillin  Resistant      Amoxicillin/Clavulanate  Susceptible      Ampicillin/Sulbactam  Susceptible      Cefazolin  Resistant      Cefazolin (uncomplicated UTIs only)  Resistant      Cefotaxime   Resistant      Ceftriaxone  Resistant      Ceftazidime  Resistant      Cefepime  Resistant      Aztreonam  Resistant      Ertapenem  Susceptible      Meropenem  Susceptible      Ciprofloxacin  Resistant      Gentamicin  Susceptible      Nitrofurantoin  Susceptible      Piperacillin/Tazobactam  Susceptible      Trimethoprim/Sulfamethoxazole  Resistant    Urinalysis with Reflex Microscopic   Result Value Ref Range    Color, Urine Yellow Light-Yellow, Yellow, Dark-Yellow    Appearance, Urine Turbid (N) Clear    Specific Gravity, Urine 1.013 1.005 - 1.035    pH, Urine 6.5 5.0, 5.5, 6.0, 6.5, 7.0, 7.5, 8.0    Protein, Urine 50 (1+) (A) NEGATIVE, 10 (TRACE), 20 (TRACE) mg/dL    Glucose, Urine Normal Normal mg/dL    Blood, Urine 0.03 (TRACE) (A) NEGATIVE    Ketones, Urine NEGATIVE NEGATIVE mg/dL    Bilirubin, Urine NEGATIVE NEGATIVE    Urobilinogen, Urine 3 (1+) (A) Normal mg/dL    Nitrite, Urine NEGATIVE NEGATIVE    Leukocyte Esterase, Urine 500 Akin/µL (A) NEGATIVE   Microscopic Only, Urine   Result Value Ref Range    WBC, Urine >50 (A) 1-5, NONE /HPF    WBC Clumps, Urine MANY Reference range not established. /HPF    RBC, Urine 3-5 NONE, 1-2, 3-5 /HPF    Bacteria, Urine 1+ (A) NONE SEEN /HPF   Electrocardiogram, 12-lead PRN ACS symptoms   Result Value Ref Range    Ventricular Rate 96 BPM    Atrial Rate 70 BPM    QRS Duration 138 ms    QT Interval 362 ms    QTC Calculation(Bazett) 457 ms    R Axis 74 degrees    T Axis 21 degrees    QRS Count 16 beats    Q Onset 221 ms    T Offset 402 ms    QTC Fredericia 423 ms   Serial Troponin, 2 Hour ([facility])   Result Value Ref Range    Troponin T, High Sensitivity 222 (H) <=15 ng/L   Blood Gas Lactic Acid, Venous   Result Value Ref Range    POCT Lactate, Venous 2.1 (H) 0.4 - 2.0 mmol/L   POCT GLUCOSE   Result Value Ref Range    POCT Glucose 184 (H) 74 - 99 mg/dL   Serial Troponin, 6 Hour ([facility])   Result Value Ref Range    Troponin T, High Sensitivity 212 (H) <=15 ng/L   POCT GLUCOSE    Result Value Ref Range    POCT Glucose 278 (H) 74 - 99 mg/dL   Comprehensive metabolic panel   Result Value Ref Range    Glucose 133 (H) 65 - 99 mg/dL    Sodium 133 133 - 145 mmol/L    Potassium 4.3 3.4 - 5.1 mmol/L    Chloride 100 97 - 107 mmol/L    Bicarbonate 22 (L) 24 - 31 mmol/L    Urea Nitrogen 22 8 - 25 mg/dL    Creatinine 1.00 0.40 - 1.60 mg/dL    eGFR 75 >60 mL/min/1.73m*2    Calcium 8.5 8.5 - 10.4 mg/dL    Albumin 3.0 (L) 3.5 - 5.0 g/dL    Alkaline Phosphatase 60 35 - 125 U/L    Total Protein 5.5 (L) 5.9 - 7.9 g/dL    AST 18 5 - 40 U/L    Bilirubin, Total 0.7 0.1 - 1.2 mg/dL    ALT 9 5 - 40 U/L    Anion Gap 11 <=19 mmol/L   CBC   Result Value Ref Range    WBC 7.3 4.4 - 11.3 x10*3/uL    nRBC 0.0 0.0 - 0.0 /100 WBCs    RBC 2.61 (L) 4.50 - 5.90 x10*6/uL    Hemoglobin 8.8 (L) 13.5 - 17.5 g/dL    Hematocrit 26.1 (L) 41.0 - 52.0 %     80 - 100 fL    MCH 33.7 26.0 - 34.0 pg    MCHC 33.7 32.0 - 36.0 g/dL    RDW 14.0 11.5 - 14.5 %    Platelets 125 (L) 150 - 450 x10*3/uL   POCT GLUCOSE   Result Value Ref Range    POCT Glucose 126 (H) 74 - 99 mg/dL   POCT GLUCOSE   Result Value Ref Range    POCT Glucose 177 (H) 74 - 99 mg/dL   Light Blue Top   Result Value Ref Range    Extra Tube Hold for add-ons.    PST Top   Result Value Ref Range    Extra Tube Hold for add-ons.    Iron and TIBC   Result Value Ref Range    Iron <20 (L) 45 - 160 ug/dL    UIBC 149 110 - 370 ug/dL    TIBC      % Saturation     Ferritin   Result Value Ref Range    Ferritin 339 30 - 400 ng/mL   Vitamin B12   Result Value Ref Range    Vitamin B12 805 211 - 946 pg/mL   Hemoglobin and hematocrit, blood   Result Value Ref Range    Hemoglobin 8.9 (L) 13.5 - 17.5 g/dL    Hematocrit 26.7 (L) 41.0 - 52.0 %   Urinalysis with Reflex Microscopic   Result Value Ref Range    Color, Urine Yellow Light-Yellow, Yellow, Dark-Yellow    Appearance, Urine Turbid (N) Clear    Specific Gravity, Urine 1.016 1.005 - 1.035    pH, Urine 5.0 5.0, 5.5, 6.0, 6.5, 7.0, 7.5,  8.0    Protein, Urine 30 (1+) (A) NEGATIVE, 10 (TRACE), 20 (TRACE) mg/dL    Glucose, Urine Normal Normal mg/dL    Blood, Urine 0.03 (TRACE) (A) NEGATIVE    Ketones, Urine NEGATIVE NEGATIVE mg/dL    Bilirubin, Urine NEGATIVE NEGATIVE    Urobilinogen, Urine 3 (1+) (A) Normal mg/dL    Nitrite, Urine NEGATIVE NEGATIVE    Leukocyte Esterase, Urine 500 Akin/µL (A) NEGATIVE   Microscopic Only, Urine   Result Value Ref Range    WBC, Urine >50 (A) 1-5, NONE /HPF    WBC Clumps, Urine MODERATE Reference range not established. /HPF    RBC, Urine >20 (A) NONE, 1-2, 3-5 /HPF    Squamous Epithelial Cells, Urine 1-9 (SPARSE) Reference range not established. /HPF    Bacteria, Urine 1+ (A) NONE SEEN /HPF    Mucus, Urine FEW Reference range not established. /LPF    Hyaline Casts, Urine 3+ (A) NONE /LPF   POCT GLUCOSE   Result Value Ref Range    POCT Glucose 247 (H) 74 - 99 mg/dL   POCT GLUCOSE   Result Value Ref Range    POCT Glucose 215 (H) 74 - 99 mg/dL   Basic Metabolic Panel   Result Value Ref Range    Glucose 133 (H) 65 - 99 mg/dL    Sodium 130 (L) 133 - 145 mmol/L    Potassium 3.9 3.4 - 5.1 mmol/L    Chloride 97 97 - 107 mmol/L    Bicarbonate 22 (L) 24 - 31 mmol/L    Urea Nitrogen 25 8 - 25 mg/dL    Creatinine 1.00 0.40 - 1.60 mg/dL    eGFR 75 >60 mL/min/1.73m*2    Calcium 8.5 8.5 - 10.4 mg/dL    Anion Gap 11 <=19 mmol/L   CBC   Result Value Ref Range    WBC 5.4 4.4 - 11.3 x10*3/uL    nRBC 0.0 0.0 - 0.0 /100 WBCs    RBC 2.64 (L) 4.50 - 5.90 x10*6/uL    Hemoglobin 8.6 (L) 13.5 - 17.5 g/dL    Hematocrit 26.4 (L) 41.0 - 52.0 %     80 - 100 fL    MCH 32.6 26.0 - 34.0 pg    MCHC 32.6 32.0 - 36.0 g/dL    RDW 13.9 11.5 - 14.5 %    Platelets 139 (L) 150 - 450 x10*3/uL   POCT GLUCOSE   Result Value Ref Range    POCT Glucose 124 (H) 74 - 99 mg/dL   POCT GLUCOSE   Result Value Ref Range    POCT Glucose 188 (H) 74 - 99 mg/dL   POCT GLUCOSE   Result Value Ref Range    POCT Glucose 175 (H) 74 - 99 mg/dL   POCT GLUCOSE   Result Value Ref  Range    POCT Glucose 165 (H) 74 - 99 mg/dL   Basic Metabolic Panel   Result Value Ref Range    Glucose 98 65 - 99 mg/dL    Sodium 131 (L) 133 - 145 mmol/L    Potassium 4.2 3.4 - 5.1 mmol/L    Chloride 97 97 - 107 mmol/L    Bicarbonate 22 (L) 24 - 31 mmol/L    Urea Nitrogen 22 8 - 25 mg/dL    Creatinine 0.90 0.40 - 1.60 mg/dL    eGFR 85 >60 mL/min/1.73m*2    Calcium 8.4 (L) 8.5 - 10.4 mg/dL    Anion Gap 12 <=19 mmol/L   CBC   Result Value Ref Range    WBC 4.2 (L) 4.4 - 11.3 x10*3/uL    nRBC 0.0 0.0 - 0.0 /100 WBCs    RBC 2.74 (L) 4.50 - 5.90 x10*6/uL    Hemoglobin 9.2 (L) 13.5 - 17.5 g/dL    Hematocrit 27.3 (L) 41.0 - 52.0 %     80 - 100 fL    MCH 33.6 26.0 - 34.0 pg    MCHC 33.7 32.0 - 36.0 g/dL    RDW 13.7 11.5 - 14.5 %    Platelets 148 (L) 150 - 450 x10*3/uL   POCT GLUCOSE   Result Value Ref Range    POCT Glucose 101 (H) 74 - 99 mg/dL   POCT GLUCOSE   Result Value Ref Range    POCT Glucose 220 (H) 74 - 99 mg/dL   POCT GLUCOSE   Result Value Ref Range    POCT Glucose 152 (H) 74 - 99 mg/dL   POCT GLUCOSE   Result Value Ref Range    POCT Glucose 149 (H) 74 - 99 mg/dL   POCT GLUCOSE   Result Value Ref Range    POCT Glucose 99 74 - 99 mg/dL         This patient has a urinary catheter   Reason for the urinary catheter remaining today? urinary retention/bladder outlet obstruction, acute or chronic    ASSESSMENT:  Sepsis  Fever  Urinary tract infection secondary to chronic indwelling guidry catheter - E. Coli  Bacteremia secondary to urinary tract infection - E. Coli  Chronic urinary retention  BPH  Elevated troponin asymptomatic  Atrial fibrillation status post Watchman device  Coronary artery disease  History of CABG  Hypertension  History of TIA  ?CVA  History of stroke - like episodes  Abnormal renal function  Normocytic anemia  Iron deficiency anemia  History of GI bled  Chronic right hip pain  Spinal stenosis    PLAN:  Patient is doing well this morning.  No new issues.  Consultations Infectious disease,  Urology input appreciated.  CT radiology report reviewed.  No obstruction.  Infectious disease notes concern re: UroLift procedure and possibility of implanted foreign body in prostate gland.  Urology notes plan to review with Dr. Starkey - alternative is to perform TUR of prostate gland.  Management per Urology's recommendations.  IV antibiotics Meropenem per Infectious disease.  PICC line placement.  Discharge plan Ertapenem once daily x 4 weeks thru 1/17/2024.  Meropenem to Ertapenem tomorrow.  Maintain guidry catheter.  Patient education home-going guidry catheter management - discussed with nursing.  Cardiology following input appreciated.  Medical management.  H&H stable.  No black tarry or red bloody stools reported.  IV Venofer.  P.o Iron.  Outpatient follow up for Iron deficiency anemia.  Continue home medications.  Point of care glucose reviewed.  ADA diet.  Metformin.  Lantus at HS.  PT/OT.  Fall precautions.  Up with assistance only.  Supportive care.  Patient reassured.  Case management following for discharge planning.  Anticipate discharge when IV antibiotic arrangements are made.  Discussed with patient, nursing and Dr. Oneill.    Martina Haynes, JONA-CNP

## 2023-12-22 NOTE — NURSING NOTE
Patient had a bowel movement in the bed. He is now washed with soap/water and CHG around PICC and guidry

## 2023-12-22 NOTE — PROGRESS NOTES
Physical Therapy    Physical Therapy Treatment    Patient Name: Luis Greene  MRN: [id number]  Today's Date: 12/22/2023  Time Calculation  Start Time: 1055  Stop Time: 1120  Time Calculation (min): 25 min       Assessment/Plan   PT Assessment  Assessment Comment: Pt made adequate progress toward their functional mobility goals. Pt required close S during functional mobility. Pt would benefit from continued skilled PT services prior to and after discharge to increase independence and safety with functional mobility within their home environment.  End of Session Patient Position: Up in chair, Alarm off, not on at start of session  PT Plan  Inpatient/Swing Bed or Outpatient: Inpatient  PT Plan  Treatment/Interventions: Bed mobility, Transfer training, Gait training, Strengthening, Endurance training, Therapeutic exercise, Therapeutic activity, Home exercise program  PT Plan: Skilled PT  PT Frequency: 4 times per week  PT Discharge Recommendations: Low intensity level of continued care  PT Recommended Transfer Status: Assist x1  PT - OK to Discharge: Yes      General Visit Information:   PT  Visit  PT Received On: 12/22/23  General  Family/Caregiver Present: No  Prior to Session Communication: Bedside nurse  Patient Position Received: Bed, 2 rail up (alarm off; HOB elevated    General Comment: Pt agreed to tx and participated fully.    Subjective   Precautions:  Precautions  Medical Precautions: Fall precautions      Objective   Pain:  Pain Assessment  Pain Assessment: 0-10   Pain Score: 0 - No pain   Treatments:  Therapeutic Exercise  Therapeutic Exercise Performed: Yes (in supported sitting, BLE x20 each)  Therapeutic Exercise Activity 1: LAQ  Therapeutic Exercise Activity 2: hip abd  Therapeutic Exercise Activity 3: hip flex    Bed Mobility  Bed Mobility: Yes  Bed Mobility 1  Bed Mobility 1: Supine to sitting  Level of Assistance 1: Close supervision  Bed Mobility Comments 1: HOB elevated; toward R  side.    Ambulation/Gait Training  Ambulation/Gait Training Performed: Yes  Ambulation/Gait Training 1  Surface 1: Level tile  Device 1: Rollator (bilat AFOs)  Assistance 1: Close supervision  Comments/Distance (ft) 1: 500 ft using step through pattern with slowed velocity and narrower BETH. Steady gait.    Transfers  Transfer: Yes   Transfer 1  Technique 1: Sit to stand  Transfer Device 1: Walker   Transfer Level of Assistance 1: Close supervision   Trials/Comments 1: x1 trial from EOB, x1 from bedside chair  Transfers 2  Technique 2: Stand to sit  Transfer Device 2: Walker  Transfer Level of Assistance 2: Close supervision     Outcome Measures:  [facility] Basic Mobility  Turning from your back to your side while in a flat bed without using bedrails: A little  Moving from lying on your back to sitting on the side of a flat bed without using bedrails: A little  Moving to and from bed to chair (including a wheelchair): A little  Standing up from a chair using your arms (e.g. wheelchair or bedside chair): A little  To walk in hospital room: A little  Climbing 3-5 steps with railing: A little  Basic Mobility - Total Score: 18    Education Documentation  Body Mechanics, taught by Anne Guerra PT at 12/22/2023 12:09 PM.  Learner: Patient  Readiness: Eager  Method: Explanation  Response: Verbalizes Understanding, Demonstrated Understanding, Needs Reinforcement    Mobility Training, taught by Anne Guerra PT at 12/22/2023 12:09 PM.  Learner: Patient  Readiness: Eager  Method: Explanation  Response: Verbalizes Understanding, Demonstrated Understanding, Needs Reinforcement    Education Comments  No comments found.        OP EDUCATION:       Encounter Problems       Encounter Problems (Active)       Mobility       STG - Patient will ambulate >/=100 ft with FWW at mod I level. (Progressing)       Start:  12/19/23    Expected End:  01/03/24            STG - Patient will ascend and descend 3 stairs with unilateral rail and close  S. (Not Progressing)       Start:  12/19/23    Expected End:  01/03/24               Mobility       perform functional mobility to and from the bathroom independent with 2ww (Progressing)       Start:  12/19/23    Expected End:  01/19/24               Pain - Adult          Transfers       STG - Patient to transfer supine<>sit at mod I level. (Progressing)       Start:  12/19/23    Expected End:  01/03/24            STG - Patient will transfer sit<>stand with FWW and mod I. (Progressing)       Start:  12/19/23    Expected End:  01/03/24               Transfers       LTG - Patient perform all functional transfers independent with 2ww (Progressing)       Start:  12/19/23    Expected End:  01/19/24

## 2023-12-23 LAB
GLUCOSE BLD MANUAL STRIP-MCNC: 120 MG/DL (ref 74–99)
GLUCOSE BLD MANUAL STRIP-MCNC: 130 MG/DL (ref 74–99)
GLUCOSE BLD MANUAL STRIP-MCNC: 160 MG/DL (ref 74–99)
GLUCOSE BLD MANUAL STRIP-MCNC: 76 MG/DL (ref 74–99)
STAPHYLOCOCCUS SPEC CULT: NORMAL

## 2023-12-23 PROCEDURE — 2500000002 HC RX 250 W HCPCS SELF ADMINISTERED DRUGS (ALT 637 FOR MEDICARE OP, ALT 636 FOR OP/ED): Performed by: INTERNAL MEDICINE

## 2023-12-23 PROCEDURE — 2500000004 HC RX 250 GENERAL PHARMACY W/ HCPCS (ALT 636 FOR OP/ED): Performed by: NURSE PRACTITIONER

## 2023-12-23 PROCEDURE — 2500000004 HC RX 250 GENERAL PHARMACY W/ HCPCS (ALT 636 FOR OP/ED): Performed by: INTERNAL MEDICINE

## 2023-12-23 PROCEDURE — 2500000001 HC RX 250 WO HCPCS SELF ADMINISTERED DRUGS (ALT 637 FOR MEDICARE OP): Performed by: INTERNAL MEDICINE

## 2023-12-23 PROCEDURE — 2500000001 HC RX 250 WO HCPCS SELF ADMINISTERED DRUGS (ALT 637 FOR MEDICARE OP)

## 2023-12-23 PROCEDURE — 82947 ASSAY GLUCOSE BLOOD QUANT: CPT

## 2023-12-23 PROCEDURE — 2060000001 HC INTERMEDIATE ICU ROOM DAILY

## 2023-12-23 PROCEDURE — 2500000001 HC RX 250 WO HCPCS SELF ADMINISTERED DRUGS (ALT 637 FOR MEDICARE OP): Performed by: NURSE PRACTITIONER

## 2023-12-23 PROCEDURE — 96372 THER/PROPH/DIAG INJ SC/IM: CPT | Performed by: INTERNAL MEDICINE

## 2023-12-23 RX ORDER — ALUMINUM HYDROXIDE, MAGNESIUM HYDROXIDE, AND SIMETHICONE 1200; 120; 1200 MG/30ML; MG/30ML; MG/30ML
30 SUSPENSION ORAL 4 TIMES DAILY PRN
Status: DISCONTINUED | OUTPATIENT
Start: 2023-12-23 | End: 2023-12-26 | Stop reason: HOSPADM

## 2023-12-23 RX ADMIN — HEPARIN SODIUM 5000 UNITS: 5000 INJECTION, SOLUTION INTRAVENOUS; SUBCUTANEOUS at 14:52

## 2023-12-23 RX ADMIN — CYANOCOBALAMIN TAB 1000 MCG 1000 MCG: 1000 TAB at 09:07

## 2023-12-23 RX ADMIN — TAMSULOSIN HYDROCHLORIDE 0.4 MG: 0.4 CAPSULE ORAL at 09:07

## 2023-12-23 RX ADMIN — Medication 400 MG: at 09:07

## 2023-12-23 RX ADMIN — METFORMIN HYDROCHLORIDE 1000 MG: 1000 TABLET ORAL at 17:33

## 2023-12-23 RX ADMIN — FINASTERIDE 5 MG: 5 TABLET, FILM COATED ORAL at 21:48

## 2023-12-23 RX ADMIN — METFORMIN HYDROCHLORIDE 1000 MG: 1000 TABLET ORAL at 09:07

## 2023-12-23 RX ADMIN — ERTAPENEM SODIUM 1 G: 1 INJECTION, POWDER, LYOPHILIZED, FOR SOLUTION INTRAMUSCULAR; INTRAVENOUS at 09:00

## 2023-12-23 RX ADMIN — ASPIRIN 162 MG: 81 TABLET, COATED ORAL at 09:07

## 2023-12-23 RX ADMIN — ACETAMINOPHEN 650 MG: 325 TABLET ORAL at 21:57

## 2023-12-23 RX ADMIN — INSULIN GLARGINE 10 UNITS: 100 INJECTION, SOLUTION SUBCUTANEOUS at 21:45

## 2023-12-23 RX ADMIN — ATORVASTATIN CALCIUM 40 MG: 40 TABLET, FILM COATED ORAL at 21:49

## 2023-12-23 RX ADMIN — GABAPENTIN 100 MG: 100 CAPSULE ORAL at 21:49

## 2023-12-23 RX ADMIN — LEVETIRACETAM 250 MG: 250 TABLET, FILM COATED ORAL at 09:07

## 2023-12-23 RX ADMIN — TAMSULOSIN HYDROCHLORIDE 0.4 MG: 0.4 CAPSULE ORAL at 21:48

## 2023-12-23 RX ADMIN — FUROSEMIDE 40 MG: 40 TABLET ORAL at 09:07

## 2023-12-23 RX ADMIN — ALUMINUM HYDROXIDE, MAGNESIUM HYDROXIDE, AND SIMETHICONE 30 ML: 200; 200; 20 SUSPENSION ORAL at 22:09

## 2023-12-23 RX ADMIN — FERROUS SULFATE TAB 325 MG (65 MG ELEMENTAL FE) 1 TABLET: 325 (65 FE) TAB at 09:07

## 2023-12-23 RX ADMIN — HEPARIN SODIUM 5000 UNITS: 5000 INJECTION, SOLUTION INTRAVENOUS; SUBCUTANEOUS at 21:48

## 2023-12-23 RX ADMIN — DOCUSATE SODIUM 100 MG: 100 CAPSULE, LIQUID FILLED ORAL at 09:07

## 2023-12-23 RX ADMIN — LEVETIRACETAM 250 MG: 250 TABLET, FILM COATED ORAL at 21:49

## 2023-12-23 RX ADMIN — GABAPENTIN 100 MG: 100 CAPSULE ORAL at 09:07

## 2023-12-23 RX ADMIN — HEPARIN SODIUM 5000 UNITS: 5000 INJECTION, SOLUTION INTRAVENOUS; SUBCUTANEOUS at 06:35

## 2023-12-23 RX ADMIN — AMLODIPINE BESYLATE 5 MG: 5 TABLET ORAL at 09:07

## 2023-12-23 RX ADMIN — IRON SUCROSE 200 MG: 20 INJECTION, SOLUTION INTRAVENOUS at 06:35

## 2023-12-23 RX ADMIN — PANTOPRAZOLE SODIUM 40 MG: 40 TABLET, DELAYED RELEASE ORAL at 06:35

## 2023-12-23 RX ADMIN — CLOPIDOGREL BISULFATE 75 MG: 75 TABLET ORAL at 09:07

## 2023-12-23 ASSESSMENT — PAIN SCALES - GENERAL
PAINLEVEL_OUTOF10: 0 - NO PAIN
PAINLEVEL_OUTOF10: 3
PAINLEVEL_OUTOF10: 0 - NO PAIN
PAINLEVEL_OUTOF10: 2

## 2023-12-23 ASSESSMENT — COGNITIVE AND FUNCTIONAL STATUS - GENERAL
TOILETING: A LOT
MOVING FROM LYING ON BACK TO SITTING ON SIDE OF FLAT BED WITH BEDRAILS: A LOT
HELP NEEDED FOR BATHING: A LOT
DRESSING REGULAR UPPER BODY CLOTHING: A LOT
MOVING TO AND FROM BED TO CHAIR: A LOT
MOBILITY SCORE: 12
TURNING FROM BACK TO SIDE WHILE IN FLAT BAD: A LOT
STANDING UP FROM CHAIR USING ARMS: A LOT
WALKING IN HOSPITAL ROOM: A LOT
PERSONAL GROOMING: A LOT
DRESSING REGULAR LOWER BODY CLOTHING: A LOT
CLIMB 3 TO 5 STEPS WITH RAILING: A LOT
DAILY ACTIVITIY SCORE: 14

## 2023-12-23 ASSESSMENT — PAIN - FUNCTIONAL ASSESSMENT
PAIN_FUNCTIONAL_ASSESSMENT: 0-10
PAIN_FUNCTIONAL_ASSESSMENT: WONG-BAKER FACES
PAIN_FUNCTIONAL_ASSESSMENT: 0-10

## 2023-12-23 ASSESSMENT — PAIN DESCRIPTION - LOCATION: LOCATION: GENERALIZED

## 2023-12-23 NOTE — NURSING NOTE
Patient resting quietly in bed, awake and alert.  Offers no complaints of pain/discomfort.  Discussed plan of care for today.  Continues on IV ATB therapy with no adverse side effects.  Call light in reach.

## 2023-12-23 NOTE — PROGRESS NOTES
"Luis Greene is a 83 y.o. male on day 5 of admission presenting with Urinary tract infection associated with indwelling urethral catheter, initial encounter (CMS/[facility]).    Subjective   Up in chair with son at bedside. No complaints       Objective     Physical Exam  Cardiovascular:      Rate and Rhythm: Normal rate.   Pulmonary:      Effort: Pulmonary effort is normal.   Abdominal:      General: Bowel sounds are normal.   Musculoskeletal:         General: Normal range of motion.   Neurological:      Mental Status: He is alert.         Last Recorded Vitals  Blood pressure 134/50, pulse 87, temperature 36.7 °C (98.1 °F), temperature source Temporal, resp. rate 18, height 1.803 m (5' 10.98\"), weight 80.7 kg (177 lb 14.6 oz), SpO2 97 %.  Intake/Output last 3 Shifts:  I/O last 3 completed shifts:  In: - (0 mL/kg)   Out: 3900 (48.3 mL/kg) [Urine:3900 (1.3 mL/kg/hr)]  Weight: 80.7 kg     Relevant Results              This patient has a central line   Reason for the central line remaining today? Parenteral medication    This patient has a urinary catheter   Reason for the urinary catheter remaining today? urinary retention/bladder outlet obstruction, acute or chronic               Assessment/Plan     Urinary tract infection associated with indwelling urethral catheter, initial encounter (CMS/[facility])  Active Problems:    Urinary tract infection associated with indwelling urethral catheter (CMS/[facility])    Elevated troponin  Bacteremia  Recurrent UTI  Neuropathy  Diabetes  Anemia     Plan: Continue current medication.  Supportive care.  Patient has been scheduled to have outpatient IV antibiotic test ertapenem.  ID input appreciated.  Patient medically stable for discharge when arrangements made.    I have seen the patient and verified the exam. I have personally reviewed all available data.  I agree with the note as documented with additional comments if needed. The assessment and plan as outlined are reflection of our " discussion.    Vickey Gunn MD

## 2023-12-23 NOTE — PROGRESS NOTES
Patient is planned discharge home with [facility] for infusions.   [facility] has accepted for medications, need accepting home care agency for skilled nursing services.       [facility] referral reviewed, no accepting agency at this time.   Referral updated with additional agencies and sent for review.   Awaiting responses.      UPDATE 1630:  [facility] referral reviewed, only accepting agency at this time is [facility] with first available SOC for WEDNESDAY 12/27, [facility] will continue to follow.  [facility] updated.      Discharge plan NOT secure  DO NOT DC without speaking to care coordination

## 2023-12-23 NOTE — CARE PLAN
The patient's goals for the shift include      The clinical goals for the shift include IV ATB therapy    Over the shift, the patient did not make progress toward the following goals. Barriers to progression include -. Recommendations to address these barriers include -.

## 2023-12-24 LAB
BACTERIA BLD AEROBE CULT: ABNORMAL
BACTERIA BLD CULT: ABNORMAL
GLUCOSE BLD MANUAL STRIP-MCNC: 112 MG/DL (ref 74–99)
GLUCOSE BLD MANUAL STRIP-MCNC: 143 MG/DL (ref 74–99)
GLUCOSE BLD MANUAL STRIP-MCNC: 182 MG/DL (ref 74–99)
GLUCOSE BLD MANUAL STRIP-MCNC: 278 MG/DL (ref 74–99)
GRAM STN SPEC: ABNORMAL

## 2023-12-24 PROCEDURE — 2500000001 HC RX 250 WO HCPCS SELF ADMINISTERED DRUGS (ALT 637 FOR MEDICARE OP)

## 2023-12-24 PROCEDURE — 2500000001 HC RX 250 WO HCPCS SELF ADMINISTERED DRUGS (ALT 637 FOR MEDICARE OP): Performed by: NURSE PRACTITIONER

## 2023-12-24 PROCEDURE — 96372 THER/PROPH/DIAG INJ SC/IM: CPT | Performed by: INTERNAL MEDICINE

## 2023-12-24 PROCEDURE — 82947 ASSAY GLUCOSE BLOOD QUANT: CPT

## 2023-12-24 PROCEDURE — 2500000002 HC RX 250 W HCPCS SELF ADMINISTERED DRUGS (ALT 637 FOR MEDICARE OP, ALT 636 FOR OP/ED): Performed by: INTERNAL MEDICINE

## 2023-12-24 PROCEDURE — 2500000004 HC RX 250 GENERAL PHARMACY W/ HCPCS (ALT 636 FOR OP/ED): Performed by: INTERNAL MEDICINE

## 2023-12-24 PROCEDURE — 2500000001 HC RX 250 WO HCPCS SELF ADMINISTERED DRUGS (ALT 637 FOR MEDICARE OP): Performed by: INTERNAL MEDICINE

## 2023-12-24 PROCEDURE — 2060000001 HC INTERMEDIATE ICU ROOM DAILY

## 2023-12-24 PROCEDURE — 2500000004 HC RX 250 GENERAL PHARMACY W/ HCPCS (ALT 636 FOR OP/ED): Performed by: NURSE PRACTITIONER

## 2023-12-24 RX ORDER — L. ACIDOPHILUS/L.BULGARICUS 1MM CELL
1 TABLET ORAL
Status: DISCONTINUED | OUTPATIENT
Start: 2023-12-25 | End: 2023-12-26 | Stop reason: HOSPADM

## 2023-12-24 RX ORDER — POLYETHYLENE GLYCOL 3350 17 G/17G
17 POWDER, FOR SOLUTION ORAL DAILY PRN
Status: DISCONTINUED | OUTPATIENT
Start: 2023-12-24 | End: 2023-12-26 | Stop reason: HOSPADM

## 2023-12-24 RX ORDER — DOCUSATE SODIUM 100 MG/1
100 CAPSULE, LIQUID FILLED ORAL 2 TIMES DAILY PRN
Status: DISCONTINUED | OUTPATIENT
Start: 2023-12-24 | End: 2023-12-26 | Stop reason: HOSPADM

## 2023-12-24 RX ADMIN — PANTOPRAZOLE SODIUM 40 MG: 40 TABLET, DELAYED RELEASE ORAL at 06:31

## 2023-12-24 RX ADMIN — CYANOCOBALAMIN TAB 1000 MCG 1000 MCG: 1000 TAB at 08:52

## 2023-12-24 RX ADMIN — ERTAPENEM SODIUM 1 G: 1 INJECTION, POWDER, LYOPHILIZED, FOR SOLUTION INTRAMUSCULAR; INTRAVENOUS at 08:52

## 2023-12-24 RX ADMIN — GABAPENTIN 100 MG: 100 CAPSULE ORAL at 20:26

## 2023-12-24 RX ADMIN — Medication 400 MG: at 08:52

## 2023-12-24 RX ADMIN — FUROSEMIDE 40 MG: 40 TABLET ORAL at 08:52

## 2023-12-24 RX ADMIN — GABAPENTIN 100 MG: 100 CAPSULE ORAL at 08:52

## 2023-12-24 RX ADMIN — METFORMIN HYDROCHLORIDE 1000 MG: 1000 TABLET ORAL at 08:52

## 2023-12-24 RX ADMIN — ACETAMINOPHEN 650 MG: 325 TABLET ORAL at 20:33

## 2023-12-24 RX ADMIN — METFORMIN HYDROCHLORIDE 1000 MG: 1000 TABLET ORAL at 17:48

## 2023-12-24 RX ADMIN — HEPARIN SODIUM 5000 UNITS: 5000 INJECTION, SOLUTION INTRAVENOUS; SUBCUTANEOUS at 21:01

## 2023-12-24 RX ADMIN — ATORVASTATIN CALCIUM 40 MG: 40 TABLET, FILM COATED ORAL at 20:26

## 2023-12-24 RX ADMIN — DOCUSATE SODIUM 100 MG: 100 CAPSULE, LIQUID FILLED ORAL at 08:52

## 2023-12-24 RX ADMIN — ASPIRIN 162 MG: 81 TABLET, COATED ORAL at 08:52

## 2023-12-24 RX ADMIN — TAMSULOSIN HYDROCHLORIDE 0.4 MG: 0.4 CAPSULE ORAL at 08:52

## 2023-12-24 RX ADMIN — INSULIN GLARGINE 10 UNITS: 100 INJECTION, SOLUTION SUBCUTANEOUS at 20:29

## 2023-12-24 RX ADMIN — TAMSULOSIN HYDROCHLORIDE 0.4 MG: 0.4 CAPSULE ORAL at 20:26

## 2023-12-24 RX ADMIN — AMLODIPINE BESYLATE 5 MG: 5 TABLET ORAL at 08:52

## 2023-12-24 RX ADMIN — IRON SUCROSE 200 MG: 20 INJECTION, SOLUTION INTRAVENOUS at 06:31

## 2023-12-24 RX ADMIN — CLOPIDOGREL BISULFATE 75 MG: 75 TABLET ORAL at 08:52

## 2023-12-24 RX ADMIN — LEVETIRACETAM 250 MG: 250 TABLET, FILM COATED ORAL at 20:26

## 2023-12-24 RX ADMIN — FINASTERIDE 5 MG: 5 TABLET, FILM COATED ORAL at 20:26

## 2023-12-24 RX ADMIN — FERROUS SULFATE TAB 325 MG (65 MG ELEMENTAL FE) 1 TABLET: 325 (65 FE) TAB at 08:52

## 2023-12-24 RX ADMIN — LEVETIRACETAM 250 MG: 250 TABLET, FILM COATED ORAL at 08:52

## 2023-12-24 RX ADMIN — HEPARIN SODIUM 5000 UNITS: 5000 INJECTION, SOLUTION INTRAVENOUS; SUBCUTANEOUS at 06:31

## 2023-12-24 ASSESSMENT — PAIN - FUNCTIONAL ASSESSMENT
PAIN_FUNCTIONAL_ASSESSMENT: 0-10

## 2023-12-24 ASSESSMENT — COGNITIVE AND FUNCTIONAL STATUS - GENERAL
DAILY ACTIVITIY SCORE: 24
MOBILITY SCORE: 24
MOBILITY SCORE: 24
DAILY ACTIVITIY SCORE: 24

## 2023-12-24 ASSESSMENT — PAIN DESCRIPTION - LOCATION: LOCATION: HIP

## 2023-12-24 ASSESSMENT — PAIN SCALES - GENERAL
PAINLEVEL_OUTOF10: 0 - NO PAIN
PAINLEVEL_OUTOF10: 0 - NO PAIN
PAINLEVEL_OUTOF10: 7
PAINLEVEL_OUTOF10: 0 - NO PAIN

## 2023-12-24 NOTE — NURSING NOTE
Assumed care of patient.  Resting quietly in bed at this time.  Awakens easily.  Continues on IV ATB therapy with no adverse side effects.  Reviewed care coordination notes.  Patient to stay in hospital and receive ATB therapy.    Call light and commonly used items in reach.  Bed locked and in low position.

## 2023-12-24 NOTE — CARE PLAN
The patient's goals for the shift include      The clinical goals for the shift include no adverse side effects from ATB    Over the shift, the patient did not make progress toward the following goals. Barriers to progression include -. Recommendations to address these barriers include -.

## 2023-12-24 NOTE — PROGRESS NOTES
INTERNAL MEDICINE  NOTE                                            PATIENT NAME: Luis Greene    MRN: [id number]  SERVICE DATE: 12/24/2023      ASSESSMENT and PLAN:    Urinary tract infection associated with indwelling urethral catheter, initial encounter (CMS/[facility])  Active Problems:    Urinary tract infection associated with indwelling urethral catheter (CMS/[facility])    Elevated troponin  Bacteremia  Recurrent UTI  Neuropathy  Diabetes  Anemia       Patient has been scheduled to have outpatient IV antibiotic test ertapenem.  ID input appreciated.  Patient medically stable for discharge when arrangements made.    only accepting agency at this time is [facility] with first available SOC for WEDNESDAY 12/27     Diarrhea - stop colace/ miralax. Start probiotics if still persist, then check c diff    Continue current medications.  Supportive care.  Physical therapy and Occupational Therapy -as needed.  Continue to monitor labs. Continue DVT, aspiration, decubitus precautions.         SUBJECTIVE:    Pt is seen and examined. C.o losse stool    OBJECTIVE:      Current Facility-Administered Medications:     acetaminophen (Tylenol) tablet 650 mg, 650 mg, oral, q4h PRN, 650 mg at 12/23/23 2157 **OR** acetaminophen (Tylenol) oral liquid 650 mg, 650 mg, oral, q4h PRN **OR** acetaminophen (Tylenol) suppository 650 mg, 650 mg, rectal, q4h PRN, Pete Oneill MD    alteplase (Cathflo Activase) injection 2 mg, 2 mg, intra-catheter, PRN, Raul Edwards MD    alum-mag hydroxide-simeth (Mylanta) 200-200-20 mg/5 mL oral suspension 30 mL, 30 mL, oral, 4x daily PRN, Vickey Gunn MD, 30 mL at 12/23/23 2209    amLODIPine (Norvasc) tablet 5 mg, 5 mg, oral, Daily, Pete Oneill MD, 5 mg at 12/24/23 0852    aspirin EC tablet 162 mg, 162 mg, oral, Daily, Pete Oneill MD, 162 mg at 12/24/23 0852    atorvastatin (Lipitor) tablet 40 mg, 40 mg, oral, Nightly, Pete Oneill MD, 40 mg at 12/23/23 0021     benzocaine-menthol (Cepastat Sore Throat) 15-3.6 mg lozenge 1 lozenge, 1 lozenge, Mouth/Throat, q2h PRN, Pete Oneill MD    clopidogrel (Plavix) tablet 75 mg, 75 mg, oral, Daily, Pete Oneill MD, 75 mg at 12/24/23 0852    cyanocobalamin (Vitamin B-12) tablet 1,000 mcg, 1,000 mcg, oral, Daily, Pete Oneill MD, 1,000 mcg at 12/24/23 0852    dextromethorphan-guaifenesin (Robitussin DM)  mg/5 mL oral liquid 5 mL, 5 mL, oral, q4h PRN, Pete Oneill MD    dextrose 10 % in water (D10W) infusion, 0.3 g/kg/hr, intravenous, Once PRN, Pete Oneill MD    dextrose 50 % injection 25 g, 25 g, intravenous, q15 min PRN, Pete Oneill MD    docusate sodium (Colace) capsule 100 mg, 100 mg, oral, BID, Pete Oneill MD, 100 mg at 12/24/23 0852    ertapenem (INVanz) 1 g in sodium chloride 0.9% 50 mL IV, 1 g, intravenous, q24h, Raul Edwards MD, Stopped at 12/24/23 0922    ferrous sulfate (325 mg ferrous sulfate) tablet 1 tablet, 65 mg of iron, oral, Daily, Martina Haynes, APRN-CNP, 1 tablet at 12/24/23 0852    finasteride (Proscar) tablet 5 mg, 5 mg, oral, Nightly, Raul Fine, [facility], 5 mg at 12/23/23 2148    furosemide (Lasix) tablet 40 mg, 40 mg, oral, Daily, Pete Oneill MD, 40 mg at 12/24/23 0852    gabapentin (Neurontin) capsule 100 mg, 100 mg, oral, BID, Pete Oneill MD, 100 mg at 12/24/23 0852    glucagon (Glucagen) injection 1 mg, 1 mg, intramuscular, q15 min PRN, Pete Oneill MD    guaiFENesin (Mucinex) 12 hr tablet 600 mg, 600 mg, oral, q12h PRN, Pete Oneill MD    heparin (porcine) injection 5,000 Units, 5,000 Units, subcutaneous, q8h TAYLOR, Pete Oneill MD, 5,000 Units at 12/24/23 0631    insulin glargine (Lantus) injection 10 Units, 10 Units, subcutaneous, Nightly, Pete Oneill MD, 10 Units at 12/23/23 2145    levETIRAcetam (Keppra) tablet 250 mg, 250 mg, oral, BID, Pete Oneill MD, 250 mg at 12/24/23 0852    magnesium  "oxide (Mag-Ox) tablet 400 mg, 400 mg, oral, Daily, Pete Oneill MD, 400 mg at 12/24/23 0852    metFORMIN (Glucophage) tablet 1,000 mg, 1,000 mg, oral, BID with meals, Pete Oneill MD, 1,000 mg at 12/24/23 0852    pantoprazole (ProtoNix) EC tablet 40 mg, 40 mg, oral, Daily before breakfast, Pete Oneill MD, 40 mg at 12/24/23 0631    polyethylene glycol (Glycolax, Miralax) packet 17 g, 17 g, oral, BID, Pete Oneill MD, 17 g at 12/21/23 2151    tamsulosin (Flomax) 24 hr capsule 0.4 mg, 0.4 mg, oral, BID, Pete Oneill MD, 0.4 mg at 12/24/23 0852        VITAL SIGNS:  BP (!) 122/44 (BP Location: Left arm, Patient Position: Sitting)   Pulse 68   Temp 36 °C (96.8 °F) (Temporal)   Resp 18   Ht 1.803 m (5' 10.98\")   Wt 79.3 kg (174 lb 13.2 oz)   SpO2 100%   BMI 24.39 kg/m²         PHYSICAL EXAM:  General: Awake and Alert, in no distress  Head - Normocephalic, atraumatic  Cardiac: S1S2 wnl, No MGR  Lungs: Clear to auscultation, no rales  Abdomen: Soft, non-tender  Extremities: No obvious deformity          DATA:   Diagnostic tests reviewed for today's visit:          Results for orders placed or performed during the hospital encounter of 12/18/23 (from the past 24 hour(s))   POCT GLUCOSE   Result Value Ref Range    POCT Glucose 130 (H) 74 - 99 mg/dL   POCT GLUCOSE   Result Value Ref Range    POCT Glucose 120 (H) 74 - 99 mg/dL   POCT GLUCOSE   Result Value Ref Range    POCT Glucose 112 (H) 74 - 99 mg/dL   POCT GLUCOSE   Result Value Ref Range    POCT Glucose 182 (H) 74 - 99 mg/dL           SIGNATURE    Vickey Gunn MD  DATE: December 24, 2023    "

## 2023-12-24 NOTE — CARE PLAN
The patient's goals for the shift include      The clinical goals for the shift include IV ATB therapy, remain safe    Over the shift, the patient did not make progress toward the following goals. Barriers to progression include weakness. Recommendations to address these barriers include administer ordered medications/interventions, encourage pt to call for help with ambulation.

## 2023-12-25 LAB
ANION GAP SERPL CALC-SCNC: 12 MMOL/L
BASOPHILS # BLD AUTO: 0.02 X10*3/UL (ref 0–0.1)
BASOPHILS NFR BLD AUTO: 0.3 %
BUN SERPL-MCNC: 15 MG/DL (ref 8–25)
CALCIUM SERPL-MCNC: 8.3 MG/DL (ref 8.5–10.4)
CHLORIDE SERPL-SCNC: 100 MMOL/L (ref 97–107)
CO2 SERPL-SCNC: 24 MMOL/L (ref 24–31)
CREAT SERPL-MCNC: 1 MG/DL (ref 0.4–1.6)
EOSINOPHIL # BLD AUTO: 0.32 X10*3/UL (ref 0–0.4)
EOSINOPHIL NFR BLD AUTO: 4.8 %
ERYTHROCYTE [DISTWIDTH] IN BLOOD BY AUTOMATED COUNT: 14.3 % (ref 11.5–14.5)
GFR SERPL CREATININE-BSD FRML MDRD: 75 ML/MIN/1.73M*2
GLUCOSE BLD MANUAL STRIP-MCNC: 148 MG/DL (ref 74–99)
GLUCOSE BLD MANUAL STRIP-MCNC: 189 MG/DL (ref 74–99)
GLUCOSE BLD MANUAL STRIP-MCNC: 230 MG/DL (ref 74–99)
GLUCOSE SERPL-MCNC: 68 MG/DL (ref 65–99)
HCT VFR BLD AUTO: 26.3 % (ref 41–52)
HGB BLD-MCNC: 8.9 G/DL (ref 13.5–17.5)
IMM GRANULOCYTES # BLD AUTO: 0.16 X10*3/UL (ref 0–0.5)
IMM GRANULOCYTES NFR BLD AUTO: 2.4 % (ref 0–0.9)
LYMPHOCYTES # BLD AUTO: 1.99 X10*3/UL (ref 0.8–3)
LYMPHOCYTES NFR BLD AUTO: 29.6 %
MCH RBC QN AUTO: 33.7 PG (ref 26–34)
MCHC RBC AUTO-ENTMCNC: 33.8 G/DL (ref 32–36)
MCV RBC AUTO: 100 FL (ref 80–100)
MONOCYTES # BLD AUTO: 0.59 X10*3/UL (ref 0.05–0.8)
MONOCYTES NFR BLD AUTO: 8.8 %
NEUTROPHILS # BLD AUTO: 3.64 X10*3/UL (ref 1.6–5.5)
NEUTROPHILS NFR BLD AUTO: 54.1 %
NRBC BLD-RTO: 0 /100 WBCS (ref 0–0)
PLATELET # BLD AUTO: 223 X10*3/UL (ref 150–450)
POTASSIUM SERPL-SCNC: 3.7 MMOL/L (ref 3.4–5.1)
RBC # BLD AUTO: 2.64 X10*6/UL (ref 4.5–5.9)
SODIUM SERPL-SCNC: 136 MMOL/L (ref 133–145)
WBC # BLD AUTO: 6.7 X10*3/UL (ref 4.4–11.3)

## 2023-12-25 PROCEDURE — 2500000001 HC RX 250 WO HCPCS SELF ADMINISTERED DRUGS (ALT 637 FOR MEDICARE OP)

## 2023-12-25 PROCEDURE — 36415 COLL VENOUS BLD VENIPUNCTURE: CPT | Performed by: INTERNAL MEDICINE

## 2023-12-25 PROCEDURE — 80048 BASIC METABOLIC PNL TOTAL CA: CPT | Performed by: INTERNAL MEDICINE

## 2023-12-25 PROCEDURE — 2500000001 HC RX 250 WO HCPCS SELF ADMINISTERED DRUGS (ALT 637 FOR MEDICARE OP): Performed by: INTERNAL MEDICINE

## 2023-12-25 PROCEDURE — 82947 ASSAY GLUCOSE BLOOD QUANT: CPT

## 2023-12-25 PROCEDURE — 2500000004 HC RX 250 GENERAL PHARMACY W/ HCPCS (ALT 636 FOR OP/ED): Performed by: INTERNAL MEDICINE

## 2023-12-25 PROCEDURE — 2500000001 HC RX 250 WO HCPCS SELF ADMINISTERED DRUGS (ALT 637 FOR MEDICARE OP): Performed by: NURSE PRACTITIONER

## 2023-12-25 PROCEDURE — 2500000002 HC RX 250 W HCPCS SELF ADMINISTERED DRUGS (ALT 637 FOR MEDICARE OP, ALT 636 FOR OP/ED): Performed by: INTERNAL MEDICINE

## 2023-12-25 PROCEDURE — 85025 COMPLETE CBC W/AUTO DIFF WBC: CPT | Performed by: INTERNAL MEDICINE

## 2023-12-25 PROCEDURE — 2060000001 HC INTERMEDIATE ICU ROOM DAILY

## 2023-12-25 PROCEDURE — 96372 THER/PROPH/DIAG INJ SC/IM: CPT | Performed by: INTERNAL MEDICINE

## 2023-12-25 RX ADMIN — Medication 1 TABLET: at 09:41

## 2023-12-25 RX ADMIN — HEPARIN SODIUM 5000 UNITS: 5000 INJECTION, SOLUTION INTRAVENOUS; SUBCUTANEOUS at 21:22

## 2023-12-25 RX ADMIN — ACETAMINOPHEN 650 MG: 325 TABLET ORAL at 21:23

## 2023-12-25 RX ADMIN — TAMSULOSIN HYDROCHLORIDE 0.4 MG: 0.4 CAPSULE ORAL at 21:22

## 2023-12-25 RX ADMIN — TAMSULOSIN HYDROCHLORIDE 0.4 MG: 0.4 CAPSULE ORAL at 09:41

## 2023-12-25 RX ADMIN — ATORVASTATIN CALCIUM 40 MG: 40 TABLET, FILM COATED ORAL at 21:22

## 2023-12-25 RX ADMIN — Medication 400 MG: at 09:41

## 2023-12-25 RX ADMIN — FINASTERIDE 5 MG: 5 TABLET, FILM COATED ORAL at 21:22

## 2023-12-25 RX ADMIN — GABAPENTIN 100 MG: 100 CAPSULE ORAL at 09:42

## 2023-12-25 RX ADMIN — ASPIRIN 162 MG: 81 TABLET, COATED ORAL at 09:41

## 2023-12-25 RX ADMIN — CYANOCOBALAMIN TAB 1000 MCG 1000 MCG: 1000 TAB at 09:42

## 2023-12-25 RX ADMIN — INSULIN GLARGINE 10 UNITS: 100 INJECTION, SOLUTION SUBCUTANEOUS at 21:21

## 2023-12-25 RX ADMIN — LEVETIRACETAM 250 MG: 250 TABLET, FILM COATED ORAL at 21:22

## 2023-12-25 RX ADMIN — METFORMIN HYDROCHLORIDE 1000 MG: 1000 TABLET ORAL at 09:42

## 2023-12-25 RX ADMIN — FERROUS SULFATE TAB 325 MG (65 MG ELEMENTAL FE) 1 TABLET: 325 (65 FE) TAB at 09:41

## 2023-12-25 RX ADMIN — LEVETIRACETAM 250 MG: 250 TABLET, FILM COATED ORAL at 09:42

## 2023-12-25 RX ADMIN — FUROSEMIDE 40 MG: 40 TABLET ORAL at 09:41

## 2023-12-25 RX ADMIN — HEPARIN SODIUM 5000 UNITS: 5000 INJECTION, SOLUTION INTRAVENOUS; SUBCUTANEOUS at 05:29

## 2023-12-25 RX ADMIN — AMLODIPINE BESYLATE 5 MG: 5 TABLET ORAL at 09:42

## 2023-12-25 RX ADMIN — METFORMIN HYDROCHLORIDE 1000 MG: 1000 TABLET ORAL at 16:36

## 2023-12-25 RX ADMIN — PANTOPRAZOLE SODIUM 40 MG: 40 TABLET, DELAYED RELEASE ORAL at 06:13

## 2023-12-25 RX ADMIN — ERTAPENEM SODIUM 1 G: 1 INJECTION, POWDER, LYOPHILIZED, FOR SOLUTION INTRAMUSCULAR; INTRAVENOUS at 09:45

## 2023-12-25 RX ADMIN — GABAPENTIN 100 MG: 100 CAPSULE ORAL at 21:22

## 2023-12-25 RX ADMIN — HEPARIN SODIUM 5000 UNITS: 5000 INJECTION, SOLUTION INTRAVENOUS; SUBCUTANEOUS at 16:36

## 2023-12-25 RX ADMIN — Medication 1 TABLET: at 16:36

## 2023-12-25 RX ADMIN — CLOPIDOGREL BISULFATE 75 MG: 75 TABLET ORAL at 09:41

## 2023-12-25 ASSESSMENT — PAIN - FUNCTIONAL ASSESSMENT
PAIN_FUNCTIONAL_ASSESSMENT: 0-10
PAIN_FUNCTIONAL_ASSESSMENT: 0-10

## 2023-12-25 ASSESSMENT — PAIN SCALES - GENERAL
PAINLEVEL_OUTOF10: 1
PAINLEVEL_OUTOF10: 3

## 2023-12-25 ASSESSMENT — PAIN DESCRIPTION - LOCATION: LOCATION: GENERALIZED

## 2023-12-25 NOTE — PROGRESS NOTES
INTERNAL MEDICINE  NOTE                                            PATIENT NAME: Luis Greene    MRN: [id number]  SERVICE DATE: 12/25/2023      ASSESSMENT and PLAN:    Urinary tract infection associated with indwelling urethral catheter, initial encounter (CMS/[facility])  Active Problems:    Urinary tract infection associated with indwelling urethral catheter (CMS/[facility])    Elevated troponin  Bacteremia  Recurrent UTI  Neuropathy  Diabetes  Anemia       Patient has been scheduled to have outpatient IV antibiotic  ertapenem.  ID input appreciated.  Patient medically stable for discharge when arrangements made.     only accepting agency at this time is [facility] with first available SOC for WEDNESDAY 12/27      Diarrhea - stopped colace/ miralax. Start probiotics if still persist, then check c diff         Continue current medications.  Supportive care.  Physical therapy and Occupational Therapy -as needed.  Continue to monitor labs. Continue DVT, aspiration, decubitus precautions.         SUBJECTIVE:    Pt is seen and examined. No new issues.     OBJECTIVE:      Current Facility-Administered Medications:     acetaminophen (Tylenol) tablet 650 mg, 650 mg, oral, q4h PRN, 650 mg at 12/24/23 2033 **OR** acetaminophen (Tylenol) oral liquid 650 mg, 650 mg, oral, q4h PRN **OR** acetaminophen (Tylenol) suppository 650 mg, 650 mg, rectal, q4h PRN, Pete Oneill MD    alteplase (Cathflo Activase) injection 2 mg, 2 mg, intra-catheter, PRN, Raul Edwards MD    alum-mag hydroxide-simeth (Mylanta) 200-200-20 mg/5 mL oral suspension 30 mL, 30 mL, oral, 4x daily PRN, Vickey Gunn MD, 30 mL at 12/23/23 2209    amLODIPine (Norvasc) tablet 5 mg, 5 mg, oral, Daily, Pete Oneill MD, 5 mg at 12/25/23 0942    aspirin EC tablet 162 mg, 162 mg, oral, Daily, Pete Oneill MD, 162 mg at 12/25/23 0941    atorvastatin (Lipitor) tablet 40 mg, 40 mg, oral, Nightly, Pete Oneill MD, 40 mg at 12/24/23  2026    benzocaine-menthol (Cepastat Sore Throat) 15-3.6 mg lozenge 1 lozenge, 1 lozenge, Mouth/Throat, q2h PRN, Pete Oneill MD    clopidogrel (Plavix) tablet 75 mg, 75 mg, oral, Daily, Pete Oneill MD, 75 mg at 12/25/23 0941    cyanocobalamin (Vitamin B-12) tablet 1,000 mcg, 1,000 mcg, oral, Daily, Pete Oneill MD, 1,000 mcg at 12/25/23 0942    dextromethorphan-guaifenesin (Robitussin DM)  mg/5 mL oral liquid 5 mL, 5 mL, oral, q4h PRN, Pete Oneill MD    dextrose 10 % in water (D10W) infusion, 0.3 g/kg/hr, intravenous, Once PRN, Pete Oneill MD    dextrose 50 % injection 25 g, 25 g, intravenous, q15 min PRN, Pete Oneill MD    docusate sodium (Colace) capsule 100 mg, 100 mg, oral, BID PRN, Vickey Gunn MD    ertapenem (INVanz) 1 g in sodium chloride 0.9% 50 mL IV, 1 g, intravenous, q24h, Raul Edwards MD, Stopped at 12/25/23 1015    ferrous sulfate (325 mg ferrous sulfate) tablet 1 tablet, 65 mg of iron, oral, Daily, Martina Haynes, APRN-CNP, 1 tablet at 12/25/23 0941    finasteride (Proscar) tablet 5 mg, 5 mg, oral, Nightly, Raul Fine, [facility], 5 mg at 12/24/23 2026    furosemide (Lasix) tablet 40 mg, 40 mg, oral, Daily, Pete Oneill MD, 40 mg at 12/25/23 0941    gabapentin (Neurontin) capsule 100 mg, 100 mg, oral, BID, Pete Oneill MD, 100 mg at 12/25/23 0942    glucagon (Glucagen) injection 1 mg, 1 mg, intramuscular, q15 min PRN, Pete Oneill MD    guaiFENesin (Mucinex) 12 hr tablet 600 mg, 600 mg, oral, q12h PRN, Pete Oneill MD    heparin (porcine) injection 5,000 Units, 5,000 Units, subcutaneous, q8h TAYLOR, Pete Oneill MD, 5,000 Units at 12/25/23 0529    insulin glargine (Lantus) injection 10 Units, 10 Units, subcutaneous, Nightly, Pete Oneill MD, 10 Units at 12/24/23 2029    lactobacillus acidophilus tablet 1 tablet, 1 tablet, oral, BID with meals, Vickey Gunn MD, 1 tablet at 12/25/23 0918     "levETIRAcetam (Keppra) tablet 250 mg, 250 mg, oral, BID, Pete Oneill MD, 250 mg at 12/25/23 0942    magnesium oxide (Mag-Ox) tablet 400 mg, 400 mg, oral, Daily, Pete Oneill MD, 400 mg at 12/25/23 0941    metFORMIN (Glucophage) tablet 1,000 mg, 1,000 mg, oral, BID with meals, Pete Oneill MD, 1,000 mg at 12/25/23 0942    pantoprazole (ProtoNix) EC tablet 40 mg, 40 mg, oral, Daily before breakfast, Pete Oneill MD, 40 mg at 12/25/23 0613    polyethylene glycol (Glycolax, Miralax) packet 17 g, 17 g, oral, Daily PRN, Vickey Gunn MD    tamsulosin (Flomax) 24 hr capsule 0.4 mg, 0.4 mg, oral, BID, Pete Oneill MD, 0.4 mg at 12/25/23 0941        VITAL SIGNS:  BP (!) 132/48 (BP Location: Left arm, Patient Position: Sitting)   Pulse 56   Temp 36.4 °C (97.5 °F) (Oral)   Resp 18   Ht 1.803 m (5' 10.98\")   Wt 79.3 kg (174 lb 13.2 oz)   SpO2 97%   BMI 24.39 kg/m²         PHYSICAL EXAM:  General: Awake and Alert, in no distress  Head - Normocephalic, atraumatic  Cardiac: S1S2 wnl, No MGR  Lungs: Clear to auscultation, no rales  Abdomen: Soft, non-tender  Extremities: No obvious deformity          DATA:   Diagnostic tests reviewed for today's visit:          Results for orders placed or performed during the hospital encounter of 12/18/23 (from the past 24 hour(s))   POCT GLUCOSE   Result Value Ref Range    POCT Glucose 278 (H) 74 - 99 mg/dL   POCT GLUCOSE   Result Value Ref Range    POCT Glucose 143 (H) 74 - 99 mg/dL   CBC and Auto Differential   Result Value Ref Range    WBC 6.7 4.4 - 11.3 x10*3/uL    nRBC 0.0 0.0 - 0.0 /100 WBCs    RBC 2.64 (L) 4.50 - 5.90 x10*6/uL    Hemoglobin 8.9 (L) 13.5 - 17.5 g/dL    Hematocrit 26.3 (L) 41.0 - 52.0 %     80 - 100 fL    MCH 33.7 26.0 - 34.0 pg    MCHC 33.8 32.0 - 36.0 g/dL    RDW 14.3 11.5 - 14.5 %    Platelets 223 150 - 450 x10*3/uL    Neutrophils % 54.1 40.0 - 80.0 %    Immature Granulocytes %, Automated 2.4 (H) 0.0 - 0.9 %    " Lymphocytes % 29.6 13.0 - 44.0 %    Monocytes % 8.8 2.0 - 10.0 %    Eosinophils % 4.8 0.0 - 6.0 %    Basophils % 0.3 0.0 - 2.0 %    Neutrophils Absolute 3.64 1.60 - 5.50 x10*3/uL    Immature Granulocytes Absolute, Automated 0.16 0.00 - 0.50 x10*3/uL    Lymphocytes Absolute 1.99 0.80 - 3.00 x10*3/uL    Monocytes Absolute 0.59 0.05 - 0.80 x10*3/uL    Eosinophils Absolute 0.32 0.00 - 0.40 x10*3/uL    Basophils Absolute 0.02 0.00 - 0.10 x10*3/uL   Basic Metabolic Panel   Result Value Ref Range    Glucose 68 65 - 99 mg/dL    Sodium 136 133 - 145 mmol/L    Potassium 3.7 3.4 - 5.1 mmol/L    Chloride 100 97 - 107 mmol/L    Bicarbonate 24 24 - 31 mmol/L    Urea Nitrogen 15 8 - 25 mg/dL    Creatinine 1.00 0.40 - 1.60 mg/dL    eGFR 75 >60 mL/min/1.73m*2    Calcium 8.3 (L) 8.5 - 10.4 mg/dL    Anion Gap 12 <=19 mmol/L           SIGNATURE    Vickey Gunn MD  DATE: December 25, 2023

## 2023-12-25 NOTE — CARE PLAN
The patient's goals for the shift include      The clinical goals for the shift include rest    Over the shift, the patient did not make progress toward the following goals. Barriers to progression include n/a. Recommendations to address these barriers include rest.

## 2023-12-26 VITALS
DIASTOLIC BLOOD PRESSURE: 47 MMHG | HEIGHT: 71 IN | SYSTOLIC BLOOD PRESSURE: 135 MMHG | RESPIRATION RATE: 16 BRPM | BODY MASS INDEX: 24.6 KG/M2 | WEIGHT: 175.71 LBS | OXYGEN SATURATION: 99 % | HEART RATE: 61 BPM | TEMPERATURE: 96.8 F

## 2023-12-26 LAB
GLUCOSE BLD MANUAL STRIP-MCNC: 210 MG/DL (ref 74–99)
GLUCOSE BLD MANUAL STRIP-MCNC: 97 MG/DL (ref 74–99)

## 2023-12-26 PROCEDURE — 96372 THER/PROPH/DIAG INJ SC/IM: CPT | Performed by: INTERNAL MEDICINE

## 2023-12-26 PROCEDURE — 2500000001 HC RX 250 WO HCPCS SELF ADMINISTERED DRUGS (ALT 637 FOR MEDICARE OP): Performed by: INTERNAL MEDICINE

## 2023-12-26 PROCEDURE — 97530 THERAPEUTIC ACTIVITIES: CPT | Mod: GO,CO

## 2023-12-26 PROCEDURE — 97116 GAIT TRAINING THERAPY: CPT | Mod: GP,CQ

## 2023-12-26 PROCEDURE — 97535 SELF CARE MNGMENT TRAINING: CPT | Mod: GO,CO

## 2023-12-26 PROCEDURE — 2500000001 HC RX 250 WO HCPCS SELF ADMINISTERED DRUGS (ALT 637 FOR MEDICARE OP): Performed by: NURSE PRACTITIONER

## 2023-12-26 PROCEDURE — 2500000004 HC RX 250 GENERAL PHARMACY W/ HCPCS (ALT 636 FOR OP/ED): Performed by: INTERNAL MEDICINE

## 2023-12-26 PROCEDURE — 97530 THERAPEUTIC ACTIVITIES: CPT | Mod: GP,CQ

## 2023-12-26 PROCEDURE — 82947 ASSAY GLUCOSE BLOOD QUANT: CPT

## 2023-12-26 RX ORDER — INSULIN GLARGINE AND LIXISENATIDE 100; 33 U/ML; UG/ML
10 INJECTION, SOLUTION SUBCUTANEOUS NIGHTLY
Start: 2023-12-26

## 2023-12-26 RX ADMIN — AMLODIPINE BESYLATE 5 MG: 5 TABLET ORAL at 10:16

## 2023-12-26 RX ADMIN — TAMSULOSIN HYDROCHLORIDE 0.4 MG: 0.4 CAPSULE ORAL at 10:15

## 2023-12-26 RX ADMIN — CLOPIDOGREL BISULFATE 75 MG: 75 TABLET ORAL at 10:16

## 2023-12-26 RX ADMIN — ASPIRIN 162 MG: 81 TABLET, COATED ORAL at 10:15

## 2023-12-26 RX ADMIN — LEVETIRACETAM 250 MG: 250 TABLET, FILM COATED ORAL at 10:16

## 2023-12-26 RX ADMIN — ERTAPENEM 1 G: 1 INJECTION INTRAMUSCULAR; INTRAVENOUS at 10:17

## 2023-12-26 RX ADMIN — Medication 400 MG: at 10:16

## 2023-12-26 RX ADMIN — GABAPENTIN 100 MG: 100 CAPSULE ORAL at 10:15

## 2023-12-26 RX ADMIN — FUROSEMIDE 40 MG: 40 TABLET ORAL at 10:15

## 2023-12-26 RX ADMIN — HEPARIN SODIUM 5000 UNITS: 5000 INJECTION, SOLUTION INTRAVENOUS; SUBCUTANEOUS at 06:26

## 2023-12-26 RX ADMIN — FERROUS SULFATE TAB 325 MG (65 MG ELEMENTAL FE) 1 TABLET: 325 (65 FE) TAB at 10:16

## 2023-12-26 RX ADMIN — CYANOCOBALAMIN TAB 1000 MCG 1000 MCG: 1000 TAB at 10:16

## 2023-12-26 RX ADMIN — Medication 1 TABLET: at 10:15

## 2023-12-26 RX ADMIN — METFORMIN HYDROCHLORIDE 1000 MG: 1000 TABLET ORAL at 10:16

## 2023-12-26 RX ADMIN — PANTOPRAZOLE SODIUM 40 MG: 40 TABLET, DELAYED RELEASE ORAL at 06:26

## 2023-12-26 ASSESSMENT — PAIN - FUNCTIONAL ASSESSMENT
PAIN_FUNCTIONAL_ASSESSMENT: 0-10
PAIN_FUNCTIONAL_ASSESSMENT: WONG-BAKER FACES

## 2023-12-26 ASSESSMENT — COGNITIVE AND FUNCTIONAL STATUS - GENERAL
STANDING UP FROM CHAIR USING ARMS: A LITTLE
CLIMB 3 TO 5 STEPS WITH RAILING: A LOT
DAILY ACTIVITIY SCORE: 20
WALKING IN HOSPITAL ROOM: A LITTLE
DRESSING REGULAR LOWER BODY CLOTHING: A LITTLE
HELP NEEDED FOR BATHING: A LITTLE
TOILETING: A LITTLE
MOBILITY SCORE: 19
DRESSING REGULAR UPPER BODY CLOTHING: A LITTLE
TURNING FROM BACK TO SIDE WHILE IN FLAT BAD: A LITTLE

## 2023-12-26 ASSESSMENT — ACTIVITIES OF DAILY LIVING (ADL): HOME_MANAGEMENT_TIME_ENTRY: 16

## 2023-12-26 ASSESSMENT — PAIN SCALES - GENERAL
PAINLEVEL_OUTOF10: 0 - NO PAIN

## 2023-12-26 NOTE — CARE PLAN
The patient's goals for the shift include      The clinical goals for the shift include rest    Over the shift, the patient did not make progress toward the following goals. Barriers to progression include loose stool. Recommendations to address these barriers include administer medications/interventions as ordered.

## 2023-12-26 NOTE — NURSING NOTE
Spoke to Dr Gunn, while her was on unit rounding for Dr Oneill.  Dr Gunn is aware that everything is set up for pt's discharge today.  I am waiting for Dr Gunn to put in the orders.

## 2023-12-26 NOTE — PROGRESS NOTES
INTERNAL MEDICINE  NOTE                                            PATIENT NAME: Luis Greene    MRN: [id number]  SERVICE DATE: 12/26/2023      ASSESSMENT and PLAN:      Urinary tract infection associated with indwelling urethral catheter, initial encounter (CMS/[facility])  Active Problems:    Urinary tract infection associated with indwelling urethral catheter (CMS/[facility])    Elevated troponin  Bacteremia  Recurrent UTI  Neuropathy  Diabetes  Anemia       Patient has been scheduled to have outpatient IV antibiotic  ertapenem.  ID input appreciated.  Patient medically stable for discharge when arrangements made.     only accepting agency at this time is [facility] with first available SOC for WEDNESDAY 12/27      Diarrhea - stopped colace/ miralax. Start probiotics if still persist, then check c diff       Continue current medications.  Supportive care.  Physical therapy and Occupational Therapy -as needed.  Continue to monitor labs. Continue DVT, aspiration, decubitus precautions.         SUBJECTIVE:    Pt is seen and examined. No new issues.     OBJECTIVE:      Current Facility-Administered Medications:     acetaminophen (Tylenol) tablet 650 mg, 650 mg, oral, q4h PRN, 650 mg at 12/25/23 2123 **OR** acetaminophen (Tylenol) oral liquid 650 mg, 650 mg, oral, q4h PRN **OR** acetaminophen (Tylenol) suppository 650 mg, 650 mg, rectal, q4h PRN, Pete Oneill MD    alteplase (Cathflo Activase) injection 2 mg, 2 mg, intra-catheter, PRN, Raul Edwards MD    alum-mag hydroxide-simeth (Mylanta) 200-200-20 mg/5 mL oral suspension 30 mL, 30 mL, oral, 4x daily PRN, Vickey Gunn MD, 30 mL at 12/23/23 2209    amLODIPine (Norvasc) tablet 5 mg, 5 mg, oral, Daily, Pete Oneill MD, 5 mg at 12/26/23 1016    aspirin EC tablet 162 mg, 162 mg, oral, Daily, Pete Oneill MD, 162 mg at 12/26/23 1015    atorvastatin (Lipitor) tablet 40 mg, 40 mg, oral, Nightly, Pete Oneill MD, 40 mg at 12/25/23  2122    benzocaine-menthol (Cepastat Sore Throat) 15-3.6 mg lozenge 1 lozenge, 1 lozenge, Mouth/Throat, q2h PRN, Pete Oneill MD    clopidogrel (Plavix) tablet 75 mg, 75 mg, oral, Daily, Pete Oneill MD, 75 mg at 12/26/23 1016    cyanocobalamin (Vitamin B-12) tablet 1,000 mcg, 1,000 mcg, oral, Daily, Pete Oneill MD, 1,000 mcg at 12/26/23 1016    dextromethorphan-guaifenesin (Robitussin DM)  mg/5 mL oral liquid 5 mL, 5 mL, oral, q4h PRN, Pete Oneill MD    dextrose 10 % in water (D10W) infusion, 0.3 g/kg/hr, intravenous, Once PRN, Pete Oneill MD    dextrose 50 % injection 25 g, 25 g, intravenous, q15 min PRN, Pete Oneill MD    docusate sodium (Colace) capsule 100 mg, 100 mg, oral, BID PRN, Vickey Gunn MD    ertapenem (INVanz) 1 g in sodium chloride 0.9 % 50 mL IV, 1 g, intravenous, q24h, Raul Edwards MD, Stopped at 12/26/23 1047    ferrous sulfate (325 mg ferrous sulfate) tablet 1 tablet, 65 mg of iron, oral, Daily, Martina Haynes, APRN-CNP, 1 tablet at 12/26/23 1016    finasteride (Proscar) tablet 5 mg, 5 mg, oral, Nightly, Raul Fine, [facility], 5 mg at 12/25/23 2122    furosemide (Lasix) tablet 40 mg, 40 mg, oral, Daily, Pete Oneill MD, 40 mg at 12/26/23 1015    gabapentin (Neurontin) capsule 100 mg, 100 mg, oral, BID, Pete Oneill MD, 100 mg at 12/26/23 1015    glucagon (Glucagen) injection 1 mg, 1 mg, intramuscular, q15 min PRN, Pete Oneill MD    guaiFENesin (Mucinex) 12 hr tablet 600 mg, 600 mg, oral, q12h PRN, Pete Oneill MD    heparin (porcine) injection 5,000 Units, 5,000 Units, subcutaneous, q8h TAYLOR, Pete Oneill MD, 5,000 Units at 12/26/23 0626    insulin glargine (Lantus) injection 10 Units, 10 Units, subcutaneous, Nightly, Pete Oneill MD, 10 Units at 12/25/23 2121    lactobacillus acidophilus tablet 1 tablet, 1 tablet, oral, BID with meals, Vickey Gunn MD, 1 tablet at 12/26/23 1015     "levETIRAcetam (Keppra) tablet 250 mg, 250 mg, oral, BID, Pete Oneill MD, 250 mg at 12/26/23 1016    magnesium oxide (Mag-Ox) tablet 400 mg, 400 mg, oral, Daily, Pete Oneill MD, 400 mg at 12/26/23 1016    metFORMIN (Glucophage) tablet 1,000 mg, 1,000 mg, oral, BID with meals, Pete Oneill MD, 1,000 mg at 12/26/23 1016    pantoprazole (ProtoNix) EC tablet 40 mg, 40 mg, oral, Daily before breakfast, Pete Oneill MD, 40 mg at 12/26/23 0626    polyethylene glycol (Glycolax, Miralax) packet 17 g, 17 g, oral, Daily PRN, Vickey Gunn MD    tamsulosin (Flomax) 24 hr capsule 0.4 mg, 0.4 mg, oral, BID, Pete Oneill MD, 0.4 mg at 12/26/23 1015        VITAL SIGNS:  /54 (BP Location: Left arm, Patient Position: Lying)   Pulse 67   Temp 36.5 °C (97.7 °F) (Temporal)   Resp 16   Ht 1.803 m (5' 10.98\")   Wt 79.7 kg (175 lb 11.3 oz)   SpO2 98%   BMI 24.52 kg/m²         PHYSICAL EXAM:  General: Awake and Alert, in no distress  Head - Normocephalic, atraumatic  Cardiac: S1S2 wnl, No MGR  Lungs: Clear to auscultation, no rales  Abdomen: Soft, non-tender  Extremities: No obvious deformity          DATA:   Diagnostic tests reviewed for today's visit:          Results for orders placed or performed during the hospital encounter of 12/18/23 (from the past 24 hour(s))   POCT GLUCOSE   Result Value Ref Range    POCT Glucose 230 (H) 74 - 99 mg/dL   POCT GLUCOSE   Result Value Ref Range    POCT Glucose 189 (H) 74 - 99 mg/dL   POCT GLUCOSE   Result Value Ref Range    POCT Glucose 97 74 - 99 mg/dL           ELO Meek, APRN-CNP  DATE: December 26, 2023    "

## 2023-12-26 NOTE — PROCEDURES
Pt has a single lumen picc line to R upper arm, drsg dry and intact, sm amt of dried bloody drainage noted near insertion site, drsg change is due in 2 days but pt is being discharged home with picc line, will change drsg today. Drsg has been changed using sterile technique, site without any redness, swelling or drainage, external length is 1cm, 0cm external length on insertion. Blue cap has been changed, brisk blood return noted and flushes easily with NS, curos cap applied.

## 2023-12-26 NOTE — PROGRESS NOTES
Physical Therapy    Physical Therapy Treatment    Patient Name: Luis Greene  MRN: [id number]  Today's Date: 12/26/2023  Time Calculation  Start Time: 0905  Stop Time: 0950  Time Calculation (min): 45 min       Assessment/Plan   PT Assessment  PT Assessment Results: Decreased strength, Decreased range of motion, Impaired balance, Decreased mobility  Rehab Prognosis: Good  Evaluation/Treatment Tolerance: Patient limited by fatigue  Medical Staff Made Aware: Yes  End of Session Communication: Bedside nurse  End of Session Patient Position: Up in chair, Alarm off, not on at start of session  PT Plan  Inpatient/Swing Bed or Outpatient: Inpatient  PT Plan  Treatment/Interventions: Bed mobility, Transfer training, Gait training, Balance training, Strengthening, Endurance training, Therapeutic exercise, Therapeutic activity  PT Plan: Skilled PT  PT Frequency: 4 times per week  PT Discharge Recommendations: Low intensity level of continued care  Equipment Recommended upon Discharge: Wheeled walker  PT Recommended Transfer Status: Contact guard  PT - OK to Discharge: Yes    General Visit Information:   PT  Visit  PT Received On: 12/26/23  Response to Previous Treatment: Patient with no complaints from previous session.  General  Reason for Referral: impaired functional mobility. This 83 year old male presented to the ED from home with c/o fever and confusion. He was admitted for UTI associated with indwelling urinary catheter.  Referred By: Pete Oneill MD  Past Medical History Relevant to Rehab: UTI, indwelling catheter, Afib, HTN  Missed Visit: No  Prior to Session Communication: Bedside nurse  Patient Position Received: Up in chair    Subjective   Precautions:  Precautions  Hearing/Visual Limitations: Hearing WFL, wears glasses  Medical Precautions: Fall precautions  Braces Applied: B AFO's and shoes.    Objective   Pain:  Pain Assessment  Pain Score: 0 - No pain  Cognition:  Cognition  Overall Cognitive Status:  Within Functional Limits  Orientation Level: Oriented X4  Postural Control:  Postural Control  Posture Comment: forward head rounded shoulders posture    Activity Tolerance:  Activity Tolerance  Endurance: Tolerates 30 min exercise with multiple rests  Activity Tolerance Comments: Intermittent rest breaks for fatigue with good recovery.    Therapeutic Activity  Therapeutic Activity Performed: Yes  Therapeutic Activity 1: Pt doffed hospital clothing and donned own clothing for home going: Joseph for pants, guidry bag, bilat AFOs and shoes. Functional transfers. Education for home safety, energy conservation, benefits of early mobility.    Balance/Neuromuscular Re-Education  Balance/Neuromuscular Re-Education Activity Performed: Yes (Good static and dynamic seated balance; fair static and dynamic standing balance.)    Bed Mobility  Bed Mobility: No    Ambulation/Gait Training  Ambulation/Gait Training Performed: Yes  Ambulation/Gait Training 1  Surface 1: Level tile  Device 1: Rollator  Gait Support Devices: R Ankle-foot orthoses, L Ankle-foot orthosis  Assistance 1: Minimum assistance  Quality of Gait 1: Narrow base of support, Diminished heel strike, Decreased step length  Comments/Distance (ft) 1: 50'x1 Increased fatigue and forward flexed posture. Seated rest break required with good recovery.  Ambulation/Gait Training 2  Surface 2: Level tile  Device 2: Rolling walker  Gait Support Devices: R Ankle-foot orthoses, L Ankle-foot orthosis  Assistance 2: Minimum assistance  Quality of Gait 2: Narrow base of support, Decreased step length, Diminished heel strike, Soft knee(s)  Comments/Distance (ft) 2: 100'x2 Improved posture and endurance noted with FWW.  Transfers  Transfer: Yes  Transfer 1  Transfer From 1: Chair with arms to  Transfer to 1: Chair with arms  Technique 1: Sit to stand, Stand to sit  Transfer Level of Assistance 1: Close supervision, Minimum assistance (Joseph to steady as needed with  fatigue.)  Trials/Comments 1: x3 trials.    Stairs  Stairs: No    Outcome Measures:  [facility] Basic Mobility  Turning from your back to your side while in a flat bed without using bedrails: None  Moving from lying on your back to sitting on the side of a flat bed without using bedrails: A little  Moving to and from bed to chair (including a wheelchair): None  Standing up from a chair using your arms (e.g. wheelchair or bedside chair): A little  To walk in hospital room: A little  Climbing 3-5 steps with railing: A lot  Basic Mobility - Total Score: 19    OP EDUCATION:  Outpatient Education  Individual(s) Educated: Patient  Education Provided: Anatomy, Body Mechanics, Fall Risk, Home Safety, Posture  Patient Response to Education: Patient/Caregiver Verbalized Understanding of Information, Patient/Caregiver Performed Return Demonstration of Exercises/Activities    Encounter Problems       Encounter Problems (Active)       Mobility       STG - Patient will ambulate >/=100 ft with FWW at mod I level. (Progressing)       Start:  12/19/23    Expected End:  01/03/24            STG - Patient will ascend and descend 3 stairs with unilateral rail and close S. (Progressing)       Start:  12/19/23    Expected End:  01/03/24               Mobility       perform functional mobility to and from the bathroom independent with 2ww (Progressing)       Start:  12/19/23    Expected End:  01/19/24               Pain - Adult          Transfers       STG - Patient to transfer supine<>sit at mod I level. (Progressing)       Start:  12/19/23    Expected End:  01/03/24            STG - Patient will transfer sit<>stand with FWW and mod I. (Progressing)       Start:  12/19/23    Expected End:  01/03/24               Transfers       LTG - Patient perform all functional transfers independent with 2ww (Progressing)       Start:  12/19/23    Expected End:  01/19/24

## 2023-12-26 NOTE — DISCHARGE SUMMARY
DISCHARGE SUMMARY     PATIENT NAME: Luis Greene Full Code   MRN: [id number]          ADMIT DATE: 12/18/2023  DISCHARGE DATE:  12/26/2023     MY DOCTORS AND MEDICAL TEAM:  Primary Care Provider: Christopher D'Amico, DO        Past Medical History:   Diagnosis Date    A-fib (CMS/[facility])     CHF (congestive heart failure) (CMS/HCC)     Cognitive communication deficit     Coronary artery disease     Diabetes (CMS/[facility])     Hyperlipidemia     Iron deficiency anemia     Muscle weakness (generalized)     Osteoarthritis     Personal history of other diseases of the circulatory system     History of cardiac disorder    Personal history of other endocrine, nutritional and metabolic disease     History of hypercholesterolemia    Unspecified convulsions (CMS/[facility])     Urinary retention     Urinary tract infection            HPI  Urinary tract infection associated with indwelling urethral catheter, initial encounter (CMS/[facility])  Active Problems:    Urinary tract infection associated with indwelling urethral catheter (CMS/[facility])    Elevated troponin  Bacteremia  Recurrent UTI  Neuropathy  Diabetes  Anemia       Patient has been scheduled to have outpatient IV antibiotic  ertapenem.  ID input appreciated.  Patient medically stable for discharge when arrangements made.     only accepting agency at this time is [facility] with first available SOC for WEDNESDAY 12/27      Diarrhea - stopped colace/ miralax. Start probiotics if still persist, then check c diff            Pt condition has improved since admission and looks stable for discharge.        FOLLOW-UP APPOINTMENTS ALREADY SCHEDULED WITH PROVIDER:  Future Appointments   Date Time Provider Department Center   1/16/2024  1:30 PM [id number]   1/17/2024  3:30 PM Luis Grant MD [id number]   1/29/2024  2:00 PM Christopher D'Amico, DO [id number]         DISCHARGE MEDICATION:      Your medication list        START taking  these medications        Instructions Last Dose Given Next Dose Due   ertapenem 1 g in sodium chloride 0.9% 50 mL IV      Infuse 1 g at 100 mL/hr over 30 minutes into a venous catheter once every 24 hours for 26 doses. Every Monday CBC with diff, BMP, fax results to Dr Edwards [phone]  Routine PICC care  Follow-up appointment Dr Edwards, [id number] [address], 1/17/24 at 11:15 AM Do not start before December 23, 2023.              CHANGE how you take these medications        Instructions Last Dose Given Next Dose Due   gabapentin 100 mg capsule  Commonly known as: Neurontin  What changed:   how much to take  how to take this  when to take this      TAKE 1 CAPSULE BY MOUTH ONCE DAILY FOR NEUROPATHY PAIN       Soliqua 100/33 100 unit-33 mcg/mL insulin pen  Generic drug: insulin glargine-lixisenatide  What changed: how much to take      Inject 10 Units under the skin once daily at bedtime.              CONTINUE taking these medications        Instructions Last Dose Given Next Dose Due   acetaminophen 325 mg tablet  Commonly known as: Tylenol           aspirin 81 mg EC tablet           atorvastatin 40 mg tablet  Commonly known as: Lipitor           BISA-LAX (BISACODYL) RECT           clopidogrel 75 mg tablet  Commonly known as: Plavix      Take 1 tablet (75 mg) by mouth once daily.       cranberry fruit 500 mg tablet,chewable           cyanocobalamin 500 mcg tablet  Commonly known as: Vitamin B-12           docusate sodium 100 mg capsule  Commonly known as: Colace      Take 1 capsule (100 mg) by mouth 2 times a day.       ferrous sulfate 325 (65 Fe) MG EC tablet           finasteride 5 mg tablet  Commonly known as: Proscar      Take 1 tablet (5 mg) by mouth once daily.       furosemide 40 mg tablet  Commonly known as: Lasix      Take 1 tablet (40 mg) by mouth once daily.       levETIRAcetam 250 mg tablet  Commonly known as: Keppra           lidocaine 4 % patch      Place 1 patch over 12 hours on the  skin once daily. Remove & discard patch within 12 hours or as directed by MD.  Apply to right hip at site of pain       lisinopril 20 mg tablet      Take 1 tablet (20 mg) by mouth once daily. Hold for systolic blood pressure < 140 mmhg       magnesium oxide 400 mg (241.3 mg magnesium) tablet  Commonly known as: Mag-Ox      Take 1 tablet (400 mg) by mouth once daily.       metFORMIN 500 mg tablet  Commonly known as: Glucophage           pantoprazole 40 mg EC tablet  Commonly known as: ProtoNix      Take 1 tablet (40 mg) by mouth once daily in the morning. Take before meals. Do not crush, chew, or split.       polyethylene glycol 17 gram packet  Commonly known as: Glycolax, Miralax      Take 17 g by mouth 2 times a day. Hold for loose stool       tamsulosin 0.4 mg 24 hr capsule  Commonly known as: Flomax      Take 1 capsule (0.4 mg) by mouth 2 times a day.              STOP taking these medications      amLODIPine 5 mg tablet  Commonly known as: Norvasc                  Where to Get Your Medications        You can get these medications from any pharmacy    Bring a paper prescription for each of these medications  ertapenem 1 g in sodium chloride 0.9% 50 mL IV       Information about where to get these medications is not yet available    Ask your nurse or doctor about these medications  Soliqua 100/33 100 unit-33 mcg/mL insulin pen               SIGNATURE: Vickey Gunn MD    DATE: December 26, 2023    TIME: 2:20 PM

## 2023-12-26 NOTE — PROGRESS NOTES
Occupational Therapy    OT Treatment    Patient Name: Luis Greene  MRN: [id number]  Today's Date: 12/26/2023  Time Calculation  Start Time: 0823  Stop Time: 0854  Time Calculation (min): 31 min         Assessment:  OT Assessment: Pt tolerated session fairly with increased fatigue noted this date. Pt would benefit from continued skilled OT services to improve strength, balance, and functional tolerance to increase independence with ADL/IADL tasks for home going.  End of Session Communication: Bedside nurse  End of Session Patient Position: Up in chair, Alarm off, not on at start of session  OT Assessment Results: Decreased upper extremity strength, Decreased endurance, Decreased functional mobility  Plan:  Treatment Interventions: ADL retraining, Functional transfer training, UE strengthening/ROM, Patient/family training  OT Frequency: 4 times per week  OT Discharge Recommendations: Low intensity level of continued care  OT Recommended Transfer Status: Assist of 1, Minimal assist  OT - OK to Discharge: Yes (when medically appropriate)  Treatment Interventions: ADL retraining, Functional transfer training, UE strengthening/ROM, Patient/family training    Subjective   Previous Visit Info:  OT Last Visit  OT Received On: 12/26/23  General:  General  Reason for Referral: impaired functional mobility. This 83 year old male presented to the ED from home with c/o fever and confusion. He was admitted for UTI associated with indwelling urinary catheter.  Referred By: Pete Oneill MD  Past Medical History Relevant to Rehab: UTI, indwelling catheter, Afib, HTN  Prior to Session Communication: Bedside nurse  Patient Position Received: Bed, 2 rail up  General Comment: Pt cleared for therapy per RN. Pt seated EOB upon arrival and agreeable/motivated for tx session. Pt demonstrating decreased functional tolerance compared to previous sessions, requiring rest breaks throughout session.  Precautions:  Medical Precautions:  Fall precautions    Pain:  Pain Assessment  Pain Assessment: 0-10  Pain Score: 0 - No pain (pt stating he had pain in RUE previous date when pushing rollator, but has subsided)    Objective    Cognition:  Cognition  Overall Cognitive Status: Within Functional Limits  Orientation Level: Oriented X4    Activities of Daily Living:      LE Dressing  LE Dressing: Yes  Sock Level of Assistance: Moderate assistance  LE Dressing Where Assessed: Edge of bed  LE Dressing Comments: mod A to don shoes + SILVER braces with VC for task initiation, having to do doff L shoe x2 d/t discomfort from brace. increased time required for task completion and problem solving (pt stating he uses AE at home)    Toileting  Toileting Level of Assistance: Visual aid cues, Tactile cues, Minimum assistance  Where Assessed: Toilet  Toileting Comments: min A with VC/TC for guidry management standing at toilet with pt able to empty guidry with increased time for task completion and problem solving. Pt demonstrating forward lean during task with VC for postural alignment with pt slight LOB d/t fatigue having to sit on toilet for rest break  Functional Standing Tolerance:  Time: 4min  Functional Standing Tolerance Comments: tolerating standing ~4 min for guidry mangement  Bed Mobility/Transfers:      Transfers  Transfer: Yes  Transfer 1  Transfer From 1: Sit to  Transfer to 1: Stand  Technique 1: Sit to stand  Transfer Level of Assistance 1: Close supervision  Trials/Comments 1: sit>stand from elevated EOB>rollator with VC for proper hand placement.  Transfers 2  Transfer From 2: Stand to  Transfer to 2: Sit  Technique 2: Stand to sit  Transfer Level of Assistance 2: Contact guard  Trials/Comments 2: CGA stand>sit from rollator>chair for rest break/L shoe adjustment, with decreased eccentric control noted.  Transfers 3  Transfer From 3: Sit to  Transfer to 3: Stand  Technique 3: Sit to stand  Transfer Level of Assistance 3: Contact guard  Trials/Comments 3:  sit>stand from chair>rollator with VC to widen BETH  Transfers 4  Transfer From 4: Stand to  Transfer to 4: Sit  Technique 4: Stand to sit  Transfer Level of Assistance 4: Contact guard  Trials/Comments 4: stand>sit from rollator>chair with VC for safety awareness when sitting and walker abanadonment    Toilet Transfers  Toilet Transfer From: Walker  Toilet Transfer Type: To and from  Toilet Transfer to: Standard toilet  Toilet Transfers: Contact guard  Toilet Transfers Comments: sit<>stand ffrom toilet<>rollator with VC for eccentric control and use of grab bars for UE support    Ambulation/Gait Training:     Sitting Balance:  Dynamic Sitting Balance  Dynamic Sitting-Balance Support: No upper extremity supported  Dynamic Sitting-Balance: Forward lean, Reaching for objects, Reaching across midline  Dynamic Sitting-Comments: fair sitting balance to don shoes  Standing Balance:  Static Standing Balance  Static Standing-Balance Support: Bilateral upper extremity supported  Static Standing-Level of Assistance: Contact guard  Static Standing-Comment/Number of Minutes: fair- standing balance with VC for postural alignment with pt demonstrating intermitent forward lean  Dynamic Standing Balance  Dynamic Standing-Balance Support: Left upper extremity supported  Dynamic Standing-Balance: Forward lean, Reaching across midline, Reaching for objects  Dynamic Standing-Comments: fair- standing balance at toilet for guidry management with strong forward lean noted, leading to slight LOB with pt able to self correct       Therapy/Activity: Therapeutic Activity  Therapeutic Activity Performed: Yes  Therapeutic Activity 1: pt participated in functional mobility a short household distance at [facility] with CGA with pt requiring rest break x2 d/t fatigue + L shoe adjustment      Outcome Measures:[facility] Daily Activity  Putting on and taking off regular lower body clothing: A little  Bathing (including washing, rinsing, drying): A  little  Putting on and taking off regular upper body clothing: A little  Toileting, which includes using toilet, bedpan or urinal: A little  Taking care of personal grooming such as brushing teeth: None  Eating Meals: None  Daily Activity - Total Score: 20        Education Documentation  ADL Training, taught by JF Ta at 12/26/2023  9:45 AM.  Learner: Patient  Readiness: Acceptance  Method: Explanation, Demonstration  Response: Verbalizes Understanding, Demonstrated Understanding  Comment: Pt educated on pacing tasks to increase safety awareness with pt demonstrating understanding    Education Comments  No comments found.           Problem: Bathing  Goal: STG - Patient will bathe lower body with close supervision and AE  Outcome: Progressing     Problem: Dressings Lower Extremities  Goal: LTG - Patient will dress lower body independent with AE  Outcome: Progressing     Problem: Mobility  Goal: perform functional mobility to and from the bathroom independent with 2ww  Outcome: Progressing     Problem: Transfers  Goal: LTG - Patient perform all functional transfers independent with 2ww  Outcome: Progressing     Problem: Therapeutic Activity  Goal: Patient will perform BUE exercise with close supervision  Outcome: Progressing

## 2023-12-26 NOTE — DISCHARGE INSTR - APPOINTMENTS
Jan 17, 2023 11:15 AM  Dr Edwards  [address], [address]  [place] [id number]

## 2023-12-26 NOTE — PROGRESS NOTES
Plan will be for pt to dc today after he receives his dose of IV abx. Once time of this is known, [facility] will let home infusion it is ok to deliver the abx and supplies today and let [facility] know what time they will need to be out on 12/27 for SOC.     Update: [facility] stating RN will be out on 12/27 between 9-10 am, [facility] made pts daughter Ni aware as she will be there as the teachable caregiver.      12/26/23 0817   Discharge Planning   Patient expects to be discharged to: [facility] with [facility]

## 2023-12-27 ENCOUNTER — PATIENT OUTREACH (OUTPATIENT)
Dept: PRIMARY CARE | Facility: CLINIC | Age: 83
End: 2023-12-27
Payer: MEDICARE

## 2023-12-27 NOTE — PROGRESS NOTES
Discharge Facility:  [facility]  Discharge Diagnosis:  UTI  Admission Date:  12/18/23  Discharge Date: 12/26/23    PCP Appointment Date:  none available.  Message to office.    Specialist Appointment Date:   Hospital Encounter and Summary: Linked   See discharge assessment below for further details     Engagement  Call Start Time: 0937 (12/27/2023  9:40 AM)    Medications  Medications reviewed with patient/caregiver?: Yes (12/27/2023  9:40 AM)  Is the patient having any side effects they believe may be caused by any medication additions or changes?: No (12/27/2023  9:40 AM)  Does the patient have all medications ordered at discharge?: Yes (12/27/2023  9:40 AM)  Is the patient taking all medications as directed (includes completed medication regime)?: Yes (12/27/2023  9:40 AM)  Medication Comments: reviewed new, changed, and discontinued meds.  ertapenem is new.  gabapentin and soliqua are changed.  amlodipine is discontinued. (12/27/2023  9:40 AM)    Appointments  Does the patient have a primary care provider?: Yes (12/27/2023  9:40 AM)  Care Management Interventions: -- (none available.  message to office.) (12/27/2023  9:40 AM)  Has the patient kept scheduled appointments due by today?: Yes (12/27/2023  9:40 AM)    Self Management  What is the home health agency?: residence [facility] (12/27/2023  9:40 AM)  Has home health visited the patient within 72 hours of discharge?: Yes (12/27/2023  9:40 AM)    Patient Teaching  Does the patient have access to their discharge instructions?: Yes (12/27/2023  9:40 AM)  What is the patient's perception of their health status since discharge?: Same (12/27/2023  9:40 AM)  Is the patient/caregiver able to teach back the hierarchy of who to call/visit for symptoms/problems? PCP, Specialist, Home Health nurse, Urgent Care, ED, 911: Yes (12/27/2023  9:40 AM)  Patient/Caregiver Education Comments: spoke with spouse.  states Devyn came home yesterday and feels about the same.  reviewed  keeping an eye on diarrhea (note says may check for c-diff if persists after no longer taking miralax and colace).  call back number given for questions or concerns. (12/27/2023  9:40 AM)

## 2023-12-28 ENCOUNTER — HOSPITAL ENCOUNTER (EMERGENCY)
Facility: HOSPITAL | Age: 83
Discharge: HOME | End: 2023-12-28
Attending: EMERGENCY MEDICINE
Payer: MEDICARE

## 2023-12-28 VITALS
RESPIRATION RATE: 16 BRPM | OXYGEN SATURATION: 97 % | HEIGHT: 71 IN | DIASTOLIC BLOOD PRESSURE: 59 MMHG | BODY MASS INDEX: 23.8 KG/M2 | HEART RATE: 66 BPM | TEMPERATURE: 98.2 F | SYSTOLIC BLOOD PRESSURE: 156 MMHG | WEIGHT: 170 LBS

## 2023-12-28 DIAGNOSIS — T83.9XXA PROBLEM WITH FOLEY CATHETER, INITIAL ENCOUNTER (CMS-HCC): Primary | ICD-10-CM

## 2023-12-28 LAB
ALBUMIN SERPL-MCNC: 3.4 G/DL (ref 3.5–5)
ALP BLD-CCNC: 72 U/L (ref 35–125)
ALT SERPL-CCNC: 13 U/L (ref 5–40)
ANION GAP SERPL CALC-SCNC: 11 MMOL/L
APPEARANCE UR: CLEAR
AST SERPL-CCNC: 21 U/L (ref 5–40)
BASOPHILS # BLD AUTO: 0.04 X10*3/UL (ref 0–0.1)
BASOPHILS NFR BLD AUTO: 0.6 %
BILIRUB SERPL-MCNC: 0.5 MG/DL (ref 0.1–1.2)
BILIRUB UR STRIP.AUTO-MCNC: NEGATIVE MG/DL
BUN SERPL-MCNC: 14 MG/DL (ref 8–25)
CALCIUM SERPL-MCNC: 8.3 MG/DL (ref 8.5–10.4)
CHLORIDE SERPL-SCNC: 99 MMOL/L (ref 97–107)
CO2 SERPL-SCNC: 25 MMOL/L (ref 24–31)
COLOR UR: ABNORMAL
CREAT SERPL-MCNC: 0.9 MG/DL (ref 0.4–1.6)
EOSINOPHIL # BLD AUTO: 0.19 X10*3/UL (ref 0–0.4)
EOSINOPHIL NFR BLD AUTO: 2.9 %
ERYTHROCYTE [DISTWIDTH] IN BLOOD BY AUTOMATED COUNT: 14.8 % (ref 11.5–14.5)
GFR SERPL CREATININE-BSD FRML MDRD: 85 ML/MIN/1.73M*2
GLUCOSE SERPL-MCNC: 95 MG/DL (ref 65–99)
GLUCOSE UR STRIP.AUTO-MCNC: NORMAL MG/DL
HCT VFR BLD AUTO: 28.7 % (ref 41–52)
HGB BLD-MCNC: 9.6 G/DL (ref 13.5–17.5)
IMM GRANULOCYTES # BLD AUTO: 0.06 X10*3/UL (ref 0–0.5)
IMM GRANULOCYTES NFR BLD AUTO: 0.9 % (ref 0–0.9)
KETONES UR STRIP.AUTO-MCNC: NEGATIVE MG/DL
LEUKOCYTE ESTERASE UR QL STRIP.AUTO: ABNORMAL
LYMPHOCYTES # BLD AUTO: 1.68 X10*3/UL (ref 0.8–3)
LYMPHOCYTES NFR BLD AUTO: 26 %
MCH RBC QN AUTO: 34.2 PG (ref 26–34)
MCHC RBC AUTO-ENTMCNC: 33.4 G/DL (ref 32–36)
MCV RBC AUTO: 102 FL (ref 80–100)
MONOCYTES # BLD AUTO: 0.43 X10*3/UL (ref 0.05–0.8)
MONOCYTES NFR BLD AUTO: 6.6 %
NEUTROPHILS # BLD AUTO: 4.07 X10*3/UL (ref 1.6–5.5)
NEUTROPHILS NFR BLD AUTO: 63 %
NITRITE UR QL STRIP.AUTO: NEGATIVE
NRBC BLD-RTO: 0 /100 WBCS (ref 0–0)
PH UR STRIP.AUTO: 6.5 [PH]
PLATELET # BLD AUTO: 222 X10*3/UL (ref 150–450)
POTASSIUM SERPL-SCNC: 3.9 MMOL/L (ref 3.4–5.1)
PROT SERPL-MCNC: 5.9 G/DL (ref 5.9–7.9)
PROT UR STRIP.AUTO-MCNC: ABNORMAL MG/DL
RBC # BLD AUTO: 2.81 X10*6/UL (ref 4.5–5.9)
RBC # UR STRIP.AUTO: ABNORMAL /UL
RBC #/AREA URNS AUTO: ABNORMAL /HPF
SODIUM SERPL-SCNC: 135 MMOL/L (ref 133–145)
SP GR UR STRIP.AUTO: 1.01
UROBILINOGEN UR STRIP.AUTO-MCNC: NORMAL MG/DL
WBC # BLD AUTO: 6.5 X10*3/UL (ref 4.4–11.3)
WBC #/AREA URNS AUTO: ABNORMAL /HPF
YEAST BUDDING #/AREA UR COMP ASSIST: PRESENT /HPF

## 2023-12-28 PROCEDURE — 80053 COMPREHEN METABOLIC PANEL: CPT

## 2023-12-28 PROCEDURE — 99284 EMERGENCY DEPT VISIT MOD MDM: CPT | Performed by: EMERGENCY MEDICINE

## 2023-12-28 PROCEDURE — 99283 EMERGENCY DEPT VISIT LOW MDM: CPT | Mod: 25

## 2023-12-28 PROCEDURE — 87086 URINE CULTURE/COLONY COUNT: CPT | Mod: WESLAB

## 2023-12-28 PROCEDURE — 85025 COMPLETE CBC W/AUTO DIFF WBC: CPT

## 2023-12-28 PROCEDURE — 81001 URINALYSIS AUTO W/SCOPE: CPT

## 2023-12-28 PROCEDURE — 51702 INSERT TEMP BLADDER CATH: CPT

## 2023-12-28 ASSESSMENT — LIFESTYLE VARIABLES
REASON UNABLE TO ASSESS: NO
EVER FELT BAD OR GUILTY ABOUT YOUR DRINKING: NO
EVER HAD A DRINK FIRST THING IN THE MORNING TO STEADY YOUR NERVES TO GET RID OF A HANGOVER: NO
HAVE YOU EVER FELT YOU SHOULD CUT DOWN ON YOUR DRINKING: NO
HAVE PEOPLE ANNOYED YOU BY CRITICIZING YOUR DRINKING: NO

## 2023-12-28 ASSESSMENT — PAIN DESCRIPTION - PROGRESSION: CLINICAL_PROGRESSION: NOT CHANGED

## 2023-12-28 ASSESSMENT — PAIN - FUNCTIONAL ASSESSMENT: PAIN_FUNCTIONAL_ASSESSMENT: 0-10

## 2023-12-28 ASSESSMENT — PAIN SCALES - GENERAL: PAINLEVEL_OUTOF10: 0 - NO PAIN

## 2023-12-29 LAB — HOLD SPECIMEN: NORMAL

## 2023-12-29 NOTE — ED PROVIDER NOTES
HPI   Chief Complaint   Patient presents with    Blood in Urine     Started last night, pt states he was urinating around catheter. Nurse came today to replace guidry and had difficulty, pt started bleeding then.       HPI  Patient is an 83-year-old male with chronic indwelling Guidry who presents to ED for possible obstruction.  Patient states Guidry was not draining yesterday, home health aide attempted to change catheter, was unable to insert new catheter.  Family states that home health aide did not have coudé catheter so they came to the ER for evaluation.  Patient has no other acute complaints at this time he is not in any pain, denies fever or chills.  He has a history of urinary tract infections and sepsis, he receives daily antibiotic infusions for clearing most recent infection.                  Giovanna Coma Scale Score: 15                  Patient History   Past Medical History:   Diagnosis Date    A-fib (CMS/[facility])     CHF (congestive heart failure) (CMS/[facility])     Cognitive communication deficit     Coronary artery disease     Diabetes (CMS/[facility])     Hyperlipidemia     Iron deficiency anemia     Muscle weakness (generalized)     Osteoarthritis     Personal history of other diseases of the circulatory system     History of cardiac disorder    Personal history of other endocrine, nutritional and metabolic disease     History of hypercholesterolemia    Unspecified convulsions (CMS/[facility])     Urinary retention     Urinary tract infection      Past Surgical History:   Procedure Laterality Date    CARDIAC CATHETERIZATION N/A 11/10/2023    Procedure: LAAO (Left Atrial Appendage Occlusion);  Surgeon: Louie Avitia MD;  Location: [place] [address] Cardiac Cath Lab;  Service: Cardiovascular;  Laterality: N/A;  Last Eliquis 11/06/SD /[employer]    MR HEAD ANGIO WO IV CONTRAST  2/17/2019    MR HEAD ANGIO WO IV CONTRAST [facility] EMERGENCY LEGACY    MR HEAD ANGIO WO IV CONTRAST  10/28/2022    MR HEAD ANGIO WO IV CONTRAST  [facility]ERGENCY LEGACY    MR HEAD ANGIO WO IV CONTRAST  8/15/2023    MR HEAD ANGIO WO IV CONTRAST [facility] INPATIENT LEGACY    OTHER SURGICAL HISTORY  03/11/2019    Heart surgery    OTHER SURGICAL HISTORY  03/11/2019    Knee surgery    OTHER SURGICAL HISTORY  03/11/2019    Tonsillectomy     Family History   Problem Relation Name Age of Onset    Other (cardiac disorder) Father      Diabetes Father      Hyperlipidemia Father      Hypertension Father      Other (liver carcinoma) Father      Other (hypercholesterolemia) Father      Hypertension Daughter       Social History     Tobacco Use    Smoking status: Never    Smokeless tobacco: Never   Vaping Use    Vaping Use: Never used   Substance Use Topics    Alcohol use: Never    Drug use: Never       Physical Exam   ED Triage Vitals [12/28/23 1803]   Temp Heart Rate Resp BP   36.8 °C (98.2 °F) 87 18 144/62      SpO2 Temp Source Heart Rate Source Patient Position   97 % Oral Monitor --      BP Location FiO2 (%)     -- --       Physical Exam  Constitutional:       Appearance: Normal appearance.   HENT:      Head: Normocephalic and atraumatic.   Eyes:      Extraocular Movements: Extraocular movements intact.      Pupils: Pupils are equal, round, and reactive to light.   Cardiovascular:      Rate and Rhythm: Normal rate and regular rhythm.   Pulmonary:      Effort: Pulmonary effort is normal.      Breath sounds: Normal breath sounds.   Abdominal:      Palpations: Abdomen is soft.   Musculoskeletal:         General: Normal range of motion.      Cervical back: Neck supple.   Skin:     General: Skin is warm and dry.   Neurological:      Mental Status: He is alert and oriented to person, place, and time.   Psychiatric:         Mood and Affect: Mood normal.         Behavior: Behavior normal.         ED Course & MDM   Diagnoses as of 12/28/23 2031   Problem with Tiwari catheter, initial encounter (CMS/[facility])       Medical Decision Making  Parts of this chart have been completed using voice  recognition software. Please excuse any errors of transcription.  My thought process and reason for plan has been formulated from the time that I saw the patient until the time of disposition and is not specific to one specific moment during their visit and furthermore my MDM encompasses this entire chart and not only this text box.      HPI: Detailed above.    Exam: A medically appropriate exam performed, outlined above, given the known history and presentation.    History obtained from: Patient, family, chart review    EKG: None    Social Determinants of Health considered during this visit: Discussed    Medications given during visit:  Medications - No data to display     Diagnostic/tests  Labs Reviewed   CBC WITH AUTO DIFFERENTIAL - Abnormal       Result Value    WBC 6.5      nRBC 0.0      RBC 2.81 (*)     Hemoglobin 9.6 (*)     Hematocrit 28.7 (*)      (*)     MCH 34.2 (*)     MCHC 33.4      RDW 14.8 (*)     Platelets 222      Neutrophils % 63.0      Immature Granulocytes %, Automated 0.9      Lymphocytes % 26.0      Monocytes % 6.6      Eosinophils % 2.9      Basophils % 0.6      Neutrophils Absolute 4.07      Immature Granulocytes Absolute, Automated 0.06      Lymphocytes Absolute 1.68      Monocytes Absolute 0.43      Eosinophils Absolute 0.19      Basophils Absolute 0.04     COMPREHENSIVE METABOLIC PANEL - Abnormal    Glucose 95      Sodium 135      Potassium 3.9      Chloride 99      Bicarbonate 25      Urea Nitrogen 14      Creatinine 0.90      eGFR 85      Calcium 8.3 (*)     Albumin 3.4 (*)     Alkaline Phosphatase 72      Total Protein 5.9      AST 21      Bilirubin, Total 0.5      ALT 13      Anion Gap 11     URINALYSIS WITH REFLEX CULTURE AND MICROSCOPIC - Abnormal    Color, Urine Light-Yellow      Appearance, Urine Clear      Specific Gravity, Urine 1.013      pH, Urine 6.5      Protein, Urine 20 (TRACE)      Glucose, Urine Normal      Blood, Urine 0.1 (1+) (*)     Ketones, Urine NEGATIVE       Bilirubin, Urine NEGATIVE      Urobilinogen, Urine Normal      Nitrite, Urine NEGATIVE      Leukocyte Esterase, Urine 75 Akin/µL (*)    MICROSCOPIC ONLY, URINE - Abnormal    WBC, Urine 11-20 (*)     RBC, Urine 11-20 (*)     Budding Yeast, Urine PRESENT (*)    URINE CULTURE   URINALYSIS WITH REFLEX CULTURE AND MICROSCOPIC    Narrative:     The following orders were created for panel order Urinalysis with Reflex Culture and Microscopic.  Procedure                               Abnormality         Status                     ---------                               -----------         ------                     Urinalysis with Reflex C...[[id number]]  Abnormal            Final result               Extra Urine Gray Tube[[id number]]                            In process                   Please view results for these tests on the individual orders.   EXTRA URINE GRAY TUBE      No orders to display        Considerations/further MDM:    Patient is an 83-year-old male with chronic indwelling Tiwari who presents to ED for possible obstruction.  Patient states Tiwari was not draining yesterday, home health aide attempted to change catheter, was unable to insert new catheter.  Family states that home health aide did not have coudé catheter so they came to the ER for evaluation.  New Tiwari catheter successfully placed.  No significant acute abnormality on labs.  Patient is safe for discharge home, he was followed by infectious disease for UTI.  We have discussed the diagnosis and risks, and we agree with discharging home to follow-up with appropriate physician as directed. We also discussed returning to the Emergency Department immediately if new or worsening symptoms occur. We have discussed the symptoms which are most concerning that necessitate immediate return. Pt symptoms have been well controlled here with medications and the patient is safe for discharge with appropriate outpatient follow up.  The patient has verbalized  understanding to return to ER without delay for new or worsening pains or for any other symptoms or concerns.    Procedure  Procedures     Daryl Bueno PA-C  12/28/23 2033

## 2023-12-30 LAB — BACTERIA UR CULT: NORMAL

## 2024-01-05 ENCOUNTER — TELEPHONE (OUTPATIENT)
Dept: PHARMACY | Facility: HOSPITAL | Age: 84
End: 2024-01-05
Payer: MEDICARE

## 2024-01-05 DIAGNOSIS — Z79.4 TYPE 2 DIABETES MELLITUS WITHOUT COMPLICATION, WITH LONG-TERM CURRENT USE OF INSULIN (MULTI): Primary | ICD-10-CM

## 2024-01-05 DIAGNOSIS — E11.9 TYPE 2 DIABETES MELLITUS WITHOUT COMPLICATION, WITH LONG-TERM CURRENT USE OF INSULIN (MULTI): Primary | ICD-10-CM

## 2024-01-05 NOTE — TELEPHONE ENCOUNTER
Resuming care with patient post discharge.     Patient and his daughter have concerns of low blood glucose as patient is waking up with readings in the 70's. Discussed decreasing soliqua. Discussed tracking food intake. Discussed CGM, freestyle reed, patient and his daughter will look into this and see if it is something they want to move forward with.     PLAN:   Decrease soliqua to 8 units once daily.   Continue checking AM readings.   Research CGM (freestyle reed), we will discuss the potential to utilize this at next visit.    Thanks,   Leeann Page

## 2024-01-08 ENCOUNTER — TELEMEDICINE (OUTPATIENT)
Dept: PHARMACY | Facility: HOSPITAL | Age: 84
End: 2024-01-08
Payer: MEDICARE

## 2024-01-08 ENCOUNTER — PATIENT OUTREACH (OUTPATIENT)
Dept: PRIMARY CARE | Facility: CLINIC | Age: 84
End: 2024-01-08

## 2024-01-08 DIAGNOSIS — E11.9 TYPE 2 DIABETES MELLITUS WITHOUT COMPLICATION, WITH LONG-TERM CURRENT USE OF INSULIN (MULTI): Primary | ICD-10-CM

## 2024-01-08 DIAGNOSIS — Z79.4 TYPE 2 DIABETES MELLITUS WITHOUT COMPLICATION, WITH LONG-TERM CURRENT USE OF INSULIN (MULTI): Primary | ICD-10-CM

## 2024-01-08 NOTE — PROGRESS NOTES
Call regarding appt. with no PCP  after hospitalization.  At time of outreach call the patient feels as if their condition has remained about the same  since last visit.    Has an appt with urology today and has an appt with pcp at the end of the month.

## 2024-01-08 NOTE — PROGRESS NOTES
Pharmacist Clinic: Diabetes Management  Luis Greene is a 83 y.o. male was referred to Clinical Pharmacy Team for diabetes management. At last visit, no medication changes were made.     Referring Provider: Christopher D'Amico, DO     HISTORY OF PRESENT ILLNESS  Patient is a type 2 diabetic, his DM is currently complicated with steroid injections for back pain. In his lifetime he has gotten 4 shots.      Diet:   - 1-2 snacks throughout the day (apple, popcorn, chips, sandwich)   - Dinner: varies, pasta, soup, take out     LAB REVIEW   Glucose (mg/dL)   Date Value   12/28/2023 95   12/25/2023 68   12/22/2023 108 (H)     Hemoglobin A1C (%)   Date Value   11/15/2023 6.7 (H)   09/14/2023 6.8 (A)   08/14/2023 8.1 (H)   07/14/2023 7.5 (A)   04/19/2023 9.7 (H)   02/03/2023 9.6 (H)   12/20/2021 7.5 (H)     Bicarbonate (mmol/L)   Date Value   12/28/2023 25   12/25/2023 24   12/22/2023 22 (L)     Urea Nitrogen (mg/dL)   Date Value   12/28/2023 14   12/25/2023 15   12/22/2023 21     Creatinine (mg/dL)   Date Value   12/28/2023 0.90   12/25/2023 1.00   12/22/2023 1.00     Lab Results   Component Value Date    HGBA1C 6.7 (H) 11/15/2023    HGBA1C 6.8 (A) 09/14/2023    HGBA1C 8.1 (H) 08/14/2023     Lab Results   Component Value Date    CHOL 81 (L) 11/15/2023    CHOL 81 (L) 08/15/2023    CHOL 73 (L) 10/28/2022     Lab Results   Component Value Date    HDL 29.0 (L) 11/15/2023    HDL 28 (L) 08/15/2023    HDL 32 (L) 10/28/2022     Lab Results   Component Value Date    TRIG 110 11/15/2023    TRIG 109 08/15/2023    TRIG 45 10/28/2022       DIABETES ASSESSMENT    CURRENT PHARMACOTHERAPY  - metformin 500 mg 2 tabs by mouth twice daily   - soliqua 8 units under the skin once daily    SECONDARY PREVENTION  - Statin? Yes  - ACE-I/ARB? Yes    HISTORICAL PHARMACOTHERAPY   - Lantus 20 units under the skin once daily   - Novolog 5 units once daily once daily     SMBG MEASUREMENTS:   1/7: 77  1/8: 126     DISCUSSION:   - Patient decreased his  insulin to 6 units after 2 days, continue this dose   - Submitted forms for Freestyle reed 2     RECOMMENDATIONS/PLAN  1. Patients diabetes is improving with most recent A1c of 6.7% (goal < 7 %).   - Continue all meds under the continuation of care with the referring provider and clinical pharmacy team    Clinical Pharmacist follow up: 1 week   Thank you,  Leeann Page, PharmD    Verbal consent to manage patient's drug therapy was obtained from the patient. They were informed they may decline to participate or withdraw from participation in pharmacy services at any time.

## 2024-01-12 ENCOUNTER — TELEMEDICINE (OUTPATIENT)
Dept: PHARMACY | Facility: HOSPITAL | Age: 84
End: 2024-01-12
Payer: MEDICARE

## 2024-01-12 ENCOUNTER — TELEPHONE (OUTPATIENT)
Dept: CARDIOLOGY | Facility: CLINIC | Age: 84
End: 2024-01-12

## 2024-01-12 DIAGNOSIS — E11.9 TYPE 2 DIABETES MELLITUS WITHOUT COMPLICATION, WITH LONG-TERM CURRENT USE OF INSULIN (MULTI): ICD-10-CM

## 2024-01-12 DIAGNOSIS — Z79.4 TYPE 2 DIABETES MELLITUS WITHOUT COMPLICATION, WITH LONG-TERM CURRENT USE OF INSULIN (MULTI): ICD-10-CM

## 2024-01-12 NOTE — PROGRESS NOTES
Pharmacist Clinic: Diabetes Management  Luis Greene is a 83 y.o. male was referred to Clinical Pharmacy Team for diabetes management.     Referring Provider: Christopher D'Amico, DO     HISTORY OF PRESENT ILLNESS  Patient is a type 2 diabetic, his DM is currently complicated with steroid injections for back pain. In his lifetime he has gotten 4 shots.      Diet:   - 1-2 snacks throughout the day (apple, popcorn, chips, sandwich)   - Dinner: varies, pasta, soup, take out     LAB REVIEW   Glucose (mg/dL)   Date Value   12/28/2023 95   12/25/2023 68   12/22/2023 108 (H)     Hemoglobin A1C (%)   Date Value   11/15/2023 6.7 (H)   09/14/2023 6.8 (A)   08/14/2023 8.1 (H)   07/14/2023 7.5 (A)   04/19/2023 9.7 (H)   02/03/2023 9.6 (H)   12/20/2021 7.5 (H)     Bicarbonate (mmol/L)   Date Value   12/28/2023 25   12/25/2023 24   12/22/2023 22 (L)     Urea Nitrogen (mg/dL)   Date Value   12/28/2023 14   12/25/2023 15   12/22/2023 21     Creatinine (mg/dL)   Date Value   12/28/2023 0.90   12/25/2023 1.00   12/22/2023 1.00     Lab Results   Component Value Date    HGBA1C 6.7 (H) 11/15/2023    HGBA1C 6.8 (A) 09/14/2023    HGBA1C 8.1 (H) 08/14/2023     Lab Results   Component Value Date    CHOL 81 (L) 11/15/2023    CHOL 81 (L) 08/15/2023    CHOL 73 (L) 10/28/2022     Lab Results   Component Value Date    HDL 29.0 (L) 11/15/2023    HDL 28 (L) 08/15/2023    HDL 32 (L) 10/28/2022     Lab Results   Component Value Date    TRIG 110 11/15/2023    TRIG 109 08/15/2023    TRIG 45 10/28/2022       DIABETES ASSESSMENT    CURRENT PHARMACOTHERAPY  - metformin 500 mg 2 tabs by mouth twice daily   - soliqua 6 units under the skin once daily    SECONDARY PREVENTION  - Statin? Yes  - ACE-I/ARB? Yes    HISTORICAL PHARMACOTHERAPY   - Lantus 20 units under the skin once daily   - Novolog 5 units once daily once daily     SMBG MEASUREMENTS: around 100    DISCUSSION:   - Continue current medication regimen   - Come into the office after your  cardiology appt to learn how to use your CGM    RECOMMENDATIONS/PLAN  1. Patients diabetes is improving with most recent A1c of 6.7% (goal < 7 %).   - Continue all meds under the continuation of care with the referring provider and clinical pharmacy team    Clinical Pharmacist follow up: 1 week   Thank you,  Leeann Page, PharmD    Verbal consent to manage patient's drug therapy was obtained from the patient. They were informed they may decline to participate or withdraw from participation in pharmacy services at any time.

## 2024-01-12 NOTE — DOCUMENTATION CLARIFICATION NOTE
"    PATIENT:               LUIS OLIVEROS  ACCT #:                  [id number]  MRN:                       [id number]  :                       1940  ADMIT DATE:       2023 11:14 AM  DISCH DATE:        2023 4:11 PM  RESPONDING PROVIDER #:        [id number]          PROVIDER RESPONSE TEXT:    Metabolic Encephalopathy 2/2 CDI TO ENTER CAUSE    CDI QUERY TEXT:    [facility]_Encephalopathy Type      Instruction:    Based on your assessment of the patient and the clinical information, please provide the requested documentation by clicking on the appropriate radio button and enter any additional information if prompted.    Question: Please further clarify the type of Encephalopathy as        When answering this query, please exercise your independent professional judgment. The fact that a question is being asked, does not imply that any particular answer is desired or expected.    The patient's clinical indicators include:  Clinical Information:  Luis Oliveros is a very pleasant 83 y.o.  male presenting with complaints of a fever and generalized weakness.History is obtained upon review of the medical record and discussion with the patient and hisspouse present at bedside.Briefly, he has a past medical history significant for urinary retention and chronic Tiwari catheter    Clinical Indicators:23  ED Provider Note: \"Patient presents with  - Altered Mental Status\";  \"Over the past couple  of days has had decreased appetite poor oral intake yesterday developed a fever of 101 and was confused\"  \"Sepsis with encephalopathy without septic shock, due to unspecified organism\"    23  CT Head WO IV Contrast:  \"No CT evidence for acute intracranial pathology.Stable appearing  examination from 2023\"    23 Urine Culture:   >100,000 Escherichia coli    23 Nutritition Consult:  \"...patient reports he felt confused as well.\"    23 Discharge Summary:  \"Recurrent UTI\"; " "\"Bacteremia\"    Treatment:  CT Head WO IV Contrast; UA re flex to culture; NS 2L Bolus; Merrem IV 1GM (12/18-12/25);    Risk Factors: Recent H/O toxic encephalopathy secondary to UTI, Chronic indwelling Renae catheter; Diabetes;  Options provided:  -- Metabolic Encephalopathy 2/2 CDI TO ENTER CAUSE  -- Other - I will add my own diagnosis  -- Refer to Clinical Documentation Reviewer    Query created by: Joie Garduno on 1/9/2024 1:02 PM      Electronically signed by:  ROSAS FERRIS MD 1/12/2024 9:21 AM          "

## 2024-01-16 ENCOUNTER — APPOINTMENT (OUTPATIENT)
Dept: CARDIOLOGY | Facility: CLINIC | Age: 84
End: 2024-01-16
Payer: MEDICARE

## 2024-01-17 ENCOUNTER — CLINICAL SUPPORT (OUTPATIENT)
Dept: PRIMARY CARE | Facility: CLINIC | Age: 84
End: 2024-01-17
Payer: MEDICARE

## 2024-01-17 ENCOUNTER — OFFICE VISIT (OUTPATIENT)
Dept: CARDIOLOGY | Facility: CLINIC | Age: 84
End: 2024-01-17
Payer: MEDICARE

## 2024-01-17 VITALS
HEIGHT: 71 IN | OXYGEN SATURATION: 96 % | HEART RATE: 90 BPM | DIASTOLIC BLOOD PRESSURE: 46 MMHG | RESPIRATION RATE: 16 BRPM | WEIGHT: 174 LBS | BODY MASS INDEX: 24.36 KG/M2 | SYSTOLIC BLOOD PRESSURE: 88 MMHG

## 2024-01-17 DIAGNOSIS — E11.9 DIABETES MELLITUS TYPE 2 WITHOUT RETINOPATHY (MULTI): Primary | ICD-10-CM

## 2024-01-17 DIAGNOSIS — E78.2 MIXED HYPERLIPIDEMIA: ICD-10-CM

## 2024-01-17 DIAGNOSIS — I25.10 CORONARY ARTERY DISEASE INVOLVING NATIVE CORONARY ARTERY OF NATIVE HEART WITHOUT ANGINA PECTORIS: ICD-10-CM

## 2024-01-17 DIAGNOSIS — Z79.4 TYPE 2 DIABETES MELLITUS WITHOUT COMPLICATION, WITH LONG-TERM CURRENT USE OF INSULIN (MULTI): ICD-10-CM

## 2024-01-17 DIAGNOSIS — I48.91 ATRIAL FIBRILLATION, UNSPECIFIED TYPE (MULTI): ICD-10-CM

## 2024-01-17 DIAGNOSIS — R07.9 CHEST PAIN, UNSPECIFIED TYPE: ICD-10-CM

## 2024-01-17 DIAGNOSIS — I10 BENIGN ESSENTIAL HYPERTENSION: ICD-10-CM

## 2024-01-17 DIAGNOSIS — E11.9 TYPE 2 DIABETES MELLITUS WITHOUT COMPLICATION, WITH LONG-TERM CURRENT USE OF INSULIN (MULTI): ICD-10-CM

## 2024-01-17 DIAGNOSIS — D64.9 ANEMIA, UNSPECIFIED TYPE: ICD-10-CM

## 2024-01-17 DIAGNOSIS — R06.09 DYSPNEA ON EFFORT: ICD-10-CM

## 2024-01-17 PROCEDURE — 3074F SYST BP LT 130 MM HG: CPT | Performed by: INTERNAL MEDICINE

## 2024-01-17 PROCEDURE — 1157F ADVNC CARE PLAN IN RCRD: CPT | Performed by: INTERNAL MEDICINE

## 2024-01-17 PROCEDURE — 99214 OFFICE O/P EST MOD 30 MIN: CPT | Performed by: INTERNAL MEDICINE

## 2024-01-17 PROCEDURE — 3078F DIAST BP <80 MM HG: CPT | Performed by: INTERNAL MEDICINE

## 2024-01-17 PROCEDURE — 1159F MED LIST DOCD IN RCRD: CPT | Performed by: INTERNAL MEDICINE

## 2024-01-17 PROCEDURE — 1126F AMNT PAIN NOTED NONE PRSNT: CPT | Performed by: INTERNAL MEDICINE

## 2024-01-17 PROCEDURE — 1111F DSCHRG MED/CURRENT MED MERGE: CPT | Performed by: INTERNAL MEDICINE

## 2024-01-17 PROCEDURE — 1036F TOBACCO NON-USER: CPT | Performed by: INTERNAL MEDICINE

## 2024-01-17 RX ORDER — FERROUS SULFATE 325(65) MG
1 TABLET, DELAYED RELEASE (ENTERIC COATED) ORAL DAILY
Qty: 90 TABLET | Refills: 3 | Status: SHIPPED | OUTPATIENT
Start: 2024-01-17

## 2024-01-17 RX ORDER — OMEPRAZOLE 40 MG/1
CAPSULE, DELAYED RELEASE ORAL
COMMUNITY
Start: 2023-12-10 | End: 2024-03-11 | Stop reason: SDUPTHER

## 2024-01-17 ASSESSMENT — PAIN SCALES - GENERAL: PAINLEVEL: 0-NO PAIN

## 2024-01-17 NOTE — PATIENT INSTRUCTIONS
Follow up in six weeks  Lab work was done yesterday  Hold Lisinopril 20 mg for now  Hold Aspirin and Plavix for urology surgery

## 2024-01-17 NOTE — PROGRESS NOTES
Subjective   Luis Greene is a 83 y.o. male.    Chief Complaint: A-Fib., Hyperlipidemia, Hypertension, A-Fib. and CAD.     HPI This is an 84 y/o male here today for a hospital follow up visit. He had a Watchman procedure in November. Has been admitted several time over the past couple months with A-Fib, CVA, UTI, and urinary obstruction- see hospital notes. Will be having a Urology surgery at [facility]. Has a PICC line for home antibiotics. He complains os generalized weakness.    ROS    Objective   Physical Exam  General: alert, oriented x2-3, very pleasant; son at bedside  HEENT: normal cephalic, atraumatic  Neck: No JVD, bruit or thrill, masses or tenderness   Heart: S1/S2, Rate 50, Rhythm irregular, no s3 or s4, no murmur, thrill, or heaves at PMI.   Lungs: Clear, equal expansion and excursion, no wheezes, crackles, rhales or rhonci.  Room air.  No conversational dyspnea appreciated.  No tachypnea.  No pain with deep aspiration   Abdomen: Overweight, bowel sounds x 4, soft, non-tender   Genitourinary: deferred   Extremities: No significant upper or lower extremity daylin appreciated.    Lab Review:   Admission on 12/28/2023, Discharged on 12/28/2023   Component Date Value    WBC 12/28/2023 6.5     nRBC 12/28/2023 0.0     RBC 12/28/2023 2.81 (L)     Hemoglobin 12/28/2023 9.6 (L)     Hematocrit 12/28/2023 28.7 (L)     MCV 12/28/2023 102 (H)     MCH 12/28/2023 34.2 (H)     MCHC 12/28/2023 33.4     RDW 12/28/2023 14.8 (H)     Platelets 12/28/2023 222     Neutrophils % 12/28/2023 63.0     Immature Granulocytes %,* 12/28/2023 0.9     Lymphocytes % 12/28/2023 26.0     Monocytes % 12/28/2023 6.6     Eosinophils % 12/28/2023 2.9     Basophils % 12/28/2023 0.6     Neutrophils Absolute 12/28/2023 4.07     Immature Granulocytes Ab* 12/28/2023 0.06     Lymphocytes Absolute 12/28/2023 1.68     Monocytes Absolute 12/28/2023 0.43     Eosinophils Absolute 12/28/2023 0.19     Basophils Absolute 12/28/2023 0.04     Glucose 12/28/2023 95      Sodium 12/28/2023 135     Potassium 12/28/2023 3.9     Chloride 12/28/2023 99     Bicarbonate 12/28/2023 25     Urea Nitrogen 12/28/2023 14     Creatinine 12/28/2023 0.90     eGFR 12/28/2023 85     Calcium 12/28/2023 8.3 (L)     Albumin 12/28/2023 3.4 (L)     Alkaline Phosphatase 12/28/2023 72     Total Protein 12/28/2023 5.9     AST 12/28/2023 21     Bilirubin, Total 12/28/2023 0.5     ALT 12/28/2023 13     Anion Gap 12/28/2023 11     Color, Urine 12/28/2023 Light-Yellow     Appearance, Urine 12/28/2023 Clear     Specific Gravity, Urine 12/28/2023 1.013     pH, Urine 12/28/2023 6.5     Protein, Urine 12/28/2023 20 (TRACE)     Glucose, Urine 12/28/2023 Normal     Blood, Urine 12/28/2023 0.1 (1+) (A)     Ketones, Urine 12/28/2023 NEGATIVE     Bilirubin, Urine 12/28/2023 NEGATIVE     Urobilinogen, Urine 12/28/2023 Normal     Nitrite, Urine 12/28/2023 NEGATIVE     Leukocyte Esterase, Urine 12/28/2023 75 Akin/µL (A)     Extra Tube 12/29/2023 Hold for add-ons.     WBC, Urine 12/28/2023 11-20 (A)     RBC, Urine 12/28/2023 11-20 (A)     Budding Yeast, Urine 12/28/2023 PRESENT (A)     Urine Culture 12/28/2023 No significant growth    No results displayed because visit has over 200 results.      No results displayed because visit has over 200 results.      No results displayed because visit has over 200 results.      Admission on 11/10/2023, Discharged on 11/10/2023   Component Date Value    LVIDd 11/10/2023 4.36    Lab on 10/24/2023   Component Date Value    WBC 10/24/2023 6.4     nRBC 10/24/2023 0.0     RBC 10/24/2023 3.38 (L)     Hemoglobin 10/24/2023 12.4 (L)     Hematocrit 10/24/2023 37.0 (L)     MCV 10/24/2023 110 (H)     MCH 10/24/2023 36.7 (H)     MCHC 10/24/2023 33.5     RDW 10/24/2023 14.6 (H)     Platelets 10/24/2023 139 (L)     MPV 10/24/2023 10.8     Glucose 10/24/2023 100 (H)     Sodium 10/24/2023 141     Potassium 10/24/2023 4.4     Chloride 10/24/2023 104     Bicarbonate 10/24/2023 25     Anion Gap  10/24/2023 16     Urea Nitrogen 10/24/2023 21     Creatinine 10/24/2023 0.88     eGFR 10/24/2023 86     Calcium 10/24/2023 9.7    Office Visit on 10/17/2023   Component Date Value    Urine Culture 10/17/2023 >100,000 Aerococcus urinae (A)    Lab on 09/14/2023   Component Date Value    WBC 09/14/2023 9.5     nRBC 09/14/2023 0.0     RBC 09/14/2023 3.37 (L)     Hemoglobin 09/14/2023 12.5 (L)     Hematocrit 09/14/2023 36.4 (L)     MCV 09/14/2023 108 (H)     MCHC 09/14/2023 34.3     Platelets 09/14/2023 143 (L)     RDW 09/14/2023 13.9     Neutrophils % 09/14/2023 64.9     Immature Granulocytes %,* 09/14/2023 0.5     Lymphocytes % 09/14/2023 23.6     Monocytes % 09/14/2023 7.4     Eosinophils % 09/14/2023 3.3     Basophils % 09/14/2023 0.3     Neutrophils Absolute 09/14/2023 6.13 (H)     Lymphocytes Absolute 09/14/2023 2.23     Monocytes Absolute 09/14/2023 0.70     Eosinophils Absolute 09/14/2023 0.31     Basophils Absolute 09/14/2023 0.03     Glucose 09/14/2023 175 (H)     Sodium 09/14/2023 140     Potassium 09/14/2023 4.8     Chloride 09/14/2023 103     Bicarbonate 09/14/2023 23     Anion Gap 09/14/2023 19     Urea Nitrogen 09/14/2023 26 (H)     Creatinine 09/14/2023 0.93     GFR MALE 09/14/2023 82     Calcium 09/14/2023 9.4     Albumin 09/14/2023 4.2     Alkaline Phosphatase 09/14/2023 40     Total Protein 09/14/2023 6.3 (L)     AST 09/14/2023 22     Total Bilirubin 09/14/2023 1.0     ALT (SGPT) 09/14/2023 18     Hemoglobin A1C 09/14/2023 6.8 (A)     Estimated Average Glucose 09/14/2023 148     Vitamin B-12 09/14/2023 1114 (H)        Assessment/Plan   1. Coronary artery disease with remote CABG Ã--2 2011. This patient did develop recurrent angina in 2013 and underwent a repeat cardiac catheterization demonstrating closure of the original bypass grafts but with collateral circulation and no PCI was required.     2. History of TIA. This patient did have a TIA following his cardiac catheterization with transient  confusion on the day of discharge. The patient was admitted to [facility] on 02/17/2019 with numbness of his left arm hand and ultimately jaw. The numbness of the left hand and jaw resolved but persisted somewhat longer within the left arm. Evaluation at that time included a chest x-ray showing previous sternotomy with clear lung fields. Carotid ultrasound showed mild bilateral plaque less than 50% stenoses. CT angiogram of the head and neck was unremarkable with nonstenotic calcification of the right common carotid artery without stenosis. The left common carotid artery had a nonstenotic calcification with a patent internal carotid artery. His intracerebral vessels were unremarkable with no aneurysm. An MRI of the brain on 02/17/2019 was unremarkable. He had an MRI of the cervical spine that showed minimal disc bulging at C2-C3 with moderate disc osteophyte complex at C3-C4 causing moderate canals narrowing and cord impingement along with bilateral neural foraminal stenoses. There was an osteophyte complex at C4-C5 causing mild anterior cord impingement and bilateral moderate neural foraminal narrowing and disc bulging at C5-C6 without cord impingement along with anterior subluxation of C7 upon T1. EKG showed sinus rhythm with first-degree AV block and no ST segment change. There was no evidence of atrial fibrillation by telemetry monitoring. His high sensitivity troponins were negative. He had an echocardiogram performed on 02/17/2019 showing an estimated LV ejection fraction at 55â€“59 percent with mild hypokinesis of the anteroseptal wall due to previous thoracotomy. Left atrial size was in the upper range of normal with mild aortic root calcification and a mildly dilated right atrium. The patient was readmitted to [facility] on 06/04/2019 with recurrent neurological complaints. He had pins and needles paresthesias of the left hand and arm along with perceived weakness of the left hand and arm with  loss of coordination. The patient underwent repeat evaluation including MRI of the brain which was unremarkable. Negligible carotid vascular disease by ultrasound and CT angiogram in the past. Blood pressure readings were acceptable at that time with lisinopril 40 mg daily amlodipine 5 mg daily metoprolol extended release 5 mg daily. He was on aspirin at that time along with the atorvastatin 40 mg daily. More recently the patient [place] he began to develop paresthesias of the right hand and fingertips. Initially he thought that an Ace wrap of his right shoulder was too tight and continued driving. The sense of tingling began to get worse and he developed a funny sensation around the right side of his face involving the lip. The patient pulled his car off to the side of the road. At that point in time he was having trouble communicating verbally due to the inability to formulate words. He was taken by EMS to [facility] and observed but then released without admission. He is subsequently seen by neurology as an outpatient. He did have a extra no cardiac monitor performed on 02/05/2020â€“02/14/2020. This did not demonstrate atrial fibrillation. It did demonstrate some brief episodes of Mobitz 1 second degree AV block. He also had one 8 beat run of nonsustained ventricular tachycardia. The patient has never had presyncopal or syncopal events but more notably paresthesias. The results of this external cardiac monitor were discussed with electrophysiology and at this point the patient will be scheduled to have a loop recorder implanted to ensure that he is not having clinically silent paroxysmal atrial fibrillation not detected on previous telemetry monitoring. The patient has been seen by neurology and was switched to Plavix 75 mg daily which will be continued unless there is documentation of paroxysmal atrial fibrillation by the loop recorder.  Echocardiogram performed at time of today's visit 03/17/2020 demonstrates an LV ejection fraction of 55â€“60 percent with mild hypokinesis of the anteroseptal wall due to previous thoracotomy with oneâ€“2+ mitral valve regurgitation. The findings are very similar to the echo that was performed at [facility] in 2/2019. Patient had loop recorder placed 07/2020 by Dr Steven Flores. Had negative pharmacologic nuclear stress test done 01/2022. Patient had hospital stay at [facility] 08/15/2023 and was worked up for TIA vs migraine equivalent. Carotid US showed < 50% stenosis bilaterally. MRI of brain was negative and MRA was unremarkable. Echo showed EF 55-60%, 1+ MR and 1-2+ TR. See Dr Avitia for consideration for Watchman device.     3. Hypertension. Adequate control with present management which includes lisinopril 20 mg daily, amlodipine 5 mg daily and metoprolol ER 50 mg daily.     4. Hyperlipidemia. Good response to atorvastatin 40 mg daily. Lipid panel on 08/2022 includes cholesterol 77 LDL 37 HDL 29 triglycerides 537. FLP done 08/2022 chol 77, HDL 29 LDL 37 trig 53.     5. Type 2 diabetes.     6. Lifetime nonsmoking.     5. Status post right rotator cuff repair 11/11/2019. The patient had a right rotator cuff operation on 11 11/20/1990 evidently developed an infection that required surgical debridement.     6. Hx of covid-19 vaccine #1 and #2.      7. Afib noted to loop recorder after back surgery 02/2022. Patient had laminectomy with Dr Allred 02/22/2022 and developed afib on his loop recorder. Has been started on Xarelto. ECG done prior shows NSR with long first degree AVB.      8. TIA. Patient has been admitted to [facility] on 4 occasions, the first being February 2019 for complaints of left hand numbness and difficulty finding words and left leg heaviness.Carotid ultrasound showed less than 50% stenosis bilaterally. CT angiogram of the head and neck was unremarkable. MRI MRA of the brain was negative for acute  stroke. Later in 2019 the patient was again admitted with similar complaints while driving. He had a loop recorder implanted which did show 2 episodes of atrial tachycardia or atrial fibrillation the longest lasting 6 minutes. The burden was only 0.06%. In October 2022 he was treated for UTI with antibiotics and again admitted for similar complaints. Neurology recommended outpatient physical therapy for inner ear issue. CT angiogram was again negative, carotid ultrasound showed less than 50% stenosis bilaterally, MRI MRA did not show any acute infarct or intracerebral vascular stenosis. Echo done October 2022 showed an EF of 60%, RAD, mild MR, mild to moderate TR, mildly elevated RVSP, PASP 43 mmHg. He was seen by neurology for possible alternative mechanisms to his TIA and treated with magnesium citrate and possibly verapamil. Alternative explanation may be migraine equivalent or focal seizure. Neurology discharged him with referral for vestibular studies and PT for balance. Patient currently will follow-up with Dr. Garvey.      9. GI bleed. Patient with black stools. Had extensive work up with hemoc and can restart Eliquis.     10. AVB. Monitor worn 03/24/2023 showed HR  with 67 bpm average. First degree AVB. PVC 0.08%. PAC 0.64%.

## 2024-01-17 NOTE — PROGRESS NOTES
Patient came into the office for CGM demo.     Counseled on how to use freestyle lirbe 2, first sensor will be applied after patients upcoming procedure on Tuesday.     Thanks,   Leeann Page

## 2024-01-24 ENCOUNTER — PATIENT OUTREACH (OUTPATIENT)
Dept: PRIMARY CARE | Facility: CLINIC | Age: 84
End: 2024-01-24
Payer: MEDICARE

## 2024-01-25 ENCOUNTER — APPOINTMENT (OUTPATIENT)
Dept: PHARMACY | Facility: HOSPITAL | Age: 84
End: 2024-01-25
Payer: MEDICARE

## 2024-01-29 ENCOUNTER — OFFICE VISIT (OUTPATIENT)
Dept: PRIMARY CARE | Facility: CLINIC | Age: 84
End: 2024-01-29
Payer: MEDICARE

## 2024-01-29 VITALS
WEIGHT: 175 LBS | HEIGHT: 71 IN | DIASTOLIC BLOOD PRESSURE: 78 MMHG | OXYGEN SATURATION: 96 % | SYSTOLIC BLOOD PRESSURE: 144 MMHG | BODY MASS INDEX: 24.5 KG/M2 | HEART RATE: 85 BPM

## 2024-01-29 DIAGNOSIS — D69.6 THROMBOCYTOPENIA, UNSPECIFIED (CMS-HCC): ICD-10-CM

## 2024-01-29 DIAGNOSIS — I25.118 CORONARY ARTERY DISEASE OF NATIVE ARTERY OF NATIVE HEART WITH STABLE ANGINA PECTORIS (CMS-HCC): ICD-10-CM

## 2024-01-29 DIAGNOSIS — I50.9 CONGESTIVE HEART FAILURE, UNSPECIFIED HF CHRONICITY, UNSPECIFIED HEART FAILURE TYPE (MULTI): ICD-10-CM

## 2024-01-29 DIAGNOSIS — E11.69 TYPE 2 DIABETES MELLITUS WITH OTHER SPECIFIED COMPLICATION, UNSPECIFIED WHETHER LONG TERM INSULIN USE (MULTI): ICD-10-CM

## 2024-01-29 DIAGNOSIS — I48.91 ATRIAL FIBRILLATION, UNSPECIFIED TYPE (MULTI): ICD-10-CM

## 2024-01-29 DIAGNOSIS — G62.9 NEUROPATHY: ICD-10-CM

## 2024-01-29 DIAGNOSIS — E78.00 HYPERCHOLESTEREMIA: ICD-10-CM

## 2024-01-29 DIAGNOSIS — Z79.4 TYPE 2 DIABETES MELLITUS WITHOUT COMPLICATION, WITH LONG-TERM CURRENT USE OF INSULIN (MULTI): Primary | ICD-10-CM

## 2024-01-29 DIAGNOSIS — E11.9 TYPE 2 DIABETES MELLITUS WITHOUT COMPLICATION, WITH LONG-TERM CURRENT USE OF INSULIN (MULTI): Primary | ICD-10-CM

## 2024-01-29 DIAGNOSIS — N40.0 BENIGN PROSTATIC HYPERPLASIA WITHOUT LOWER URINARY TRACT SYMPTOMS: ICD-10-CM

## 2024-01-29 DIAGNOSIS — L29.9 PRURITUS: ICD-10-CM

## 2024-01-29 DIAGNOSIS — D64.9 ANEMIA, UNSPECIFIED TYPE: ICD-10-CM

## 2024-01-29 DIAGNOSIS — E78.5 HYPERLIPIDEMIA, UNSPECIFIED HYPERLIPIDEMIA TYPE: ICD-10-CM

## 2024-01-29 DIAGNOSIS — I10 HYPERTENSION, UNSPECIFIED TYPE: ICD-10-CM

## 2024-01-29 PROBLEM — Z98.890 HISTORY OF CARDIOVASCULAR SURGERY: Status: ACTIVE | Noted: 2024-01-29

## 2024-01-29 PROBLEM — R35.0 FREQUENT URINATION: Status: ACTIVE | Noted: 2024-01-29

## 2024-01-29 PROBLEM — Z95.818 PRESENCE OF WATCHMAN LEFT ATRIAL APPENDAGE CLOSURE DEVICE: Status: ACTIVE | Noted: 2024-01-16

## 2024-01-29 PROBLEM — H91.90 HOH (HARD OF HEARING): Status: ACTIVE | Noted: 2024-01-16

## 2024-01-29 PROBLEM — Z86.19 HISTORY OF SEPSIS: Status: ACTIVE | Noted: 2024-01-16

## 2024-01-29 PROBLEM — R52 PAIN, UNSPECIFIED: Status: ACTIVE | Noted: 2023-06-20

## 2024-01-29 PROBLEM — M25.551 PAIN OF RIGHT HIP JOINT: Status: ACTIVE | Noted: 2023-06-08

## 2024-01-29 PROBLEM — Z86.79 HISTORY OF ATRIAL FIBRILLATION: Status: ACTIVE | Noted: 2024-01-16

## 2024-01-29 PROBLEM — J90 BILATERAL PLEURAL EFFUSION: Status: ACTIVE | Noted: 2024-01-29

## 2024-01-29 PROBLEM — R47.01 APHASIA: Status: ACTIVE | Noted: 2024-01-29

## 2024-01-29 PROBLEM — R55 SYNCOPE AND COLLAPSE: Status: ACTIVE | Noted: 2023-09-29

## 2024-01-29 PROBLEM — T83.9XXA URINARY CATHETER COMPLICATION (CMS-HCC): Status: ACTIVE | Noted: 2024-01-29

## 2024-01-29 PROBLEM — Z86.39 HISTORY OF HYPERCHOLESTEROLEMIA: Status: ACTIVE | Noted: 2024-01-29

## 2024-01-29 PROBLEM — R73.09 BLOOD GLUCOSE ABNORMAL: Status: ACTIVE | Noted: 2023-06-20

## 2024-01-29 PROBLEM — I35.0 NONRHEUMATIC AORTIC VALVE STENOSIS: Status: ACTIVE | Noted: 2023-11-10

## 2024-01-29 PROCEDURE — 1157F ADVNC CARE PLAN IN RCRD: CPT | Performed by: STUDENT IN AN ORGANIZED HEALTH CARE EDUCATION/TRAINING PROGRAM

## 2024-01-29 PROCEDURE — 1036F TOBACCO NON-USER: CPT | Performed by: STUDENT IN AN ORGANIZED HEALTH CARE EDUCATION/TRAINING PROGRAM

## 2024-01-29 PROCEDURE — 3078F DIAST BP <80 MM HG: CPT | Performed by: STUDENT IN AN ORGANIZED HEALTH CARE EDUCATION/TRAINING PROGRAM

## 2024-01-29 PROCEDURE — 1160F RVW MEDS BY RX/DR IN RCRD: CPT | Performed by: STUDENT IN AN ORGANIZED HEALTH CARE EDUCATION/TRAINING PROGRAM

## 2024-01-29 PROCEDURE — G2211 COMPLEX E/M VISIT ADD ON: HCPCS | Performed by: STUDENT IN AN ORGANIZED HEALTH CARE EDUCATION/TRAINING PROGRAM

## 2024-01-29 PROCEDURE — 99214 OFFICE O/P EST MOD 30 MIN: CPT | Performed by: STUDENT IN AN ORGANIZED HEALTH CARE EDUCATION/TRAINING PROGRAM

## 2024-01-29 PROCEDURE — 1126F AMNT PAIN NOTED NONE PRSNT: CPT | Performed by: STUDENT IN AN ORGANIZED HEALTH CARE EDUCATION/TRAINING PROGRAM

## 2024-01-29 PROCEDURE — 1159F MED LIST DOCD IN RCRD: CPT | Performed by: STUDENT IN AN ORGANIZED HEALTH CARE EDUCATION/TRAINING PROGRAM

## 2024-01-29 PROCEDURE — 3077F SYST BP >= 140 MM HG: CPT | Performed by: STUDENT IN AN ORGANIZED HEALTH CARE EDUCATION/TRAINING PROGRAM

## 2024-01-29 RX ORDER — ATORVASTATIN CALCIUM 40 MG/1
40 TABLET, FILM COATED ORAL DAILY
Qty: 90 TABLET | Refills: 2 | Status: SHIPPED | OUTPATIENT
Start: 2024-01-29

## 2024-01-29 RX ORDER — METFORMIN HYDROCHLORIDE 500 MG/1
1000 TABLET ORAL 2 TIMES DAILY
Qty: 360 TABLET | Refills: 1 | Status: SHIPPED | OUTPATIENT
Start: 2024-01-29

## 2024-01-29 RX ORDER — GABAPENTIN 100 MG/1
CAPSULE ORAL
Qty: 90 CAPSULE | Refills: 0 | Status: SHIPPED | OUTPATIENT
Start: 2024-01-29

## 2024-01-29 ASSESSMENT — ENCOUNTER SYMPTOMS
LOSS OF SENSATION IN FEET: 0
DEPRESSION: 0
OCCASIONAL FEELINGS OF UNSTEADINESS: 0

## 2024-01-29 ASSESSMENT — PATIENT HEALTH QUESTIONNAIRE - PHQ9
1. LITTLE INTEREST OR PLEASURE IN DOING THINGS: NOT AT ALL
SUM OF ALL RESPONSES TO PHQ9 QUESTIONS 1 AND 2: 0
2. FEELING DOWN, DEPRESSED OR HOPELESS: NOT AT ALL

## 2024-01-29 NOTE — PROGRESS NOTES
83-year-old male presenting for follow-up of multiple chronic conditions:    DMII  Stable has been doing well.  Using CGM, doing well.    A-fib, TIA, HTN, HLD  Follow with cardiology, and electrophysiology, has Watchman device.  Decreased on anticoagulation down to baby aspirin only.  Blood pressure medication was reduced by cardiology, lisinopril discontinued.    General pruritus, dry skin, AK's  Has been itching all over her, no rash on most of the body.  Takes Benadryl as needed, decent relief.  Has not seen dermatology long-term, used to regularly.    History of GI bleed  CBCs have been stable.    BPH, status post UroLift  Had Tiwari catheter taken out after approximately 2 months, stable, following with urology.    Seizure activity?  Following with neurology, on Keppra, stable and doing well.    12 point ROS reviewed and negative other than as stated in HPI    General: Alert, oriented, pleasant, in no acute distress  HEENT:      Head: normocephalic, atraumatic;      eyes: EOMI, no scleral icterus;   CV: Mostly sinus rhythm with ectopy, no murmur  Lungs: CTAB without wheezing, rhonchi or rales; good respiratory effort, no increased work of breathing  Neuro: Cranial nerves grossly intact; alert and oriented  Psych: Appropriate mood and affect    #CAD with remote CABG, 2021 #A-fib #HTN #HLD  -Blood pressure slightly above goal, acceptable for age  - Follows with cardiology  - Continue amlodipine 5 mg daily, metoprolol succinate 50 mg daily  -Continue aspirin 81 mg daily  -Following with cardiology, has EP scheduled for tomorrow, consideration of Watchman device and discontinuation of anticoagulation due to history of GI bleed     #DMII  - Currently on Soliqua 6 units daily, metformin 1000 mg twice daily  -No recent ophthalmology exam, referral ordered at last appointment  - Never established with podiatry, diabetic foot exam positive in office, referral ordered at last appointment  -Most recent A1c below 9.8  -  Continue to follow with pharmacist, would like to put on GLP or SGLT2 by itself, there is some cost prohibitions.     #History of GI bleed #thrombocytopenia  - Repeat CBCs have been stable    #BPH #S/P urolift  - Continue finasteride 5 mg daily, continue tamsulosin 0.4 mg daily    #Generalized pruritus #AK's  - Can take antihistamine, prefer second-generation to decrease drowsiness  - Topical moisturizing and Sarna as needed  - Should follow-up with dermatology for both this and multiple AK's on head    #Seizure activity  - Following with neurology, stable on Keppra    Most labs recent and reviewed, will defer more labs until next visit    Follow-up 3 months, Medicare in June    Christopher D'Amico, DO

## 2024-01-31 DIAGNOSIS — I48.91 ATRIAL FIBRILLATION, UNSPECIFIED TYPE (MULTI): ICD-10-CM

## 2024-02-01 ENCOUNTER — TELEMEDICINE (OUTPATIENT)
Dept: PHARMACY | Facility: HOSPITAL | Age: 84
End: 2024-02-01
Payer: MEDICARE

## 2024-02-01 DIAGNOSIS — Z79.4 TYPE 2 DIABETES MELLITUS WITHOUT COMPLICATION, WITH LONG-TERM CURRENT USE OF INSULIN (MULTI): Primary | ICD-10-CM

## 2024-02-01 DIAGNOSIS — E11.9 DIABETES MELLITUS TYPE 2 WITHOUT RETINOPATHY (MULTI): ICD-10-CM

## 2024-02-01 DIAGNOSIS — E11.9 TYPE 2 DIABETES MELLITUS WITHOUT COMPLICATION, WITH LONG-TERM CURRENT USE OF INSULIN (MULTI): Primary | ICD-10-CM

## 2024-02-01 NOTE — PROGRESS NOTES
Pharmacist Clinic: Diabetes Management  Luis Greene is a 84 y.o. male was referred to Clinical Pharmacy Team for diabetes management.     Referring Provider: Christopher D'Amico, DO     HISTORY OF PRESENT ILLNESS  Patient is a type 2 diabetic, his DM is currently complicated with steroid injections for back pain. In his lifetime he has gotten 4 shots.      Diet:   - 1-2 snacks throughout the day (apple, popcorn, chips, sandwich)   - Dinner: varies, pasta, soup, take out     LAB REVIEW   Glucose (mg/dL)   Date Value   12/28/2023 95   12/25/2023 68   12/22/2023 108 (H)     Hemoglobin A1C (%)   Date Value   11/15/2023 6.7 (H)   09/14/2023 6.8 (A)   08/14/2023 8.1 (H)   07/14/2023 7.5 (A)   04/19/2023 9.7 (H)   02/03/2023 9.6 (H)   12/20/2021 7.5 (H)     Bicarbonate (mmol/L)   Date Value   12/28/2023 25   12/25/2023 24   12/22/2023 22 (L)     Urea Nitrogen (mg/dL)   Date Value   12/28/2023 14   12/25/2023 15   12/22/2023 21     Creatinine (mg/dL)   Date Value   12/28/2023 0.90   12/25/2023 1.00   12/22/2023 1.00     Lab Results   Component Value Date    HGBA1C 6.7 (H) 11/15/2023    HGBA1C 6.8 (A) 09/14/2023    HGBA1C 8.1 (H) 08/14/2023     Lab Results   Component Value Date    CHOL 81 (L) 11/15/2023    CHOL 81 (L) 08/15/2023    CHOL 73 (L) 10/28/2022     Lab Results   Component Value Date    HDL 29.0 (L) 11/15/2023    HDL 28 (L) 08/15/2023    HDL 32 (L) 10/28/2022     Lab Results   Component Value Date    TRIG 110 11/15/2023    TRIG 109 08/15/2023    TRIG 45 10/28/2022       DIABETES ASSESSMENT    CURRENT PHARMACOTHERAPY  - metformin 500 mg 2 tabs by mouth twice daily   - soliqua 6 units under the skin once daily    SECONDARY PREVENTION  - Statin? Yes  - ACE-I/ARB? Yes    HISTORICAL PHARMACOTHERAPY   - Lantus 20 units under the skin once daily   - Novolog 5 units once daily once daily     SMBG MEASUREMENTS: patient is using freestyle reed 2   - around 100    DISCUSSION:   - Continue current medication regimen      RECOMMENDATIONS/PLAN  1. Patients diabetes is well controlled with most recent A1c of 6.7% (goal < 7 %).   - Continue all meds under the continuation of care with the referring provider and clinical pharmacy team    Clinical Pharmacist follow up: 2 weeks     Thank you,  Leeann Page, PharmD    Verbal consent to manage patient's drug therapy was obtained from the patient. They were informed they may decline to participate or withdraw from participation in pharmacy services at any time.

## 2024-02-12 DIAGNOSIS — Z79.4 TYPE 2 DIABETES MELLITUS WITHOUT COMPLICATION, WITH LONG-TERM CURRENT USE OF INSULIN (MULTI): Primary | ICD-10-CM

## 2024-02-12 DIAGNOSIS — E11.9 TYPE 2 DIABETES MELLITUS WITHOUT COMPLICATION, WITH LONG-TERM CURRENT USE OF INSULIN (MULTI): Primary | ICD-10-CM

## 2024-02-13 ENCOUNTER — TELEPHONE (OUTPATIENT)
Dept: CARDIOLOGY | Facility: CLINIC | Age: 84
End: 2024-02-13
Payer: MEDICARE

## 2024-02-14 ENCOUNTER — APPOINTMENT (OUTPATIENT)
Dept: RADIOLOGY | Facility: HOSPITAL | Age: 84
End: 2024-02-14
Payer: MEDICARE

## 2024-02-14 ENCOUNTER — APPOINTMENT (OUTPATIENT)
Dept: CARDIOLOGY | Facility: HOSPITAL | Age: 84
End: 2024-02-14
Payer: MEDICARE

## 2024-02-14 ENCOUNTER — HOSPITAL ENCOUNTER (EMERGENCY)
Facility: HOSPITAL | Age: 84
Discharge: HOME | End: 2024-02-14
Attending: STUDENT IN AN ORGANIZED HEALTH CARE EDUCATION/TRAINING PROGRAM
Payer: MEDICARE

## 2024-02-14 VITALS
HEART RATE: 74 BPM | WEIGHT: 175 LBS | RESPIRATION RATE: 18 BRPM | TEMPERATURE: 97.3 F | DIASTOLIC BLOOD PRESSURE: 78 MMHG | SYSTOLIC BLOOD PRESSURE: 145 MMHG | OXYGEN SATURATION: 98 % | HEIGHT: 71 IN | BODY MASS INDEX: 24.5 KG/M2

## 2024-02-14 DIAGNOSIS — I10 HYPERTENSION, UNSPECIFIED TYPE: Primary | ICD-10-CM

## 2024-02-14 LAB
ANION GAP SERPL CALC-SCNC: 13 MMOL/L
BASOPHILS # BLD AUTO: 0.02 X10*3/UL (ref 0–0.1)
BASOPHILS NFR BLD AUTO: 0.5 %
BUN SERPL-MCNC: 21 MG/DL (ref 8–25)
CALCIUM SERPL-MCNC: 9.6 MG/DL (ref 8.5–10.4)
CHLORIDE SERPL-SCNC: 100 MMOL/L (ref 97–107)
CO2 SERPL-SCNC: 25 MMOL/L (ref 24–31)
CREAT SERPL-MCNC: 1 MG/DL (ref 0.4–1.6)
EGFRCR SERPLBLD CKD-EPI 2021: 75 ML/MIN/1.73M*2
EOSINOPHIL # BLD AUTO: 0.27 X10*3/UL (ref 0–0.4)
EOSINOPHIL NFR BLD AUTO: 6.1 %
ERYTHROCYTE [DISTWIDTH] IN BLOOD BY AUTOMATED COUNT: 14.6 % (ref 11.5–14.5)
GLUCOSE SERPL-MCNC: 160 MG/DL (ref 65–99)
HCT VFR BLD AUTO: 36.8 % (ref 41–52)
HGB BLD-MCNC: 12 G/DL (ref 13.5–17.5)
IMM GRANULOCYTES # BLD AUTO: 0.01 X10*3/UL (ref 0–0.5)
IMM GRANULOCYTES NFR BLD AUTO: 0.2 % (ref 0–0.9)
LYMPHOCYTES # BLD AUTO: 1.63 X10*3/UL (ref 0.8–3)
LYMPHOCYTES NFR BLD AUTO: 36.9 %
MCH RBC QN AUTO: 34.7 PG (ref 26–34)
MCHC RBC AUTO-ENTMCNC: 32.6 G/DL (ref 32–36)
MCV RBC AUTO: 106 FL (ref 80–100)
MONOCYTES # BLD AUTO: 0.35 X10*3/UL (ref 0.05–0.8)
MONOCYTES NFR BLD AUTO: 7.9 %
NEUTROPHILS # BLD AUTO: 2.14 X10*3/UL (ref 1.6–5.5)
NEUTROPHILS NFR BLD AUTO: 48.4 %
NRBC BLD-RTO: 0 /100 WBCS (ref 0–0)
PLATELET # BLD AUTO: 114 X10*3/UL (ref 150–450)
POTASSIUM SERPL-SCNC: 4.8 MMOL/L (ref 3.4–5.1)
RBC # BLD AUTO: 3.46 X10*6/UL (ref 4.5–5.9)
RBC MORPH BLD: NORMAL
SODIUM SERPL-SCNC: 138 MMOL/L (ref 133–145)
WBC # BLD AUTO: 4.4 X10*3/UL (ref 4.4–11.3)

## 2024-02-14 PROCEDURE — 80048 BASIC METABOLIC PNL TOTAL CA: CPT | Performed by: EMERGENCY MEDICINE

## 2024-02-14 PROCEDURE — 93005 ELECTROCARDIOGRAM TRACING: CPT

## 2024-02-14 PROCEDURE — 71046 X-RAY EXAM CHEST 2 VIEWS: CPT

## 2024-02-14 PROCEDURE — 99284 EMERGENCY DEPT VISIT MOD MDM: CPT | Mod: 25 | Performed by: STUDENT IN AN ORGANIZED HEALTH CARE EDUCATION/TRAINING PROGRAM

## 2024-02-14 PROCEDURE — 85025 COMPLETE CBC W/AUTO DIFF WBC: CPT | Performed by: EMERGENCY MEDICINE

## 2024-02-14 PROCEDURE — 99283 EMERGENCY DEPT VISIT LOW MDM: CPT | Mod: 25

## 2024-02-14 NOTE — ED TRIAGE NOTES
Pts home care nurse took his blood pressure and said it was high, pt called his cardiologist and the Dr office advised him to come here.  took him off blood pressure pills because it was falling too low.

## 2024-02-14 NOTE — ED TRIAGE NOTES
TRIAGE NOTE   I saw the patient as the Clinician in Triage and performed a brief history and physical exam, established acuity, and ordered appropriate tests to develop basic plan of care. Patient will be seen by an ANAID, resident and/or physician who will independently evaluate the patient. Please see subsequent provider notes for further details and disposition.     Brief HPI: In brief, Luis Greene is a 84 y.o. male that presents for elevated blood pressure.  The patient has a longstanding history of hypertension and until recently was on lisinopril, amlodipine and metoprolol.  The patient was hospitalized with sepsis a few months ago, and around that time had amlodipine and metoprolol discontinued because of lower blood pressures.  More recently his lisinopril was discontinued because of lower blood pressures.  2 days a visiting nurse obtained a systolic blood pressure reading of 184.  The patient's cardiology office was contacted and he was directed to come to the emergency department.  He denies any chest pain or shortness of breath.  No headaches or vision disturbances.  No nausea or vomiting or abdominal pain.  Focused Physical exam:   Triage vitals are unremarkable.  Blood pressure is 170/80.  Heart rate is in the 90s with irregular rhythm.  No murmurs.  Lungs are clear to auscultation bilaterally.  Pupils are equal and reactive to light and extract muscles are intact.  Face is symmetrical and speech is clear.  There are no gross motor or sensory deficits detected in the upper or lower extremities although the patient does have decreased sensation to the left foot area due to chronic neuropathy.  Plan/MDM:   Plan is for cardiac monitor, EKG, labs and chest x-ray.  Please see subsequent provider note for further details and disposition     Alon Weiner D.O.  3:56 PM

## 2024-02-14 NOTE — ED PROVIDER NOTES
Supervisory note:  Patient seen in conjunction with KIERRA Guajardo.  Patient presents with high blood pressure.  His cardiologist, Dr. Grant, recently stopped his metoprolol, lisinopril, and amlodipine due to low blood pressure.  Patient has been checking his blood pressure and it has been gradually getting higher.  He denies any symptoms however.  On examination, patient is awake, alert, answers questions appropriately.  Vital signs reveal systolic hypertension but are otherwise unremarkable.  Laboratory studies are unremarkable.    EKG interpretation: Sinus rhythm with first-degree AV block, rate 82.  Normal axis.  Right bundle branch block.  No significant ST or T wave abnormalities.    Patient was advised to restart low-dose amlodipine.  Patient was advised to check blood pressure daily and if blood pressure becomes low, to discontinue amlodipine.  Patient advised to follow-up with cardiology.  Return precautions given for any worsening symptoms.  I personally saw the patient and made/approved the management plan and take responsiblity for the patient management.  Parts of this chart were completed with dictation software, please excuse any errors in transcription.     Christian Trejo MD  02/14/24 [id number]       Christian Trejo MD  02/14/24 [id number]

## 2024-02-14 NOTE — DISCHARGE INSTRUCTIONS
You should restart your amlodipine.  You should keep close watch on your blood pressure and if it becomes low, you should again discontinue your amlodipine.  Follow-up with your primary care physician and cardiologist.    It is important to remember that your care does not end here and you must continue to monitor your condition closely. Please return to the emergency department for any worsening or concerning signs or symptoms as directed by our conversations and the discharge instructions. If you do not have a doctor please contact the referral number on your discharge instructions. Please contact any physician specialists provided in your discharge notes as it is very important to follow up with them regarding your condition. If you are unable to reach the physicians provided, please come back to the Emergency Department at any time.    Return to emergency room without delay for ANY new or worsening pains or for any other symptoms or concerns.  Return with worsening pains, nausea, vomiting, trouble breathing, palpitations, shortness of breath, inability to pass stool or urine, loss of control of stool or urine, any numbness or tingling (that is not normal for you), uncontrolled fevers, the passing of blood or other material in stool or urine, rashes, pains or for any other symptoms or concerns you may have.  You are always welcome to return to the ER at any time for any reason or for any other concerns you may have.   Patient/Family

## 2024-02-14 NOTE — ED PROVIDER NOTES
HPI   Chief Complaint   Patient presents with    Hypertension       Patient is an 84-year-old male who presents emergency department for evaluation of elevated blood pressures.  Patient states that he has a history of atrial fibrillation, but had a Watchman procedure in November and does not require any anticoagulation at this time.  He states that over the last several months he has had intermittent issues with high blood pressure and low blood pressure.  They have been changing many of his hypertensive medications because of this.  He was on metoprolol, lisinopril, and amlodipine.  He states that because his blood pressures were dropping low he eventually was taken off of all of his blood pressure medications and is no longer taking any of them.  He states that the last medication he was on was amlodipine and he was just recently taken off of this a week or 2 ago.  He states that he does have a nurse that comes and visits with him and they took his blood pressure yesterday and noted to be elevated with systolics in the 180s.  He has felt well with no symptoms at this time.  He denies any associated chest pain, headache, lightheadedness, dizziness, nausea, vomiting.  He states that he does have a blood pressure cuff at home that he uses and noted that his blood pressure was still elevated today with systolics in the 170s.  He states that because his blood pressure remained elevated, he presents for further evaluation.  He denies any chest pain, shortness of breath, lightheadedness, dizziness, nausea, vomiting, recent illnesses, cough, congestion.      History provided by:  Patient   used: No                        No data recorded                   Patient History   Past Medical History:   Diagnosis Date    A-fib (CMS/[facility])     CHF (congestive heart failure) (CMS/[facility])     Cognitive communication deficit     Coronary artery disease     Diabetes (CMS/[facility])     Hyperlipidemia     Iron deficiency  anemia     Muscle weakness (generalized)     Osteoarthritis     Personal history of other diseases of the circulatory system     History of cardiac disorder    Personal history of other endocrine, nutritional and metabolic disease     History of hypercholesterolemia    Unspecified convulsions (CMS/[facility]CC)     Urinary retention     Urinary tract infection      Past Surgical History:   Procedure Laterality Date    CARDIAC CATHETERIZATION N/A 11/10/2023    Procedure: LAAO (Left Atrial Appendage Occlusion);  Surgeon: Louie Avitia MD;  Location: [place] [address] Cardiac Cath Lab;  Service: Cardiovascular;  Laterality: N/A;  Last Eliquis 11/06/SD /[employer]    MR HEAD ANGIO WO IV CONTRAST  2/17/2019    MR HEAD ANGIO WO IV CONTRAST [facility]    MR HEAD ANGIO WO IV CONTRAST  10/28/2022    MR HEAD ANGIO WO IV CONTRAST [facility]    MR HEAD ANGIO WO IV CONTRAST  8/15/2023    MR HEAD ANGIO WO IV CONTRAST [facility]    OTHER SURGICAL HISTORY  03/11/2019    Heart surgery    OTHER SURGICAL HISTORY  03/11/2019    Knee surgery    OTHER SURGICAL HISTORY  03/11/2019    Tonsillectomy     Family History   Problem Relation Name Age of Onset    Other (cardiac disorder) Father      Diabetes Father      Hyperlipidemia Father      Hypertension Father      Other (liver carcinoma) Father      Other (hypercholesterolemia) Father      Hypertension Daughter       Social History     Tobacco Use    Smoking status: Never    Smokeless tobacco: Never   Vaping Use    Vaping Use: Never used   Substance Use Topics    Alcohol use: Never    Drug use: Never       Physical Exam   ED Triage Vitals [02/14/24 1432]   Temperature Heart Rate Respirations BP   36.3 °C (97.3 °F) 96 16 170/80      Pulse Ox Temp Source Heart Rate Source Patient Position   100 % Temporal Monitor Sitting      BP Location FiO2 (%)     Left arm --       Physical Exam  Constitutional:       Appearance: Normal appearance.   Cardiovascular:       Rate and Rhythm: Normal rate and regular rhythm.   Pulmonary:      Effort: Pulmonary effort is normal.      Breath sounds: Normal breath sounds.   Abdominal:      General: Abdomen is flat.      Palpations: Abdomen is soft.      Tenderness: There is no abdominal tenderness.   Musculoskeletal:         General: Normal range of motion.   Skin:     General: Skin is warm and dry.   Neurological:      General: No focal deficit present.      Mental Status: He is alert and oriented to person, place, and time.      Cranial Nerves: No cranial nerve deficit.      Sensory: No sensory deficit.      Motor: No weakness.         ED Course & MDM   Diagnoses as of 02/14/24 2221   Hypertension, unspecified type       Medical Decision Making  Patient is an 84-year-old male presents emergency department for evaluation of elevated blood pressures.    EKG was interpreted by attending physician and reviewed by me.    Lab work done today included BMP, CBC.  Lab work with mild anemia and otherwise unremarkable.    Scans done today were interpreted/confirmed by radiologist and also interpreted by me which included chest x-ray.  Chest x-ray shows no acute cardiopulmonary disease.    Medications not given at today's visit.    I saw this patient in conjunction with Dr. Trejo.  Patient remains hemodynamically stable while in the emergency department.  Repeat blood pressure without any acute intervention is 145/78.  Patient remains asymptomatic and well-appearing.  Lab work without significant abnormality.  Given improvement of blood pressure without acute intervention and patient remaining asymptomatic patient stable to be discharged follow-up closely with cardiology and primary care provider for continued management of elevated blood pressures.  Patient is not currently on any of his antihypertensives and will be restarted on his amlodipine low-dose to use until follow-up with cardiologist and primary care provider.  Emergent pathologies were  considered for this patient, although I have low suspicion for anything acutely emergent given patient's clinical presentation, history, physical exam, stable vital signs, and relatively unremarkable workup.  Discharging patient home is reasonable plan of care for outpatient management.    All labs, imaging, and diagnostic studies were reviewed by me and patient was counseled on clinical impression, expectations, and plan.  Patient was educated to follow-up with PCP in the following 1-2 days.  All questions from patient were answered. They elicited understanding and were agreeable to course of treatment.  Patient was discharged in stable condition and given strict return precautions.    Prescriptions given on discharge: PO amlodipine    ** Disclaimer:  Parts of this document were written utilizing a voice to text dictation software.  Note may contain minor transcription or typographical errors that were inadvertently transcribed by the computer software.        Procedure  Procedures     Tracey Massey PA-C  02/14/24 [id number]

## 2024-02-15 ENCOUNTER — APPOINTMENT (OUTPATIENT)
Dept: PHARMACY | Facility: HOSPITAL | Age: 84
End: 2024-02-15
Payer: MEDICARE

## 2024-02-19 LAB
ATRIAL RATE: 111 BPM
Q ONSET: 224 MS
QRS COUNT: 13 BEATS
QRS DURATION: 132 MS
QT INTERVAL: 414 MS
QTC CALCULATION(BAZETT): 483 MS
QTC FREDERICIA: 459 MS
R AXIS: 76 DEGREES
T AXIS: 5 DEGREES
T OFFSET: 431 MS
VENTRICULAR RATE: 82 BPM

## 2024-02-22 ENCOUNTER — TELEMEDICINE (OUTPATIENT)
Dept: PHARMACY | Facility: HOSPITAL | Age: 84
End: 2024-02-22
Payer: MEDICARE

## 2024-02-22 DIAGNOSIS — Z79.4 TYPE 2 DIABETES MELLITUS WITHOUT COMPLICATION, WITH LONG-TERM CURRENT USE OF INSULIN (MULTI): Primary | ICD-10-CM

## 2024-02-22 DIAGNOSIS — E11.9 TYPE 2 DIABETES MELLITUS WITHOUT COMPLICATION, WITH LONG-TERM CURRENT USE OF INSULIN (MULTI): Primary | ICD-10-CM

## 2024-02-22 NOTE — PROGRESS NOTES
Pharmacist Clinic: Diabetes Management  Luis Greene is a 84 y.o. male was referred to Clinical Pharmacy Team for diabetes management.     Referring Provider: Christopher D'Amico, DO     HISTORY OF PRESENT ILLNESS  Patient is a type 2 diabetic, his DM is currently complicated with steroid injections for back pain. In his lifetime he has gotten 4 shots.      Diet:   - 1-2 snacks throughout the day (apple, popcorn, chips, sandwich)   - Dinner: varies, pasta, soup, take out     LAB REVIEW   Glucose (mg/dL)   Date Value   02/14/2024 160 (H)   12/28/2023 95   12/25/2023 68     Hemoglobin A1C (%)   Date Value   11/15/2023 6.7 (H)   09/14/2023 6.8 (A)   08/14/2023 8.1 (H)   07/14/2023 7.5 (A)   04/19/2023 9.7 (H)   02/03/2023 9.6 (H)   12/20/2021 7.5 (H)     Bicarbonate (mmol/L)   Date Value   02/14/2024 25   12/28/2023 25   12/25/2023 24     Urea Nitrogen (mg/dL)   Date Value   02/14/2024 21   12/28/2023 14   12/25/2023 15     Creatinine (mg/dL)   Date Value   02/14/2024 1.00   12/28/2023 0.90   12/25/2023 1.00     Lab Results   Component Value Date    HGBA1C 6.7 (H) 11/15/2023    HGBA1C 6.8 (A) 09/14/2023    HGBA1C 8.1 (H) 08/14/2023     Lab Results   Component Value Date    CHOL 81 (L) 11/15/2023    CHOL 81 (L) 08/15/2023    CHOL 73 (L) 10/28/2022     Lab Results   Component Value Date    HDL 29.0 (L) 11/15/2023    HDL 28 (L) 08/15/2023    HDL 32 (L) 10/28/2022     Lab Results   Component Value Date    TRIG 110 11/15/2023    TRIG 109 08/15/2023    TRIG 45 10/28/2022       DIABETES ASSESSMENT    CURRENT PHARMACOTHERAPY  - metformin 500 mg 2 tabs by mouth twice daily   - soliqua 6 units under the skin once daily    SECONDARY PREVENTION  - Statin? Yes  - ACE-I/ARB? Yes    HISTORICAL PHARMACOTHERAPY   - Lantus 20 units under the skin once daily   - Novolog 5 units once daily once daily     SMBG MEASUREMENTS: patient is using freestyle reed 2   - around 100    DISCUSSION:   - Continue current medication regimen      RECOMMENDATIONS/PLAN  1. Patients diabetes is well controlled with most recent A1c of 6.7% (goal < 7 %).   - Continue all meds under the continuation of care with the referring provider and clinical pharmacy team    Clinical Pharmacist follow up: 2 weeks     Thank you,  Leeann Page, PharmD    Verbal consent to manage patient's drug therapy was obtained from the patient. They were informed they may decline to participate or withdraw from participation in pharmacy services at any time.

## 2024-03-05 ENCOUNTER — OFFICE VISIT (OUTPATIENT)
Dept: CARDIOLOGY | Facility: CLINIC | Age: 84
End: 2024-03-05
Payer: MEDICARE

## 2024-03-05 VITALS
DIASTOLIC BLOOD PRESSURE: 67 MMHG | OXYGEN SATURATION: 98 % | SYSTOLIC BLOOD PRESSURE: 137 MMHG | WEIGHT: 179.5 LBS | HEIGHT: 71 IN | BODY MASS INDEX: 25.13 KG/M2 | HEART RATE: 94 BPM

## 2024-03-05 DIAGNOSIS — I10 HYPERTENSION, UNSPECIFIED TYPE: ICD-10-CM

## 2024-03-05 PROCEDURE — 1160F RVW MEDS BY RX/DR IN RCRD: CPT | Performed by: NURSE PRACTITIONER

## 2024-03-05 PROCEDURE — 3075F SYST BP GE 130 - 139MM HG: CPT | Performed by: NURSE PRACTITIONER

## 2024-03-05 PROCEDURE — 1126F AMNT PAIN NOTED NONE PRSNT: CPT | Performed by: NURSE PRACTITIONER

## 2024-03-05 PROCEDURE — 1159F MED LIST DOCD IN RCRD: CPT | Performed by: NURSE PRACTITIONER

## 2024-03-05 PROCEDURE — 1157F ADVNC CARE PLAN IN RCRD: CPT | Performed by: NURSE PRACTITIONER

## 2024-03-05 PROCEDURE — 1036F TOBACCO NON-USER: CPT | Performed by: NURSE PRACTITIONER

## 2024-03-05 PROCEDURE — 99214 OFFICE O/P EST MOD 30 MIN: CPT | Performed by: NURSE PRACTITIONER

## 2024-03-05 PROCEDURE — 3078F DIAST BP <80 MM HG: CPT | Performed by: NURSE PRACTITIONER

## 2024-03-05 RX ORDER — IBUPROFEN 100 MG/5ML
1000 SUSPENSION, ORAL (FINAL DOSE FORM) ORAL DAILY
COMMUNITY

## 2024-03-05 RX ORDER — FUROSEMIDE 40 MG/1
40 TABLET ORAL DAILY
Qty: 90 TABLET | Refills: 0 | Status: SHIPPED | OUTPATIENT
Start: 2024-03-05 | End: 2025-03-05

## 2024-03-05 RX ORDER — AMLODIPINE BESYLATE 5 MG/1
5 TABLET ORAL DAILY
COMMUNITY

## 2024-03-05 ASSESSMENT — ENCOUNTER SYMPTOMS
NEUROLOGICAL NEGATIVE: 1
DEPRESSION: 0
RESPIRATORY NEGATIVE: 1
CARDIOVASCULAR NEGATIVE: 1
LOSS OF SENSATION IN FEET: 1
MUSCULOSKELETAL NEGATIVE: 1
GASTROINTESTINAL NEGATIVE: 1
CONSTITUTIONAL NEGATIVE: 1
OCCASIONAL FEELINGS OF UNSTEADINESS: 1

## 2024-03-05 ASSESSMENT — PAIN SCALES - GENERAL: PAINLEVEL: 0-NO PAIN

## 2024-03-05 NOTE — PROGRESS NOTES
"Chief Complaint:   Follow-up    History Of Present Illness:    .Mr Greene returns in follow up.  Denies chest pain, sob, palpitations or pedal edema.           Last Recorded Vitals:  Blood pressure 137/67, pulse 94, height 1.803 m (5' 11\"), weight 81.4 kg (179 lb 8 oz), SpO2 98 %.     Past Medical History:  Past Medical History:   Diagnosis Date    A-fib (CMS/[facility])     CHF (congestive heart failure) (CMS/HCC)     Cognitive communication deficit     Coronary artery disease     Diabetes (CMS/HCC)     Hyperlipidemia     Iron deficiency anemia     Muscle weakness (generalized)     Osteoarthritis     Personal history of other diseases of the circulatory system     History of cardiac disorder    Personal history of other endocrine, nutritional and metabolic disease     History of hypercholesterolemia    Unspecified convulsions (CMS/[facility])     Urinary retention     Urinary tract infection         Past Surgical History:  Past Surgical History:   Procedure Laterality Date    CARDIAC CATHETERIZATION N/A 11/10/2023    Procedure: LAAO (Left Atrial Appendage Occlusion);  Surgeon: Louie Avitia MD;  Location: [place] [address] Cardiac Cath Lab;  Service: Cardiovascular;  Laterality: N/A;  Last Eliquis 11/06/SD /[employer]    MR HEAD ANGIO WO IV CONTRAST  2/17/2019    MR HEAD ANGIO WO IV CONTRAST [facility]    MR HEAD ANGIO WO IV CONTRAST  10/28/2022    MR HEAD ANGIO WO IV CONTRAST [facility]    MR HEAD ANGIO WO IV CONTRAST  8/15/2023    MR HEAD ANGIO WO IV CONTRAST [facility]    OTHER SURGICAL HISTORY  03/11/2019    Heart surgery    OTHER SURGICAL HISTORY  03/11/2019    Knee surgery    OTHER SURGICAL HISTORY  03/11/2019    Tonsillectomy       Social History:  Social History     Socioeconomic History    Marital status:      Spouse name: None    Number of children: None    Years of education: None    Highest education level: None   Occupational History    None   Tobacco Use    Smoking " status: Never    Smokeless tobacco: Never   Vaping Use    Vaping Use: Never used   Substance and Sexual Activity    Alcohol use: Never    Drug use: Never    Sexual activity: None   Other Topics Concern    None   Social History Narrative    None     Social Determinants of Health     Financial Resource Strain: Low Risk  (12/18/2023)    Overall Financial Resource Strain (CARDIA)     Difficulty of Paying Living Expenses: Not hard at all   Food Insecurity: No Food Insecurity (12/18/2023)    Hunger Vital Sign     Worried About Running Out of Food in the Last Year: Never true     Ran Out of Food in the Last Year: Never true   Transportation Needs: No Transportation Needs (12/18/2023)    PRAPARE - Transportation     Lack of Transportation (Medical): No     Lack of Transportation (Non-Medical): No   Physical Activity: Insufficiently Active (12/18/2023)    Exercise Vital Sign     Days of Exercise per Week: 2 days     Minutes of Exercise per Session: 10 min   Stress: No Stress Concern Present (12/18/2023)    [employer] - Occupational Stress Questionnaire     Feeling of Stress : Not at all   Recent Concern: Stress - Stress Concern Present (11/24/2023)    [employer] - Occupational Stress Questionnaire     Feeling of Stress : To some extent   Social Connections: Socially Isolated (12/18/2023)    Social Connection and Isolation Panel [NHANES]     Frequency of Communication with Friends and Family: Three times a week     Frequency of Social Gatherings with Friends and Family: Three times a week     Attends Bahai Services: Never     Active Member of Clubs or Organizations: No     Attends Club or Organization Meetings: Never     Marital Status: Patient unable to answer   Intimate Partner Violence: Not At Risk (12/18/2023)    Humiliation, Afraid, Rape, and Kick questionnaire     Fear of Current or Ex-Partner: No     Emotionally Abused: No     Physically Abused: No      Sexually Abused: No   Housing Stability: Low Risk  (12/18/2023)    Housing Stability Vital Sign     Unable to Pay for Housing in the Last Year: No     Number of Places Lived in the Last Year: 1     Unstable Housing in the Last Year: No       Family History:  Family History   Problem Relation Name Age of Onset    Other (cardiac disorder) Father      Diabetes Father      Hyperlipidemia Father      Hypertension Father      Other (liver carcinoma) Father      Other (hypercholesterolemia) Father      Hypertension Daughter           Allergies:  Famotidine, Prednisone, Amoxicillin, Donepezil, Gabapentin, Hydrochlorothiazide, Ibuprofen, Oxycodone-acetaminophen, Tramadol hcl, and Oxycodone hcl    Outpatient Medications:  Current Outpatient Medications   Medication Sig Dispense Refill    acetaminophen (Tylenol) 325 mg tablet Take 2 tablets (650 mg) by mouth every 6 hours if needed for mild pain (1 - 3).      amLODIPine (Norvasc) 5 mg tablet Take 1 tablet (5 mg) by mouth once daily.      ascorbic acid (Vitamin C) 1,000 mg tablet Vitamin C      aspirin 81 mg EC tablet Take 2 tablets (162 mg) by mouth once daily.      atorvastatin (Lipitor) 40 mg tablet Take 1 tablet (40 mg) by mouth once daily. 90 tablet 2    cyanocobalamin (Vitamin B-12) 500 mcg tablet Take 1 tablet (500 mcg) by mouth 2 times a week. Tuesday and Friday      ferrous sulfate 325 (65 Fe) MG EC tablet Take 1 tablet by mouth once daily. 90 tablet 3    finasteride (Proscar) 5 mg tablet Take 1 tablet (5 mg) by mouth once daily. 30 tablet 5    furosemide (Lasix) 40 mg tablet Take 1 tablet (40 mg) by mouth once daily. 30 tablet 11    gabapentin (Neurontin) 100 mg capsule TAKE 1 CAPSULE BY MOUTH ONCE DAILY FOR NEUROPATHY PAIN 90 capsule 0    insulin glargine-lixisenatide (Soliqua 100/33) 100 unit-33 mcg/mL insulin pen Inject 10 Units under the skin once daily at bedtime.      levETIRAcetam (Keppra) 250 mg tablet Take 1 tablet (250 mg) by mouth 2 times a day.       magnesium oxide (Mag-Ox) 400 mg (241.3 mg magnesium) tablet Take 1 tablet (400 mg) by mouth once daily.      metFORMIN (Glucophage) 500 mg tablet Take 2 tablets (1,000 mg) by mouth 2 times a day. 360 tablet 1    omeprazole (PriLOSEC) 40 mg DR capsule GIVE 1 CAPSULE BY MOUTH ONE TIME A DAY FOR HEARTBURN      tamsulosin (Flomax) 0.4 mg 24 hr capsule Take 1 capsule (0.4 mg) by mouth 2 times a day. 180 capsule 1     No current facility-administered medications for this visit.        Physical Exam:  Cardiovascular:      PMI at left midclavicular line. Normal rate. Regular rhythm. Normal S1. Normal S2.       Murmurs: There is no murmur.      No gallop.  No click. No rub.   Pulses:     Intact distal pulses.   Edema:     Peripheral edema absent.         ROS:  Review of Systems   Constitutional: Negative.   Cardiovascular: Negative.    Respiratory: Negative.     Skin: Negative.    Musculoskeletal: Negative.    Gastrointestinal: Negative.    Genitourinary: Negative.    Neurological: Negative.           Last Labs:  CBC -  Lab Results   Component Value Date    WBC 4.4 02/14/2024    HGB 12.0 (L) 02/14/2024    HCT 36.8 (L) 02/14/2024     (H) 02/14/2024     (L) 02/14/2024       CMP -  Lab Results   Component Value Date    CALCIUM 9.6 02/14/2024    PROT 5.9 12/28/2023    ALBUMIN 3.4 (L) 12/28/2023    AST 21 12/28/2023    ALT 13 12/28/2023    ALKPHOS 72 12/28/2023    BILITOT 0.5 12/28/2023       LIPID PANEL -   Lab Results   Component Value Date    CHOL 81 (L) 11/15/2023    TRIG 110 11/15/2023    HDL 29.0 (L) 11/15/2023    CHHDL 2.8 11/15/2023    LDLF 37 08/22/2022    VLDL 11 08/22/2022       RENAL FUNCTION PANEL -   Lab Results   Component Value Date    GLUCOSE 160 (H) 02/14/2024     02/14/2024    K 4.8 02/14/2024     02/14/2024    CO2 25 02/14/2024    ANIONGAP 13 02/14/2024    ANIONGAP 16 10/24/2023    BUN 21 02/14/2024    CREATININE 1.00 02/14/2024    GFRMALE 82 09/14/2023    CALCIUM 9.6 02/14/2024     ALBUMIN 3.4 (L) 12/28/2023        Lab Results   Component Value Date    HGBA1C 6.7 (H) 11/15/2023         Assessment/Plan   Problem List Items Addressed This Visit    None    1. Coronary artery disease with remote CABG Ã--2 2011. This patient did develop recurrent angina in 2013 and underwent a repeat cardiac catheterization demonstrating closure of the original bypass grafts but with collateral circulation and no PCI was required.     2. History of TIA. This patient did have a TIA following his cardiac catheterization with transient confusion on the day of discharge. The patient was admitted to [facility] on 02/17/2019 with numbness of his left arm hand and ultimately jaw. The numbness of the left hand and jaw resolved but persisted somewhat longer within the left arm. Evaluation at that time included a chest x-ray showing previous sternotomy with clear lung fields. Carotid ultrasound showed mild bilateral plaque less than 50% stenoses. CT angiogram of the head and neck was unremarkable with nonstenotic calcification of the right common carotid artery without stenosis. The left common carotid artery had a nonstenotic calcification with a patent internal carotid artery. His intracerebral vessels were unremarkable with no aneurysm. An MRI of the brain on 02/17/2019 was unremarkable. He had an MRI of the cervical spine that showed minimal disc bulging at C2-C3 with moderate disc osteophyte complex at C3-C4 causing moderate canals narrowing and cord impingement along with bilateral neural foraminal stenoses. There was an osteophyte complex at C4-C5 causing mild anterior cord impingement and bilateral moderate neural foraminal narrowing and disc bulging at C5-C6 without cord impingement along with anterior subluxation of C7 upon T1. EKG showed sinus rhythm with first-degree AV block and no ST segment change. There was no evidence of atrial fibrillation by telemetry monitoring. His high sensitivity troponins were  negative. He had an echocardiogram performed on 02/17/2019 showing an estimated LV ejection fraction at 55â€“59 percent with mild hypokinesis of the anteroseptal wall due to previous thoracotomy. Left atrial size was in the upper range of normal with mild aortic root calcification and a mildly dilated right atrium. The patient was readmitted to [facility] on 06/04/2019 with recurrent neurological complaints. He had pins and needles paresthesias of the left hand and arm along with perceived weakness of the left hand and arm with loss of coordination. The patient underwent repeat evaluation including MRI of the brain which was unremarkable. Negligible carotid vascular disease by ultrasound and CT angiogram in the past. Blood pressure readings were acceptable at that time with lisinopril 40 mg daily amlodipine 5 mg daily metoprolol extended release 5 mg daily. He was on aspirin at that time along with the atorvastatin 40 mg daily. More recently the patient [place] he began to develop paresthesias of the right hand and fingertips. Initially he thought that an Ace wrap of his right shoulder was too tight and continued driving. The sense of tingling began to get worse and he developed a funny sensation around the right side of his face involving the lip. The patient pulled his car off to the side of the road. At that point in time he was having trouble communicating verbally due to the inability to formulate words. He was taken by EMS to [facility] and observed but then released without admission. He is subsequently seen by neurology as an outpatient. He did have a extra no cardiac monitor performed on 02/05/2020â€“02/14/2020. This did not demonstrate atrial fibrillation. It did demonstrate some brief episodes of Mobitz 1 second degree AV block. He also had one 8 beat run of nonsustained ventricular tachycardia. The patient has never had presyncopal or  syncopal events but more notably paresthesias. The results of this external cardiac monitor were discussed with electrophysiology and at this point the patient will be scheduled to have a loop recorder implanted to ensure that he is not having clinically silent paroxysmal atrial fibrillation not detected on previous telemetry monitoring. The patient has been seen by neurology and was switched to Plavix 75 mg daily which will be continued unless there is documentation of paroxysmal atrial fibrillation by the loop recorder. Echocardiogram performed at time of today's visit 03/17/2020 demonstrates an LV ejection fraction of 55â€“60 percent with mild hypokinesis of the anteroseptal wall due to previous thoracotomy with oneâ€“2+ mitral valve regurgitation. The findings are very similar to the echo that was performed at [facility] in 2/2019. Patient had loop recorder placed 07/2020 by Dr Steven Flores. Had negative pharmacologic nuclear stress test done 01/2022. Patient had hospital stay at [facility] 08/15/2023 and was worked up for TIA vs migraine equivalent. Carotid US showed < 50% stenosis bilaterally. MRI of brain was negative and MRA was unremarkable. Echo showed EF 55-60%, 1+ MR and 1-2+ TR. See Dr Avitia for consideration for Watchman device.  Patient had Watchman placed 11/2023.     3. Hypertension. Adequate control with present management which includes lisinopril 20 mg daily, amlodipine 5 mg daily and metoprolol ER 50 mg daily.     4. Hyperlipidemia. Good response to atorvastatin 40 mg daily. Lipid panel on 08/2022 includes cholesterol 77 LDL 37 HDL 29 triglycerides 537. FLP done 08/2022 chol 77, HDL 29 LDL 37 trig 53.     5. Type 2 diabetes.     6. Lifetime nonsmoking.     5. Status post right rotator cuff repair 11/11/2019. The patient had a right rotator cuff operation on 11 11/20/1990 evidently developed an infection that required surgical debridement.     6. Hx of covid-19 vaccine #1 and #2.      7. Afib noted  to loop recorder after back surgery 02/2022. Patient had laminectomy with Dr Allred 02/22/2022 and developed afib on his loop recorder. Has been started on Xarelto. ECG done prior shows NSR with long first degree AVB.      8. TIA. Patient has been admitted to [facility] [place] on 4 occasions, the first being February 2019 for complaints of left hand numbness and difficulty finding words and left leg heaviness.Carotid ultrasound showed less than 50% stenosis bilaterally. CT angiogram of the head and neck was unremarkable. MRI MRA of the brain was negative for acute stroke. Later in 2019 the patient was again admitted with similar complaints while driving. He had a loop recorder implanted which did show 2 episodes of atrial tachycardia or atrial fibrillation the longest lasting 6 minutes. The burden was only 0.06%. In October 2022 he was treated for UTI with antibiotics and again admitted for similar complaints. Neurology recommended outpatient physical therapy for inner ear issue. CT angiogram was again negative, carotid ultrasound showed less than 50% stenosis bilaterally, MRI MRA did not show any acute infarct or intracerebral vascular stenosis. Echo done October 2022 showed an EF of 60%, RAD, mild MR, mild to moderate TR, mildly elevated RVSP, PASP 43 mmHg. He was seen by neurology for possible alternative mechanisms to his TIA and treated with magnesium citrate and possibly verapamil. Alternative explanation may be migraine equivalent or focal seizure. Neurology discharged him with referral for vestibular studies and PT for balance. Patient currently will follow-up with Dr. Garvey.      9. GI bleed. Patient with black stools. Had extensive work up with hemoc and can restart Eliquis.     10. AVB. Monitor worn 03/24/2023 showed HR  with 67 bpm average. First degree AVB. PVC 0.08%. PAC 0.64%.     11.  Urolift for BPH.  Hx of urinary catheter placement and sepsis.         Sandy Gar, APRN-CNP

## 2024-03-11 DIAGNOSIS — K21.9 GERD WITHOUT ESOPHAGITIS: ICD-10-CM

## 2024-03-11 RX ORDER — OMEPRAZOLE 40 MG/1
CAPSULE, DELAYED RELEASE ORAL
Qty: 30 CAPSULE | Refills: 3 | Status: SHIPPED | OUTPATIENT
Start: 2024-03-11 | End: 2024-03-14 | Stop reason: SDUPTHER

## 2024-03-12 ENCOUNTER — HOSPITAL ENCOUNTER (OUTPATIENT)
Dept: RADIOLOGY | Facility: HOSPITAL | Age: 84
Discharge: HOME | End: 2024-03-12
Payer: MEDICARE

## 2024-03-12 DIAGNOSIS — I48.91 ATRIAL FIBRILLATION, UNSPECIFIED TYPE (MULTI): ICD-10-CM

## 2024-03-12 PROCEDURE — 75572 CT HRT W/3D IMAGE: CPT

## 2024-03-12 PROCEDURE — 2550000001 HC RX 255 CONTRASTS: Performed by: INTERNAL MEDICINE

## 2024-03-12 RX ADMIN — IOHEXOL 75 ML: 350 INJECTION, SOLUTION INTRAVENOUS at 11:42

## 2024-03-14 DIAGNOSIS — K21.9 GERD WITHOUT ESOPHAGITIS: ICD-10-CM

## 2024-03-14 RX ORDER — OMEPRAZOLE 40 MG/1
CAPSULE, DELAYED RELEASE ORAL
Qty: 90 CAPSULE | Refills: 3 | Status: SHIPPED | OUTPATIENT
Start: 2024-03-14

## 2024-03-21 ENCOUNTER — TELEMEDICINE (OUTPATIENT)
Dept: PHARMACY | Facility: HOSPITAL | Age: 84
End: 2024-03-21
Payer: MEDICARE

## 2024-03-21 DIAGNOSIS — Z79.4 TYPE 2 DIABETES MELLITUS WITHOUT COMPLICATION, WITH LONG-TERM CURRENT USE OF INSULIN (MULTI): Primary | ICD-10-CM

## 2024-03-21 DIAGNOSIS — E11.9 TYPE 2 DIABETES MELLITUS WITHOUT COMPLICATION, WITH LONG-TERM CURRENT USE OF INSULIN (MULTI): Primary | ICD-10-CM

## 2024-03-21 NOTE — PROGRESS NOTES
Pharmacist Clinic: Diabetes Management  Luis Greene is a 84 y.o. male was referred to Clinical Pharmacy Team for diabetes management.     Referring Provider: Christopher D'Amico, DO     HISTORY OF PRESENT ILLNESS  Patient is a type 2 diabetic, his DM is currently complicated with steroid injections for back pain. In his lifetime he has gotten 4 shots.      Diet:   - 1-2 snacks throughout the day (apple, popcorn, chips, sandwich)   - Dinner: varies, pasta, soup, take out     LAB REVIEW   Glucose (mg/dL)   Date Value   02/14/2024 160 (H)   12/28/2023 95   12/25/2023 68     Hemoglobin A1C (%)   Date Value   11/15/2023 6.7 (H)   09/14/2023 6.8 (A)   08/14/2023 8.1 (H)   07/14/2023 7.5 (A)   04/19/2023 9.7 (H)   02/03/2023 9.6 (H)   12/20/2021 7.5 (H)     Bicarbonate (mmol/L)   Date Value   02/14/2024 25   12/28/2023 25   12/25/2023 24     Urea Nitrogen (mg/dL)   Date Value   02/14/2024 21   12/28/2023 14   12/25/2023 15     Creatinine (mg/dL)   Date Value   02/14/2024 1.00   12/28/2023 0.90   12/25/2023 1.00     Lab Results   Component Value Date    HGBA1C 6.7 (H) 11/15/2023    HGBA1C 6.8 (A) 09/14/2023    HGBA1C 8.1 (H) 08/14/2023     Lab Results   Component Value Date    CHOL 81 (L) 11/15/2023    CHOL 81 (L) 08/15/2023    CHOL 73 (L) 10/28/2022     Lab Results   Component Value Date    HDL 29.0 (L) 11/15/2023    HDL 28 (L) 08/15/2023    HDL 32 (L) 10/28/2022     Lab Results   Component Value Date    TRIG 110 11/15/2023    TRIG 109 08/15/2023    TRIG 45 10/28/2022       DIABETES ASSESSMENT    CURRENT PHARMACOTHERAPY  - metformin 500 mg 2 tabs by mouth twice daily   - soliqua 6 units under the skin once daily    SECONDARY PREVENTION  - Statin? Yes  - ACE-I/ARB? Yes    HISTORICAL PHARMACOTHERAPY   - Lantus 20 units under the skin once daily   - Novolog 5 units once daily once daily     SMBG MEASUREMENTS: patient is using freestyle reed 2   - around 100    DISCUSSION:   - Continue current medication regimen      RECOMMENDATIONS/PLAN  1. Patients diabetes is well controlled with most recent A1c of 6.7% (goal < 7 %).   - Continue all meds under the continuation of care with the referring provider and clinical pharmacy team    Clinical Pharmacist follow up: 4 week    Thank you,  Leeann Page, PharmD    Verbal consent to manage patient's drug therapy was obtained from the patient. They were informed they may decline to participate or withdraw from participation in pharmacy services at any time.

## 2024-03-25 ENCOUNTER — PATIENT OUTREACH (OUTPATIENT)
Dept: PRIMARY CARE | Facility: CLINIC | Age: 84
End: 2024-03-25
Payer: MEDICARE

## 2024-03-25 NOTE — PROGRESS NOTES
Patient has met target of no readmission for 90 days post hospital discharge and is graduated from Transitional Care Management program at this time. Has home care for wound care.  Reminded of fu appointment 4-

## 2024-04-18 ENCOUNTER — TELEMEDICINE (OUTPATIENT)
Dept: PHARMACY | Facility: HOSPITAL | Age: 84
End: 2024-04-18
Payer: MEDICARE

## 2024-04-18 DIAGNOSIS — Z79.4 TYPE 2 DIABETES MELLITUS WITHOUT COMPLICATION, WITH LONG-TERM CURRENT USE OF INSULIN (MULTI): Primary | ICD-10-CM

## 2024-04-18 DIAGNOSIS — E11.9 TYPE 2 DIABETES MELLITUS WITHOUT COMPLICATION, WITH LONG-TERM CURRENT USE OF INSULIN (MULTI): Primary | ICD-10-CM

## 2024-04-18 NOTE — PROGRESS NOTES
Pharmacist Clinic: Diabetes Management  Luis Greene is a 84 y.o. male was referred to Clinical Pharmacy Team for diabetes management.     Referring Provider: Christopher D'Amico, DO     HISTORY OF PRESENT ILLNESS  Patient is a type 2 diabetic, his DM is currently complicated with steroid injections for back pain. In his lifetime he has gotten 4 shots.      Diet:   - 1-2 snacks throughout the day (apple, popcorn, chips, sandwich)   - Dinner: varies, pasta, soup, take out     LAB REVIEW   Glucose (mg/dL)   Date Value   02/14/2024 160 (H)   12/28/2023 95   12/25/2023 68     Hemoglobin A1C (%)   Date Value   11/15/2023 6.7 (H)   09/14/2023 6.8 (A)   08/14/2023 8.1 (H)   07/14/2023 7.5 (A)   04/19/2023 9.7 (H)   02/03/2023 9.6 (H)   12/20/2021 7.5 (H)     Bicarbonate (mmol/L)   Date Value   02/14/2024 25   12/28/2023 25   12/25/2023 24     Urea Nitrogen (mg/dL)   Date Value   02/14/2024 21   12/28/2023 14   12/25/2023 15     Creatinine (mg/dL)   Date Value   02/14/2024 1.00   12/28/2023 0.90   12/25/2023 1.00     Lab Results   Component Value Date    HGBA1C 6.7 (H) 11/15/2023    HGBA1C 6.8 (A) 09/14/2023    HGBA1C 8.1 (H) 08/14/2023     Lab Results   Component Value Date    CHOL 81 (L) 11/15/2023    CHOL 81 (L) 08/15/2023    CHOL 73 (L) 10/28/2022     Lab Results   Component Value Date    HDL 29.0 (L) 11/15/2023    HDL 28 (L) 08/15/2023    HDL 32 (L) 10/28/2022     Lab Results   Component Value Date    TRIG 110 11/15/2023    TRIG 109 08/15/2023    TRIG 45 10/28/2022       DIABETES ASSESSMENT    CURRENT PHARMACOTHERAPY  - metformin 500 mg 2 tabs by mouth twice daily   - soliqua 7 units under the skin once daily    SECONDARY PREVENTION  - Statin? Yes  - ACE-I/ARB? Yes    HISTORICAL PHARMACOTHERAPY   - Lantus 20 units under the skin once daily   - Novolog 5 units once daily once daily     SMBG MEASUREMENTS: patient is using freestyle reed 2   - around 200    DISCUSSION:   - BG readings have come up, patient has not seen  a number under 200 in a while   - Patient reports his appetite is up, which is a good thing considering after his last hospital visit he was not interested in eating as much   - Discussed increased soiqua to 10 units once daily   - Patient is aware of BG goals, we are shooting for fasting numbers closer to 150    RECOMMENDATIONS/PLAN  1. Patients diabetes is well controlled with most recent A1c of 6.7% (goal < 7 %).   - Continue all meds under the continuation of care with the referring provider and clinical pharmacy team  - Increase soliqua to 10 units once daily    Clinical Pharmacist follow up: 2 weeks    Thank you,  Leeann Page, PharmD    Verbal consent to manage patient's drug therapy was obtained from the patient. They were informed they may decline to participate or withdraw from participation in pharmacy services at any time.

## 2024-04-29 ENCOUNTER — OFFICE VISIT (OUTPATIENT)
Dept: PRIMARY CARE | Facility: CLINIC | Age: 84
End: 2024-04-29
Payer: MEDICARE

## 2024-04-29 VITALS
DIASTOLIC BLOOD PRESSURE: 66 MMHG | OXYGEN SATURATION: 95 % | HEIGHT: 71 IN | HEART RATE: 84 BPM | BODY MASS INDEX: 24.64 KG/M2 | SYSTOLIC BLOOD PRESSURE: 132 MMHG | WEIGHT: 176 LBS

## 2024-04-29 DIAGNOSIS — N40.0 BENIGN PROSTATIC HYPERPLASIA WITHOUT LOWER URINARY TRACT SYMPTOMS: ICD-10-CM

## 2024-04-29 DIAGNOSIS — D64.9 ANEMIA, UNSPECIFIED TYPE: ICD-10-CM

## 2024-04-29 DIAGNOSIS — E78.5 HYPERLIPIDEMIA, UNSPECIFIED HYPERLIPIDEMIA TYPE: ICD-10-CM

## 2024-04-29 DIAGNOSIS — Z79.4 TYPE 2 DIABETES MELLITUS WITHOUT COMPLICATION, WITH LONG-TERM CURRENT USE OF INSULIN (MULTI): Primary | ICD-10-CM

## 2024-04-29 DIAGNOSIS — D69.6 THROMBOCYTOPENIA, UNSPECIFIED (CMS-HCC): ICD-10-CM

## 2024-04-29 DIAGNOSIS — E11.9 TYPE 2 DIABETES MELLITUS WITHOUT COMPLICATION, WITH LONG-TERM CURRENT USE OF INSULIN (MULTI): Primary | ICD-10-CM

## 2024-04-29 DIAGNOSIS — I25.118 CORONARY ARTERY DISEASE OF NATIVE ARTERY OF NATIVE HEART WITH STABLE ANGINA PECTORIS (CMS-HCC): ICD-10-CM

## 2024-04-29 DIAGNOSIS — I48.91 ATRIAL FIBRILLATION, UNSPECIFIED TYPE (MULTI): ICD-10-CM

## 2024-04-29 PROBLEM — R56.9 SEIZURE (MULTI): Status: ACTIVE | Noted: 2024-04-29

## 2024-04-29 PROCEDURE — 99214 OFFICE O/P EST MOD 30 MIN: CPT | Performed by: STUDENT IN AN ORGANIZED HEALTH CARE EDUCATION/TRAINING PROGRAM

## 2024-04-29 PROCEDURE — 1157F ADVNC CARE PLAN IN RCRD: CPT | Performed by: STUDENT IN AN ORGANIZED HEALTH CARE EDUCATION/TRAINING PROGRAM

## 2024-04-29 PROCEDURE — G2211 COMPLEX E/M VISIT ADD ON: HCPCS | Performed by: STUDENT IN AN ORGANIZED HEALTH CARE EDUCATION/TRAINING PROGRAM

## 2024-04-29 PROCEDURE — 1159F MED LIST DOCD IN RCRD: CPT | Performed by: STUDENT IN AN ORGANIZED HEALTH CARE EDUCATION/TRAINING PROGRAM

## 2024-04-29 PROCEDURE — 3078F DIAST BP <80 MM HG: CPT | Performed by: STUDENT IN AN ORGANIZED HEALTH CARE EDUCATION/TRAINING PROGRAM

## 2024-04-29 PROCEDURE — 3075F SYST BP GE 130 - 139MM HG: CPT | Performed by: STUDENT IN AN ORGANIZED HEALTH CARE EDUCATION/TRAINING PROGRAM

## 2024-04-29 PROCEDURE — 1160F RVW MEDS BY RX/DR IN RCRD: CPT | Performed by: STUDENT IN AN ORGANIZED HEALTH CARE EDUCATION/TRAINING PROGRAM

## 2024-04-29 PROCEDURE — 1036F TOBACCO NON-USER: CPT | Performed by: STUDENT IN AN ORGANIZED HEALTH CARE EDUCATION/TRAINING PROGRAM

## 2024-04-29 RX ORDER — TOLTERODINE 4 MG/1
4 CAPSULE, EXTENDED RELEASE ORAL DAILY
Status: ON HOLD | COMMUNITY
Start: 2024-03-27 | End: 2024-05-16 | Stop reason: ALTCHOICE

## 2024-04-29 ASSESSMENT — ENCOUNTER SYMPTOMS
DEPRESSION: 0
LOSS OF SENSATION IN FEET: 0
OCCASIONAL FEELINGS OF UNSTEADINESS: 1

## 2024-04-29 NOTE — PROGRESS NOTES
84-year-old male presenting for follow-up on multiple concerns.    DMII  Stable has been doing well.  Using CGM, doing well.  Has recently Soliqua, follows with [facility] pharmacist.    A-fib, TIA, HTN, HLD  Follow with cardiology, and electrophysiology, has Watchman device.  Decreased on anticoagulation down to baby aspirin only.  Blood pressure medication was reduced by cardiology, lisinopril discontinued.    History of GI bleed  CBCs have been stable.    BPH, status post UroLift  Stable, has been following with urology.  Had recent urinalysis given some discomfort with urination, culture just came back as positive, waiting to hear from urology    Seizure activity?  Following with neurology, on Keppra, stable and doing well.    12 point ROS reviewed and negative other than as stated in HPI    General: Alert, oriented, pleasant, in no acute distress  HEENT:      Head: normocephalic, atraumatic;      eyes: EOMI, no scleral icterus;   CV: Mostly sinus rhythm with ectopy, no murmur  Lungs: CTAB without wheezing, rhonchi or rales; good respiratory effort, no increased work of breathing  Neuro: Cranial nerves grossly intact; alert and oriented  Psych: Appropriate mood and affect    #CAD with remote CABG, 2021 #A-fib #HTN #HLD  -Blood pressure essentially at goal  - Follows with cardiology  - Continue amlodipine 5 mg daily, metoprolol succinate 50 mg daily  -Continue aspirin 81 mg daily  -Following with cardiology, EP, has Watchman device and removed from anticoagulation given history of GI bleed     #DMII  - Currently on Soliqua 10 units daily, metformin 1000 mg twice daily  -Last A1C 6.7  - Continue to follow with pharmacist, would like to put on GLP or SGLT2 by itself, there is some cost prohibitions.  -Repeat A1c     #History of GI bleed #thrombocytopenia  - Repeat CBCs have been stable  -CBC ordered    #BPH #S/P urolift  - Continue finasteride 5 mg daily, continue tamsulosin 0.4 mg daily  -Continue to follow with  urology  -Had recent urinalysis given some discomfort with urination, culture just came back as positive, waiting to hear from urology on sensitivities, if not getting response by tomorrow, I will send empiric antibiotics    #Seizure activity  - Following with neurology, stable on Keppra    Most labs recent and reviewed, will defer more labs until next visit    Follow-up 3 months, Medicare in June    Christopher D'Amico, DO

## 2024-05-02 ENCOUNTER — TELEMEDICINE (OUTPATIENT)
Dept: PHARMACY | Facility: HOSPITAL | Age: 84
End: 2024-05-02
Payer: MEDICARE

## 2024-05-02 DIAGNOSIS — E11.9 TYPE 2 DIABETES MELLITUS WITHOUT COMPLICATION, WITH LONG-TERM CURRENT USE OF INSULIN (MULTI): Primary | ICD-10-CM

## 2024-05-02 DIAGNOSIS — Z79.4 TYPE 2 DIABETES MELLITUS WITHOUT COMPLICATION, WITH LONG-TERM CURRENT USE OF INSULIN (MULTI): Primary | ICD-10-CM

## 2024-05-02 NOTE — PROGRESS NOTES
Pharmacist Clinic: Diabetes Management  Luis Greene is a 84 y.o. male was referred to Clinical Pharmacy Team for diabetes management.     Referring Provider: Christopher D'Amico, DO     HISTORY OF PRESENT ILLNESS  Patient is a type 2 diabetic, his DM is currently complicated with steroid injections for back pain. In his lifetime he has gotten 4 shots.      Diet:   - 1-2 snacks throughout the day (apple, popcorn, chips, sandwich)   - Dinner: varies, pasta, soup, take out     LAB REVIEW   Glucose (mg/dL)   Date Value   02/14/2024 160 (H)   12/28/2023 95   12/25/2023 68     Hemoglobin A1C (%)   Date Value   11/15/2023 6.7 (H)   09/14/2023 6.8 (A)   08/14/2023 8.1 (H)   07/14/2023 7.5 (A)   04/19/2023 9.7 (H)   02/03/2023 9.6 (H)   12/20/2021 7.5 (H)     Bicarbonate (mmol/L)   Date Value   02/14/2024 25   12/28/2023 25   12/25/2023 24     Urea Nitrogen (mg/dL)   Date Value   02/14/2024 21   12/28/2023 14   12/25/2023 15     Creatinine (mg/dL)   Date Value   02/14/2024 1.00   12/28/2023 0.90   12/25/2023 1.00     Lab Results   Component Value Date    HGBA1C 6.7 (H) 11/15/2023    HGBA1C 6.8 (A) 09/14/2023    HGBA1C 8.1 (H) 08/14/2023     Lab Results   Component Value Date    CHOL 81 (L) 11/15/2023    CHOL 81 (L) 08/15/2023    CHOL 73 (L) 10/28/2022     Lab Results   Component Value Date    HDL 29.0 (L) 11/15/2023    HDL 28 (L) 08/15/2023    HDL 32 (L) 10/28/2022     Lab Results   Component Value Date    TRIG 110 11/15/2023    TRIG 109 08/15/2023    TRIG 45 10/28/2022       DIABETES ASSESSMENT    CURRENT PHARMACOTHERAPY  - metformin 500 mg 2 tabs by mouth twice daily   - soliqua 10 units under the skin once daily    SECONDARY PREVENTION  - Statin? Yes  - ACE-I/ARB? Yes    HISTORICAL PHARMACOTHERAPY   - Lantus 20 units under the skin once daily   - Novolog 5 units once daily once daily     SMBG MEASUREMENTS: patient is using freestyle reed 2   - around 120    DISCUSSION:   - BG readings have come up, patient has not  seen a number under 150 in a while   - Patient reports his appetite is up, which is a good thing considering after his last hospital visit he was not interested in eating as much   - Discussed getting his A1c checked     RECOMMENDATIONS/PLAN  1. Patients diabetes is well controlled with most recent A1c of 6.7% (goal < 7 %).   - Continue all meds under the continuation of care with the referring provider and clinical pharmacy team  - Increase soliqua to 10 units once daily    Clinical Pharmacist follow up: 2 weeks    Thank you,  Leeann Page, PharmD    Verbal consent to manage patient's drug therapy was obtained from the patient. They were informed they may decline to participate or withdraw from participation in pharmacy services at any time.

## 2024-05-03 ENCOUNTER — LAB (OUTPATIENT)
Dept: LAB | Facility: LAB | Age: 84
End: 2024-05-03
Payer: MEDICARE

## 2024-05-03 DIAGNOSIS — E11.9 TYPE 2 DIABETES MELLITUS WITHOUT COMPLICATION, WITH LONG-TERM CURRENT USE OF INSULIN (MULTI): ICD-10-CM

## 2024-05-03 DIAGNOSIS — Z79.4 TYPE 2 DIABETES MELLITUS WITHOUT COMPLICATION, WITH LONG-TERM CURRENT USE OF INSULIN (MULTI): ICD-10-CM

## 2024-05-03 DIAGNOSIS — D64.9 ANEMIA, UNSPECIFIED TYPE: ICD-10-CM

## 2024-05-03 DIAGNOSIS — E78.5 HYPERLIPIDEMIA, UNSPECIFIED HYPERLIPIDEMIA TYPE: ICD-10-CM

## 2024-05-03 DIAGNOSIS — B96.29 URINARY TRACT INFECTION DUE TO EXTENDED-SPECTRUM BETA LACTAMASE (ESBL) PRODUCING ESCHERICHIA COLI: ICD-10-CM

## 2024-05-03 DIAGNOSIS — I48.91 ATRIAL FIBRILLATION, UNSPECIFIED TYPE (MULTI): ICD-10-CM

## 2024-05-03 DIAGNOSIS — I25.118 CORONARY ARTERY DISEASE OF NATIVE ARTERY OF NATIVE HEART WITH STABLE ANGINA PECTORIS (CMS-HCC): ICD-10-CM

## 2024-05-03 DIAGNOSIS — Z16.12 URINARY TRACT INFECTION DUE TO EXTENDED-SPECTRUM BETA LACTAMASE (ESBL) PRODUCING ESCHERICHIA COLI: ICD-10-CM

## 2024-05-03 DIAGNOSIS — N40.0 BENIGN PROSTATIC HYPERPLASIA WITHOUT LOWER URINARY TRACT SYMPTOMS: ICD-10-CM

## 2024-05-03 DIAGNOSIS — N39.0 URINARY TRACT INFECTION DUE TO EXTENDED-SPECTRUM BETA LACTAMASE (ESBL) PRODUCING ESCHERICHIA COLI: ICD-10-CM

## 2024-05-03 DIAGNOSIS — D69.6 THROMBOCYTOPENIA, UNSPECIFIED (CMS-HCC): ICD-10-CM

## 2024-05-03 LAB
ALBUMIN SERPL BCP-MCNC: 4.1 G/DL (ref 3.4–5)
ALP SERPL-CCNC: 80 U/L (ref 33–136)
ALT SERPL W P-5'-P-CCNC: 29 U/L (ref 10–52)
ANION GAP SERPL CALC-SCNC: 16 MMOL/L (ref 10–20)
AST SERPL W P-5'-P-CCNC: 32 U/L (ref 9–39)
BILIRUB SERPL-MCNC: 1 MG/DL (ref 0–1.2)
BUN SERPL-MCNC: 18 MG/DL (ref 6–23)
CALCIUM SERPL-MCNC: 9.6 MG/DL (ref 8.6–10.6)
CHLORIDE SERPL-SCNC: 104 MMOL/L (ref 98–107)
CHOLEST SERPL-MCNC: 97 MG/DL (ref 0–199)
CHOLESTEROL/HDL RATIO: 2.6
CO2 SERPL-SCNC: 25 MMOL/L (ref 21–32)
CREAT SERPL-MCNC: 0.98 MG/DL (ref 0.5–1.3)
EGFRCR SERPLBLD CKD-EPI 2021: 77 ML/MIN/1.73M*2
EST. AVERAGE GLUCOSE BLD GHB EST-MCNC: 229 MG/DL
GLUCOSE SERPL-MCNC: 204 MG/DL (ref 74–99)
HBA1C MFR BLD: 9.6 %
HDLC SERPL-MCNC: 37.6 MG/DL
LDLC SERPL CALC-MCNC: 45 MG/DL
NON HDL CHOLESTEROL: 59 MG/DL (ref 0–149)
PHOSPHATE SERPL-MCNC: 3.1 MG/DL (ref 2.5–4.9)
POTASSIUM SERPL-SCNC: 5.1 MMOL/L (ref 3.5–5.3)
PROT SERPL-MCNC: 6.6 G/DL (ref 6.4–8.2)
SODIUM SERPL-SCNC: 140 MMOL/L (ref 136–145)
TRIGL SERPL-MCNC: 70 MG/DL (ref 0–149)
VLDL: 14 MG/DL (ref 0–40)

## 2024-05-03 PROCEDURE — 84100 ASSAY OF PHOSPHORUS: CPT

## 2024-05-03 PROCEDURE — 80061 LIPID PANEL: CPT

## 2024-05-03 PROCEDURE — 85027 COMPLETE CBC AUTOMATED: CPT

## 2024-05-03 PROCEDURE — 80053 COMPREHEN METABOLIC PANEL: CPT

## 2024-05-03 PROCEDURE — 83036 HEMOGLOBIN GLYCOSYLATED A1C: CPT

## 2024-05-04 LAB
ERYTHROCYTE [DISTWIDTH] IN BLOOD BY AUTOMATED COUNT: 14 % (ref 11.5–14.5)
HCT VFR BLD AUTO: 35.2 % (ref 41–52)
HGB BLD-MCNC: 11.6 G/DL (ref 13.5–17.5)
MCH RBC QN AUTO: 35 PG (ref 26–34)
MCHC RBC AUTO-ENTMCNC: 33 G/DL (ref 32–36)
MCV RBC AUTO: 106 FL (ref 80–100)
NRBC BLD-RTO: 0 /100 WBCS (ref 0–0)
PLATELET # BLD AUTO: 87 X10*3/UL (ref 150–450)
RBC # BLD AUTO: 3.31 X10*6/UL (ref 4.5–5.9)
WBC # BLD AUTO: 5.4 X10*3/UL (ref 4.4–11.3)

## 2024-05-04 NOTE — RESULT ENCOUNTER NOTE
Hemoglobin A1c did go up to 9.6, this will not reflect recent changes to diabetic regimen, as it is a 3-month average.  Continue with monitoring with Leeann and medication changes as needed.    Mild anemia improved from several months ago    Remaining labs unremarkable.

## 2024-05-06 ENCOUNTER — TELEPHONE (OUTPATIENT)
Dept: PRIMARY CARE | Facility: CLINIC | Age: 84
End: 2024-05-06
Payer: MEDICARE

## 2024-05-06 DIAGNOSIS — L98.9 SKIN LESION: Primary | ICD-10-CM

## 2024-05-06 NOTE — TELEPHONE ENCOUNTER
Result Communication    Resulted Orders   CBC   Result Value Ref Range    WBC 5.4 4.4 - 11.3 x10*3/uL    nRBC 0.0 0.0 - 0.0 /100 WBCs    RBC 3.31 (L) 4.50 - 5.90 x10*6/uL    Hemoglobin 11.6 (L) 13.5 - 17.5 g/dL    Hematocrit 35.2 (L) 41.0 - 52.0 %     (H) 80 - 100 fL    MCH 35.0 (H) 26.0 - 34.0 pg    MCHC 33.0 32.0 - 36.0 g/dL    RDW 14.0 11.5 - 14.5 %    Platelets 87 (L) 150 - 450 x10*3/uL   Comprehensive Metabolic Panel   Result Value Ref Range    Glucose 204 (H) 74 - 99 mg/dL    Sodium 140 136 - 145 mmol/L    Potassium 5.1 3.5 - 5.3 mmol/L    Chloride 104 98 - 107 mmol/L    Bicarbonate 25 21 - 32 mmol/L    Anion Gap 16 10 - 20 mmol/L    Urea Nitrogen 18 6 - 23 mg/dL    Creatinine 0.98 0.50 - 1.30 mg/dL    eGFR 77 >60 mL/min/1.73m*2      Comment:      Calculations of estimated GFR are performed using the 2021 CKD-EPI Study Refit equation without the race variable for the IDMS-Traceable creatinine methods.  https://jasn.asnjournals.org/content/early/2021/09/22/ASN.2941292847    Calcium 9.6 8.6 - 10.6 mg/dL    Albumin 4.1 3.4 - 5.0 g/dL    Alkaline Phosphatase 80 33 - 136 U/L    Total Protein 6.6 6.4 - 8.2 g/dL    AST 32 9 - 39 U/L    Bilirubin, Total 1.0 0.0 - 1.2 mg/dL    ALT 29 10 - 52 U/L      Comment:      Patients treated with Sulfasalazine may generate falsely decreased results for ALT.   Lipid Panel   Result Value Ref Range    Cholesterol 97 0 - 199 mg/dL      Comment:            Age      Desirable   Borderline High   High     0-19 Y     0 - 169       170 - 199     >/= 200    20-24 Y     0 - 189       190 - 224     >/= 225         >24 Y     0 - 199       200 - 239     >/= 240   **All ranges are based on fasting samples. Specific   therapeutic targets will vary based on patient-specific   cardiac risk.    Pediatric guidelines reference:Pediatrics 2011, 128(S5).Adult guidelines reference: NCEP ATPIII Guidelines,ANAHI 2001, 258:2486-97    Venipuncture immediately after or during the administration of  Metamizole may lead to falsely low results. Testing should be performed immediately prior to Metamizole dosing.    HDL-Cholesterol 37.6 mg/dL      Comment:        Age       Very Low   Low     Normal    High    0-19 Y    < 35      < 40     40-45     ----  20-24 Y    ----     < 40      >45      ----        >24 Y      ----     < 40     40-60      >60      Cholesterol/HDL Ratio 2.6       Comment:        Ref Values  Desirable  < 3.4  High Risk  > 5.0    LDL Calculated 45 <=99 mg/dL      Comment:                                  Near   Borderline      AGE      Desirable  Optimal    High     High     Very High     0-19 Y     0 - 109     ---    110-129   >/= 130     ----    20-24 Y     0 - 119     ---    120-159   >/= 160     ----      >24 Y     0 -  99   100-129  130-159   160-189     >/=190      VLDL 14 0 - 40 mg/dL    Triglycerides 70 0 - 149 mg/dL      Comment:         Age         Desirable   Borderline High   High     Very High   0 D-90 D    19 - 174         ----         ----        ----  91 D- 9 Y     0 -  74        75 -  99     >/= 100      ----    10-19 Y     0 -  89        90 - 129     >/= 130      ----    20-24 Y     0 - 114       115 - 149     >/= 150      ----         >24 Y     0 - 149       150 - 199    200- 499    >/= 500    Venipuncture immediately after or during the administration of Metamizole may lead to falsely low results. Testing should be performed immediately prior to Metamizole dosing.    Non HDL Cholesterol 59 0 - 149 mg/dL      Comment:            Age       Desirable   Borderline High   High     Very High     0-19 Y     0 - 119       120 - 144     >/= 145    >/= 160    20-24 Y     0 - 149       150 - 189     >/= 190      ----         >24 Y    30 mg/dL above LDL Cholesterol goal     Hemoglobin A1C   Result Value Ref Range    Hemoglobin A1C 9.6 (H) see below %    Estimated Average Glucose 229 Not Established mg/dL    Narrative    Diagnosis of Diabetes-Adults  Non-Diabetic: < or = 5.6%  Increased  risk for developing diabetes: 5.7-6.4%  Diagnostic of diabetes: > or = 6.5%    Monitoring of Diabetes  Age (y)....................... Therapeutic Goal (%)  Adults: >18.........................<7.0  Pediatrics: 13-18...................<7.5  Pediatrics: 7-12....................<8.0  Pediatrics: 0-6..................... 7.5-8.5    American Diabetes Association. Diabetes Care 33(S1), Jan 2010           1:52 PM      Results were successfully communicated with the patient and they acknowledged their understanding.

## 2024-05-06 NOTE — TELEPHONE ENCOUNTER
Patient needs a referral for wound care-Visiting RN Yi [phone] called and said patient has a small tear, patient has been scratching due to itching (I need to fax to [facility]) [phone]   No

## 2024-05-06 NOTE — TELEPHONE ENCOUNTER
----- Message from Christopher D'Amico, DO sent at 5/4/2024  2:20 PM EDT -----  Hemoglobin A1c did go up to 9.6, this will not reflect recent changes to diabetic regimen, as it is a 3-month average.  Continue with monitoring with Leeann and medication changes as needed.    Mild anemia improved from several months ago    Remaining labs unremarkable.

## 2024-05-15 ENCOUNTER — HOSPITAL ENCOUNTER (INPATIENT)
Facility: HOSPITAL | Age: 84
LOS: 6 days | Discharge: HOME HEALTH | DRG: 689 | End: 2024-05-21
Attending: STUDENT IN AN ORGANIZED HEALTH CARE EDUCATION/TRAINING PROGRAM | Admitting: INTERNAL MEDICINE
Payer: MEDICARE

## 2024-05-15 ENCOUNTER — APPOINTMENT (OUTPATIENT)
Dept: RADIOLOGY | Facility: HOSPITAL | Age: 84
DRG: 689 | End: 2024-05-15
Payer: MEDICARE

## 2024-05-15 ENCOUNTER — APPOINTMENT (OUTPATIENT)
Dept: CARDIOLOGY | Facility: HOSPITAL | Age: 84
DRG: 689 | End: 2024-05-15
Payer: MEDICARE

## 2024-05-15 DIAGNOSIS — N39.0 UTI (URINARY TRACT INFECTION): Primary | ICD-10-CM

## 2024-05-15 DIAGNOSIS — R53.1 WEAKNESS: ICD-10-CM

## 2024-05-15 DIAGNOSIS — J96.01 ACUTE RESPIRATORY FAILURE WITH HYPOXIA (MULTI): ICD-10-CM

## 2024-05-15 DIAGNOSIS — J18.9 ATYPICAL PNEUMONIA: ICD-10-CM

## 2024-05-15 DIAGNOSIS — N30.00 ACUTE CYSTITIS WITHOUT HEMATURIA: ICD-10-CM

## 2024-05-15 LAB
ALBUMIN SERPL-MCNC: 4.2 G/DL (ref 3.5–5)
ALP BLD-CCNC: 102 U/L (ref 35–125)
ALT SERPL-CCNC: 23 U/L (ref 5–40)
AMORPH CRY #/AREA UR COMP ASSIST: ABNORMAL /HPF
ANION GAP SERPL CALC-SCNC: 14 MMOL/L
APPEARANCE UR: CLEAR
AST SERPL-CCNC: 29 U/L (ref 5–40)
BASOPHILS # BLD AUTO: 0.02 X10*3/UL (ref 0–0.1)
BASOPHILS NFR BLD AUTO: 0.3 %
BILIRUB SERPL-MCNC: 1.6 MG/DL (ref 0.1–1.2)
BILIRUB UR STRIP.AUTO-MCNC: NEGATIVE MG/DL
BUN SERPL-MCNC: 18 MG/DL (ref 8–25)
CALCIUM SERPL-MCNC: 9.4 MG/DL (ref 8.5–10.4)
CHLORIDE SERPL-SCNC: 95 MMOL/L (ref 97–107)
CO2 SERPL-SCNC: 24 MMOL/L (ref 24–31)
COLOR UR: YELLOW
CREAT SERPL-MCNC: 1 MG/DL (ref 0.4–1.6)
EGFRCR SERPLBLD CKD-EPI 2021: 75 ML/MIN/1.73M*2
EOSINOPHIL # BLD AUTO: 0.24 X10*3/UL (ref 0–0.4)
EOSINOPHIL NFR BLD AUTO: 3.1 %
ERYTHROCYTE [DISTWIDTH] IN BLOOD BY AUTOMATED COUNT: 13.1 % (ref 11.5–14.5)
FLUAV RNA RESP QL NAA+PROBE: NOT DETECTED
FLUBV RNA RESP QL NAA+PROBE: NOT DETECTED
GLUCOSE SERPL-MCNC: 279 MG/DL (ref 65–99)
GLUCOSE UR STRIP.AUTO-MCNC: ABNORMAL MG/DL
HCT VFR BLD AUTO: 36.3 % (ref 41–52)
HGB BLD-MCNC: 12.7 G/DL (ref 13.5–17.5)
HYALINE CASTS #/AREA URNS AUTO: ABNORMAL /LPF
IMM GRANULOCYTES # BLD AUTO: 0.07 X10*3/UL (ref 0–0.5)
IMM GRANULOCYTES NFR BLD AUTO: 0.9 % (ref 0–0.9)
KETONES UR STRIP.AUTO-MCNC: NEGATIVE MG/DL
LACTATE BLDV-SCNC: 1.9 MMOL/L (ref 0.4–2)
LEUKOCYTE ESTERASE UR QL STRIP.AUTO: ABNORMAL
LYMPHOCYTES # BLD AUTO: 1.08 X10*3/UL (ref 0.8–3)
LYMPHOCYTES NFR BLD AUTO: 13.9 %
MCH RBC QN AUTO: 35.2 PG (ref 26–34)
MCHC RBC AUTO-ENTMCNC: 35 G/DL (ref 32–36)
MCV RBC AUTO: 101 FL (ref 80–100)
MONOCYTES # BLD AUTO: 0.55 X10*3/UL (ref 0.05–0.8)
MONOCYTES NFR BLD AUTO: 7.1 %
MUCOUS THREADS #/AREA URNS AUTO: ABNORMAL /LPF
NEUTROPHILS # BLD AUTO: 5.82 X10*3/UL (ref 1.6–5.5)
NEUTROPHILS NFR BLD AUTO: 74.7 %
NITRITE UR QL STRIP.AUTO: NEGATIVE
NRBC BLD-RTO: 0 /100 WBCS (ref 0–0)
PH UR STRIP.AUTO: 7 [PH]
PLATELET # BLD AUTO: 106 X10*3/UL (ref 150–450)
POTASSIUM SERPL-SCNC: 4.4 MMOL/L (ref 3.4–5.1)
PROT SERPL-MCNC: 7.3 G/DL (ref 5.9–7.9)
PROT UR STRIP.AUTO-MCNC: ABNORMAL MG/DL
RBC # BLD AUTO: 3.61 X10*6/UL (ref 4.5–5.9)
RBC # UR STRIP.AUTO: NEGATIVE /UL
RBC #/AREA URNS AUTO: ABNORMAL /HPF
RBC MORPH BLD: NORMAL
SARS-COV-2 RNA RESP QL NAA+PROBE: NOT DETECTED
SODIUM SERPL-SCNC: 133 MMOL/L (ref 133–145)
SP GR UR STRIP.AUTO: 1.02
UROBILINOGEN UR STRIP.AUTO-MCNC: ABNORMAL MG/DL
WBC # BLD AUTO: 7.8 X10*3/UL (ref 4.4–11.3)
WBC #/AREA URNS AUTO: ABNORMAL /HPF

## 2024-05-15 PROCEDURE — 2500000004 HC RX 250 GENERAL PHARMACY W/ HCPCS (ALT 636 FOR OP/ED): Performed by: STUDENT IN AN ORGANIZED HEALTH CARE EDUCATION/TRAINING PROGRAM

## 2024-05-15 PROCEDURE — 96361 HYDRATE IV INFUSION ADD-ON: CPT

## 2024-05-15 PROCEDURE — 36415 COLL VENOUS BLD VENIPUNCTURE: CPT | Performed by: STUDENT IN AN ORGANIZED HEALTH CARE EDUCATION/TRAINING PROGRAM

## 2024-05-15 PROCEDURE — 85025 COMPLETE CBC W/AUTO DIFF WBC: CPT | Performed by: STUDENT IN AN ORGANIZED HEALTH CARE EDUCATION/TRAINING PROGRAM

## 2024-05-15 PROCEDURE — 2060000001 HC INTERMEDIATE ICU ROOM DAILY

## 2024-05-15 PROCEDURE — 87040 BLOOD CULTURE FOR BACTERIA: CPT | Mod: 91,WESLAB | Performed by: STUDENT IN AN ORGANIZED HEALTH CARE EDUCATION/TRAINING PROGRAM

## 2024-05-15 PROCEDURE — 87086 URINE CULTURE/COLONY COUNT: CPT | Mod: WESLAB | Performed by: INTERNAL MEDICINE

## 2024-05-15 PROCEDURE — 71045 X-RAY EXAM CHEST 1 VIEW: CPT

## 2024-05-15 PROCEDURE — 93005 ELECTROCARDIOGRAM TRACING: CPT

## 2024-05-15 PROCEDURE — 81001 URINALYSIS AUTO W/SCOPE: CPT | Performed by: STUDENT IN AN ORGANIZED HEALTH CARE EDUCATION/TRAINING PROGRAM

## 2024-05-15 PROCEDURE — 80053 COMPREHEN METABOLIC PANEL: CPT | Performed by: STUDENT IN AN ORGANIZED HEALTH CARE EDUCATION/TRAINING PROGRAM

## 2024-05-15 PROCEDURE — 99285 EMERGENCY DEPT VISIT HI MDM: CPT | Mod: 25

## 2024-05-15 PROCEDURE — 96365 THER/PROPH/DIAG IV INF INIT: CPT

## 2024-05-15 PROCEDURE — 87636 SARSCOV2 & INF A&B AMP PRB: CPT | Performed by: STUDENT IN AN ORGANIZED HEALTH CARE EDUCATION/TRAINING PROGRAM

## 2024-05-15 PROCEDURE — 83605 ASSAY OF LACTIC ACID: CPT | Performed by: STUDENT IN AN ORGANIZED HEALTH CARE EDUCATION/TRAINING PROGRAM

## 2024-05-15 PROCEDURE — 71045 X-RAY EXAM CHEST 1 VIEW: CPT | Performed by: RADIOLOGY

## 2024-05-15 RX ORDER — ACETAMINOPHEN 325 MG/1
975 TABLET ORAL ONCE
Status: COMPLETED | OUTPATIENT
Start: 2024-05-15 | End: 2024-05-15

## 2024-05-15 RX ORDER — MEROPENEM 1 G/1
1 INJECTION, POWDER, FOR SOLUTION INTRAVENOUS ONCE
Status: COMPLETED | OUTPATIENT
Start: 2024-05-15 | End: 2024-05-15

## 2024-05-15 RX ADMIN — MEROPENEM 1 G: 1 INJECTION, POWDER, FOR SOLUTION INTRAVENOUS at 21:56

## 2024-05-15 RX ADMIN — ACETAMINOPHEN 975 MG: 325 TABLET ORAL at 19:50

## 2024-05-15 RX ADMIN — SODIUM CHLORIDE 1000 ML: 900 INJECTION, SOLUTION INTRAVENOUS at 20:21

## 2024-05-15 ASSESSMENT — COLUMBIA-SUICIDE SEVERITY RATING SCALE - C-SSRS
1. IN THE PAST MONTH, HAVE YOU WISHED YOU WERE DEAD OR WISHED YOU COULD GO TO SLEEP AND NOT WAKE UP?: NO
2. HAVE YOU ACTUALLY HAD ANY THOUGHTS OF KILLING YOURSELF?: NO
6. HAVE YOU EVER DONE ANYTHING, STARTED TO DO ANYTHING, OR PREPARED TO DO ANYTHING TO END YOUR LIFE?: NO

## 2024-05-15 NOTE — LETTER
May 21, 2024     Patient: Luis Greene   YOB: 1940   Date of Visit: 5/15/2024       To Whom It May Concern:    Luis Greene was seen in my clinic on 5/15/2024 at . Please excuse Luis for his absence from work on this day to make the appointment.    If you have any questions or concerns, please don't hesitate to call.         Sincerely,         No name on file        CC:   No Recipients

## 2024-05-16 ENCOUNTER — APPOINTMENT (OUTPATIENT)
Dept: RADIOLOGY | Facility: HOSPITAL | Age: 84
DRG: 689 | End: 2024-05-16
Payer: MEDICARE

## 2024-05-16 ENCOUNTER — APPOINTMENT (OUTPATIENT)
Dept: PHARMACY | Facility: HOSPITAL | Age: 84
End: 2024-05-16
Payer: MEDICARE

## 2024-05-16 LAB
ALBUMIN SERPL-MCNC: 3.6 G/DL (ref 3.5–5)
ALP BLD-CCNC: 81 U/L (ref 35–125)
ALT SERPL-CCNC: 19 U/L (ref 5–40)
ANION GAP SERPL CALC-SCNC: 13 MMOL/L
AST SERPL-CCNC: 24 U/L (ref 5–40)
ATRIAL RATE: 192 BPM
BILIRUB SERPL-MCNC: 1.2 MG/DL (ref 0.1–1.2)
BUN SERPL-MCNC: 20 MG/DL (ref 8–25)
CALCIUM SERPL-MCNC: 8.6 MG/DL (ref 8.5–10.4)
CHLORIDE SERPL-SCNC: 97 MMOL/L (ref 97–107)
CO2 SERPL-SCNC: 23 MMOL/L (ref 24–31)
CREAT SERPL-MCNC: 0.9 MG/DL (ref 0.4–1.6)
EGFRCR SERPLBLD CKD-EPI 2021: 85 ML/MIN/1.73M*2
ERYTHROCYTE [DISTWIDTH] IN BLOOD BY AUTOMATED COUNT: 13.2 % (ref 11.5–14.5)
GLUCOSE BLD MANUAL STRIP-MCNC: 268 MG/DL (ref 74–99)
GLUCOSE BLD MANUAL STRIP-MCNC: 293 MG/DL (ref 74–99)
GLUCOSE BLD MANUAL STRIP-MCNC: 403 MG/DL (ref 74–99)
GLUCOSE SERPL-MCNC: 301 MG/DL (ref 65–99)
HCT VFR BLD AUTO: 32.9 % (ref 41–52)
HGB BLD-MCNC: 11.3 G/DL (ref 13.5–17.5)
MCH RBC QN AUTO: 34.7 PG (ref 26–34)
MCHC RBC AUTO-ENTMCNC: 34.3 G/DL (ref 32–36)
MCV RBC AUTO: 101 FL (ref 80–100)
NRBC BLD-RTO: 0 /100 WBCS (ref 0–0)
PLATELET # BLD AUTO: 93 X10*3/UL (ref 150–450)
POTASSIUM SERPL-SCNC: 4.4 MMOL/L (ref 3.4–5.1)
PROT SERPL-MCNC: 6.3 G/DL (ref 5.9–7.9)
Q ONSET: 223 MS
QRS COUNT: 17 BEATS
QRS DURATION: 144 MS
QT INTERVAL: 362 MS
QTC CALCULATION(BAZETT): 471 MS
QTC FREDERICIA: 431 MS
R AXIS: 78 DEGREES
RBC # BLD AUTO: 3.26 X10*6/UL (ref 4.5–5.9)
SODIUM SERPL-SCNC: 133 MMOL/L (ref 133–145)
T AXIS: 7 DEGREES
T OFFSET: 404 MS
VENTRICULAR RATE: 102 BPM
WBC # BLD AUTO: 6.5 X10*3/UL (ref 4.4–11.3)

## 2024-05-16 PROCEDURE — 2500000001 HC RX 250 WO HCPCS SELF ADMINISTERED DRUGS (ALT 637 FOR MEDICARE OP): Performed by: INTERNAL MEDICINE

## 2024-05-16 PROCEDURE — 36415 COLL VENOUS BLD VENIPUNCTURE: CPT | Performed by: INTERNAL MEDICINE

## 2024-05-16 PROCEDURE — 82947 ASSAY GLUCOSE BLOOD QUANT: CPT | Mod: 91

## 2024-05-16 PROCEDURE — 76770 US EXAM ABDO BACK WALL COMP: CPT | Performed by: RADIOLOGY

## 2024-05-16 PROCEDURE — 84460 ALANINE AMINO (ALT) (SGPT): CPT | Performed by: INTERNAL MEDICINE

## 2024-05-16 PROCEDURE — 2500000002 HC RX 250 W HCPCS SELF ADMINISTERED DRUGS (ALT 637 FOR MEDICARE OP, ALT 636 FOR OP/ED): Performed by: INTERNAL MEDICINE

## 2024-05-16 PROCEDURE — 2060000001 HC INTERMEDIATE ICU ROOM DAILY

## 2024-05-16 PROCEDURE — 2500000005 HC RX 250 GENERAL PHARMACY W/O HCPCS: Performed by: NURSE PRACTITIONER

## 2024-05-16 PROCEDURE — 2500000004 HC RX 250 GENERAL PHARMACY W/ HCPCS (ALT 636 FOR OP/ED): Performed by: INTERNAL MEDICINE

## 2024-05-16 PROCEDURE — 2500000004 HC RX 250 GENERAL PHARMACY W/ HCPCS (ALT 636 FOR OP/ED): Performed by: NURSE PRACTITIONER

## 2024-05-16 PROCEDURE — 71250 CT THORAX DX C-: CPT | Performed by: RADIOLOGY

## 2024-05-16 PROCEDURE — 97530 THERAPEUTIC ACTIVITIES: CPT | Mod: GP

## 2024-05-16 PROCEDURE — 71250 CT THORAX DX C-: CPT

## 2024-05-16 PROCEDURE — 76770 US EXAM ABDO BACK WALL COMP: CPT

## 2024-05-16 PROCEDURE — 2500000006 HC RX 250 W HCPCS SELF ADMINISTERED DRUGS (ALT 637 FOR ALL PAYERS): Mod: MUE | Performed by: INTERNAL MEDICINE

## 2024-05-16 PROCEDURE — 97162 PT EVAL MOD COMPLEX 30 MIN: CPT | Mod: GP

## 2024-05-16 PROCEDURE — 85027 COMPLETE CBC AUTOMATED: CPT | Performed by: INTERNAL MEDICINE

## 2024-05-16 PROCEDURE — 82947 ASSAY GLUCOSE BLOOD QUANT: CPT

## 2024-05-16 PROCEDURE — C9113 INJ PANTOPRAZOLE SODIUM, VIA: HCPCS | Performed by: INTERNAL MEDICINE

## 2024-05-16 PROCEDURE — 97535 SELF CARE MNGMENT TRAINING: CPT | Mod: GO

## 2024-05-16 PROCEDURE — 97165 OT EVAL LOW COMPLEX 30 MIN: CPT | Mod: GO

## 2024-05-16 PROCEDURE — 2500000002 HC RX 250 W HCPCS SELF ADMINISTERED DRUGS (ALT 637 FOR MEDICARE OP, ALT 636 FOR OP/ED): Performed by: NURSE PRACTITIONER

## 2024-05-16 RX ORDER — FINASTERIDE 5 MG/1
5 TABLET, FILM COATED ORAL DAILY
Status: DISCONTINUED | OUTPATIENT
Start: 2024-05-16 | End: 2024-05-21 | Stop reason: HOSPADM

## 2024-05-16 RX ORDER — FUROSEMIDE 10 MG/ML
20 INJECTION INTRAMUSCULAR; INTRAVENOUS EVERY 12 HOURS
Status: DISCONTINUED | OUTPATIENT
Start: 2024-05-16 | End: 2024-05-18

## 2024-05-16 RX ORDER — DEXTROSE 50 % IN WATER (D50W) INTRAVENOUS SYRINGE
12.5
Status: DISCONTINUED | OUTPATIENT
Start: 2024-05-16 | End: 2024-05-18

## 2024-05-16 RX ORDER — NAPROXEN SODIUM 220 MG/1
81 TABLET, FILM COATED ORAL DAILY
Status: DISCONTINUED | OUTPATIENT
Start: 2024-05-16 | End: 2024-05-21 | Stop reason: HOSPADM

## 2024-05-16 RX ORDER — TAMSULOSIN HYDROCHLORIDE 0.4 MG/1
0.4 CAPSULE ORAL DAILY
Status: DISCONTINUED | OUTPATIENT
Start: 2024-05-16 | End: 2024-05-21 | Stop reason: HOSPADM

## 2024-05-16 RX ORDER — INSULIN LISPRO 100 [IU]/ML
0-5 INJECTION, SOLUTION INTRAVENOUS; SUBCUTANEOUS
Status: DISCONTINUED | OUTPATIENT
Start: 2024-05-16 | End: 2024-05-16

## 2024-05-16 RX ORDER — ATORVASTATIN CALCIUM 40 MG/1
40 TABLET, FILM COATED ORAL NIGHTLY
Status: DISCONTINUED | OUTPATIENT
Start: 2024-05-16 | End: 2024-05-21 | Stop reason: HOSPADM

## 2024-05-16 RX ORDER — AMLODIPINE BESYLATE 5 MG/1
5 TABLET ORAL DAILY
Status: DISCONTINUED | OUTPATIENT
Start: 2024-05-16 | End: 2024-05-21 | Stop reason: HOSPADM

## 2024-05-16 RX ORDER — INSULIN LISPRO 100 [IU]/ML
0-10 INJECTION, SOLUTION INTRAVENOUS; SUBCUTANEOUS
Status: DISCONTINUED | OUTPATIENT
Start: 2024-05-16 | End: 2024-05-21 | Stop reason: HOSPADM

## 2024-05-16 RX ORDER — INSULIN GLARGINE 100 [IU]/ML
10 INJECTION, SOLUTION SUBCUTANEOUS NIGHTLY
Status: DISCONTINUED | OUTPATIENT
Start: 2024-05-16 | End: 2024-05-16

## 2024-05-16 RX ORDER — NITROFURANTOIN 25; 75 MG/1; MG/1
100 CAPSULE ORAL 2 TIMES DAILY
COMMUNITY
Start: 2024-05-06 | End: 2024-05-21 | Stop reason: HOSPADM

## 2024-05-16 RX ORDER — HEPARIN SODIUM 5000 [USP'U]/ML
5000 INJECTION, SOLUTION INTRAVENOUS; SUBCUTANEOUS 2 TIMES DAILY
Status: DISCONTINUED | OUTPATIENT
Start: 2024-05-16 | End: 2024-05-21 | Stop reason: HOSPADM

## 2024-05-16 RX ORDER — LEVETIRACETAM 250 MG/1
250 TABLET ORAL 2 TIMES DAILY
Status: DISCONTINUED | OUTPATIENT
Start: 2024-05-16 | End: 2024-05-21 | Stop reason: HOSPADM

## 2024-05-16 RX ORDER — PANTOPRAZOLE SODIUM 40 MG/10ML
40 INJECTION, POWDER, LYOPHILIZED, FOR SOLUTION INTRAVENOUS DAILY
Status: DISCONTINUED | OUTPATIENT
Start: 2024-05-16 | End: 2024-05-21 | Stop reason: HOSPADM

## 2024-05-16 RX ORDER — GABAPENTIN 100 MG/1
100 CAPSULE ORAL DAILY
Status: DISCONTINUED | OUTPATIENT
Start: 2024-05-16 | End: 2024-05-21 | Stop reason: HOSPADM

## 2024-05-16 RX ORDER — DEXTROSE 50 % IN WATER (D50W) INTRAVENOUS SYRINGE
25
Status: DISCONTINUED | OUTPATIENT
Start: 2024-05-16 | End: 2024-05-18

## 2024-05-16 RX ORDER — ONDANSETRON HYDROCHLORIDE 2 MG/ML
4 INJECTION, SOLUTION INTRAVENOUS EVERY 4 HOURS PRN
Status: DISCONTINUED | OUTPATIENT
Start: 2024-05-16 | End: 2024-05-21 | Stop reason: HOSPADM

## 2024-05-16 RX ORDER — MEROPENEM 1 G/1
1 INJECTION, POWDER, FOR SOLUTION INTRAVENOUS EVERY 8 HOURS
Status: DISCONTINUED | OUTPATIENT
Start: 2024-05-16 | End: 2024-05-21

## 2024-05-16 RX ADMIN — FINASTERIDE 5 MG: 5 TABLET, FILM COATED ORAL at 08:38

## 2024-05-16 RX ADMIN — ASPIRIN 81 MG CHEWABLE TABLET 81 MG: 81 TABLET CHEWABLE at 08:38

## 2024-05-16 RX ADMIN — ATORVASTATIN CALCIUM 40 MG: 40 TABLET, FILM COATED ORAL at 21:57

## 2024-05-16 RX ADMIN — HEPARIN SODIUM 5000 UNITS: 5000 INJECTION, SOLUTION INTRAVENOUS; SUBCUTANEOUS at 08:37

## 2024-05-16 RX ADMIN — MEROPENEM 1 G: 1 INJECTION, POWDER, FOR SOLUTION INTRAVENOUS at 05:51

## 2024-05-16 RX ADMIN — LEVETIRACETAM 250 MG: 250 TABLET, FILM COATED ORAL at 21:57

## 2024-05-16 RX ADMIN — TAMSULOSIN HYDROCHLORIDE 0.4 MG: 0.4 CAPSULE ORAL at 08:38

## 2024-05-16 RX ADMIN — MEROPENEM 1 G: 1 INJECTION, POWDER, FOR SOLUTION INTRAVENOUS at 13:44

## 2024-05-16 RX ADMIN — LEVETIRACETAM 250 MG: 250 TABLET, FILM COATED ORAL at 02:37

## 2024-05-16 RX ADMIN — INSULIN LISPRO 3 UNITS: 100 INJECTION, SOLUTION INTRAVENOUS; SUBCUTANEOUS at 07:48

## 2024-05-16 RX ADMIN — INSULIN GLARGINE 10 UNITS: 100 INJECTION, SOLUTION SUBCUTANEOUS at 02:36

## 2024-05-16 RX ADMIN — LEVETIRACETAM 250 MG: 250 TABLET, FILM COATED ORAL at 08:38

## 2024-05-16 RX ADMIN — AMLODIPINE BESYLATE 5 MG: 5 TABLET ORAL at 08:38

## 2024-05-16 RX ADMIN — HEPARIN SODIUM 5000 UNITS: 5000 INJECTION, SOLUTION INTRAVENOUS; SUBCUTANEOUS at 21:56

## 2024-05-16 RX ADMIN — INSULIN GLARGINE AND LIXISENATIDE 10 UNITS: 100; 33 INJECTION, SOLUTION SUBCUTANEOUS at 22:00

## 2024-05-16 RX ADMIN — PANTOPRAZOLE SODIUM 40 MG: 40 INJECTION, POWDER, FOR SOLUTION INTRAVENOUS at 08:37

## 2024-05-16 RX ADMIN — ATORVASTATIN CALCIUM 40 MG: 40 TABLET, FILM COATED ORAL at 02:49

## 2024-05-16 RX ADMIN — INSULIN LISPRO 6 UNITS: 100 INJECTION, SOLUTION INTRAVENOUS; SUBCUTANEOUS at 11:34

## 2024-05-16 RX ADMIN — FUROSEMIDE 20 MG: 10 INJECTION, SOLUTION INTRAMUSCULAR; INTRAVENOUS at 22:00

## 2024-05-16 RX ADMIN — FUROSEMIDE 20 MG: 10 INJECTION, SOLUTION INTRAMUSCULAR; INTRAVENOUS at 11:57

## 2024-05-16 RX ADMIN — MEROPENEM 1 G: 1 INJECTION, POWDER, FOR SOLUTION INTRAVENOUS at 21:57

## 2024-05-16 RX ADMIN — GABAPENTIN 100 MG: 100 CAPSULE ORAL at 08:38

## 2024-05-16 SDOH — ECONOMIC STABILITY: FOOD INSECURITY: WITHIN THE PAST 12 MONTHS, YOU WORRIED THAT YOUR FOOD WOULD RUN OUT BEFORE YOU GOT MONEY TO BUY MORE.: NEVER TRUE

## 2024-05-16 SDOH — SOCIAL STABILITY: SOCIAL INSECURITY: DO YOU FEEL ANYONE HAS EXPLOITED OR TAKEN ADVANTAGE OF YOU FINANCIALLY OR OF YOUR PERSONAL PROPERTY?: NO

## 2024-05-16 SDOH — SOCIAL STABILITY: SOCIAL INSECURITY: WERE YOU ABLE TO COMPLETE ALL THE BEHAVIORAL HEALTH SCREENINGS?: YES

## 2024-05-16 SDOH — SOCIAL STABILITY: SOCIAL INSECURITY: WITHIN THE LAST YEAR, HAVE YOU BEEN AFRAID OF YOUR PARTNER OR EX-PARTNER?: NO

## 2024-05-16 SDOH — HEALTH STABILITY: MENTAL HEALTH
HOW OFTEN DO YOU NEED TO HAVE SOMEONE HELP YOU WHEN YOU READ INSTRUCTIONS, PAMPHLETS, OR OTHER WRITTEN MATERIAL FROM YOUR DOCTOR OR PHARMACY?: NEVER

## 2024-05-16 SDOH — HEALTH STABILITY: PHYSICAL HEALTH: ON AVERAGE, HOW MANY DAYS PER WEEK DO YOU ENGAGE IN MODERATE TO STRENUOUS EXERCISE (LIKE A BRISK WALK)?: 0 DAYS

## 2024-05-16 SDOH — SOCIAL STABILITY: SOCIAL NETWORK: HOW OFTEN DO YOU ATTEND CHURCH OR RELIGIOUS SERVICES?: NEVER

## 2024-05-16 SDOH — SOCIAL STABILITY: SOCIAL INSECURITY: DO YOU FEEL UNSAFE GOING BACK TO THE PLACE WHERE YOU ARE LIVING?: NO

## 2024-05-16 SDOH — SOCIAL STABILITY: SOCIAL NETWORK: ARE YOU MARRIED, WIDOWED, DIVORCED, SEPARATED, NEVER MARRIED, OR LIVING WITH A PARTNER?: MARRIED

## 2024-05-16 SDOH — HEALTH STABILITY: MENTAL HEALTH: HOW MANY STANDARD DRINKS CONTAINING ALCOHOL DO YOU HAVE ON A TYPICAL DAY?: PATIENT DOES NOT DRINK

## 2024-05-16 SDOH — ECONOMIC STABILITY: INCOME INSECURITY: IN THE LAST 12 MONTHS, WAS THERE A TIME WHEN YOU WERE NOT ABLE TO PAY THE MORTGAGE OR RENT ON TIME?: NO

## 2024-05-16 SDOH — SOCIAL STABILITY: SOCIAL INSECURITY: HAVE YOU HAD ANY THOUGHTS OF HARMING ANYONE ELSE?: NO

## 2024-05-16 SDOH — HEALTH STABILITY: MENTAL HEALTH: HOW OFTEN DO YOU HAVE A DRINK CONTAINING ALCOHOL?: NEVER

## 2024-05-16 SDOH — SOCIAL STABILITY: SOCIAL INSECURITY: WITHIN THE LAST YEAR, HAVE YOU BEEN HUMILIATED OR EMOTIONALLY ABUSED IN OTHER WAYS BY YOUR PARTNER OR EX-PARTNER?: NO

## 2024-05-16 SDOH — HEALTH STABILITY: PHYSICAL HEALTH: ON AVERAGE, HOW MANY MINUTES DO YOU ENGAGE IN EXERCISE AT THIS LEVEL?: 0 MIN

## 2024-05-16 SDOH — SOCIAL STABILITY: SOCIAL INSECURITY: HAVE YOU HAD THOUGHTS OF HARMING ANYONE ELSE?: NO

## 2024-05-16 SDOH — SOCIAL STABILITY: SOCIAL INSECURITY: ARE YOU OR HAVE YOU BEEN THREATENED OR ABUSED PHYSICALLY, EMOTIONALLY, OR SEXUALLY BY ANYONE?: NO

## 2024-05-16 SDOH — SOCIAL STABILITY: SOCIAL INSECURITY: HAS ANYONE EVER THREATENED TO HURT YOUR FAMILY OR YOUR PETS?: NO

## 2024-05-16 SDOH — ECONOMIC STABILITY: INCOME INSECURITY: HOW HARD IS IT FOR YOU TO PAY FOR THE VERY BASICS LIKE FOOD, HOUSING, MEDICAL CARE, AND HEATING?: NOT HARD AT ALL

## 2024-05-16 SDOH — SOCIAL STABILITY: SOCIAL NETWORK: HOW OFTEN DO YOU ATTENT MEETINGS OF THE CLUB OR ORGANIZATION YOU BELONG TO?: NEVER

## 2024-05-16 SDOH — SOCIAL STABILITY: SOCIAL INSECURITY: DOES ANYONE TRY TO KEEP YOU FROM HAVING/CONTACTING OTHER FRIENDS OR DOING THINGS OUTSIDE YOUR HOME?: NO

## 2024-05-16 SDOH — HEALTH STABILITY: MENTAL HEALTH: HOW OFTEN DO YOU HAVE 6 OR MORE DRINKS ON ONE OCCASION?: NEVER

## 2024-05-16 SDOH — SOCIAL STABILITY: SOCIAL INSECURITY: ABUSE: ADULT

## 2024-05-16 SDOH — ECONOMIC STABILITY: FOOD INSECURITY: WITHIN THE PAST 12 MONTHS, THE FOOD YOU BOUGHT JUST DIDN'T LAST AND YOU DIDN'T HAVE MONEY TO GET MORE.: NEVER TRUE

## 2024-05-16 SDOH — ECONOMIC STABILITY: INCOME INSECURITY: IN THE PAST 12 MONTHS, HAS THE ELECTRIC, GAS, OIL, OR WATER COMPANY THREATENED TO SHUT OFF SERVICE IN YOUR HOME?: NO

## 2024-05-16 SDOH — SOCIAL STABILITY: SOCIAL INSECURITY: ARE THERE ANY APPARENT SIGNS OF INJURIES/BEHAVIORS THAT COULD BE RELATED TO ABUSE/NEGLECT?: NO

## 2024-05-16 SDOH — SOCIAL STABILITY: SOCIAL NETWORK: HOW OFTEN DO YOU GET TOGETHER WITH FRIENDS OR RELATIVES?: TWICE A WEEK

## 2024-05-16 SDOH — ECONOMIC STABILITY: HOUSING INSECURITY: IN THE LAST 12 MONTHS, HOW MANY PLACES HAVE YOU LIVED?: 1

## 2024-05-16 SDOH — HEALTH STABILITY: MENTAL HEALTH: EXPERIENCED ANY OF THE FOLLOWING LIFE EVENTS: OTHER (COMMENT)

## 2024-05-16 ASSESSMENT — COGNITIVE AND FUNCTIONAL STATUS - GENERAL
HELP NEEDED FOR BATHING: A LOT
DRESSING REGULAR UPPER BODY CLOTHING: A LITTLE
PERSONAL GROOMING: A LITTLE
TURNING FROM BACK TO SIDE WHILE IN FLAT BAD: A LOT
DRESSING REGULAR LOWER BODY CLOTHING: A LITTLE
MOVING FROM LYING ON BACK TO SITTING ON SIDE OF FLAT BED WITH BEDRAILS: A LOT
WALKING IN HOSPITAL ROOM: TOTAL
CLIMB 3 TO 5 STEPS WITH RAILING: A LITTLE
DAILY ACTIVITIY SCORE: 20
PATIENT BASELINE BEDBOUND: NO
CLIMB 3 TO 5 STEPS WITH RAILING: TOTAL
DRESSING REGULAR UPPER BODY CLOTHING: A LITTLE
MOBILITY SCORE: 8
WALKING IN HOSPITAL ROOM: A LITTLE
DRESSING REGULAR LOWER BODY CLOTHING: A LOT
MOVING TO AND FROM BED TO CHAIR: TOTAL
DAILY ACTIVITIY SCORE: 15
MOVING TO AND FROM BED TO CHAIR: A LITTLE
TOILETING: A LITTLE
TOILETING: A LOT
MOBILITY SCORE: 20
STANDING UP FROM CHAIR USING ARMS: A LITTLE
EATING MEALS: A LITTLE
STANDING UP FROM CHAIR USING ARMS: TOTAL
HELP NEEDED FOR BATHING: A LITTLE

## 2024-05-16 ASSESSMENT — LIFESTYLE VARIABLES
SKIP TO QUESTIONS 9-10: 1
HOW OFTEN DO YOU HAVE 6 OR MORE DRINKS ON ONE OCCASION: NEVER
SUBSTANCE_ABUSE_PAST_12_MONTHS: NO
PRESCIPTION_ABUSE_PAST_12_MONTHS: NO
HOW OFTEN DO YOU HAVE A DRINK CONTAINING ALCOHOL: NEVER
AUDIT-C TOTAL SCORE: 0

## 2024-05-16 ASSESSMENT — ACTIVITIES OF DAILY LIVING (ADL)
HEARING - LEFT EAR: DIFFICULTY WITH NOISE
DRESSING YOURSELF: NEEDS ASSISTANCE
HOME_MANAGEMENT_TIME_ENTRY: 10
WALKS IN HOME: NEEDS ASSISTANCE
LACK_OF_TRANSPORTATION: NO
BATHING: NEEDS ASSISTANCE
TOILETING: NEEDS ASSISTANCE
PATIENT'S MEMORY ADEQUATE TO SAFELY COMPLETE DAILY ACTIVITIES?: YES
ADL_ASSISTANCE: NEEDS ASSISTANCE
FEEDING YOURSELF: INDEPENDENT
LACK_OF_TRANSPORTATION: NO
GROOMING: NEEDS ASSISTANCE
ADEQUATE_TO_COMPLETE_ADL: YES
BATHING_ASSISTANCE: MODERATE
ADL_ASSISTANCE: NEEDS ASSISTANCE
JUDGMENT_ADEQUATE_SAFELY_COMPLETE_DAILY_ACTIVITIES: YES
HEARING - RIGHT EAR: DIFFICULTY WITH NOISE

## 2024-05-16 ASSESSMENT — PAIN - FUNCTIONAL ASSESSMENT
PAIN_FUNCTIONAL_ASSESSMENT: 0-10
PAIN_FUNCTIONAL_ASSESSMENT: 0-10

## 2024-05-16 ASSESSMENT — PAIN SCALES - GENERAL
PAINLEVEL_OUTOF10: 0 - NO PAIN

## 2024-05-16 NOTE — PROGRESS NOTES
Occupational Therapy    Evaluation/Treatment    Patient Name: Luis Greene  MRN: [id number]  : 1940  Today's Date: 24  Time Calculation  Start Time: 836  Stop Time: 856  Time Calculation (min): 20 min       Assessment:  OT Assessment: OT order received, chart reviewed, evaluation completed.c/o SOB, fever and weakness; pt found to have pneumonia; Pt demonstrated significant deficits in ADLs and functional mobility and would benefit from acute OT services.  Prognosis: Good  Barriers to Discharge: Decreased caregiver support  Evaluation/Treatment Tolerance: Patient limited by fatigue  Medical Staff Made Aware: Yes  End of Session Communication: Bedside nurse  End of Session Patient Position: Up in chair, Alarm on (call button in reach RN aware.)  OT Assessment Results: Decreased ADL status, Decreased upper extremity strength, Decreased cognition, Decreased endurance, Decreased functional mobility, Decreased IADLs  Prognosis: Good  Barriers to Discharge: Decreased caregiver support  Evaluation/Treatment Tolerance: Patient limited by fatigue  Medical Staff Made Aware: Yes  Strengths: Coping skills  Barriers to Participation: Comorbidities  Plan:  Treatment Interventions: ADL retraining, Functional transfer training, UE strengthening/ROM, Endurance training, Cognitive reorientation, Patient/family training, Equipment evaluation/education  OT Frequency: 3 times per week  OT Discharge Recommendations: Moderate intensity level of continued care  Equipment Recommended upon Discharge: Wheeled walker  OT - OK to Discharge: Yes  Treatment Interventions: ADL retraining, Functional transfer training, UE strengthening/ROM, Endurance training, Cognitive reorientation, Patient/family training, Equipment evaluation/education    Subjective   Current Problem:  1. UTI (urinary tract infection)        2. Acute respiratory failure with hypoxia (Multi)        3. Atypical pneumonia        4. Acute cystitis without hematuria           General:   OT Received On: 05/16/24  General  Reason for Referral: UTI; c/o SOB, fever and weakness; pt found to have pneumonia; impaired mobility  Past Medical History Relevant to Rehab: HTN; UTI; sepsis; TIA; ESBL; A-Fib; CHF; CAD; DM; anemia; urinary incontinence and retention;GERD; spinal stenosis with surgery; OA; encephalopathy; B foot drop; wears B AFO's  Co-Treatment: PT  Co-Treatment Reason: medically and physically complex patient seen in ED  Prior to Session Communication: Bedside nurse  Patient Position Received: Bed, 3 rail up  Preferred Learning Style: verbal  General Comment: Cleared by nursing, pt agreeable to OT evalaution  Precautions:  Hearing/Visual Limitations: wears reading glasses; mild [place]  Medical Precautions: Fall precautions  Vital Signs:  Heart Rate: 81  Respirations: 18  Pulse Ox: 95 % (on RA)  BP: 146/66  Pain:  Pain Assessment  Pain Assessment: 0-10  Pain Score: 0 - No pain    Objective   Cognition:  Overall Cognitive Status: Within Functional Limits  Orientation Level: Disoriented to time  Safety/Judgement:  (mild)  Insight: Mild  Processing Speed: Delayed           Home Living:  Type of Home: House  Lives With: Spouse  Home Adaptive Equipment:  (rollator)  Home Layout: One level  Home Access:  (4 entry stairs with 1 railing)  Bathroom Shower/Tub: Walk-in shower  Bathroom Equipment: Grab bars in shower, Shower chair with back  Home Living Comments: pt also has 12 stairs with railing to basement laundry; but does not need to use  Prior Function:  Level of [place]: Needs assistance with ADLs  Receives Help From:  (spouse assists daily; pts son and daughter check in regularly)  ADL Assistance: Needs assistance  Homemaking Assistance: Needs assistance  Ambulatory Assistance: Independent (uses rollator at home)  Vocational: Retired  Prior Function Comments: pt reported managing well at home; Pts spouse provides transportation  IADL History:     ADL:  Eating Deficit: Setup (on  clear liquid diet)  Grooming Assistance: Minimal  Bathing Assistance: Moderate  UE Dressing Assistance: Minimal  LE Dressing Assistance: Total  LE Dressing Deficit: Don/doff R sock, Don/doff L sock  Toileting Assistance with Device: Total  Toileting Deficit: Incontinent, Perineal hygiene, Clothing management down, Clothing management up (Pt incontinent of urine, dep for brief removal and hygiene)    Activity Tolerance:  Endurance: Decreased tolerance for upright activites  Functional Standing Tolerance:     Bed Mobility/Transfers: Bed Mobility  Bed Mobility: Yes  Bed Mobility 1  Bed Mobility 1: Supine to sitting  Level of Assistance 1: Moderate assistance  Bed Mobility Comments 1: Assist for B LEs and trunk up and scoot to EOB.    Transfers  Transfer: Yes  Transfer 1  Transfer From 1: Bed to  Transfer to 1: Stand  Technique 1: Sit to stand  Transfer Device 1: Walker  Transfer Level of Assistance 1: Moderate assistance, +2, Moderate verbal cues  Trials/Comments 1: Pt stood from bed with mod A x2 and RW support. Pt with flexed soft knees and SILVER drop foot. Pt performed functional mobility several steps to bedside chair significant difficulty taking steps  Transfers 2  Transfer From 2: Stand to  Transfer to 2: Chair with arms  Technique 2: Stand to sit  Transfer Device 2: Walker  Transfer Level of Assistance 2: Moderate assistance  Trials/Comments 2: cues for safe hand placement and mod A x2 to control descent into chair.    Sensation:  Light Touch: Partial deficits in the RLE, Partial deficits in the LLE  Strength:  Strength Comments: 3/5 B UES    Coordination:  Movements are Fluid and Coordinated: No  Upper Body Coordination: tremulous with movement   Hand Function:  Hand Function  Gross Grasp: Functional  Coordination: Functional  Extremities: RUE   RUE : Within Functional Limits and LUE   LUE: Within Functional Limits    Outcome Measures: [facility] Daily Activity  Putting on and taking off regular lower body clothing:  A lot  Bathing (including washing, rinsing, drying): A lot  Putting on and taking off regular upper body clothing: A little  Toileting, which includes using toilet, bedpan or urinal: A lot  Taking care of personal grooming such as brushing teeth: A little  Eating Meals: A little  Daily Activity - Total Score: 15        Education Documentation  ADL Training, taught by Martina Hanson OT at 5/16/2024  1:14 PM.  Learner: Patient  Readiness: Acceptance  Method: Explanation  Response: Verbalizes Understanding  Comment: Pt edu on OT POC    Education Comments  No comments found.        OP EDUCATION:       Goals:  Encounter Problems       Encounter Problems (Active)       OT Goals       ADLs        Start:  05/16/24    Expected End:  05/31/24       Pt will complete ADL tasks with Mod I, using AE as needed, in order to complete self-care tasks.           Functional Transfers       Start:  05/16/24    Expected End:  05/31/24       Pt will perform functional transfers at mod ind with RW           Functional mobility       Start:  05/16/24    Expected End:  05/31/24       Pt will perform functional mobility household distance at mod ind level with RW           Therapeutic exercise       Start:  05/16/24    Expected End:  05/31/24       Pt will perform upper body therapeutic exercises all joints/planes of motion with distant S to increase functional use of UEs

## 2024-05-16 NOTE — PROGRESS NOTES
05/16/24 1059   Discharge Planning   Living Arrangements Spouse/significant other   Support Systems Spouse/significant other;Children   Type of Residence Private residence   Number of Stairs to Enter Residence 4   Number of Stairs Within Residence 12   Do you have animals or pets at home? No   Who is requesting discharge planning? Provider   Home or Post Acute Services In home services   Type of Home Care Services Home PT;Home OT   Patient expects to be discharged to: Pt would like Residence [facility]   Does the patient need discharge transport arranged? No   Financial Resource Strain   How hard is it for you to pay for the very basics like food, housing, medical care, and heating? Not hard   Housing Stability   In the last 12 months, was there a time when you were not able to pay the mortgage or rent on time? N   In the last 12 months, how many places have you lived? 1   In the last 12 months, was there a time when you did not have a steady place to sleep or slept in a shelter (including now)? N   Transportation Needs   In the past 12 months, has lack of transportation kept you from medical appointments or from getting medications? no   In the past 12 months, has lack of transportation kept you from meetings, work, or from getting things needed for daily living? No   Patient Choice   Provider Choice list and CMS website (https://medicare.gov/care-compare#search) for post-acute Quality and Resource Measure Data were provided and reviewed with: Patient   Patient / Family choosing to utilize agency / facility established prior to hospitalization Yes

## 2024-05-16 NOTE — PROGRESS NOTES
"Luis Greene is a 84 y.o. male on day 1 of admission presenting with UTI (urinary tract infection).    Subjective   HPI   Patient seen and examined, complains with fatigue weakness .   Patient denies any chest pain, shortness of breath in room air.  Patient tolerates well her diet with no nausea vomiting or abdominal pain.  No fever or chills.  No headache or dizziness.      Objective     Last Recorded Vitals  Blood pressure 168/70, pulse 89, temperature 37.3 °C (99.1 °F), temperature source Oral, resp. rate 17, height 1.803 m (5' 11\"), weight 77.1 kg (170 lb), SpO2 94%.      Intake/Output Summary (Last 24 hours) at 5/16/2024 1047  Last data filed at 5/15/2024 2155  Gross per 24 hour   Intake --   Output 150 ml   Net -150 ml         Physical Exam   General: alert, no diaphoresis   HENT: mucous membranes moist, external ears normal, no rhinorrhea   Eyes: no icterus or injection, no discharge   Lungs: CTA BL   Heart: RRR, no murmurs, no LE edema BL   GI: abdomen soft, nontender, nondistended, BS present   MSK: no joint effusion or deformity   Skin: no rashes, erythema, or ecchymosis   Neuro: grossly normal cognition, motor strength, sensation   Relevant Results    Lab Results   Component Value Date    WBC 6.5 05/16/2024    HGB 11.3 (L) 05/16/2024    HCT 32.9 (L) 05/16/2024     (H) 05/16/2024    PLT 93 (L) 05/16/2024       Lab Results   Component Value Date    GLUCOSE 301 (H) 05/16/2024    CALCIUM 8.6 05/16/2024     05/16/2024    K 4.4 05/16/2024    CO2 23 (L) 05/16/2024    CL 97 05/16/2024    BUN 20 05/16/2024    CREATININE 0.90 05/16/2024       Lab Results   Component Value Date    HGBA1C 9.6 (H) 05/03/2024       ECG 12 lead    Result Date: 5/16/2024  Undetermined rhythm Right bundle branch block Abnormal ECG When compared with ECG of 14-FEB-2024 16:19, Current undetermined rhythm precludes rhythm comparison, needs review    CT chest wo IV contrast    Result Date: 5/16/2024  Interpreted By:  Justin, " Jenise, STUDY: CT CHEST WO IV CONTRAST;  5/16/2024 1:35 am   INDICATION: Signs/Symptoms:pneumonia   COMPARISON: Chest x-ray 05/15/2024. CT watch min 03/12/2024.   ACCESSION NUMBER(S): [id number]   ORDERING CLINICIAN: SELVIN RICHARDSON   TECHNIQUE: Axial CT images were obtained through the chest with no intravenous contrast administration.  Coronal and sagittal reformats were performed.   FINDINGS: There is motion artifact.   HEART AND VESSELS: No thoracic aortic aneurysm. Atherosclerotic calcifications of the thoracic aorta.   The heart is not enlarged.  Status post open heart surgery. Occluder noted at the left atrial appendage. No evidence of pericardial effusion.   MEDIASTINUM AND YEFRI, LOWER NECK AND AXILLA: The visualized thyroid gland is small size.   Suboptimal evaluation for adenopathy in the absence of intravenous contrast. There is mild mediastinal adenopathy. A subcarinal lymph node measures 1.2 cm in short axis.   LUNGS AND AIRWAYS: The trachea and central airways are patent.   The evaluation of the lungs is degraded by motion artifact. There is interstitial edema. There are small bilateral pleural effusions with airspace opacities at the bilateral lower lobes. No pneumothorax.   UPPER ABDOMEN: The visualized subdiaphragmatic structures demonstrate no acute findings.   CHEST WALL AND OSSEOUS STRUCTURES: A cardiac monitoring device is noted at the anterior left chest wall. There is mild diffuse soft tissue edema at the chest wall and visualized abdominal wall. Multilevel degenerative changes of the spine.       1. Study degraded by motion artifact. Interstitial edema. Small bilateral pleural effusions with airspace opacities at the bilateral lower lobes, suggestive of atelectasis, superimposed pneumonia is not excludable. 2. Mild mediastinal adenopathy. 3. Mild diffuse soft tissue edema at the chest wall and visualized abdominal wall.         MACRO:   None.   Signed by: Jenise Anderson 5/16/2024 2:09 AM  Dictation workstation:   [id number]    XR chest 1 view    Result Date: 5/15/2024  Interpreted By:  King Velasquez, STUDY: XR CHEST 1 VIEW;  5/15/2024 10:17 pm   INDICATION: Signs/Symptoms:sob.   COMPARISON: 2/14/2024   ACCESSION NUMBER(S): [id number]   ORDERING CLINICIAN: SELVIN CURTIS   FINDINGS: Postsurgical change with sternotomy wires. The cardiac silhouette is normal in size. There is slightly limited inspiratory lung volumes. No focal airspace consolidation or pleural effusion. No pneumothorax.       Limited inspiratory lung volumes without focal airspace consolidation.       MACRO: None   Signed by: King Velasquez 5/15/2024 10:59 PM Dictation workstation:   [id number]    Scheduled medications  amLODIPine, 5 mg, oral, Daily  aspirin, 81 mg, oral, Daily  atorvastatin, 40 mg, oral, Nightly  finasteride, 5 mg, oral, Daily  furosemide, 20 mg, intravenous, q12h  gabapentin, 100 mg, oral, Daily  heparin, 5,000 Units, subcutaneous, BID  insulin glargine, 10 Units, subcutaneous, Nightly  insulin lispro, 0-10 Units, subcutaneous, TID  levETIRAcetam, 250 mg, oral, BID  meropenem, 1 g, intravenous, q8h  pantoprazole, 40 mg, intravenous, Daily  tamsulosin, 0.4 mg, oral, Daily      Continuous medications     PRN medications  PRN medications: dextrose, dextrose, glucagon, glucagon, ondansetron    Assessment/Plan   UTI (urinary tract infection)  Fever yesterday    cover for ES BL with meropenem  follow infectious disease.    urine culture pending Blood cultures been drawn.    Uncontrolled DM type II:   Continue with home meds   A1C 9.3 on 5/3/2024    HTN:   Continue with home meds   Monitor Bp      BPH:  Hx of urine retention  Continue with home meds     Weakness/deconditioning   Fall precautions  PT/OT    DVT prophylaxis:       Discharge plan:  Anticipate discharging patient home with home health care vs skilled of nursing when medically clear    I spent 35 minutes in the professional and overall care of this  patient.    Stefany Rodney, APRN-CNP

## 2024-05-16 NOTE — ED PROVIDER NOTES
HPI   Chief Complaint   Patient presents with    Flu Symptoms     Patient arrives via EMS with complaints of flu like symptoms. Nausea and vomiting with fever. Febrile in our ER. Reports a little short of breath       Patient is an 84-year-old male presents emergency department accompanied by family for evaluation of nausea, vomiting, and fevers.  Patient reportedly has not been feeling well over the last several days.  Today started having nausea and vomiting.  He had a few episodes of this, but is feeling better.  He denies any associate abdominal pain at this time.  He is been having normal bowel movements.  Family states that he has had some cough and congestion over the last several days as well.  He denies any shortness of breath or chest pain at this time.  He denies any headache, lightheadedness, dizziness.  He notes that he has been treated for urinary tract infection over the last several weeks and had a lot of resistance and had to change antibiotics multiple times.  He still has some increased frequency and burning with urination for which he states he follows with urology for.      History provided by:  Patient   used: No                        [place] Coma Scale Score: 15                     Patient History   Past Medical History:   Diagnosis Date    A-fib (Multi)     CHF (congestive heart failure) (Multi)     Cognitive communication deficit     Coronary artery disease     Diabetes (Multi)     Hyperlipidemia     Iron deficiency anemia     Muscle weakness (generalized)     Osteoarthritis     Personal history of other diseases of the circulatory system     History of cardiac disorder    Personal history of other endocrine, nutritional and metabolic disease     History of hypercholesterolemia    Unspecified convulsions (Multi)     Urinary retention     Urinary tract infection      Past Surgical History:   Procedure Laterality Date    CARDIAC CATHETERIZATION N/A 11/10/2023    Procedure:  LAAO (Left Atrial Appendage Occlusion);  Surgeon: Louie Avitia MD;  Location: [place] [address] [facility]iac [facility];  Service: Cardiovascular;  Laterality: N/A;  Last Eliquis 11/06/SD /[employer]    MR HEAD ANGIO WO IV CONTRAST  2/17/2019    MR HEAD ANGIO WO IV CONTRAST [facility]    MR HEAD ANGIO WO IV CONTRAST  10/28/2022    MR HEAD ANGIO WO IV CONTRAST [facility]    MR HEAD ANGIO WO IV CONTRAST  8/15/2023    MR HEAD ANGIO WO IV CONTRAST [facility]    OTHER SURGICAL HISTORY  03/11/2019    Heart surgery    OTHER SURGICAL HISTORY  03/11/2019    Knee surgery    OTHER SURGICAL HISTORY  03/11/2019    Tonsillectomy     Family History   Problem Relation Name Age of Onset    Other (cardiac disorder) Father      Diabetes Father      Hyperlipidemia Father      Hypertension Father      Other (liver carcinoma) Father      Other (hypercholesterolemia) Father      Hypertension Daughter       Social History     Tobacco Use    Smoking status: Never    Smokeless tobacco: Never   Vaping Use    Vaping status: Never Used   Substance Use Topics    Alcohol use: Never    Drug use: Never       Physical Exam   ED Triage Vitals   Temperature Heart Rate Respirations BP   05/15/24 1839 05/15/24 1839 05/15/24 1839 05/15/24 1839   (!) 39.2 °C (102.6 °F) (!) 105 20 (!) 191/71      Pulse Ox Temp Source Heart Rate Source Patient Position   05/15/24 1839 05/15/24 2156 -- --   97 % Oral        BP Location FiO2 (%)     -- --             Physical Exam  Constitutional:       Appearance: Normal appearance.   Cardiovascular:      Rate and Rhythm: Regular rhythm. Tachycardia present.   Pulmonary:      Breath sounds: Rales present.      Comments: Mild tachypnea  Abdominal:      General: Abdomen is flat.      Palpations: Abdomen is soft.      Tenderness: There is no abdominal tenderness.   Musculoskeletal:         General: Normal range of motion.   Skin:     General: Skin is warm and dry.   Neurological:      General:  No focal deficit present.      Mental Status: He is alert and oriented to person, place, and time.         ED Course & MDM   ED Course as of 05/15/24 2331   Wed May 15, 2024   1858 EKG presented to me at 6:58 PM     EKG as interpreted by me shows normal sinus rhythm at a ventricular rate of 102 bpm, right bundle branch block, no STEMI.   [DH]      ED Course User Index  [DH] Raul Erickson MD         Diagnoses as of 05/15/24 2331   Acute respiratory failure with hypoxia (Multi)   Atypical pneumonia   UTI (urinary tract infection)   Acute cystitis without hematuria       Medical Decision Making  Patient is an 84-year-old male presents emergency department for evaluation of nausea, vomiting and generalized fatigue.    EKG interpreted by attending physician and reviewed by me.    Lab work done today included CMP, CBC, urinalysis, lactic acid, blood cultures, and flu/COVID swabs.  Lab work with evidence of urinary tract infection, anemia, mild hyperbilirubinemia and otherwise unremarkable    Scans done today were interpreted/confirmed by radiologist and also interpreted by me which included chest x-ray.  Chest x-ray shows no inspiratory lung volumes without focal airspace consolidation.    Medications given at today's visit include IV fluids, IV meropenem, PO Tylenol    I saw this patient in conjunction with Dr. Erickson.  Patient is febrile, tachycardic and given this sepsis bundle was ordered.  On review of EMR patient has reportedly had resistant urinary tract infection with limited coverage with oral antibiotics.  Given this IV meropenem prophylactically started.  Patient became hypoxic at 89% on room air and placed on nasal cannula oxygen 2 L.  Chest x-ray is clear, but on physical exam patient has basilar Rales and crackles concerning for atypical pneumonia.  Given hypoxia with initial concern for sepsis, patient will be admitted for further management.  I spoke with hospitalist on-call who is agreeable to admission of  patient for further management.    The patient/family was counseled on clinical impression, expectations, and plan along with recommendations to admission.  All questions were answered and involved parties were understanding and agreeable to course of treatment.  Case was discussed with admitting physician and any consultants. Bed type, ED treatment and further ED workup decided by joint decision making with admitting team and any consultants. Patient stable for admission per my assessment and further management of patient will be deferred to the inpatient setting.    ** Disclaimer:  Parts of this document were written utilizing a voice to text dictation software.  Note may contain minor transcription or typographical errors that were inadvertently transcribed by the computer software.        Procedure  Procedures     Tracey Massey PA-C  05/15/24 1731

## 2024-05-16 NOTE — CARE PLAN
Pt has a POA and Living Will Both are on file  ADOD: 2 days    Pt lives at home with his wife and he has support from his children  He uses a cane while in the house and a rollator when he leaves the home  His wife or children drive him to his appointments.  He is independent with showering and dressing. He does not use home 02/cpap/bipap  He wears glasses, no hearing aids. He denies hx of depression and anxiety.  He is a diabetic and he uses the [employer] to monitor his B.S.  His PCP is Dr. Damico from  and he uses [facility] in [place] for his meds and he can afford his meds  He just completed PT OT with [facility] and he would like PT OT with them.   There is a PT OT order in the computer.  Referral to be placed to [facility].    DISCHARGE PLAN: HOME WITH WIFE/FAMILY TO ASSIST; REFERRAL PLACED TO [facility]; NEED EXTERNAL ORDER FOR HHC  DO NOT DISCHARGE PATIENT BEFORE SPEAKING WITH CARE COORDINATION; MUST MAKE SURE THAT [facility] IS AWARE WHEN PT IS DISCHARGED.

## 2024-05-16 NOTE — CONSULTS
Inpatient consult to Infectious Diseases  Consult performed by: Paty Bailey DO  Consult ordered by: Raul Hatfield MD        Referred by Dr Alysia Andrews MD: Christopher D'Amico, DO    Reason For Consult  fever    History Of Present Illness  Luis Greene is a 84 y.o. male presenting with fever    He has a hx of urinary retention and incontinence as well as recurrent UTIs.     +fever at home and vomiting    Blood and urine cultures were ordered though urine culture not obtained. Based on prior cultures with ESBL organisms, he was started on empiric meropenem    Fevers improved overnight and he feels great today. Doesn't think he urinated since last night, however    Past Medical History  He has a past medical history of A-fib (Multi), CHF (congestive heart failure) (Multi), Cognitive communication deficit, Coronary artery disease, Diabetes (Multi), Hyperlipidemia, Iron deficiency anemia, Muscle weakness (generalized), Osteoarthritis, Personal history of other diseases of the circulatory system, Personal history of other endocrine, nutritional and metabolic disease, Unspecified convulsions (Multi), Urinary retention, and Urinary tract infection.    Surgical History  He has a past surgical history that includes Other surgical history (03/11/2019); Other surgical history (03/11/2019); Other surgical history (03/11/2019); MR angio head wo IV contrast (2/17/2019); MR angio head wo IV contrast (10/28/2022); MR angio head wo IV contrast (8/15/2023); and Cardiac catheterization (N/A, 11/10/2023).     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   [employer]        Comments:      Family History  Family History   Problem Relation Name Age of Onset    Other (cardiac disorder) Father      Diabetes Father      Hyperlipidemia Father      Hypertension Father      Other (liver carcinoma) Father      Other (hypercholesterolemia) Father      Hypertension Daughter       Allergies  Isosorbide, Famotidine, Prednisone, Amoxicillin, Donepezil, Gabapentin, Hydrochlorothiazide, Ibuprofen, Oxycodone-acetaminophen, Tramadol hcl, and Oxycodone hcl     Immunization History   Administered Date(s) Administered    Flu vaccine, quadrivalent, high-dose, preservative free, age 65y+ (FLUZONE) 10/07/2020, 10/18/2021    Influenza, High Dose Seasonal, Preservative Free 02/17/2019, 01/06/2020    Influenza, Seasonal, Quadrivalent, Adjuvanted 11/03/2022    Influenza, Unspecified 10/26/2010    Influenza, seasonal, injectable 10/23/2014    Influenza, seasonal, injectable, preservative free 12/09/2015    Moderna COVID-19 vaccine, Fall 2023, 12 yeasrs and older (50mcg/0.5mL) 10/15/2023    Moderna SARS-CoV-2 Vaccination 02/05/2021, 03/05/2021, 11/26/2021, 06/17/2022    PPD Test 08/19/2023, 08/26/2023    Pfizer COVID-19 vaccine, bivalent, age 12 years and older (30 mcg/0.3 mL) 12/08/2022    Pneumococcal conjugate vaccine, 13-valent (PREVNAR 13) 01/13/2016    Pneumococcal polysaccharide vaccine, 23-valent, age 2 years and older (PNEUMOVAX 23) 05/24/2006    RSV, 60 Years And Older (AREXVY) 10/15/2023    Td (adult), unspecified 05/24/2006    Tdap vaccine, age 7 year and older (BOOSTRIX, ADACEL) 03/21/2014     Medications  Home medications:  (Not in a hospital admission)    Current medications:  Scheduled medications  amLODIPine, 5 mg, oral, Daily  aspirin, 81 mg, oral, Daily  atorvastatin, 40 mg, oral, Nightly  finasteride, 5 mg, oral, Daily  gabapentin, 100 mg, oral, Daily  heparin, 5,000 Units, subcutaneous, BID  insulin glargine, 10 Units, subcutaneous, Nightly  insulin lispro, 0-5 Units, subcutaneous,  "TID  levETIRAcetam, 250 mg, oral, BID  meropenem, 1 g, intravenous, q8h  pantoprazole, 40 mg, intravenous, Daily  tamsulosin, 0.4 mg, oral, Daily      Continuous medications     PRN medications  PRN medications: dextrose, dextrose, glucagon, glucagon, ondansetron    Review of Systems   +fever and urinary retention    Objective  Range of Vitals (last 24 hours)  Heart Rate:  []   Temperature:  [37.3 °C (99.1 °F)-39.2 °C (102.6 °F)]   Respirations:  [16-25]   BP: (114-191)/()   Height:  [180.3 cm (5' 11\")]   Weight:  [77.1 kg (170 lb)]   Pulse Ox:  [91 %-97 %]   Daily Weight  05/15/24 : 77.1 kg (170 lb)    Body mass index is 23.71 kg/m².     Physical Exam  Gen: AAOx3, NAD  HEENT: no oral lesions  CV:regular  Resp: lungs CTA b/l  Abd:soft, nondistended  : no guidry  Skin: no rashes noted  Neuro: AAOx3       Labs  Results from last 72 hours   Lab Units 05/16/24  0551 05/15/24  1858   WBC AUTO x10*3/uL 6.5 7.8   HEMOGLOBIN g/dL 11.3* 12.7*   HEMATOCRIT % 32.9* 36.3*   PLATELETS AUTO x10*3/uL 93* 106*   NEUTROS PCT AUTO %  --  74.7   LYMPHS PCT AUTO %  --  13.9   MONOS PCT AUTO %  --  7.1   EOS PCT AUTO %  --  3.1     Results from last 72 hours   Lab Units 05/16/24  0551 05/15/24  1858   SODIUM mmol/L 133 133   POTASSIUM mmol/L 4.4 4.4   CHLORIDE mmol/L 97 95*   CO2 mmol/L 23* 24   BUN mg/dL 20 18   CREATININE mg/dL 0.90 1.00   GLUCOSE mg/dL 301* 279*   CALCIUM mg/dL 8.6 9.4   ANION GAP mmol/L 13 14   EGFR mL/min/1.73m*2 85 75     Results from last 72 hours   Lab Units 05/16/24  0551 05/15/24  1858   ALK PHOS U/L 81 102   BILIRUBIN TOTAL mg/dL 1.2 1.6*   PROTEIN TOTAL g/dL 6.3 7.3   ALT U/L 19 23   AST U/L 24 29   ALBUMIN g/dL 3.6 4.2     Estimated Creatinine Clearance: 66.2 mL/min (by C-G formula based on SCr of 0.9 mg/dL).  Sedimentation Rate   Date Value Ref Range Status   07/14/2023 <1 0 - 20 mm/h Final     No results found for: \"HIV1X2\", \"HIVCONF\", \"PZJEBH9KD\"  No results found for: \"HEPCABINIT\", " "\"HEPCAB\", \"HCVPCRQUANT\"  Microbiology  5/15 blood cx: pending  5/15 urine cx: not collected    Imaging:  CT chest   IMPRESSION:  1. Study degraded by motion artifact. Interstitial edema. Small  bilateral pleural effusions with airspace opacities at the bilateral  lower lobes, suggestive of atelectasis, superimposed pneumonia is not  excludable.  2. Mild mediastinal adenopathy.  3. Mild diffuse soft tissue edema at the chest wall and visualized  abdominal wall.    Assessment:  Fever, possibly complicated UTI, rule out bacteremia  Urinary retention    Plan:  -continue meropenem  -please collect urine culture  -bladder scan to see if retaining urine --> if so, recommend straight cath/Urology eval  -follow up blood cultures  -will follow    Discussed with Dr Chaya Bailey,   "

## 2024-05-16 NOTE — H&P
History Of Present Illness  Luis Greene is a 84 y.o. male presenting with fever.    This man has a history of urologic issues including urinary retention and incontinence.  He has had problems with resistant UTIs including ESBL yesterday he started spiking high fevers became lethargic and confused.  There is been no change in his urinary habits.  He seems to be constipated at baseline.  He did have some vomiting.  He has been coughing and he is borderline hypoxemic.     Past Medical History  He has a past medical history of A-fib (Multi), CHF (congestive heart failure) (Multi), Cognitive communication deficit, Coronary artery disease, Diabetes (Multi), Hyperlipidemia, Iron deficiency anemia, Muscle weakness (generalized), Osteoarthritis, Personal history of other diseases of the circulatory system, Personal history of other endocrine, nutritional and metabolic disease, Unspecified convulsions (Multi), Urinary retention, and Urinary tract infection.    Surgical History  He has a past surgical history that includes Other surgical history (03/11/2019); Other surgical history (03/11/2019); Other surgical history (03/11/2019); MR angio head wo IV contrast (2/17/2019); MR angio head wo IV contrast (10/28/2022); MR angio head wo IV contrast (8/15/2023); and Cardiac catheterization (N/A, 11/10/2023).     Social History  He reports that he has never smoked. He has never used smokeless tobacco. He reports that he does not drink alcohol and does not use drugs.    Family History  Family History   Problem Relation Name Age of Onset    Other (cardiac disorder) Father      Diabetes Father      Hyperlipidemia Father      Hypertension Father      Other (liver carcinoma) Father      Other (hypercholesterolemia) Father      Hypertension Daughter          Allergies  Isosorbide, Famotidine, Prednisone, Amoxicillin, Donepezil, Gabapentin, Hydrochlorothiazide, Ibuprofen, Oxycodone-acetaminophen, Tramadol hcl, and Oxycodone  hcl    Review of Systems-right now he is not short of breath not having any chest pain no headache not currently nauseous no abdominal pain no skin concerns otherwise see HPI otherwise 10 point review of systems no     Physical Exam he is alert oriented undistressed  Head atraumatic normocephalic  Pupils equal round reactive to light extraocular motion intact  Mouth normal-appearing tongue and oropharynx  Neck supple without thyromegaly  Lymph nodes no cervical or axillary lymphadenopathy  Heart regular rate and rhythm  Lungs bibasilar crackles without wheezing or rhonchi  Abdomen soft nontender nondistended  Extremities no significant edema  Skin no rashes or ulcerations  Musculoskeletal normal passive range of motion shoulders elbows hips knees     Last Recorded Vitals  /90   Pulse 86   Temp 37.6 °C (99.7 °F) (Oral)   Resp 18   Wt 77.1 kg (170 lb)   SpO2 95%     Relevant Results  Urine has only 10 white cells in it..  There is a urine culture from months ago that grew ESBL.    Chest x-ray last night was a very poor study.  Poor inspiration    CBC unremarkable  For COVID and influenza      Assessment/Plan   Acute febrile illness.  Suspect it is another UTI despite the unimpressive urine analysis.  Will cover for ES BL with meropenem and consult infectious disease.  Strangely enough there is no urine culture pending so I will order 1.  Blood cultures been drawn.    There were some respiratory symptoms and his lungs are crackly.  I am going to get a CT of the chest to better clarify what may or may not be going on in his lungs.    Will order PT OT na    Did vomit yesterday saw him going to order clear liquid diet IV Protonix and as needed Zofran.  Raul Hatfield MD

## 2024-05-16 NOTE — PROGRESS NOTES
Pharmacy Medication History Review    Luis Greene is a 84 y.o. male admitted for UTI (urinary tract infection). Pharmacy reviewed the patient's [id number] medications and allergies for accuracy.    Medications ADDED:  Nitrofurantoin, macrocrystal-monohydrate 100mg   Medications CHANGED:  none  Medications REMOVED:   none     The list below reflects the updated PTA list. Comments regarding how patient may be taking medications differently can be found in the Admit Orders Activity  Prior to Admission Medications   Prescriptions Last Dose Informant   acetaminophen (Tylenol) 325 mg tablet  self   Sig: Take 2 tablets (650 mg) by mouth every 6 hours if needed for mild pain (1 - 3).   amLODIPine (Norvasc) 5 mg tablet  self   Sig: Take 1 tablet (5 mg) by mouth once daily.   ascorbic acid (Vitamin C) 1,000 mg tablet  self   Sig: Vitamin C   aspirin 81 mg EC tablet  self   Sig: Take 2 tablets (162 mg) by mouth once daily.   atorvastatin (Lipitor) 40 mg tablet  self   Sig: Take 1 tablet (40 mg) by mouth once daily.   cyanocobalamin (Vitamin B-12) 500 mcg tablet  self   Sig: Take 1 tablet (500 mcg) by mouth 2 times a week. Tuesday and Friday   ferrous sulfate 325 (65 Fe) MG EC tablet  self   Sig: Take 1 tablet by mouth once daily.   finasteride (Proscar) 5 mg tablet  self   Sig: Take 1 tablet (5 mg) by mouth once daily.   furosemide (Lasix) 40 mg tablet  self   Sig: Take 1 tablet (40 mg) by mouth once daily.   gabapentin (Neurontin) 100 mg capsule  self   Sig: TAKE 1 CAPSULE BY MOUTH ONCE DAILY FOR NEUROPATHY PAIN   insulin glargine-lixisenatide (Soliqua 100/33) 100 unit-33 mcg/mL insulin pen  self   Sig: Inject 10 Units under the skin once daily at bedtime.   levETIRAcetam (Keppra) 250 mg tablet  self   Sig: Take 1 tablet (250 mg) by mouth 2 times a day.   magnesium oxide (Mag-Ox) 400 mg (241.3 mg magnesium) tablet  self   Sig: Take 1 tablet (400 mg) by mouth once daily.   metFORMIN (Glucophage) 500 mg tablet  self    Sig: Take 2 tablets (1,000 mg) by mouth 2 times a day.   nitrofurantoin, macrocrystal-monohydrate, (Macrobid) 100 mg capsule 5/15/2024 self   Sig: Take 1 capsule (100 mg) by mouth 2 times a day.   omeprazole (PriLOSEC) 40 mg DR capsule  self   Sig: GIVE 1 CAPSULE BY MOUTH ONE TIME A DAY FOR HEARTBURN   tamsulosin (Flomax) 0.4 mg 24 hr capsule  self   Sig: Take 1 capsule (0.4 mg) by mouth 2 times a day.   tolterodine LA (Detrol LA) 4 mg 24 hr capsule  self   Sig: Take 1 capsule (4 mg) by mouth once daily.      Facility-Administered Medications: None        The list below reflects the updated allergy list. Please review each documented allergy for additional clarification and justification.  Allergies  Reviewed by John Villagomez RN on 5/15/2024        Severity Reactions Comments    Isosorbide High GI Upset, Unknown     Famotidine Medium Agitation Shakiness      Prednisone Medium Other, Unknown Agitation     Amoxicillin Not Specified Unknown     Donepezil Not Specified Unknown     Gabapentin Not Specified Other Tolerates gabapentin    Hydrochlorothiazide Not Specified Other     Ibuprofen Not Specified Unknown     Oxycodone-acetaminophen Not Specified Other Family states no allergy    Tramadol Hcl Not Specified Unknown Tolerates tramadol    Oxycodone Hcl Low Rash             Pharmacy has been updated to [facility].    Sources used to complete the med history include dispense history, PTA medication list, patient interview. Patient is a poor historian.    Below are additional concerns with the patient's PTA list.  None. Patient supplied soliqua.    Mikki Wong  Please reach out via [employer] Secure Chat for questions

## 2024-05-16 NOTE — PROGRESS NOTES
[facility] order sent to [facility], made them aware pt is to dc today.      05/16/24 1521   Discharge Planning   Patient expects to be discharged to: [facility]

## 2024-05-16 NOTE — PROGRESS NOTES
05/16/24 1103   [facility] Disability Status   Are you deaf or do you have serious difficulty hearing? N   Are you blind or do you have serious difficulty seeing, even when wearing glasses? N   Because of a physical, mental, or emotional condition, do you have serious difficulty concentrating, remembering, or making decisions? (5 years old or older) N   Do you have serious difficulty walking or climbing stairs? N   Do you have serious difficulty dressing or bathing? N   Because of a physical, mental, or emotional condition, do you have serious difficulty doing errands alone such as visiting the doctor? N            1

## 2024-05-16 NOTE — PROGRESS NOTES
Physical Therapy    Physical Therapy Evaluation & Treatment    Patient Name: Luis Greene  MRN: [id number]  Today's Date: 5/16/2024   Time Calculation  Start Time: 0833  Stop Time: 0856  Time Calculation (min): 23 min    Assessment/Plan   PT Assessment  PT Assessment Results: Decreased strength, Decreased endurance, Impaired balance, Decreased mobility, Decreased coordination, Decreased safety awareness  Rehab Prognosis: Good  Evaluation/Treatment Tolerance: Patient limited by fatigue, Patient tolerated treatment well  End of Session Communication: Bedside nurse  Assessment Comment: pt would benefit from skilled therapy services  End of Session Patient Position: Up in chair, Alarm on (call button in reach)  IP OR SWING BED PT PLAN  Inpatient or Swing Bed: Inpatient    PT Plan  Treatment/Interventions: Bed mobility, Transfer training, Gait training, Stair training, Balance training, Strengthening, Endurance training  PT Plan: Skilled PT  PT Frequency: 5 times per week  PT Discharge Recommendations: Moderate intensity level of continued care  Equipment Recommended upon Discharge: Wheeled walker  PT Recommended Transfer Status: Assist x2  PT - OK to Discharge: Yes      Subjective   General Visit Information:  General  Reason for Referral: UTI; c/o SOB, fever and weakness; pt found to have pneumonia; impaired mobility  Past Medical History Relevant to Rehab: HTN; UTI; sepsis; TIA; ESBL; A-Fib; CHF; CAD; DM; anemia; urinary incontinence and retention;GERD; spinal stenosis with surgery; OA; encephalopathy; B foot drop; wears B AFO's  Co-Treatment: OT  Co-Treatment Reason: medically and physically complex patient seen in ED  Prior to Session Communication: Bedside nurse  Patient Position Received: Bed, 3 rail up  General Comment: pt cooperative and willing to sit up in chair    Home Living:  Home Living  Type of Home: House  Lives With: Spouse  Home Adaptive Equipment:  (rollator)  Home Layout: One level  Home Access:  4  entry stairs with 1 railing  Bathroom Shower/Tub: Walk-in shower  Bathroom Equipment: Grab bars in shower, Shower chair with back  Home Living Comments: pt also has 12 stairs with railing to basement laundry; but does not need to use    Prior Level of Function:  Prior Function Per Pt/Caregiver Report  Level of [place]: Needs assistance with ADLs  Receives Help From:  (spouse assists daily; pts son and daughter check in regularly)  ADL Assistance: Needs assistance  Homemaking Assistance: Needs assistance  Ambulatory Assistance: Independent (uses rollator at home)  Vocational: Retired  Prior Function Comments: pt reported managing well at home; Pts spouse provides transportation    Precautions:  Precautions  Hearing/Visual Limitations: wears reading glasses; mild [place]  Medical Precautions: Fall precautions    Vital Signs:  Vital Signs  Heart Rate: 81  Respirations: 18  Pulse Ox:  (95% on room air)  BP: 146/66    Objective   Pain:  Pain Assessment  Pain Assessment:  (0/10)    Cognition:  Cognition  Overall Cognitive Status: Within Functional Limits  Orientation Level: Disoriented to time  Safety/Judgement:  (FAIR safety awareness)  Processing Speed:  (delay in motor processing and word finding)    General Assessments:    Activity Tolerance  Endurance:  (FAIR activity tolerance)    Sensation  Sensation Comment: tingling B feet      Coordination  Coordination Comment: + steppage gait; short inconsistent steps    Postural Control  Posture Comment: rigid flexed forward trunk    Static Sitting Balance  Static Sitting-Comment/Number of Minutes: GOOD  Dynamic Sitting Balance  Dynamic Sitting-Comments: GOOD-    Static Standing Balance  Static Standing-Comment/Number of Minutes: FAIR  Dynamic Standing Balance  Dynamic Standing-Comments: FAIR-      Functional Assessments:       Bed Mobility:  supine to sit with MOD A for trunk up, B LE and scooting to EOB.      Transfer:  sit <-> stand with MOD A x 2; increased time and  effort noted. pt unable to stand fully erect; VC for safe hand placement during stand to sit. FAIR- eccentric control noted.      Ambulation/Gait Training Performed:  pt took 5-6 short inconsistent steps using rolling walker with MOD A x 2 to steady; pt presented with steppage gait from B foot drop, and flexed B knees. pt had difficulty weight shifting and advancing steps toward chair. mild fatigue and tremors noted       Extremity/Trunk Assessments:  RUE   RUE : Within Functional Limits  LUE   LUE: Within Functional Limits  RLE   RLE :  (WFL with 3/5 strength except 2-/5 DF strength with foot drop noted.)  LLE   LLE :  (WFL with 3/5 strength except 2-/5 DF strength with foot drop noted.)      Outcome Measures:  [facility] Basic Mobility  Turning from your back to your side while in a flat bed without using bedrails: A lot  Moving from lying on your back to sitting on the side of a flat bed without using bedrails: A lot  Moving to and from bed to chair (including a wheelchair): Total  Standing up from a chair using your arms (e.g. wheelchair or bedside chair): Total  To walk in hospital room: Total  Climbing 3-5 steps with railing: Total  Basic Mobility - Total Score: 8    Encounter Problems       Encounter Problems (Active)       Mobility       STG - Patient will ambulate 60' x 1 using rolling walker wearing B AFO's with SBA (Progressing)       Start:  05/16/24    Expected End:  05/30/24            STG - Patient will negotiate 4 stairs using 1 railing with CGA (Progressing)       Start:  05/16/24    Expected End:  05/30/24               PT Transfers       STG - Patient to transfer to and from sit to supine with SUPERVISION (Progressing)       Start:  05/16/24    Expected End:  05/30/24            STG - Patient will transfer sit to and from stand with SBA (Progressing)       Start:  05/16/24    Expected End:  05/30/24                   Education Documentation  Body Mechanics, taught by Vincenzo Burton, PT at 5/16/2024  11:14 AM.  Learner: Patient  Readiness: Eager  Method: Explanation  Response: Verbalizes Understanding    Mobility Training, taught by Vincenzo Burton PT at 5/16/2024 11:14 AM.  Learner: Patient  Readiness: Eager  Method: Explanation  Response: Verbalizes Understanding    Education Comments  No comments found.

## 2024-05-16 NOTE — NURSING NOTE
Patient to the floor from ED with family at bedside.  Oriented to room and tele placed.  Will continue to monitor patient.

## 2024-05-16 NOTE — PROGRESS NOTES
05/16/24 1103   Current Planned Discharge Disposition   Current Planned Discharge Disposition Home H                                     Ashlee Parker RN

## 2024-05-16 NOTE — PROGRESS NOTES
05/16/24 1056   Physical Activity   On average, how many days per week do you engage in moderate to strenuous exercise (like a brisk walk)? 0 days   On average, how many minutes do you engage in exercise at this level? 0 min   Financial Resource Strain   How hard is it for you to pay for the very basics like food, housing, medical care, and heating? Not hard   Housing Stability   In the last 12 months, was there a time when you were not able to pay the mortgage or rent on time? N   In the last 12 months, how many places have you lived? 1   In the last 12 months, was there a time when you did not have a steady place to sleep or slept in a shelter (including now)? N   Transportation Needs   In the past 12 months, has lack of transportation kept you from medical appointments or from getting medications? no   In the past 12 months, has lack of transportation kept you from meetings, work, or from getting things needed for daily living? No   Food Insecurity   Within the past 12 months, you worried that your food would run out before you got the money to buy more. Never true   Within the past 12 months, the food you bought just didn't last and you didn't have money to get more. Never true   Stress   Do you feel stress - tense, restless, nervous, or anxious, or unable to sleep at night because your mind is troubled all the time - these days? Not at all   Social Connections   In a typical week, how many times do you talk on the phone with family, friends, or neighbors? More than 3   How often do you get together with friends or relatives? Twice   How often do you attend Adventist or Protestant services? Never   Do you belong to any clubs or organizations such as Adventist groups, unions, fraternal or athletic groups, or school groups? No   How often do you attend meetings of the clubs or organizations you belong to? Never   Are you , , , , never , or living with a partner?    Intimate  Partner Violence   Within the last year, have you been afraid of your partner or ex-partner? No   Within the last year, have you been humiliated or emotionally abused in other ways by your partner or ex-partner? No   Within the last year, have you been kicked, hit, slapped, or otherwise physically hurt by your partner or ex-partner? No   Within the last year, have you been raped or forced to have any kind of sexual activity by your partner or ex-partner? No   Alcohol Use   Q1: How often do you have a drink containing alcohol? Never   Q2: How many drinks containing alcohol do you have on a typical day when you are drinking? None   Q3: How often do you have six or more drinks on one occasion? Never   Utilities   In the past 12 months has the electric, gas, oil, or water company threatened to shut off services in your home? No   Health Literacy   How often do you need to have someone help you when you read instructions, pamphlets, or other written material from your doctor or pharmacy? Never

## 2024-05-17 LAB
ANION GAP SERPL CALC-SCNC: 11 MMOL/L
BUN SERPL-MCNC: 22 MG/DL (ref 8–25)
CALCIUM SERPL-MCNC: 8.7 MG/DL (ref 8.5–10.4)
CHLORIDE SERPL-SCNC: 99 MMOL/L (ref 97–107)
CO2 SERPL-SCNC: 24 MMOL/L (ref 24–31)
CREAT SERPL-MCNC: 1 MG/DL (ref 0.4–1.6)
EGFRCR SERPLBLD CKD-EPI 2021: 75 ML/MIN/1.73M*2
ERYTHROCYTE [DISTWIDTH] IN BLOOD BY AUTOMATED COUNT: 13.3 % (ref 11.5–14.5)
GLUCOSE BLD MANUAL STRIP-MCNC: 198 MG/DL (ref 74–99)
GLUCOSE BLD MANUAL STRIP-MCNC: 238 MG/DL (ref 74–99)
GLUCOSE BLD MANUAL STRIP-MCNC: 269 MG/DL (ref 74–99)
GLUCOSE BLD MANUAL STRIP-MCNC: 333 MG/DL (ref 74–99)
GLUCOSE SERPL-MCNC: 232 MG/DL (ref 65–99)
HCT VFR BLD AUTO: 31.7 % (ref 41–52)
HGB BLD-MCNC: 11.1 G/DL (ref 13.5–17.5)
MCH RBC QN AUTO: 35.1 PG (ref 26–34)
MCHC RBC AUTO-ENTMCNC: 35 G/DL (ref 32–36)
MCV RBC AUTO: 100 FL (ref 80–100)
NRBC BLD-RTO: 0 /100 WBCS (ref 0–0)
PLATELET # BLD AUTO: 83 X10*3/UL (ref 150–450)
POTASSIUM SERPL-SCNC: 4.2 MMOL/L (ref 3.4–5.1)
RBC # BLD AUTO: 3.16 X10*6/UL (ref 4.5–5.9)
SODIUM SERPL-SCNC: 134 MMOL/L (ref 133–145)
WBC # BLD AUTO: 5.3 X10*3/UL (ref 4.4–11.3)

## 2024-05-17 PROCEDURE — 82565 ASSAY OF CREATININE: CPT | Mod: 91 | Performed by: NURSE PRACTITIONER

## 2024-05-17 PROCEDURE — 2500000006 HC RX 250 W HCPCS SELF ADMINISTERED DRUGS (ALT 637 FOR ALL PAYERS): Mod: MUE | Performed by: INTERNAL MEDICINE

## 2024-05-17 PROCEDURE — 97110 THERAPEUTIC EXERCISES: CPT | Mod: GO,CO

## 2024-05-17 PROCEDURE — 97110 THERAPEUTIC EXERCISES: CPT | Mod: GP,CQ

## 2024-05-17 PROCEDURE — 85027 COMPLETE CBC AUTOMATED: CPT | Performed by: NURSE PRACTITIONER

## 2024-05-17 PROCEDURE — C9113 INJ PANTOPRAZOLE SODIUM, VIA: HCPCS | Performed by: INTERNAL MEDICINE

## 2024-05-17 PROCEDURE — 2500000001 HC RX 250 WO HCPCS SELF ADMINISTERED DRUGS (ALT 637 FOR MEDICARE OP): Performed by: INTERNAL MEDICINE

## 2024-05-17 PROCEDURE — 2060000001 HC INTERMEDIATE ICU ROOM DAILY

## 2024-05-17 PROCEDURE — 2500000002 HC RX 250 W HCPCS SELF ADMINISTERED DRUGS (ALT 637 FOR MEDICARE OP, ALT 636 FOR OP/ED): Performed by: NURSE PRACTITIONER

## 2024-05-17 PROCEDURE — 2500000004 HC RX 250 GENERAL PHARMACY W/ HCPCS (ALT 636 FOR OP/ED): Performed by: INTERNAL MEDICINE

## 2024-05-17 PROCEDURE — 97530 THERAPEUTIC ACTIVITIES: CPT | Mod: GO,CO

## 2024-05-17 PROCEDURE — 82947 ASSAY GLUCOSE BLOOD QUANT: CPT | Mod: 91

## 2024-05-17 PROCEDURE — 36415 COLL VENOUS BLD VENIPUNCTURE: CPT | Performed by: NURSE PRACTITIONER

## 2024-05-17 PROCEDURE — 2500000005 HC RX 250 GENERAL PHARMACY W/O HCPCS: Performed by: NURSE PRACTITIONER

## 2024-05-17 PROCEDURE — 2500000004 HC RX 250 GENERAL PHARMACY W/ HCPCS (ALT 636 FOR OP/ED): Performed by: NURSE PRACTITIONER

## 2024-05-17 PROCEDURE — 97116 GAIT TRAINING THERAPY: CPT | Mod: GP,CQ

## 2024-05-17 RX ADMIN — PANTOPRAZOLE SODIUM 40 MG: 40 INJECTION, POWDER, FOR SOLUTION INTRAVENOUS at 08:50

## 2024-05-17 RX ADMIN — INSULIN LISPRO 2 UNITS: 100 INJECTION, SOLUTION INTRAVENOUS; SUBCUTANEOUS at 12:19

## 2024-05-17 RX ADMIN — AMLODIPINE BESYLATE 5 MG: 5 TABLET ORAL at 08:50

## 2024-05-17 RX ADMIN — INSULIN LISPRO 8 UNITS: 100 INJECTION, SOLUTION INTRAVENOUS; SUBCUTANEOUS at 16:53

## 2024-05-17 RX ADMIN — INSULIN GLARGINE AND LIXISENATIDE 10 UNITS: 100; 33 INJECTION, SOLUTION SUBCUTANEOUS at 21:11

## 2024-05-17 RX ADMIN — ASPIRIN 81 MG CHEWABLE TABLET 81 MG: 81 TABLET CHEWABLE at 08:49

## 2024-05-17 RX ADMIN — MEROPENEM 1 G: 1 INJECTION, POWDER, FOR SOLUTION INTRAVENOUS at 14:53

## 2024-05-17 RX ADMIN — INSULIN LISPRO 4 UNITS: 100 INJECTION, SOLUTION INTRAVENOUS; SUBCUTANEOUS at 08:49

## 2024-05-17 RX ADMIN — LEVETIRACETAM 250 MG: 250 TABLET, FILM COATED ORAL at 21:04

## 2024-05-17 RX ADMIN — FUROSEMIDE 20 MG: 10 INJECTION, SOLUTION INTRAMUSCULAR; INTRAVENOUS at 22:03

## 2024-05-17 RX ADMIN — FINASTERIDE 5 MG: 5 TABLET, FILM COATED ORAL at 08:50

## 2024-05-17 RX ADMIN — GABAPENTIN 100 MG: 100 CAPSULE ORAL at 08:50

## 2024-05-17 RX ADMIN — HEPARIN SODIUM 5000 UNITS: 5000 INJECTION, SOLUTION INTRAVENOUS; SUBCUTANEOUS at 08:50

## 2024-05-17 RX ADMIN — MEROPENEM 1 G: 1 INJECTION, POWDER, FOR SOLUTION INTRAVENOUS at 21:04

## 2024-05-17 RX ADMIN — TAMSULOSIN HYDROCHLORIDE 0.4 MG: 0.4 CAPSULE ORAL at 08:49

## 2024-05-17 RX ADMIN — ATORVASTATIN CALCIUM 40 MG: 40 TABLET, FILM COATED ORAL at 21:04

## 2024-05-17 RX ADMIN — MEROPENEM 1 G: 1 INJECTION, POWDER, FOR SOLUTION INTRAVENOUS at 05:34

## 2024-05-17 RX ADMIN — HEPARIN SODIUM 5000 UNITS: 5000 INJECTION, SOLUTION INTRAVENOUS; SUBCUTANEOUS at 21:00

## 2024-05-17 RX ADMIN — FUROSEMIDE 20 MG: 10 INJECTION, SOLUTION INTRAMUSCULAR; INTRAVENOUS at 11:30

## 2024-05-17 RX ADMIN — LEVETIRACETAM 250 MG: 250 TABLET, FILM COATED ORAL at 08:50

## 2024-05-17 ASSESSMENT — COGNITIVE AND FUNCTIONAL STATUS - GENERAL
WALKING IN HOSPITAL ROOM: A LITTLE
MOBILITY SCORE: 20
TOILETING: A LITTLE
MOVING TO AND FROM BED TO CHAIR: A LITTLE
DRESSING REGULAR LOWER BODY CLOTHING: A LITTLE
MOVING TO AND FROM BED TO CHAIR: A LITTLE
CLIMB 3 TO 5 STEPS WITH RAILING: A LITTLE
CLIMB 3 TO 5 STEPS WITH RAILING: A LITTLE
DRESSING REGULAR LOWER BODY CLOTHING: A LITTLE
STANDING UP FROM CHAIR USING ARMS: A LITTLE
TOILETING: A LITTLE
DAILY ACTIVITIY SCORE: 22
MOBILITY SCORE: 20
DAILY ACTIVITIY SCORE: 22
STANDING UP FROM CHAIR USING ARMS: A LITTLE
WALKING IN HOSPITAL ROOM: A LITTLE

## 2024-05-17 ASSESSMENT — PAIN - FUNCTIONAL ASSESSMENT
PAIN_FUNCTIONAL_ASSESSMENT: 0-10
PAIN_FUNCTIONAL_ASSESSMENT: 0-10

## 2024-05-17 ASSESSMENT — PAIN SCALES - GENERAL
PAINLEVEL_OUTOF10: 0 - NO PAIN

## 2024-05-17 NOTE — PROGRESS NOTES
Await ID's plan for abx.      05/17/24 3951   Discharge Planning   Patient expects to be discharged to: Home with Residence [facility]

## 2024-05-17 NOTE — PROGRESS NOTES
Physical Therapy    Physical Therapy Treatment    Patient Name: Luis Greene  MRN: [id number]  Today's Date: 5/17/2024  Time Calculation  Start Time: 1024  Stop Time: 1047  Time Calculation (min): 23 min    Assessment/Plan   PT Assessment  End of Session Communication: Bedside nurse  End of Session Patient Position: Up in chair, Alarm off, not on at start of session  PT Plan  Inpatient/Swing Bed or Outpatient: Inpatient  PT Plan  Treatment/Interventions: Bed mobility, Transfer training, Gait training, Stair training, Balance training, Strengthening, Endurance training  PT Plan: Skilled PT  PT Frequency: Other (Comment) (0)  PT Discharge Recommendations: No further acute PT  Equipment Recommended upon Discharge: Wheeled walker  PT Recommended Transfer Status: Assist x2  PT - OK to Discharge: Yes      General Visit Information:   PT  Visit  PT Received On: 05/17/24  General  Family/Caregiver Present: Yes  Caregiver Feedback: Multiple family members present at initial start of tx.  Prior to Session Communication: Bedside nurse  Patient Position Received: Up in chair, Alarm off, caregiver present  General Comment: Cleared by nursing to be seen for therapy, pt agreeable with tx, seated in chair upon arrival.    Subjective   Precautions:  Precautions  Braces Applied: B AFO's and shoes.       Objective   Pain:  Pain Assessment  Pain Assessment: 0-10  Pain Score: 0 - No pain     Postural Control:  Static Sitting Balance  Static Sitting-Balance Support: Feet supported  Static Sitting-Level of Assistance: Independent  Static Standing Balance  Static Standing-Balance Support: Bilateral upper extremity supported  Static Standing-Level of Assistance: Distant supervision     Treatments:  Therapeutic Exercise  Therapeutic Exercise Performed: Yes  Therapeutic Exercise Activity 1: Bilateral ankle pumps x15  Therapeutic Exercise Activity 2: Bilateral hip flexion x15  Therapeutic Exercise Activity 3: Bilateral knee extension  x15  Therapeutic Exercise Activity 4: Sit to stand x10    Bed Mobility  Bed Mobility: No    Ambulation/Gait Training  Ambulation/Gait Training Performed: Yes  Ambulation/Gait Training 1  Surface 1: Level tile  Device 1: Rollator  Assistance 1: Distant supervision  Comments/Distance (ft) 1: 150' with rollator, bilateral AFO's, good jose david speed, no LOB noted.    Transfers  Transfer: Yes  Transfer 1  Transfer From 1: Sit to  Transfer to 1: Stand  Technique 1: Sit to stand, Stand to sit  Transfer Level of Assistance 1: Distant supervision    Outcome Measures:  [facility] Basic Mobility  Turning from your back to your side while in a flat bed without using bedrails: None  Moving from lying on your back to sitting on the side of a flat bed without using bedrails: None  Moving to and from bed to chair (including a wheelchair): A little  Standing up from a chair using your arms (e.g. wheelchair or bedside chair): A little  To walk in hospital room: A little  Climbing 3-5 steps with railing: A little  Basic Mobility - Total Score: 20    Encounter Problems       Encounter Problems (Resolved)       Mobility       STG - Patient will ambulate 60' x 1 using rolling walker wearing B AFO's with SBA (Met)       Start:  05/16/24    Expected End:  05/30/24    Resolved:  05/17/24         STG - Patient will negotiate 4 stairs using 1 railing with CGA (Met)       Start:  05/16/24    Expected End:  05/30/24    Resolved:  05/17/24            PT Transfers       STG - Patient to transfer to and from sit to supine with SUPERVISION (Met)       Start:  05/16/24    Expected End:  05/30/24    Resolved:  05/17/24         STG - Patient will transfer sit to and from stand with SBA (Met)       Start:  05/16/24    Expected End:  05/30/24    Resolved:  05/17/24

## 2024-05-17 NOTE — PROGRESS NOTES
"Luis Greene is a 84 y.o. male on day 2 of admission presenting with UTI (urinary tract infection).    Subjective   HPI   Patient seen and examined, feels better. Able to walk with PT. Had a bm last night   Patient denies any chest pain, shortness of breath in room air.  Patient tolerates well her diet with no nausea vomiting or abdominal pain.  No fever or chills.  No headache or dizziness.      Objective     Last Recorded Vitals  Blood pressure 129/60, pulse 52, temperature 36.7 °C (98.1 °F), temperature source Oral, resp. rate 20, height 1.803 m (5' 11\"), weight 77.6 kg (171 lb 1.2 oz), SpO2 98%.      Intake/Output Summary (Last 24 hours) at 5/17/2024 1112  Last data filed at 5/17/2024 0700  Gross per 24 hour   Intake 200 ml   Output 800 ml   Net -600 ml         Physical Exam   General: alert, no diaphoresis   HENT: mucous membranes moist, external ears normal, no rhinorrhea   Eyes: no icterus or injection, no discharge   Lungs: CTA BL   Heart: RRR, no murmurs, no LE edema BL   GI: abdomen soft, nontender, nondistended, BS present   MSK: no joint effusion or deformity   Skin: no rashes, erythema, or ecchymosis   Neuro: grossly normal cognition, motor strength, sensation   Relevant Results    Lab Results   Component Value Date    WBC 5.3 05/17/2024    HGB 11.1 (L) 05/17/2024    HCT 31.7 (L) 05/17/2024     05/17/2024    PLT 83 (L) 05/17/2024       Lab Results   Component Value Date    GLUCOSE 232 (H) 05/17/2024    CALCIUM 8.7 05/17/2024     05/17/2024    K 4.2 05/17/2024    CO2 24 05/17/2024    CL 99 05/17/2024    BUN 22 05/17/2024    CREATININE 1.00 05/17/2024       Lab Results   Component Value Date    HGBA1C 9.6 (H) 05/03/2024       ECG 12 lead    Result Date: 5/16/2024  Undetermined rhythm Right bundle branch block Abnormal ECG When compared with ECG of 14-FEB-2024 16:19, Current undetermined rhythm precludes rhythm comparison, needs review    CT chest wo IV contrast    Result Date: " 5/16/2024  Interpreted By:  Jenise Anderson, STUDY: CT CHEST WO IV CONTRAST;  5/16/2024 1:35 am   INDICATION: Signs/Symptoms:pneumonia   COMPARISON: Chest x-ray 05/15/2024. CT watch min 03/12/2024.   ACCESSION NUMBER(S): [id number]   ORDERING CLINICIAN: SELVIN RICHARDSON   TECHNIQUE: Axial CT images were obtained through the chest with no intravenous contrast administration.  Coronal and sagittal reformats were performed.   FINDINGS: There is motion artifact.   HEART AND VESSELS: No thoracic aortic aneurysm. Atherosclerotic calcifications of the thoracic aorta.   The heart is not enlarged.  Status post open heart surgery. Occluder noted at the left atrial appendage. No evidence of pericardial effusion.   MEDIASTINUM AND YEFRI, LOWER NECK AND AXILLA: The visualized thyroid gland is small size.   Suboptimal evaluation for adenopathy in the absence of intravenous contrast. There is mild mediastinal adenopathy. A subcarinal lymph node measures 1.2 cm in short axis.   LUNGS AND AIRWAYS: The trachea and central airways are patent.   The evaluation of the lungs is degraded by motion artifact. There is interstitial edema. There are small bilateral pleural effusions with airspace opacities at the bilateral lower lobes. No pneumothorax.   UPPER ABDOMEN: The visualized subdiaphragmatic structures demonstrate no acute findings.   CHEST WALL AND OSSEOUS STRUCTURES: A cardiac monitoring device is noted at the anterior left chest wall. There is mild diffuse soft tissue edema at the chest wall and visualized abdominal wall. Multilevel degenerative changes of the spine.       1. Study degraded by motion artifact. Interstitial edema. Small bilateral pleural effusions with airspace opacities at the bilateral lower lobes, suggestive of atelectasis, superimposed pneumonia is not excludable. 2. Mild mediastinal adenopathy. 3. Mild diffuse soft tissue edema at the chest wall and visualized abdominal wall.         MACRO:   None.   Signed by:  Jenise Anderson 5/16/2024 2:09 AM Dictation workstation:   [id number]    XR chest 1 view    Result Date: 5/15/2024  Interpreted By:  King Velasquez, STUDY: XR CHEST 1 VIEW;  5/15/2024 10:17 pm   INDICATION: Signs/Symptoms:sob.   COMPARISON: 2/14/2024   ACCESSION NUMBER(S): [id number]   ORDERING CLINICIAN: SELVIN CURTIS   FINDINGS: Postsurgical change with sternotomy wires. The cardiac silhouette is normal in size. There is slightly limited inspiratory lung volumes. No focal airspace consolidation or pleural effusion. No pneumothorax.       Limited inspiratory lung volumes without focal airspace consolidation.       MACRO: None   Signed by: King Velasquez 5/15/2024 10:59 PM Dictation workstation:   [id number]    Scheduled medications  amLODIPine, 5 mg, oral, Daily  aspirin, 81 mg, oral, Daily  atorvastatin, 40 mg, oral, Nightly  finasteride, 5 mg, oral, Daily  furosemide, 20 mg, intravenous, q12h  gabapentin, 100 mg, oral, Daily  heparin, 5,000 Units, subcutaneous, BID  insulin glargine-lixisenatide, 10 Units, subcutaneous, Nightly  insulin lispro, 0-10 Units, subcutaneous, TID  levETIRAcetam, 250 mg, oral, BID  meropenem, 1 g, intravenous, q8h  pantoprazole, 40 mg, intravenous, Daily  tamsulosin, 0.4 mg, oral, Daily      Continuous medications     PRN medications  PRN medications: dextrose, dextrose, glucagon, glucagon, ondansetron    Assessment/Plan   Abnormal US and frequent UTI  Pt denies any urine retention or dysuria   Pt 's urology retired.   Consult urology     UTI (urinary tract infection)  Fever yesterday    cover for ES BL with meropenem  follow infectious disease.    urine culture pending Blood cultures been drawn.    Uncontrolled DM type II:   Continue with home meds   A1C 9.3 on 5/3/2024    HTN:   Continue with home meds   Monitor Bp    BPH:  Hx of urine retention  Continue with home meds     Weakness/deconditioning   Fall precautions  PT/OT    DVT prophylaxis:       Discharge plan:  Anticipate  discharging patient home with home health care vs skilled of nursing when medically clear by consults     I spent 35 minutes in the professional and overall care of this patient.    Stefany Rodney, APRN-CNP

## 2024-05-17 NOTE — PROGRESS NOTES
"Luis Greene is a 84 y.o. male on day 2 of admission presenting with UTI (urinary tract infection).    Subjective   Interval History: feeling great today. Up walking around the halls       Objective   Physical Exam    Range of Vitals (last 24 hours)  /66 (BP Location: Left arm, Patient Position: Lying)   Pulse 52   Temp 36.9 °C (98.4 °F) (Oral)   Resp 18   Ht 1.803 m (5' 11\")   Wt 77.6 kg (171 lb 1.2 oz)   SpO2 96%   BMI 23.86 kg/m²   Daily Weight  05/17/24 : 77.6 kg (171 lb 1.2 oz)    Body mass index is 23.86 kg/m².    General: AAOx3, NAD, nontoxic appearing  Up walking around the halls      Current Facility-Administered Medications:     amLODIPine (Norvasc) tablet 5 mg, 5 mg, oral, Daily, Raul Hatfield MD, 5 mg at 05/17/24 0850    aspirin chewable tablet 81 mg, 81 mg, oral, Daily, Raul Hatfield MD, 81 mg at 05/17/24 0849    atorvastatin (Lipitor) tablet 40 mg, 40 mg, oral, Nightly, Raul Hatfield MD, 40 mg at 05/16/24 2157    dextrose 50 % injection 12.5 g, 12.5 g, intravenous, q15 min PRN, Raul Hatfield MD    dextrose 50 % injection 25 g, 25 g, intravenous, q15 min PRN, Raul Hatfield MD    finasteride (Proscar) tablet 5 mg, 5 mg, oral, Daily, Raul Hatfield MD, 5 mg at 05/17/24 0850    furosemide (Lasix) injection 20 mg, 20 mg, intravenous, q12h, Stefany Rodney, APRN-CNP, 20 mg at 05/17/24 1130    gabapentin (Neurontin) capsule 100 mg, 100 mg, oral, Daily, Raul Hatfield MD, 100 mg at 05/17/24 0850    glucagon (Glucagen) injection 1 mg, 1 mg, intramuscular, q15 min PRN, Raul Hatfield MD    glucagon (Glucagen) injection 1 mg, 1 mg, intramuscular, q15 min PRN, Raul Hatfield MD    heparin (porcine) injection 5,000 Units, 5,000 Units, subcutaneous, BID, Raul Hatfield MD, 5,000 Units at 05/17/24 0850    insulin glargine-lixisenatide 100 unit-33 mcg/mL insulin pen 10 Units, 10 Units, subcutaneous, Nightly, JONA Solo-CNP, 10 Units at 05/16/24 2200    insulin lispro (HumaLOG) injection 0-10 Units, " "0-10 Units, subcutaneous, TID, Stefany Meganm, APRN-CNP, 2 Units at 05/17/24 1219    levETIRAcetam (Keppra) tablet 250 mg, 250 mg, oral, BID, Selvin Hatfield MD, 250 mg at 05/17/24 0850    meropenem (Merrem) in sodium chloride 0.9 % 100 mL IV 1 g, 1 g, intravenous, q8h, Selvin Hatfield MD, Stopped at 05/17/24 0604    ondansetron (Zofran) injection 4 mg, 4 mg, intravenous, q4h PRN, Selvin Hatfield MD    pantoprazole (ProtoNix) injection 40 mg, 40 mg, intravenous, Daily, Selvin Hatfield MD, 40 mg at 05/17/24 0850    tamsulosin (Flomax) 24 hr capsule 0.4 mg, 0.4 mg, oral, Daily, Selvin Hatfield MD, 0.4 mg at 05/17/24 0849    Relevant Results  Labs:  Lab Results   Component Value Date    WBC 5.3 05/17/2024    HGB 11.1 (L) 05/17/2024    HCT 31.7 (L) 05/17/2024     05/17/2024    PLT 83 (L) 05/17/2024     Lab Results   Component Value Date    GLUCOSE 232 (H) 05/17/2024    CALCIUM 8.7 05/17/2024     05/17/2024    K 4.2 05/17/2024    CO2 24 05/17/2024    CL 99 05/17/2024    BUN 22 05/17/2024    CREATININE 1.00 05/17/2024     Results from last 72 hours   Lab Units 05/16/24  0551   ALK PHOS U/L 81   BILIRUBIN TOTAL mg/dL 1.2   PROTEIN TOTAL g/dL 6.3   ALT U/L 19   AST U/L 24   ALBUMIN g/dL 3.6     Estimated Creatinine Clearance: 59.6 mL/min (by C-G formula based on SCr of 1 mg/dL).  No results found for: \"CRP\"    Micro:  Susceptibility data from last 7 days.  Collected Specimen Info Organism   05/15/24 Urine from Clean Catch/Voided Enteric bacilli       Imaging:  US renal complete    Result Date: 5/16/2024  Interpreted By:  Jenise Anderson, STUDY: US RENAL COMPLETE;  5/16/2024 9:35 pm   INDICATION: Signs/Symptoms:uti.   COMPARISON: CT abdomen and pelvis 12/20/2023   ACCESSION NUMBER(S): [id number]   ORDERING CLINICIAN: SELVIN HATFIELD   TECHNIQUE: Multiple images of the kidneys were obtained  .   FINDINGS: RIGHT KIDNEY: The right kidney measures 10.8 cm in length. Echogenicity within normal limits. No hydronephrosis. The " previously described right renal cyst on prior CT is not visualized on this exam.   LEFT KIDNEY: The left kidney measures 11.1 cm in length. Echogenicity within normal limits. No hydronephrosis.   BLADDER: Debris noted at the urinary bladder.   There is a nodular contour of the prostate indenting the base of the urinary bladder.       1. No hydronephrosis. 2. Debris at the urinary bladder. Please correlate with urinalysis. 3. Nodular contour of the prostate indenting the base of the urinary bladder. Please correlate with PSA level.           MACRO: None     Signed by: Jenise Anderson 5/16/2024 11:25 PM Dictation workstation:   [id number]    ECG 12 lead    Result Date: 5/16/2024  Atrial fibrillation Right bundle branch block Abnormal ECG When compared with ECG of 14-FEB-2024 16:19, No significant change was found Confirmed by Duc Broussard ([id number]) on 5/16/2024 12:10:44 PM    CT chest wo IV contrast    Result Date: 5/16/2024  Interpreted By:  Jenise Anderson, STUDY: CT CHEST WO IV CONTRAST;  5/16/2024 1:35 am   INDICATION: Signs/Symptoms:pneumonia   COMPARISON: Chest x-ray 05/15/2024. CT watch min 03/12/2024.   ACCESSION NUMBER(S): [id number]   ORDERING CLINICIAN: SELVIN RICHARDSON   TECHNIQUE: Axial CT images were obtained through the chest with no intravenous contrast administration.  Coronal and sagittal reformats were performed.   FINDINGS: There is motion artifact.   HEART AND VESSELS: No thoracic aortic aneurysm. Atherosclerotic calcifications of the thoracic aorta.   The heart is not enlarged.  Status post open heart surgery. Occluder noted at the left atrial appendage. No evidence of pericardial effusion.   MEDIASTINUM AND YEFRI, LOWER NECK AND AXILLA: The visualized thyroid gland is small size.   Suboptimal evaluation for adenopathy in the absence of intravenous contrast. There is mild mediastinal adenopathy. A subcarinal lymph node measures 1.2 cm in short axis.   LUNGS AND AIRWAYS: The trachea and central  airways are patent.   The evaluation of the lungs is degraded by motion artifact. There is interstitial edema. There are small bilateral pleural effusions with airspace opacities at the bilateral lower lobes. No pneumothorax.   UPPER ABDOMEN: The visualized subdiaphragmatic structures demonstrate no acute findings.   CHEST WALL AND OSSEOUS STRUCTURES: A cardiac monitoring device is noted at the anterior left chest wall. There is mild diffuse soft tissue edema at the chest wall and visualized abdominal wall. Multilevel degenerative changes of the spine.       1. Study degraded by motion artifact. Interstitial edema. Small bilateral pleural effusions with airspace opacities at the bilateral lower lobes, suggestive of atelectasis, superimposed pneumonia is not excludable. 2. Mild mediastinal adenopathy. 3. Mild diffuse soft tissue edema at the chest wall and visualized abdominal wall.         MACRO:   None.   Signed by: Jenise Anderson 5/16/2024 2:09 AM Dictation workstation:   [id number]    XR chest 1 view    Result Date: 5/15/2024  Interpreted By:  King Velasquez, STUDY: XR CHEST 1 VIEW;  5/15/2024 10:17 pm   INDICATION: Signs/Symptoms:sob.   COMPARISON: 2/14/2024   ACCESSION NUMBER(S): [id number]   ORDERING CLINICIAN: SELVIN CURTIS   FINDINGS: Postsurgical change with sternotomy wires. The cardiac silhouette is normal in size. There is slightly limited inspiratory lung volumes. No focal airspace consolidation or pleural effusion. No pneumothorax.       Limited inspiratory lung volumes without focal airspace consolidation.       MACRO: None   Signed by: King Velasquez 5/15/2024 10:59 PM Dictation workstation:   [id number]    No results found for this or any previous visit from the past 1095 days.  Assessment:    Fever, possibly complicated UTI, rule out bacteremia, currently blood cx NGTD  Urinary retention     Plan:  -continue meropenem  -follow up urine cx--currently enteric bacilli  -bladder scan to see if  retaining urine --> if so, recommend straight cath/Urology eval  -follow up blood cultures--currently NGTD  -anticipate discharge on PO abx over the weekend--fosfomycin (?) if ESBL organism  -will follow peripherally over the weekend    Discussed with patient's wife    Paty Bailey, DO  [facility] [place]  #[phone]

## 2024-05-17 NOTE — CONSULTS
Reason For Consult  UTI, bladder ultrasound    History Of Present Illness  Luis Greene is a 84 y.o. male presenting with fever.  He has a history of BPH and urinary retention.  He has been treated with Urolift and is on tamsulosin and finasteride.  He is following with [facility] urology.  He does retain urine.       Past Medical History  He has a past medical history of A-fib (Multi), CHF (congestive heart failure) (Multi), Cognitive communication deficit, Coronary artery disease, Diabetes (Multi), Hyperlipidemia, Iron deficiency anemia, Muscle weakness (generalized), Osteoarthritis, Personal history of other diseases of the circulatory system, Personal history of other endocrine, nutritional and metabolic disease, Unspecified convulsions (Multi), Urinary retention, and Urinary tract infection.    Surgical History  He has a past surgical history that includes Other surgical history (03/11/2019); Other surgical history (03/11/2019); Other surgical history (03/11/2019); MR angio head wo IV contrast (2/17/2019); MR angio head wo IV contrast (10/28/2022); MR angio head wo IV contrast (8/15/2023); and Cardiac catheterization (N/A, 11/10/2023).     Social History  He reports that he has never smoked. He has never used smokeless tobacco. He reports that he does not drink alcohol and does not use drugs.    Family History  Family History   Problem Relation Name Age of Onset    Other (cardiac disorder) Father      Diabetes Father      Hyperlipidemia Father      Hypertension Father      Other (liver carcinoma) Father      Other (hypercholesterolemia) Father      Hypertension Daughter          Allergies  Isosorbide, Famotidine, Prednisone, Amoxicillin, Donepezil, Gabapentin, Hydrochlorothiazide, Ibuprofen, Oxycodone-acetaminophen, Tramadol hcl, and Oxycodone hcl    Review of Systems  Recent fever and confusion     Physical Exam  Alert, oriented  Normal resp effort  Abdomen soft, nontender  No suprapubic tenderness    "  Last Recorded Vitals  Blood pressure 132/53, pulse 52, temperature 36.7 °C (98.1 °F), temperature source Oral, resp. rate 18, height 1.803 m (5' 11\"), weight 77.6 kg (171 lb 1.2 oz), SpO2 96%.    Relevant Results      Scheduled medications  amLODIPine, 5 mg, oral, Daily  aspirin, 81 mg, oral, Daily  atorvastatin, 40 mg, oral, Nightly  finasteride, 5 mg, oral, Daily  furosemide, 20 mg, intravenous, q12h  gabapentin, 100 mg, oral, Daily  heparin, 5,000 Units, subcutaneous, BID  insulin glargine-lixisenatide, 10 Units, subcutaneous, Nightly  insulin lispro, 0-10 Units, subcutaneous, TID  levETIRAcetam, 250 mg, oral, BID  meropenem, 1 g, intravenous, q8h  pantoprazole, 40 mg, intravenous, Daily  tamsulosin, 0.4 mg, oral, Daily      Continuous medications     PRN medications  PRN medications: dextrose, dextrose, glucagon, glucagon, ondansetron  Results for orders placed or performed during the hospital encounter of 05/15/24 (from the past 24 hour(s))   POCT GLUCOSE   Result Value Ref Range    POCT Glucose 403 (H) 74 - 99 mg/dL   POCT GLUCOSE   Result Value Ref Range    POCT Glucose 238 (H) 74 - 99 mg/dL   Basic Metabolic Panel   Result Value Ref Range    Glucose 232 (H) 65 - 99 mg/dL    Sodium 134 133 - 145 mmol/L    Potassium 4.2 3.4 - 5.1 mmol/L    Chloride 99 97 - 107 mmol/L    Bicarbonate 24 24 - 31 mmol/L    Urea Nitrogen 22 8 - 25 mg/dL    Creatinine 1.00 0.40 - 1.60 mg/dL    eGFR 75 >60 mL/min/1.73m*2    Calcium 8.7 8.5 - 10.4 mg/dL    Anion Gap 11 <=19 mmol/L   CBC   Result Value Ref Range    WBC 5.3 4.4 - 11.3 x10*3/uL    nRBC 0.0 0.0 - 0.0 /100 WBCs    RBC 3.16 (L) 4.50 - 5.90 x10*6/uL    Hemoglobin 11.1 (L) 13.5 - 17.5 g/dL    Hematocrit 31.7 (L) 41.0 - 52.0 %     80 - 100 fL    MCH 35.1 (H) 26.0 - 34.0 pg    MCHC 35.0 32.0 - 36.0 g/dL    RDW 13.3 11.5 - 14.5 %    Platelets 83 (L) 150 - 450 x10*3/uL   POCT GLUCOSE   Result Value Ref Range    POCT Glucose 198 (H) 74 - 99 mg/dL   POCT GLUCOSE   Result " Value Ref Range    POCT Glucose 333 (H) 74 - 99 mg/dL        Assessment/Plan     84 year old has recurrent UTIs.  He has chronic retention but is voiding.  He ha no hydronephrosis on renal ultrasound.  He had sonme debris in the bladder consistent with his UTI.  He retention is likely contributing to the recurrent UTIs.  Cystoscopy recommended. He states he is scheduled for follow up with [facility] urology.  Monitor residual..  Hold off on catheter at this time.    Lewis Chavez MD

## 2024-05-17 NOTE — PROGRESS NOTES
Occupational Therapy    OT Treatment    Patient Name: Luis Greene  MRN: [id number]  Today's Date: 5/17/2024  Time Calculation  Start Time: 1025  Stop Time: 1048  Time Calculation (min): 23 min         Assessment:  OT Assessment: Tolerated session well, demonstrating improved functional tolerance throughout tx with minimal assist required. Pt is currently functioning at baseline-will discontinue OT orders at this time  End of Session Communication: Bedside nurse  End of Session Patient Position: Up in chair, Alarm off, caregiver present     Plan:  Treatment Interventions: ADL retraining, Functional transfer training, UE strengthening/ROM, Endurance training, Cognitive reorientation, Patient/family training, Equipment evaluation/education  OT Frequency: 3 times per week  OT Discharge Recommendations: Low intensity level of continued care  Equipment Recommended upon Discharge: Wheeled walker  OT - OK to Discharge: Yes  Treatment Interventions: ADL retraining, Functional transfer training, UE strengthening/ROM, Endurance training, Cognitive reorientation, Patient/family training, Equipment evaluation/education    Subjective   Previous Visit Info:  OT Last Visit  OT Received On: 05/17/24  General:  General  Family/Caregiver Present: Yes  Caregiver Feedback: two family members present  Prior to Session Communication: Bedside nurse  Patient Position Received: Up in chair, Alarm off, caregiver present  General Comment: Cleared for therapy per RN. Pt seated in chair upon arrival and agreeable to tx  Precautions:  Hearing/Visual Limitations: wears reading glasses; mild [place]  Medical Precautions: Fall precautions  Braces Applied: B AFO's and shoes.    Pain:  Pain Assessment  Pain Assessment: 0-10  Pain Score: 0 - No pain    Objective    Cognition:  Cognition  Overall Cognitive Status: Within Functional Limits    Activities of Daily Living:    LE Dressing  LE Dressing: Yes  Shoe Level of Assistance: Setup, Minimum  assistance  Orthotics Level of Assistance: Setup  LE Dressing Where Assessed: Chair  LE Dressing Comments: assist to don heel in shoes with pt able to strap and don B AFOs with increased time for task completion    Bed Mobility/Transfers:    Transfers  Transfer: Yes  Transfer 1  Technique 1: Sit to stand, Stand to sit  Transfer Device 1: Walker  Transfer Level of Assistance 1: Close supervision  Trials/Comments 1: cues for proper hand placement and eccentric lowering to chair    Toilet Transfers  Toilet Transfer Type: To and from  Toilet Transfer to: Standard toilet  Toilet Transfers: Modified independence  Toilet Transfers Comments: demonstrating good transfer technique with use of grab bar for UE support    Functional Mobility:  Functional Mobility  Functional Mobility Performed: Yes  Functional Mobility 1  Device 1: Rollator  Assistance 1: Distant supervision  Comments 1: functional mobility a community distance with rollator and distant S demonstrating fair+ balance throughout.    Standing Balance:  Dynamic Standing Balance  Dynamic Standing-Balance: Reaching for objects, Forward lean, Reaching across midline  Dynamic Standing-Comments: fair balance during functional mobility task on stairs    Therapy/Activity: Therapeutic Exercise  Therapeutic Exercise Performed: Yes  Therapeutic Exercise Activity 1: participated in AROM BUE exercises 3x10 in all planes to improve strength and functional tolerance to increase independence with transfer tasks with cues provided for proper muscle recruitement    Outcome Measures:[facility] Daily Activity  Putting on and taking off regular lower body clothing: A little  Bathing (including washing, rinsing, drying): None  Putting on and taking off regular upper body clothing: None  Toileting, which includes using toilet, bedpan or urinal: A little  Taking care of personal grooming such as brushing teeth: None  Eating Meals: None  Daily Activity - Total Score: 22      Education  Documentation  ADL Training, taught by JF Ta at 5/17/2024 12:16 PM.  Learner: Patient  Readiness: Acceptance  Method: Explanation  Response: Verbalizes Understanding, Demonstrated Understanding    Education Comments  No comments found.        Problem: OT Goals  Goal: ADLs   Description: Pt will complete ADL tasks with Mod I, using AE as needed, in order to complete self-care tasks.    Outcome: Met  Goal: Functional Transfers  Description: Pt will perform functional transfers at mod ind with RW    Outcome: Met  Goal: Functional mobility  Description: Pt will perform functional mobility household distance at mod ind level with RW    Outcome: Met  Goal: Therapeutic exercise  Description: Pt will perform upper body therapeutic exercises all joints/planes of motion with distant S to increase functional use of UEs    Outcome: Met

## 2024-05-18 LAB
ANION GAP SERPL CALC-SCNC: 13 MMOL/L
BACTERIA UR CULT: ABNORMAL
BUN SERPL-MCNC: 27 MG/DL (ref 8–25)
CALCIUM SERPL-MCNC: 8.6 MG/DL (ref 8.5–10.4)
CHLORIDE SERPL-SCNC: 98 MMOL/L (ref 97–107)
CO2 SERPL-SCNC: 24 MMOL/L (ref 24–31)
CREAT SERPL-MCNC: 1 MG/DL (ref 0.4–1.6)
EGFRCR SERPLBLD CKD-EPI 2021: 75 ML/MIN/1.73M*2
GLUCOSE BLD MANUAL STRIP-MCNC: 180 MG/DL (ref 74–99)
GLUCOSE BLD MANUAL STRIP-MCNC: 246 MG/DL (ref 74–99)
GLUCOSE BLD MANUAL STRIP-MCNC: 251 MG/DL (ref 74–99)
GLUCOSE BLD MANUAL STRIP-MCNC: 280 MG/DL (ref 74–99)
GLUCOSE SERPL-MCNC: 200 MG/DL (ref 65–99)
HOLD SPECIMEN: NORMAL
POTASSIUM SERPL-SCNC: 3.4 MMOL/L (ref 3.4–5.1)
SODIUM SERPL-SCNC: 135 MMOL/L (ref 133–145)

## 2024-05-18 PROCEDURE — 36415 COLL VENOUS BLD VENIPUNCTURE: CPT | Performed by: NURSE PRACTITIONER

## 2024-05-18 PROCEDURE — C9113 INJ PANTOPRAZOLE SODIUM, VIA: HCPCS | Performed by: INTERNAL MEDICINE

## 2024-05-18 PROCEDURE — 2060000001 HC INTERMEDIATE ICU ROOM DAILY

## 2024-05-18 PROCEDURE — 80048 BASIC METABOLIC PNL TOTAL CA: CPT | Performed by: NURSE PRACTITIONER

## 2024-05-18 PROCEDURE — 2500000004 HC RX 250 GENERAL PHARMACY W/ HCPCS (ALT 636 FOR OP/ED): Performed by: INTERNAL MEDICINE

## 2024-05-18 PROCEDURE — 2500000001 HC RX 250 WO HCPCS SELF ADMINISTERED DRUGS (ALT 637 FOR MEDICARE OP): Performed by: NURSE PRACTITIONER

## 2024-05-18 PROCEDURE — 87040 BLOOD CULTURE FOR BACTERIA: CPT | Mod: WESLAB | Performed by: NURSE PRACTITIONER

## 2024-05-18 PROCEDURE — 2500000001 HC RX 250 WO HCPCS SELF ADMINISTERED DRUGS (ALT 637 FOR MEDICARE OP): Performed by: INTERNAL MEDICINE

## 2024-05-18 PROCEDURE — 82947 ASSAY GLUCOSE BLOOD QUANT: CPT

## 2024-05-18 PROCEDURE — 2500000002 HC RX 250 W HCPCS SELF ADMINISTERED DRUGS (ALT 637 FOR MEDICARE OP, ALT 636 FOR OP/ED): Performed by: INTERNAL MEDICINE

## 2024-05-18 PROCEDURE — 2500000004 HC RX 250 GENERAL PHARMACY W/ HCPCS (ALT 636 FOR OP/ED): Performed by: NURSE PRACTITIONER

## 2024-05-18 PROCEDURE — 2500000006 HC RX 250 W HCPCS SELF ADMINISTERED DRUGS (ALT 637 FOR ALL PAYERS): Performed by: INTERNAL MEDICINE

## 2024-05-18 RX ORDER — DIPHENHYDRAMINE HCL 25 MG
25 TABLET ORAL EVERY 6 HOURS PRN
Status: DISCONTINUED | OUTPATIENT
Start: 2024-05-18 | End: 2024-05-21 | Stop reason: HOSPADM

## 2024-05-18 RX ORDER — INSULIN LISPRO 100 [IU]/ML
8 INJECTION, SOLUTION INTRAVENOUS; SUBCUTANEOUS ONCE
Status: COMPLETED | OUTPATIENT
Start: 2024-05-18 | End: 2024-05-18

## 2024-05-18 RX ORDER — DEXTROSE 50 % IN WATER (D50W) INTRAVENOUS SYRINGE
12.5
Status: DISCONTINUED | OUTPATIENT
Start: 2024-05-18 | End: 2024-05-21 | Stop reason: HOSPADM

## 2024-05-18 RX ORDER — FUROSEMIDE 10 MG/ML
20 INJECTION INTRAMUSCULAR; INTRAVENOUS EVERY 12 HOURS
Status: DISCONTINUED | OUTPATIENT
Start: 2024-05-19 | End: 2024-05-21

## 2024-05-18 RX ORDER — DEXTROSE 50 % IN WATER (D50W) INTRAVENOUS SYRINGE
25
Status: DISCONTINUED | OUTPATIENT
Start: 2024-05-18 | End: 2024-05-21 | Stop reason: HOSPADM

## 2024-05-18 RX ORDER — ACETAMINOPHEN 325 MG/1
650 TABLET ORAL EVERY 6 HOURS PRN
Status: DISCONTINUED | OUTPATIENT
Start: 2024-05-18 | End: 2024-05-21 | Stop reason: HOSPADM

## 2024-05-18 RX ADMIN — AMLODIPINE BESYLATE 5 MG: 5 TABLET ORAL at 08:55

## 2024-05-18 RX ADMIN — ACETAMINOPHEN 650 MG: 325 TABLET ORAL at 16:44

## 2024-05-18 RX ADMIN — LEVETIRACETAM 250 MG: 250 TABLET, FILM COATED ORAL at 21:13

## 2024-05-18 RX ADMIN — ATORVASTATIN CALCIUM 40 MG: 40 TABLET, FILM COATED ORAL at 21:13

## 2024-05-18 RX ADMIN — MEROPENEM 1 G: 1 INJECTION, POWDER, FOR SOLUTION INTRAVENOUS at 05:57

## 2024-05-18 RX ADMIN — FUROSEMIDE 20 MG: 10 INJECTION, SOLUTION INTRAMUSCULAR; INTRAVENOUS at 11:20

## 2024-05-18 RX ADMIN — INSULIN LISPRO 2 UNITS: 100 INJECTION, SOLUTION INTRAVENOUS; SUBCUTANEOUS at 08:55

## 2024-05-18 RX ADMIN — HEPARIN SODIUM 5000 UNITS: 5000 INJECTION, SOLUTION INTRAVENOUS; SUBCUTANEOUS at 21:13

## 2024-05-18 RX ADMIN — TAMSULOSIN HYDROCHLORIDE 0.4 MG: 0.4 CAPSULE ORAL at 08:54

## 2024-05-18 RX ADMIN — ASPIRIN 81 MG CHEWABLE TABLET 81 MG: 81 TABLET CHEWABLE at 08:54

## 2024-05-18 RX ADMIN — INSULIN LISPRO 6 UNITS: 100 INJECTION, SOLUTION INTRAVENOUS; SUBCUTANEOUS at 12:09

## 2024-05-18 RX ADMIN — HEPARIN SODIUM 5000 UNITS: 5000 INJECTION, SOLUTION INTRAVENOUS; SUBCUTANEOUS at 08:54

## 2024-05-18 RX ADMIN — INSULIN LISPRO 4 UNITS: 100 INJECTION, SOLUTION INTRAVENOUS; SUBCUTANEOUS at 16:50

## 2024-05-18 RX ADMIN — FINASTERIDE 5 MG: 5 TABLET, FILM COATED ORAL at 08:55

## 2024-05-18 RX ADMIN — INSULIN LISPRO 8 UNITS: 100 INJECTION, SOLUTION INTRAVENOUS; SUBCUTANEOUS at 22:12

## 2024-05-18 RX ADMIN — LEVETIRACETAM 250 MG: 250 TABLET, FILM COATED ORAL at 08:55

## 2024-05-18 RX ADMIN — MEROPENEM 1 G: 1 INJECTION, POWDER, FOR SOLUTION INTRAVENOUS at 21:14

## 2024-05-18 RX ADMIN — DIPHENHYDRAMINE HYDROCHLORIDE 25 MG: 25 TABLET ORAL at 16:39

## 2024-05-18 RX ADMIN — MEROPENEM 1 G: 1 INJECTION, POWDER, FOR SOLUTION INTRAVENOUS at 14:06

## 2024-05-18 RX ADMIN — PANTOPRAZOLE SODIUM 40 MG: 40 INJECTION, POWDER, FOR SOLUTION INTRAVENOUS at 08:54

## 2024-05-18 RX ADMIN — GABAPENTIN 100 MG: 100 CAPSULE ORAL at 08:54

## 2024-05-18 ASSESSMENT — COGNITIVE AND FUNCTIONAL STATUS - GENERAL
DRESSING REGULAR LOWER BODY CLOTHING: A LITTLE
MOBILITY SCORE: 20
TOILETING: A LITTLE
MOVING TO AND FROM BED TO CHAIR: A LITTLE
DAILY ACTIVITIY SCORE: 22
DRESSING REGULAR LOWER BODY CLOTHING: A LITTLE
WALKING IN HOSPITAL ROOM: A LITTLE
DAILY ACTIVITIY SCORE: 22
TOILETING: A LITTLE
CLIMB 3 TO 5 STEPS WITH RAILING: A LITTLE
CLIMB 3 TO 5 STEPS WITH RAILING: A LITTLE
WALKING IN HOSPITAL ROOM: A LITTLE
STANDING UP FROM CHAIR USING ARMS: A LITTLE
STANDING UP FROM CHAIR USING ARMS: A LITTLE
MOBILITY SCORE: 20
MOVING TO AND FROM BED TO CHAIR: A LITTLE

## 2024-05-18 ASSESSMENT — PAIN SCALES - GENERAL
PAINLEVEL_OUTOF10: 0 - NO PAIN
PAINLEVEL_OUTOF10: 0 - NO PAIN

## 2024-05-18 ASSESSMENT — PAIN - FUNCTIONAL ASSESSMENT: PAIN_FUNCTIONAL_ASSESSMENT: 0-10

## 2024-05-18 NOTE — CARE PLAN
The patient's goals for the shift include no pain    The clinical goals for the shift include remain stable    Over the shift, the patient did not make progress toward the following goals. Barriers to progression include none. Recommendations to address these barriers include monitor pain and vital signs.

## 2024-05-18 NOTE — PROGRESS NOTES
"Luis Greene is a 84 y.o. male on day 3 of admission presenting with UTI (urinary tract infection).    Subjective   HPI   Patient seen and examined, resting in his bed.  He said that he did not have a good night sleep because he was peeing all night.. Patient denies any chest pain, shortness of breath in room air.  Patient tolerates well her diet with no nausea vomiting or abdominal pain.  No fever or chills.  No headache or dizziness.      Objective     Last Recorded Vitals  Blood pressure 139/61, pulse 52, temperature 36.7 °C (98.1 °F), temperature source Temporal, resp. rate 15, height 1.803 m (5' 11\"), weight 79.4 kg (175 lb 0.7 oz), SpO2 96%.      Intake/Output Summary (Last 24 hours) at 5/18/2024 1422  Last data filed at 5/18/2024 1300  Gross per 24 hour   Intake 580 ml   Output 1900 ml   Net -1320 ml         Physical Exam  Vitals and nursing note reviewed.   Constitutional:       Appearance: Normal appearance.   HENT:      Head: Normocephalic and atraumatic.      Nose: Nose normal.      Mouth/Throat:      Mouth: Mucous membranes are moist.   Eyes:      Extraocular Movements: Extraocular movements intact.      Pupils: Pupils are equal, round, and reactive to light.   Cardiovascular:      Rate and Rhythm: Normal rate.   Pulmonary:      Effort: Pulmonary effort is normal.      Breath sounds: Normal breath sounds.   Abdominal:      General: Bowel sounds are normal. There is no distension.      Palpations: Abdomen is soft.      Tenderness: There is no right CVA tenderness or left CVA tenderness.   Musculoskeletal:         General: No swelling. Normal range of motion.      Cervical back: Normal range of motion and neck supple.      Right lower leg: No edema.      Left lower leg: No edema.   Skin:     General: Skin is warm.   Neurological:      General: No focal deficit present.      Mental Status: He is alert and oriented to person, place, and time.   Psychiatric:         Mood and Affect: Mood normal.      "     Relevant Results    Lab Results   Component Value Date    WBC 5.3 05/17/2024    HGB 11.1 (L) 05/17/2024    HCT 31.7 (L) 05/17/2024     05/17/2024    PLT 83 (L) 05/17/2024       Lab Results   Component Value Date    GLUCOSE 200 (H) 05/18/2024    CALCIUM 8.6 05/18/2024     05/18/2024    K 3.4 05/18/2024    CO2 24 05/18/2024    CL 98 05/18/2024    BUN 27 (H) 05/18/2024    CREATININE 1.00 05/18/2024       Lab Results   Component Value Date    HGBA1C 9.6 (H) 05/03/2024       ECG 12 lead    Result Date: 5/16/2024  Undetermined rhythm Right bundle branch block Abnormal ECG When compared with ECG of 14-FEB-2024 16:19, Current undetermined rhythm precludes rhythm comparison, needs review    CT chest wo IV contrast    Result Date: 5/16/2024  Interpreted By:  Jenise Anderson, STUDY: CT CHEST WO IV CONTRAST;  5/16/2024 1:35 am   INDICATION: Signs/Symptoms:pneumonia   COMPARISON: Chest x-ray 05/15/2024. CT watch min 03/12/2024.   ACCESSION NUMBER(S): [id number]   ORDERING CLINICIAN: SELVIN RICHARDSON   TECHNIQUE: Axial CT images were obtained through the chest with no intravenous contrast administration.  Coronal and sagittal reformats were performed.   FINDINGS: There is motion artifact.   HEART AND VESSELS: No thoracic aortic aneurysm. Atherosclerotic calcifications of the thoracic aorta.   The heart is not enlarged.  Status post open heart surgery. Occluder noted at the left atrial appendage. No evidence of pericardial effusion.   MEDIASTINUM AND YEFRI, LOWER NECK AND AXILLA: The visualized thyroid gland is small size.   Suboptimal evaluation for adenopathy in the absence of intravenous contrast. There is mild mediastinal adenopathy. A subcarinal lymph node measures 1.2 cm in short axis.   LUNGS AND AIRWAYS: The trachea and central airways are patent.   The evaluation of the lungs is degraded by motion artifact. There is interstitial edema. There are small bilateral pleural effusions with airspace opacities at the  bilateral lower lobes. No pneumothorax.   UPPER ABDOMEN: The visualized subdiaphragmatic structures demonstrate no acute findings.   CHEST WALL AND OSSEOUS STRUCTURES: A cardiac monitoring device is noted at the anterior left chest wall. There is mild diffuse soft tissue edema at the chest wall and visualized abdominal wall. Multilevel degenerative changes of the spine.       1. Study degraded by motion artifact. Interstitial edema. Small bilateral pleural effusions with airspace opacities at the bilateral lower lobes, suggestive of atelectasis, superimposed pneumonia is not excludable. 2. Mild mediastinal adenopathy. 3. Mild diffuse soft tissue edema at the chest wall and visualized abdominal wall.         MACRO:   None.   Signed by: Jenise Anderson 5/16/2024 2:09 AM Dictation workstation:   [id number]    XR chest 1 view    Result Date: 5/15/2024  Interpreted By:  King Velasquez, STUDY: XR CHEST 1 VIEW;  5/15/2024 10:17 pm   INDICATION: Signs/Symptoms:sob.   COMPARISON: 2/14/2024   ACCESSION NUMBER(S): [id number]   ORDERING CLINICIAN: SELVIN CURTIS   FINDINGS: Postsurgical change with sternotomy wires. The cardiac silhouette is normal in size. There is slightly limited inspiratory lung volumes. No focal airspace consolidation or pleural effusion. No pneumothorax.       Limited inspiratory lung volumes without focal airspace consolidation.       MACRO: None   Signed by: King Velasquez 5/15/2024 10:59 PM Dictation workstation:   [id number]    Scheduled medications  amLODIPine, 5 mg, oral, Daily  aspirin, 81 mg, oral, Daily  atorvastatin, 40 mg, oral, Nightly  finasteride, 5 mg, oral, Daily  furosemide, 20 mg, intravenous, q12h  gabapentin, 100 mg, oral, Daily  heparin, 5,000 Units, subcutaneous, BID  insulin glargine-lixisenatide, 10 Units, subcutaneous, Nightly  insulin lispro, 0-10 Units, subcutaneous, TID  levETIRAcetam, 250 mg, oral, BID  meropenem, 1 g, intravenous, q8h  pantoprazole, 40 mg, intravenous,  "Daily  tamsulosin, 0.4 mg, oral, Daily      Continuous medications     PRN medications  PRN medications: dextrose, dextrose, glucagon, glucagon, ondansetron    Assessment/Plan   Abnormal US and frequent UTI  Pt denies any urine retention or dysuria   Pt 's urology retired.   Following  with  urology     UTI (urinary tract infection)  No Fever    cover for ES BL with meropenem  follow infectious disease.    urine culture pending Blood cultures been drawn.    Uncontrolled DM type II:   Continue with home meds   A1C 9.3 on 5/3/2024    HTN:   Continue with home meds   Monitor Bp    BPH:  Hx of urine retention  Continue with home meds     Weakness/deconditioning   Fall precautions  PT/OT    DVT prophylaxis:   Heparin SQ    Discharge plan:  This is 84 years old man with history of BPH, recurrent UTI and urinary retention \"presented with fever.  admitted with complicated UTI and fever rule out bacteremia.  Evaluated by ID initially treated with IV meropenem.  Per ID pt will discharge  with PO ATB Fosfomycin is ESBL organism is detected.  Elevated by urology, renal ultrasound no hydronephrosis has some debris's in the bladder consistent with frequent UTI.  Cystoscopy recommended.  Patient wants to follow with his [facility] urology.  No need for guidry monitor output and input.     Waiting for for the final cultures and recommendation of ID.  Anticipate discharging patient home with home health care when medically clear by consults     I spent 35 minutes in the professional and overall care of this patient.    Stefany Rodney, APRN-CNP        "

## 2024-05-18 NOTE — CARE PLAN
Chart reviewed. Remains on meropenem    Blood cultures with [facility]    Urine cx still with enteric bacilli    Awaiting final culture for discharge plans    Will follow    DO SIGRID Blank[facility]  #[phone]

## 2024-05-18 NOTE — CARE PLAN
Problem: Safety  Goal: Patient will be injury free during hospitalization  5/17/2024 2247 by So Rebolledo RN  Outcome: Progressing  5/17/2024 2247 by So Rebolledo RN  Outcome: Progressing  Goal: I will remain free of falls  5/17/2024 2247 by So Rebolledo RN  Outcome: Progressing  5/17/2024 2247 by So Rebolledo RN  Outcome: Progressing     Problem: Skin  Goal: Participates in plan/prevention/treatment measures  5/17/2024 2247 by So Rebolledo RN  Outcome: Progressing  5/17/2024 2247 by So Reoblledo RN  Outcome: Progressing   The patient's goals for the shift include no pain    The clinical goals for the shift include remain hemodynamically stable

## 2024-05-18 NOTE — CARE PLAN
The patient's goals for the shift include no pain    The clinical goals for the shift include remain hemodynamically stable    Problem: Safety  Goal: Patient will be injury free during hospitalization  Outcome: Progressing  Goal: I will remain free of falls  Outcome: Progressing     Problem: Skin  Goal: Participates in plan/prevention/treatment measures  Outcome: Progressing

## 2024-05-19 LAB
ANION GAP SERPL CALC-SCNC: 15 MMOL/L
BACTERIA BLD CULT: NORMAL
BACTERIA BLD CULT: NORMAL
BUN SERPL-MCNC: 23 MG/DL (ref 8–25)
CALCIUM SERPL-MCNC: 8.5 MG/DL (ref 8.5–10.4)
CHLORIDE SERPL-SCNC: 98 MMOL/L (ref 97–107)
CO2 SERPL-SCNC: 23 MMOL/L (ref 24–31)
CREAT SERPL-MCNC: 1 MG/DL (ref 0.4–1.6)
EGFRCR SERPLBLD CKD-EPI 2021: 75 ML/MIN/1.73M*2
ERYTHROCYTE [DISTWIDTH] IN BLOOD BY AUTOMATED COUNT: 13.2 % (ref 11.5–14.5)
FLUAV RNA RESP QL NAA+PROBE: NOT DETECTED
FLUBV RNA RESP QL NAA+PROBE: NOT DETECTED
GLUCOSE BLD MANUAL STRIP-MCNC: 201 MG/DL (ref 74–99)
GLUCOSE BLD MANUAL STRIP-MCNC: 252 MG/DL (ref 74–99)
GLUCOSE BLD MANUAL STRIP-MCNC: 287 MG/DL (ref 74–99)
GLUCOSE BLD MANUAL STRIP-MCNC: 330 MG/DL (ref 74–99)
GLUCOSE SERPL-MCNC: 199 MG/DL (ref 65–99)
HCT VFR BLD AUTO: 32.6 % (ref 41–52)
HGB BLD-MCNC: 11.5 G/DL (ref 13.5–17.5)
MCH RBC QN AUTO: 34.6 PG (ref 26–34)
MCHC RBC AUTO-ENTMCNC: 35.3 G/DL (ref 32–36)
MCV RBC AUTO: 98 FL (ref 80–100)
NRBC BLD-RTO: 0 /100 WBCS (ref 0–0)
PLATELET # BLD AUTO: 97 X10*3/UL (ref 150–450)
POTASSIUM SERPL-SCNC: 3.6 MMOL/L (ref 3.4–5.1)
RBC # BLD AUTO: 3.32 X10*6/UL (ref 4.5–5.9)
RSV RNA RESP QL NAA+PROBE: NOT DETECTED
SARS-COV-2 RNA RESP QL NAA+PROBE: NOT DETECTED
SODIUM SERPL-SCNC: 136 MMOL/L (ref 133–145)
WBC # BLD AUTO: 4.8 X10*3/UL (ref 4.4–11.3)

## 2024-05-19 PROCEDURE — 2060000001 HC INTERMEDIATE ICU ROOM DAILY

## 2024-05-19 PROCEDURE — 2500000004 HC RX 250 GENERAL PHARMACY W/ HCPCS (ALT 636 FOR OP/ED): Performed by: NURSE PRACTITIONER

## 2024-05-19 PROCEDURE — 80048 BASIC METABOLIC PNL TOTAL CA: CPT | Performed by: NURSE PRACTITIONER

## 2024-05-19 PROCEDURE — 2500000001 HC RX 250 WO HCPCS SELF ADMINISTERED DRUGS (ALT 637 FOR MEDICARE OP): Performed by: INTERNAL MEDICINE

## 2024-05-19 PROCEDURE — 36415 COLL VENOUS BLD VENIPUNCTURE: CPT | Performed by: NURSE PRACTITIONER

## 2024-05-19 PROCEDURE — C9113 INJ PANTOPRAZOLE SODIUM, VIA: HCPCS | Performed by: INTERNAL MEDICINE

## 2024-05-19 PROCEDURE — 2500000004 HC RX 250 GENERAL PHARMACY W/ HCPCS (ALT 636 FOR OP/ED): Performed by: INTERNAL MEDICINE

## 2024-05-19 PROCEDURE — 2500000005 HC RX 250 GENERAL PHARMACY W/O HCPCS: Performed by: NURSE PRACTITIONER

## 2024-05-19 PROCEDURE — 2500000006 HC RX 250 W HCPCS SELF ADMINISTERED DRUGS (ALT 637 FOR ALL PAYERS): Performed by: INTERNAL MEDICINE

## 2024-05-19 PROCEDURE — 85027 COMPLETE CBC AUTOMATED: CPT | Performed by: NURSE PRACTITIONER

## 2024-05-19 PROCEDURE — 82947 ASSAY GLUCOSE BLOOD QUANT: CPT

## 2024-05-19 PROCEDURE — 2500000001 HC RX 250 WO HCPCS SELF ADMINISTERED DRUGS (ALT 637 FOR MEDICARE OP): Performed by: NURSE PRACTITIONER

## 2024-05-19 PROCEDURE — 87637 SARSCOV2&INF A&B&RSV AMP PRB: CPT | Performed by: STUDENT IN AN ORGANIZED HEALTH CARE EDUCATION/TRAINING PROGRAM

## 2024-05-19 RX ADMIN — INSULIN LISPRO 6 UNITS: 100 INJECTION, SOLUTION INTRAVENOUS; SUBCUTANEOUS at 12:15

## 2024-05-19 RX ADMIN — INSULIN LISPRO 8 UNITS: 100 INJECTION, SOLUTION INTRAVENOUS; SUBCUTANEOUS at 16:43

## 2024-05-19 RX ADMIN — FINASTERIDE 5 MG: 5 TABLET, FILM COATED ORAL at 09:26

## 2024-05-19 RX ADMIN — PANTOPRAZOLE SODIUM 40 MG: 40 INJECTION, POWDER, FOR SOLUTION INTRAVENOUS at 09:26

## 2024-05-19 RX ADMIN — LEVETIRACETAM 250 MG: 250 TABLET, FILM COATED ORAL at 21:32

## 2024-05-19 RX ADMIN — GABAPENTIN 100 MG: 100 CAPSULE ORAL at 09:26

## 2024-05-19 RX ADMIN — FUROSEMIDE 20 MG: 10 INJECTION, SOLUTION INTRAMUSCULAR; INTRAVENOUS at 02:40

## 2024-05-19 RX ADMIN — ATORVASTATIN CALCIUM 40 MG: 40 TABLET, FILM COATED ORAL at 21:31

## 2024-05-19 RX ADMIN — DIPHENHYDRAMINE HYDROCHLORIDE 25 MG: 25 TABLET ORAL at 09:29

## 2024-05-19 RX ADMIN — AMLODIPINE BESYLATE 5 MG: 5 TABLET ORAL at 09:26

## 2024-05-19 RX ADMIN — ACETAMINOPHEN 650 MG: 325 TABLET ORAL at 09:26

## 2024-05-19 RX ADMIN — LEVETIRACETAM 250 MG: 250 TABLET, FILM COATED ORAL at 09:27

## 2024-05-19 RX ADMIN — MEROPENEM 1 G: 1 INJECTION, POWDER, FOR SOLUTION INTRAVENOUS at 13:58

## 2024-05-19 RX ADMIN — MEROPENEM 1 G: 1 INJECTION, POWDER, FOR SOLUTION INTRAVENOUS at 05:37

## 2024-05-19 RX ADMIN — DIPHENHYDRAMINE HYDROCHLORIDE 25 MG: 25 TABLET ORAL at 21:44

## 2024-05-19 RX ADMIN — MEROPENEM 1 G: 1 INJECTION, POWDER, FOR SOLUTION INTRAVENOUS at 21:31

## 2024-05-19 RX ADMIN — INSULIN GLARGINE AND LIXISENATIDE 10 UNITS: 100; 33 INJECTION, SOLUTION SUBCUTANEOUS at 21:32

## 2024-05-19 RX ADMIN — ASPIRIN 81 MG CHEWABLE TABLET 81 MG: 81 TABLET CHEWABLE at 09:27

## 2024-05-19 RX ADMIN — HEPARIN SODIUM 5000 UNITS: 5000 INJECTION, SOLUTION INTRAVENOUS; SUBCUTANEOUS at 21:31

## 2024-05-19 RX ADMIN — INSULIN LISPRO 4 UNITS: 100 INJECTION, SOLUTION INTRAVENOUS; SUBCUTANEOUS at 09:35

## 2024-05-19 RX ADMIN — TAMSULOSIN HYDROCHLORIDE 0.4 MG: 0.4 CAPSULE ORAL at 09:26

## 2024-05-19 RX ADMIN — DIPHENHYDRAMINE HYDROCHLORIDE 25 MG: 25 TABLET ORAL at 03:50

## 2024-05-19 RX ADMIN — FUROSEMIDE 20 MG: 10 INJECTION, SOLUTION INTRAMUSCULAR; INTRAVENOUS at 13:58

## 2024-05-19 RX ADMIN — DIPHENHYDRAMINE HYDROCHLORIDE 25 MG: 25 TABLET ORAL at 14:04

## 2024-05-19 RX ADMIN — HEPARIN SODIUM 5000 UNITS: 5000 INJECTION, SOLUTION INTRAVENOUS; SUBCUTANEOUS at 09:27

## 2024-05-19 ASSESSMENT — COGNITIVE AND FUNCTIONAL STATUS - GENERAL
TOILETING: A LITTLE
DAILY ACTIVITIY SCORE: 22
MOVING TO AND FROM BED TO CHAIR: A LITTLE
WALKING IN HOSPITAL ROOM: A LITTLE
STANDING UP FROM CHAIR USING ARMS: A LITTLE
MOBILITY SCORE: 20
CLIMB 3 TO 5 STEPS WITH RAILING: A LITTLE
DRESSING REGULAR LOWER BODY CLOTHING: A LITTLE

## 2024-05-19 ASSESSMENT — PAIN SCALES - GENERAL: PAINLEVEL_OUTOF10: 0 - NO PAIN

## 2024-05-19 NOTE — NURSING NOTE
Pt ordered a guidry.  Procedure and purpose of guidry explained to pt whom verbalized understanding.  Guidry placed with no issues.

## 2024-05-19 NOTE — CARE PLAN
Problem: Safety  Goal: Patient will be injury free during hospitalization  Outcome: Progressing   The patient's goals for the shift include no pain    The clinical goals for the shift include safety

## 2024-05-19 NOTE — CARE PLAN
Chart reviewed    Fever yesterday and repeat blood cx drawn. Urine is growing ESBL Ecoli S to meropenem    I am going to recheck a COVID test to be sure that is not the reason for the fever. If negative, would check CT flank    Continue meropenem for now    Paty Bailey DO  [facility]  #[phone]

## 2024-05-19 NOTE — PROGRESS NOTES
"Luis Greene is a 84 y.o. male on day 4 of admission presenting with UTI (urinary tract infection).    Subjective   HPI   Patient seen and examined, walking with his walker . He had fever yesterday and chills today. Pt wants to follow up with urologist at  . His urologist retired at [facility] and he wants all his providers want with  .  Patient denies any chest pain, shortness of breath in room air.  Patient tolerates well her diet with no nausea vomiting or abdominal pain.  No fever or chills.  No headache or dizziness.      Objective     Last Recorded Vitals  Blood pressure 131/55, pulse 52, temperature 36.5 °C (97.7 °F), temperature source Temporal, resp. rate 16, height 1.803 m (5' 11\"), weight 78.5 kg (173 lb 1 oz), SpO2 92%.      Intake/Output Summary (Last 24 hours) at 5/19/2024 1416  Last data filed at 5/19/2024 0354  Gross per 24 hour   Intake --   Output 200 ml   Net -200 ml         Physical Exam  Vitals and nursing note reviewed.   Constitutional:       Appearance: Normal appearance.   HENT:      Head: Normocephalic and atraumatic.      Nose: Nose normal.      Mouth/Throat:      Mouth: Mucous membranes are moist.   Eyes:      Extraocular Movements: Extraocular movements intact.      Pupils: Pupils are equal, round, and reactive to light.   Cardiovascular:      Rate and Rhythm: Normal rate.   Pulmonary:      Effort: Pulmonary effort is normal.      Breath sounds: Normal breath sounds.   Abdominal:      General: Bowel sounds are normal. There is no distension.      Palpations: Abdomen is soft.      Tenderness: There is no right CVA tenderness or left CVA tenderness.   Musculoskeletal:         General: No swelling. Normal range of motion.      Cervical back: Normal range of motion and neck supple.      Right lower leg: No edema.      Left lower leg: No edema.   Skin:     General: Skin is warm.   Neurological:      General: No focal deficit present.      Mental Status: He is alert and oriented to person, " place, and time.   Psychiatric:         Mood and Affect: Mood normal.          Relevant Results    Lab Results   Component Value Date    WBC 4.8 05/19/2024    HGB 11.5 (L) 05/19/2024    HCT 32.6 (L) 05/19/2024    MCV 98 05/19/2024    PLT 97 (L) 05/19/2024       Lab Results   Component Value Date    GLUCOSE 199 (H) 05/19/2024    CALCIUM 8.5 05/19/2024     05/19/2024    K 3.6 05/19/2024    CO2 23 (L) 05/19/2024    CL 98 05/19/2024    BUN 23 05/19/2024    CREATININE 1.00 05/19/2024       Lab Results   Component Value Date    HGBA1C 9.6 (H) 05/03/2024       ECG 12 lead    Result Date: 5/16/2024  Undetermined rhythm Right bundle branch block Abnormal ECG When compared with ECG of 14-FEB-2024 16:19, Current undetermined rhythm precludes rhythm comparison, needs review    CT chest wo IV contrast    Result Date: 5/16/2024  Interpreted By:  Jenise Anderson, STUDY: CT CHEST WO IV CONTRAST;  5/16/2024 1:35 am   INDICATION: Signs/Symptoms:pneumonia   COMPARISON: Chest x-ray 05/15/2024. CT watch min 03/12/2024.   ACCESSION NUMBER(S): [id number]   ORDERING CLINICIAN: SELVIN RICHARDSON   TECHNIQUE: Axial CT images were obtained through the chest with no intravenous contrast administration.  Coronal and sagittal reformats were performed.   FINDINGS: There is motion artifact.   HEART AND VESSELS: No thoracic aortic aneurysm. Atherosclerotic calcifications of the thoracic aorta.   The heart is not enlarged.  Status post open heart surgery. Occluder noted at the left atrial appendage. No evidence of pericardial effusion.   MEDIASTINUM AND YEFRI, LOWER NECK AND AXILLA: The visualized thyroid gland is small size.   Suboptimal evaluation for adenopathy in the absence of intravenous contrast. There is mild mediastinal adenopathy. A subcarinal lymph node measures 1.2 cm in short axis.   LUNGS AND AIRWAYS: The trachea and central airways are patent.   The evaluation of the lungs is degraded by motion artifact. There is interstitial edema.  There are small bilateral pleural effusions with airspace opacities at the bilateral lower lobes. No pneumothorax.   UPPER ABDOMEN: The visualized subdiaphragmatic structures demonstrate no acute findings.   CHEST WALL AND OSSEOUS STRUCTURES: A cardiac monitoring device is noted at the anterior left chest wall. There is mild diffuse soft tissue edema at the chest wall and visualized abdominal wall. Multilevel degenerative changes of the spine.       1. Study degraded by motion artifact. Interstitial edema. Small bilateral pleural effusions with airspace opacities at the bilateral lower lobes, suggestive of atelectasis, superimposed pneumonia is not excludable. 2. Mild mediastinal adenopathy. 3. Mild diffuse soft tissue edema at the chest wall and visualized abdominal wall.         MACRO:   None.   Signed by: Jenise Anderson 5/16/2024 2:09 AM Dictation workstation:   [id number]    XR chest 1 view    Result Date: 5/15/2024  Interpreted By:  King Velasquez, STUDY: XR CHEST 1 VIEW;  5/15/2024 10:17 pm   INDICATION: Signs/Symptoms:sob.   COMPARISON: 2/14/2024   ACCESSION NUMBER(S): [id number]   ORDERING CLINICIAN: SELVIN CURTIS   FINDINGS: Postsurgical change with sternotomy wires. The cardiac silhouette is normal in size. There is slightly limited inspiratory lung volumes. No focal airspace consolidation or pleural effusion. No pneumothorax.       Limited inspiratory lung volumes without focal airspace consolidation.       MACRO: None   Signed by: King Velasquez 5/15/2024 10:59 PM Dictation workstation:   [id number]    Scheduled medications  amLODIPine, 5 mg, oral, Daily  aspirin, 81 mg, oral, Daily  atorvastatin, 40 mg, oral, Nightly  finasteride, 5 mg, oral, Daily  furosemide, 20 mg, intravenous, q12h  gabapentin, 100 mg, oral, Daily  heparin, 5,000 Units, subcutaneous, BID  insulin glargine-lixisenatide, 10 Units, subcutaneous, Nightly  insulin lispro, 0-10 Units, subcutaneous, TID  levETIRAcetam, 250 mg, oral,  "BID  meropenem, 1 g, intravenous, q8h  pantoprazole, 40 mg, intravenous, Daily  tamsulosin, 0.4 mg, oral, Daily      Continuous medications     PRN medications  PRN medications: acetaminophen, dextrose, dextrose, diphenhydrAMINE, glucagon, glucagon, glucagon, glucagon, ondansetron    Assessment/Plan   Abnormal US and frequent UTI  Pt denies any urine retention or dysuria   Pt 's urology retired.   Discuss with urology wants guidry in     UTI (urinary tract infection)  No Fever    cover for ES BL with meropenem  follow infectious disease.    urine culture pending Blood cultures been drawn.    Uncontrolled DM type II:   Continue with home meds   A1C 9.3 on 5/3/2024    HTN:   Continue with home meds   Monitor Bp    BPH:  Hx of urine retention  Continue with home meds     Weakness/deconditioning   Fall precautions  PT/OT    DVT prophylaxis:   Heparin SQ    Discharge plan:  This is 84 years old man with history of BPH, recurrent UTI and urinary retention \"presented with fever.  Admitted with complicated UTI and fever rule out bacteremia.  Evaluated by ID initially treated with IV meropenem.  Per ID pt will discharge with PO ATB Fosfomycin if ESBL organism is detected.  Elevated by urology, renal ultrasound no hydronephrosis has some debris in the bladder consistent with frequent UTI.  Cystoscopy recommended.  Per urology pt will get cystoscopy when cleared from infection. Guidry ordered, monitor output and input. A1C 9.3 on 5/3/2024    Waiting for for the final cultures and recommendation of ID.    Anticipate discharging patient home with home health care when medically clear by consults     I spent 35 minutes in the professional and overall care of this patient.    Stefany Rodney, APRN-CNP        "

## 2024-05-20 LAB
ANION GAP SERPL CALC-SCNC: 15 MMOL/L
BUN SERPL-MCNC: 24 MG/DL (ref 8–25)
CALCIUM SERPL-MCNC: 8.4 MG/DL (ref 8.5–10.4)
CHLORIDE SERPL-SCNC: 99 MMOL/L (ref 97–107)
CO2 SERPL-SCNC: 23 MMOL/L (ref 24–31)
CREAT SERPL-MCNC: 1 MG/DL (ref 0.4–1.6)
EGFRCR SERPLBLD CKD-EPI 2021: 75 ML/MIN/1.73M*2
ERYTHROCYTE [DISTWIDTH] IN BLOOD BY AUTOMATED COUNT: 13.1 % (ref 11.5–14.5)
GLUCOSE BLD MANUAL STRIP-MCNC: 196 MG/DL (ref 74–99)
GLUCOSE BLD MANUAL STRIP-MCNC: 256 MG/DL (ref 74–99)
GLUCOSE BLD MANUAL STRIP-MCNC: 306 MG/DL (ref 74–99)
GLUCOSE BLD MANUAL STRIP-MCNC: 343 MG/DL (ref 74–99)
GLUCOSE SERPL-MCNC: 284 MG/DL (ref 65–99)
HCT VFR BLD AUTO: 30.1 % (ref 41–52)
HGB BLD-MCNC: 10.6 G/DL (ref 13.5–17.5)
MCH RBC QN AUTO: 34.8 PG (ref 26–34)
MCHC RBC AUTO-ENTMCNC: 35.2 G/DL (ref 32–36)
MCV RBC AUTO: 99 FL (ref 80–100)
NRBC BLD-RTO: 0 /100 WBCS (ref 0–0)
PLATELET # BLD AUTO: 96 X10*3/UL (ref 150–450)
POTASSIUM SERPL-SCNC: 3.2 MMOL/L (ref 3.4–5.1)
RBC # BLD AUTO: 3.05 X10*6/UL (ref 4.5–5.9)
SODIUM SERPL-SCNC: 137 MMOL/L (ref 133–145)
WBC # BLD AUTO: 4.7 X10*3/UL (ref 4.4–11.3)

## 2024-05-20 PROCEDURE — 36415 COLL VENOUS BLD VENIPUNCTURE: CPT | Performed by: NURSE PRACTITIONER

## 2024-05-20 PROCEDURE — C9113 INJ PANTOPRAZOLE SODIUM, VIA: HCPCS | Performed by: INTERNAL MEDICINE

## 2024-05-20 PROCEDURE — 85027 COMPLETE CBC AUTOMATED: CPT | Performed by: NURSE PRACTITIONER

## 2024-05-20 PROCEDURE — 2500000004 HC RX 250 GENERAL PHARMACY W/ HCPCS (ALT 636 FOR OP/ED): Performed by: INTERNAL MEDICINE

## 2024-05-20 PROCEDURE — 2500000006 HC RX 250 W HCPCS SELF ADMINISTERED DRUGS (ALT 637 FOR ALL PAYERS): Performed by: INTERNAL MEDICINE

## 2024-05-20 PROCEDURE — 2500000001 HC RX 250 WO HCPCS SELF ADMINISTERED DRUGS (ALT 637 FOR MEDICARE OP)

## 2024-05-20 PROCEDURE — 2500000004 HC RX 250 GENERAL PHARMACY W/ HCPCS (ALT 636 FOR OP/ED): Performed by: NURSE PRACTITIONER

## 2024-05-20 PROCEDURE — 82947 ASSAY GLUCOSE BLOOD QUANT: CPT | Mod: 91

## 2024-05-20 PROCEDURE — 2500000005 HC RX 250 GENERAL PHARMACY W/O HCPCS: Performed by: NURSE PRACTITIONER

## 2024-05-20 PROCEDURE — 80048 BASIC METABOLIC PNL TOTAL CA: CPT | Performed by: NURSE PRACTITIONER

## 2024-05-20 PROCEDURE — 82947 ASSAY GLUCOSE BLOOD QUANT: CPT

## 2024-05-20 PROCEDURE — 2500000001 HC RX 250 WO HCPCS SELF ADMINISTERED DRUGS (ALT 637 FOR MEDICARE OP): Performed by: NURSE PRACTITIONER

## 2024-05-20 PROCEDURE — 2500000001 HC RX 250 WO HCPCS SELF ADMINISTERED DRUGS (ALT 637 FOR MEDICARE OP): Performed by: INTERNAL MEDICINE

## 2024-05-20 PROCEDURE — 2060000001 HC INTERMEDIATE ICU ROOM DAILY

## 2024-05-20 RX ORDER — POTASSIUM CHLORIDE 1.5 G/1.58G
40 POWDER, FOR SOLUTION ORAL ONCE
Status: COMPLETED | OUTPATIENT
Start: 2024-05-20 | End: 2024-05-20

## 2024-05-20 RX ADMIN — LEVETIRACETAM 250 MG: 250 TABLET, FILM COATED ORAL at 21:26

## 2024-05-20 RX ADMIN — INSULIN LISPRO 2 UNITS: 100 INJECTION, SOLUTION INTRAVENOUS; SUBCUTANEOUS at 09:51

## 2024-05-20 RX ADMIN — INSULIN LISPRO 8 UNITS: 100 INJECTION, SOLUTION INTRAVENOUS; SUBCUTANEOUS at 12:23

## 2024-05-20 RX ADMIN — GABAPENTIN 100 MG: 100 CAPSULE ORAL at 09:49

## 2024-05-20 RX ADMIN — HEPARIN SODIUM 5000 UNITS: 5000 INJECTION, SOLUTION INTRAVENOUS; SUBCUTANEOUS at 21:26

## 2024-05-20 RX ADMIN — INSULIN GLARGINE AND LIXISENATIDE 10 UNITS: 100; 33 INJECTION, SOLUTION SUBCUTANEOUS at 21:27

## 2024-05-20 RX ADMIN — AMLODIPINE BESYLATE 5 MG: 5 TABLET ORAL at 09:50

## 2024-05-20 RX ADMIN — LEVETIRACETAM 250 MG: 250 TABLET, FILM COATED ORAL at 09:50

## 2024-05-20 RX ADMIN — FUROSEMIDE 20 MG: 10 INJECTION, SOLUTION INTRAMUSCULAR; INTRAVENOUS at 14:26

## 2024-05-20 RX ADMIN — ATORVASTATIN CALCIUM 40 MG: 40 TABLET, FILM COATED ORAL at 21:26

## 2024-05-20 RX ADMIN — ASPIRIN 81 MG CHEWABLE TABLET 81 MG: 81 TABLET CHEWABLE at 09:50

## 2024-05-20 RX ADMIN — TAMSULOSIN HYDROCHLORIDE 0.4 MG: 0.4 CAPSULE ORAL at 09:50

## 2024-05-20 RX ADMIN — FUROSEMIDE 20 MG: 10 INJECTION, SOLUTION INTRAMUSCULAR; INTRAVENOUS at 01:52

## 2024-05-20 RX ADMIN — HEPARIN SODIUM 5000 UNITS: 5000 INJECTION, SOLUTION INTRAVENOUS; SUBCUTANEOUS at 09:00

## 2024-05-20 RX ADMIN — FINASTERIDE 5 MG: 5 TABLET, FILM COATED ORAL at 09:50

## 2024-05-20 RX ADMIN — DIPHENHYDRAMINE HYDROCHLORIDE 25 MG: 25 TABLET ORAL at 14:26

## 2024-05-20 RX ADMIN — DIPHENHYDRAMINE HYDROCHLORIDE 25 MG: 25 TABLET ORAL at 05:41

## 2024-05-20 RX ADMIN — DIPHENHYDRAMINE HYDROCHLORIDE 25 MG: 25 TABLET ORAL at 21:26

## 2024-05-20 RX ADMIN — PANTOPRAZOLE SODIUM 40 MG: 40 INJECTION, POWDER, FOR SOLUTION INTRAVENOUS at 09:49

## 2024-05-20 RX ADMIN — INSULIN LISPRO 10 UNITS: 100 INJECTION, SOLUTION INTRAVENOUS; SUBCUTANEOUS at 17:39

## 2024-05-20 RX ADMIN — POTASSIUM CHLORIDE 40 MEQ: 1.5 FOR SOLUTION ORAL at 09:50

## 2024-05-20 RX ADMIN — MEROPENEM 1 G: 1 INJECTION, POWDER, FOR SOLUTION INTRAVENOUS at 21:26

## 2024-05-20 RX ADMIN — MEROPENEM 1 G: 1 INJECTION, POWDER, FOR SOLUTION INTRAVENOUS at 14:26

## 2024-05-20 RX ADMIN — MEROPENEM 1 G: 1 INJECTION, POWDER, FOR SOLUTION INTRAVENOUS at 06:45

## 2024-05-20 ASSESSMENT — COGNITIVE AND FUNCTIONAL STATUS - GENERAL
MOBILITY SCORE: 23
HELP NEEDED FOR BATHING: A LITTLE
DRESSING REGULAR LOWER BODY CLOTHING: A LITTLE
DAILY ACTIVITIY SCORE: 24
TOILETING: A LITTLE
WALKING IN HOSPITAL ROOM: A LITTLE
MOBILITY SCORE: 23
MOVING TO AND FROM BED TO CHAIR: A LITTLE
CLIMB 3 TO 5 STEPS WITH RAILING: A LITTLE
STANDING UP FROM CHAIR USING ARMS: A LITTLE
MOBILITY SCORE: 19
CLIMB 3 TO 5 STEPS WITH RAILING: A LITTLE
DAILY ACTIVITIY SCORE: 21
CLIMB 3 TO 5 STEPS WITH RAILING: A LOT
DAILY ACTIVITIY SCORE: 24

## 2024-05-20 ASSESSMENT — PAIN - FUNCTIONAL ASSESSMENT
PAIN_FUNCTIONAL_ASSESSMENT: 0-10

## 2024-05-20 ASSESSMENT — PAIN SCALES - GENERAL
PAINLEVEL_OUTOF10: 0 - NO PAIN

## 2024-05-20 ASSESSMENT — PAIN SCALES - WONG BAKER: WONGBAKER_NUMERICALRESPONSE: NO HURT

## 2024-05-20 NOTE — PROGRESS NOTES
INFECTIOUS DISEASES PROGRESS NOTE    Consulted / following patient for:  ESBL E. coli urinary tract infection  Urinary retention    Subjective   Interval History:   Tiwari catheter is now in place.  No systemic symptoms.  He says he is to be seen today by urology and there is a consideration for possible surgery       Objective   PHYSICAL EXAMINATION  Vital signs:  Visit Vitals  BP (!) 162/49 (BP Location: Right arm, Patient Position: Lying)   Pulse 71   Temp 36.8 °C (98.2 °F) (Temporal)   Resp 16      General: Sitting up in a bedside chair, not toxic, no acute distress  Abdomen:  Soft, nontender.   Back:  No spinal or CVA tenderness  Genitalia: Tiwari catheter draining clear urine    Relevant Results  WBC: 4700     Results from last 72 hours   Lab Units 05/20/24  0423   CREATININE mg/dL 1.00   ANION GAP mmol/L 15   EGFR mL/min/1.73m*2 75   UA: Mild pyuria  Microbiology:  Blood (5/15): Negative X2  Blood (5/18): Negative X1  Urine (5/15): ESBL E. coli      ASSESSMENT:  Urinary retention/ESBL E. coli urinary tract infection  No systemic symptoms.  Awaiting further urology plans.  Will continue intravenous meropenem in anticipation of possible surgical intervention during this admission.  If not, will revert to oral fosfomycin    PLANS:  -   Continue meropenem pending further surgical plans  -   If no anticipated surgery during this admission, and if Tiwari catheter is to remain in place, will suggest fosfomycin 3 g every 48 hours X2 doses    Raul Edwards MD  [facility]  Office:  [phone]

## 2024-05-20 NOTE — CARE PLAN
The patient's goals for the shift include no pain    The clinical goals for the shift include Pt will remain free from falls    Problem: Safety  Goal: Patient will be injury free during hospitalization  Outcome: Progressing  Goal: I will remain free of falls  Outcome: Progressing     Problem: Skin  Goal: Participates in plan/prevention/treatment measures  Outcome: Progressing  Flowsheets (Taken 5/20/2024 1320)  Participates in plan/prevention/treatment measures: Increase activity/out of bed for meals     Problem: Diabetes  Goal: Achieve decreasing blood glucose levels by end of shift  Outcome: Progressing  Goal: Increase stability of blood glucose readings by end of shift  Outcome: Progressing  Goal: Decrease in ketones present in urine by end of shift  Outcome: Progressing  Goal: Maintain electrolyte levels within acceptable range throughout shift  Outcome: Progressing  Goal: Maintain glucose levels >70mg/dl to <250mg/dl throughout shift  Outcome: Progressing  Goal: No changes in neurological exam by end of shift  Outcome: Progressing  Goal: Learn about and adhere to nutrition recommendations by end of shift  Outcome: Progressing  Goal: Vital signs within normal range for age by end of shift  Outcome: Progressing  Goal: Increase self care and/or family involovement by end of shift  Outcome: Progressing  Goal: Receive DSME education by end of shift  Outcome: Progressing

## 2024-05-20 NOTE — CARE PLAN
Over the shift, the patient did make progress toward the following goals.       Problem: Diabetes  Goal: No changes in neurological exam by end of shift  Outcome: Progressing     Problem: Diabetes  Goal: Vital signs within normal range for age by end of shift  Outcome: Progressing

## 2024-05-20 NOTE — PROGRESS NOTES
Pt with fever previous night, urine gorwing esbl ecoli, pt receiving meropenem, ID following.      05/20/24 9679   Discharge Planning   Patient expects to be discharged to: Home with Residence [facility]

## 2024-05-20 NOTE — PROGRESS NOTES
Luis Greene is a 84 y.o. male on day 5 of admission presenting with UTI (urinary tract infection).      Subjective   Patient seen and examined; sitting up in chair. Spouse bedside. Patient denies acute events overnight; denies any pain or shortness of breath. States he would like to be seen by urology today. No acute distress.        Objective     Last Recorded Vitals  /57 (BP Location: Right arm, Patient Position: Sitting)   Pulse 89   Temp 36.5 °C (97.7 °F) (Temporal)   Resp 18   Wt 78.8 kg (173 lb 11.6 oz)   SpO2 96%   Intake/Output last 3 Shifts:    Intake/Output Summary (Last 24 hours) at 5/20/2024 1337  Last data filed at 5/20/2024 0600  Gross per 24 hour   Intake 680 ml   Output 1600 ml   Net -920 ml       Admission Weight  Weight: 77.1 kg (170 lb) (05/15/24 1839)    Daily Weight  05/20/24 : 78.8 kg (173 lb 11.6 oz)    Image Results  US renal complete  Narrative: Interpreted By:  Jenise Anderson,   STUDY:  US RENAL COMPLETE;  5/16/2024 9:35 pm      INDICATION:  Signs/Symptoms:uti.      COMPARISON:  CT abdomen and pelvis 12/20/2023      ACCESSION NUMBER(S):  [id number]      ORDERING CLINICIAN:  SELVIN RICHARDSON      TECHNIQUE:  Multiple images of the kidneys were obtained  .      FINDINGS:  RIGHT KIDNEY:  The right kidney measures 10.8 cm in length. Echogenicity within  normal limits. No hydronephrosis. The previously described right  renal cyst on prior CT is not visualized on this exam.      LEFT KIDNEY:  The left kidney measures 11.1 cm in length. Echogenicity within  normal limits. No hydronephrosis.      BLADDER:  Debris noted at the urinary bladder.      There is a nodular contour of the prostate indenting the base of the  urinary bladder.      Impression: 1. No hydronephrosis.  2. Debris at the urinary bladder. Please correlate with urinalysis.  3. Nodular contour of the prostate indenting the base of the urinary  bladder. Please correlate with PSA level.                      MACRO:  None           Signed by: Jenise Anderson 5/16/2024 11:25 PM  Dictation workstation:   [id number]  ECG 12 lead  Atrial fibrillation  Right bundle branch block  Abnormal ECG  When compared with ECG of 14-FEB-2024 16:19,  No significant change was found  Confirmed by Duc Broussard ([id number]) on 5/16/2024 12:10:44 PM  CT chest wo IV contrast  Narrative: Interpreted By:  Jenise Anderson,   STUDY:  CT CHEST WO IV CONTRAST;  5/16/2024 1:35 am      INDICATION:  Signs/Symptoms:pneumonia      COMPARISON:  Chest x-ray 05/15/2024. CT watch min 03/12/2024.      ACCESSION NUMBER(S):  [id number]      ORDERING CLINICIAN:  SELVIN RICHARDSON      TECHNIQUE:  Axial CT images were obtained through the chest with no intravenous  contrast administration.  Coronal and sagittal reformats were  performed.      FINDINGS:  There is motion artifact.      HEART AND VESSELS:  No thoracic aortic aneurysm. Atherosclerotic calcifications of the  thoracic aorta.      The heart is not enlarged.  Status post open heart surgery. Occluder  noted at the left atrial appendage. No evidence of pericardial  effusion.      MEDIASTINUM AND YEFRI, LOWER NECK AND AXILLA:  The visualized thyroid gland is small size.      Suboptimal evaluation for adenopathy in the absence of intravenous  contrast. There is mild mediastinal adenopathy. A subcarinal lymph  node measures 1.2 cm in short axis.      LUNGS AND AIRWAYS:  The trachea and central airways are patent.      The evaluation of the lungs is degraded by motion artifact. There is  interstitial edema. There are small bilateral pleural effusions with  airspace opacities at the bilateral lower lobes. No pneumothorax.      UPPER ABDOMEN:  The visualized subdiaphragmatic structures demonstrate no acute  findings.      CHEST WALL AND OSSEOUS STRUCTURES:  A cardiac monitoring device is noted at the anterior left chest wall.  There is mild diffuse soft tissue edema at the chest wall and  visualized abdominal wall. Multilevel  degenerative changes of the  spine.      Impression: 1. Study degraded by motion artifact. Interstitial edema. Small  bilateral pleural effusions with airspace opacities at the bilateral  lower lobes, suggestive of atelectasis, superimposed pneumonia is not  excludable.  2. Mild mediastinal adenopathy.  3. Mild diffuse soft tissue edema at the chest wall and visualized  abdominal wall.                  MACRO:      None.      Signed by: Jenise Anderson 5/16/2024 2:09 AM  Dictation workstation:   [id number]      Physical Exam  Vitals and nursing note reviewed.   Constitutional:       Appearance: Normal appearance.   HENT:      Head: Normocephalic and atraumatic.      Mouth/Throat:      Mouth: Mucous membranes are moist.   Eyes:      Extraocular Movements: Extraocular movements intact.   Cardiovascular:      Rate and Rhythm: Normal rate. Rhythm irregular.      Pulses: Normal pulses.      Heart sounds: Normal heart sounds.   Pulmonary:      Effort: Pulmonary effort is normal.      Breath sounds: Normal breath sounds.   Abdominal:      General: Bowel sounds are normal.      Palpations: Abdomen is soft.   Genitourinary:     Comments: Indwelling guidry catheter with clear yellow urine    Musculoskeletal:         General: Normal range of motion.      Cervical back: Normal range of motion and neck supple.   Skin:     General: Skin is warm and dry.      Capillary Refill: Capillary refill takes less than 2 seconds.   Neurological:      General: No focal deficit present.      Mental Status: He is alert and oriented to person, place, and time.         Relevant Results               Assessment/Plan        This patient has a urinary catheter   Reason for the urinary catheter remaining today? urinary retention/bladder outlet obstruction, acute or chronic    Principal Problem:    UTI (urinary tract infection)       UTI (urinary tract infection)  Cover for E Coli, ESBL   Continue with IV meropenem  ID following      Uncontrolled DM  type II   Continue with home meds   A1C 9.3 on 5/3/2024     HTN  Continue with home meds   SSI, hypoglycemia protocol   Monitor Bp     BPH  Hx of urine retention  Continue with home meds      Weakness/deconditioning   Fall precautions  PT/OT     DVT prophylaxis:   Heparin SQ              Marilynn Suarez, APRN-CNP

## 2024-05-21 ENCOUNTER — APPOINTMENT (OUTPATIENT)
Dept: RADIOLOGY | Facility: HOSPITAL | Age: 84
DRG: 689 | End: 2024-05-21
Payer: MEDICARE

## 2024-05-21 VITALS
HEIGHT: 71 IN | TEMPERATURE: 98.2 F | RESPIRATION RATE: 16 BRPM | WEIGHT: 180.56 LBS | OXYGEN SATURATION: 99 % | BODY MASS INDEX: 25.28 KG/M2 | SYSTOLIC BLOOD PRESSURE: 133 MMHG | DIASTOLIC BLOOD PRESSURE: 60 MMHG | HEART RATE: 73 BPM

## 2024-05-21 PROBLEM — N39.0 UTI (URINARY TRACT INFECTION): Status: RESOLVED | Noted: 2024-05-15 | Resolved: 2024-05-21

## 2024-05-21 LAB
ANION GAP SERPL CALC-SCNC: 12 MMOL/L
BUN SERPL-MCNC: 19 MG/DL (ref 8–25)
CALCIUM SERPL-MCNC: 8.5 MG/DL (ref 8.5–10.4)
CHLORIDE SERPL-SCNC: 99 MMOL/L (ref 97–107)
CO2 SERPL-SCNC: 24 MMOL/L (ref 24–31)
CREAT SERPL-MCNC: 1 MG/DL (ref 0.4–1.6)
EGFRCR SERPLBLD CKD-EPI 2021: 75 ML/MIN/1.73M*2
ERYTHROCYTE [DISTWIDTH] IN BLOOD BY AUTOMATED COUNT: 13.2 % (ref 11.5–14.5)
GLUCOSE BLD MANUAL STRIP-MCNC: 181 MG/DL (ref 74–99)
GLUCOSE BLD MANUAL STRIP-MCNC: 240 MG/DL (ref 74–99)
GLUCOSE SERPL-MCNC: 194 MG/DL (ref 65–99)
HCT VFR BLD AUTO: 29.1 % (ref 41–52)
HGB BLD-MCNC: 10.5 G/DL (ref 13.5–17.5)
MCH RBC QN AUTO: 34.3 PG (ref 26–34)
MCHC RBC AUTO-ENTMCNC: 36.1 G/DL (ref 32–36)
MCV RBC AUTO: 95 FL (ref 80–100)
NRBC BLD-RTO: 0 /100 WBCS (ref 0–0)
PLATELET # BLD AUTO: 102 X10*3/UL (ref 150–450)
POTASSIUM SERPL-SCNC: 3.5 MMOL/L (ref 3.4–5.1)
RBC # BLD AUTO: 3.06 X10*6/UL (ref 4.5–5.9)
SODIUM SERPL-SCNC: 135 MMOL/L (ref 133–145)
WBC # BLD AUTO: 4.8 X10*3/UL (ref 4.4–11.3)

## 2024-05-21 PROCEDURE — 2500000001 HC RX 250 WO HCPCS SELF ADMINISTERED DRUGS (ALT 637 FOR MEDICARE OP): Performed by: INTERNAL MEDICINE

## 2024-05-21 PROCEDURE — 2500000001 HC RX 250 WO HCPCS SELF ADMINISTERED DRUGS (ALT 637 FOR MEDICARE OP)

## 2024-05-21 PROCEDURE — 2500000004 HC RX 250 GENERAL PHARMACY W/ HCPCS (ALT 636 FOR OP/ED): Performed by: INTERNAL MEDICINE

## 2024-05-21 PROCEDURE — 85027 COMPLETE CBC AUTOMATED: CPT | Performed by: NURSE PRACTITIONER

## 2024-05-21 PROCEDURE — 80048 BASIC METABOLIC PNL TOTAL CA: CPT | Performed by: NURSE PRACTITIONER

## 2024-05-21 PROCEDURE — 71045 X-RAY EXAM CHEST 1 VIEW: CPT

## 2024-05-21 PROCEDURE — 2500000004 HC RX 250 GENERAL PHARMACY W/ HCPCS (ALT 636 FOR OP/ED): Performed by: NURSE PRACTITIONER

## 2024-05-21 PROCEDURE — 2500000001 HC RX 250 WO HCPCS SELF ADMINISTERED DRUGS (ALT 637 FOR MEDICARE OP): Performed by: NURSE PRACTITIONER

## 2024-05-21 PROCEDURE — 71045 X-RAY EXAM CHEST 1 VIEW: CPT | Performed by: RADIOLOGY

## 2024-05-21 PROCEDURE — 82947 ASSAY GLUCOSE BLOOD QUANT: CPT

## 2024-05-21 PROCEDURE — 2500000006 HC RX 250 W HCPCS SELF ADMINISTERED DRUGS (ALT 637 FOR ALL PAYERS): Performed by: INTERNAL MEDICINE

## 2024-05-21 PROCEDURE — 36415 COLL VENOUS BLD VENIPUNCTURE: CPT | Performed by: NURSE PRACTITIONER

## 2024-05-21 PROCEDURE — C9113 INJ PANTOPRAZOLE SODIUM, VIA: HCPCS | Performed by: INTERNAL MEDICINE

## 2024-05-21 PROCEDURE — 2500000006 HC RX 250 W HCPCS SELF ADMINISTERED DRUGS (ALT 637 FOR ALL PAYERS)

## 2024-05-21 RX ORDER — GRANULES FOR ORAL 3 G/1
3 POWDER ORAL ONCE
Qty: 3 G | Refills: 0 | Status: COMPLETED | OUTPATIENT
Start: 2024-05-21 | End: 2024-05-21

## 2024-05-21 RX ORDER — FUROSEMIDE 40 MG/1
40 TABLET ORAL DAILY
Status: DISCONTINUED | OUTPATIENT
Start: 2024-05-21 | End: 2024-05-21 | Stop reason: HOSPADM

## 2024-05-21 RX ORDER — POTASSIUM CHLORIDE 20 MEQ/1
40 TABLET, EXTENDED RELEASE ORAL DAILY
Status: DISCONTINUED | OUTPATIENT
Start: 2024-05-21 | End: 2024-05-21 | Stop reason: HOSPADM

## 2024-05-21 RX ADMIN — FINASTERIDE 5 MG: 5 TABLET, FILM COATED ORAL at 09:55

## 2024-05-21 RX ADMIN — TAMSULOSIN HYDROCHLORIDE 0.4 MG: 0.4 CAPSULE ORAL at 09:55

## 2024-05-21 RX ADMIN — INSULIN LISPRO 2 UNITS: 100 INJECTION, SOLUTION INTRAVENOUS; SUBCUTANEOUS at 09:55

## 2024-05-21 RX ADMIN — FUROSEMIDE 20 MG: 10 INJECTION, SOLUTION INTRAMUSCULAR; INTRAVENOUS at 01:18

## 2024-05-21 RX ADMIN — LEVETIRACETAM 250 MG: 250 TABLET, FILM COATED ORAL at 09:55

## 2024-05-21 RX ADMIN — HEPARIN SODIUM 5000 UNITS: 5000 INJECTION, SOLUTION INTRAVENOUS; SUBCUTANEOUS at 09:55

## 2024-05-21 RX ADMIN — ASPIRIN 81 MG CHEWABLE TABLET 81 MG: 81 TABLET CHEWABLE at 09:55

## 2024-05-21 RX ADMIN — PANTOPRAZOLE SODIUM 40 MG: 40 INJECTION, POWDER, FOR SOLUTION INTRAVENOUS at 14:07

## 2024-05-21 RX ADMIN — GRANULES FOR ORAL SOLUTION 3 G: 3 POWDER ORAL at 15:56

## 2024-05-21 RX ADMIN — AMLODIPINE BESYLATE 5 MG: 5 TABLET ORAL at 09:55

## 2024-05-21 RX ADMIN — GABAPENTIN 100 MG: 100 CAPSULE ORAL at 09:55

## 2024-05-21 RX ADMIN — INSULIN LISPRO 4 UNITS: 100 INJECTION, SOLUTION INTRAVENOUS; SUBCUTANEOUS at 12:39

## 2024-05-21 RX ADMIN — MEROPENEM 1 G: 1 INJECTION, POWDER, FOR SOLUTION INTRAVENOUS at 05:48

## 2024-05-21 RX ADMIN — POTASSIUM CHLORIDE 40 MEQ: 1500 TABLET, EXTENDED RELEASE ORAL at 09:55

## 2024-05-21 RX ADMIN — FUROSEMIDE 40 MG: 40 TABLET ORAL at 09:55

## 2024-05-21 RX ADMIN — DIPHENHYDRAMINE HYDROCHLORIDE 25 MG: 25 TABLET ORAL at 05:48

## 2024-05-21 ASSESSMENT — PAIN - FUNCTIONAL ASSESSMENT
PAIN_FUNCTIONAL_ASSESSMENT: 0-10

## 2024-05-21 ASSESSMENT — COGNITIVE AND FUNCTIONAL STATUS - GENERAL
MOBILITY SCORE: 23
DAILY ACTIVITIY SCORE: 24
CLIMB 3 TO 5 STEPS WITH RAILING: A LITTLE

## 2024-05-21 ASSESSMENT — PAIN SCALES - GENERAL
PAINLEVEL_OUTOF10: 0 - NO PAIN

## 2024-05-21 NOTE — CARE PLAN
The patient's goals for the shift include no pain    The clinical goals for the shift include rest and stable VS and safety      Problem: Safety  Goal: Patient will be injury free during hospitalization  Outcome: Progressing  Goal: I will remain free of falls  Outcome: Progressing     Problem: Skin  Goal: Participates in plan/prevention/treatment measures  Outcome: Progressing  Goal: Decreased wound size/increased tissue granulation at next dressing change  Outcome: Progressing  Goal: Prevent/manage excess moisture  Outcome: Progressing  Goal: Prevent/minimize sheer/friction injuries  Outcome: Progressing  Goal: Promote/optimize nutrition  Outcome: Progressing  Goal: Promote skin healing  Outcome: Progressing  Flowsheets (Taken 5/21/2024 1416)  Promote skin healing: Protective dressings over bony prominences

## 2024-05-21 NOTE — PROGRESS NOTES
INFECTIOUS DISEASES PROGRESS NOTE    Consulted / following patient for:  ESBL E. coli urinary tract infection  Urinary retention    Subjective   Interval History:   Tiwari catheter remains in place.  No systemic symptoms.  He says he is still awaiting urologic plans       Objective   PHYSICAL EXAMINATION  Vital signs:  Visit Vitals  /55 (BP Location: Right arm, Patient Position: Lying)   Pulse 57   Temp 36.9 °C (98.4 °F) (Temporal)   Resp 20      General: Not toxic, no acute distress  Abdomen:  Soft, nontender.   Back:  No spinal or CVA tenderness  Genitalia: Tiwari catheter draining clear urine    Relevant Results  WBC: 4800     Results from last 72 hours   Lab Units 05/21/24  0553   CREATININE mg/dL 1.00   ANION GAP mmol/L 12   EGFR mL/min/1.73m*2 75   UA: Mild pyuria  Microbiology:  Blood (5/15): Negative X2  Blood (5/18): Negative X1  Urine (5/15): ESBL E. coli      ASSESSMENT:  Urinary retention/ESBL E. coli urinary tract infection  No systemic symptoms.  Awaiting further urology plans.  Will continue intravenous meropenem in anticipation of possible surgical intervention during this admission.  If not, will revert to oral fosfomycin    PLANS:  -   Continue meropenem pending further surgical plans  -   If no anticipated surgery during this admission, and if Tiwari catheter is to remain in place, will suggest fosfomycin 3 g every 48 hours X2 doses    Raul Edwards MD  [facility]  Office:  [phone]      Addendum:  Discussed with Ms. Suarez.  Apparently there are no plans for urologic surgery during this admission.  Patient is to be discharged with a Tiwari catheter in place with outpatient follow-up with urology.  That being the case, will stop meropenem and give a single dose of fosfomycin, which should suffice at this time.    Raul Edwards MD  [facility]  Office:  [phone]    WILL SIGN OFF.  PLEASE RE-CONSULT PRN.  THANK YOU.

## 2024-05-21 NOTE — DISCHARGE INSTRUCTIONS
Keep guidry in until follow up appointment with urologist.  Repeat UA 5/27/24.    Follow up with PCP within 1 week, Follow up with urology within 1 week.

## 2024-05-21 NOTE — DOCUMENTATION CLARIFICATION NOTE
"    PATIENT:               ASHKAN OLIVEROS  ACCT #:                  [id number]  MRN:                       [id number]  :                       1940  ADMIT DATE:       5/15/2024 6:29 PM  DISCH DATE:  RESPONDING PROVIDER #:        [id number]          PROVIDER RESPONSE TEXT:    Pneumonia ruled out after further workup    CDI QUERY TEXT:    Clarification    Instruction:    Based on your assessment of the patient and the clinical information, please provide the requested documentation by clicking on the appropriate radio button and enter any additional information if prompted.    Question: Please further specify the type of pneumonia being treated    When answering this query, please exercise your independent professional judgment. The fact that a question is being asked, does not imply that any particular answer is desired or expected.    The patient's clinical indicators include:  Clinical Information: 84 Y/M problems with resistant UTIs including ESBL yesterday he started spiking high fevers became lethargic and confused. He did have some vomiting.  He has been coughing and he is borderline hypoxemic.    Clinical Indicators:  5/15 Triage VS: 39.2, 105, 20, 191/71,  97% on 4L NC    \" some respiratory symptoms and his lungs are crackly.  I am going to get a CT of the chest to better clarify what may or may not be going on in his lungs.\"     ED :\" Family states that he has had some cough and congestion over the last several days as well...Patient became hypoxic at 89% on room air and placed on nasal cannula oxygen 2 L... patient has basilar Rales and crackles concerning for atypical pneumonia.\"     CT:\" Study degraded by motion artifact. Interstitial edema. Small bilateral pleural effusions with airspace opacities at the bilateral lower lobes, suggestive of atelectasis, superimposed pneumonia is not excludable.\"    Treatment: IVF, O2, Merrem 1g IV q8H    Risk Factors: Nausea and vomiting,  recently was " treated for a UTI and multiple medication changes were made  Options provided:  -- Pneumonia ruled out after further workup  -- Aspiration PNA  -- Gram Negative PNA  -- Other - I will add my own diagnosis  -- Refer to Clinical Documentation Reviewer    Query created by: Viviana Diana on 5/21/2024 11:13 AM      Electronically signed by:  SAPNA DAVIS 5/21/2024 4:53 PM

## 2024-05-21 NOTE — PROGRESS NOTES
Pt will not be having further surgical intervention, will not need to dc on IV abx. Possible dc today. [facility] making [facility] aware, will send them dc information when it is available.      05/21/24 1327   Discharge Planning   Assistance Needed Home with [facility]   Patient expects to be discharged to: Home with [facility]

## 2024-05-21 NOTE — NURSING NOTE
Discharge instructions reviewed with patient and family, tele removed, piv out, guidry leg bag given for home and guidry care instructions reviewed. Patient ambulated to lobby with belongings and family at side.

## 2024-05-21 NOTE — CARE PLAN
The patient's goals for the shift include no pain    The clinical goals for the shift include rest and stable VS and safety    Over the shift, the patient did not make progress toward the following goals. Barriers to progression include weakness. Recommendations to address these barriers include rest.

## 2024-05-21 NOTE — DISCHARGE SUMMARY
Discharge Diagnosis  Acute urinary retention  UTI (urinary tract infection)    Issues Requiring Follow-Up  Chronic Urinary Retention  Diabetes Mellitus Type II  Atrial fibrillation  CHF  Recurrent UTI    Discharge Meds     Your medication list        CONTINUE taking these medications        Instructions Last Dose Given Next Dose Due   acetaminophen 325 mg tablet  Commonly known as: Tylenol           amLODIPine 5 mg tablet  Commonly known as: Norvasc           aspirin 81 mg EC tablet           atorvastatin 40 mg tablet  Commonly known as: Lipitor      Take 1 tablet (40 mg) by mouth once daily.       cyanocobalamin 500 mcg tablet  Commonly known as: Vitamin B-12           ferrous sulfate 325 (65 Fe) MG EC tablet      Take 1 tablet by mouth once daily.       finasteride 5 mg tablet  Commonly known as: Proscar      Take 1 tablet (5 mg) by mouth once daily.       furosemide 40 mg tablet  Commonly known as: Lasix      Take 1 tablet (40 mg) by mouth once daily.       gabapentin 100 mg capsule  Commonly known as: Neurontin      TAKE 1 CAPSULE BY MOUTH ONCE DAILY FOR NEUROPATHY PAIN       levETIRAcetam 250 mg tablet  Commonly known as: Keppra           magnesium oxide 400 mg (241.3 mg magnesium) tablet  Commonly known as: Mag-Ox      Take 1 tablet (400 mg) by mouth once daily.       metFORMIN 500 mg tablet  Commonly known as: Glucophage      Take 2 tablets (1,000 mg) by mouth 2 times a day.       omeprazole 40 mg DR capsule  Commonly known as: PriLOSEC      GIVE 1 CAPSULE BY MOUTH ONE TIME A DAY FOR HEARTBURN       Soliqua 100/33 100 unit-33 mcg/mL insulin pen  Generic drug: insulin glargine-lixisenatide      Inject 10 Units under the skin once daily at bedtime.       tamsulosin 0.4 mg 24 hr capsule  Commonly known as: Flomax      Take 1 capsule (0.4 mg) by mouth 2 times a day.       Vitamin C 1,000 mg tablet  Generic drug: ascorbic acid                  STOP taking these medications      nitrofurantoin  (macrocrystal-monohydrate) 100 mg capsule  Commonly known as: Macrobid                 Test Results Pending At Discharge  Pending Labs       Order Current Status    Blood Culture Preliminary result            Hospital Course  84 year old male admitted 5/15/24 for fever. Found to have UTI. Hx of BPH, recurrent UTIs and recurrent urinary retention. Consults with urology and ID. Placed on IV meropenem for UTI. Placed indwelling guidry catheter for retention. Urine culture growing E. Coli, Per urology, keep indwelling guidry until follow up appointment in one week. Completed meronem course and received 1 dose of fosfomycin 5/21/24 to complete treatment for UTI. Per family's request have ordered repeat UA for next week. Patient cleared to dc from urology and ID. Follow up with PCP and urology within 1 week. Will discharge home and resume home health care.     Pertinent Physical Exam At Time of Discharge  Physical Exam  Vitals and nursing note reviewed.   Constitutional:       Appearance: Normal appearance.   HENT:      Head: Normocephalic and atraumatic.      Mouth/Throat:      Mouth: Mucous membranes are moist.   Eyes:      Extraocular Movements: Extraocular movements intact.      Pupils: Pupils are equal, round, and reactive to light.   Cardiovascular:      Rate and Rhythm: Normal rate.      Pulses: Normal pulses.      Heart sounds: Normal heart sounds.   Pulmonary:      Effort: Pulmonary effort is normal.      Breath sounds: Normal breath sounds.   Abdominal:      General: Bowel sounds are normal.      Palpations: Abdomen is soft.   Genitourinary:     Comments: Indwelling guidry catheter draining clear yellow urine    Musculoskeletal:         General: Normal range of motion.      Cervical back: Normal range of motion and neck supple.   Skin:     General: Skin is warm and dry.      Capillary Refill: Capillary refill takes less than 2 seconds.   Neurological:      General: No focal deficit present.      Mental Status: He  is alert.         Outpatient Follow-Up  Future Appointments   Date Time Provider Department Center   5/30/2024  3:30 PM PHARMACY WEARN [facility] [id number] [facility]   6/11/2024  3:00 PM SUSAN Mccann [id number] [facility]   7/29/2024  1:00 PM Christopher D'Amico, DO [id number] [facility]         DINORA DavisCNP

## 2024-05-22 ENCOUNTER — PATIENT OUTREACH (OUTPATIENT)
Dept: PRIMARY CARE | Facility: CLINIC | Age: 84
End: 2024-05-22
Payer: MEDICARE

## 2024-05-22 NOTE — PROGRESS NOTES
Discharge Facility:[facility]  Discharge Diagnosis:UTI and Acute UR  Admission Date:5/16/24  Discharge Date: 5/21/24    PCP Appointment Date:Tasked to office  Specialist Appointment Date: Urology, Cardiology  Hospital Encounter and Summary: Linked   See discharge assessment below for further details  Engagement  Call Start Time: 1244 (5/22/2024 12:44 PM)    Medications  Medications reviewed with patient/caregiver?: Yes (5/22/2024 12:44 PM)  Is the patient having any side effects they believe may be caused by any medication additions or changes?: No (5/22/2024 12:44 PM)  Does the patient have all medications ordered at discharge?: Yes (5/22/2024 12:44 PM)  Prescription Comments: see med list (5/22/2024 12:44 PM)  Is the patient taking all medications as directed (includes completed medication regime)?: Yes (5/22/2024 12:44 PM)  Medication Comments: see med list (5/22/2024 12:44 PM)    Appointments  Does the patient have a primary care provider?: Yes (5/22/2024 12:44 PM)  Care Management Interventions: Advised patient to make appointment; Educated patient on importance of making appointment (5/22/2024 12:44 PM)  Has the patient kept scheduled appointments due by today?: Yes (5/22/2024 12:44 PM)    Self Management  What is the home health agency?:  Home Health (5/22/2024 12:44 PM)  Has home health visited the patient within 72 hours of discharge?: No (5/22/2024 12:44 PM)    Patient Teaching  Does the patient have access to their discharge instructions?: Yes (5/22/2024 12:44 PM)  Care Management Interventions: Reviewed instructions with patient (5/22/2024 12:44 PM)  What is the patient's perception of their health status since discharge?: Improving (5/22/2024 12:44 PM)  Is the patient/caregiver able to teach back the hierarchy of who to call/visit for symptoms/problems? PCP, Specialist, Home Health nurse, Urgent Care, ED, 911: Yes (5/22/2024 12:44 PM)    Wrap Up  Wrap Up Additional Comments: This CM spoke with pt via  phone. Pt reports doing well at home since discharge. New meds reviewed. Pt denies CP and SOB. Patient denies any further discharge questions/needs at this time. Emphasized that Follow up is needed after discharge to review the hospital recommendations, assess your response to your treatment. Pt aware of my availability for non-emergent concerns. Contact info provided to patient. (5/22/2024 12:44 PM)      Sofia Flores LPN

## 2024-05-23 LAB — BACTERIA BLD CULT: NORMAL

## 2024-05-30 ENCOUNTER — HOSPITAL ENCOUNTER (OUTPATIENT)
Facility: HOSPITAL | Age: 84
Setting detail: OUTPATIENT SURGERY
End: 2024-05-30
Attending: UROLOGY | Admitting: UROLOGY
Payer: MEDICARE

## 2024-05-30 ENCOUNTER — TELEMEDICINE (OUTPATIENT)
Dept: PHARMACY | Facility: HOSPITAL | Age: 84
End: 2024-05-30
Payer: MEDICARE

## 2024-05-30 DIAGNOSIS — E11.9 TYPE 2 DIABETES MELLITUS WITHOUT COMPLICATION, WITH LONG-TERM CURRENT USE OF INSULIN (MULTI): Primary | ICD-10-CM

## 2024-05-30 DIAGNOSIS — N40.1 ENLARGED PROSTATE WITH URINARY RETENTION: Primary | ICD-10-CM

## 2024-05-30 DIAGNOSIS — Z79.4 TYPE 2 DIABETES MELLITUS WITHOUT COMPLICATION, WITH LONG-TERM CURRENT USE OF INSULIN (MULTI): Primary | ICD-10-CM

## 2024-05-30 DIAGNOSIS — R33.8 ENLARGED PROSTATE WITH URINARY RETENTION: Primary | ICD-10-CM

## 2024-05-30 NOTE — PROGRESS NOTES
Pharmacist Clinic: Diabetes Management  Luis Greene is a 84 y.o. male was referred to Clinical Pharmacy Team for diabetes management.     Referring Provider: Christopher D'Amico, DO     HISTORY OF PRESENT ILLNESS  Patient is a type 2 diabetic, his DM is currently complicated with steroid injections for back pain. In his lifetime he has gotten 4 shots.      Diet:   - 1-2 snacks throughout the day (apple, popcorn, chips, sandwich)   - Dinner: varies, pasta, soup, take out     LAB REVIEW   Glucose (mg/dL)   Date Value   05/21/2024 194 (H)   05/20/2024 284 (H)   05/19/2024 199 (H)     Hemoglobin A1C (%)   Date Value   05/03/2024 9.6 (H)   11/15/2023 6.7 (H)   09/14/2023 6.8 (A)   08/14/2023 8.1 (H)   07/14/2023 7.5 (A)   04/19/2023 9.7 (H)   02/03/2023 9.6 (H)   12/20/2021 7.5 (H)     Bicarbonate (mmol/L)   Date Value   05/21/2024 24   05/20/2024 23 (L)   05/19/2024 23 (L)     Urea Nitrogen (mg/dL)   Date Value   05/21/2024 19   05/20/2024 24   05/19/2024 23     Creatinine (mg/dL)   Date Value   05/21/2024 1.00   05/20/2024 1.00   05/19/2024 1.00     Lab Results   Component Value Date    HGBA1C 9.6 (H) 05/03/2024    HGBA1C 6.7 (H) 11/15/2023    HGBA1C 6.8 (A) 09/14/2023     Lab Results   Component Value Date    CHOL 97 05/03/2024    CHOL 81 (L) 11/15/2023    CHOL 81 (L) 08/15/2023     Lab Results   Component Value Date    HDL 37.6 05/03/2024    HDL 29.0 (L) 11/15/2023    HDL 28 (L) 08/15/2023     Lab Results   Component Value Date    TRIG 70 05/03/2024    TRIG 110 11/15/2023    TRIG 109 08/15/2023       DIABETES ASSESSMENT    CURRENT PHARMACOTHERAPY  - metformin 500 mg 2 tabs by mouth twice daily   - soliqua 10 units under the skin once daily    SECONDARY PREVENTION  - Statin? Yes  - ACE-I/ARB? Yes    HISTORICAL PHARMACOTHERAPY   - Lantus 20 units under the skin once daily   - Novolog 5 units once daily once daily     SMBG MEASUREMENTS: patient is using freestyle reed 2   - around 180, however he has just been  discharged from the hospital and his numbers always fluctuate when he is in patient     DISCUSSION:   - Patient has been in and out of the hospital these past few months, discussed continuing current regimen and focusing on getting back on track since discharge     RECOMMENDATIONS/PLAN  1. Patients diabetes is worsening with most recent A1c of 9.6 % (goal < 7 %).   - Continue all meds under the continuation of care with the referring provider and clinical pharmacy team  - Continue soliqua to 10 units once daily    Clinical Pharmacist follow up: 2 weeks    Thank you,  Leeann Page, PharmD    Verbal consent to manage patient's drug therapy was obtained from the patient. They were informed they may decline to participate or withdraw from participation in pharmacy services at any time.

## 2024-06-07 ENCOUNTER — PRE-ADMISSION TESTING (OUTPATIENT)
Dept: PREADMISSION TESTING | Facility: HOSPITAL | Age: 84
End: 2024-06-07
Payer: MEDICARE

## 2024-06-07 VITALS
SYSTOLIC BLOOD PRESSURE: 124 MMHG | DIASTOLIC BLOOD PRESSURE: 65 MMHG | TEMPERATURE: 97 F | OXYGEN SATURATION: 100 % | WEIGHT: 175.04 LBS | RESPIRATION RATE: 18 BRPM | HEIGHT: 71 IN | BODY MASS INDEX: 24.51 KG/M2 | HEART RATE: 95 BPM

## 2024-06-07 PROCEDURE — 99203 OFFICE O/P NEW LOW 30 MIN: CPT | Performed by: NURSE PRACTITIONER

## 2024-06-07 ASSESSMENT — DUKE ACTIVITY SCORE INDEX (DASI)
CAN YOU DO MODERATE WORK AROUND THE HOUSE LIKE VACUUMING, SWEEPING FLOORS OR CARRYING GROCERIES: NO
TOTAL_SCORE: 28.2
CAN YOU CLIMB A FLIGHT OF STAIRS OR WALK UP A HILL: YES
CAN YOU TAKE CARE OF YOURSELF (EAT, DRESS, BATHE, OR USE TOILET): YES
DASI METS SCORE: 6.2
CAN YOU PARTICIPATE IN STRENOUS SPORTS LIKE SWIMMING, SINGLES TENNIS, FOOTBALL, BASKETBALL, OR SKIING: YES
CAN YOU DO YARD WORK LIKE RAKING LEAVES, WEEDING OR PUSHING A MOWER: NO
CAN YOU PARTICIPATE IN MODERATE RECREATIONAL ACTIVITIES LIKE GOLF, BOWLING, DANCING, DOUBLES TENNIS OR THROWING A BASEBALL OR FOOTBALL: NO
CAN YOU WALK A BLOCK OR TWO ON LEVEL GROUND: YES
CAN YOU WALK INDOORS, SUCH AS AROUND YOUR HOUSE: YES
CAN YOU DO LIGHT WORK AROUND THE HOUSE LIKE DUSTING OR WASHING DISHES: YES
CAN YOU RUN A SHORT DISTANCE: NO
CAN YOU HAVE SEXUAL RELATIONS: YES
CAN YOU DO HEAVY WORK AROUND THE HOUSE LIKE SCRUBBING FLOORS OR LIFTING AND MOVING HEAVY FURNITURE: NO

## 2024-06-07 ASSESSMENT — ENCOUNTER SYMPTOMS
GASTROINTESTINAL NEGATIVE: 1
EYES NEGATIVE: 1
PSYCHIATRIC NEGATIVE: 1
CONSTITUTIONAL NEGATIVE: 1
HEMATOLOGIC/LYMPHATIC NEGATIVE: 1
ALLERGIC/IMMUNOLOGIC NEGATIVE: 1
RESPIRATORY NEGATIVE: 1
ENDOCRINE NEGATIVE: 1
CARDIOVASCULAR NEGATIVE: 1
MUSCULOSKELETAL NEGATIVE: 1

## 2024-06-07 NOTE — PREPROCEDURE INSTRUCTIONS
Medication List            Accurate as of June 7, 2024  2:15 PM. Always use your most recent med list.                acetaminophen 325 mg tablet  Commonly known as: Tylenol  Medication Adjustments for Surgery: Take morning of surgery with sip of water, no other fluids     amLODIPine 5 mg tablet  Commonly known as: Norvasc  Medication Adjustments for Surgery: Take morning of surgery with sip of water, no other fluids     aspirin 81 mg EC tablet  Medication Adjustments for Surgery: Other (Comment)  Notes to patient: LAST DOSE 6/14/24     atorvastatin 40 mg tablet  Commonly known as: Lipitor  Take 1 tablet (40 mg) by mouth once daily.  Medication Adjustments for Surgery: Take morning of surgery with sip of water, no other fluids     cyanocobalamin 500 mcg tablet  Commonly known as: Vitamin B-12  Medication Adjustments for Surgery: Stop 7 days before surgery     ferrous sulfate 325 (65 Fe) MG EC tablet  Take 1 tablet by mouth once daily.  Medication Adjustments for Surgery: Continue until night before surgery  Notes to patient: DO NOT TAKE THE MORNING OF SURGERY     finasteride 5 mg tablet  Commonly known as: Proscar  Take 1 tablet (5 mg) by mouth once daily.  Medication Adjustments for Surgery: Take morning of surgery with sip of water, no other fluids     furosemide 40 mg tablet  Commonly known as: Lasix  Take 1 tablet (40 mg) by mouth once daily.  Medication Adjustments for Surgery: Take morning of surgery with sip of water, no other fluids     gabapentin 100 mg capsule  Commonly known as: Neurontin  TAKE 1 CAPSULE BY MOUTH ONCE DAILY FOR NEUROPATHY PAIN  Medication Adjustments for Surgery: Take morning of surgery with sip of water, no other fluids     levETIRAcetam 250 mg tablet  Commonly known as: Keppra  Medication Adjustments for Surgery: Take morning of surgery with sip of water, no other fluids     magnesium oxide 400 mg (241.3 mg magnesium) tablet  Commonly known as: Mag-Ox  Take 1 tablet (400 mg) by mouth  once daily.  Medication Adjustments for Surgery: Continue until night before surgery  Notes to patient: DO NOT TAKE THE MORNING OF SURGERY     metFORMIN 500 mg tablet  Commonly known as: Glucophage  Take 2 tablets (1,000 mg) by mouth 2 times a day.  Medication Adjustments for Surgery: Continue until night before surgery  Notes to patient: DO NOT TAKE THE MORNING OF SURGERY     omeprazole 40 mg DR capsule  Commonly known as: PriLOSEC  GIVE 1 CAPSULE BY MOUTH ONE TIME A DAY FOR HEARTBURN  Medication Adjustments for Surgery: Take morning of surgery with sip of water, no other fluids     Soliqua 100/33 100 unit-33 mcg/mL insulin pen  Generic drug: insulin glargine-lixisenatide  Inject 10 Units under the skin once daily at bedtime.  Medication Adjustments for Surgery: Continue until night before surgery  Notes to patient: TAKE HALF OF THE DOSE THE NIGHT BEFORE YOUR SURGERY     tamsulosin 0.4 mg 24 hr capsule  Commonly known as: Flomax  Take 1 capsule (0.4 mg) by mouth 2 times a day.  Medication Adjustments for Surgery: Take morning of surgery with sip of water, no other fluids     Vitamin C 1,000 mg tablet  Generic drug: ascorbic acid  Medication Adjustments for Surgery: Stop 7 days before surgery                              PAT DISCHARGE INSTRUCTIONS    Please call the Same Day Surgery (SDS) Department of the hospital where your procedure will be performed after 2:00 PM the day before your surgery. If you are scheduled on a Monday, or a Tuesday following a Monday holiday, you will need to call on the last business day prior to your surgery.    [facility]  [address] [place] [address], [address] [phone]  [facility]  [address] [place] [address] [phone]  [facility]  [address] [place] [address]  [phone]    Please let your surgeon know if:      You  develop any open sores, shingles, burning or painful urination as these may increase your risk of an infection.   You no longer wish to have the surgery.   Any other personal circumstances change that may lead to the need to cancel or defer this surgery-such as being sick or getting admitted to any hospital within one week of your planned procedure.    Your contact details change, such as a change of address or phone number.    Starting now:     Please DO NOT drink alcohol or smoke for 24 hours before surgery. It is well known that quitting smoking can make a huge difference to your health and recovery from surgery. The longer you abstain from smoking, the better your chances of a healthy recovery. If you need help with quitting, call 3-756-QUIT-NOW to be connected to a trained counselor who will discuss the best methods to help you quit.     Before your surgery:    Please stop all supplements 7 days prior to surgery. Or as directed by your surgeon.   Please stop taking NSAID pain medicine such as Advil and Motrin 7 days before surgery.    If you develop any fever, cough, cold, rashes, cuts, scratches, scrapes, urinary symptoms or infection anywhere on your body (including teeth and gums) prior to surgery, please call your surgeon’s office as soon as possible. This may require treatment to reduce the chance of cancellation on the day of surgery.    The day before your surgery:   DIET- Please follow the diet instructions at the top of your packet.   Get a good night’s rest.  Use the special soap for bathing if you have been instructed to use one.    Scheduled surgery times may change and you will be notified if this occurs - please check your personal voicemail for any updates.     On the morning of surgery:   Wear comfortable, loose fitting clothes which open in the front. Please do not wear moisturizers, creams, lotions, makeup or perfume.    Please bring with you to surgery:   Photo ID and insurance card   Current  list of medicines and allergies   Pacemaker/ Defibrillator/Heart stent cards   CPAP machine and mask    Slings/ splints/ crutches   A copy of your complete advanced directive/DHPOA.    Please do NOT bring with you to surgery:   All jewelry and valuables should be left at home.   Prosthetic devices such as contact lenses, hearing aids, dentures, eyelash extensions, hairpins and body piercings must be removed prior to going in to the surgical suite.    After outpatient surgery:   A responsible adult MUST accompany you at the time of discharge and stay with you for 24 hours after your surgery. You may NOT drive yourself home after surgery.    Do not drive, operate machinery, make critical decisions or do activities that require co-ordination or balance until after a night’s sleep.   Do not drink alcoholic beverages for 24 hours.   Instructions for resuming your medications will be provided by your surgeon.    CALL YOUR DOCTOR AFTER SURGERY IF YOU HAVE:     Chills and/or a fever of 101° F or higher.    Redness, swelling, pus or drainage from your surgical wound or a bad smell from the wound.    Lightheadedness, fainting or confusion.    Persistent vomiting (throwing up) and are not able to eat or drink for 12 hours.    Three or more loose, watery bowel movements in 24 hours (diarrhea).   Difficulty or pain while urinating( after non-urological surgery)    Pain and swelling in your legs, especially if it is only on one side.    Difficulty breathing or are breathing faster than normal.    Any new concerning symptoms.    Preoperative Fasting Guidelines    Why must I stop eating and drinking near surgery time?  With sedation, food or liquid in your stomach can enter your lungs causing serious complications  Increases nausea and vomiting    When do I need to stop eating and drinking before my surgery?  Do not eat any food or drink any liquids after midnight the night before your surgery/procedure.  You may have sips of water  to take medications.

## 2024-06-07 NOTE — CPM/PAT H&P
CPM/PAT Evaluation       Name: Luis Greene (Luis Greene)  /Age: 1940/84 y.o.     In-Person       Chief Complaint: BPH with urinary obstruction    HPI    Pt is an 84 year old male with H/O BPH with urinary obstruction. Pt was accompanied to PAT today by his wife. Pt has history of recurrent UTI. Pt had a Urolift surgery in the past. Pt was just recently hospitalized for chronic urinary retention and UTI. Pt stated he was discharged from the hospital with a guidry catheter and it remains in place. Pt denies fever, chills, abdominal pain, or gross hematuria. Pt's urologist has scheduled the patient for Green light laser of the prostate. Pt denies CP, SOB, or dizziness.     Past Medical History:   Diagnosis Date    A-fib (Multi)     Anemia     BPH (benign prostatic hyperplasia)     CHF (congestive heart failure) (Multi)     Cognitive communication deficit     Coronary artery disease     Diabetes (Multi)     GERD (gastroesophageal reflux disease)     H/O sepsis     H/O: upper GI bleed     HL (hearing loss)     Hyperlipidemia     Iron deficiency anemia     Muscle weakness (generalized)     Osteoarthritis     Personal history of other diseases of the circulatory system     History of cardiac disorder    Personal history of other endocrine, nutritional and metabolic disease     History of hypercholesterolemia    Recurrent UTI     Seizure disorder (Multi)     Thrombocytopenia (CMS-HCC)     TIA (transient ischemic attack)     Type 2 diabetes mellitus (Multi)     Unspecified convulsions (Multi)     Urinary retention     Urinary tract infection      Past Surgical History:   Procedure Laterality Date    CARDIAC CATHETERIZATION N/A 11/10/2023    Procedure: LAAO (Left Atrial Appendage Occlusion);  Surgeon: Louie Avitia MD;  Location: [place] [address] [facility];  Service: Cardiovascular;  Laterality: N/A;  Last Eliquis /SD /[employer]    CARDIAC CATHETERIZATION  2012    COLONOSCOPY      CORONARY  ARTERY BYPASS GRAFT  2011    CABG x2 vessels bypassed    LAMINECTOMY  2022    MR HEAD ANGIO WO IV CONTRAST  02/17/2019    MR HEAD ANGIO WO IV CONTRAST [facility]    MR HEAD ANGIO WO IV CONTRAST  10/28/2022    MR HEAD ANGIO WO IV CONTRAST [facility]    MR HEAD ANGIO WO IV CONTRAST  08/15/2023    MR HEAD ANGIO WO IV CONTRAST [facility]    OTHER SURGICAL HISTORY  03/11/2019    Heart surgery    OTHER SURGICAL HISTORY  03/11/2019    Knee surgery    OTHER SURGICAL HISTORY  03/11/2019    Tonsillectomy    OTHER SURGICAL HISTORY      urolift    ROTATOR CUFF REPAIR      UPPER GASTROINTESTINAL ENDOSCOPY       Social History     Tobacco Use    Smoking status: Never    Smokeless tobacco: Never   Substance Use Topics    Alcohol use: Never     Social History     Substance and Sexual Activity   Drug Use Never     Patient Sexual activity questions deferred to the physician.    Family History   Problem Relation Name Age of Onset    Other (cardiac disorder) Father      Diabetes Father      Hyperlipidemia Father      Hypertension Father      Other (liver carcinoma) Father      Other (hypercholesterolemia) Father      Hypertension Daughter         Allergies   Allergen Reactions    Isosorbide GI Upset and Unknown    Famotidine Agitation     Shakiness       Prednisone Other and Unknown     Agitation     Amoxicillin Unknown    Donepezil Unknown    Hydrochlorothiazide Other    Ibuprofen Unknown    Oxycodone-Acetaminophen Other     Family states no allergy    Tramadol Hcl Unknown     Tolerates tramadol    Oxycodone Hcl Rash     Current Outpatient Medications   Medication Sig Dispense Refill    amLODIPine (Norvasc) 5 mg tablet Take 1 tablet (5 mg) by mouth once daily.      ascorbic acid (Vitamin C) 1,000 mg tablet Vitamin C      aspirin 81 mg EC tablet Take 1 tablet (81 mg) by mouth once daily.      atorvastatin (Lipitor) 40 mg tablet Take 1 tablet (40 mg) by mouth once daily. 90 tablet 2    cyanocobalamin  "(Vitamin B-12) 500 mcg tablet Take 1 tablet (500 mcg) by mouth 2 times a week. Tuesday and Friday      ferrous sulfate 325 (65 Fe) MG EC tablet Take 1 tablet by mouth once daily. 90 tablet 3    finasteride (Proscar) 5 mg tablet Take 1 tablet (5 mg) by mouth once daily. 30 tablet 5    furosemide (Lasix) 40 mg tablet Take 1 tablet (40 mg) by mouth once daily. 90 tablet 0    gabapentin (Neurontin) 100 mg capsule TAKE 1 CAPSULE BY MOUTH ONCE DAILY FOR NEUROPATHY PAIN 90 capsule 0    insulin glargine-lixisenatide (Soliqua 100/33) 100 unit-33 mcg/mL insulin pen Inject 10 Units under the skin once daily at bedtime.      levETIRAcetam (Keppra) 250 mg tablet Take 1 tablet (250 mg) by mouth 2 times a day.      magnesium oxide (Mag-Ox) 400 mg (241.3 mg magnesium) tablet Take 1 tablet (400 mg) by mouth once daily.      metFORMIN (Glucophage) 500 mg tablet Take 2 tablets (1,000 mg) by mouth 2 times a day. 360 tablet 1    omeprazole (PriLOSEC) 40 mg DR capsule GIVE 1 CAPSULE BY MOUTH ONE TIME A DAY FOR HEARTBURN 90 capsule 3    tamsulosin (Flomax) 0.4 mg 24 hr capsule Take 1 capsule (0.4 mg) by mouth 2 times a day. 180 capsule 1    acetaminophen (Tylenol) 325 mg tablet Take 2 tablets (650 mg) by mouth every 6 hours if needed for mild pain (1 - 3).       No current facility-administered medications for this visit.     Review of Systems   Constitutional: Negative.    HENT: Negative.     Eyes: Negative.    Respiratory: Negative.     Cardiovascular: Negative.    Gastrointestinal: Negative.    Endocrine: Negative.    Genitourinary: Negative.         Tiwari catheter in place   Musculoskeletal: Negative.    Skin: Negative.    Allergic/Immunologic: Negative.    Neurological:         BLE peripheral neuropathy.    Hematological: Negative.    Psychiatric/Behavioral: Negative.       /65   Pulse 95   Temp 36.1 °C (97 °F) (Tympanic)   Resp 18   Ht 1.803 m (5' 11\")   Wt 79.4 kg (175 lb 0.7 oz)   SpO2 100%   BMI 24.41 kg/m² "     Physical Exam  Vitals reviewed.   Constitutional:       Appearance: Normal appearance. He is normal weight.   HENT:      Head: Normocephalic and atraumatic.      Nose: Nose normal.      Mouth/Throat:      Mouth: Mucous membranes are moist.      Pharynx: Oropharynx is clear.   Eyes:      Extraocular Movements: Extraocular movements intact.      Conjunctiva/sclera: Conjunctivae normal.      Pupils: Pupils are equal, round, and reactive to light.   Cardiovascular:      Rate and Rhythm: Normal rate and regular rhythm.      Pulses: Normal pulses.      Heart sounds: Normal heart sounds.   Pulmonary:      Effort: Pulmonary effort is normal.      Breath sounds: Normal breath sounds.   Abdominal:      General: Bowel sounds are normal.      Palpations: Abdomen is soft.   Genitourinary:     Comments: Tiwari catheter in place  Musculoskeletal:      Cervical back: Normal range of motion and neck supple.      Comments: Pt ambulates with a rollator d/t BLE peripheral neuropathy   Skin:     General: Skin is warm and dry.   Neurological:      General: No focal deficit present.      Mental Status: He is alert and oriented to person, place, and time. Mental status is at baseline.   Psychiatric:         Mood and Affect: Mood normal.         Behavior: Behavior normal.         Thought Content: Thought content normal.         Judgment: Judgment normal.        PAT AIRWAY:   Airway:     Mallampati::  I    TM distance::  >3 FB    Neck ROM::  Full   partials    ASA: 3  DASI: 28.2  METS: 6.2  CHADS: 2.5%  RCRI: 0.9%  STOP BAN    Assessment and Plan:     BPH with urinary obstruction: Green Light Laser Prostate.   HTN: Pt is taking amlodipine and furosemide.   DM2: 5/3/2024 HgbA1C: 9.6  AFIB: 2023 Watchman; Pt is followed by cardiology Sandy Gar.   Hyperlipidemia: pt is taking atorvastatin.   CAD: s/p CABG x 2 vessels bypassed in ; Card Pt is followed by Sandy Gar.   Anemia pt is taking ferrous sulfate.   H/O TIA in the  past.   CHF: 11/2023: EF: 60-65%  Thrombocytopenia: CBC   Seizure activity: Pt was diagnosed with seizure activity this past fall. Pt is taking Keppra.   GERD: pt is taking omeprazole.     11/10/2023: ECHO  CONCLUSIONS:   1. Left ventricular systolic function is normal with a 60-65% estimated ejection fraction.   2. Abnormal septal motion consistent with post-operative status.   3. The left atrium is enlarged.   4. The right atrium is moderately dilated.      EKG from 5/15/2024    JONA Beckman-CNP

## 2024-06-11 ENCOUNTER — OFFICE VISIT (OUTPATIENT)
Dept: CARDIOLOGY | Facility: CLINIC | Age: 84
End: 2024-06-11
Payer: MEDICARE

## 2024-06-11 VITALS
HEIGHT: 71 IN | OXYGEN SATURATION: 99 % | SYSTOLIC BLOOD PRESSURE: 117 MMHG | DIASTOLIC BLOOD PRESSURE: 64 MMHG | BODY MASS INDEX: 24.43 KG/M2 | WEIGHT: 174.5 LBS | HEART RATE: 87 BPM

## 2024-06-11 DIAGNOSIS — I48.91 ATRIAL FIBRILLATION, UNSPECIFIED TYPE (MULTI): Primary | ICD-10-CM

## 2024-06-11 PROCEDURE — 99214 OFFICE O/P EST MOD 30 MIN: CPT | Performed by: NURSE PRACTITIONER

## 2024-06-11 PROCEDURE — 1036F TOBACCO NON-USER: CPT | Performed by: NURSE PRACTITIONER

## 2024-06-11 PROCEDURE — 1159F MED LIST DOCD IN RCRD: CPT | Performed by: NURSE PRACTITIONER

## 2024-06-11 PROCEDURE — 3074F SYST BP LT 130 MM HG: CPT | Performed by: NURSE PRACTITIONER

## 2024-06-11 PROCEDURE — 1160F RVW MEDS BY RX/DR IN RCRD: CPT | Performed by: NURSE PRACTITIONER

## 2024-06-11 PROCEDURE — 1126F AMNT PAIN NOTED NONE PRSNT: CPT | Performed by: NURSE PRACTITIONER

## 2024-06-11 PROCEDURE — 1111F DSCHRG MED/CURRENT MED MERGE: CPT | Performed by: NURSE PRACTITIONER

## 2024-06-11 PROCEDURE — 3078F DIAST BP <80 MM HG: CPT | Performed by: NURSE PRACTITIONER

## 2024-06-11 PROCEDURE — 1157F ADVNC CARE PLAN IN RCRD: CPT | Performed by: NURSE PRACTITIONER

## 2024-06-11 ASSESSMENT — ENCOUNTER SYMPTOMS
MUSCULOSKELETAL NEGATIVE: 1
LOSS OF SENSATION IN FEET: 1
CARDIOVASCULAR NEGATIVE: 1
GASTROINTESTINAL NEGATIVE: 1
CONSTITUTIONAL NEGATIVE: 1
RESPIRATORY NEGATIVE: 1
DEPRESSION: 0
OCCASIONAL FEELINGS OF UNSTEADINESS: 1

## 2024-06-11 ASSESSMENT — PAIN SCALES - GENERAL: PAINLEVEL: 0-NO PAIN

## 2024-06-11 NOTE — PROGRESS NOTES
"Chief Complaint:   Follow-up    History Of Present Illness:    .Mr Greene returns in follow up.  Denies chest pain, sob, palpitations or pedal edema.  He will have bph surgery.  Cleared for planned surgery.         Last Recorded Vitals:  Blood pressure 117/64, pulse 87, height 1.803 m (5' 11\"), weight 79.2 kg (174 lb 8 oz), SpO2 99%.     Past Medical History:  Past Medical History:   Diagnosis Date    A-fib (Multi)     Anemia     BPH (benign prostatic hyperplasia)     CHF (congestive heart failure) (Multi)     Cognitive communication deficit     Coronary artery disease     Diabetes (Multi)     GERD (gastroesophageal reflux disease)     H/O sepsis     H/O: upper GI bleed     HL (hearing loss)     Hyperlipidemia     Iron deficiency anemia     Muscle weakness (generalized)     Osteoarthritis     Personal history of other diseases of the circulatory system     History of cardiac disorder    Personal history of other endocrine, nutritional and metabolic disease     History of hypercholesterolemia    Recurrent UTI     Seizure disorder (Multi)     Thrombocytopenia (CMS-HCC)     TIA (transient ischemic attack)     Type 2 diabetes mellitus (Multi)     Unspecified convulsions (Multi)     Urinary retention     Urinary tract infection         Past Surgical History:  Past Surgical History:   Procedure Laterality Date    CARDIAC CATHETERIZATION N/A 11/10/2023    Procedure: LAAO (Left Atrial Appendage Occlusion);  Surgeon: Louie Avitia MD;  Location: [place] [address] [facility];  Service: Cardiovascular;  Laterality: N/A;  Last Eliquis 11/06/SD /[facility]    CARDIAC CATHETERIZATION  2012    COLONOSCOPY      CORONARY ARTERY BYPASS GRAFT  2011    CABG x2 vessels bypassed    LAMINECTOMY  2022    MR HEAD ANGIO WO IV CONTRAST  02/17/2019    MR HEAD ANGIO WO IV CONTRAST [facility]    MR HEAD ANGIO WO IV CONTRAST  10/28/2022    MR HEAD ANGIO WO IV CONTRAST [facility]    MR HEAD ANGIO WO IV CONTRAST "  08/15/2023    MR HEAD ANGIO WO IV CONTRAST [facility]    OTHER SURGICAL HISTORY  03/11/2019    Heart surgery    OTHER SURGICAL HISTORY  03/11/2019    Knee surgery    OTHER SURGICAL HISTORY  03/11/2019    Tonsillectomy    OTHER SURGICAL HISTORY      urolift    OTHER SURGICAL HISTORY      Loop recorder placement    ROTATOR CUFF REPAIR      UPPER GASTROINTESTINAL ENDOSCOPY         Social History:  Social History     Socioeconomic History    Marital status:      Spouse name: None    Number of children: None    Years of education: None    Highest education level: None   Occupational History    None   Tobacco Use    Smoking status: Never    Smokeless tobacco: Never   Vaping Use    Vaping status: Never Used   Substance and Sexual Activity    Alcohol use: Never    Drug use: Never    Sexual activity: Defer   Other Topics Concern    None   Social History Narrative    None     Social Determinants of Health     Financial Resource Strain: Low Risk  (5/16/2024)    Overall Financial Resource Strain (CARDIA)     Difficulty of Paying Living Expenses: Not hard at all   Food Insecurity: No Food Insecurity (5/16/2024)    Hunger Vital Sign     Worried About Running Out of Food in the Last Year: Never true     Ran Out of Food in the Last Year: Never true   Transportation Needs: No Transportation Needs (5/16/2024)    PRAPARE - Transportation     Lack of Transportation (Medical): No     Lack of Transportation (Non-Medical): No   Physical Activity: Inactive (5/16/2024)    Exercise Vital Sign     Days of Exercise per Week: 0 days     Minutes of Exercise per Session: 0 min   Stress: No Stress Concern Present (5/16/2024)    [employer] - Occupational Stress Questionnaire     Feeling of Stress : Not at all   Social Connections: Moderately Isolated (5/16/2024)    Social Connection and Isolation Panel [NHANES]     Frequency of Communication with Friends and Family: More than three times a week      Frequency of Social Gatherings with Friends and Family: Twice a week     Attends Shinto Services: Never     Active Member of Clubs or Organizations: No     Attends Club or Organization Meetings: Never     Marital Status:    Intimate Partner Violence: Not At Risk (5/16/2024)    Humiliation, Afraid, Rape, and Kick questionnaire     Fear of Current or Ex-Partner: No     Emotionally Abused: No     Physically Abused: No     Sexually Abused: No   Housing Stability: Patient Declined (5/16/2024)    Housing Stability Vital Sign     Unable to Pay for Housing in the Last Year: Patient declined     Number of Places Lived in the Last Year: 1     Unstable Housing in the Last Year: Patient declined       Family History:  Family History   Problem Relation Name Age of Onset    Other (cardiac disorder) Father      Diabetes Father      Hyperlipidemia Father      Hypertension Father      Other (liver carcinoma) Father      Other (hypercholesterolemia) Father      Hypertension Daughter           Allergies:  Isosorbide, Famotidine, Prednisone, Amoxicillin, Donepezil, Hydrochlorothiazide, Ibuprofen, Oxycodone-acetaminophen, Tramadol hcl, and Oxycodone hcl    Outpatient Medications:  Current Outpatient Medications   Medication Sig Dispense Refill    acetaminophen (Tylenol) 325 mg tablet Take 2 tablets (650 mg) by mouth every 6 hours if needed for mild pain (1 - 3).      amLODIPine (Norvasc) 5 mg tablet Take 1 tablet (5 mg) by mouth once daily.      ascorbic acid (Vitamin C) 1,000 mg tablet Take 1 tablet (1,000 mg) by mouth once daily.      aspirin 81 mg EC tablet Take 1 tablet (81 mg) by mouth once daily.      atorvastatin (Lipitor) 40 mg tablet Take 1 tablet (40 mg) by mouth once daily. 90 tablet 2    cyanocobalamin (Vitamin B-12) 500 mcg tablet Take 1 tablet (500 mcg) by mouth 2 times a week. Tuesday and Friday      ferrous sulfate 325 (65 Fe) MG EC tablet Take 1 tablet by mouth once daily. 90 tablet 3    finasteride (Proscar)  5 mg tablet Take 1 tablet (5 mg) by mouth once daily. 30 tablet 5    furosemide (Lasix) 40 mg tablet Take 1 tablet (40 mg) by mouth once daily. 90 tablet 0    gabapentin (Neurontin) 100 mg capsule TAKE 1 CAPSULE BY MOUTH ONCE DAILY FOR NEUROPATHY PAIN 90 capsule 0    insulin glargine-lixisenatide (Soliqua 100/33) 100 unit-33 mcg/mL insulin pen Inject 10 Units under the skin once daily at bedtime.      levETIRAcetam (Keppra) 250 mg tablet Take 1 tablet (250 mg) by mouth 2 times a day.      magnesium oxide (Mag-Ox) 400 mg (241.3 mg magnesium) tablet Take 1 tablet (400 mg) by mouth once daily.      metFORMIN (Glucophage) 500 mg tablet Take 2 tablets (1,000 mg) by mouth 2 times a day. 360 tablet 1    omeprazole (PriLOSEC) 40 mg DR capsule GIVE 1 CAPSULE BY MOUTH ONE TIME A DAY FOR HEARTBURN 90 capsule 3    tamsulosin (Flomax) 0.4 mg 24 hr capsule Take 1 capsule (0.4 mg) by mouth 2 times a day. 180 capsule 1     No current facility-administered medications for this visit.        Physical Exam:  Cardiovascular:      PMI at left midclavicular line. Normal rate. Regular rhythm. Normal S1. Normal S2.       Murmurs: There is no murmur.      No gallop.  No click. No rub.   Pulses:     Intact distal pulses.   Edema:     Peripheral edema absent.         ROS:  Review of Systems   Constitutional: Negative.   Cardiovascular: Negative.    Respiratory: Negative.     Musculoskeletal: Negative.    Gastrointestinal: Negative.    Genitourinary: Negative.           Last Labs:  CBC -  Lab Results   Component Value Date    WBC 4.8 05/21/2024    HGB 10.5 (L) 05/21/2024    HCT 29.1 (L) 05/21/2024    MCV 95 05/21/2024     (L) 05/21/2024       CMP -  Lab Results   Component Value Date    CALCIUM 8.5 05/21/2024    PHOS 3.1 05/03/2024    PROT 6.3 05/16/2024    ALBUMIN 3.6 05/16/2024    AST 24 05/16/2024    ALT 19 05/16/2024    ALKPHOS 81 05/16/2024    BILITOT 1.2 05/16/2024       LIPID PANEL -   Lab Results   Component Value Date    CHOL  97 05/03/2024    TRIG 70 05/03/2024    HDL 37.6 05/03/2024    CHHDL 2.6 05/03/2024    LDLF 37 08/22/2022    VLDL 14 05/03/2024    NHDL 59 05/03/2024       RENAL FUNCTION PANEL -   Lab Results   Component Value Date    GLUCOSE 194 (H) 05/21/2024     05/21/2024    K 3.5 05/21/2024    CL 99 05/21/2024    CO2 24 05/21/2024    ANIONGAP 12 05/21/2024    ANIONGAP 16 05/03/2024    BUN 19 05/21/2024    CREATININE 1.00 05/21/2024    GFRMALE 82 09/14/2023    CALCIUM 8.5 05/21/2024    PHOS 3.1 05/03/2024    ALBUMIN 3.6 05/16/2024        Lab Results   Component Value Date    HGBA1C 9.6 (H) 05/03/2024         Assessment/Plan   Problem List Items Addressed This Visit    None    1. Coronary artery disease with remote CABG Ã--2 2011. This patient did develop recurrent angina in 2013 and underwent a repeat cardiac catheterization demonstrating closure of the original bypass grafts but with collateral circulation and no PCI was required.     2. History of TIA. This patient did have a TIA following his cardiac catheterization with transient confusion on the day of discharge. The patient was admitted to [facility] on 02/17/2019 with numbness of his left arm hand and ultimately jaw. The numbness of the left hand and jaw resolved but persisted somewhat longer within the left arm. Evaluation at that time included a chest x-ray showing previous sternotomy with clear lung fields. Carotid ultrasound showed mild bilateral plaque less than 50% stenoses. CT angiogram of the head and neck was unremarkable with nonstenotic calcification of the right common carotid artery without stenosis. The left common carotid artery had a nonstenotic calcification with a patent internal carotid artery. His intracerebral vessels were unremarkable with no aneurysm. An MRI of the brain on 02/17/2019 was unremarkable. He had an MRI of the cervical spine that showed minimal disc bulging at C2-C3 with moderate disc osteophyte complex at C3-C4 causing  moderate canals narrowing and cord impingement along with bilateral neural foraminal stenoses. There was an osteophyte complex at C4-C5 causing mild anterior cord impingement and bilateral moderate neural foraminal narrowing and disc bulging at C5-C6 without cord impingement along with anterior subluxation of C7 upon T1. EKG showed sinus rhythm with first-degree AV block and no ST segment change. There was no evidence of atrial fibrillation by telemetry monitoring. His high sensitivity troponins were negative. He had an echocardiogram performed on 02/17/2019 showing an estimated LV ejection fraction at 55â€“59 percent with mild hypokinesis of the anteroseptal wall due to previous thoracotomy. Left atrial size was in the upper range of normal with mild aortic root calcification and a mildly dilated right atrium. The patient was readmitted to [facility] on 06/04/2019 with recurrent neurological complaints. He had pins and needles paresthesias of the left hand and arm along with perceived weakness of the left hand and arm with loss of coordination. The patient underwent repeat evaluation including MRI of the brain which was unremarkable. Negligible carotid vascular disease by ultrasound and CT angiogram in the past. Blood pressure readings were acceptable at that time with lisinopril 40 mg daily amlodipine 5 mg daily metoprolol extended release 5 mg daily. He was on aspirin at that time along with the atorvastatin 40 mg daily. More recently the [place] he began to develop paresthesias of the right hand and fingertips. Initially he thought that an Ace wrap of his right shoulder was too tight and continued driving. The sense of tingling began to get worse and he developed a funny sensation around the right side of his face involving the lip. The patient pulled his car off to the side of the road. At that point in time he was having trouble  communicating verbally due to the inability to formulate words. He was taken by EMS to [facility] and observed but then released without admission. He is subsequently seen by neurology as an outpatient. He did have a extra no cardiac monitor performed on 02/05/2020â€“02/14/2020. This did not demonstrate atrial fibrillation. It did demonstrate some brief episodes of Mobitz 1 second degree AV block. He also had one 8 beat run of nonsustained ventricular tachycardia. The patient has never had presyncopal or syncopal events but more notably paresthesias. The results of this external cardiac monitor were discussed with electrophysiology and at this point the patient will be scheduled to have a loop recorder implanted to ensure that he is not having clinically silent paroxysmal atrial fibrillation not detected on previous telemetry monitoring. The patient has been seen by neurology and was switched to Plavix 75 mg daily which will be continued unless there is documentation of paroxysmal atrial fibrillation by the loop recorder. Echocardiogram performed at time of today's visit 03/17/2020 demonstrates an LV ejection fraction of 55â€“60 percent with mild hypokinesis of the anteroseptal wall due to previous thoracotomy with oneâ€“2+ mitral valve regurgitation. The findings are very similar to the echo that was performed at [facility] in 2/2019. Patient had loop recorder placed 07/2020 by Dr Steven Flores. Had negative pharmacologic nuclear stress test done 01/2022. Patient had hospital stay at [facility] 08/15/2023 and was worked up for TIA vs migraine equivalent. Carotid US showed < 50% stenosis bilaterally. MRI of brain was negative and MRA was unremarkable. Echo showed EF 55-60%, 1+ MR and 1-2+ TR. See Dr Avitia for consideration for Watchman device.  Patient had Watchman placed 11/2023.     3. Hypertension. Adequate control with present management which includes lisinopril 20 mg daily, amlodipine 5 mg daily and  metoprolol ER 50 mg daily.     4. Hyperlipidemia. Good response to atorvastatin 40 mg daily. Lipid panel on 08/2022 includes cholesterol 77 LDL 37 HDL 29 triglycerides 537. FLP done 08/2022 chol 77, HDL 29 LDL 37 trig 53.     5. Type 2 diabetes.     6. Lifetime nonsmoking.     5. Status post right rotator cuff repair 11/11/2019. The patient had a right rotator cuff operation on 11 11/20/1990 evidently developed an infection that required surgical debridement.     6. Hx of covid-19 vaccine #1 and #2.      7. Afib noted to loop recorder after back surgery 02/2022. Patient had laminectomy with Dr Allred 02/22/2022 and developed afib on his loop recorder. Has been started on Xarelto. ECG done prior shows NSR with long first degree AVB.      8. TIA. Patient has been admitted to [facility] on 4 occasions, the first being February 2019 for complaints of left hand numbness and difficulty finding words and left leg heaviness.Carotid ultrasound showed less than 50% stenosis bilaterally. CT angiogram of the head and neck was unremarkable. MRI MRA of the brain was negative for acute stroke. Later in 2019 the patient was again admitted with similar complaints while driving. He had a loop recorder implanted which did show 2 episodes of atrial tachycardia or atrial fibrillation the longest lasting 6 minutes. The burden was only 0.06%. In October 2022 he was treated for UTI with antibiotics and again admitted for similar complaints. Neurology recommended outpatient physical therapy for inner ear issue. CT angiogram was again negative, carotid ultrasound showed less than 50% stenosis bilaterally, MRI MRA did not show any acute infarct or intracerebral vascular stenosis. Echo done October 2022 showed an EF of 60%, RAD, mild MR, mild to moderate TR, mildly elevated RVSP, PASP 43 mmHg. He was seen by neurology for possible alternative mechanisms to his TIA and treated with magnesium citrate and possibly verapamil. Alternative  explanation may be migraine equivalent or focal seizure. Neurology discharged him with referral for vestibular studies and PT for balance. Patient currently will follow-up with Dr. Garvey.      9. GI bleed. Patient with black stools. Had extensive work up with hemoc and can restart Eliquis.     10. AVB. Monitor worn 03/24/2023 showed HR  with 67 bpm average. First degree AVB. PVC 0.08%. PAC 0.64%.      11.  Urolift for BPH.  Hx of urinary catheter placement and sepsis.         Sandy Gar, APRN-CNP

## 2024-06-13 ENCOUNTER — APPOINTMENT (OUTPATIENT)
Dept: PHARMACY | Facility: HOSPITAL | Age: 84
End: 2024-06-13
Payer: MEDICARE

## 2024-06-13 DIAGNOSIS — Z79.4 TYPE 2 DIABETES MELLITUS WITHOUT COMPLICATION, WITH LONG-TERM CURRENT USE OF INSULIN (MULTI): ICD-10-CM

## 2024-06-13 DIAGNOSIS — E11.9 DIABETES MELLITUS TYPE 2 WITHOUT RETINOPATHY (MULTI): Primary | ICD-10-CM

## 2024-06-13 DIAGNOSIS — E11.9 TYPE 2 DIABETES MELLITUS WITHOUT COMPLICATION, WITH LONG-TERM CURRENT USE OF INSULIN (MULTI): ICD-10-CM

## 2024-06-13 NOTE — PROGRESS NOTES
Pharmacist Clinic: Diabetes Management  Luis Greene is a 84 y.o. male was referred to Clinical Pharmacy Team for diabetes management.     Referring Provider: Christopher D'Amico, DO     HISTORY OF PRESENT ILLNESS  Patient is a type 2 diabetic, his DM is currently complicated with steroid injections for back pain. In his lifetime he has gotten 4 shots.      Diet:   - 1-2 snacks throughout the day (apple, popcorn, chips, sandwich)   - Dinner: varies, pasta, soup, take out     LAB REVIEW   Glucose (mg/dL)   Date Value   05/21/2024 194 (H)   05/20/2024 284 (H)   05/19/2024 199 (H)     Hemoglobin A1C (%)   Date Value   05/03/2024 9.6 (H)   11/15/2023 6.7 (H)   09/14/2023 6.8 (A)   08/14/2023 8.1 (H)   07/14/2023 7.5 (A)   04/19/2023 9.7 (H)   02/03/2023 9.6 (H)   12/20/2021 7.5 (H)     Bicarbonate (mmol/L)   Date Value   05/21/2024 24   05/20/2024 23 (L)   05/19/2024 23 (L)     Urea Nitrogen (mg/dL)   Date Value   05/21/2024 19   05/20/2024 24   05/19/2024 23     Creatinine (mg/dL)   Date Value   05/21/2024 1.00   05/20/2024 1.00   05/19/2024 1.00     Lab Results   Component Value Date    HGBA1C 9.6 (H) 05/03/2024    HGBA1C 6.7 (H) 11/15/2023    HGBA1C 6.8 (A) 09/14/2023     Lab Results   Component Value Date    CHOL 97 05/03/2024    CHOL 81 (L) 11/15/2023    CHOL 81 (L) 08/15/2023     Lab Results   Component Value Date    HDL 37.6 05/03/2024    HDL 29.0 (L) 11/15/2023    HDL 28 (L) 08/15/2023     Lab Results   Component Value Date    TRIG 70 05/03/2024    TRIG 110 11/15/2023    TRIG 109 08/15/2023       DIABETES ASSESSMENT    CURRENT PHARMACOTHERAPY  - metformin 500 mg 2 tabs by mouth twice daily   - soliqua 5 units under the skin once daily    SECONDARY PREVENTION  - Statin? Yes  - ACE-I/ARB? Yes    HISTORICAL PHARMACOTHERAPY   - Lantus 20 units under the skin once daily   - Novolog 5 units once daily once daily     SMBG MEASUREMENTS: patient is using freestyle reed 2   - around 180, however he has just been  discharged from the hospital and his numbers always fluctuate when he is in patient     DISCUSSION:   - Patient has been in and out of the hospital these past few months, discussed continuing current regimen and focusing on getting back on track since discharge     RECOMMENDATIONS/PLAN  1. Patients diabetes is worsening with most recent A1c of 9.6 % (goal < 7 %).   - Continue all meds under the continuation of care with the referring provider and clinical pharmacy team  - Continue soliqua 5 units once daily    Clinical Pharmacist follow up: 1 month     Thank you,  Leeann Page, PharmD    Verbal consent to manage patient's drug therapy was obtained from the patient. They were informed they may decline to participate or withdraw from participation in pharmacy services at any time.

## 2024-06-14 ENCOUNTER — APPOINTMENT (OUTPATIENT)
Dept: RADIOLOGY | Facility: HOSPITAL | Age: 84
End: 2024-06-14
Payer: MEDICARE

## 2024-06-14 ENCOUNTER — APPOINTMENT (OUTPATIENT)
Dept: CARDIOLOGY | Facility: HOSPITAL | Age: 84
End: 2024-06-14
Payer: MEDICARE

## 2024-06-14 ENCOUNTER — HOSPITAL ENCOUNTER (INPATIENT)
Facility: HOSPITAL | Age: 84
End: 2024-06-14
Attending: EMERGENCY MEDICINE | Admitting: INTERNAL MEDICINE
Payer: MEDICARE

## 2024-06-14 DIAGNOSIS — N30.90 CYSTITIS: Primary | ICD-10-CM

## 2024-06-14 DIAGNOSIS — R73.9 HYPERGLYCEMIA: ICD-10-CM

## 2024-06-14 DIAGNOSIS — A41.9 SEPSIS, DUE TO UNSPECIFIED ORGANISM, UNSPECIFIED WHETHER ACUTE ORGAN DYSFUNCTION PRESENT (MULTI): ICD-10-CM

## 2024-06-14 DIAGNOSIS — R53.1 GENERALIZED WEAKNESS: ICD-10-CM

## 2024-06-14 DIAGNOSIS — D64.9 ANEMIA, UNSPECIFIED TYPE: ICD-10-CM

## 2024-06-14 DIAGNOSIS — R79.89 ELEVATED BRAIN NATRIURETIC PEPTIDE (BNP) LEVEL: ICD-10-CM

## 2024-06-14 DIAGNOSIS — I10 HYPERTENSION, UNSPECIFIED TYPE: ICD-10-CM

## 2024-06-14 LAB
ALBUMIN SERPL-MCNC: 3.6 G/DL (ref 3.5–5)
ALP BLD-CCNC: 88 U/L (ref 35–125)
ALT SERPL-CCNC: 15 U/L (ref 5–40)
ANION GAP SERPL CALC-SCNC: 12 MMOL/L
APPEARANCE UR: ABNORMAL
AST SERPL-CCNC: 24 U/L (ref 5–40)
ATRIAL RATE: 83 BPM
BACTERIA #/AREA URNS AUTO: ABNORMAL /HPF
BASOPHILS # BLD AUTO: 0.03 X10*3/UL (ref 0–0.1)
BASOPHILS NFR BLD AUTO: 0.4 %
BILIRUB SERPL-MCNC: 1.3 MG/DL (ref 0.1–1.2)
BILIRUB UR STRIP.AUTO-MCNC: NEGATIVE MG/DL
BUN SERPL-MCNC: 19 MG/DL (ref 8–25)
CALCIUM SERPL-MCNC: 9.5 MG/DL (ref 8.5–10.4)
CHLORIDE SERPL-SCNC: 99 MMOL/L (ref 97–107)
CO2 SERPL-SCNC: 23 MMOL/L (ref 24–31)
COLOR UR: ABNORMAL
CREAT SERPL-MCNC: 0.9 MG/DL (ref 0.4–1.6)
EGFRCR SERPLBLD CKD-EPI 2021: 85 ML/MIN/1.73M*2
EOSINOPHIL # BLD AUTO: 0.17 X10*3/UL (ref 0–0.4)
EOSINOPHIL NFR BLD AUTO: 2.3 %
ERYTHROCYTE [DISTWIDTH] IN BLOOD BY AUTOMATED COUNT: 13.2 % (ref 11.5–14.5)
GLUCOSE BLD MANUAL STRIP-MCNC: 257 MG/DL (ref 74–99)
GLUCOSE SERPL-MCNC: 267 MG/DL (ref 65–99)
GLUCOSE UR STRIP.AUTO-MCNC: ABNORMAL MG/DL
GRAN CASTS #/AREA UR COMP ASSIST: ABNORMAL /LPF
HCT VFR BLD AUTO: 31.5 % (ref 41–52)
HGB BLD-MCNC: 10.9 G/DL (ref 13.5–17.5)
HOLD SPECIMEN: NORMAL
IMM GRANULOCYTES # BLD AUTO: 0.05 X10*3/UL (ref 0–0.5)
IMM GRANULOCYTES NFR BLD AUTO: 0.7 % (ref 0–0.9)
KETONES UR STRIP.AUTO-MCNC: NEGATIVE MG/DL
LACTATE BLDV-SCNC: 3 MMOL/L (ref 0.4–2)
LACTATE BLDV-SCNC: 3.5 MMOL/L (ref 0.4–2)
LEUKOCYTE ESTERASE UR QL STRIP.AUTO: ABNORMAL
LIPASE SERPL-CCNC: 50 U/L (ref 16–63)
LYMPHOCYTES # BLD AUTO: 0.65 X10*3/UL (ref 0.8–3)
LYMPHOCYTES NFR BLD AUTO: 9 %
MCH RBC QN AUTO: 35.3 PG (ref 26–34)
MCHC RBC AUTO-ENTMCNC: 34.6 G/DL (ref 32–36)
MCV RBC AUTO: 102 FL (ref 80–100)
MONOCYTES # BLD AUTO: 0.73 X10*3/UL (ref 0.05–0.8)
MONOCYTES NFR BLD AUTO: 10.1 %
NEUTROPHILS # BLD AUTO: 5.63 X10*3/UL (ref 1.6–5.5)
NEUTROPHILS NFR BLD AUTO: 77.5 %
NITRITE UR QL STRIP.AUTO: NEGATIVE
NRBC BLD-RTO: 0 /100 WBCS (ref 0–0)
NT-PROBNP SERPL-MCNC: 2114 PG/ML (ref 0–852)
OVALOCYTES BLD QL SMEAR: NORMAL
PH UR STRIP.AUTO: 6 [PH]
PLATELET # BLD AUTO: 94 X10*3/UL (ref 150–450)
POLYCHROMASIA BLD QL SMEAR: NORMAL
POTASSIUM SERPL-SCNC: 4.5 MMOL/L (ref 3.4–5.1)
PROT SERPL-MCNC: 6.6 G/DL (ref 5.9–7.9)
PROT UR STRIP.AUTO-MCNC: ABNORMAL MG/DL
Q ONSET: 203 MS
QRS COUNT: 16 BEATS
QRS DURATION: 144 MS
QT INTERVAL: 374 MS
QTC CALCULATION(BAZETT): 462 MS
QTC FREDERICIA: 431 MS
R AXIS: 38 DEGREES
RBC # BLD AUTO: 3.09 X10*6/UL (ref 4.5–5.9)
RBC # UR STRIP.AUTO: ABNORMAL /UL
RBC #/AREA URNS AUTO: ABNORMAL /HPF
RBC MORPH BLD: NORMAL
SARS-COV-2 RNA RESP QL NAA+PROBE: NOT DETECTED
SODIUM SERPL-SCNC: 134 MMOL/L (ref 133–145)
SP GR UR STRIP.AUTO: 1.01
T AXIS: 5 DEGREES
T OFFSET: 390 MS
TROPONIN T SERPL-MCNC: 78 NG/L
TROPONIN T SERPL-MCNC: 84 NG/L
TROPONIN T SERPL-MCNC: 91 NG/L
UROBILINOGEN UR STRIP.AUTO-MCNC: NORMAL MG/DL
VENTRICULAR RATE: 92 BPM
WBC # BLD AUTO: 7.3 X10*3/UL (ref 4.4–11.3)
WBC #/AREA URNS AUTO: >50 /HPF
WBC CLUMPS #/AREA URNS AUTO: ABNORMAL /HPF

## 2024-06-14 PROCEDURE — 71045 X-RAY EXAM CHEST 1 VIEW: CPT | Performed by: RADIOLOGY

## 2024-06-14 PROCEDURE — 2500000001 HC RX 250 WO HCPCS SELF ADMINISTERED DRUGS (ALT 637 FOR MEDICARE OP): Performed by: NURSE PRACTITIONER

## 2024-06-14 PROCEDURE — 94760 N-INVAS EAR/PLS OXIMETRY 1: CPT

## 2024-06-14 PROCEDURE — 74176 CT ABD & PELVIS W/O CONTRAST: CPT | Performed by: RADIOLOGY

## 2024-06-14 PROCEDURE — 81001 URINALYSIS AUTO W/SCOPE: CPT | Performed by: EMERGENCY MEDICINE

## 2024-06-14 PROCEDURE — 2500000002 HC RX 250 W HCPCS SELF ADMINISTERED DRUGS (ALT 637 FOR MEDICARE OP, ALT 636 FOR OP/ED): Performed by: NURSE PRACTITIONER

## 2024-06-14 PROCEDURE — 74176 CT ABD & PELVIS W/O CONTRAST: CPT

## 2024-06-14 PROCEDURE — 36415 COLL VENOUS BLD VENIPUNCTURE: CPT | Performed by: EMERGENCY MEDICINE

## 2024-06-14 PROCEDURE — 82947 ASSAY GLUCOSE BLOOD QUANT: CPT

## 2024-06-14 PROCEDURE — 84484 ASSAY OF TROPONIN QUANT: CPT | Mod: 91 | Performed by: EMERGENCY MEDICINE

## 2024-06-14 PROCEDURE — 96361 HYDRATE IV INFUSION ADD-ON: CPT

## 2024-06-14 PROCEDURE — 96365 THER/PROPH/DIAG IV INF INIT: CPT

## 2024-06-14 PROCEDURE — 71045 X-RAY EXAM CHEST 1 VIEW: CPT

## 2024-06-14 PROCEDURE — 2500000004 HC RX 250 GENERAL PHARMACY W/ HCPCS (ALT 636 FOR OP/ED): Performed by: NURSE PRACTITIONER

## 2024-06-14 PROCEDURE — 83690 ASSAY OF LIPASE: CPT | Performed by: EMERGENCY MEDICINE

## 2024-06-14 PROCEDURE — 87040 BLOOD CULTURE FOR BACTERIA: CPT | Mod: 91,WESLAB | Performed by: EMERGENCY MEDICINE

## 2024-06-14 PROCEDURE — 99291 CRITICAL CARE FIRST HOUR: CPT

## 2024-06-14 PROCEDURE — 87635 SARS-COV-2 COVID-19 AMP PRB: CPT | Performed by: EMERGENCY MEDICINE

## 2024-06-14 PROCEDURE — 84484 ASSAY OF TROPONIN QUANT: CPT | Performed by: EMERGENCY MEDICINE

## 2024-06-14 PROCEDURE — 83605 ASSAY OF LACTIC ACID: CPT | Performed by: EMERGENCY MEDICINE

## 2024-06-14 PROCEDURE — 85025 COMPLETE CBC W/AUTO DIFF WBC: CPT | Performed by: EMERGENCY MEDICINE

## 2024-06-14 PROCEDURE — 83880 ASSAY OF NATRIURETIC PEPTIDE: CPT | Performed by: EMERGENCY MEDICINE

## 2024-06-14 PROCEDURE — 2500000004 HC RX 250 GENERAL PHARMACY W/ HCPCS (ALT 636 FOR OP/ED): Performed by: EMERGENCY MEDICINE

## 2024-06-14 PROCEDURE — 2060000001 HC INTERMEDIATE ICU ROOM DAILY

## 2024-06-14 PROCEDURE — 2500000004 HC RX 250 GENERAL PHARMACY W/ HCPCS (ALT 636 FOR OP/ED)

## 2024-06-14 PROCEDURE — 87086 URINE CULTURE/COLONY COUNT: CPT | Mod: WESLAB | Performed by: EMERGENCY MEDICINE

## 2024-06-14 PROCEDURE — 99222 1ST HOSP IP/OBS MODERATE 55: CPT | Performed by: INTERNAL MEDICINE

## 2024-06-14 PROCEDURE — 80053 COMPREHEN METABOLIC PANEL: CPT | Performed by: EMERGENCY MEDICINE

## 2024-06-14 PROCEDURE — 93005 ELECTROCARDIOGRAM TRACING: CPT

## 2024-06-14 RX ORDER — AMLODIPINE BESYLATE 5 MG/1
5 TABLET ORAL DAILY
Status: DISCONTINUED | OUTPATIENT
Start: 2024-06-14 | End: 2024-06-19 | Stop reason: HOSPADM

## 2024-06-14 RX ORDER — ATORVASTATIN CALCIUM 40 MG/1
40 TABLET, FILM COATED ORAL NIGHTLY
Status: DISCONTINUED | OUTPATIENT
Start: 2024-06-14 | End: 2024-06-19 | Stop reason: HOSPADM

## 2024-06-14 RX ORDER — LEVETIRACETAM 250 MG/1
250 TABLET ORAL 2 TIMES DAILY
Status: DISCONTINUED | OUTPATIENT
Start: 2024-06-14 | End: 2024-06-19 | Stop reason: HOSPADM

## 2024-06-14 RX ORDER — INSULIN LISPRO 100 [IU]/ML
0-5 INJECTION, SOLUTION INTRAVENOUS; SUBCUTANEOUS
Status: DISCONTINUED | OUTPATIENT
Start: 2024-06-14 | End: 2024-06-19 | Stop reason: HOSPADM

## 2024-06-14 RX ORDER — ACETAMINOPHEN 325 MG/1
650 TABLET ORAL 4 TIMES DAILY
Status: DISCONTINUED | OUTPATIENT
Start: 2024-06-14 | End: 2024-06-19 | Stop reason: HOSPADM

## 2024-06-14 RX ORDER — FERROUS SULFATE 325(65) MG
65 TABLET ORAL
Status: DISCONTINUED | OUTPATIENT
Start: 2024-06-15 | End: 2024-06-19 | Stop reason: HOSPADM

## 2024-06-14 RX ORDER — POLYETHYLENE GLYCOL 3350 17 G/17G
17 POWDER, FOR SOLUTION ORAL DAILY PRN
Status: DISCONTINUED | OUTPATIENT
Start: 2024-06-14 | End: 2024-06-19 | Stop reason: HOSPADM

## 2024-06-14 RX ORDER — MEROPENEM 1 G/1
1 INJECTION, POWDER, FOR SOLUTION INTRAVENOUS EVERY 8 HOURS SCHEDULED
Status: DISCONTINUED | OUTPATIENT
Start: 2024-06-14 | End: 2024-06-17

## 2024-06-14 RX ORDER — FUROSEMIDE 40 MG/1
40 TABLET ORAL DAILY
Qty: 90 TABLET | Refills: 3 | Status: ON HOLD | OUTPATIENT
Start: 2024-06-14 | End: 2025-06-14

## 2024-06-14 RX ORDER — LANOLIN ALCOHOL/MO/W.PET/CERES
400 CREAM (GRAM) TOPICAL DAILY
Status: DISCONTINUED | OUTPATIENT
Start: 2024-06-14 | End: 2024-06-19 | Stop reason: HOSPADM

## 2024-06-14 RX ORDER — PANTOPRAZOLE SODIUM 40 MG/1
40 TABLET, DELAYED RELEASE ORAL DAILY
Status: DISCONTINUED | OUTPATIENT
Start: 2024-06-14 | End: 2024-06-19 | Stop reason: HOSPADM

## 2024-06-14 RX ORDER — ACETAMINOPHEN 325 MG/1
650 TABLET ORAL EVERY 6 HOURS PRN
Status: DISCONTINUED | OUTPATIENT
Start: 2024-06-14 | End: 2024-06-14

## 2024-06-14 RX ORDER — ASPIRIN 81 MG/1
81 TABLET ORAL DAILY
Status: DISCONTINUED | OUTPATIENT
Start: 2024-06-14 | End: 2024-06-17

## 2024-06-14 RX ORDER — AMOXICILLIN 250 MG
2 CAPSULE ORAL NIGHTLY
Status: DISCONTINUED | OUTPATIENT
Start: 2024-06-14 | End: 2024-06-19 | Stop reason: HOSPADM

## 2024-06-14 RX ORDER — CEFTRIAXONE 1 G/50ML
1 INJECTION, SOLUTION INTRAVENOUS ONCE
Status: COMPLETED | OUTPATIENT
Start: 2024-06-14 | End: 2024-06-14

## 2024-06-14 RX ORDER — ENOXAPARIN SODIUM 100 MG/ML
40 INJECTION SUBCUTANEOUS DAILY
Status: DISCONTINUED | OUTPATIENT
Start: 2024-06-14 | End: 2024-06-19 | Stop reason: HOSPADM

## 2024-06-14 RX ORDER — ONDANSETRON 4 MG/1
4 TABLET, FILM COATED ORAL EVERY 8 HOURS PRN
Status: DISCONTINUED | OUTPATIENT
Start: 2024-06-14 | End: 2024-06-19 | Stop reason: HOSPADM

## 2024-06-14 RX ORDER — ACETAMINOPHEN 325 MG/1
975 TABLET ORAL ONCE
Status: DISCONTINUED | OUTPATIENT
Start: 2024-06-14 | End: 2024-06-15

## 2024-06-14 RX ORDER — METFORMIN HYDROCHLORIDE 1000 MG/1
1000 TABLET ORAL
Status: DISCONTINUED | OUTPATIENT
Start: 2024-06-14 | End: 2024-06-19 | Stop reason: HOSPADM

## 2024-06-14 RX ORDER — TAMSULOSIN HYDROCHLORIDE 0.4 MG/1
0.4 CAPSULE ORAL 2 TIMES DAILY
Status: DISCONTINUED | OUTPATIENT
Start: 2024-06-14 | End: 2024-06-19 | Stop reason: HOSPADM

## 2024-06-14 RX ORDER — FUROSEMIDE 40 MG/1
40 TABLET ORAL DAILY
Status: DISCONTINUED | OUTPATIENT
Start: 2024-06-14 | End: 2024-06-17

## 2024-06-14 RX ORDER — FINASTERIDE 5 MG/1
5 TABLET, FILM COATED ORAL DAILY
Status: DISCONTINUED | OUTPATIENT
Start: 2024-06-14 | End: 2024-06-19 | Stop reason: HOSPADM

## 2024-06-14 RX ORDER — GABAPENTIN 100 MG/1
100 CAPSULE ORAL 2 TIMES DAILY
Status: DISCONTINUED | OUTPATIENT
Start: 2024-06-14 | End: 2024-06-19 | Stop reason: HOSPADM

## 2024-06-14 RX ORDER — DEXTROSE 50 % IN WATER (D50W) INTRAVENOUS SYRINGE
12.5
Status: DISCONTINUED | OUTPATIENT
Start: 2024-06-14 | End: 2024-06-19 | Stop reason: HOSPADM

## 2024-06-14 RX ORDER — ONDANSETRON HYDROCHLORIDE 2 MG/ML
4 INJECTION, SOLUTION INTRAVENOUS EVERY 8 HOURS PRN
Status: DISCONTINUED | OUTPATIENT
Start: 2024-06-14 | End: 2024-06-19 | Stop reason: HOSPADM

## 2024-06-14 RX ORDER — INSULIN GLARGINE 100 [IU]/ML
10 INJECTION, SOLUTION SUBCUTANEOUS NIGHTLY
Status: DISCONTINUED | OUTPATIENT
Start: 2024-06-14 | End: 2024-06-19 | Stop reason: HOSPADM

## 2024-06-14 RX ORDER — SODIUM CHLORIDE 9 MG/ML
100 INJECTION, SOLUTION INTRAVENOUS CONTINUOUS
Status: DISCONTINUED | OUTPATIENT
Start: 2024-06-14 | End: 2024-06-17

## 2024-06-14 SDOH — HEALTH STABILITY: MENTAL HEALTH: HOW OFTEN DO YOU HAVE 6 OR MORE DRINKS ON ONE OCCASION?: NEVER

## 2024-06-14 SDOH — ECONOMIC STABILITY: HOUSING INSECURITY: IN THE LAST 12 MONTHS, HOW MANY PLACES HAVE YOU LIVED?: 1

## 2024-06-14 SDOH — ECONOMIC STABILITY: INCOME INSECURITY: IN THE LAST 12 MONTHS, WAS THERE A TIME WHEN YOU WERE NOT ABLE TO PAY THE MORTGAGE OR RENT ON TIME?: NO

## 2024-06-14 SDOH — ECONOMIC STABILITY: FOOD INSECURITY: WITHIN THE PAST 12 MONTHS, THE FOOD YOU BOUGHT JUST DIDN'T LAST AND YOU DIDN'T HAVE MONEY TO GET MORE.: NEVER TRUE

## 2024-06-14 SDOH — HEALTH STABILITY: PHYSICAL HEALTH: ON AVERAGE, HOW MANY DAYS PER WEEK DO YOU ENGAGE IN MODERATE TO STRENUOUS EXERCISE (LIKE A BRISK WALK)?: 0 DAYS

## 2024-06-14 SDOH — SOCIAL STABILITY: SOCIAL NETWORK: HOW OFTEN DO YOU ATTEND CHURCH OR RELIGIOUS SERVICES?: NEVER

## 2024-06-14 SDOH — ECONOMIC STABILITY: INCOME INSECURITY: IN THE PAST 12 MONTHS, HAS THE ELECTRIC, GAS, OIL, OR WATER COMPANY THREATENED TO SHUT OFF SERVICE IN YOUR HOME?: NO

## 2024-06-14 SDOH — HEALTH STABILITY: PHYSICAL HEALTH: ON AVERAGE, HOW MANY MINUTES DO YOU ENGAGE IN EXERCISE AT THIS LEVEL?: 0 MIN

## 2024-06-14 SDOH — HEALTH STABILITY: MENTAL HEALTH: HOW OFTEN DO YOU HAVE A DRINK CONTAINING ALCOHOL?: NEVER

## 2024-06-14 SDOH — SOCIAL STABILITY: SOCIAL INSECURITY: WITHIN THE LAST YEAR, HAVE YOU BEEN AFRAID OF YOUR PARTNER OR EX-PARTNER?: NO

## 2024-06-14 SDOH — SOCIAL STABILITY: SOCIAL NETWORK: HOW OFTEN DO YOU GET TOGETHER WITH FRIENDS OR RELATIVES?: NEVER

## 2024-06-14 SDOH — ECONOMIC STABILITY: FOOD INSECURITY: WITHIN THE PAST 12 MONTHS, YOU WORRIED THAT YOUR FOOD WOULD RUN OUT BEFORE YOU GOT MONEY TO BUY MORE.: NEVER TRUE

## 2024-06-14 SDOH — HEALTH STABILITY: MENTAL HEALTH: HOW MANY STANDARD DRINKS CONTAINING ALCOHOL DO YOU HAVE ON A TYPICAL DAY?: PATIENT DOES NOT DRINK

## 2024-06-14 SDOH — SOCIAL STABILITY: SOCIAL NETWORK: ARE YOU MARRIED, WIDOWED, DIVORCED, SEPARATED, NEVER MARRIED, OR LIVING WITH A PARTNER?: MARRIED

## 2024-06-14 SDOH — SOCIAL STABILITY: SOCIAL INSECURITY: WITHIN THE LAST YEAR, HAVE YOU BEEN HUMILIATED OR EMOTIONALLY ABUSED IN OTHER WAYS BY YOUR PARTNER OR EX-PARTNER?: NO

## 2024-06-14 SDOH — SOCIAL STABILITY: SOCIAL NETWORK: IN A TYPICAL WEEK, HOW MANY TIMES DO YOU TALK ON THE PHONE WITH FAMILY, FRIENDS, OR NEIGHBORS?: TWICE A WEEK

## 2024-06-14 SDOH — ECONOMIC STABILITY: INCOME INSECURITY: HOW HARD IS IT FOR YOU TO PAY FOR THE VERY BASICS LIKE FOOD, HOUSING, MEDICAL CARE, AND HEATING?: NOT HARD AT ALL

## 2024-06-14 SDOH — SOCIAL STABILITY: SOCIAL NETWORK: HOW OFTEN DO YOU ATTENT MEETINGS OF THE CLUB OR ORGANIZATION YOU BELONG TO?: NEVER

## 2024-06-14 ASSESSMENT — ENCOUNTER SYMPTOMS
MYALGIAS: 0
PALPITATIONS: 0
HEADACHES: 0
SLEEP DISTURBANCE: 0
SHORTNESS OF BREATH: 0
ABDOMINAL PAIN: 0
ACTIVITY CHANGE: 0
NERVOUS/ANXIOUS: 0
CHILLS: 1
TREMORS: 1
HEMATURIA: 0
COLOR CHANGE: 0
SINUS PAIN: 0
FLANK PAIN: 0
DIFFICULTY URINATING: 0
POLYPHAGIA: 0
WHEEZING: 0
ARTHRALGIAS: 0
COUGH: 0
FATIGUE: 0
TROUBLE SWALLOWING: 0
WEAKNESS: 0
NAUSEA: 0
ABDOMINAL DISTENTION: 0
EYE PAIN: 0
CONSTIPATION: 1
FEVER: 0
SORE THROAT: 0
APPETITE CHANGE: 0
CONFUSION: 1
POLYDIPSIA: 0
WOUND: 0
DIZZINESS: 0
SEIZURES: 0
DIARRHEA: 0
CHEST TIGHTNESS: 0

## 2024-06-14 ASSESSMENT — LIFESTYLE VARIABLES
TOTAL SCORE: 0
HAVE PEOPLE ANNOYED YOU BY CRITICIZING YOUR DRINKING: NO
HAVE YOU EVER FELT YOU SHOULD CUT DOWN ON YOUR DRINKING: NO
EVER HAD A DRINK FIRST THING IN THE MORNING TO STEADY YOUR NERVES TO GET RID OF A HANGOVER: NO
AUDIT-C TOTAL SCORE: 0
EVER FELT BAD OR GUILTY ABOUT YOUR DRINKING: NO
SKIP TO QUESTIONS 9-10: 1

## 2024-06-14 ASSESSMENT — PAIN - FUNCTIONAL ASSESSMENT: PAIN_FUNCTIONAL_ASSESSMENT: 0-10

## 2024-06-14 ASSESSMENT — ACTIVITIES OF DAILY LIVING (ADL): LACK_OF_TRANSPORTATION: NO

## 2024-06-14 ASSESSMENT — PAIN SCALES - GENERAL: PAINLEVEL_OUTOF10: 0 - NO PAIN

## 2024-06-14 NOTE — PROGRESS NOTES
06/14/24 1812   Current Planned Discharge Disposition   Current Planned Discharge Disposition Home Health  (Pt is active with [facility] for nursing--[place] care)

## 2024-06-14 NOTE — CONSULTS
Reason For Consult  Urinary retention, UTI    History Of Present Illness  Luis Greene is a 84 y.o. male presenting with A fever of 102, chills and confusion which started early this morning.  Patient has a chronic indwelling catheter.  He was initially seen by us approximately 7 months ago with urinary retention and a UTI.  He did not come back in for follow-up but has been hospitalized 3 times for urosepsis since then.  His most recent hospitalization was a couple of weeks ago.  He usually sees Dr. Lozano but he is no longer seen by him.  The patient did have a prior UroLift which helped but then he again began having symptoms.  He currently is on finasteride and tamsulosin..  After seeing me last week we discussed the options of a chronic catheter, intermittent catheterization or greenlight laser.  He elected for the greenlight laser and he currently is scheduled for next week.  On admission now he has a white blood cell count of 7.3 and a very mild left shift.  Urinalysis shows greater than 50 white blood cells per high-power field.  He has 4+ bacteria.  He has been started on ceftriaxone.     Past Medical History  He has a past medical history of A-fib (Multi), Anemia, BPH (benign prostatic hyperplasia), CHF (congestive heart failure) (Multi), Cognitive communication deficit, Coronary artery disease, Diabetes (Multi), GERD (gastroesophageal reflux disease), H/O sepsis, H/O: upper GI bleed, HL (hearing loss), Hyperlipidemia, Iron deficiency anemia, Muscle weakness (generalized), Osteoarthritis, Personal history of other diseases of the circulatory system, Personal history of other endocrine, nutritional and metabolic disease, Recurrent UTI, Seizure disorder (Multi), Thrombocytopenia (CMS-HCC), TIA (transient ischemic attack), Type 2 diabetes mellitus (Multi), Unspecified convulsions (Multi), Urinary retention, and Urinary tract infection.    Surgical History  He has a past surgical history that includes  "Other surgical history (03/11/2019); Other surgical history (03/11/2019); Other surgical history (03/11/2019); MR angio head wo IV contrast (02/17/2019); MR angio head wo IV contrast (10/28/2022); MR angio head wo IV contrast (08/15/2023); Cardiac catheterization (N/A, 11/10/2023); Other surgical history; Coronary artery bypass graft (2011); Cardiac catheterization (2012); Rotator cuff repair; Colonoscopy; Upper gastrointestinal endoscopy; Laminectomy (2022); and Other surgical history.     Social History  He reports that he has never smoked. He has never used smokeless tobacco. He reports that he does not drink alcohol and does not use drugs.    Family History  Family History   Problem Relation Name Age of Onset    Other (cardiac disorder) Father      Diabetes Father      Hyperlipidemia Father      Hypertension Father      Other (liver carcinoma) Father      Other (hypercholesterolemia) Father      Hypertension Daughter          Allergies  Isosorbide, Famotidine, Prednisone, Amoxicillin, Donepezil, Hydrochlorothiazide, Ibuprofen, Oxycodone-acetaminophen, Tramadol hcl, and Oxycodone hcl    Review of Systems  As per admission HPI     Physical Exam  Awake, alert, oriented  HEENT: Normal extraocular movements  Neck: Supple  Lungs: Normal respiratory pattern  Abdomen: Soft, nontender  : Tiwari catheter in place.     Last Recorded Vitals  Blood pressure 168/64, pulse 94, temperature (!) 38.1 °C (100.5 °F), temperature source Oral, resp. rate 16, height 1.803 m (5' 11\"), weight 76.9 kg (169 lb 8 oz), SpO2 94%.    Relevant Results      Results for orders placed or performed during the hospital encounter of 06/14/24 (from the past 24 hour(s))   CBC and Auto Differential   Result Value Ref Range    WBC 7.3 4.4 - 11.3 x10*3/uL    nRBC 0.0 0.0 - 0.0 /100 WBCs    RBC 3.09 (L) 4.50 - 5.90 x10*6/uL    Hemoglobin 10.9 (L) 13.5 - 17.5 g/dL    Hematocrit 31.5 (L) 41.0 - 52.0 %     (H) 80 - 100 fL    MCH 35.3 (H) 26.0 - 34.0 " pg    MCHC 34.6 32.0 - 36.0 g/dL    RDW 13.2 11.5 - 14.5 %    Platelets 94 (L) 150 - 450 x10*3/uL    Neutrophils % 77.5 40.0 - 80.0 %    Immature Granulocytes %, Automated 0.7 0.0 - 0.9 %    Lymphocytes % 9.0 13.0 - 44.0 %    Monocytes % 10.1 2.0 - 10.0 %    Eosinophils % 2.3 0.0 - 6.0 %    Basophils % 0.4 0.0 - 2.0 %    Neutrophils Absolute 5.63 (H) 1.60 - 5.50 x10*3/uL    Immature Granulocytes Absolute, Automated 0.05 0.00 - 0.50 x10*3/uL    Lymphocytes Absolute 0.65 (L) 0.80 - 3.00 x10*3/uL    Monocytes Absolute 0.73 0.05 - 0.80 x10*3/uL    Eosinophils Absolute 0.17 0.00 - 0.40 x10*3/uL    Basophils Absolute 0.03 0.00 - 0.10 x10*3/uL   Comprehensive metabolic panel   Result Value Ref Range    Glucose 267 (H) 65 - 99 mg/dL    Sodium 134 133 - 145 mmol/L    Potassium 4.5 3.4 - 5.1 mmol/L    Chloride 99 97 - 107 mmol/L    Bicarbonate 23 (L) 24 - 31 mmol/L    Urea Nitrogen 19 8 - 25 mg/dL    Creatinine 0.90 0.40 - 1.60 mg/dL    eGFR 85 >60 mL/min/1.73m*2    Calcium 9.5 8.5 - 10.4 mg/dL    Albumin 3.6 3.5 - 5.0 g/dL    Alkaline Phosphatase 88 35 - 125 U/L    Total Protein 6.6 5.9 - 7.9 g/dL    AST 24 5 - 40 U/L    Bilirubin, Total 1.3 (H) 0.1 - 1.2 mg/dL    ALT 15 5 - 40 U/L    Anion Gap 12 <=19 mmol/L   Lipase   Result Value Ref Range    Lipase 50 16 - 63 U/L   NT Pro-BNP   Result Value Ref Range    PROBNP 2,114 (H) 0 - 852 pg/mL   Serial Troponin, Initial ([facility])   Result Value Ref Range    Troponin T, High Sensitivity 91 (HH) <=14 ng/L   Morphology   Result Value Ref Range    RBC Morphology See Below     Polychromasia Mild     Ovalocytes Few    Sars-CoV-2 PCR   Result Value Ref Range    Coronavirus 2019, PCR Not Detected Not Detected   Urinalysis with Reflex Culture and Microscopic   Result Value Ref Range    Color, Urine Light-Orange (N) Light-Yellow, Yellow, Dark-Yellow    Appearance, Urine Ex.Turbid (N) Clear    Specific Gravity, Urine 1.013 1.005 - 1.035    pH, Urine 6.0 5.0, 5.5, 6.0, 6.5, 7.0, 7.5, 8.0     Protein, Urine 30 (1+) (A) NEGATIVE, 10 (TRACE), 20 (TRACE) mg/dL    Glucose, Urine 200 (2+) (A) Normal mg/dL    Blood, Urine 0.1 (1+) (A) NEGATIVE    Ketones, Urine NEGATIVE NEGATIVE mg/dL    Bilirubin, Urine NEGATIVE NEGATIVE    Urobilinogen, Urine Normal Normal mg/dL    Nitrite, Urine NEGATIVE NEGATIVE    Leukocyte Esterase, Urine 500 Akin/µL (A) NEGATIVE   Microscopic Only, Urine   Result Value Ref Range    WBC, Urine >50 (A) 1-5, NONE /HPF    WBC Clumps, Urine MODERATE Reference range not established. /HPF    RBC, Urine 1-2 NONE, 1-2, 3-5 /HPF    Bacteria, Urine 4+ (A) NONE SEEN /HPF    Fine Granular Casts, Urine OCCASIONAL (A) NONE /LPF   ECG 12 lead   Result Value Ref Range    Ventricular Rate 92 BPM    Atrial Rate 83 BPM    QRS Duration 144 ms    QT Interval 374 ms    QTC Calculation(Bazett) 462 ms    R Axis 38 degrees    T Axis 5 degrees    QRS Count 16 beats    Q Onset 203 ms    T Offset 390 ms    QTC Fredericia 431 ms   Serial Troponin, 2 Hour ([facility])   Result Value Ref Range    Troponin T, High Sensitivity 84 (HH) <=14 ng/L   BLOOD GAS LACTIC ACID, VENOUS   Result Value Ref Range    POCT Lactate, Venous 3.0 (H) 0.4 - 2.0 mmol/L   Serial Troponin, 6 Hour ([facility])   Result Value Ref Range    Troponin T, High Sensitivity 78 (HH) <=14 ng/L   Blood Gas Lactic Acid, Venous   Result Value Ref Range    POCT Lactate, Venous 3.5 (H) 0.4 - 2.0 mmol/L   POCT GLUCOSE   Result Value Ref Range    POCT Glucose 257 (H) 74 - 99 mg/dL     ECG 12 lead    Result Date: 6/14/2024  Atrial fibrillation Right bundle branch block Abnormal ECG When compared with ECG of 15-MAY-2024 18:52, No significant change was found Confirmed by Ismael Uriostegui ([id number]) on 6/14/2024 1:35:05 PM    CT abdomen pelvis wo IV contrast    Result Date: 6/14/2024  Interpreted By:  Reema Wray, STUDY: CT ABDOMEN PELVIS WO IV CONTRAST;  6/14/2024 10:52 am   INDICATION: Signs/Symptoms:fever; h/o urosepsis.   COMPARISON: 12/20/2023   ACCESSION NUMBER(S):  [id number]   ORDERING CLINICIAN: ISA FRIEDMAN   TECHNIQUE: CT of the abdomen and pelvis was performed. Sagittal and coronal reconstructions were generated.  No intravenous contrast given for the exam.   FINDINGS: Solid organ and vessel evaluation limited without IV contrast.   ABDOMINAL ORGANS:   LIVER: No focal lesion within limits of unenhanced exam.   GALL BLADDER AND BILIARY TREE: Small calcified gallstone   SPLEEN: No focal lesion within limits of unenhanced exam. 2 isodense presumed splenules near the inferior pole again seen..   PANCREAS: No focal lesion within limits of unenhanced exam   ADRENALS: No adrenal mass   KIDNEYS AND URETERS: No renal mass or hydronephrosis within limits of unenhanced exam. Mild relatively symmetric perinephric fat stranding similar to the previous exam allowing for technical differences.   BOWEL: No abnormally dilated large or small bowel loops. Dot of air in nondilated cecal appendix. Moderate fecal debris throughout the colon.   PERITONEUM, RETROPERITONEUM, NODES: No significant free fluid. No free air. No significant retroperitoneal adenopathy within limits of unenhanced exam.   VESSELS:  Lack of IV contrast precludes vascular luminal assessment. Extensive atherosclerotic calcifications. No abdominal aortic aneurysm.   PELVIS: Tiwari catheter and small amount of air in mildly distended thick-walled urinary bladder. Questionable mild perivesical fat stranding anteriorly. Dense presumed surgical material in enlarged prostate gland measuring 5.2 cm transversely.   ABDOMINAL WALL: Small fat containing umbilical hernia. Dots of air in the perineum on the most caudal images likely related to skin fold.   BONES: Multifocal presumed degenerative changes. Laminectomy defects and spondylolisthesis in the lower lumbar spine similar to the previous exam.   LOWER CHEST: Uneven interstitial and dependent densities in the visualized lung bases. Small bilateral pleural effusions. Coronary  artery calcifications and probable epicardial wire along the undersurface of the heart again seen.       Mild bladder wall thickening with questionable perivesical stranding concerning for cystitis. Small amount of air in the bladder presumably related to presence of Tiwari catheter however gas-forming infection or enterovesical fistula could have a similar appearance. Clinical correlation recommended.   Cholelithiasis.   Colonic fecal retention.   Mild atelectasis or infiltrates in both lung bases with small pleural effusions.   Additional findings as described above.   MACRO: None.   Signed by: Reema Wray 6/14/2024 11:21 AM Dictation workstation:   [id number]    XR chest 1 view    Result Date: 6/14/2024  Interpreted By:  Reema Wray, STUDY: XR CHEST 1 VIEW;  6/14/2024 10:39 am   INDICATION: Signs/Symptoms:sepsis.   COMPARISON: 05/21/2024   ACCESSION NUMBER(S): [id number]   ORDERING CLINICIAN: ISA FRIEDMAN   FINDINGS: No focal infiltrate, pleural effusion or pneumothorax identified. Cardiac silhouette is within normal limits for size with overlying electronic structure and sternotomy wires again seen.       No acute cardiopulmonary process radiographically.   MACRO: None.   Signed by: Reema Wray 6/14/2024 11:11 AM Dictation workstation:   [id number]         Assessment/Plan     Urinary retention: He has had chronic urinary retention.  Will have his Tiwari catheter changed due to the UTI.    UTI: He has had recurrent UTIs and now appears to have another one.  I agree with starting him on ceftriaxone and awaiting the results of the culture.  The antibiotics can be changed depending on the culture results.  Will continue to follow him with you.          Grant Stephens MD

## 2024-06-14 NOTE — PROGRESS NOTES
06/14/24 1807   Physical Activity   On average, how many days per week do you engage in moderate to strenuous exercise (like a brisk walk)? 0 days   On average, how many minutes do you engage in exercise at this level? 0 min   Financial Resource Strain   How hard is it for you to pay for the very basics like food, housing, medical care, and heating? Not hard   Housing Stability   In the last 12 months, was there a time when you were not able to pay the mortgage or rent on time? N   In the last 12 months, how many places have you lived? 1   In the last 12 months, was there a time when you did not have a steady place to sleep or slept in a shelter (including now)? N   Transportation Needs   In the past 12 months, has lack of transportation kept you from medical appointments or from getting medications? no   In the past 12 months, has lack of transportation kept you from meetings, work, or from getting things needed for daily living? No   Food Insecurity   Within the past 12 months, you worried that your food would run out before you got the money to buy more. Never true   Within the past 12 months, the food you bought just didn't last and you didn't have money to get more. Never true   Stress   Do you feel stress - tense, restless, nervous, or anxious, or unable to sleep at night because your mind is troubled all the time - these days? Not at all   Social Connections   In a typical week, how many times do you talk on the phone with family, friends, or neighbors? Twice a week   How often do you get together with friends or relatives? Never  (Pt said he lives at home with his wife and he does not receive visitors on a regular basis)   How often do you attend Amish or Mu-ism services? Never   Do you belong to any clubs or organizations such as Amish groups, unions, fraternal or athletic groups, or school groups? No   How often do you attend meetings of the clubs or organizations you belong to? Never   Are you  , , , , never , or living with a partner?    Intimate Partner Violence   Within the last year, have you been afraid of your partner or ex-partner? No   Within the last year, have you been humiliated or emotionally abused in other ways by your partner or ex-partner? No   Within the last year, have you been kicked, hit, slapped, or otherwise physically hurt by your partner or ex-partner? No   Within the last year, have you been raped or forced to have any kind of sexual activity by your partner or ex-partner? No   Alcohol Use   Q1: How often do you have a drink containing alcohol? Never   Q2: How many drinks containing alcohol do you have on a typical day when you are drinking? None   Q3: How often do you have six or more drinks on one occasion? Never   Utilities   In the past 12 months has the electric, gas, oil, or water company threatened to shut off services in your home? No   Health Literacy   How often do you need to have someone help you when you read instructions, pamphlets, or other written material from your doctor or pharmacy? Never

## 2024-06-14 NOTE — CONSULTS
Consults  History Of Present Illness:    Luis Greene is a 84 y.o. male presenting with .    1. Coronary artery disease with remote CABG Ã--2 2011. This patient did develop recurrent angina in 2013 and underwent a repeat cardiac catheterization demonstrating closure of the original bypass grafts but with collateral circulation and no PCI was required.     2. History of TIA. This patient did have a TIA following his cardiac catheterization with transient confusion on the day of discharge. The patient was admitted to [facility] on 02/17/2019 with numbness of his left arm hand and ultimately jaw. The numbness of the left hand and jaw resolved but persisted somewhat longer within the left arm. Evaluation at that time included a chest x-ray showing previous sternotomy with clear lung fields. Carotid ultrasound showed mild bilateral plaque less than 50% stenoses. CT angiogram of the head and neck was unremarkable with nonstenotic calcification of the right common carotid artery without stenosis. The left common carotid artery had a nonstenotic calcification with a patent internal carotid artery. His intracerebral vessels were unremarkable with no aneurysm. An MRI of the brain on 02/17/2019 was unremarkable. He had an MRI of the cervical spine that showed minimal disc bulging at C2-C3 with moderate disc osteophyte complex at C3-C4 causing moderate canals narrowing and cord impingement along with bilateral neural foraminal stenoses. There was an osteophyte complex at C4-C5 causing mild anterior cord impingement and bilateral moderate neural foraminal narrowing and disc bulging at C5-C6 without cord impingement along with anterior subluxation of C7 upon T1. EKG showed sinus rhythm with first-degree AV block and no ST segment change. There was no evidence of atrial fibrillation by telemetry monitoring. His high sensitivity troponins were negative. He had an echocardiogram performed on 02/17/2019 showing an estimated  LV ejection fraction at 55â€“59 percent with mild hypokinesis of the anteroseptal wall due to previous thoracotomy. Left atrial size was in the upper range of normal with mild aortic root calcification and a mildly dilated right atrium. The patient was readmitted to [facility] [place], operated by [facility] on 06/04/2019 with recurrent neurological complaints. He had pins and needles paresthesias of the left hand and arm along with perceived weakness of the left hand and arm with loss of coordination. The patient underwent repeat evaluation including MRI of the brain which was unremarkable. Negligible carotid vascular disease by ultrasound and CT angiogram in the past. Blood pressure readings were acceptable at that time with lisinopril 40 mg daily amlodipine 5 mg daily metoprolol extended release 5 mg daily. He was on aspirin at that time along with the atorvastatin 40 mg daily. More recently the patient was driving home from a physician appointment on the [place] side when he began to develop paresthesias of the right hand and fingertips. Initially he thought that an Ace wrap of his right shoulder was too tight and continued driving. The sense of tingling began to get worse and he developed a funny sensation around the right side of his face involving the lip. The patient pulled his car off to the side of the road. At that point in time he was having trouble communicating verbally due to the inability to formulate words. He was taken by EMS to [facility] and observed but then released without admission. He is subsequently seen by neurology as an outpatient. He did have a extra no cardiac monitor performed on 02/05/2020â€“02/14/2020. This did not demonstrate atrial fibrillation. It did demonstrate some brief episodes of Mobitz 1 second degree AV block. He also had one 8 beat run of nonsustained ventricular tachycardia. The patient has never had presyncopal or syncopal events but more notably paresthesias. The results of this external  cardiac monitor were discussed with electrophysiology and at this point the patient will be scheduled to have a loop recorder implanted to ensure that he is not having clinically silent paroxysmal atrial fibrillation not detected on previous telemetry monitoring. The patient has been seen by neurology and was switched to Plavix 75 mg daily which will be continued unless there is documentation of paroxysmal atrial fibrillation by the loop recorder. Echocardiogram performed at time of today's visit 03/17/2020 demonstrates an LV ejection fraction of 55â€“60 percent with mild hypokinesis of the anteroseptal wall due to previous thoracotomy with oneâ€“2+ mitral valve regurgitation. The findings are very similar to the echo that was performed at [facility] in 2/2019. Patient had loop recorder placed 07/2020 by Dr Steven Flores. Had negative pharmacologic nuclear stress test done 01/2022.      The patient was admitted to [facility] on 11/24/2023 with a change in mental status somnolence confusion inability to eat low-grade fever shortly after having a Watchman procedure performed.  Patient was noted to have a urinary tract infection with urosepsis for chronic indwelling Tiwari catheter.  Urine and blood cultures were positive for E. coli.  The patient was maintained on aspirin and Plavix as per watchman protocol.  Lisinopril was stopped to avoid hypotension due to urosepsis.  His atrial fibrillation remains with an adequate ventricular rate control in the absence of previously prescribed metoprolol.  The patient remained on Keppra 250 mg twice daily for prophylaxis of his underlying seizure disorder.  Head CT was negative for CVA.  He was treated with IV meropenem for ESBL E. coli bacteremia.  Ultimately amlodipine and lisinopril restarted and his systolic blood pressures were between 135-155.  His creatinine was 0.7 at time of discharge during the [facility].  He had several exchanges of  his Tiwari catheter because of hematuria.     The patient had been at a local rehab facility [place] from his recent hospitalization.  Approximately 1 week ago on 12/11/2023 he was given a trial of void with his Tiwari catheter removed.  The patient was unable to void and subsequently the next the catheter was reinserted. The patient was released from the rehab facility at the end of the week on 12/15/2023.  2 days later he developed high-grade fever and was brought back to the [facility] emergency room on 12/18/2023 where he was found to have urinary tract infection.  Of note the patient is being followed by urology and evidently is now being considered for surgical procedure because of his ongoing urinary retention.  Upon presentation to the emergency room he was noted to have a temperature of 101.3.  His O2 saturation was 97% on room air and his hemodynamics are stable.  Electrolyte panel notable for baseline creatinine 1.2 sodium 131.  CBC included WBC of 9700 hematocrit 32.2.  High-sensitivity troponins were elevated at 244, 222, and repeat at 212.  The patient's initial urine and blood cultures were positive for gram-negative bacilli and he was placed on IV antibiotic therapy.  The patient improved rather quickly clinically with the institution of IV antibiotics.  The elevated high-sensitivity troponins were thought to be nondiagnostic of acute coronary syndrome and thought to be related to a minor degree of demand ischemia from urosepsis.  He was restarted on IV meropenem.  He was continued on the aspirin and Plavix as per watchman protocol.  His atrial fibrillation was controlled in the absence of a beta-blocking agent.  He was restarted on amlodipine for treatment of hypertension but his ACE inhibitor was held due to the urosepsis and potential for fluctuating renal parameters.  Blood pressure ultimately was acceptable simply on the amlodipine 5 mg daily.  The patient was recently seen for an outpatient  follow-up 6/11/2024 without any new issues raised.    Patient currently presenting to the [facility] emergency room 6/14/2024 because of fever to 102 °F with chills and apparent rigors associated with a change in mental status with confusion.  The patient had been previously scheduled for greenlight laser therapy for BPH on 6/20/2024 and still has a chronic Tiwari catheter in place due to recurrent UTIs from urinary retention.  In the [facility] emergency room was given 1 g of IV Rocephin.  Urine and blood cultures were sent and results are pending.  Urinalysis was suggestive of urinary tract infection.  Portable chest x-ray was clear.  Abdominal and pelvic CT scan showed mild bladder wall thickening with questionable perivesical stranding concerning for cystitis along with incidental cholelithiasis fecal retention and mild basilar atelectasis.  Initial lab work notable for WBC of only 7300 hematocrit 31.5.  Comprehensive metabolic panel is unremarkable other than a glucose of 267 with creatinine being 0.9.  proBNP modestly elevated 2114.  High-sensitivity troponins have been 91, 84, 78.         Last Recorded Vitals:  Vitals:    06/14/24 1225 06/14/24 1501 06/14/24 1530 06/14/24 1702   BP: 166/64  168/64    BP Location: Left arm      Patient Position: Sitting      Pulse: 82  94    Resp: 16      Temp:    (!) 38.1 °C (100.5 °F)   TempSrc:    Oral   SpO2: 94% 94%     Weight:       Height:           Last Labs:  CBC - 6/14/2024: 10:08 AM  7.3 10.9 94    31.5      CMP - 6/14/2024: 10:08 AM  9.5 6.6 24 --- 1.3   3.1 3.6 15 88      PTT - 11/10/2023:  4:59 PM  1.2   12.3 22.2     Hemoglobin A1C   Date/Time Value Ref Range Status   05/03/2024 01:22 PM 9.6 (H) see below % Final   11/15/2023 11:08 AM 6.7 (H) See below % Final     LDL Calculated   Date/Time Value Ref Range Status   05/03/2024 01:22 PM 45 <=99 mg/dL Final     Comment:                                 Near   Borderline      AGE      Desirable  Optimal     "High     High     Very High     0-19 Y     0 - 109     ---    110-129   >/= 130     ----    20-24 Y     0 - 119     ---    120-159   >/= 160     ----      >24 Y     0 -  99   100-129  130-159   160-189     >/=190     11/15/2023 11:08 AM 30 (L) 65 - 130 mg/dL Final   08/15/2023 05:50 AM 31 (L) 65 - 130 MG/DL Final   10/28/2022 05:39 AM 32 (L) 65 - 130 MG/DL Final   07/06/2021 09:50 AM 39 (L) 65 - 130 MG/DL Final     VLDL   Date/Time Value Ref Range Status   05/03/2024 01:22 PM 14 0 - 40 mg/dL Final   08/22/2022 10:17 AM 11 0 - 40 mg/dL Final   09/17/2019 01:20 PM 16 0 - 40 mg/dL Final      Last I/O:  No intake/output data recorded.    Past Cardiology Tests (Last 3 Years):  EKG:  ECG 12 lead 06/14/2024      ECG 12 lead 05/15/2024      ECG 12 lead 02/14/2024      ECG 12 lead 12/18/2023      Electrocardiogram, 12-lead PRN ACS symptoms 12/18/2023      ECG 12 lead 11/26/2023      ECG 12 Lead       ECG 12 lead daily 11/17/2023      ECG 12 lead daily 11/15/2023      ECG 12 lead daily 11/15/2023      ECG 12 lead 11/15/2023    Echo:  Transthoracic Echo (TTE) Limited 11/10/2023      Transthoracic Echo (TTE) Limited 11/10/2023    Ejection Fractions:  No results found for: \"EF\"  Cath:  No results found for this or any previous visit from the past 1095 days.    Stress Test:  Nuclear Stress Test 01/24/2022    Cardiac Imaging:  No results found for this or any previous visit from the past 1095 days.      Past Medical History:  He has a past medical history of A-fib (Multi), Anemia, BPH (benign prostatic hyperplasia), CHF (congestive heart failure) (Multi), Cognitive communication deficit, Coronary artery disease, Diabetes (Multi), GERD (gastroesophageal reflux disease), H/O sepsis, H/O: upper GI bleed, HL (hearing loss), Hyperlipidemia, Iron deficiency anemia, Muscle weakness (generalized), Osteoarthritis, Personal history of other diseases of the circulatory system, Personal history of other endocrine, nutritional and metabolic " disease, Recurrent UTI, Seizure disorder (Multi), Thrombocytopenia (CMS-HCC), TIA (transient ischemic attack), Type 2 diabetes mellitus (Multi), Unspecified convulsions (Multi), Urinary retention, and Urinary tract infection.    Past Surgical History:  He has a past surgical history that includes Other surgical history (03/11/2019); Other surgical history (03/11/2019); Other surgical history (03/11/2019); MR angio head wo IV contrast (02/17/2019); MR angio head wo IV contrast (10/28/2022); MR angio head wo IV contrast (08/15/2023); Cardiac catheterization (N/A, 11/10/2023); Other surgical history; Coronary artery bypass graft (2011); Cardiac catheterization (2012); Rotator cuff repair; Colonoscopy; Upper gastrointestinal endoscopy; Laminectomy (2022); and Other surgical history.      Social History:  He reports that he has never smoked. He has never used smokeless tobacco. He reports that he does not drink alcohol and does not use drugs.    Family History:  Family History   Problem Relation Name Age of Onset    Other (cardiac disorder) Father      Diabetes Father      Hyperlipidemia Father      Hypertension Father      Other (liver carcinoma) Father      Other (hypercholesterolemia) Father      Hypertension Daughter          Allergies:  Isosorbide, Famotidine, Prednisone, Amoxicillin, Donepezil, Hydrochlorothiazide, Ibuprofen, Oxycodone-acetaminophen, Tramadol hcl, and Oxycodone hcl    Inpatient Medications:  Scheduled medications   Medication Dose Route Frequency    acetaminophen  975 mg oral Once    amLODIPine  5 mg oral Daily    aspirin  81 mg oral Daily    atorvastatin  40 mg oral Nightly    enoxaparin  40 mg subcutaneous Daily    [START ON 6/15/2024] ferrous sulfate (325 mg ferrous sulfate)  65 mg of iron oral Daily with breakfast    finasteride  5 mg oral Daily    furosemide  40 mg oral Daily    gabapentin  100 mg oral BID    insulin glargine  10 Units subcutaneous Nightly    insulin lispro  0-5 Units  subcutaneous TID    levETIRAcetam  250 mg oral BID    magnesium oxide  400 mg oral Daily    meropenem  1 g intravenous q8h TAYLOR    metFORMIN  1,000 mg oral BID    pantoprazole  40 mg oral Daily    sennosides-docusate sodium  2 tablet oral Nightly    tamsulosin  0.4 mg oral BID     PRN medications   Medication    acetaminophen    dextrose    glucagon    ondansetron    Or    ondansetron    polyethylene glycol     Continuous Medications   Medication Dose Last Rate     Outpatient Medications:  Current Outpatient Medications   Medication Instructions    acetaminophen (TYLENOL) 650 mg, oral, Every 6 hours PRN    amLODIPine (NORVASC) 5 mg, oral, Daily    ascorbic acid (Vitamin C) 1,000 mg tablet Take 1 tablet (1,000 mg) by mouth once daily.    aspirin 81 mg, oral, Daily    atorvastatin (LIPITOR) 40 mg, oral, Daily    cyanocobalamin (VITAMIN B-12) 500 mcg, oral, 2 times weekly, Tuesday and Friday     ferrous sulfate 325 (65 Fe) MG EC tablet 1 tablet, oral, Daily    finasteride (PROSCAR) 5 mg, oral, Daily    furosemide (LASIX) 40 mg, oral, Daily    gabapentin (Neurontin) 100 mg capsule TAKE 1 CAPSULE BY MOUTH ONCE DAILY FOR NEUROPATHY PAIN    insulin glargine-lixisenatide (Soliqua 100/33) 100 unit-33 mcg/mL insulin pen 10 Units, subcutaneous, Nightly    levETIRAcetam (KEPPRA) 250 mg, oral, 2 times daily    magnesium oxide (MAG-OX) 400 mg, oral, Daily    metFORMIN (GLUCOPHAGE) 1,000 mg, oral, 2 times daily    omeprazole (PriLOSEC) 40 mg DR capsule GIVE 1 CAPSULE BY MOUTH ONE TIME A DAY FOR HEARTBURN    tamsulosin (FLOMAX) 0.4 mg, oral, 2 times daily       Physical Exam:  The patient is an elderly white male lying on his side curled into a ball with some tremoring due to chills no respiratory distress  JVP not elevated carotid pulses 2+  Chest with shallow air movement breath sounds clear somewhat diminished  The cardiac rhythm is irregularly irregular normal S1-S2  Abdomen is soft and benign  No peripheral edema pedal pulses  present     Assessment/Plan   6/14: This elderly white male with an extensive past medical history including coronary artery disease, remote CABG, hypertension, hyperlipidemia, type 2 diabetes, paroxysmal atrial fibrillation, history of?  TIA versus recurrent localized focal seizures, history of GI bleeding on Eliquis anticoagulation, status post Watchman implantation is currently admitted with high-grade fever and evidence of recurrent urinary tract infection possible urosepsis.  The patient had previously been scheduled for a laser-assisted TURP on 6/20/2024.  He has been started on empiric IV Rocephin.  Systolic blood pressure slightly elevated on the amlodipine 5 mg daily.  Urine and blood cultures are pending.  No leukocytosis and renal parameters remain at baseline presumably positive indicators of early diagnosis.  Minor elevation of high-sensitivity troponin consistent with minimal demand ischemia.  Will hold usual low-dose Lasix 40 mg daily.  Patient was not given usual IV fluids per sepsis protocol due to the slight elevation in his high-sensitivity troponin.  Clinically the patient is not volume overloaded.       Code Status:  DNR    I spent 30 minutes in the professional and overall care of this patient.        Luis Grant MD

## 2024-06-14 NOTE — ED PROVIDER NOTES
HPI   Chief Complaint   Patient presents with    Fever     Pt came to ED for fever and chills. Pt recently discharged with sepsis. Pt is A&O X 4. Pt is feeling weak at this time        HPI  Patient is an 84-year-old male who presents to ED for fever and chills.  Patient was recently hospitalized with sepsis.  Patient is accompanied by daughter who states he was running a high fever this morning, was confused.  Was given Tylenol prior to arrival.  Patient complains of generalized weakness.  Denies any specific complaints of pain.  Denies headache or dizziness, cough or congestion, chest pain or shortness of breath, abdominal pain or NVD.                  Cunningham Coma Scale Score: 15                     Patient History   Past Medical History:   Diagnosis Date    A-fib (Multi)     Anemia     BPH (benign prostatic hyperplasia)     CHF (congestive heart failure) (Multi)     Cognitive communication deficit     Coronary artery disease     Diabetes (Multi)     GERD (gastroesophageal reflux disease)     H/O sepsis     H/O: upper GI bleed     HL (hearing loss)     Hyperlipidemia     Iron deficiency anemia     Muscle weakness (generalized)     Osteoarthritis     Personal history of other diseases of the circulatory system     History of cardiac disorder    Personal history of other endocrine, nutritional and metabolic disease     History of hypercholesterolemia    Recurrent UTI     Seizure disorder (Multi)     Thrombocytopenia (CMS-HCC)     TIA (transient ischemic attack)     Type 2 diabetes mellitus (Multi)     Unspecified convulsions (Multi)     Urinary retention     Urinary tract infection      Past Surgical History:   Procedure Laterality Date    CARDIAC CATHETERIZATION N/A 11/10/2023    Procedure: LAAO (Left Atrial Appendage Occlusion);  Surgeon: Louie Avitia MD;  Location: [place] [address] [facility];  Service: Cardiovascular;  Laterality: N/A;  Last Eliquis 11/06/SD /"[employer]    CARDIAC  CATHETERIZATION  2012    COLONOSCOPY      CORONARY ARTERY BYPASS GRAFT  2011    CABG x2 vessels bypassed    LAMINECTOMY  2022    MR HEAD ANGIO WO IV CONTRAST  02/17/2019    MR HEAD ANGIO WO IV CONTRAST [facility]    MR HEAD ANGIO WO IV CONTRAST  10/28/2022    MR HEAD ANGIO WO IV CONTRAST [facility]    MR HEAD ANGIO WO IV CONTRAST  08/15/2023    MR HEAD ANGIO WO IV CONTRAST [facility]    OTHER SURGICAL HISTORY  03/11/2019    Heart surgery    OTHER SURGICAL HISTORY  03/11/2019    Knee surgery    OTHER SURGICAL HISTORY  03/11/2019    Tonsillectomy    OTHER SURGICAL HISTORY      urolift    OTHER SURGICAL HISTORY      Loop recorder placement    ROTATOR CUFF REPAIR      UPPER GASTROINTESTINAL ENDOSCOPY       Family History   Problem Relation Name Age of Onset    Other (cardiac disorder) Father      Diabetes Father      Hyperlipidemia Father      Hypertension Father      Other (liver carcinoma) Father      Other (hypercholesterolemia) Father      Hypertension Daughter       Social History     Tobacco Use    Smoking status: Never    Smokeless tobacco: Never   Vaping Use    Vaping status: Never Used   Substance Use Topics    Alcohol use: Never    Drug use: Never       Physical Exam   ED Triage Vitals   Temperature Heart Rate Respirations BP   06/14/24 1016 06/14/24 0953 06/14/24 0953 06/14/24 0953   37.6 °C (99.7 °F) 92 16 (!) 131/49      Pulse Ox Temp Source Heart Rate Source Patient Position   06/14/24 0953 06/14/24 1016 06/14/24 0953 --   95 % Oral Monitor       BP Location FiO2 (%)     -- --             Physical Exam  Vitals reviewed.   Constitutional:       Appearance: He is ill-appearing.   HENT:      Head: Atraumatic.   Eyes:      Extraocular Movements: Extraocular movements intact.   Cardiovascular:      Rate and Rhythm: Normal rate and regular rhythm.   Pulmonary:      Effort: Pulmonary effort is normal.      Breath sounds: Normal breath sounds.   Abdominal:      General: Abdomen is  flat. There is no distension.      Palpations: Abdomen is soft.      Tenderness: There is no abdominal tenderness.   Musculoskeletal:         General: Normal range of motion.      Cervical back: Normal range of motion and neck supple.   Skin:     General: Skin is warm and dry.   Neurological:      General: No focal deficit present.      Mental Status: He is alert and oriented to person, place, and time.   Psychiatric:         Mood and Affect: Mood normal.         Behavior: Behavior normal.         ED Course & MDM   Diagnoses as of 06/15/24 1527   Cystitis   Elevated brain natriuretic peptide (BNP) level   Hyperglycemia   Anemia, unspecified type   Generalized weakness       Medical Decision Making  Parts of this chart have been completed using voice recognition software. Please excuse any errors of transcription.  My thought process and reason for plan has been formulated from the time that I saw the patient until the time of disposition and is not specific to one specific moment during their visit and furthermore my MDM encompasses this entire chart and not only this text box.    HPI:   Detailed above.    Exam:   A medically appropriate exam performed, outlined above, given the known history and presentation.    History obtained from:   Patient, EMR,  Family of Patient    EKG/Cardiac monitor:   Interpreted by attending physician, see their note for ED course for more detail.    Social Determinants of Health considered during this visit:   Housing, Family or social support    Medications given during visit:  Medications   amLODIPine (Norvasc) tablet 5 mg (5 mg oral Given 6/15/24 0816)   aspirin EC tablet 81 mg (81 mg oral Not Given 6/15/24 0816)   atorvastatin (Lipitor) tablet 40 mg (40 mg oral Given 6/14/24 2104)   ferrous sulfate (325 mg ferrous sulfate) tablet 1 tablet (1 tablet oral Given 6/15/24 0816)   finasteride (Proscar) tablet 5 mg (5 mg oral Given 6/15/24 0816)   gabapentin (Neurontin) capsule 100 mg (100  mg oral Given 6/15/24 0816)   levETIRAcetam (Keppra) tablet 250 mg (250 mg oral Given 6/15/24 0816)   magnesium oxide (Mag-Ox) tablet 400 mg (400 mg oral Given 6/15/24 0816)   metFORMIN (Glucophage) tablet 1,000 mg ( oral Dose Auto Held 6/22/24 1700)   pantoprazole (ProtoNix) EC tablet 40 mg (40 mg oral Given 6/15/24 0817)   tamsulosin (Flomax) 24 hr capsule 0.4 mg (0.4 mg oral Given 6/15/24 0816)   enoxaparin (Lovenox) syringe 40 mg (40 mg subcutaneous Not Given 6/15/24 0817)   ondansetron (Zofran) tablet 4 mg ( oral See Alternative 6/15/24 0316)     Or   ondansetron (Zofran) injection 4 mg (4 mg intravenous Given 6/15/24 0316)   polyethylene glycol (Glycolax, Miralax) packet 17 g (has no administration in time range)   sennosides-docusate sodium (Andreea-Colace) 8.6-50 mg per tablet 2 tablet (2 tablets oral Not Given 6/14/24 2100)   glucagon (Glucagen) injection 1 mg (has no administration in time range)   dextrose 50 % injection 12.5 g (has no administration in time range)   insulin glargine (Lantus) injection 10 Units (10 Units subcutaneous Given 6/14/24 2105)   insulin lispro (HumaLOG) injection 0-5 Units (2 Units subcutaneous Given 6/15/24 1309)   meropenem (Merrem) in sodium chloride 0.9 % 100 mL IV 1 g (0 g intravenous Stopped 6/15/24 1340)   furosemide (Lasix) tablet 40 mg ( oral Dose Auto Held 6/22/24 0900)   acetaminophen (Tylenol) tablet 650 mg (650 mg oral Given 6/15/24 1047)   sodium chloride 0.9% infusion (100 mL/hr intravenous Restarted 6/15/24 1430)   acetaminophen (Tylenol) suppository 650 mg (650 mg rectal Given 6/15/24 0350)   ibuprofen tablet 400 mg (has no administration in time range)   sodium chloride 0.9 % bolus 2,376 mL (0 mL intravenous Stopped 6/14/24 1205)   cefTRIAXone (Rocephin) IVPB 1 g (0 g intravenous Stopped 6/14/24 1146)        Diagnostic/tests:  Labs Reviewed   URINE CULTURE - Abnormal       Result Value    Urine Culture >100,000 Gram Negative Bacilli (*)    BLOOD CULTURE - Abnormal     Blood Culture        Value: Identification and susceptibility testing to follow    Gram Stain Gram negative bacilli (*)    BLOOD CULTURE - Abnormal    Blood Culture Enterobacter cloacae complex (*)     BLOOD CULTURE BOTTLE  Positive Aerobic Bottle      Gram Stain Gram negative bacilli (*)    CBC WITH AUTO DIFFERENTIAL - Abnormal    WBC 7.3      nRBC 0.0      RBC 3.09 (*)     Hemoglobin 10.9 (*)     Hematocrit 31.5 (*)      (*)     MCH 35.3 (*)     MCHC 34.6      RDW 13.2      Platelets 94 (*)     Neutrophils % 77.5      Immature Granulocytes %, Automated 0.7      Lymphocytes % 9.0      Monocytes % 10.1      Eosinophils % 2.3      Basophils % 0.4      Neutrophils Absolute 5.63 (*)     Immature Granulocytes Absolute, Automated 0.05      Lymphocytes Absolute 0.65 (*)     Monocytes Absolute 0.73      Eosinophils Absolute 0.17      Basophils Absolute 0.03     COMPREHENSIVE METABOLIC PANEL - Abnormal    Glucose 267 (*)     Sodium 134      Potassium 4.5      Chloride 99      Bicarbonate 23 (*)     Urea Nitrogen 19      Creatinine 0.90      eGFR 85      Calcium 9.5      Albumin 3.6      Alkaline Phosphatase 88      Total Protein 6.6      AST 24      Bilirubin, Total 1.3 (*)     ALT 15      Anion Gap 12     N-TERMINAL PROBNP - Abnormal    PROBNP 2,114 (*)     Narrative:     Reference ranges are based on clinical submission data. These ranges represent the 95th percentile of normal cut-off points. As NT Pro- BNP values approach 1000 pg/ml, clinical symptoms are more likely associated with CHF.   URINALYSIS WITH REFLEX CULTURE AND MICROSCOPIC - Abnormal    Color, Urine Light-Orange (*)     Appearance, Urine Ex.Turbid (*)     Specific Gravity, Urine 1.013      pH, Urine 6.0      Protein, Urine 30 (1+) (*)     Glucose, Urine 200 (2+) (*)     Blood, Urine 0.1 (1+) (*)     Ketones, Urine NEGATIVE      Bilirubin, Urine NEGATIVE      Urobilinogen, Urine Normal      Nitrite, Urine NEGATIVE      Leukocyte Esterase, Urine 500  Akin/µL (*)    SERIAL TROPONIN, INITIAL ([facility]) - Abnormal    Troponin T, High Sensitivity 91 (*)    SERIAL TROPONIN,  2 HOUR ([facility]) - Abnormal    Troponin T, High Sensitivity 84 (*)    MICROSCOPIC ONLY, URINE - Abnormal    WBC, Urine >50 (*)     WBC Clumps, Urine MODERATE      RBC, Urine 1-2      Bacteria, Urine 4+ (*)     Fine Granular Casts, Urine OCCASIONAL (*)    SERIAL TROPONIN, 6 HOUR ([facility]) - Abnormal    Troponin T, High Sensitivity 78 (*)    BLOOD GAS LACTIC ACID, VENOUS - Abnormal    POCT Lactate, Venous 3.0 (*)    BLOOD GAS LACTIC ACID, VENOUS - Abnormal    POCT Lactate, Venous 3.5 (*)    CBC - Abnormal    WBC 5.3      nRBC 0.0      RBC 2.88 (*)     Hemoglobin 10.1 (*)     Hematocrit 29.2 (*)      (*)     MCH 35.1 (*)     MCHC 34.6      RDW 13.3      Platelets 79 (*)    COMPREHENSIVE METABOLIC PANEL - Abnormal    Glucose 203 (*)     Sodium 135      Potassium 3.9      Chloride 101      Bicarbonate 23 (*)     Urea Nitrogen 20      Creatinine 1.00      eGFR 75      Calcium 8.5      Albumin 3.2 (*)     Alkaline Phosphatase 73      Total Protein 5.8 (*)     AST 30      Bilirubin, Total 1.2      ALT 17      Anion Gap 11     COMPREHENSIVE METABOLIC PANEL - Abnormal    Glucose 274 (*)     Sodium 133      Potassium 4.2      Chloride 97      Bicarbonate 23 (*)     Urea Nitrogen 20      Creatinine 1.10      eGFR 67      Calcium 8.6      Albumin 3.5      Alkaline Phosphatase 82      Total Protein 6.2      AST 72 (*)     Bilirubin, Total 1.3 (*)     ALT 30      Anion Gap 13     BLOOD GAS LACTIC ACID, VENOUS - Abnormal    POCT Lactate, Venous 2.5 (*)    POCT GLUCOSE - Abnormal    POCT Glucose 257 (*)    POCT GLUCOSE - Abnormal    POCT Glucose 208 (*)    POCT GLUCOSE - Abnormal    POCT Glucose 248 (*)    LIPASE - Normal    Lipase 50     SARS-COV-2 PCR - Normal    Coronavirus 2019, PCR Not Detected      Narrative:     This assay has received FDA Emergency Use Authorization (EUA) and is only authorized for the  duration of time that circumstances exist to justify the authorization of the emergency use of in vitro diagnostic tests for the detection of SARS-CoV-2 virus and/or diagnosis of COVID-19 infection under section 564(b)(1) of the Act, 21 U.S.C. 360bbb-3(b)(1). This assay is an in vitro diagnostic nucleic acid amplification test for the qualitative detection of SARS-CoV-2 from nasopharyngeal specimens and has been validated for use at [facility]. Negative results do not preclude COVID-19 infections and should not be used as the sole basis for diagnosis, treatment, or other management decisions.     BLOOD GAS LACTIC ACID, VENOUS - Normal    POCT Lactate, Venous 1.7     BLOOD CULTURE   BLOOD CULTURE   URINALYSIS WITH REFLEX CULTURE AND MICROSCOPIC    Narrative:     The following orders were created for panel order Urinalysis with Reflex Culture and Microscopic.  Procedure                               Abnormality         Status                     ---------                               -----------         ------                     Urinalysis with Reflex C...[[id number]]  Abnormal            Final result               Extra Urine Gray Tube[[id number]]                            Final result                 Please view results for these tests on the individual orders.   TROPONIN T SERIES, HIGH SENSITIVITY (0, 2 HR, 6 HR)    Narrative:     The following orders were created for panel order Troponin T Series, High Sensitivity (0, 2HR, 6HR).  Procedure                               Abnormality         Status                     ---------                               -----------         ------                     Serial Troponin, Initial...[[id number]]  Abnormal            Final result               Serial Troponin, 2 Hour ...[[id number]]  Abnormal            Final result               Serial Troponin, 6 Hour ...[[id number]]  Abnormal            Final result                 Please view results for these  tests on the individual orders.   EXTRA URINE GRAY TUBE    Extra Tube Hold for add-ons.     CBC WITH AUTO DIFFERENTIAL   URINALYSIS WITH REFLEX CULTURE AND MICROSCOPIC    Narrative:     The following orders were created for panel order Urinalysis with Reflex Culture and Microscopic.  Procedure                               Abnormality         Status                     ---------                               -----------         ------                     Urinalysis with Reflex C...[[id number]]                                                 Extra Urine Gray Tube[[id number]]                                                         Please view results for these tests on the individual orders.   URINALYSIS WITH REFLEX CULTURE AND MICROSCOPIC   EXTRA URINE GRAY TUBE   BLOOD GAS LACTIC ACID, VENOUS   POCT GLUCOSE   POCT GLUCOSE   POCT GLUCOSE   POCT GLUCOSE   MORPHOLOGY    RBC Morphology See Below      Polychromasia Mild      Ovalocytes Few        XR foot right 1-2 views   Final Result   No radiographic evidence of osteomyelitis.   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             MACRO:   None        Signed by: Mathew Varela 6/15/2024 3:22 PM   Dictation workstation:   [id number]      CT abdomen pelvis wo IV contrast   Final Result   Mild bladder wall thickening with questionable perivesical stranding   concerning for cystitis. Small amount of air in the bladder   presumably related to presence of Tiwari catheter however gas-forming   infection or enterovesical fistula could have a similar appearance.   Clinical correlation recommended.        Cholelithiasis.        Colonic fecal retention.        Mild atelectasis or infiltrates in both lung bases with small pleural   effusions.        Additional findings as described above.        MACRO:   None.        Signed by: Reema Wray 6/14/2024 11:21 AM   Dictation workstation:   [id number]      XR chest 1 view   Final Result   No acute cardiopulmonary process  radiographically.        MACRO:   None.        Signed by: Reema Wray 6/14/2024 11:11 AM   Dictation workstation:   [id number]           Differential Diagnosis:       Consultations:      Procedures:      Critical Care:      Referrals:      Discharge Prescriptions:      MDM Summary:  Urinalysis and CT show evidence of acute cystitis.  Will treat patient with ceftriaxone in ED.  No evidence of ureteral stone.  Lab work otherwise unremarkable.  Patient will be admitted to inpatient medical service for further management of symptoms.    We have discussed the diagnosis and risks, and we agree with discharging home to follow-up with appropriate physician as directed. We also discussed returning to the Emergency Department immediately if new or worsening symptoms occur. We have discussed the symptoms which are most concerning that necessitate immediate return. Pt symptoms have been well controlled here and the patient is safe for discharge with appropriate outpatient follow up. The patient has verbalized understanding to return to ER without delay for new or worsening pains or for any other symptoms or concerns. I utilized the discharge clinical management tool provided [employer] to help estimate risk of negative outcome for this patient.      Disposition:  The patient was discharged      Procedure  Procedures     Daryl Bueno PA-C  06/15/24 1533

## 2024-06-14 NOTE — PROGRESS NOTES
06/14/24 1810   Discharge Planning   Living Arrangements Spouse/significant other   Support Systems Spouse/significant other   Type of Residence Private residence   Number of Stairs to Enter Residence 4   Number of Stairs Within Residence 12   Do you have animals or pets at home? No   Who is requesting discharge planning? Provider   Home or Post Acute Services In home services   Type of Home Care Services Home nursing visits   Patient expects to be discharged to: Home with wife and Residence [facility] for guidry care   Does the patient need discharge transport arranged? No   Financial Resource Strain   How hard is it for you to pay for the very basics like food, housing, medical care, and heating? Not hard   Housing Stability   In the last 12 months, was there a time when you were not able to pay the mortgage or rent on time? N   In the last 12 months, how many places have you lived? 1   In the last 12 months, was there a time when you did not have a steady place to sleep or slept in a shelter (including now)? N   Transportation Needs   In the past 12 months, has lack of transportation kept you from medical appointments or from getting medications? no   In the past 12 months, has lack of transportation kept you from meetings, work, or from getting things needed for daily living? No   Patient Choice   Patient / Family choosing to utilize agency / facility established prior to hospitalization No

## 2024-06-14 NOTE — PROGRESS NOTES
06/14/24 1812   [facility] Disability Status   Are you deaf or do you have serious difficulty hearing? N   Are you blind or do you have serious difficulty seeing, even when wearing glasses? N   Because of a physical, mental, or emotional condition, do you have serious difficulty concentrating, remembering, or making decisions? (5 years old or older) N   Do you have serious difficulty walking or climbing stairs? N   Do you have serious difficulty dressing or bathing? N   Because of a physical, mental, or emotional condition, do you have serious difficulty doing errands alone such as visiting the doctor? N

## 2024-06-14 NOTE — H&P
History Of Present Illness  Luis Greene is a 84 y.o. male presenting with fever of 102 °F, accompanied by chills and rigor, patient reports onset of symptoms 730 this morning, this was also accompanied by confusion.  Patient has a significant history of enlarged prostate, was scheduled for greenlight laser therapy on the 20th with Dr. Stephens, had a chronic urinary catheter in place due to prior recurrent UTIs and urinary retention.  In the emergency room patient was treated with 1 g of IV Rocephin, noted to have elevated lactic as well as slightly elevated troponin to 91 EKG without acute ischemic changes though he was noted to have an elevated BNP.  Urine and blood cultures were taken and are currently pending.  His UA was grossly positive.  Because of his history of heart failure he was not given IV fluid per sepsis protocol.  Chest x-ray without acute findings.  He did have CT abdomen and pelvis done that showed his enlarged prostate of 5.2 cm as well as thickening and possible fat stranding around the bladder.  Patient has been admitted for further evaluation and treatment.  He currently feels much better than he did this morning he has no fevers or chills currently.  He denies dizziness or feeling lightheaded     Past Medical History  Past Medical History:   Diagnosis Date    A-fib (Multi)     Anemia     BPH (benign prostatic hyperplasia)     CHF (congestive heart failure) (Multi)     Cognitive communication deficit     Coronary artery disease     Diabetes (Multi)     GERD (gastroesophageal reflux disease)     H/O sepsis     H/O: upper GI bleed     HL (hearing loss)     Hyperlipidemia     Iron deficiency anemia     Muscle weakness (generalized)     Osteoarthritis     Personal history of other diseases of the circulatory system     History of cardiac disorder    Personal history of other endocrine, nutritional and metabolic disease     History of hypercholesterolemia    Recurrent UTI     Seizure disorder  (Multi)     Thrombocytopenia (CMS-HCC)     TIA (transient ischemic attack)     Type 2 diabetes mellitus (Multi)     Unspecified convulsions (Multi)     Urinary retention     Urinary tract infection        Surgical History  Past Surgical History:   Procedure Laterality Date    CARDIAC CATHETERIZATION N/A 11/10/2023    Procedure: LAAO (Left Atrial Appendage Occlusion);  Surgeon: Louie Avitia MD;  Location: [place] [address] [facility];  Service: Cardiovascular;  Laterality: N/A;  Last Eliquis 11/06/SD /[employer]    CARDIAC CATHETERIZATION  2012    COLONOSCOPY      CORONARY ARTERY BYPASS GRAFT  2011    CABG x2 vessels bypassed    LAMINECTOMY  2022    MR HEAD ANGIO WO IV CONTRAST  02/17/2019    MR HEAD ANGIO WO IV CONTRAST [facility]    MR HEAD ANGIO WO IV CONTRAST  10/28/2022    MR HEAD ANGIO WO IV CONTRAST [facility]    MR HEAD ANGIO WO IV CONTRAST  08/15/2023    MR HEAD ANGIO WO IV CONTRAST [facility]    OTHER SURGICAL HISTORY  03/11/2019    Heart surgery    OTHER SURGICAL HISTORY  03/11/2019    Knee surgery    OTHER SURGICAL HISTORY  03/11/2019    Tonsillectomy    OTHER SURGICAL HISTORY      urolift    OTHER SURGICAL HISTORY      Loop recorder placement    ROTATOR CUFF REPAIR      UPPER GASTROINTESTINAL ENDOSCOPY          Social History  He reports that he has never smoked. He has never used smokeless tobacco. He reports that he does not drink alcohol and does not use drugs.    Family History  Family History   Problem Relation Name Age of Onset    Other (cardiac disorder) Father      Diabetes Father      Hyperlipidemia Father      Hypertension Father      Other (liver carcinoma) Father      Other (hypercholesterolemia) Father      Hypertension Daughter          Allergies  Isosorbide, Famotidine, Prednisone, Amoxicillin, Donepezil, Hydrochlorothiazide, Ibuprofen, Oxycodone-acetaminophen, Tramadol hcl, and Oxycodone hcl    Review of Systems   Constitutional:  Positive  for chills. Negative for activity change, appetite change, fatigue and fever.   HENT:  Positive for hearing loss. Negative for mouth sores, sinus pain, sore throat and trouble swallowing.    Eyes:  Negative for pain.   Respiratory:  Negative for cough, chest tightness, shortness of breath and wheezing.    Cardiovascular:  Negative for chest pain, palpitations and leg swelling.   Gastrointestinal:  Positive for constipation. Negative for abdominal distention, abdominal pain, diarrhea and nausea.        History of fecal incontinence at times   Endocrine: Negative for polydipsia, polyphagia and polyuria.   Genitourinary:  Negative for difficulty urinating, flank pain, genital sores, hematuria, penile pain, scrotal swelling and testicular pain.        Cloudy appearance of urine in chronic catheter   Musculoskeletal:  Positive for gait problem. Negative for arthralgias and myalgias.        Uses bilateral AFOs and a walker for ambulation due to his neuropathy and foot drop   Skin:  Negative for color change, rash and wound.   Neurological:  Positive for tremors. Negative for dizziness, seizures, weakness and headaches.   Psychiatric/Behavioral:  Positive for confusion. Negative for sleep disturbance. The patient is not nervous/anxious.         Physical Exam  Vitals and nursing note reviewed.   Constitutional:       General: He is not in acute distress.     Appearance: He is ill-appearing.      Comments: Chronically ill-appearing, lying in cot in emergency room, appears stated age   HENT:      Head: Normocephalic and atraumatic.      Nose: Nose normal.      Mouth/Throat:      Mouth: Mucous membranes are moist.      Pharynx: Oropharynx is clear.   Eyes:      Extraocular Movements: Extraocular movements intact.      Pupils: Pupils are equal, round, and reactive to light.   Cardiovascular:      Rate and Rhythm: Normal rate and regular rhythm.      Pulses: Normal pulses.      Heart sounds: No murmur heard.     Comments: CABG  "incision midsternally, sternotomy wires are palpable through his skin, extremities are warm to touch   Pulmonary:      Effort: Pulmonary effort is normal. No respiratory distress.      Breath sounds: Normal breath sounds.   Abdominal:      General: Abdomen is flat. Bowel sounds are normal. There is no distension.      Palpations: Abdomen is soft.      Tenderness: There is abdominal tenderness. There is no right CVA tenderness or left CVA tenderness.      Comments: Suprapubic discomfort with palpation   Genitourinary:     Penis: Normal.       Testes: Normal.      Comments: Chronic Tiwari, urine appears cloudy  Musculoskeletal:         General: No swelling, tenderness, deformity or signs of injury. Normal range of motion.      Cervical back: Normal range of motion and neck supple.   Skin:     General: Skin is warm and dry.      Capillary Refill: Capillary refill takes 2 to 3 seconds.   Neurological:      General: No focal deficit present.      Mental Status: He is alert and oriented to person, place, and time.      Sensory: Sensory deficit present.      Motor: Weakness present.      Comments: Foot drop right greater than left, diminished sensation from the knees down   Psychiatric:         Mood and Affect: Mood normal.          Last Recorded Vitals  Blood pressure 166/64, pulse 82, temperature 37.6 °C (99.7 °F), temperature source Oral, resp. rate 16, height 1.803 m (5' 11\"), weight 76.9 kg (169 lb 8 oz), SpO2 94%.    Relevant Results  Results for orders placed or performed during the hospital encounter of 06/14/24 (from the past 96 hour(s))   CBC and Auto Differential   Result Value Ref Range    WBC 7.3 4.4 - 11.3 x10*3/uL    nRBC 0.0 0.0 - 0.0 /100 WBCs    RBC 3.09 (L) 4.50 - 5.90 x10*6/uL    Hemoglobin 10.9 (L) 13.5 - 17.5 g/dL    Hematocrit 31.5 (L) 41.0 - 52.0 %     (H) 80 - 100 fL    MCH 35.3 (H) 26.0 - 34.0 pg    MCHC 34.6 32.0 - 36.0 g/dL    RDW 13.2 11.5 - 14.5 %    Platelets 94 (L) 150 - 450 x10*3/uL "    Neutrophils % 77.5 40.0 - 80.0 %    Immature Granulocytes %, Automated 0.7 0.0 - 0.9 %    Lymphocytes % 9.0 13.0 - 44.0 %    Monocytes % 10.1 2.0 - 10.0 %    Eosinophils % 2.3 0.0 - 6.0 %    Basophils % 0.4 0.0 - 2.0 %    Neutrophils Absolute 5.63 (H) 1.60 - 5.50 x10*3/uL    Immature Granulocytes Absolute, Automated 0.05 0.00 - 0.50 x10*3/uL    Lymphocytes Absolute 0.65 (L) 0.80 - 3.00 x10*3/uL    Monocytes Absolute 0.73 0.05 - 0.80 x10*3/uL    Eosinophils Absolute 0.17 0.00 - 0.40 x10*3/uL    Basophils Absolute 0.03 0.00 - 0.10 x10*3/uL   Comprehensive metabolic panel   Result Value Ref Range    Glucose 267 (H) 65 - 99 mg/dL    Sodium 134 133 - 145 mmol/L    Potassium 4.5 3.4 - 5.1 mmol/L    Chloride 99 97 - 107 mmol/L    Bicarbonate 23 (L) 24 - 31 mmol/L    Urea Nitrogen 19 8 - 25 mg/dL    Creatinine 0.90 0.40 - 1.60 mg/dL    eGFR 85 >60 mL/min/1.73m*2    Calcium 9.5 8.5 - 10.4 mg/dL    Albumin 3.6 3.5 - 5.0 g/dL    Alkaline Phosphatase 88 35 - 125 U/L    Total Protein 6.6 5.9 - 7.9 g/dL    AST 24 5 - 40 U/L    Bilirubin, Total 1.3 (H) 0.1 - 1.2 mg/dL    ALT 15 5 - 40 U/L    Anion Gap 12 <=19 mmol/L   Lipase   Result Value Ref Range    Lipase 50 16 - 63 U/L   NT Pro-BNP   Result Value Ref Range    PROBNP 2,114 (H) 0 - 852 pg/mL   Serial Troponin, Initial ([facility])   Result Value Ref Range    Troponin T, High Sensitivity 91 (HH) <=14 ng/L   Morphology   Result Value Ref Range    RBC Morphology See Below     Polychromasia Mild     Ovalocytes Few    Sars-CoV-2 PCR   Result Value Ref Range    Coronavirus 2019, PCR Not Detected Not Detected   Urinalysis with Reflex Culture and Microscopic   Result Value Ref Range    Color, Urine Light-Orange (N) Light-Yellow, Yellow, Dark-Yellow    Appearance, Urine Ex.Turbid (N) Clear    Specific Gravity, Urine 1.013 1.005 - 1.035    pH, Urine 6.0 5.0, 5.5, 6.0, 6.5, 7.0, 7.5, 8.0    Protein, Urine 30 (1+) (A) NEGATIVE, 10 (TRACE), 20 (TRACE) mg/dL    Glucose, Urine 200 (2+) (A)  Normal mg/dL    Blood, Urine 0.1 (1+) (A) NEGATIVE    Ketones, Urine NEGATIVE NEGATIVE mg/dL    Bilirubin, Urine NEGATIVE NEGATIVE    Urobilinogen, Urine Normal Normal mg/dL    Nitrite, Urine NEGATIVE NEGATIVE    Leukocyte Esterase, Urine 500 Akin/µL (A) NEGATIVE   Microscopic Only, Urine   Result Value Ref Range    WBC, Urine >50 (A) 1-5, NONE /HPF    WBC Clumps, Urine MODERATE Reference range not established. /HPF    RBC, Urine 1-2 NONE, 1-2, 3-5 /HPF    Bacteria, Urine 4+ (A) NONE SEEN /HPF    Fine Granular Casts, Urine OCCASIONAL (A) NONE /LPF   ECG 12 lead   Result Value Ref Range    Ventricular Rate 92 BPM    Atrial Rate 83 BPM    QRS Duration 144 ms    QT Interval 374 ms    QTC Calculation(Bazett) 462 ms    R Axis 38 degrees    T Axis 5 degrees    QRS Count 16 beats    Q Onset 203 ms    T Offset 390 ms    QTC Fredericia 431 ms   Serial Troponin, 2 Hour ([facility])   Result Value Ref Range    Troponin T, High Sensitivity 84 (HH) <=14 ng/L   BLOOD GAS LACTIC ACID, VENOUS   Result Value Ref Range    POCT Lactate, Venous 3.0 (H) 0.4 - 2.0 mmol/L         Assessment/Plan   Principal Problem:    Cystitis    Acute cystitis, catheter related  -Prior urinary culture with ESBL E. coli, start meropenem, consult infectious disease, will defer urinary catheter change to urology  Enlarged prostate-urinary retention  -Was supposed to have greenlight therapy with Dr. Damon on 6/20, consult urology  -Defer chronic catheter change to urology  Elevated troponins  -Suspect related to acute infection, consult cardiology for further evaluation, monitor on telemetry, cycle third troponin  Heart failure  -Continue home medications  -Cardiology consulted  Atrial fibrillation status post watchman placement  Diabetes, insulin-dependent, last A1c 9.6  History of seizures  -Continue Keppra  8.  DVT prophylaxis with Lovenox daily           I spent 75 minutes in the professional and overall care of this patient.      20 minutes spent in ACP  discussion with the patient, his daughter Chantel is his documented healthcare power of , ED he does have a living will.  We discussed the risk benefits of CPR or intubation.  He values his cognitive status, would never want to be stuck in a bed.  He has no desire for CPR at this time as he is high risk for complications.  He is okay with the ventilator for acute reversible conditions however he has no desire for long-term ventilation trach or PEG.      Maria Del Carmen Luu, APRN-CNP

## 2024-06-14 NOTE — CARE PLAN
Pt has a POA and Living Will --both are on file  ADOD: 3 days    Pt lives at home with his wife in a 1 story home with a basement and 4 steps to climb to enter the home.  Pt has a chronic guidry cath and he is active with [facility]--NEED EXTERNAL ORDER FOR HHC   He uses both a walker and a cane. He no longer drives; his children drives him to his appointments  He does not use home 02/cpap/bipap/nebulizer  He is independent with showering and dressing. He uses [facility] for his meds and he can afford them. His PCP is Dr. HAILEE Rios from   Pt is here for cystitis.  18:27 [facility]    DISCHARGE PLAN: HOME WITH WIFE AND [facility]--NEED EXTERNAL ORDER FOR HHC  DO NOT DISCHARGE PT BEFORE SPEAKING WITH CARE COORDINATION; MUST NOTIFY [facility] OF DISCHARGE

## 2024-06-14 NOTE — PROGRESS NOTES
Attestation/Supervisory note for KIERRA Bueno      The patient is an 84-year-old male presenting to the emergency department for evaluation of a subjective fever and chills this morning.  He states he woke up around 0700 this morning with the symptoms.  He states that his wife took his temperature and it was 102.9F.  He states that he has had similar symptoms in the past with urosepsis.  He states he believes that the last time he had an episode of sepsis was about a year ago.  He denies any headache or visual changes.  No chest pain or shortness of breath.  No abdominal pain.  No nausea vomiting.  No diarrhea or constipation.  No urinary complaints.  No cough or congestion.  No sick contacts or recent travel.  No recent injury or trauma.  No focal weakness or numbness.  All pertinent positives and negatives are recorded above.  All other systems reviewed and otherwise negative.  Vital signs within normal limits.  Physical exam a well-nourished well-developed male in no acute distress.  HEENT exam with dry mucous membranes but otherwise unremarkable.  He has no evidence of airway compromise or respiratory distress.  Abdominal exam is benign.  He has no gross motor, neurologic or vascular deficits on exam.  No flank pain with percussion or palpation.  He has no gross motor, neurologic or vascular deficits on exam.      EKG with sinus rhythm at 92 bpm, right bundle branch block pattern, there is evidence of a first-degree block.  Normal axis, normal ST segment, normal T waves      Cultures were obtained and IV fluids and IV antibiotics were ordered.  Oral acetaminophen was ordered for treatment of his fever      Diagnostic labs with evidence of urinary tract infection, elevated troponin T, elevated BNP, hyperglycemia, mild hyperbilirubinemia, anemia but otherwise unremarkable.      Initial lactic acid 3.5.  Repeat lactic pending at the time of admission      COVID-19 testing negative      Initial Troponin T 91. Repeat  trop T pending at the time of admission      CT abdomen pelvis wo IV contrast   Final Result   Mild bladder wall thickening with questionable perivesical stranding   concerning for cystitis. Small amount of air in the bladder   presumably related to presence of Tiwari catheter however gas-forming   infection or enterovesical fistula could have a similar appearance.   Clinical correlation recommended.        Cholelithiasis.        Colonic fecal retention.        Mild atelectasis or infiltrates in both lung bases with small pleural   effusions.        Additional findings as described above.        MACRO:   None.        Signed by: Reema Wray 6/14/2024 11:21 AM   Dictation workstation:   [id number]      XR chest 1 view   Final Result   No acute cardiopulmonary process radiographically.        MACRO:   None.        Signed by: Reema Wray 6/14/2024 11:11 AM   Dictation workstation:   [id number]           The patient does not have any evidence of ischemia on EKG but does have an elevated troponin T.  No events on the monitor.  Chest x-ray without acute process.  No evidence of pneumonia or pneumothorax.  No evidence of CHF.  CT abdomen pelvis with evidence of cystitis.  No evidence of ureteral stone.  The patient does have evidence of urinary tract infection on diagnostic labs but does not have any evidence of hemodynamic instability.  Cultures were obtained and IV fluids and IV antibiotics were ordered.      The patient was admitted for further management of his current symptoms, for treatment with IV antibiotics, and for trending of his cardiac enzymes.      Impression/diagnosis:  1.  Febrile illness  2.  Acute lower urinary tract infection  3.  Elevated troponin T  4.  Malaise and fatigue      Critical care time of  32 minutes billed for management of a urinary tract infection with a fever concerning for possible urosepsis, trending of the cardiac enzymes, monitoring the patient on telemetry, consultation with  accepting provider, and arrangement for stepdown admission.  This time excludes time for billable procedures.      critical care time billed for by me is non concurrent with time billed for by KIERRA Bueno      I personally saw the patient and made/approve the management plan and take responsibility for the patient management.      I independently interpreted the following study (S) EKG and diagnostic labs      I personally discussed the patient's management with the patient      I reviewed the results of the diagnostic labs and diagnostic imaging.  Formal radiology read was completed by the radiologist.      Almaz Carney MD

## 2024-06-15 ENCOUNTER — APPOINTMENT (OUTPATIENT)
Dept: RADIOLOGY | Facility: HOSPITAL | Age: 84
End: 2024-06-15
Payer: MEDICARE

## 2024-06-15 LAB
ALBUMIN SERPL-MCNC: 3.2 G/DL (ref 3.5–5)
ALBUMIN SERPL-MCNC: 3.5 G/DL (ref 3.5–5)
ALP BLD-CCNC: 73 U/L (ref 35–125)
ALP BLD-CCNC: 82 U/L (ref 35–125)
ALT SERPL-CCNC: 17 U/L (ref 5–40)
ALT SERPL-CCNC: 30 U/L (ref 5–40)
ANION GAP SERPL CALC-SCNC: 11 MMOL/L
ANION GAP SERPL CALC-SCNC: 13 MMOL/L
APPEARANCE UR: CLEAR
AST SERPL-CCNC: 30 U/L (ref 5–40)
AST SERPL-CCNC: 72 U/L (ref 5–40)
BASOPHILS # BLD AUTO: 0.01 X10*3/UL (ref 0–0.1)
BASOPHILS NFR BLD AUTO: 0.2 %
BILIRUB SERPL-MCNC: 1.2 MG/DL (ref 0.1–1.2)
BILIRUB SERPL-MCNC: 1.3 MG/DL (ref 0.1–1.2)
BILIRUB UR STRIP.AUTO-MCNC: NEGATIVE MG/DL
BUN SERPL-MCNC: 20 MG/DL (ref 8–25)
BUN SERPL-MCNC: 20 MG/DL (ref 8–25)
CALCIUM SERPL-MCNC: 8.5 MG/DL (ref 8.5–10.4)
CALCIUM SERPL-MCNC: 8.6 MG/DL (ref 8.5–10.4)
CHLORIDE SERPL-SCNC: 101 MMOL/L (ref 97–107)
CHLORIDE SERPL-SCNC: 97 MMOL/L (ref 97–107)
CO2 SERPL-SCNC: 23 MMOL/L (ref 24–31)
CO2 SERPL-SCNC: 23 MMOL/L (ref 24–31)
COLOR UR: YELLOW
CREAT SERPL-MCNC: 1 MG/DL (ref 0.4–1.6)
CREAT SERPL-MCNC: 1.1 MG/DL (ref 0.4–1.6)
EGFRCR SERPLBLD CKD-EPI 2021: 67 ML/MIN/1.73M*2
EGFRCR SERPLBLD CKD-EPI 2021: 75 ML/MIN/1.73M*2
EOSINOPHIL # BLD AUTO: 0 X10*3/UL (ref 0–0.4)
EOSINOPHIL NFR BLD AUTO: 0 %
ERYTHROCYTE [DISTWIDTH] IN BLOOD BY AUTOMATED COUNT: 13.3 % (ref 11.5–14.5)
ERYTHROCYTE [DISTWIDTH] IN BLOOD BY AUTOMATED COUNT: 13.6 % (ref 11.5–14.5)
GIANT PLATELETS BLD QL SMEAR: NORMAL
GLUCOSE BLD MANUAL STRIP-MCNC: 208 MG/DL (ref 74–99)
GLUCOSE BLD MANUAL STRIP-MCNC: 248 MG/DL (ref 74–99)
GLUCOSE BLD MANUAL STRIP-MCNC: 249 MG/DL (ref 74–99)
GLUCOSE BLD MANUAL STRIP-MCNC: 270 MG/DL (ref 74–99)
GLUCOSE SERPL-MCNC: 203 MG/DL (ref 65–99)
GLUCOSE SERPL-MCNC: 274 MG/DL (ref 65–99)
GLUCOSE UR STRIP.AUTO-MCNC: ABNORMAL MG/DL
HCT VFR BLD AUTO: 29.2 % (ref 41–52)
HCT VFR BLD AUTO: 30.8 % (ref 41–52)
HGB BLD-MCNC: 10.1 G/DL (ref 13.5–17.5)
HGB BLD-MCNC: 10.4 G/DL (ref 13.5–17.5)
HYALINE CASTS #/AREA URNS AUTO: ABNORMAL /LPF
IMM GRANULOCYTES # BLD AUTO: 0.05 X10*3/UL (ref 0–0.5)
IMM GRANULOCYTES NFR BLD AUTO: 1.1 % (ref 0–0.9)
KETONES UR STRIP.AUTO-MCNC: NEGATIVE MG/DL
LACTATE BLDV-SCNC: 1.7 MMOL/L (ref 0.4–2)
LACTATE BLDV-SCNC: 2.4 MMOL/L (ref 0.4–2)
LACTATE BLDV-SCNC: 2.5 MMOL/L (ref 0.4–2)
LACTATE BLDV-SCNC: 2.6 MMOL/L (ref 0.4–2)
LEUKOCYTE ESTERASE UR QL STRIP.AUTO: ABNORMAL
LYMPHOCYTES # BLD AUTO: 0.69 X10*3/UL (ref 0.8–3)
LYMPHOCYTES NFR BLD AUTO: 15.3 %
MCH RBC QN AUTO: 35 PG (ref 26–34)
MCH RBC QN AUTO: 35.1 PG (ref 26–34)
MCHC RBC AUTO-ENTMCNC: 33.8 G/DL (ref 32–36)
MCHC RBC AUTO-ENTMCNC: 34.6 G/DL (ref 32–36)
MCV RBC AUTO: 101 FL (ref 80–100)
MCV RBC AUTO: 104 FL (ref 80–100)
MONOCYTES # BLD AUTO: 0.35 X10*3/UL (ref 0.05–0.8)
MONOCYTES NFR BLD AUTO: 7.8 %
MUCOUS THREADS #/AREA URNS AUTO: ABNORMAL /LPF
NEUTROPHILS # BLD AUTO: 3.4 X10*3/UL (ref 1.6–5.5)
NEUTROPHILS NFR BLD AUTO: 75.6 %
NITRITE UR QL STRIP.AUTO: NEGATIVE
NRBC BLD-RTO: 0 /100 WBCS (ref 0–0)
NRBC BLD-RTO: 0 /100 WBCS (ref 0–0)
PH UR STRIP.AUTO: 5.5 [PH]
PLATELET # BLD AUTO: 78 X10*3/UL (ref 150–450)
PLATELET # BLD AUTO: 79 X10*3/UL (ref 150–450)
POTASSIUM SERPL-SCNC: 3.9 MMOL/L (ref 3.4–5.1)
POTASSIUM SERPL-SCNC: 4.2 MMOL/L (ref 3.4–5.1)
PROT SERPL-MCNC: 5.8 G/DL (ref 5.9–7.9)
PROT SERPL-MCNC: 6.2 G/DL (ref 5.9–7.9)
PROT UR STRIP.AUTO-MCNC: ABNORMAL MG/DL
RBC # BLD AUTO: 2.88 X10*6/UL (ref 4.5–5.9)
RBC # BLD AUTO: 2.97 X10*6/UL (ref 4.5–5.9)
RBC # UR STRIP.AUTO: ABNORMAL /UL
RBC #/AREA URNS AUTO: ABNORMAL /HPF
RBC MORPH BLD: NORMAL
SODIUM SERPL-SCNC: 133 MMOL/L (ref 133–145)
SODIUM SERPL-SCNC: 135 MMOL/L (ref 133–145)
SP GR UR STRIP.AUTO: 1.02
UROBILINOGEN UR STRIP.AUTO-MCNC: ABNORMAL MG/DL
WBC # BLD AUTO: 4.5 X10*3/UL (ref 4.4–11.3)
WBC # BLD AUTO: 5.3 X10*3/UL (ref 4.4–11.3)
WBC #/AREA URNS AUTO: ABNORMAL /HPF
WBC CLUMPS #/AREA URNS AUTO: ABNORMAL /HPF

## 2024-06-15 PROCEDURE — 36415 COLL VENOUS BLD VENIPUNCTURE: CPT | Performed by: UROLOGY

## 2024-06-15 PROCEDURE — 2500000001 HC RX 250 WO HCPCS SELF ADMINISTERED DRUGS (ALT 637 FOR MEDICARE OP): Performed by: INTERNAL MEDICINE

## 2024-06-15 PROCEDURE — 80053 COMPREHEN METABOLIC PANEL: CPT | Mod: 91 | Performed by: UROLOGY

## 2024-06-15 PROCEDURE — 85027 COMPLETE CBC AUTOMATED: CPT | Performed by: NURSE PRACTITIONER

## 2024-06-15 PROCEDURE — 80053 COMPREHEN METABOLIC PANEL: CPT | Performed by: NURSE PRACTITIONER

## 2024-06-15 PROCEDURE — 83605 ASSAY OF LACTIC ACID: CPT | Performed by: NURSE PRACTITIONER

## 2024-06-15 PROCEDURE — 94762 N-INVAS EAR/PLS OXIMTRY CONT: CPT

## 2024-06-15 PROCEDURE — 2500000001 HC RX 250 WO HCPCS SELF ADMINISTERED DRUGS (ALT 637 FOR MEDICARE OP): Performed by: NURSE PRACTITIONER

## 2024-06-15 PROCEDURE — 82947 ASSAY GLUCOSE BLOOD QUANT: CPT

## 2024-06-15 PROCEDURE — 87040 BLOOD CULTURE FOR BACTERIA: CPT | Mod: WESLAB | Performed by: UROLOGY

## 2024-06-15 PROCEDURE — 73620 X-RAY EXAM OF FOOT: CPT | Mod: RT

## 2024-06-15 PROCEDURE — 83605 ASSAY OF LACTIC ACID: CPT | Mod: 91 | Performed by: UROLOGY

## 2024-06-15 PROCEDURE — 2500000002 HC RX 250 W HCPCS SELF ADMINISTERED DRUGS (ALT 637 FOR MEDICARE OP, ALT 636 FOR OP/ED): Performed by: NURSE PRACTITIONER

## 2024-06-15 PROCEDURE — 2500000004 HC RX 250 GENERAL PHARMACY W/ HCPCS (ALT 636 FOR OP/ED): Performed by: NURSE PRACTITIONER

## 2024-06-15 PROCEDURE — 85025 COMPLETE CBC W/AUTO DIFF WBC: CPT | Performed by: UROLOGY

## 2024-06-15 PROCEDURE — 2060000001 HC INTERMEDIATE ICU ROOM DAILY

## 2024-06-15 PROCEDURE — 99232 SBSQ HOSP IP/OBS MODERATE 35: CPT | Performed by: NURSE PRACTITIONER

## 2024-06-15 PROCEDURE — 87086 URINE CULTURE/COLONY COUNT: CPT | Mod: WESLAB | Performed by: UROLOGY

## 2024-06-15 PROCEDURE — 73620 X-RAY EXAM OF FOOT: CPT | Mod: RIGHT SIDE | Performed by: RADIOLOGY

## 2024-06-15 PROCEDURE — 73630 X-RAY EXAM OF FOOT: CPT | Mod: RT

## 2024-06-15 PROCEDURE — 81001 URINALYSIS AUTO W/SCOPE: CPT | Performed by: UROLOGY

## 2024-06-15 PROCEDURE — 36415 COLL VENOUS BLD VENIPUNCTURE: CPT | Performed by: NURSE PRACTITIONER

## 2024-06-15 RX ORDER — ACETAMINOPHEN 650 MG/1
650 SUPPOSITORY RECTAL EVERY 6 HOURS PRN
Status: DISCONTINUED | OUTPATIENT
Start: 2024-06-15 | End: 2024-06-19 | Stop reason: HOSPADM

## 2024-06-15 RX ORDER — IBUPROFEN 400 MG/1
400 TABLET ORAL EVERY 6 HOURS PRN
Status: DISCONTINUED | OUTPATIENT
Start: 2024-06-15 | End: 2024-06-17

## 2024-06-15 SDOH — HEALTH STABILITY: MENTAL HEALTH
HOW OFTEN DO YOU NEED TO HAVE SOMEONE HELP YOU WHEN YOU READ INSTRUCTIONS, PAMPHLETS, OR OTHER WRITTEN MATERIAL FROM YOUR DOCTOR OR PHARMACY?: RARELY

## 2024-06-15 SDOH — ECONOMIC STABILITY: FOOD INSECURITY: WITHIN THE PAST 12 MONTHS, THE FOOD YOU BOUGHT JUST DIDN'T LAST AND YOU DIDN'T HAVE MONEY TO GET MORE.: NEVER TRUE

## 2024-06-15 SDOH — SOCIAL STABILITY: SOCIAL INSECURITY: DO YOU FEEL UNSAFE GOING BACK TO THE PLACE WHERE YOU ARE LIVING?: NO

## 2024-06-15 SDOH — SOCIAL STABILITY: SOCIAL INSECURITY: DO YOU FEEL ANYONE HAS EXPLOITED OR TAKEN ADVANTAGE OF YOU FINANCIALLY OR OF YOUR PERSONAL PROPERTY?: NO

## 2024-06-15 SDOH — SOCIAL STABILITY: SOCIAL NETWORK: ARE YOU MARRIED, WIDOWED, DIVORCED, SEPARATED, NEVER MARRIED, OR LIVING WITH A PARTNER?: MARRIED

## 2024-06-15 SDOH — HEALTH STABILITY: MENTAL HEALTH: HOW OFTEN DO YOU HAVE A DRINK CONTAINING ALCOHOL?: NEVER

## 2024-06-15 SDOH — ECONOMIC STABILITY: FOOD INSECURITY: WITHIN THE PAST 12 MONTHS, YOU WORRIED THAT YOUR FOOD WOULD RUN OUT BEFORE YOU GOT MONEY TO BUY MORE.: NEVER TRUE

## 2024-06-15 SDOH — HEALTH STABILITY: MENTAL HEALTH: HOW OFTEN DO YOU HAVE 6 OR MORE DRINKS ON ONE OCCASION?: NEVER

## 2024-06-15 SDOH — SOCIAL STABILITY: SOCIAL INSECURITY: DOES ANYONE TRY TO KEEP YOU FROM HAVING/CONTACTING OTHER FRIENDS OR DOING THINGS OUTSIDE YOUR HOME?: NO

## 2024-06-15 SDOH — SOCIAL STABILITY: SOCIAL NETWORK: HOW OFTEN DO YOU ATTENT MEETINGS OF THE CLUB OR ORGANIZATION YOU BELONG TO?: 1 TO 4 TIMES PER YEAR

## 2024-06-15 SDOH — SOCIAL STABILITY: SOCIAL INSECURITY: WITHIN THE LAST YEAR, HAVE YOU BEEN HUMILIATED OR EMOTIONALLY ABUSED IN OTHER WAYS BY YOUR PARTNER OR EX-PARTNER?: NO

## 2024-06-15 SDOH — SOCIAL STABILITY: SOCIAL INSECURITY: WITHIN THE LAST YEAR, HAVE YOU BEEN AFRAID OF YOUR PARTNER OR EX-PARTNER?: NO

## 2024-06-15 SDOH — ECONOMIC STABILITY: INCOME INSECURITY: IN THE PAST 12 MONTHS, HAS THE ELECTRIC, GAS, OIL, OR WATER COMPANY THREATENED TO SHUT OFF SERVICE IN YOUR HOME?: NO

## 2024-06-15 SDOH — SOCIAL STABILITY: SOCIAL NETWORK: HOW OFTEN DO YOU ATTEND CHURCH OR RELIGIOUS SERVICES?: 1 TO 4 TIMES PER YEAR

## 2024-06-15 SDOH — SOCIAL STABILITY: SOCIAL NETWORK
DO YOU BELONG TO ANY CLUBS OR ORGANIZATIONS SUCH AS CHURCH GROUPS UNIONS, FRATERNAL OR ATHLETIC GROUPS, OR SCHOOL GROUPS?: YES

## 2024-06-15 SDOH — SOCIAL STABILITY: SOCIAL INSECURITY: ARE THERE ANY APPARENT SIGNS OF INJURIES/BEHAVIORS THAT COULD BE RELATED TO ABUSE/NEGLECT?: NO

## 2024-06-15 SDOH — HEALTH STABILITY: MENTAL HEALTH: HOW MANY STANDARD DRINKS CONTAINING ALCOHOL DO YOU HAVE ON A TYPICAL DAY?: PATIENT DOES NOT DRINK

## 2024-06-15 SDOH — SOCIAL STABILITY: SOCIAL INSECURITY: HAS ANYONE EVER THREATENED TO HURT YOUR FAMILY OR YOUR PETS?: NO

## 2024-06-15 SDOH — SOCIAL STABILITY: SOCIAL NETWORK: HOW OFTEN DO YOU GET TOGETHER WITH FRIENDS OR RELATIVES?: THREE TIMES A WEEK

## 2024-06-15 SDOH — SOCIAL STABILITY: SOCIAL INSECURITY: HAVE YOU HAD ANY THOUGHTS OF HARMING ANYONE ELSE?: NO

## 2024-06-15 SDOH — SOCIAL STABILITY: SOCIAL INSECURITY: ARE YOU OR HAVE YOU BEEN THREATENED OR ABUSED PHYSICALLY, EMOTIONALLY, OR SEXUALLY BY ANYONE?: NO

## 2024-06-15 SDOH — SOCIAL STABILITY: SOCIAL INSECURITY: HAVE YOU HAD THOUGHTS OF HARMING ANYONE ELSE?: NO

## 2024-06-15 SDOH — SOCIAL STABILITY: SOCIAL INSECURITY: WERE YOU ABLE TO COMPLETE ALL THE BEHAVIORAL HEALTH SCREENINGS?: YES

## 2024-06-15 SDOH — SOCIAL STABILITY: SOCIAL INSECURITY: ABUSE: ADULT

## 2024-06-15 ASSESSMENT — ACTIVITIES OF DAILY LIVING (ADL)
FEEDING YOURSELF: INDEPENDENT
PATIENT'S MEMORY ADEQUATE TO SAFELY COMPLETE DAILY ACTIVITIES?: YES
TOILETING: INDEPENDENT
JUDGMENT_ADEQUATE_SAFELY_COMPLETE_DAILY_ACTIVITIES: YES
WALKS IN HOME: NEEDS ASSISTANCE
HEARING - RIGHT EAR: DIFFICULTY WITH NOISE
ASSISTIVE_DEVICE: EYEGLASSES;WALKER
ADEQUATE_TO_COMPLETE_ADL: YES
DRESSING YOURSELF: INDEPENDENT
BATHING: INDEPENDENT
HEARING - LEFT EAR: DIFFICULTY WITH NOISE
GROOMING: INDEPENDENT

## 2024-06-15 ASSESSMENT — COGNITIVE AND FUNCTIONAL STATUS - GENERAL
CLIMB 3 TO 5 STEPS WITH RAILING: A LITTLE
STANDING UP FROM CHAIR USING ARMS: A LITTLE
DAILY ACTIVITIY SCORE: 24
PATIENT BASELINE BEDBOUND: NO
TURNING FROM BACK TO SIDE WHILE IN FLAT BAD: A LITTLE
WALKING IN HOSPITAL ROOM: A LITTLE
MOBILITY SCORE: 19
MOVING TO AND FROM BED TO CHAIR: A LITTLE

## 2024-06-15 ASSESSMENT — PAIN SCALES - GENERAL
PAINLEVEL_OUTOF10: 0 - NO PAIN
PAINLEVEL_OUTOF10: 0 - NO PAIN

## 2024-06-15 ASSESSMENT — PAIN - FUNCTIONAL ASSESSMENT: PAIN_FUNCTIONAL_ASSESSMENT: 0-10

## 2024-06-15 ASSESSMENT — LIFESTYLE VARIABLES
SKIP TO QUESTIONS 9-10: 1
PRESCIPTION_ABUSE_PAST_12_MONTHS: NO
SUBSTANCE_ABUSE_PAST_12_MONTHS: NO
SKIP TO QUESTIONS 9-10: 1
AUDIT-C TOTAL SCORE: 0
SKIP TO QUESTIONS 9-10: 1
SKIP TO QUESTIONS 9-10: 1
AUDIT-C TOTAL SCORE: 0
HOW OFTEN DO YOU HAVE 6 OR MORE DRINKS ON ONE OCCASION: NEVER
AUDIT-C TOTAL SCORE: 0
HOW OFTEN DO YOU HAVE A DRINK CONTAINING ALCOHOL: NEVER
AUDIT-C TOTAL SCORE: 0
HOW MANY STANDARD DRINKS CONTAINING ALCOHOL DO YOU HAVE ON A TYPICAL DAY: PATIENT DOES NOT DRINK
AUDIT-C TOTAL SCORE: 0

## 2024-06-15 NOTE — CONSULTS
INFECTIOUS DISEASES CONSULT NOTE    Referring Physician: Chris Duran  Reason For Consult: Gram negative sepsis due to urinary source  Date of Consult: 6/15/24 at 0729    History Of Present Illness  Patient is an 84-year-old gentleman with history of BPH and chronic indwelling Tiwari catheter who presents to the [facility] emergency room on 6/14 with 1 day history of fever to 102, shaking chills, and confusion.  On arrival temperature was 39.5.  White count was 5.3.  He had significant pyuria on urinalysis.  Blood and urine cultures were obtained.  This morning 1 of 2 sets of admission blood cultures are growing gram-negative rods.  Patient was initially started on Zosyn which was switched to meropenem this morning.  Patient has continued to spike persistent high-grade fever.  CT A/P shows no hydronephrosis.  Patient otherwise denies shortness of breath, cough, chest pain, abdominal pain, diarrhea.     Past Medical History  He has a past medical history of A-fib (Multi), Anemia, BPH (benign prostatic hyperplasia), CHF (congestive heart failure) (Multi), Cognitive communication deficit, Coronary artery disease, Diabetes (Multi), GERD (gastroesophageal reflux disease), H/O sepsis, H/O: upper GI bleed, HL (hearing loss), Hyperlipidemia, Iron deficiency anemia, Muscle weakness (generalized), Osteoarthritis, Personal history of other diseases of the circulatory system, Personal history of other endocrine, nutritional and metabolic disease, Recurrent UTI, Seizure disorder (Multi), Thrombocytopenia (CMS-[facility]), TIA (transient ischemic attack), Type 2 diabetes mellitus (Multi), Unspecified convulsions (Multi), Urinary retention, and Urinary tract infection.    Surgical History  He has a past surgical history that includes Other surgical history (03/11/2019); Other surgical history (03/11/2019); Other surgical history (03/11/2019); MR angio head wo IV contrast (02/17/2019); MR angio head wo IV contrast (10/28/2022); MR  angio head wo IV contrast (08/15/2023); Cardiac catheterization (N/A, 11/10/2023); Other surgical history; Coronary artery bypass graft (2011); Cardiac catheterization (2012); Rotator cuff repair; Colonoscopy; Upper gastrointestinal endoscopy; Laminectomy (2022); and Other surgical history.     Social History  Denies smoking alcohol or drug use.    Family History  No family exposure to known communicable illnesses  No family history of tuberculosis    Allergies  Isosorbide, Famotidine, Prednisone, Amoxicillin, Donepezil, Hydrochlorothiazide, Ibuprofen, Oxycodone-acetaminophen, Tramadol hcl, and Oxycodone hcl     Medications  Meropenem D1  See Epic for remaining medications.    Review of Systems  Detailed review of systems completed.  No significant additional positives beyond what is mentioned above    Physical Exam  Vital signs:  Visit Vitals  /85   Pulse 89   Temp 37 °C (98.6 °F) (Oral)   Resp 16      General: Elderly gentleman resting comfortably in no significant distress.  HEENT:  No scleral icterus or conjunctival suffusion, throat clear, neck supple, no cervical LAD  Lungs:  Clear to auscultation bilaterally  Heart:  RRR, S1, S2 normal, no extra sounds or murmurs.  Abdomen:  Normoactive BS, soft, NT/ND. No palpable organs or masses.  No peritoneal signs.  No CVA tenderness to percussion bilaterally.  Extremities:  No erythema/rash    Relevant Lab Results  Results from last 72 hours   Lab Units 06/15/24  0532   WBC AUTO x10*3/uL 5.3   HEMOGLOBIN g/dL 10.1*   HEMATOCRIT % 29.2*   PLATELETS AUTO x10*3/uL 79*     Results from last 72 hours   Lab Units 06/15/24  0532   CREATININE mg/dL 1.00   EGFR mL/min/1.73m*2 75     Results from last 72 hours   Lab Units 06/15/24  0532   AST U/L 30   ALT U/L 17   ALK PHOS U/L 73   BILIRUBIN TOTAL mg/dL 1.2     Cultures  Urinalysis (6/14): Nit neg/LE lrg/WBC >50  Urine culture (6/14): Pending  Blood culture (6/14): 1/2 sets gram-negative rods    Imaging  CT A/P (6/14):  Mild bladder wall thickening.  No hydronephrosis.  No other intra-abdominal pathology noted.    Impression:  1.  Gram-negative sepsis secondary to urinary source.  2.  CAUTI (POA)  3.  Fever    Plan:  1.  Continue meropenem.  Monitor for adverse antibiotic events.  2.  Await final ID and susceptibility of blood isolate.  3.  Await pending urine culture results.  4.  Would recommend changing Tiwari catheter if not already done.  5.  Will follow.      Connor Gamboa MD  [facility]  [phone]

## 2024-06-15 NOTE — ED NOTES
Report received by WILY Morgan. Pt resting in bed. Bed and gown changed as they were wet from the ice packs that were provided to lower temperature. Pt was able to stand at the bedside independently while bedding was changed. Pt stated he is feeling better and feels as though the fever did change as the most recent temperature is in a normal range.      Mony Theodore RN  06/15/24 [id number]

## 2024-06-15 NOTE — CARE PLAN
Problem: Infection prevention/bleeding  Goal: Infection s/sx managed  Outcome: Progressing  Goal: No further progression of infection  Outcome: Progressing  Goal: No signs of bleeding  Outcome: Progressing  Goal: Normal coagulation studies  Outcome: Progressing     Problem: Perfusion/Cardiac  Goal: Adequate perfusion to organs/extremities  Outcome: Progressing  Goal: Hemodynamically stable  Outcome: Progressing  Goal: No cardiac arrhythmias  Outcome: Progressing     Problem: Respiratory/Oxygenation  Goal: No signs of respiratory compromise  Outcome: Progressing  Goal: Tolerates activity without increased O2 demands  Outcome: Progressing     Problem: Neuro/Coping  Goal: Minimal anxiety; utilize coping mechanisms  Outcome: Progressing  Goal: No signs of neurological compromise  Outcome: Progressing  Goal: Increase self care/family involvement  Outcome: Progressing     Problem: Fluid/Electrolyte/Nutrition  Goal: Fluid balance within 1 liter of normovolemia  Outcome: Progressing  Goal: No signs of renal failure  Outcome: Progressing  Goal: Normal electrolyte levels  Outcome: Progressing  Goal: Normal glucose levels  Outcome: Progressing  Goal: Tolerates nutritional intake  Outcome: Progressing     Problem: Infection related to problem list condition  Goal: Infection will resolve through treatment  Outcome: Progressing   The patient's goals for the shift include      The clinical goals for the shift include keep temperature down

## 2024-06-15 NOTE — PROGRESS NOTES
"Luis Greene is a 84 y.o. male on day 1 of admission presenting with Cystitis.    Subjective   He was feeling terrible again this morning and now feels somewhat better.  Blood culture is growing Enterobacter and another gram-negative bacillus.  His urine culture is growing gram negative bacilli.  He is on meropenem.  Urine is clearer today.       Objective     Physical Exam  Awake, alert, oriented  Abdomen: Soft and nontender  Back: No CVA tenderness  Lungs: Normal respiratory pattern  Psychological: Normal affect    Last Recorded Vitals  Blood pressure 130/54, pulse 87, temperature 36.9 °C (98.5 °F), temperature source Oral, resp. rate (!) 22, height 1.803 m (5' 11\"), weight 76.9 kg (169 lb 8 oz), SpO2 94%.  Intake/Output last 3 Shifts:  I/O last 3 completed shifts:  In: 2726 (35.5 mL/kg) [IV Piggyback:2726]  Out: 700 (9.1 mL/kg) [Urine:700 (0.3 mL/kg/hr)]  Weight: 76.9 kg     Relevant Results  Scheduled medications  acetaminophen, 650 mg, oral, 4x daily  amLODIPine, 5 mg, oral, Daily  aspirin, 81 mg, oral, Daily  atorvastatin, 40 mg, oral, Nightly  enoxaparin, 40 mg, subcutaneous, Daily  ferrous sulfate (325 mg ferrous sulfate), 65 mg of iron, oral, Daily with breakfast  finasteride, 5 mg, oral, Daily  [Held by provider] furosemide, 40 mg, oral, Daily  gabapentin, 100 mg, oral, BID  insulin glargine, 10 Units, subcutaneous, Nightly  insulin lispro, 0-5 Units, subcutaneous, TID  levETIRAcetam, 250 mg, oral, BID  magnesium oxide, 400 mg, oral, Daily  meropenem, 1 g, intravenous, q8h TAYLOR  [Held by provider] metFORMIN, 1,000 mg, oral, BID  pantoprazole, 40 mg, oral, Daily  sennosides-docusate sodium, 2 tablet, oral, Nightly  tamsulosin, 0.4 mg, oral, BID      Continuous medications  sodium chloride 0.9%, 100 mL/hr, Last Rate: Stopped (06/15/24 0720)      PRN medications  PRN medications: acetaminophen, dextrose, glucagon, ibuprofen, ondansetron **OR** ondansetron, polyethylene glycol    Results for orders placed or " performed during the hospital encounter of 06/14/24 (from the past 24 hour(s))   Serial Troponin, 6 Hour ([facility])   Result Value Ref Range    Troponin T, High Sensitivity 78 (HH) <=14 ng/L   Blood Gas Lactic Acid, Venous   Result Value Ref Range    POCT Lactate, Venous 3.5 (H) 0.4 - 2.0 mmol/L   POCT GLUCOSE   Result Value Ref Range    POCT Glucose 257 (H) 74 - 99 mg/dL   CBC   Result Value Ref Range    WBC 5.3 4.4 - 11.3 x10*3/uL    nRBC 0.0 0.0 - 0.0 /100 WBCs    RBC 2.88 (L) 4.50 - 5.90 x10*6/uL    Hemoglobin 10.1 (L) 13.5 - 17.5 g/dL    Hematocrit 29.2 (L) 41.0 - 52.0 %     (H) 80 - 100 fL    MCH 35.1 (H) 26.0 - 34.0 pg    MCHC 34.6 32.0 - 36.0 g/dL    RDW 13.3 11.5 - 14.5 %    Platelets 79 (L) 150 - 450 x10*3/uL   Comprehensive metabolic panel   Result Value Ref Range    Glucose 203 (H) 65 - 99 mg/dL    Sodium 135 133 - 145 mmol/L    Potassium 3.9 3.4 - 5.1 mmol/L    Chloride 101 97 - 107 mmol/L    Bicarbonate 23 (L) 24 - 31 mmol/L    Urea Nitrogen 20 8 - 25 mg/dL    Creatinine 1.00 0.40 - 1.60 mg/dL    eGFR 75 >60 mL/min/1.73m*2    Calcium 8.5 8.5 - 10.4 mg/dL    Albumin 3.2 (L) 3.5 - 5.0 g/dL    Alkaline Phosphatase 73 35 - 125 U/L    Total Protein 5.8 (L) 5.9 - 7.9 g/dL    AST 30 5 - 40 U/L    Bilirubin, Total 1.2 0.1 - 1.2 mg/dL    ALT 17 5 - 40 U/L    Anion Gap 11 <=19 mmol/L   BLOOD GAS LACTIC ACID, VENOUS   Result Value Ref Range    POCT Lactate, Venous 1.7 0.4 - 2.0 mmol/L   POCT GLUCOSE   Result Value Ref Range    POCT Glucose 208 (H) 74 - 99 mg/dL   POCT GLUCOSE   Result Value Ref Range    POCT Glucose 248 (H) 74 - 99 mg/dL       ECG 12 lead    Result Date: 6/14/2024  Atrial fibrillation Right bundle branch block Abnormal ECG When compared with ECG of 15-MAY-2024 18:52, No significant change was found Confirmed by Ismael Uriostegui ([id number]) on 6/14/2024 1:35:05 PM    CT abdomen pelvis wo IV contrast    Result Date: 6/14/2024  Interpreted By:  Reema Wray, STUDY: CT ABDOMEN PELVIS WO IV CONTRAST;   6/14/2024 10:52 am   INDICATION: Signs/Symptoms:fever; h/o urosepsis.   COMPARISON: 12/20/2023   ACCESSION NUMBER(S): [id number]   ORDERING CLINICIAN: ISA FRIEDMAN   TECHNIQUE: CT of the abdomen and pelvis was performed. Sagittal and coronal reconstructions were generated.  No intravenous contrast given for the exam.   FINDINGS: Solid organ and vessel evaluation limited without IV contrast.   ABDOMINAL ORGANS:   LIVER: No focal lesion within limits of unenhanced exam.   GALL BLADDER AND BILIARY TREE: Small calcified gallstone   SPLEEN: No focal lesion within limits of unenhanced exam. 2 isodense presumed splenules near the inferior pole again seen..   PANCREAS: No focal lesion within limits of unenhanced exam   ADRENALS: No adrenal mass   KIDNEYS AND URETERS: No renal mass or hydronephrosis within limits of unenhanced exam. Mild relatively symmetric perinephric fat stranding similar to the previous exam allowing for technical differences.   BOWEL: No abnormally dilated large or small bowel loops. Dot of air in nondilated cecal appendix. Moderate fecal debris throughout the colon.   PERITONEUM, RETROPERITONEUM, NODES: No significant free fluid. No free air. No significant retroperitoneal adenopathy within limits of unenhanced exam.   VESSELS:  Lack of IV contrast precludes vascular luminal assessment. Extensive atherosclerotic calcifications. No abdominal aortic aneurysm.   PELVIS: Tiwari catheter and small amount of air in mildly distended thick-walled urinary bladder. Questionable mild perivesical fat stranding anteriorly. Dense presumed surgical material in enlarged prostate gland measuring 5.2 cm transversely.   ABDOMINAL WALL: Small fat containing umbilical hernia. Dots of air in the perineum on the most caudal images likely related to skin fold.   BONES: Multifocal presumed degenerative changes. Laminectomy defects and spondylolisthesis in the lower lumbar spine similar to the previous exam.   LOWER CHEST:  Uneven interstitial and dependent densities in the visualized lung bases. Small bilateral pleural effusions. Coronary artery calcifications and probable epicardial wire along the undersurface of the heart again seen.       Mild bladder wall thickening with questionable perivesical stranding concerning for cystitis. Small amount of air in the bladder presumably related to presence of Tiwari catheter however gas-forming infection or enterovesical fistula could have a similar appearance. Clinical correlation recommended.   Cholelithiasis.   Colonic fecal retention.   Mild atelectasis or infiltrates in both lung bases with small pleural effusions.   Additional findings as described above.   MACRO: None.   Signed by: Reema Wray 6/14/2024 11:21 AM Dictation workstation:   [id number]    XR chest 1 view    Result Date: 6/14/2024  Interpreted By:  Reema Wray, STUDY: XR CHEST 1 VIEW;  6/14/2024 10:39 am   INDICATION: Signs/Symptoms:sepsis.   COMPARISON: 05/21/2024   ACCESSION NUMBER(S): [id number]   ORDERING CLINICIAN: ISA FRIEDMAN   FINDINGS: No focal infiltrate, pleural effusion or pneumothorax identified. Cardiac silhouette is within normal limits for size with overlying electronic structure and sternotomy wires again seen.       No acute cardiopulmonary process radiographically.   MACRO: None.   Signed by: Reema Wray 6/14/2024 11:11 AM Dictation workstation:   [id number]    XR chest 1 view    Result Date: 5/21/2024  Interpreted By:  Low Camejo, STUDY: XR CHEST 1 VIEW; 5/21/2024 11:58 am   INDICATION: Signs/Symptoms:f/u pleural effusion   COMPARISON: 05/15/2024   ACCESSION NUMBER(S): [id number]   ORDERING CLINICIAN: SAPNA MOE   FINDINGS: The study is limited due to rotation. Median sternotomy wires and surgical clips are again present, consistent with previous coronary artery bypass graft. Implantable device is again seen in the left anterior chest wall. The cardiac silhouette is within normal limits  for the technique. Calcifications involve the tortuous aorta. There is no pneumothorax, confluent infiltrates or significant effusion. The osseous structures are unchanged.       Allowing for the aforementioned limitation, no acute cardiopulmonary disease.   Signed by: Low Camejo 5/21/2024 3:02 PM Dictation workstation:   [id number]    US renal complete    Result Date: 5/16/2024  Interpreted By:  Jenise Anderson, STUDY: US RENAL COMPLETE;  5/16/2024 9:35 pm   INDICATION: Signs/Symptoms:uti.   COMPARISON: CT abdomen and pelvis 12/20/2023   ACCESSION NUMBER(S): [id number]   ORDERING CLINICIAN: SELVIN RICHARDSON   TECHNIQUE: Multiple images of the kidneys were obtained  .   FINDINGS: RIGHT KIDNEY: The right kidney measures 10.8 cm in length. Echogenicity within normal limits. No hydronephrosis. The previously described right renal cyst on prior CT is not visualized on this exam.   LEFT KIDNEY: The left kidney measures 11.1 cm in length. Echogenicity within normal limits. No hydronephrosis.   BLADDER: Debris noted at the urinary bladder.   There is a nodular contour of the prostate indenting the base of the urinary bladder.       1. No hydronephrosis. 2. Debris at the urinary bladder. Please correlate with urinalysis. 3. Nodular contour of the prostate indenting the base of the urinary bladder. Please correlate with PSA level.           MACRO: None     Signed by: Jenise Anderson 5/16/2024 11:25 PM Dictation workstation:   [id number]                             Assessment/Plan   Principal Problem:    Cystitis    He has urosepsis.  This is from the chronic indwelling Tiwari.  He currently is on meropenem.  Await the final culture results and then adjust antibiotics.    He has had recurrent UTIs and is scheduled for greenlight laser this coming week.  Depending on his condition, I will either do it as scheduled or wait another week.  He needs to have it done as he keeps getting recurrent infections.             Grant  LENA Stephens MD

## 2024-06-15 NOTE — CARE PLAN
The patient's goals for the shift include      The clinical goals for the shift include keep temperature down    Problem: Infection prevention/bleeding  Goal: Infection s/sx managed  Outcome: Progressing  Goal: No further progression of infection  Outcome: Progressing  Goal: No signs of bleeding  Outcome: Progressing  Goal: Normal coagulation studies  Outcome: Progressing     Problem: Perfusion/Cardiac  Goal: Adequate perfusion to organs/extremities  Outcome: Progressing  Goal: Hemodynamically stable  Outcome: Progressing  Goal: No cardiac arrhythmias  Outcome: Progressing     Problem: Respiratory/Oxygenation  Goal: No signs of respiratory compromise  Outcome: Progressing  Goal: Tolerates activity without increased O2 demands  Outcome: Progressing     Problem: Neuro/Coping  Goal: Minimal anxiety; utilize coping mechanisms  Outcome: Progressing  Goal: No signs of neurological compromise  Outcome: Progressing  Goal: Increase self care/family involvement  Outcome: Progressing     Problem: Fluid/Electrolyte/Nutrition  Goal: Fluid balance within 1 liter of normovolemia  Outcome: Progressing  Goal: No signs of renal failure  Outcome: Progressing  Goal: Normal electrolyte levels  Outcome: Progressing  Goal: Normal glucose levels  Outcome: Progressing  Goal: Tolerates nutritional intake  Outcome: Progressing     Problem: Infection related to problem list condition  Goal: Infection will resolve through treatment  Outcome: Progressing

## 2024-06-15 NOTE — CARE PLAN
The patient's goals for the shift include      The clinical goals for the shift include keep temperature down    Over the shift, the patient did not make progress toward the following goals. Barriers to progression include patient has Tylenol ordered. Recommendations to address these barriers include give tylenol aqt regular intervals.

## 2024-06-15 NOTE — PROGRESS NOTES
"Luis Greene is a 84 y.o. male on day 2 of admission presenting with Cystitis.    Subjective   Alert and oriented x 2-3, ill-appearing.  Recently febrile at 103.  Breathing comfortably on nasal cannula oxygen.  Chest pain-free.       Objective     Physical Exam  Vitals reviewed.   Constitutional:       General: He is not in acute distress.     Appearance: He is ill-appearing. He is not toxic-appearing.   HENT:      Head: Normocephalic and atraumatic.      Nose: Nose normal.      Mouth/Throat:      Mouth: Mucous membranes are moist.      Pharynx: Oropharynx is clear.   Cardiovascular:      Rate and Rhythm: Normal rate and regular rhythm.   Pulmonary:      Effort: Pulmonary effort is normal.      Breath sounds: Normal breath sounds.      Comments: Oxygen via nasal cannula.  No conversational dyspnea appreciated.  No tachypnea.  No pain with inspiration  Abdominal:      General: Bowel sounds are normal.      Palpations: Abdomen is soft.   Genitourinary:     Comments: Tiwari to CD with 200 mL clear yellow urine  Musculoskeletal:      Right lower leg: No edema.      Left lower leg: No edema.   Skin:     General: Skin is warm and dry.      Capillary Refill: Capillary refill takes less than 2 seconds.   Neurological:      Mental Status: He is alert. He is disoriented.         Last Recorded Vitals  Blood pressure 118/85, pulse 89, temperature 37 °C (98.6 °F), temperature source Oral, resp. rate 16, height 1.803 m (5' 11\"), weight 76.9 kg (169 lb 8 oz), SpO2 (!) 92%.  Intake/Output last 3 Shifts:  I/O last 3 completed shifts:  In: 2726 (35.5 mL/kg) [IV Piggyback:2726]  Out: 700 (9.1 mL/kg) [Urine:700 (0.3 mL/kg/hr)]  Weight: 76.9 kg     Relevant Results  Results for orders placed or performed during the hospital encounter of 06/14/24 (from the past 24 hour(s))   CBC and Auto Differential   Result Value Ref Range    WBC 7.3 4.4 - 11.3 x10*3/uL    nRBC 0.0 0.0 - 0.0 /100 WBCs    RBC 3.09 (L) 4.50 - 5.90 x10*6/uL    Hemoglobin " 10.9 (L) 13.5 - 17.5 g/dL    Hematocrit 31.5 (L) 41.0 - 52.0 %     (H) 80 - 100 fL    MCH 35.3 (H) 26.0 - 34.0 pg    MCHC 34.6 32.0 - 36.0 g/dL    RDW 13.2 11.5 - 14.5 %    Platelets 94 (L) 150 - 450 x10*3/uL    Neutrophils % 77.5 40.0 - 80.0 %    Immature Granulocytes %, Automated 0.7 0.0 - 0.9 %    Lymphocytes % 9.0 13.0 - 44.0 %    Monocytes % 10.1 2.0 - 10.0 %    Eosinophils % 2.3 0.0 - 6.0 %    Basophils % 0.4 0.0 - 2.0 %    Neutrophils Absolute 5.63 (H) 1.60 - 5.50 x10*3/uL    Immature Granulocytes Absolute, Automated 0.05 0.00 - 0.50 x10*3/uL    Lymphocytes Absolute 0.65 (L) 0.80 - 3.00 x10*3/uL    Monocytes Absolute 0.73 0.05 - 0.80 x10*3/uL    Eosinophils Absolute 0.17 0.00 - 0.40 x10*3/uL    Basophils Absolute 0.03 0.00 - 0.10 x10*3/uL   Comprehensive metabolic panel   Result Value Ref Range    Glucose 267 (H) 65 - 99 mg/dL    Sodium 134 133 - 145 mmol/L    Potassium 4.5 3.4 - 5.1 mmol/L    Chloride 99 97 - 107 mmol/L    Bicarbonate 23 (L) 24 - 31 mmol/L    Urea Nitrogen 19 8 - 25 mg/dL    Creatinine 0.90 0.40 - 1.60 mg/dL    eGFR 85 >60 mL/min/1.73m*2    Calcium 9.5 8.5 - 10.4 mg/dL    Albumin 3.6 3.5 - 5.0 g/dL    Alkaline Phosphatase 88 35 - 125 U/L    Total Protein 6.6 5.9 - 7.9 g/dL    AST 24 5 - 40 U/L    Bilirubin, Total 1.3 (H) 0.1 - 1.2 mg/dL    ALT 15 5 - 40 U/L    Anion Gap 12 <=19 mmol/L   Lipase   Result Value Ref Range    Lipase 50 16 - 63 U/L   NT Pro-BNP   Result Value Ref Range    PROBNP 2,114 (H) 0 - 852 pg/mL   Serial Troponin, Initial ([facility])   Result Value Ref Range    Troponin T, High Sensitivity 91 (HH) <=14 ng/L   Morphology   Result Value Ref Range    RBC Morphology See Below     Polychromasia Mild     Ovalocytes Few    Sars-CoV-2 PCR   Result Value Ref Range    Coronavirus 2019, PCR Not Detected Not Detected   Urinalysis with Reflex Culture and Microscopic   Result Value Ref Range    Color, Urine Light-Orange (N) Light-Yellow, Yellow, Dark-Yellow    Appearance, Urine  Ex.Turbid (N) Clear    Specific Gravity, Urine 1.013 1.005 - 1.035    pH, Urine 6.0 5.0, 5.5, 6.0, 6.5, 7.0, 7.5, 8.0    Protein, Urine 30 (1+) (A) NEGATIVE, 10 (TRACE), 20 (TRACE) mg/dL    Glucose, Urine 200 (2+) (A) Normal mg/dL    Blood, Urine 0.1 (1+) (A) NEGATIVE    Ketones, Urine NEGATIVE NEGATIVE mg/dL    Bilirubin, Urine NEGATIVE NEGATIVE    Urobilinogen, Urine Normal Normal mg/dL    Nitrite, Urine NEGATIVE NEGATIVE    Leukocyte Esterase, Urine 500 Akin/µL (A) NEGATIVE   Extra Urine Gray Tube   Result Value Ref Range    Extra Tube Hold for add-ons.    Microscopic Only, Urine   Result Value Ref Range    WBC, Urine >50 (A) 1-5, NONE /HPF    WBC Clumps, Urine MODERATE Reference range not established. /HPF    RBC, Urine 1-2 NONE, 1-2, 3-5 /HPF    Bacteria, Urine 4+ (A) NONE SEEN /HPF    Fine Granular Casts, Urine OCCASIONAL (A) NONE /LPF   ECG 12 lead   Result Value Ref Range    Ventricular Rate 92 BPM    Atrial Rate 83 BPM    QRS Duration 144 ms    QT Interval 374 ms    QTC Calculation(Bazett) 462 ms    R Axis 38 degrees    T Axis 5 degrees    QRS Count 16 beats    Q Onset 203 ms    T Offset 390 ms    QTC Fredericia 431 ms   Serial Troponin, 2 Hour ([facility])   Result Value Ref Range    Troponin T, High Sensitivity 84 (HH) <=14 ng/L   BLOOD GAS LACTIC ACID, VENOUS   Result Value Ref Range    POCT Lactate, Venous 3.0 (H) 0.4 - 2.0 mmol/L   Blood Culture    Specimen: Peripheral Venipuncture; Blood culture   Result Value Ref Range    Blood Culture Loaded on Instrument - Culture in progress    Blood Culture    Specimen: Peripheral Venipuncture; Blood culture   Result Value Ref Range    Blood Culture       Identification and susceptibility testing to follow    Gram Stain Gram negative bacilli (AA)    Serial Troponin, 6 Hour ([facility])   Result Value Ref Range    Troponin T, High Sensitivity 78 (HH) <=14 ng/L   Blood Gas Lactic Acid, Venous   Result Value Ref Range    POCT Lactate, Venous 3.5 (H) 0.4 - 2.0 mmol/L   POCT  GLUCOSE   Result Value Ref Range    POCT Glucose 257 (H) 74 - 99 mg/dL   CBC   Result Value Ref Range    WBC 5.3 4.4 - 11.3 x10*3/uL    nRBC 0.0 0.0 - 0.0 /100 WBCs    RBC 2.88 (L) 4.50 - 5.90 x10*6/uL    Hemoglobin 10.1 (L) 13.5 - 17.5 g/dL    Hematocrit 29.2 (L) 41.0 - 52.0 %     (H) 80 - 100 fL    MCH 35.1 (H) 26.0 - 34.0 pg    MCHC 34.6 32.0 - 36.0 g/dL    RDW 13.3 11.5 - 14.5 %    Platelets 79 (L) 150 - 450 x10*3/uL   Comprehensive metabolic panel   Result Value Ref Range    Glucose 203 (H) 65 - 99 mg/dL    Sodium 135 133 - 145 mmol/L    Potassium 3.9 3.4 - 5.1 mmol/L    Chloride 101 97 - 107 mmol/L    Bicarbonate 23 (L) 24 - 31 mmol/L    Urea Nitrogen 20 8 - 25 mg/dL    Creatinine 1.00 0.40 - 1.60 mg/dL    eGFR 75 >60 mL/min/1.73m*2    Calcium 8.5 8.5 - 10.4 mg/dL    Albumin 3.2 (L) 3.5 - 5.0 g/dL    Alkaline Phosphatase 73 35 - 125 U/L    Total Protein 5.8 (L) 5.9 - 7.9 g/dL    AST 30 5 - 40 U/L    Bilirubin, Total 1.2 0.1 - 1.2 mg/dL    ALT 17 5 - 40 U/L    Anion Gap 11 <=19 mmol/L   BLOOD GAS LACTIC ACID, VENOUS   Result Value Ref Range    POCT Lactate, Venous 1.7 0.4 - 2.0 mmol/L         Assessment/Plan   Principal Problem:    Cystitis    6/14: This elderly white male with an extensive past medical history including coronary artery disease, remote CABG, hypertension, hyperlipidemia, type 2 diabetes, paroxysmal atrial fibrillation, history of?  TIA versus recurrent localized focal seizures, history of GI bleeding on Eliquis anticoagulation, status post Watchman implantation is currently admitted with high-grade fever and evidence of recurrent urinary tract infection possible urosepsis.  The patient had previously been scheduled for a laser-assisted TURP on 6/20/2024.  He has been started on empiric IV Rocephin.  Systolic blood pressure slightly elevated on the amlodipine 5 mg daily.  Urine and blood cultures are pending.  No leukocytosis and renal parameters remain at baseline presumably positive  indicators of early diagnosis.  Minor elevation of high-sensitivity troponin consistent with minimal demand ischemia.  Will hold usual low-dose Lasix 40 mg daily.  Patient was not given usual IV fluids per sepsis protocol due to the slight elevation in his high-sensitivity troponin.  Clinically the patient is not volume overloaded.     6/15: No significant changes since admission.  Patient continues to have recurrent fevers.  Most recent 103.  He remains on IV antibiotics for his known urinary tract infection.  He is receiving Tylenol for his fevers.  He states he feels achy but otherwise okay.  He is breathing comfortably on nasal cannula oxygen.  There is no evidence of fluid volume overload.  No peripheral edema.  He is chest pain-free.  On telemetry monitor overnight he remained in a sinus arrhythmia without significant ectopy.  His blood pressure is stable at 118/85.  Overall he is stable from a cardiac perspective.  Will follow from a distance.  Please feel free to reach out with further or new cardiac concerns.      I spent 35 minutes in the professional and overall care of this patient.      Juan Luis Tate, APRN-CNP

## 2024-06-15 NOTE — PROGRESS NOTES
Luis Greene is a 84 y.o. male on day 1 of admission presenting with Cystitis.      Subjective          Objective     Last Recorded Vitals  /54   Pulse 87   Temp 36.9 °C (98.5 °F) (Oral)   Resp (!) 22   Wt 76.9 kg (169 lb 8 oz)   SpO2 94%   Intake/Output last 3 Shifts:    Intake/Output Summary (Last 24 hours) at 6/15/2024 1408  Last data filed at 6/15/2024 0603  Gross per 24 hour   Intake 300 ml   Output 700 ml   Net -400 ml       Admission Weight  Weight: 76.9 kg (169 lb 8 oz) (06/14/24 1222)    Daily Weight  06/14/24 : 76.9 kg (169 lb 8 oz)    Image Results  ECG 12 lead  Atrial fibrillation  Right bundle branch block  Abnormal ECG  When compared with ECG of 15-MAY-2024 18:52,  No significant change was found  Confirmed by Ismael Uriostegui ([id number]) on 6/14/2024 1:35:05 PM  CT abdomen pelvis wo IV contrast  Narrative: Interpreted By:  Reema Wray,   STUDY:  CT ABDOMEN PELVIS WO IV CONTRAST;  6/14/2024 10:52 am      INDICATION:  Signs/Symptoms:fever; h/o urosepsis.      COMPARISON:  12/20/2023      ACCESSION NUMBER(S):  [id number]      ORDERING CLINICIAN:  ISA FRIEDMAN      TECHNIQUE:  CT of the abdomen and pelvis was performed. Sagittal and coronal  reconstructions were generated.  No intravenous contrast given for  the exam.      FINDINGS:  Solid organ and vessel evaluation limited without IV contrast.      ABDOMINAL ORGANS:      LIVER: No focal lesion within limits of unenhanced exam.      GALL BLADDER AND BILIARY TREE: Small calcified gallstone      SPLEEN: No focal lesion within limits of unenhanced exam. 2 isodense  presumed splenules near the inferior pole again seen..      PANCREAS: No focal lesion within limits of unenhanced exam      ADRENALS: No adrenal mass      KIDNEYS AND URETERS: No renal mass or hydronephrosis within limits of  unenhanced exam. Mild relatively symmetric perinephric fat stranding  similar to the previous exam allowing for technical differences.      BOWEL: No abnormally  dilated large or small bowel loops. Dot of air  in nondilated cecal appendix. Moderate fecal debris throughout the  colon.      PERITONEUM, RETROPERITONEUM, NODES: No significant free fluid. No  free air. No significant retroperitoneal adenopathy within limits of  unenhanced exam.      VESSELS:  Lack of IV contrast precludes vascular luminal assessment.  Extensive atherosclerotic calcifications. No abdominal aortic  aneurysm.      PELVIS: Tiwari catheter and small amount of air in mildly distended  thick-walled urinary bladder. Questionable mild perivesical fat  stranding anteriorly. Dense presumed surgical material in enlarged  prostate gland measuring 5.2 cm transversely.      ABDOMINAL WALL: Small fat containing umbilical hernia. Dots of air in  the perineum on the most caudal images likely related to skin fold.      BONES: Multifocal presumed degenerative changes. Laminectomy defects  and spondylolisthesis in the lower lumbar spine similar to the  previous exam.      LOWER CHEST: Uneven interstitial and dependent densities in the  visualized lung bases. Small bilateral pleural effusions. Coronary  artery calcifications and probable epicardial wire along the  undersurface of the heart again seen.      Impression: Mild bladder wall thickening with questionable perivesical stranding  concerning for cystitis. Small amount of air in the bladder  presumably related to presence of Tiwari catheter however gas-forming  infection or enterovesical fistula could have a similar appearance.  Clinical correlation recommended.      Cholelithiasis.      Colonic fecal retention.      Mild atelectasis or infiltrates in both lung bases with small pleural  effusions.      Additional findings as described above.      MACRO:  None.      Signed by: Reema Wray 6/14/2024 11:21 AM  Dictation workstation:   [id number]  XR chest 1 view  Narrative: Interpreted By:  Reema Wray,   STUDY:  XR CHEST 1 VIEW;  6/14/2024 10:39 am       INDICATION:  Signs/Symptoms:sepsis.      COMPARISON:  05/21/2024      ACCESSION NUMBER(S):  [id number]      ORDERING CLINICIAN:  ISA FRIEDMAN      FINDINGS:  No focal infiltrate, pleural effusion or pneumothorax identified.  Cardiac silhouette is within normal limits for size with overlying  electronic structure and sternotomy wires again seen.      Impression: No acute cardiopulmonary process radiographically.      MACRO:  None.      Signed by: Reema Wray 6/14/2024 11:11 AM  Dictation workstation:   [id number]      Physical Exam  Constitutional:       Appearance: Normal appearance.   Cardiovascular:      Rate and Rhythm: Normal rate and regular rhythm.      Pulses: Normal pulses.      Heart sounds: Normal heart sounds.   Pulmonary:      Effort: Pulmonary effort is normal.      Breath sounds: Normal breath sounds.   Abdominal:      General: Bowel sounds are normal.      Palpations: Abdomen is soft.   Musculoskeletal:         General: Normal range of motion.   Skin:     Comments: Right plantar surface wound no redness or drainage, stable appearance   Neurological:      Mental Status: He is alert.         Relevant Results               Assessment/Plan          This patient has a urinary catheter   Reason for the urinary catheter remaining today? urinary retention/bladder outlet obstruction, acute or chronic          Principal Problem:    Cystitis    Acute cystitis, catheter related  Prior urinary culture with ESBL E. coli  Start meropenem  Consult infectious disease, will defer urinary catheter change to urology    Enlarged prostate-urinary retention  Greenlight therapy with Dr. Damon on 6/20, consult urology  -Defer chronic catheter change to urology    Elevated troponins  -Suspect related to acute infection, consult cardiology for further evaluation, monitor on telemetry, cycle third troponin  Heart failure  Continue home medications  Cardiology consulted    Atrial fibrillation status post watchman  placement    Diabetes, insulin-dependent, last A1c 9.6    History of seizures  Continue Keppra    DVT prophylaxis   Lovenox daily    Wound to right foot  Wound appears to be stable, not infected.  X-ray of right foot to rule out osteomyelitis    Plan of Care  Patient lives at home with his spouse and plans to return there at discharge.  Patient may need long-term IV antibiotics.    Plan of care discussed with patient, wife and daughter at bedside and collaborating physician .            Martina Malik, APRN-CNP

## 2024-06-16 LAB
ANION GAP SERPL CALC-SCNC: 9 MMOL/L
BACTERIA BLD AEROBE CULT: ABNORMAL
BACTERIA BLD AEROBE CULT: ABNORMAL
BACTERIA BLD CULT: ABNORMAL
BACTERIA BLD CULT: ABNORMAL
BACTERIA BLD CULT: NORMAL
BACTERIA BLD CULT: NORMAL
BACTERIA UR CULT: ABNORMAL
BUN SERPL-MCNC: 24 MG/DL (ref 8–25)
CALCIUM SERPL-MCNC: 8.1 MG/DL (ref 8.5–10.4)
CHLORIDE SERPL-SCNC: 99 MMOL/L (ref 97–107)
CO2 SERPL-SCNC: 24 MMOL/L (ref 24–31)
CREAT SERPL-MCNC: 1.1 MG/DL (ref 0.4–1.6)
EGFRCR SERPLBLD CKD-EPI 2021: 67 ML/MIN/1.73M*2
ERYTHROCYTE [DISTWIDTH] IN BLOOD BY AUTOMATED COUNT: 13.6 % (ref 11.5–14.5)
GLUCOSE BLD MANUAL STRIP-MCNC: 232 MG/DL (ref 74–99)
GLUCOSE BLD MANUAL STRIP-MCNC: 308 MG/DL (ref 74–99)
GLUCOSE BLD MANUAL STRIP-MCNC: 320 MG/DL (ref 74–99)
GLUCOSE BLD MANUAL STRIP-MCNC: 330 MG/DL (ref 74–99)
GLUCOSE SERPL-MCNC: 233 MG/DL (ref 65–99)
GRAM STN SPEC: ABNORMAL
GRAM STN SPEC: ABNORMAL
HCT VFR BLD AUTO: 28.3 % (ref 41–52)
HGB BLD-MCNC: 10.1 G/DL (ref 13.5–17.5)
HOLD SPECIMEN: NORMAL
MCH RBC QN AUTO: 35.1 PG (ref 26–34)
MCHC RBC AUTO-ENTMCNC: 35.7 G/DL (ref 32–36)
MCV RBC AUTO: 98 FL (ref 80–100)
NRBC BLD-RTO: 0 /100 WBCS (ref 0–0)
PLATELET # BLD AUTO: 65 X10*3/UL (ref 150–450)
POTASSIUM SERPL-SCNC: 4.2 MMOL/L (ref 3.4–5.1)
RBC # BLD AUTO: 2.88 X10*6/UL (ref 4.5–5.9)
SODIUM SERPL-SCNC: 132 MMOL/L (ref 133–145)
WBC # BLD AUTO: 3.5 X10*3/UL (ref 4.4–11.3)

## 2024-06-16 PROCEDURE — 2500000002 HC RX 250 W HCPCS SELF ADMINISTERED DRUGS (ALT 637 FOR MEDICARE OP, ALT 636 FOR OP/ED): Performed by: NURSE PRACTITIONER

## 2024-06-16 PROCEDURE — 97116 GAIT TRAINING THERAPY: CPT | Mod: GP

## 2024-06-16 PROCEDURE — 97161 PT EVAL LOW COMPLEX 20 MIN: CPT | Mod: GP

## 2024-06-16 PROCEDURE — 2060000001 HC INTERMEDIATE ICU ROOM DAILY

## 2024-06-16 PROCEDURE — 85027 COMPLETE CBC AUTOMATED: CPT | Performed by: NURSE PRACTITIONER

## 2024-06-16 PROCEDURE — 36415 COLL VENOUS BLD VENIPUNCTURE: CPT | Performed by: NURSE PRACTITIONER

## 2024-06-16 PROCEDURE — 2500000001 HC RX 250 WO HCPCS SELF ADMINISTERED DRUGS (ALT 637 FOR MEDICARE OP): Performed by: NURSE PRACTITIONER

## 2024-06-16 PROCEDURE — 99232 SBSQ HOSP IP/OBS MODERATE 35: CPT | Performed by: INTERNAL MEDICINE

## 2024-06-16 PROCEDURE — 80048 BASIC METABOLIC PNL TOTAL CA: CPT | Performed by: NURSE PRACTITIONER

## 2024-06-16 PROCEDURE — 97110 THERAPEUTIC EXERCISES: CPT | Mod: GP

## 2024-06-16 PROCEDURE — 82947 ASSAY GLUCOSE BLOOD QUANT: CPT

## 2024-06-16 PROCEDURE — 2500000004 HC RX 250 GENERAL PHARMACY W/ HCPCS (ALT 636 FOR OP/ED): Performed by: NURSE PRACTITIONER

## 2024-06-16 ASSESSMENT — COGNITIVE AND FUNCTIONAL STATUS - GENERAL
MOBILITY SCORE: 14
MOVING FROM LYING ON BACK TO SITTING ON SIDE OF FLAT BED WITH BEDRAILS: A LITTLE
MOBILITY SCORE: 18
MOVING TO AND FROM BED TO CHAIR: A LITTLE
DRESSING REGULAR UPPER BODY CLOTHING: A LOT
STANDING UP FROM CHAIR USING ARMS: A LITTLE
TURNING FROM BACK TO SIDE WHILE IN FLAT BAD: A LITTLE
DRESSING REGULAR UPPER BODY CLOTHING: A LITTLE
TOILETING: A LOT
HELP NEEDED FOR BATHING: A LITTLE
TOILETING: TOTAL
CLIMB 3 TO 5 STEPS WITH RAILING: A LOT
WALKING IN HOSPITAL ROOM: A LITTLE
MOBILITY SCORE: 17
MOVING TO AND FROM BED TO CHAIR: A LOT
DRESSING REGULAR LOWER BODY CLOTHING: TOTAL
WALKING IN HOSPITAL ROOM: A LITTLE
CLIMB 3 TO 5 STEPS WITH RAILING: TOTAL
MOVING TO AND FROM BED TO CHAIR: A LITTLE
DAILY ACTIVITIY SCORE: 17
CLIMB 3 TO 5 STEPS WITH RAILING: A LOT
STANDING UP FROM CHAIR USING ARMS: A LITTLE
MOVING FROM LYING ON BACK TO SITTING ON SIDE OF FLAT BED WITH BEDRAILS: A LITTLE
PERSONAL GROOMING: A LITTLE
DAILY ACTIVITIY SCORE: 15
PERSONAL GROOMING: A LOT
TURNING FROM BACK TO SIDE WHILE IN FLAT BAD: A LOT
DRESSING REGULAR LOWER BODY CLOTHING: A LITTLE
STANDING UP FROM CHAIR USING ARMS: A LOT
WALKING IN HOSPITAL ROOM: A LITTLE

## 2024-06-16 ASSESSMENT — PAIN SCALES - GENERAL
PAINLEVEL_OUTOF10: 0 - NO PAIN

## 2024-06-16 ASSESSMENT — PAIN - FUNCTIONAL ASSESSMENT
PAIN_FUNCTIONAL_ASSESSMENT: 0-10

## 2024-06-16 NOTE — CARE PLAN
Problem: Mobility  Goal: Bed mobility including supine to sit and sit to supine independently.  Outcome: Progressing  Goal: Ambulate 200 feet with rolling walker and distant supervision.  Outcome: Progressing     Problem: PT Transfers  Goal: Transfers sit to stand and stand to sit with distant supervision.  Outcome: Progressing

## 2024-06-16 NOTE — PROGRESS NOTES
"Luis Greene is a 84 y.o. male on day 2 of admission presenting with Cystitis.    Subjective   Patient feels well.  He has not noted any further fevers.  No chest pain or palpitations.  No unusual shortness of breath.       Objective     Physical Exam  Eyes:      Conjunctiva/sclera: Conjunctivae normal.   Cardiovascular:      Rate and Rhythm: Normal rate and regular rhythm.      Heart sounds:      No gallop.   Pulmonary:      Breath sounds: Normal breath sounds. No wheezing, rhonchi or rales.   Abdominal:      Palpations: Abdomen is soft.   Neurological:      General: No focal deficit present.      Mental Status: He is alert.       Constitutional:       Appearance: Not in distress.   Eyes:      Conjunctiva/sclera: Conjunctivae normal.   Neck:      Vascular: JVD normal.   Pulmonary:      Breath sounds: Normal breath sounds. No wheezing. No rhonchi. No rales.   Cardiovascular:      Normal rate. Regular rhythm.      Murmurs: There is no murmur.      No gallop.  No click. No rub.   Abdominal:      Palpations: Abdomen is soft.   Neurological:      General: No focal deficit present.      Mental Status: Alert.        Last Recorded Vitals  Blood pressure 138/64, pulse 63, temperature 35.9 °C (96.6 °F), temperature source Temporal, resp. rate 17, height 1.803 m (5' 11\"), weight 85.3 kg (188 lb 0.8 oz), SpO2 92%.  Intake/Output last 3 Shifts:  I/O last 3 completed shifts:  In: 1440 (16.9 mL/kg) [P.O.:60; I.V.:1080 (12.6 mL/kg); IV Piggyback:300]  Out: 2400 (28.1 mL/kg) [Urine:2400 (0.8 mL/kg/hr)]  Weight: 85.4 kg     Relevant Results                  Results for orders placed or performed during the hospital encounter of 06/14/24 (from the past 24 hour(s))   POCT GLUCOSE   Result Value Ref Range    POCT Glucose 248 (H) 74 - 99 mg/dL   Comprehensive Metabolic Panel   Result Value Ref Range    Glucose 274 (H) 65 - 99 mg/dL    Sodium 133 133 - 145 mmol/L    Potassium 4.2 3.4 - 5.1 mmol/L    Chloride 97 97 - 107 mmol/L    " Bicarbonate 23 (L) 24 - 31 mmol/L    Urea Nitrogen 20 8 - 25 mg/dL    Creatinine 1.10 0.40 - 1.60 mg/dL    eGFR 67 >60 mL/min/1.73m*2    Calcium 8.6 8.5 - 10.4 mg/dL    Albumin 3.5 3.5 - 5.0 g/dL    Alkaline Phosphatase 82 35 - 125 U/L    Total Protein 6.2 5.9 - 7.9 g/dL    AST 72 (H) 5 - 40 U/L    Bilirubin, Total 1.3 (H) 0.1 - 1.2 mg/dL    ALT 30 5 - 40 U/L    Anion Gap 13 <=19 mmol/L   Blood Gas Lactic Acid, Venous   Result Value Ref Range    POCT Lactate, Venous 2.5 (H) 0.4 - 2.0 mmol/L   Blood Culture    Specimen: Peripheral Venipuncture; Blood culture   Result Value Ref Range    Blood Culture Loaded on Instrument - Culture in progress    Blood Culture    Specimen: Peripheral Venipuncture; Blood culture   Result Value Ref Range    Blood Culture Loaded on Instrument - Culture in progress    CBC and Auto Differential   Result Value Ref Range    WBC 4.5 4.4 - 11.3 x10*3/uL    nRBC 0.0 0.0 - 0.0 /100 WBCs    RBC 2.97 (L) 4.50 - 5.90 x10*6/uL    Hemoglobin 10.4 (L) 13.5 - 17.5 g/dL    Hematocrit 30.8 (L) 41.0 - 52.0 %     (H) 80 - 100 fL    MCH 35.0 (H) 26.0 - 34.0 pg    MCHC 33.8 32.0 - 36.0 g/dL    RDW 13.6 11.5 - 14.5 %    Platelets 78 (L) 150 - 450 x10*3/uL    Neutrophils % 75.6 40.0 - 80.0 %    Immature Granulocytes %, Automated 1.1 (H) 0.0 - 0.9 %    Lymphocytes % 15.3 13.0 - 44.0 %    Monocytes % 7.8 2.0 - 10.0 %    Eosinophils % 0.0 0.0 - 6.0 %    Basophils % 0.2 0.0 - 2.0 %    Neutrophils Absolute 3.40 1.60 - 5.50 x10*3/uL    Immature Granulocytes Absolute, Automated 0.05 0.00 - 0.50 x10*3/uL    Lymphocytes Absolute 0.69 (L) 0.80 - 3.00 x10*3/uL    Monocytes Absolute 0.35 0.05 - 0.80 x10*3/uL    Eosinophils Absolute 0.00 0.00 - 0.40 x10*3/uL    Basophils Absolute 0.01 0.00 - 0.10 x10*3/uL   Morphology   Result Value Ref Range    RBC Morphology See Below     Giant Platelets Few    Blood Gas Lactic Acid, Venous   Result Value Ref Range    POCT Lactate, Venous 2.6 (H) 0.4 - 2.0 mmol/L   POCT GLUCOSE    Result Value Ref Range    POCT Glucose 249 (H) 74 - 99 mg/dL   Blood Gas Lactic Acid, Venous   Result Value Ref Range    POCT Lactate, Venous 2.4 (H) 0.4 - 2.0 mmol/L   Urinalysis with Reflex Culture and Microscopic   Result Value Ref Range    Color, Urine Yellow Light-Yellow, Yellow, Dark-Yellow    Appearance, Urine Clear Clear    Specific Gravity, Urine 1.019 1.005 - 1.035    pH, Urine 5.5 5.0, 5.5, 6.0, 6.5, 7.0, 7.5, 8.0    Protein, Urine 70 (1+) (A) NEGATIVE, 10 (TRACE), 20 (TRACE) mg/dL    Glucose, Urine 100 (1+) (A) Normal mg/dL    Blood, Urine 0.2 (2+) (A) NEGATIVE    Ketones, Urine NEGATIVE NEGATIVE mg/dL    Bilirubin, Urine NEGATIVE NEGATIVE    Urobilinogen, Urine 4 (2+) (A) Normal mg/dL    Nitrite, Urine NEGATIVE NEGATIVE    Leukocyte Esterase, Urine 250 Akin/µL (A) NEGATIVE   Extra Urine Gray Tube   Result Value Ref Range    Extra Tube Hold for add-ons.    Microscopic Only, Urine   Result Value Ref Range    WBC, Urine 21-50 (A) 1-5, NONE /HPF    WBC Clumps, Urine OCCASIONAL Reference range not established. /HPF    RBC, Urine 11-20 (A) NONE, 1-2, 3-5 /HPF    Mucus, Urine FEW Reference range not established. /LPF    Hyaline Casts, Urine OCCASIONAL (A) NONE /LPF   POCT GLUCOSE   Result Value Ref Range    POCT Glucose 270 (H) 74 - 99 mg/dL   CBC   Result Value Ref Range    WBC 3.5 (L) 4.4 - 11.3 x10*3/uL    nRBC 0.0 0.0 - 0.0 /100 WBCs    RBC 2.88 (L) 4.50 - 5.90 x10*6/uL    Hemoglobin 10.1 (L) 13.5 - 17.5 g/dL    Hematocrit 28.3 (L) 41.0 - 52.0 %    MCV 98 80 - 100 fL    MCH 35.1 (H) 26.0 - 34.0 pg    MCHC 35.7 32.0 - 36.0 g/dL    RDW 13.6 11.5 - 14.5 %    Platelets 65 (L) 150 - 450 x10*3/uL   Basic metabolic panel   Result Value Ref Range    Glucose 233 (H) 65 - 99 mg/dL    Sodium 132 (L) 133 - 145 mmol/L    Potassium 4.2 3.4 - 5.1 mmol/L    Chloride 99 97 - 107 mmol/L    Bicarbonate 24 24 - 31 mmol/L    Urea Nitrogen 24 8 - 25 mg/dL    Creatinine 1.10 0.40 - 1.60 mg/dL    eGFR 67 >60 mL/min/1.73m*2     Calcium 8.1 (L) 8.5 - 10.4 mg/dL    Anion Gap 9 <=19 mmol/L   POCT GLUCOSE   Result Value Ref Range    POCT Glucose 232 (H) 74 - 99 mg/dL               Assessment/Plan   Principal Problem:    Cystitis    incipal Problem:    Cystitis     6/14: This elderly white male with an extensive past medical history including coronary artery disease, remote CABG, hypertension, hyperlipidemia, type 2 diabetes, paroxysmal atrial fibrillation, history of?  TIA versus recurrent localized focal seizures, history of GI bleeding on Eliquis anticoagulation, status post Watchman implantation is currently admitted with high-grade fever and evidence of recurrent urinary tract infection possible urosepsis.  The patient had previously been scheduled for a laser-assisted TURP on 6/20/2024.  He has been started on empiric IV Rocephin.  Systolic blood pressure slightly elevated on the amlodipine 5 mg daily.  Urine and blood cultures are pending.  No leukocytosis and renal parameters remain at baseline presumably positive indicators of early diagnosis.  Minor elevation of high-sensitivity troponin consistent with minimal demand ischemia.  Will hold usual low-dose Lasix 40 mg daily.  Patient was not given usual IV fluids per sepsis protocol due to the slight elevation in his high-sensitivity troponin.  Clinically the patient is not volume overloaded.     6/15: No significant changes since admission.  Patient continues to have recurrent fevers.  Most recent 103.  He remains on IV antibiotics for his known urinary tract infection.  He is receiving Tylenol for his fevers.  He states he feels achy but otherwise okay.  He is breathing comfortably on nasal cannula oxygen.  There is no evidence of fluid volume overload.  No peripheral edema.  He is chest pain-free.  On telemetry monitor overnight he remained in a sinus arrhythmia without significant ectopy.  His blood pressure is stable at 118/85.  Overall he is stable from a cardiac perspective.  Will  follow from a distance.  Please feel free to reach out with further or new cardiac concerns.    6/16: Cardiac wise the patient remains stable.  No chest pain or anginal type symptoms.  Receiving IV antibiotic therapy and overall clinical condition has improved.  Tentative plan is prostate surgery on Thursday of coming week.  Dr. Grant will resume cardiology care tomorrow.          I spent 20 minutes in the professional and overall care of this patient.      Ismael Uriostegui, DO

## 2024-06-16 NOTE — PROGRESS NOTES
Luis Greene is a 84 y.o. male on day 2 of admission presenting with Cystitis.      Subjective   Alert and oriented x 4. Patient slept well overnight and has no complaints. He denies any chills, shortness of breath or pain.        Objective     Last Recorded Vitals  /64 (BP Location: Left arm, Patient Position: Lying)   Pulse 63   Temp 35.9 °C (96.6 °F) (Temporal)   Resp 17   Wt 85.4 kg (188 lb 4.4 oz)   SpO2 92%   Intake/Output last 3 Shifts:    Intake/Output Summary (Last 24 hours) at 6/16/2024 0836  Last data filed at 6/16/2024 0630  Gross per 24 hour   Intake 1240 ml   Output 1700 ml   Net -460 ml       Admission Weight  Weight: 76.9 kg (169 lb 8 oz) (06/14/24 1222)    Daily Weight  06/15/24 : 85.4 kg (188 lb 4.4 oz)    Image Results  XR foot right 1-2 views  Narrative: Interpreted By:  Mathew Varela,   STUDY:  XR FOOT RIGHT 1-2 VIEWS; ;  6/15/2024 3:10 pm      INDICATION:  Signs/Symptoms:assess wound, OM.      COMPARISON:  None.      ACCESSION NUMBER(S):  [id number]      ORDERING CLINICIAN:  GINGER MITCHELL      FINDINGS:  Right foot, three views      There is no osseous destruction or erosions to suggest presence of  osteomyelitis There is moderate joint space are was otherwise the 1st  MTP joint. There is mild degenerative change the tarsometatarsal,  naviculocuneiform and talonavicular joints. There is a moderate-sized  plantar calcaneal spur.      Impression: No radiographic evidence of osteomyelitis.  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          MACRO:  None      Signed by: Mathew Varela 6/15/2024 3:22 PM  Dictation workstation:   [id number]      Physical Exam  Constitutional:       Appearance: Normal appearance.   Cardiovascular:      Rate and Rhythm: Normal rate and regular rhythm.   Pulmonary:      Effort: Pulmonary effort is normal.      Breath sounds: Normal breath sounds.   Abdominal:      General: Bowel sounds are normal.      Palpations: Abdomen is soft.    Musculoskeletal:         General: Normal range of motion.   Skin:     General: Skin is warm and dry.   Neurological:      Mental Status: He is alert and oriented to person, place, and time.         Relevant Results             Results for orders placed or performed during the hospital encounter of 06/14/24 (from the past 24 hour(s))   POCT GLUCOSE   Result Value Ref Range    POCT Glucose 248 (H) 74 - 99 mg/dL   Comprehensive Metabolic Panel   Result Value Ref Range    Glucose 274 (H) 65 - 99 mg/dL    Sodium 133 133 - 145 mmol/L    Potassium 4.2 3.4 - 5.1 mmol/L    Chloride 97 97 - 107 mmol/L    Bicarbonate 23 (L) 24 - 31 mmol/L    Urea Nitrogen 20 8 - 25 mg/dL    Creatinine 1.10 0.40 - 1.60 mg/dL    eGFR 67 >60 mL/min/1.73m*2    Calcium 8.6 8.5 - 10.4 mg/dL    Albumin 3.5 3.5 - 5.0 g/dL    Alkaline Phosphatase 82 35 - 125 U/L    Total Protein 6.2 5.9 - 7.9 g/dL    AST 72 (H) 5 - 40 U/L    Bilirubin, Total 1.3 (H) 0.1 - 1.2 mg/dL    ALT 30 5 - 40 U/L    Anion Gap 13 <=19 mmol/L   Blood Gas Lactic Acid, Venous   Result Value Ref Range    POCT Lactate, Venous 2.5 (H) 0.4 - 2.0 mmol/L   Blood Culture    Specimen: Peripheral Venipuncture; Blood culture   Result Value Ref Range    Blood Culture Loaded on Instrument - Culture in progress    Blood Culture    Specimen: Peripheral Venipuncture; Blood culture   Result Value Ref Range    Blood Culture Loaded on Instrument - Culture in progress    CBC and Auto Differential   Result Value Ref Range    WBC 4.5 4.4 - 11.3 x10*3/uL    nRBC 0.0 0.0 - 0.0 /100 WBCs    RBC 2.97 (L) 4.50 - 5.90 x10*6/uL    Hemoglobin 10.4 (L) 13.5 - 17.5 g/dL    Hematocrit 30.8 (L) 41.0 - 52.0 %     (H) 80 - 100 fL    MCH 35.0 (H) 26.0 - 34.0 pg    MCHC 33.8 32.0 - 36.0 g/dL    RDW 13.6 11.5 - 14.5 %    Platelets 78 (L) 150 - 450 x10*3/uL    Neutrophils % 75.6 40.0 - 80.0 %    Immature Granulocytes %, Automated 1.1 (H) 0.0 - 0.9 %    Lymphocytes % 15.3 13.0 - 44.0 %    Monocytes % 7.8 2.0 -  10.0 %    Eosinophils % 0.0 0.0 - 6.0 %    Basophils % 0.2 0.0 - 2.0 %    Neutrophils Absolute 3.40 1.60 - 5.50 x10*3/uL    Immature Granulocytes Absolute, Automated 0.05 0.00 - 0.50 x10*3/uL    Lymphocytes Absolute 0.69 (L) 0.80 - 3.00 x10*3/uL    Monocytes Absolute 0.35 0.05 - 0.80 x10*3/uL    Eosinophils Absolute 0.00 0.00 - 0.40 x10*3/uL    Basophils Absolute 0.01 0.00 - 0.10 x10*3/uL   Morphology   Result Value Ref Range    RBC Morphology See Below     Giant Platelets Few    Blood Gas Lactic Acid, Venous   Result Value Ref Range    POCT Lactate, Venous 2.6 (H) 0.4 - 2.0 mmol/L   POCT GLUCOSE   Result Value Ref Range    POCT Glucose 249 (H) 74 - 99 mg/dL   Blood Gas Lactic Acid, Venous   Result Value Ref Range    POCT Lactate, Venous 2.4 (H) 0.4 - 2.0 mmol/L   Urinalysis with Reflex Culture and Microscopic   Result Value Ref Range    Color, Urine Yellow Light-Yellow, Yellow, Dark-Yellow    Appearance, Urine Clear Clear    Specific Gravity, Urine 1.019 1.005 - 1.035    pH, Urine 5.5 5.0, 5.5, 6.0, 6.5, 7.0, 7.5, 8.0    Protein, Urine 70 (1+) (A) NEGATIVE, 10 (TRACE), 20 (TRACE) mg/dL    Glucose, Urine 100 (1+) (A) Normal mg/dL    Blood, Urine 0.2 (2+) (A) NEGATIVE    Ketones, Urine NEGATIVE NEGATIVE mg/dL    Bilirubin, Urine NEGATIVE NEGATIVE    Urobilinogen, Urine 4 (2+) (A) Normal mg/dL    Nitrite, Urine NEGATIVE NEGATIVE    Leukocyte Esterase, Urine 250 Akin/µL (A) NEGATIVE   Extra Urine Gray Tube   Result Value Ref Range    Extra Tube Hold for add-ons.    Microscopic Only, Urine   Result Value Ref Range    WBC, Urine 21-50 (A) 1-5, NONE /HPF    WBC Clumps, Urine OCCASIONAL Reference range not established. /HPF    RBC, Urine 11-20 (A) NONE, 1-2, 3-5 /HPF    Mucus, Urine FEW Reference range not established. /LPF    Hyaline Casts, Urine OCCASIONAL (A) NONE /LPF   POCT GLUCOSE   Result Value Ref Range    POCT Glucose 270 (H) 74 - 99 mg/dL   CBC   Result Value Ref Range    WBC 3.5 (L) 4.4 - 11.3 x10*3/uL    nRBC  0.0 0.0 - 0.0 /100 WBCs    RBC 2.88 (L) 4.50 - 5.90 x10*6/uL    Hemoglobin 10.1 (L) 13.5 - 17.5 g/dL    Hematocrit 28.3 (L) 41.0 - 52.0 %    MCV 98 80 - 100 fL    MCH 35.1 (H) 26.0 - 34.0 pg    MCHC 35.7 32.0 - 36.0 g/dL    RDW 13.6 11.5 - 14.5 %    Platelets 65 (L) 150 - 450 x10*3/uL   Basic metabolic panel   Result Value Ref Range    Glucose 233 (H) 65 - 99 mg/dL    Sodium 132 (L) 133 - 145 mmol/L    Potassium 4.2 3.4 - 5.1 mmol/L    Chloride 99 97 - 107 mmol/L    Bicarbonate 24 24 - 31 mmol/L    Urea Nitrogen 24 8 - 25 mg/dL    Creatinine 1.10 0.40 - 1.60 mg/dL    eGFR 67 >60 mL/min/1.73m*2    Calcium 8.1 (L) 8.5 - 10.4 mg/dL    Anion Gap 9 <=19 mmol/L       Assessment/Plan                  Principal Problem:    Cystitis    Acute cystitis, catheter related  Prior urinary culture with ESBL E. Coli, urine culture pending  Start meropenem  Consult infectious disease, will defer urinary catheter change to urology     Bacteremia  Blood cultures pending, gram negative  Repeat blood cultures pending    Enlarged prostate-urinary retention  Greenlight therapy with Dr. Damon on 6/20, consult urology  -Defer chronic catheter change to urology     Elevated troponins  -Suspect related to acute infection, consult cardiology for further evaluation, monitor on telemetry, cycle third troponin    Heart failure  Continue home medications  Cardiology consulted     Atrial fibrillation status post watchman placement     Diabetes, insulin-dependent, last A1c 9.6     History of seizures  Continue Keppra     DVT prophylaxis   Lovenox daily     Wound to right foot  Wound appears to be stable, not infected.  X-ray of right foot to rule out osteomyelitis     Plan of Care  Patient lives at home with his spouse and plans to return there at discharge.  Patient may need long-term IV antibiotics.     Plan of care discussed with patient, wife and daughter at bedside and collaborating physician .              Martina Malik,  APRN-CNP

## 2024-06-16 NOTE — PROGRESS NOTES
"Luis Greene is a 84 y.o. male on day 2 of admission presenting with Cystitis.    Subjective   He is feeling better today.  White blood cell count is 3.5.  Urine is clear.  Renal function is good.  Tiwari catheter was changed.  Still awaiting culture results.  He continues on meropenem.       Objective     Physical Exam    Awake, alert, oriented  Lungs: Normal respiratory pattern  Abdomen: Soft and nontender  Back: No CVA tenderness  : Urine is clear.    Last Recorded Vitals  Blood pressure 136/54, pulse 63, temperature 36 °C (96.8 °F), temperature source Temporal, resp. rate 18, height 1.803 m (5' 11\"), weight 85.3 kg (188 lb 0.8 oz), SpO2 98%.  Intake/Output last 3 Shifts:  I/O last 3 completed shifts:  In: 1440 (16.9 mL/kg) [P.O.:60; I.V.:1080 (12.6 mL/kg); IV Piggyback:300]  Out: 2400 (28.1 mL/kg) [Urine:2400 (0.8 mL/kg/hr)]  Weight: 85.4 kg     Relevant Results  Scheduled medications  acetaminophen, 650 mg, oral, 4x daily  amLODIPine, 5 mg, oral, Daily  aspirin, 81 mg, oral, Daily  atorvastatin, 40 mg, oral, Nightly  enoxaparin, 40 mg, subcutaneous, Daily  ferrous sulfate (325 mg ferrous sulfate), 65 mg of iron, oral, Daily with breakfast  finasteride, 5 mg, oral, Daily  [Held by provider] furosemide, 40 mg, oral, Daily  gabapentin, 100 mg, oral, BID  insulin glargine, 10 Units, subcutaneous, Nightly  insulin lispro, 0-5 Units, subcutaneous, TID  levETIRAcetam, 250 mg, oral, BID  magnesium oxide, 400 mg, oral, Daily  meropenem, 1 g, intravenous, q8h TAYLOR  [Held by provider] metFORMIN, 1,000 mg, oral, BID  pantoprazole, 40 mg, oral, Daily  sennosides-docusate sodium, 2 tablet, oral, Nightly  tamsulosin, 0.4 mg, oral, BID      Continuous medications  sodium chloride 0.9%, 100 mL/hr, Last Rate: 100 mL/hr (06/16/24 0339)      PRN medications  PRN medications: acetaminophen, dextrose, glucagon, ibuprofen, ondansetron **OR** ondansetron, polyethylene glycol    Results for orders placed or performed during the " hospital encounter of 06/14/24 (from the past 24 hour(s))   POCT GLUCOSE   Result Value Ref Range    POCT Glucose 248 (H) 74 - 99 mg/dL   Comprehensive Metabolic Panel   Result Value Ref Range    Glucose 274 (H) 65 - 99 mg/dL    Sodium 133 133 - 145 mmol/L    Potassium 4.2 3.4 - 5.1 mmol/L    Chloride 97 97 - 107 mmol/L    Bicarbonate 23 (L) 24 - 31 mmol/L    Urea Nitrogen 20 8 - 25 mg/dL    Creatinine 1.10 0.40 - 1.60 mg/dL    eGFR 67 >60 mL/min/1.73m*2    Calcium 8.6 8.5 - 10.4 mg/dL    Albumin 3.5 3.5 - 5.0 g/dL    Alkaline Phosphatase 82 35 - 125 U/L    Total Protein 6.2 5.9 - 7.9 g/dL    AST 72 (H) 5 - 40 U/L    Bilirubin, Total 1.3 (H) 0.1 - 1.2 mg/dL    ALT 30 5 - 40 U/L    Anion Gap 13 <=19 mmol/L   Blood Gas Lactic Acid, Venous   Result Value Ref Range    POCT Lactate, Venous 2.5 (H) 0.4 - 2.0 mmol/L   Blood Culture    Specimen: Peripheral Venipuncture; Blood culture   Result Value Ref Range    Blood Culture Loaded on Instrument - Culture in progress    Blood Culture    Specimen: Peripheral Venipuncture; Blood culture   Result Value Ref Range    Blood Culture Loaded on Instrument - Culture in progress    CBC and Auto Differential   Result Value Ref Range    WBC 4.5 4.4 - 11.3 x10*3/uL    nRBC 0.0 0.0 - 0.0 /100 WBCs    RBC 2.97 (L) 4.50 - 5.90 x10*6/uL    Hemoglobin 10.4 (L) 13.5 - 17.5 g/dL    Hematocrit 30.8 (L) 41.0 - 52.0 %     (H) 80 - 100 fL    MCH 35.0 (H) 26.0 - 34.0 pg    MCHC 33.8 32.0 - 36.0 g/dL    RDW 13.6 11.5 - 14.5 %    Platelets 78 (L) 150 - 450 x10*3/uL    Neutrophils % 75.6 40.0 - 80.0 %    Immature Granulocytes %, Automated 1.1 (H) 0.0 - 0.9 %    Lymphocytes % 15.3 13.0 - 44.0 %    Monocytes % 7.8 2.0 - 10.0 %    Eosinophils % 0.0 0.0 - 6.0 %    Basophils % 0.2 0.0 - 2.0 %    Neutrophils Absolute 3.40 1.60 - 5.50 x10*3/uL    Immature Granulocytes Absolute, Automated 0.05 0.00 - 0.50 x10*3/uL    Lymphocytes Absolute 0.69 (L) 0.80 - 3.00 x10*3/uL    Monocytes Absolute 0.35 0.05 -  0.80 x10*3/uL    Eosinophils Absolute 0.00 0.00 - 0.40 x10*3/uL    Basophils Absolute 0.01 0.00 - 0.10 x10*3/uL   Morphology   Result Value Ref Range    RBC Morphology See Below     Giant Platelets Few    Blood Gas Lactic Acid, Venous   Result Value Ref Range    POCT Lactate, Venous 2.6 (H) 0.4 - 2.0 mmol/L   POCT GLUCOSE   Result Value Ref Range    POCT Glucose 249 (H) 74 - 99 mg/dL   Blood Gas Lactic Acid, Venous   Result Value Ref Range    POCT Lactate, Venous 2.4 (H) 0.4 - 2.0 mmol/L   Urinalysis with Reflex Culture and Microscopic   Result Value Ref Range    Color, Urine Yellow Light-Yellow, Yellow, Dark-Yellow    Appearance, Urine Clear Clear    Specific Gravity, Urine 1.019 1.005 - 1.035    pH, Urine 5.5 5.0, 5.5, 6.0, 6.5, 7.0, 7.5, 8.0    Protein, Urine 70 (1+) (A) NEGATIVE, 10 (TRACE), 20 (TRACE) mg/dL    Glucose, Urine 100 (1+) (A) Normal mg/dL    Blood, Urine 0.2 (2+) (A) NEGATIVE    Ketones, Urine NEGATIVE NEGATIVE mg/dL    Bilirubin, Urine NEGATIVE NEGATIVE    Urobilinogen, Urine 4 (2+) (A) Normal mg/dL    Nitrite, Urine NEGATIVE NEGATIVE    Leukocyte Esterase, Urine 250 Akin/µL (A) NEGATIVE   Extra Urine Gray Tube   Result Value Ref Range    Extra Tube Hold for add-ons.    Microscopic Only, Urine   Result Value Ref Range    WBC, Urine 21-50 (A) 1-5, NONE /HPF    WBC Clumps, Urine OCCASIONAL Reference range not established. /HPF    RBC, Urine 11-20 (A) NONE, 1-2, 3-5 /HPF    Mucus, Urine FEW Reference range not established. /LPF    Hyaline Casts, Urine OCCASIONAL (A) NONE /LPF   POCT GLUCOSE   Result Value Ref Range    POCT Glucose 270 (H) 74 - 99 mg/dL   CBC   Result Value Ref Range    WBC 3.5 (L) 4.4 - 11.3 x10*3/uL    nRBC 0.0 0.0 - 0.0 /100 WBCs    RBC 2.88 (L) 4.50 - 5.90 x10*6/uL    Hemoglobin 10.1 (L) 13.5 - 17.5 g/dL    Hematocrit 28.3 (L) 41.0 - 52.0 %    MCV 98 80 - 100 fL    MCH 35.1 (H) 26.0 - 34.0 pg    MCHC 35.7 32.0 - 36.0 g/dL    RDW 13.6 11.5 - 14.5 %    Platelets 65 (L) 150 - 450  x10*3/uL   Basic metabolic panel   Result Value Ref Range    Glucose 233 (H) 65 - 99 mg/dL    Sodium 132 (L) 133 - 145 mmol/L    Potassium 4.2 3.4 - 5.1 mmol/L    Chloride 99 97 - 107 mmol/L    Bicarbonate 24 24 - 31 mmol/L    Urea Nitrogen 24 8 - 25 mg/dL    Creatinine 1.10 0.40 - 1.60 mg/dL    eGFR 67 >60 mL/min/1.73m*2    Calcium 8.1 (L) 8.5 - 10.4 mg/dL    Anion Gap 9 <=19 mmol/L   POCT GLUCOSE   Result Value Ref Range    POCT Glucose 232 (H) 74 - 99 mg/dL   POCT GLUCOSE   Result Value Ref Range    POCT Glucose 320 (H) 74 - 99 mg/dL       XR foot right 1-2 views    Result Date: 6/15/2024  Interpreted By:  Mathew Varela, STUDY: XR FOOT RIGHT 1-2 VIEWS; ;  6/15/2024 3:10 pm   INDICATION: Signs/Symptoms:assess wound, OM.   COMPARISON: None.   ACCESSION NUMBER(S): [id number]   ORDERING CLINICIAN: GINGER MITCHELL   FINDINGS: Right foot, three views   There is no osseous destruction or erosions to suggest presence of osteomyelitis There is moderate joint space are was otherwise the 1st MTP joint. There is mild degenerative change the tarsometatarsal, naviculocuneiform and talonavicular joints. There is a moderate-sized plantar calcaneal spur.       No radiographic evidence of osteomyelitis. 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     MACRO: None   Signed by: Mathew Varela 6/15/2024 3:22 PM Dictation workstation:   [id number]    ECG 12 lead    Result Date: 6/14/2024  Atrial fibrillation Right bundle branch block Abnormal ECG When compared with ECG of 15-MAY-2024 18:52, No significant change was found Confirmed by Ismael Uriostegui ([id number]) on 6/14/2024 1:35:05 PM    CT abdomen pelvis wo IV contrast    Result Date: 6/14/2024  Interpreted By:  Reema Wray, STUDY: CT ABDOMEN PELVIS WO IV CONTRAST;  6/14/2024 10:52 am   INDICATION: Signs/Symptoms:fever; h/o urosepsis.   COMPARISON: 12/20/2023   ACCESSION NUMBER(S): [id number]   ORDERING CLINICIAN: ISA FRIEDMAN   TECHNIQUE: CT of the abdomen and pelvis was  performed. Sagittal and coronal reconstructions were generated.  No intravenous contrast given for the exam.   FINDINGS: Solid organ and vessel evaluation limited without IV contrast.   ABDOMINAL ORGANS:   LIVER: No focal lesion within limits of unenhanced exam.   GALL BLADDER AND BILIARY TREE: Small calcified gallstone   SPLEEN: No focal lesion within limits of unenhanced exam. 2 isodense presumed splenules near the inferior pole again seen..   PANCREAS: No focal lesion within limits of unenhanced exam   ADRENALS: No adrenal mass   KIDNEYS AND URETERS: No renal mass or hydronephrosis within limits of unenhanced exam. Mild relatively symmetric perinephric fat stranding similar to the previous exam allowing for technical differences.   BOWEL: No abnormally dilated large or small bowel loops. Dot of air in nondilated cecal appendix. Moderate fecal debris throughout the colon.   PERITONEUM, RETROPERITONEUM, NODES: No significant free fluid. No free air. No significant retroperitoneal adenopathy within limits of unenhanced exam.   VESSELS:  Lack of IV contrast precludes vascular luminal assessment. Extensive atherosclerotic calcifications. No abdominal aortic aneurysm.   PELVIS: Tiwari catheter and small amount of air in mildly distended thick-walled urinary bladder. Questionable mild perivesical fat stranding anteriorly. Dense presumed surgical material in enlarged prostate gland measuring 5.2 cm transversely.   ABDOMINAL WALL: Small fat containing umbilical hernia. Dots of air in the perineum on the most caudal images likely related to skin fold.   BONES: Multifocal presumed degenerative changes. Laminectomy defects and spondylolisthesis in the lower lumbar spine similar to the previous exam.   LOWER CHEST: Uneven interstitial and dependent densities in the visualized lung bases. Small bilateral pleural effusions. Coronary artery calcifications and probable epicardial wire along the undersurface of the heart again  seen.       Mild bladder wall thickening with questionable perivesical stranding concerning for cystitis. Small amount of air in the bladder presumably related to presence of Tiwari catheter however gas-forming infection or enterovesical fistula could have a similar appearance. Clinical correlation recommended.   Cholelithiasis.   Colonic fecal retention.   Mild atelectasis or infiltrates in both lung bases with small pleural effusions.   Additional findings as described above.   MACRO: None.   Signed by: Reema Wray 6/14/2024 11:21 AM Dictation workstation:   [id number]    XR chest 1 view    Result Date: 6/14/2024  Interpreted By:  Reema Wray, STUDY: XR CHEST 1 VIEW;  6/14/2024 10:39 am   INDICATION: Signs/Symptoms:sepsis.   COMPARISON: 05/21/2024   ACCESSION NUMBER(S): [id number]   ORDERING CLINICIAN: ISA FRIEDMAN   FINDINGS: No focal infiltrate, pleural effusion or pneumothorax identified. Cardiac silhouette is within normal limits for size with overlying electronic structure and sternotomy wires again seen.       No acute cardiopulmonary process radiographically.   MACRO: None.   Signed by: Reema Wray 6/14/2024 11:11 AM Dictation workstation:   [id number]    XR chest 1 view    Result Date: 5/21/2024  Interpreted By:  Low Camejo, STUDY: XR CHEST 1 VIEW; 5/21/2024 11:58 am   INDICATION: Signs/Symptoms:f/u pleural effusion   COMPARISON: 05/15/2024   ACCESSION NUMBER(S): [id number]   ORDERING CLINICIAN: SAPNA MOE   FINDINGS: The study is limited due to rotation. Median sternotomy wires and surgical clips are again present, consistent with previous coronary artery bypass graft. Implantable device is again seen in the left anterior chest wall. The cardiac silhouette is within normal limits for the technique. Calcifications involve the tortuous aorta. There is no pneumothorax, confluent infiltrates or significant effusion. The osseous structures are unchanged.       Allowing for the aforementioned  limitation, no acute cardiopulmonary disease.   Signed by: Low Camejo 5/21/2024 3:02 PM Dictation workstation:   [id number]                             Assessment/Plan   Principal Problem:    Cystitis    Cultures are still pending.  He continues on Merrem.  Clinically he is much better.    Urinary retention: Tiwari catheter was changed today.  Depending on his progress, I would still like to do his greenlight laser procedure later this week while he is on the antibiotics.               Grant Stephens MD

## 2024-06-16 NOTE — NURSING NOTE
Throughout the night pt has been afib with HR as low as 36. HR has been around 36-58 asymptomatic. Strips has been placed in pt's chart.

## 2024-06-16 NOTE — PROGRESS NOTES
Seeing patient for Enterobacter sepsis secondary to urinary source in the setting of chronic indwelling Tiwari catheter and BPH.  Patient is resting comfortably this morning.  He looks much more alert.  Fever curve has improved.  Denies complaints.    Meropenem D2    36.8     36  Lungs: Clear to auscultation bilaterally  Cardio: RRR, S1-S2 normal  Abdomen: NABS, soft, nontender nondistended.    WBC: 3.5  Creatinine: 1.10    Urine culture (6/14): >100k negative rods  Blood culture (6/14): 2/2 sets Enterobacter  Blood culture (6/15): X 2 sets-pending  Urine culture (6/15): Pending    Impression:  1.  Enterobacter sepsis secondary to urinary source.  2. CAUTI POA  3.  Fever secondary to #1    Plan:  1.  Continue meropenem.  Monitor for adverse antibiotic events.  2.  Await final susceptibilities of blood and urine isolate for final antibiotic recommendations.  3.  Will follow.  Dr. Edwards to assume care on 6/17.    Connor Gamboa MD  [facility]  [phone]

## 2024-06-16 NOTE — CARE PLAN
The patient's goals for the shift include      The clinical goals for the shift include pt to be aferbrile thoughout shift      Problem: Infection prevention/bleeding  Goal: Infection s/sx managed  Outcome: Progressing  Goal: No further progression of infection  Outcome: Progressing  Goal: No signs of bleeding  Outcome: Progressing  Goal: Normal coagulation studies  Outcome: Progressing

## 2024-06-16 NOTE — NURSING NOTE
Pt resting in bed at this time. Able to make needs known. Family bedside. Urology MD changed guidry this shift. Pt tolerated well. Pt in good spirits regarding care. Provided incentive spirometer for pt use while in bed. Pt denies needs at this time. Care continues.

## 2024-06-16 NOTE — HOSPITAL COURSE
Patient presented with fever 102, chills, rigors and confusion. He has had a chronic guidry due to enlarged prostate. He had Greenlight laser therapy scheduled with Dr. Stephens 6/20. Patient had a recent UTI with ESBL, merrem is being given. Blood cultures are growing gram negative baccili. ID is following.

## 2024-06-16 NOTE — PROGRESS NOTES
Physical Therapy    Physical Therapy Evaluation & Treatment    Patient Name: Luis Greene  MRN: [id number]  Today's Date: 6/16/2024   Time Calculation  Start Time: 0942  Stop Time: 1030  Time Calculation (min): 48 min    Assessment/Plan   PT Assessment  PT Assessment Results: Decreased strength, Decreased endurance, Impaired balance, Decreased mobility, Decreased safety awareness  Rehab Prognosis: Good  Evaluation/Treatment Tolerance: Patient limited by fatigue  End of Session Communication: Bedside nurse  Assessment Comment: 84 year old male presents with decline from baseline functional mobility, fall risk, impaired balance, and decreased tolerance to activity;  patient would benefit from skilled physical therapy services to maximize functional mobility to modified independent levels.  End of Session Patient Position: Up in chair, Alarm off, caregiver present   IP OR SWING BED PT PLAN  Inpatient or Swing Bed: Inpatient  PT Plan  Treatment/Interventions: Bed mobility, Transfer training, Gait training, Stair training, Balance training, Strengthening, Endurance training, Therapeutic exercise, Therapeutic activity  PT Plan: Ongoing PT  PT Frequency: 5 times per week  PT Discharge Recommendations: Low intensity level of continued care  PT Recommended Transfer Status: Assist x1  PT - OK to Discharge: Yes (with skilled physical therapy services at next level of care.)      Subjective     General Visit Information:  General  Reason for Referral: Impaired mobility with cystitis  Past Medical History Relevant to Rehab: AFIB, anemia, BPH, CHF, CAD, CM, GERD, sepsis, upper GI bleed, hyperlipid, OA, cardiac cath, CABG, lami, knee surgery, neuropathy, trinity AFO's,  Family/Caregiver Present: Yes  Prior to Session Communication: Bedside nurse  Patient Position Received: Bed, 3 rail up  General Comment: 84 year old male admit from home with fever, chills, rigor, and consfusion.  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 discontinued;  does not drive  Precautions:  Precautions  Medical Precautions: Fall precautions  Precautions Comment: trinity LE AFO's         Objective   Pain:     Cognition:  Cognition  Overall Cognitive Status: Within Functional Limits (recent memory deficits)    General Assessments:                  Activity Tolerance  Endurance: Decreased tolerance for upright activites  Activity Tolerance Comments: Fatigue    Coordination  Movements are Fluid and Coordinated: No  Lower Body Coordination: slower rate of movement trinity LE    Static Sitting Balance  Static Sitting-Balance Support: No upper extremity supported  Static Sitting-Level of Assistance: Close supervision  Static Sitting-Comment/Number of Minutes: Supervision for balance while sitting on side of bed.    Static Standing Balance  Static Standing-Balance Support: Bilateral upper extremity supported  Static Standing-Level of Assistance: Minimum assistance  Static Standing-Comment/Number of Minutes: Assist with balance during static standing with rolling walker.  Functional Assessments:  Bed Mobility  Bed Mobility: Yes  Bed Mobility 1  Bed Mobility 1: Supine to sitting  Level of Assistance 1: Close supervision  Bed Mobility Comments 1: Increased effort required to achieve supine to sit.    Transfers  Transfer: Yes  Transfer 1  Transfer From 1: Sit to  Transfer to 1: Stand  Technique 1: Sit to stand  Transfer Device 1: Walker  Transfer Level of Assistance 1: Minimum assistance  Trials/Comments 1: Assist with trunk support;  verbal cues for hand  placement.  Transfers 2  Transfer From 2: Stand to  Transfer to 2: Sit  Technique 2: Stand to sit  Transfer Device 2: Walker  Transfer Level of Assistance 2: Minimum assistance  Trials/Comments 2: Assist with trunk support;  verbal cues for hand placement.    Ambulation/Gait Training  Ambulation/Gait Training Performed: Yes  Ambulation/Gait Training 1  Surface 1: Level tile  Device 1: Rolling walker  Gait Support Devices: R Ankle-foot orthoses, L Ankle-foot orthosis  Assistance 1: Minimum assistance  Comments/Distance (ft) 1: 10 feet x 1; 180 feet x 1 with rolling walker and assist wtih balance;  patient ambulates with slow jose advid, reciprocating gait pattern, decreased step length trinity LE, and forward flexed posture.    Stairs  Stairs: No  Extremity/Trunk Assessments:  RLE   RLE : Exceptions to WFL  Strength RLE  R Hip Flexion: 3+/5  R Knee Extension: 3+/5  R Ankle Dorsiflexion: 0/5  R Ankle Plantar Flexion: 1/5  LLE   LLE : Exceptions to WFL  Strength LLE  L Hip Flexion: 3+/5  L Knee Extension: 3+/5  L Ankle Dorsiflexion: 1/5  L Ankle Plantar Flexion: 2-/5  Treatments:  Therapeutic Exercise  Therapeutic Exercise Performed: Yes  Therapeutic Exercise Activity 1: Assist dorsiflexion with red theraband left LE 10 reps x 1 set;  plantarflexion with red theraband 10 reps x 1 set.              Bed Mobility  Bed Mobility: Yes  Bed Mobility 1  Bed Mobility 1: Supine to sitting  Level of Assistance 1: Close supervision  Bed Mobility Comments 1: Increased effort required to achieve supine to sit.    Ambulation/Gait Training  Ambulation/Gait Training Performed: Yes  Ambulation/Gait Training 1  Surface 1: Level tile  Device 1: Rolling walker  Gait Support Devices: R Ankle-foot orthoses, L Ankle-foot orthosis  Assistance 1: Minimum assistance  Comments/Distance (ft) 1: 10 feet x 1; 180 feet x 1 with rolling walker and assist wtih balance;  patient ambulates with slow jose david, reciprocating gait pattern, decreased step length trinity  LE, and forward flexed posture.  Transfers  Transfer: Yes  Transfer 1  Transfer From 1: Sit to  Transfer to 1: Stand  Technique 1: Sit to stand  Transfer Device 1: Walker  Transfer Level of Assistance 1: Minimum assistance  Trials/Comments 1: Assist with trunk support;  verbal cues for hand placement.  Transfers 2  Transfer From 2: Stand to  Transfer to 2: Sit  Technique 2: Stand to sit  Transfer Device 2: Walker  Transfer Level of Assistance 2: Minimum assistance  Trials/Comments 2: Assist with trunk support;  verbal cues for hand placement.    Stairs  Stairs: No       Outcome Measures:  [facility] Basic Mobility  Turning from your back to your side while in a flat bed without using bedrails: A little  Moving from lying on your back to sitting on the side of a flat bed without using bedrails: A little  Moving to and from bed to chair (including a wheelchair): A little  Standing up from a chair using your arms (e.g. wheelchair or bedside chair): A little  To walk in hospital room: A little  Climbing 3-5 steps with railing: A lot  Basic Mobility - Total Score: 17    Encounter Problems       Encounter Problems (Active)       Mobility       Bed mobility including supine to sit and sit to supine independently. (Progressing)       Start:  06/16/24    Expected End:  06/30/24            Ambulate 200 feet with rolling walker and distant supervision. (Progressing)       Start:  06/16/24    Expected End:  06/30/24            Negotiate 4 steps with single handrail +/- cane and mod assist.       Start:  06/16/24    Expected End:  06/30/24               PT Transfers       Transfers sit to stand and stand to sit with distant supervision. (Progressing)       Start:  06/16/24    Expected End:  06/30/24                   Education Documentation  Mobility Training, taught by Raul Sanchez, PT at 6/16/2024 11:07 AM.  Learner: Patient  Readiness: Acceptance  Method: Explanation, Demonstration  Response: Needs Reinforcement    Education  Comments  No comments found.

## 2024-06-17 VITALS
RESPIRATION RATE: 18 BRPM | WEIGHT: 188.05 LBS | OXYGEN SATURATION: 92 % | HEIGHT: 71 IN | BODY MASS INDEX: 26.33 KG/M2 | HEART RATE: 80 BPM | SYSTOLIC BLOOD PRESSURE: 140 MMHG | TEMPERATURE: 99.4 F | DIASTOLIC BLOOD PRESSURE: 56 MMHG

## 2024-06-17 LAB
ANION GAP SERPL CALC-SCNC: 8 MMOL/L
BACTERIA UR CULT: NO GROWTH
BUN SERPL-MCNC: 24 MG/DL (ref 8–25)
CALCIUM SERPL-MCNC: 8.1 MG/DL (ref 8.5–10.4)
CHLORIDE SERPL-SCNC: 99 MMOL/L (ref 97–107)
CO2 SERPL-SCNC: 22 MMOL/L (ref 24–31)
CREAT SERPL-MCNC: 1 MG/DL (ref 0.4–1.6)
EGFRCR SERPLBLD CKD-EPI 2021: 75 ML/MIN/1.73M*2
ERYTHROCYTE [DISTWIDTH] IN BLOOD BY AUTOMATED COUNT: 13.3 % (ref 11.5–14.5)
GLUCOSE BLD MANUAL STRIP-MCNC: 197 MG/DL (ref 74–99)
GLUCOSE BLD MANUAL STRIP-MCNC: 341 MG/DL (ref 74–99)
GLUCOSE BLD MANUAL STRIP-MCNC: 347 MG/DL (ref 74–99)
GLUCOSE SERPL-MCNC: 235 MG/DL (ref 65–99)
HCT VFR BLD AUTO: 28.7 % (ref 41–52)
HGB BLD-MCNC: 9.9 G/DL (ref 13.5–17.5)
MCH RBC QN AUTO: 34.6 PG (ref 26–34)
MCHC RBC AUTO-ENTMCNC: 34.5 G/DL (ref 32–36)
MCV RBC AUTO: 100 FL (ref 80–100)
NRBC BLD-RTO: 0 /100 WBCS (ref 0–0)
PLATELET # BLD AUTO: 68 X10*3/UL (ref 150–450)
POTASSIUM SERPL-SCNC: 4.4 MMOL/L (ref 3.4–5.1)
RBC # BLD AUTO: 2.86 X10*6/UL (ref 4.5–5.9)
SODIUM SERPL-SCNC: 129 MMOL/L (ref 133–145)
WBC # BLD AUTO: 5.4 X10*3/UL (ref 4.4–11.3)

## 2024-06-17 PROCEDURE — 2500000001 HC RX 250 WO HCPCS SELF ADMINISTERED DRUGS (ALT 637 FOR MEDICARE OP): Performed by: INTERNAL MEDICINE

## 2024-06-17 PROCEDURE — 80048 BASIC METABOLIC PNL TOTAL CA: CPT | Performed by: NURSE PRACTITIONER

## 2024-06-17 PROCEDURE — 2500000002 HC RX 250 W HCPCS SELF ADMINISTERED DRUGS (ALT 637 FOR MEDICARE OP, ALT 636 FOR OP/ED): Performed by: NURSE PRACTITIONER

## 2024-06-17 PROCEDURE — 97116 GAIT TRAINING THERAPY: CPT | Mod: GP,CQ

## 2024-06-17 PROCEDURE — 92610 EVALUATE SWALLOWING FUNCTION: CPT | Mod: GN | Performed by: SPEECH-LANGUAGE PATHOLOGIST

## 2024-06-17 PROCEDURE — 82947 ASSAY GLUCOSE BLOOD QUANT: CPT | Mod: 91

## 2024-06-17 PROCEDURE — 85027 COMPLETE CBC AUTOMATED: CPT | Performed by: NURSE PRACTITIONER

## 2024-06-17 PROCEDURE — 2500000001 HC RX 250 WO HCPCS SELF ADMINISTERED DRUGS (ALT 637 FOR MEDICARE OP): Performed by: NURSE PRACTITIONER

## 2024-06-17 PROCEDURE — 99232 SBSQ HOSP IP/OBS MODERATE 35: CPT | Performed by: INTERNAL MEDICINE

## 2024-06-17 PROCEDURE — 36415 COLL VENOUS BLD VENIPUNCTURE: CPT | Performed by: NURSE PRACTITIONER

## 2024-06-17 PROCEDURE — 97110 THERAPEUTIC EXERCISES: CPT | Mod: GP,CQ

## 2024-06-17 PROCEDURE — 2500000002 HC RX 250 W HCPCS SELF ADMINISTERED DRUGS (ALT 637 FOR MEDICARE OP, ALT 636 FOR OP/ED): Performed by: INTERNAL MEDICINE

## 2024-06-17 PROCEDURE — 2500000004 HC RX 250 GENERAL PHARMACY W/ HCPCS (ALT 636 FOR OP/ED): Performed by: NURSE PRACTITIONER

## 2024-06-17 PROCEDURE — 2060000001 HC INTERMEDIATE ICU ROOM DAILY

## 2024-06-17 PROCEDURE — 92526 ORAL FUNCTION THERAPY: CPT | Mod: GN | Performed by: SPEECH-LANGUAGE PATHOLOGIST

## 2024-06-17 RX ORDER — TRAZODONE HYDROCHLORIDE 50 MG/1
50 TABLET ORAL NIGHTLY PRN
Status: DISCONTINUED | OUTPATIENT
Start: 2024-06-17 | End: 2024-06-19 | Stop reason: HOSPADM

## 2024-06-17 RX ORDER — SULFAMETHOXAZOLE AND TRIMETHOPRIM 800; 160 MG/1; MG/1
1 TABLET ORAL 2 TIMES DAILY
Status: DISCONTINUED | OUTPATIENT
Start: 2024-06-17 | End: 2024-06-19 | Stop reason: HOSPADM

## 2024-06-17 ASSESSMENT — PAIN - FUNCTIONAL ASSESSMENT
PAIN_FUNCTIONAL_ASSESSMENT: 0-10

## 2024-06-17 ASSESSMENT — COGNITIVE AND FUNCTIONAL STATUS - GENERAL
TURNING FROM BACK TO SIDE WHILE IN FLAT BAD: A LITTLE
CLIMB 3 TO 5 STEPS WITH RAILING: A LOT
STANDING UP FROM CHAIR USING ARMS: A LITTLE
DAILY ACTIVITIY SCORE: 18
TOILETING: A LOT
TURNING FROM BACK TO SIDE WHILE IN FLAT BAD: A LITTLE
PERSONAL GROOMING: A LOT
TOILETING: A LITTLE
WALKING IN HOSPITAL ROOM: A LITTLE
DRESSING REGULAR LOWER BODY CLOTHING: A LITTLE
HELP NEEDED FOR BATHING: A LITTLE
EATING MEALS: A LITTLE
WALKING IN HOSPITAL ROOM: A LITTLE
STANDING UP FROM CHAIR USING ARMS: A LITTLE
MOBILITY SCORE: 18
MOBILITY SCORE: 17
STANDING UP FROM CHAIR USING ARMS: A LITTLE
DRESSING REGULAR UPPER BODY CLOTHING: A LITTLE
MOVING TO AND FROM BED TO CHAIR: A LITTLE
TURNING FROM BACK TO SIDE WHILE IN FLAT BAD: A LITTLE
DAILY ACTIVITIY SCORE: 17
MOVING TO AND FROM BED TO CHAIR: A LITTLE
CLIMB 3 TO 5 STEPS WITH RAILING: A LOT
MOVING FROM LYING ON BACK TO SITTING ON SIDE OF FLAT BED WITH BEDRAILS: A LITTLE
DRESSING REGULAR UPPER BODY CLOTHING: A LITTLE
MOBILITY SCORE: 18
MOVING FROM LYING ON BACK TO SITTING ON SIDE OF FLAT BED WITH BEDRAILS: A LITTLE
PERSONAL GROOMING: A LITTLE
MOBILITY SCORE: 17
MOVING FROM LYING ON BACK TO SITTING ON SIDE OF FLAT BED WITH BEDRAILS: A LITTLE
TURNING FROM BACK TO SIDE WHILE IN FLAT BAD: A LITTLE
STANDING UP FROM CHAIR USING ARMS: A LITTLE
MOVING TO AND FROM BED TO CHAIR: A LITTLE
HELP NEEDED FOR BATHING: A LITTLE
PERSONAL GROOMING: A LITTLE
DRESSING REGULAR UPPER BODY CLOTHING: A LITTLE
TOILETING: A LITTLE
MOVING TO AND FROM BED TO CHAIR: A LITTLE
DAILY ACTIVITIY SCORE: 19
HELP NEEDED FOR BATHING: A LITTLE
CLIMB 3 TO 5 STEPS WITH RAILING: A LOT
WALKING IN HOSPITAL ROOM: A LITTLE
WALKING IN HOSPITAL ROOM: A LITTLE
DRESSING REGULAR LOWER BODY CLOTHING: A LITTLE
CLIMB 3 TO 5 STEPS WITH RAILING: A LITTLE
DRESSING REGULAR LOWER BODY CLOTHING: A LITTLE

## 2024-06-17 ASSESSMENT — PAIN SCALES - GENERAL
PAINLEVEL_OUTOF10: 0 - NO PAIN

## 2024-06-17 NOTE — CARE PLAN
Still waiting for urine and blood cx results. Pt is active with [facility], need external order for [facility]    DISCHARGE PLAN: HOME WITH [facility]   (3) occasionally moist

## 2024-06-17 NOTE — PROGRESS NOTES
Subjective Data:      Overnight Events:         Objective Data:  Last Recorded Vitals:  Vitals:    06/17/24 0000 06/17/24 0500 06/17/24 0734 06/17/24 0800   BP: 140/56  153/69    BP Location: Left arm  Left arm    Patient Position: Lying  Sitting    Pulse: 80  58    Resp: 18  15    Temp: 37.8 °C (100 °F) 37.8 °C (100.1 °F) 36.8 °C (98.2 °F)    TempSrc: Temporal Oral Temporal    SpO2: 92%  95%    Weight:    85.3 kg (188 lb 0.8 oz)   Height:           Last Labs:  CBC - 6/17/2024:  4:21 AM  5.4 9.9 68    28.7      CMP - 6/17/2024:  4:21 AM  8.1 6.2 72 --- 1.3   3.1 3.5 30 82      PTT - 11/10/2023:  4:59 PM  1.2   12.3 22.2     HGBA1C   Date/Time Value Ref Range Status   05/03/2024 01:22 PM 9.6 see below % Final   11/15/2023 11:08 AM 6.7 See below % Final     LDLCALC   Date/Time Value Ref Range Status   05/03/2024 01:22 PM 45 <=99 mg/dL Final     Comment:                                 Near   Borderline      AGE      Desirable  Optimal    High     High     Very High     0-19 Y     0 - 109     ---    110-129   >/= 130     ----    20-24 Y     0 - 119     ---    120-159   >/= 160     ----      >24 Y     0 -  99   100-129  130-159   160-189     >/=190     11/15/2023 11:08 AM 30 65 - 130 mg/dL Final   08/15/2023 05:50 AM 31 65 - 130 MG/DL Final   10/28/2022 05:39 AM 32 65 - 130 MG/DL Final   07/06/2021 09:50 AM 39 65 - 130 MG/DL Final     VLDL   Date/Time Value Ref Range Status   05/03/2024 01:22 PM 14 0 - 40 mg/dL Final   08/22/2022 10:17 AM 11 0 - 40 mg/dL Final   09/17/2019 01:20 PM 16 0 - 40 mg/dL Final      Last I/O:  I/O last 3 completed shifts:  In: 800 (9.4 mL/kg) [P.O.:620; I.V.:80 (0.9 mL/kg); IV Piggyback:100]  Out: 1300 (15.2 mL/kg) [Urine:1300 (0.4 mL/kg/hr)]  Weight: 85.3 kg     Past Cardiology Tests (Last 3 Years):  EKG:  ECG 12 lead 06/14/2024      ECG 12 lead 05/15/2024      ECG 12 lead 02/14/2024      ECG 12 lead 12/18/2023      Electrocardiogram, 12-lead PRN ACS symptoms 12/18/2023      ECG 12 lead  "11/26/2023      ECG 12 Lead       ECG 12 lead daily 11/17/2023      ECG 12 lead daily 11/15/2023      ECG 12 lead daily 11/15/2023      ECG 12 lead 11/15/2023    Echo:  Transthoracic Echo (TTE) Limited 11/10/2023      Transthoracic Echo (TTE) Limited 11/10/2023    Ejection Fractions:  No results found for: \"EF\"  Cath:  No results found for this or any previous visit from the past 1095 days.    Stress Test:  Nuclear Stress Test 01/24/2022    Cardiac Imaging:  No results found for this or any previous visit from the past 1095 days.      Inpatient Medications:  Scheduled medications   Medication Dose Route Frequency    acetaminophen  650 mg oral 4x daily    amLODIPine  5 mg oral Daily    aspirin  81 mg oral Daily    atorvastatin  40 mg oral Nightly    enoxaparin  40 mg subcutaneous Daily    ferrous sulfate (325 mg ferrous sulfate)  65 mg of iron oral Daily with breakfast    finasteride  5 mg oral Daily    [Held by provider] furosemide  40 mg oral Daily    gabapentin  100 mg oral BID    insulin glargine  10 Units subcutaneous Nightly    insulin lispro  0-5 Units subcutaneous TID    levETIRAcetam  250 mg oral BID    magnesium oxide  400 mg oral Daily    meropenem  1 g intravenous q8h TAYLOR    [Held by provider] metFORMIN  1,000 mg oral BID    pantoprazole  40 mg oral Daily    sennosides-docusate sodium  2 tablet oral Nightly    tamsulosin  0.4 mg oral BID     PRN medications   Medication    acetaminophen    dextrose    glucagon    ibuprofen    ondansetron    Or    ondansetron    polyethylene glycol     Continuous Medications   Medication Dose Last Rate    sodium chloride 0.9%  100 mL/hr 100 mL/hr (06/17/24 0124)       Physical Exam:       Assessment/Plan     Principal Problem:    Cystitis     incipal Problem:    Cystitis     6/14: This elderly white male with an extensive past medical history including coronary artery disease, remote CABG, hypertension, hyperlipidemia, type 2 diabetes, paroxysmal atrial fibrillation, " history of?  TIA versus recurrent localized focal seizures, history of GI bleeding on Eliquis anticoagulation, status post Watchman implantation is currently admitted with high-grade fever and evidence of recurrent urinary tract infection possible urosepsis.  The patient had previously been scheduled for a laser-assisted TURP on 6/20/2024.  He has been started on empiric IV Rocephin.  Systolic blood pressure slightly elevated on the amlodipine 5 mg daily.  Urine and blood cultures are pending.  No leukocytosis and renal parameters remain at baseline presumably positive indicators of early diagnosis.  Minor elevation of high-sensitivity troponin consistent with minimal demand ischemia.  Will hold usual low-dose Lasix 40 mg daily.  Patient was not given usual IV fluids per sepsis protocol due to the slight elevation in his high-sensitivity troponin.  Clinically the patient is not volume overloaded.     6/15: No significant changes since admission.  Patient continues to have recurrent fevers.  Most recent 103.  He remains on IV antibiotics for his known urinary tract infection.  He is receiving Tylenol for his fevers.  He states he feels achy but otherwise okay.  He is breathing comfortably on nasal cannula oxygen.  There is no evidence of fluid volume overload.  No peripheral edema.  He is chest pain-free.  On telemetry monitor overnight he remained in a sinus arrhythmia without significant ectopy.  His blood pressure is stable at 118/85.  Overall he is stable from a cardiac perspective.  Will follow from a distance.  Please feel free to reach out with further or new cardiac concerns.     6/16: Cardiac wise the patient remains stable.  No chest pain or anginal type symptoms.  Receiving IV antibiotic therapy and overall clinical condition has improved.  Tentative plan is prostate surgery on Thursday of coming week.  Dr. Grant will resume cardiology care tomorrow.    6/17: The patient is sitting upright in bed side  chair feeling and appearing much improved.  He is awake alert, conversant and eating a full breakfast.  The original urine and blood cultures that were drawn on 6/14/2024 from the emergency room proved to be positive for Enterobacter cloacae UTI/bacteremia.  The patient is responding well to the IV meropenem.  Fortunately the patient had been brought to the emergency room quickly upon the original onset of symptoms and he never developed leukocytosis.  CBC today WBC of 5400 hematocrit is unchanged at 28.7.  He does have a chronic thrombocytopenia platelet count 68,000 presently.  Electrolyte panel is notable for creatinine of 1.0 serum sodium 129.  Systolic blood pressures are in the range of 140-150 mmHg on amlodipine 5 mg daily.  The patient was tentatively previously scheduled for a laser-assisted TURP on 6/20 and hopefully this procedure can still be performed as planned.  The patient has had several episodes of urosepsis related to urinary retention originally and now from Tiwari catheter currently.       Peripheral IV 06/14/24 20 G Distal;Right;Upper Arm (Active)   Site Assessment Clean;Dry;Intact 06/16/24 2040   Dressing Status Dry;Clean 06/16/24 2040   Number of days: 3       Urethral Catheter Straight-tip 18 Fr. (Active)   Site Assessment Clean;Skin intact 06/17/24 0645   Number of days: 29       Code Status:  DNR    I spent 20 minutes in the professional and overall care of this patient.        Luis Grant MD

## 2024-06-17 NOTE — CONSULTS
Nutrition Assessement Note    Nutrition Assessment       Admitted with fever, chills, UTI. Patient states possible bladder surgery on Thursday. Reports good appetite and stable weight, declines nutritional supplements. A1c 9.6. Patient agreeable to nutrition education at this time.    Reason for Hospital Admission:  Luis Greene is a 84 y.o. male who is admitted for cystitis    Past Medical History:   Diagnosis Date    A-fib (Multi)     Anemia     BPH (benign prostatic hyperplasia)     CHF (congestive heart failure) (Multi)     Cognitive communication deficit     Coronary artery disease     Diabetes (Multi)     GERD (gastroesophageal reflux disease)     H/O sepsis     H/O: upper GI bleed     HL (hearing loss)     Hyperlipidemia     Iron deficiency anemia     Muscle weakness (generalized)     Osteoarthritis     Personal history of other diseases of the circulatory system     History of cardiac disorder    Personal history of other endocrine, nutritional and metabolic disease     History of hypercholesterolemia    Recurrent UTI     Seizure disorder (Multi)     Thrombocytopenia (CMS-HCC)     TIA (transient ischemic attack)     Type 2 diabetes mellitus (Multi)     Unspecified convulsions (Multi)     Urinary retention     Urinary tract infection       Past Surgical History:   Procedure Laterality Date    CARDIAC CATHETERIZATION N/A 11/10/2023    Procedure: LAAO (Left Atrial Appendage Occlusion);  Surgeon: Louie Avitia MD;  Location: [place] [address] [facility];  Service: Cardiovascular;  Laterality: N/A;  Last Eliquis 11/06/SD /[employer]    CARDIAC CATHETERIZATION  2012    COLONOSCOPY      CORONARY ARTERY BYPASS GRAFT  2011    CABG x2 vessels bypassed    LAMINECTOMY  2022    MR HEAD ANGIO WO IV CONTRAST  02/17/2019    MR HEAD ANGIO WO IV CONTRAST [facility]    MR HEAD ANGIO WO IV CONTRAST  10/28/2022    MR HEAD ANGIO WO IV CONTRAST [facility]    MR HEAD ANGIO WO IV CONTRAST   "08/15/2023    MR HEAD ANGIO WO IV CONTRAST [facility]    OTHER SURGICAL HISTORY  03/11/2019    Heart surgery    OTHER SURGICAL HISTORY  03/11/2019    Knee surgery    OTHER SURGICAL HISTORY  03/11/2019    Tonsillectomy    OTHER SURGICAL HISTORY      urolift    OTHER SURGICAL HISTORY      Loop recorder placement    ROTATOR CUFF REPAIR      UPPER GASTROINTESTINAL ENDOSCOPY         Nutrition History:  Food and Nutrient History: patient reports good appetite  Energy Intake: Good > 75 %  Food Allergies/Intolerances:  None  GI Symptoms: None  Oral Problems: None    Anthropometrics:  Ht: 180.3 cm (5' 11\"), Wt: 85.3 kg (188 lb 0.8 oz), BMI: 26.24  IBW/kg (Dietitian Calculated): 78.18 kg     Weight Change:  Daily Weight  06/17/24 : 85.3 kg (188 lb 0.8 oz)  06/11/24 : 79.2 kg (174 lb 8 oz)  06/07/24 : 79.4 kg (175 lb 0.7 oz)  05/21/24 : 81.9 kg (180 lb 8.9 oz)  04/29/24 : 79.8 kg (176 lb)  03/05/24 : 81.4 kg (179 lb 8 oz)  02/14/24 : 79.4 kg (175 lb)  01/29/24 : 79.4 kg (175 lb)  01/17/24 : 78.9 kg (174 lb)  12/28/23 : 77.1 kg (170 lb)     Weight History / % Weight Change: Patient states current weight is 175#, states record weight is from january, 13# (6.9%) weight loss in 6 months  Significant Weight Loss: No     Nutrition Focused Physical Exam Findings:   Subcutaneous Fat Loss  Orbital Fat Pads: Well nourished (slightly bulging fat pads)  Buccal Fat Pads: Well nourished (full, rounded cheeks)  Triceps: Well nourished (ample fat tissue)  Ribs: Defer    Muscle Wasting  Temporalis: Well nourished (well-defined muscle)  Pectoralis (Clavicular Region): Well nourished (clavicle not visible)  Deltoid/Trapezius: Well nourished (rounded appearance at arm, shoulder, neck)  Interosseous: Well nourished (muscle bulges)  Trapezius/Infraspinatus/Supraspinatus (Scapular Region): Defer  Quadriceps: Defer  Gastrocnemius: Defer    Nutrition Significant Labs:  Lab Results   Component Value Date    WBC 5.4 06/17/2024    HGB 9.9 (L) " 06/17/2024    HCT 28.7 (L) 06/17/2024    PLT 68 (L) 06/17/2024    CHOL 97 05/03/2024    TRIG 70 05/03/2024    HDL 37.6 05/03/2024    ALT 30 06/15/2024    AST 72 (H) 06/15/2024     (L) 06/17/2024    K 4.4 06/17/2024    CL 99 06/17/2024    CREATININE 1.00 06/17/2024    BUN 24 06/17/2024    CO2 22 (L) 06/17/2024    TSH 6.44 (H) 07/14/2023    INR 1.2 11/11/2023    HGBA1C 9.6 (H) 05/03/2024     Nutrition Specific Medications:  acetaminophen, 650 mg, oral, 4x daily  amLODIPine, 5 mg, oral, Daily  atorvastatin, 40 mg, oral, Nightly  enoxaparin, 40 mg, subcutaneous, Daily  ferrous sulfate (325 mg ferrous sulfate), 65 mg of iron, oral, Daily with breakfast  finasteride, 5 mg, oral, Daily  gabapentin, 100 mg, oral, BID  insulin glargine, 10 Units, subcutaneous, Nightly  insulin lispro, 0-5 Units, subcutaneous, TID  levETIRAcetam, 250 mg, oral, BID  magnesium oxide, 400 mg, oral, Daily  metFORMIN, 1,000 mg, oral, BID  pantoprazole, 40 mg, oral, Daily  sennosides-docusate sodium, 2 tablet, oral, Nightly  sulfamethoxazole-trimethoprim, 1 tablet, oral, BID  tamsulosin, 0.4 mg, oral, BID        Dietary Orders (From admission, onward)       Start     Ordered    06/17/24 1142  Adult diet Regular, Carb Controlled, 2-3 grams sodium; 60 gram carb/meal, 30 gram Carb evening snack  Diet effective now        Comments: Compensatory Swallowing Strategies:                                                                                                                                                                                                                                                           Upright 90 degrees as possible for all oral intake, single sips/bites, slow rate eating/feeding, alternate liquids and solids, upright after meals   Question Answer Comment   Diet type Regular    Diet type Carb Controlled    Diet type 2-3 grams sodium    Carb diet selection: 60 gram carb/meal, 30 gram Carb evening snack        06/17/24  1141                  Estimated Needs:   Estimated Energy Needs  Total Energy Estimated Needs (kCal): 2000 kCal  Total Estimated Energy Need per Day (kCal/kg): 25 kCal/kg  Method for Estimating Needs: Stated weight 175#    Estimated Protein Needs  Total Protein Estimated Needs (g): 80 g  Total Protein Estimated Needs (g/kg): 1 g/kg  Method for Estimating Needs: Stated weight    Estimated Fluid Needs  Total Fluid Estimated Needs (mL): 2000 mL  Method for Estimating Needs: 1 mL/kcal      Nutrition Diagnosis   Nutrition Diagnosis:  Malnutrition Diagnosis  Patient has Malnutrition Diagnosis: No    Nutrition Diagnosis  Patient has Nutrition Diagnosis: Yes  Diagnosis Status (1): New  Nutrition Diagnosis 1: Food and nutrition related knowledge deficit  Related to (1): needs review of diet education  As Evidenced by (1): A1c 9.6     Nutrition Interventions/Recommendations   Nutrition Interventions and Recommendations:    Nutrition Prescription:  Individualized Nutrition Prescription Provided for : 2000 calories, 80 gm protein via oral diet    Nutrition Interventions:   Food and/or Nutrient Delivery Interventions  Interventions: Meals and snacks  Meals and Snacks: Carbohydrate-modified diet, Mineral-modified diet, Fat-modified diet  Goal: provide as ordered    Education Documentation  Nutrition Care Manual, taught by Lilly Chaney RD, LD at 6/17/2024 12:52 PM.  Learner: Patient  Readiness: Acceptance  Method: Explanation, Handout  Response: Verbalizes Understanding  Comment: provided handouts and discussed goals/strategies of low sodium diet and carbohydrate counting for people with diabetes.           Nutrition Monitoring and Evaluation   Monitoring/Evaluation:   Food/Nutrient Related History Monitoring  Monitoring and Evaluation Plan: Energy intake  Energy Intake: Estimated energy intake  Criteria: pt to consume >/= 75% estimated needs         Biochemical Data, Medical Tests and Procedures  Monitoring and Evaluation Plan:  Glucose/endocrine profile  Glucose/Endocrine Profile: Glucose, casual, Hemoglobin A1c (HgbA1c)  Criteria: labs will trend towards desirable range       Time Spent/Follow-up:   Follow Up  Time Spent (min): 30 minutes  Last Date of Nutrition Visit: 06/17/24  Nutrition Follow-Up Needed?: 7-10 days  Follow up Comment: 6/24/24

## 2024-06-17 NOTE — PROGRESS NOTES
Luis Greene is a 84 y.o. male on day 3 of admission presenting with Cystitis.      Subjective   No acute events overnight. Low grade temp overnight, but <100. Feels better. Do co insomnia.        Objective     Last Recorded Vitals  /59 (BP Location: Right arm, Patient Position: Sitting)   Pulse 74   Temp 36.1 °C (97 °F) (Temporal)   Resp 15   Wt 85.3 kg (188 lb 0.8 oz)   SpO2 91%   Intake/Output last 3 Shifts:    Intake/Output Summary (Last 24 hours) at 6/17/2024 1153  Last data filed at 6/17/2024 0400  Gross per 24 hour   Intake 320 ml   Output 600 ml   Net -280 ml       Admission Weight  Weight: 76.9 kg (169 lb 8 oz) (06/14/24 1222)    Daily Weight  06/17/24 : 85.3 kg (188 lb 0.8 oz)    Image Results  XR foot right 1-2 views  Narrative: Interpreted By:  Mathew Varela,   STUDY:  XR FOOT RIGHT 1-2 VIEWS; ;  6/15/2024 3:10 pm      INDICATION:  Signs/Symptoms:assess wound, OM.      COMPARISON:  None.      ACCESSION NUMBER(S):  [id number]      ORDERING CLINICIAN:  GINGER MITCHELL      FINDINGS:  Right foot, three views      There is no osseous destruction or erosions to suggest presence of  osteomyelitis There is moderate joint space are was otherwise the 1st  MTP joint. There is mild degenerative change the tarsometatarsal,  naviculocuneiform and talonavicular joints. There is a moderate-sized  plantar calcaneal spur.      Impression: No radiographic evidence of osteomyelitis.  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          MACRO:  None      Signed by: Mathew Varela 6/15/2024 3:22 PM  Dictation workstation:   [id number]      Physical Exam  Constitutional:       Appearance: Normal appearance.   Cardiovascular:      Rate and Rhythm: Normal rate and regular rhythm.   Pulmonary:      Effort: Pulmonary effort is normal.      Breath sounds: Normal breath sounds.   Abdominal:      General: Bowel sounds are normal.      Palpations: Abdomen is soft.   Musculoskeletal:         General:  Normal range of motion.   Skin:     General: Skin is warm and dry.   Neurological:      Mental Status: He is alert and oriented to person, place, and time.         Relevant Results    Results for orders placed or performed during the hospital encounter of 06/14/24 (from the past 24 hour(s))   POCT GLUCOSE   Result Value Ref Range    POCT Glucose 330 (H) 74 - 99 mg/dL   POCT GLUCOSE   Result Value Ref Range    POCT Glucose 308 (H) 74 - 99 mg/dL   CBC   Result Value Ref Range    WBC 5.4 4.4 - 11.3 x10*3/uL    nRBC 0.0 0.0 - 0.0 /100 WBCs    RBC 2.86 (L) 4.50 - 5.90 x10*6/uL    Hemoglobin 9.9 (L) 13.5 - 17.5 g/dL    Hematocrit 28.7 (L) 41.0 - 52.0 %     80 - 100 fL    MCH 34.6 (H) 26.0 - 34.0 pg    MCHC 34.5 32.0 - 36.0 g/dL    RDW 13.3 11.5 - 14.5 %    Platelets 68 (L) 150 - 450 x10*3/uL   Basic Metabolic Panel   Result Value Ref Range    Glucose 235 (H) 65 - 99 mg/dL    Sodium 129 (L) 133 - 145 mmol/L    Potassium 4.4 3.4 - 5.1 mmol/L    Chloride 99 97 - 107 mmol/L    Bicarbonate 22 (L) 24 - 31 mmol/L    Urea Nitrogen 24 8 - 25 mg/dL    Creatinine 1.00 0.40 - 1.60 mg/dL    eGFR 75 >60 mL/min/1.73m*2    Calcium 8.1 (L) 8.5 - 10.4 mg/dL    Anion Gap 8 <=19 mmol/L   POCT GLUCOSE   Result Value Ref Range    POCT Glucose 197 (H) 74 - 99 mg/dL       Assessment/Plan                  Principal Problem:    Cystitis    Acute cystitis, catheter related  Prior urinary culture with ESBL E. Coli, urine culture 6/14 with enterobacter  Started meropenem 6/14  ID managing antibiotics.      Bacteremia  Blood cultures + enterobacter  Repeat blood cultures pending  ID managing    Enlarged prostate-urinary retention  Greenlight therapy with Dr. Damon on 6/20  Urology planning to continue with procedure as scheduled, inpatient, while patient is still on antibiotics.   discontinue asa 81mg today, did not received dose 6/16. Discontinued ibuprofen.      Elevated troponins  -Suspect related to acute infection, cardiology evaluated.  No ACS.     Heart failure  Continue home medications  Cardiology consulted     Atrial fibrillation status post watchman placement     Diabetes, insulin-dependent, last A1c 9.6     History of seizures  Continue Keppra     DVT prophylaxis   Lovenox daily     Wound to right foot  Wound appears to be stable, not infected.  X-ray of right foot to rule out osteomyelitis     Plan of Care  Patient lives at home with his spouse and plans to return there at discharge.  PT/OT ordered for evaluation and discharge planning.       Maria Del Carmen Luu, APRN-CNP

## 2024-06-17 NOTE — CARE PLAN
The patient's goals for the shift include      The clinical goals for the shift include remain aferbrile        Problem: Infection prevention/bleeding  Goal: Infection s/sx managed  Outcome: Not Progressing  Goal: No further progression of infection  Outcome: Not Progressing  Goal: No signs of bleeding  Outcome: Not Progressing  Goal: Normal coagulation studies  Outcome: Not Progressing     Problem: Perfusion/Cardiac  Goal: Adequate perfusion to organs/extremities  Outcome: Not Progressing  Goal: Hemodynamically stable  Outcome: Not Progressing  Goal: No cardiac arrhythmias  Outcome: Not Progressing     Problem: Respiratory/Oxygenation  Goal: No signs of respiratory compromise  Outcome: Not Progressing  Goal: Tolerates activity without increased O2 demands  Outcome: Not Progressing     Problem: Neuro/Coping  Goal: Minimal anxiety; utilize coping mechanisms  Outcome: Not Progressing  Goal: No signs of neurological compromise  Outcome: Not Progressing  Goal: Increase self care/family involvement  Outcome: Not Progressing     Problem: Fluid/Electrolyte/Nutrition  Goal: Fluid balance within 1 liter of normovolemia  Outcome: Not Progressing  Goal: No signs of renal failure  Outcome: Not Progressing  Goal: Normal electrolyte levels  Outcome: Not Progressing  Goal: Normal glucose levels  Outcome: Not Progressing  Goal: Tolerates nutritional intake  Outcome: Not Progressing     Problem: Infection related to problem list condition  Goal: Infection will resolve through treatment  Outcome: Not Progressing     Problem: Skin  Goal: Decreased wound size/increased tissue granulation at next dressing change  Outcome: Not Progressing  Flowsheets (Taken 6/17/2024 0351)  Decreased wound size/increased tissue granulation at next dressing change:   Promote sleep for wound healing   Protective dressings over bony prominences  Goal: Participates in plan/prevention/treatment measures  Outcome: Not Progressing  Flowsheets (Taken 6/17/2024  0351)  Participates in plan/prevention/treatment measures: Elevate heels  Goal: Prevent/manage excess moisture  Outcome: Not Progressing  Flowsheets (Taken 6/17/2024 0351)  Prevent/manage excess moisture:   Moisturize dry skin   Monitor for/manage infection if present   Follow provider orders for dressing changes  Goal: Prevent/minimize sheer/friction injuries  Outcome: Not Progressing  Flowsheets (Taken 6/17/2024 0351)  Prevent/minimize sheer/friction injuries:   Turn/reposition every 2 hours/use positioning/transfer devices   Use pull sheet   HOB 30 degrees or less   Increase activity/out of bed for meals  Goal: Promote/optimize nutrition  Outcome: Not Progressing  Flowsheets (Taken 6/17/2024 0351)  Promote/optimize nutrition:   Monitor/record intake including meals   Assist with feeding   Offer water/supplements/favorite foods  Goal: Promote skin healing  Outcome: Not Progressing  Flowsheets (Taken 6/17/2024 0351)  Promote skin healing:   Assess skin/pad under line(s)/device(s)   Ensure correct size (line/device) and apply per  instructions   Protective dressings over bony prominences   Rotate device position/do not position patient on device   Turn/reposition every 2 hours/use positioning/transfer devices

## 2024-06-17 NOTE — PROGRESS NOTES
"Pt is much improved, sitting in chair today  Blood pressure 153/69, pulse 58, temperature 36.8 °C (98.2 °F), temperature source Temporal, resp. rate 15, height 1.803 m (5' 11\"), weight 85.3 kg (188 lb 0.8 oz), SpO2 95%.     Results for orders placed or performed during the hospital encounter of 06/14/24 (from the past 24 hour(s))   POCT GLUCOSE   Result Value Ref Range    POCT Glucose 320 (H) 74 - 99 mg/dL   POCT GLUCOSE   Result Value Ref Range    POCT Glucose 330 (H) 74 - 99 mg/dL   POCT GLUCOSE   Result Value Ref Range    POCT Glucose 308 (H) 74 - 99 mg/dL   CBC   Result Value Ref Range    WBC 5.4 4.4 - 11.3 x10*3/uL    nRBC 0.0 0.0 - 0.0 /100 WBCs    RBC 2.86 (L) 4.50 - 5.90 x10*6/uL    Hemoglobin 9.9 (L) 13.5 - 17.5 g/dL    Hematocrit 28.7 (L) 41.0 - 52.0 %     80 - 100 fL    MCH 34.6 (H) 26.0 - 34.0 pg    MCHC 34.5 32.0 - 36.0 g/dL    RDW 13.3 11.5 - 14.5 %    Platelets 68 (L) 150 - 450 x10*3/uL   Basic Metabolic Panel   Result Value Ref Range    Glucose 235 (H) 65 - 99 mg/dL    Sodium 129 (L) 133 - 145 mmol/L    Potassium 4.4 3.4 - 5.1 mmol/L    Chloride 99 97 - 107 mmol/L    Bicarbonate 22 (L) 24 - 31 mmol/L    Urea Nitrogen 24 8 - 25 mg/dL    Creatinine 1.00 0.40 - 1.60 mg/dL    eGFR 75 >60 mL/min/1.73m*2    Calcium 8.1 (L) 8.5 - 10.4 mg/dL    Anion Gap 8 <=19 mmol/L   POCT GLUCOSE   Result Value Ref Range    POCT Glucose 197 (H) 74 - 99 mg/dL        Urine clear to yelllow in tubing  Pt is on schedule 6-20 for greenlight laser of prostate, looks as if will be able to proceed, will add npo after midnight on 6-19  "

## 2024-06-17 NOTE — PROGRESS NOTES
Speech-Language Pathology    Speech-Language Pathology Clinical Swallow Evaluation    Patient Name: Luis Greene  MRN: [id number]  : 1940  Today's Date: 24  Start time: Start Time: 1114  Stop time: Stop Time: 1138  Time calculation (min) : Time Calculation (min): 24 min      ASSESSMENT  Impressions:   Normal oral phase and  suspected pharyngeal phase dysphagia based on clinical swallow evaluation.  Prognosis: Good    PLAN  Recommendations:  MBSS recommended: Yes; MBSS is recommended in order to objectively assess for aspiration risk, safest/least restrictive diet, and any effective compensatory strategies.  Solid consistency: Regular (IDDSI level 7)  Liquid consistency: Thin (IDDSI 0)  Medication administration: Whole, in thin liquid  Compensatory swallow strategies: Upright positioning for all PO intake, Slow rate of intake, Small bites, Small sips, and Single sips    Recommended frequency/duration:  Skilled SLP services recommended: Yes  Frequency: 1x/week  Duration: 1 week  Discharge recommendation: Unable to determine at this time; please see follow-up notes for DC recommendation.     Strengths: Cognition, Motivation, and Family/caregiver support  Barriers to participation in tx: N/A    Goals (start date 24. Anticipated time frame for goal attainment: 1 week):  Pt will consume prescribed diet (current diet is regular and thin liquids) without overt s/sx aspiration/penetration in 95% of observed trials.   Status: Goal initiated this date   Progress this date: s/s aspiration with continous sips thin liquids, no s/s aspiration with single sips, puree or valerie cracker    Pt will demonstrate follow-through of trained compensatory strategies during a meal/snack with 90% acc independently.   Status: Goal initiated this date   Progress this date: After initial instruction pt was able to demon trained compensatory strategies (small single sips and bites) 10/10 trials, which resulted in no s/s  aspiration, pt was even able to verbalize and demon strategies after 10 min delay    Pt will participate in MBSS to assess for safest/least restrictive diet and any effective compensatory strategies.   Status: Goal initiated this date   Progress this date: will schedule for 6/18/24    Further goals to be determined after MBSS.   Status: Goal initiated this date   Progress this date: MBSS not completed yet       SUBJECTIVE    PMHx relevant to rehab:   A-fib (Multi)       Anemia      BPH (benign prostatic hyperplasia)      CHF (congestive heart failure) (Multi)      Cognitive communication deficit      Coronary artery disease      Diabetes (Multi)      GERD (gastroesophageal reflux disease)      H/O sepsis      H/O: upper GI bleed      HL (hearing loss)      Hyperlipidemia      Iron deficiency anemia      Muscle weakness (generalized)      Osteoarthritis      Personal history of other diseases of the circulatory system       History of cardiac disorder    Personal history of other endocrine, nutritional and metabolic disease       History of hypercholesterolemia    Recurrent UTI      Seizure disorder (Multi)      Thrombocytopenia (CMS-HCC)      TIA (transient ischemic attack)      Type 2 diabetes mellitus (Multi)      Unspecified convulsions (Multi)      Urinary retention      Urinary tract infection             Chief complaint: Pt was admitted on 6/14/24 presenting with fever of 102 °F, accompanied by chills and rigor, patient reports onset of symptoms 730 this morning, this was also accompanied by confusion.     Relevant imaging results:  CXR 6/14/24: IMPRESSION:  No acute cardiopulmonary process radiographically.        General Visit Information:     Patient Class: Inpatient  Living Environment: Home     Reason for Referral: tickle in throat, coughing, coughs wiht liquids @ home., concern for aspiration  Prior to Session Communication: Bedside nurse    RN cleared pt to participate in session and reported no s/s  aspiration with meds taken when taken with liquids    Pt reported sometimes coughs with liquids @ home, yesterday felt tickle and coughing on secretions    Date of Onset: 06/14/24  Date of Order: 06/16/24  [employer] Diet: regular and thin liquids  Current Diet : regular and thin liquids    Pain Assessment  Pain Assessment: 0-10  Pain Score: 0 - No pain    Pt was alert, pleasant, and cooperative for session.  Orientation: Oriented to situation and Ox4  Ability to follow functional commands: WFL  Nutritional status: Appears well-nourished/no concerns    Respiratory status: Supplemental oxygen via NC 2lpm  Baseline Vocal Quality: Normal  Volitional Cough: Strong  Volitional Swallow: Within Functional Limits  Patient positioning: Upright in bed      OBJECTIVE  Clinical swallow evaluation completed and consisted of interview, oral motor assessment, and PO trials (8 oz thin liquids via cup and straw, 2 oz puree and 2 grham crackers).  ORAL PHASE: upper and lower dentures in good condition. Oral mucosa were pink, moist, and free of obvious lesions. Lingual strength and ROM were WFL. Labial strength/ROM were WFL. Labial seal was adequate. Mastication of regular solids was WFL A/P transit and oral clearance were adequate.  PHARYNGEAL PHASE: Laryngeal elevation was visualized or palpated with all trials, however adequacy of hyolaryngeal elevation/excursion cannot be determined at bedside.  immediate s/sx aspiration/penetration were observed with continuous isp thin liquids, no s/s aspiration with single sips thin via cup or straw, pure or cracker).    Was 3oz challenge administered: Yes; pt drank 3oz of thin liquid in one attempt, without breaking, which resulted in immediate cough.      Treatment/Education:  Results and recommendations were relayed to: Patient and Bedside nurse  Education provided: Yes   Learner: Patient   Barriers to learning: None   Method of teaching: Verbal   Topic: role of ST, results of assessment, risk  for aspiration, recommendation for MBSS, recommended safe swallow strategies, and swallow anatomy/physiology   Outcome of teaching: Pt verbalized understanding  Treatment provided: Yes  Next Treatment Priority: MBSS

## 2024-06-17 NOTE — PROGRESS NOTES
Physical Therapy    Physical Therapy Treatment    Patient Name: Luis Greene  MRN: [id number]  Today's Date: 6/17/2024  Time Calculation  Start Time: 0727  Stop Time: 0750  Time Calculation (min): 23 min    Assessment/Plan   PT Assessment  End of Session Communication: Bedside nurse  End of Session Patient Position: Up in chair, Alarm off, not on at start of session  PT Plan  Inpatient/Swing Bed or Outpatient: Inpatient  PT Plan  Treatment/Interventions: Bed mobility, Transfer training, Gait training, Stair training, Balance training, Strengthening, Endurance training, Therapeutic exercise, Therapeutic activity  PT Plan: Ongoing PT  PT Frequency: 5 times per week  PT Discharge Recommendations: Low intensity level of continued care  PT Recommended Transfer Status: Assist x1  PT - OK to Discharge: Yes (with skilled physical therapy services at next level of care.)      General Visit Information:   PT  Visit  PT Received On: 06/17/24  General  Prior to Session Communication: Bedside nurse  Patient Position Received: Bed, 2 rail up, Alarm off, not on at start of session  General Comment: Cleared by nursing to be seen for therapy, pt agreeable with tx, seated EOB upon arrival.    Subjective   Precautions:  Precautions  Precautions Comment: trinity Steinberg    Objective   Pain:  Pain Assessment  Pain Assessment: 0-10  Pain Score: 0 - No pain    Postural Control:  Static Sitting Balance  Static Sitting-Balance Support: No upper extremity supported, Feet supported  Static Sitting-Level of Assistance: Modified independent  Static Standing Balance  Static Standing-Balance Support: Bilateral upper extremity supported  Static Standing-Level of Assistance: Minimum assistance    Treatments:  Therapeutic Exercise  Therapeutic Exercise Performed: Yes  Therapeutic Exercise Activity 1: Bilateral ankle pumps x15  Therapeutic Exercise Activity 2: Bilateral hip flexion x15  Therapeutic Exercise Activity 3: Bilateral knee extension  x15  Therapeutic Exercise Activity 4: Resisted hip abd/add 15    Bed Mobility  Bed Mobility: No    Ambulation/Gait Training  Ambulation/Gait Training Performed: Yes  Ambulation/Gait Training 1  Surface 1: Level tile  Device 1: Rollator  Gait Support Devices: R Ankle-foot orthoses, L Ankle-foot orthosis  Assistance 1: Minimum assistance  Quality of Gait 1: Wide base of support, Diminished heel strike, Decreased step length, Forward flexed posture  Comments/Distance (ft) 1: 150' with rollator, requires frequent cues for upright posture/cues to remain in BEHT of walker, min assist for balance.    Transfers  Transfer: Yes  Transfer 1  Transfer From 1: Sit to  Transfer to 1: Stand  Technique 1: Sit to stand, Stand to sit  Transfer Level of Assistance 1: Contact guard    Outcome Measures:  [facility] Basic Mobility  Turning from your back to your side while in a flat bed without using bedrails: A little  Moving from lying on your back to sitting on the side of a flat bed without using bedrails: A little  Moving to and from bed to chair (including a wheelchair): A little  Standing up from a chair using your arms (e.g. wheelchair or bedside chair): A little  To walk in hospital room: A little  Climbing 3-5 steps with railing: A lot  Basic Mobility - Total Score: 17      Encounter Problems       Encounter Problems (Active)       Mobility       Bed mobility including supine to sit and sit to supine independently. (Progressing)       Start:  06/16/24    Expected End:  06/30/24            Ambulate 200 feet with rolling walker and distant supervision. (Progressing)       Start:  06/16/24    Expected End:  06/30/24            Negotiate 4 steps with single handrail +/- cane and mod assist. (Not Progressing)       Start:  06/16/24    Expected End:  06/30/24               PT Transfers       Transfers sit to stand and stand to sit with distant supervision. (Progressing)       Start:  06/16/24    Expected End:  06/30/24

## 2024-06-17 NOTE — PROGRESS NOTES
INFECTIOUS DISEASES PROGRESS NOTE    Consulted / following patient for:  Enterobacter septicemia due to catheter-associated urinary tract infection    Subjective   Interval History:   Feeling well and looking forward to having his greenlight laser surgery during this admission   Objective   PHYSICAL EXAMINATION  Vital signs:  Visit Vitals  /59 (BP Location: Right arm, Patient Position: Sitting)   Pulse 74   Temp 36.1 °C (97 °F) (Temporal)   Resp 15      General: Sitting upright in bed, alert, not toxic  Lungs:  Clear to auscultation  Heart:  S1, S2 normal  Abdomen:  Soft, nontender. No palpable organs or masses  Back:  No spinal or CVA tenderness  Genitalia: Tiwari catheter has been changed    Relevant Results  WBC: 5400  Platelets: 68,000  Hemoglobin: 9.9    Results from last 72 hours   Lab Units 06/17/24  0421   CREATININE mg/dL 1.00   ANION GAP mmol/L 8   EGFR mL/min/1.73m*2 75     Results from last 72 hours   Lab Units 06/15/24  1446   AST U/L 72*   ALT U/L 30   ALK PHOS U/L 82   BILIRUBIN TOTAL mg/dL 1.3*     Microbiology:  Blood (6/14): Enterobacter X2, susceptible to TMP-SMX  Blood (6/15): Negative X2  Urine (6/14): High colony count Enterobacter    Imaging:  CT abdomen and pelvis: No hydronephrosis or hydroureter or abscess      ASSESSMENT:  Enterobacter septicemia, due to Enterobacter catheter-associated urinary tract infection  Patient is responding well to antimicrobial therapy and catheter change.  Organism susceptible to TMP-SMX.  Will change to this organism for de-escalation and because it achieves excellent penetration of the prostatic tissue.  Anticipate greenlight laser surgery 6/20      PLANS:  -   Change antibiotic from meropenem to TMP-SMX  -   Anticipate surgery 6/20  -   Will monitor for allergy and adverse reactions to antibiotic therapy    Raul Edwards MD  [facility]  Office:  [phone]

## 2024-06-18 ENCOUNTER — APPOINTMENT (OUTPATIENT)
Dept: CARDIOLOGY | Facility: HOSPITAL | Age: 84
End: 2024-06-18
Payer: MEDICARE

## 2024-06-18 ENCOUNTER — APPOINTMENT (OUTPATIENT)
Dept: RADIOLOGY | Facility: HOSPITAL | Age: 84
End: 2024-06-18
Payer: MEDICARE

## 2024-06-18 LAB
ANION GAP SERPL CALC-SCNC: 11 MMOL/L
BUN SERPL-MCNC: 25 MG/DL (ref 8–25)
CALCIUM SERPL-MCNC: 8.3 MG/DL (ref 8.5–10.4)
CHLORIDE SERPL-SCNC: 100 MMOL/L (ref 97–107)
CO2 SERPL-SCNC: 21 MMOL/L (ref 24–31)
CREAT SERPL-MCNC: 1.1 MG/DL (ref 0.4–1.6)
EGFRCR SERPLBLD CKD-EPI 2021: 67 ML/MIN/1.73M*2
ERYTHROCYTE [DISTWIDTH] IN BLOOD BY AUTOMATED COUNT: 13.2 % (ref 11.5–14.5)
GLUCOSE BLD MANUAL STRIP-MCNC: 173 MG/DL (ref 74–99)
GLUCOSE BLD MANUAL STRIP-MCNC: 207 MG/DL (ref 74–99)
GLUCOSE BLD MANUAL STRIP-MCNC: 268 MG/DL (ref 74–99)
GLUCOSE BLD MANUAL STRIP-MCNC: 299 MG/DL (ref 74–99)
GLUCOSE SERPL-MCNC: 191 MG/DL (ref 65–99)
HCT VFR BLD AUTO: 26.7 % (ref 41–52)
HGB BLD-MCNC: 9.5 G/DL (ref 13.5–17.5)
MCH RBC QN AUTO: 34.2 PG (ref 26–34)
MCHC RBC AUTO-ENTMCNC: 35.6 G/DL (ref 32–36)
MCV RBC AUTO: 96 FL (ref 80–100)
NRBC BLD-RTO: 0 /100 WBCS (ref 0–0)
PLATELET # BLD AUTO: 72 X10*3/UL (ref 150–450)
POTASSIUM SERPL-SCNC: 3.8 MMOL/L (ref 3.4–5.1)
RBC # BLD AUTO: 2.78 X10*6/UL (ref 4.5–5.9)
SODIUM SERPL-SCNC: 132 MMOL/L (ref 133–145)
WBC # BLD AUTO: 5.2 X10*3/UL (ref 4.4–11.3)

## 2024-06-18 PROCEDURE — 82947 ASSAY GLUCOSE BLOOD QUANT: CPT

## 2024-06-18 PROCEDURE — 36415 COLL VENOUS BLD VENIPUNCTURE: CPT | Performed by: INTERNAL MEDICINE

## 2024-06-18 PROCEDURE — 97165 OT EVAL LOW COMPLEX 30 MIN: CPT | Mod: GO

## 2024-06-18 PROCEDURE — 97535 SELF CARE MNGMENT TRAINING: CPT | Mod: GO

## 2024-06-18 PROCEDURE — 2500000001 HC RX 250 WO HCPCS SELF ADMINISTERED DRUGS (ALT 637 FOR MEDICARE OP): Performed by: NURSE PRACTITIONER

## 2024-06-18 PROCEDURE — 93005 ELECTROCARDIOGRAM TRACING: CPT

## 2024-06-18 PROCEDURE — 97110 THERAPEUTIC EXERCISES: CPT | Mod: GP,CQ

## 2024-06-18 PROCEDURE — 92611 MOTION FLUOROSCOPY/SWALLOW: CPT | Mod: GN | Performed by: SPEECH-LANGUAGE PATHOLOGIST

## 2024-06-18 PROCEDURE — 82374 ASSAY BLOOD CARBON DIOXIDE: CPT | Performed by: INTERNAL MEDICINE

## 2024-06-18 PROCEDURE — 99232 SBSQ HOSP IP/OBS MODERATE 35: CPT | Performed by: INTERNAL MEDICINE

## 2024-06-18 PROCEDURE — 1200000002 HC GENERAL ROOM WITH TELEMETRY DAILY

## 2024-06-18 PROCEDURE — 2500000005 HC RX 250 GENERAL PHARMACY W/O HCPCS: Performed by: STUDENT IN AN ORGANIZED HEALTH CARE EDUCATION/TRAINING PROGRAM

## 2024-06-18 PROCEDURE — 85027 COMPLETE CBC AUTOMATED: CPT | Performed by: INTERNAL MEDICINE

## 2024-06-18 PROCEDURE — 74230 X-RAY XM SWLNG FUNCJ C+: CPT | Performed by: RADIOLOGY

## 2024-06-18 PROCEDURE — 74230 X-RAY XM SWLNG FUNCJ C+: CPT

## 2024-06-18 PROCEDURE — 97116 GAIT TRAINING THERAPY: CPT | Mod: GP,CQ

## 2024-06-18 PROCEDURE — 2500000001 HC RX 250 WO HCPCS SELF ADMINISTERED DRUGS (ALT 637 FOR MEDICARE OP): Performed by: INTERNAL MEDICINE

## 2024-06-18 PROCEDURE — 2500000004 HC RX 250 GENERAL PHARMACY W/ HCPCS (ALT 636 FOR OP/ED): Performed by: NURSE PRACTITIONER

## 2024-06-18 PROCEDURE — 2500000002 HC RX 250 W HCPCS SELF ADMINISTERED DRUGS (ALT 637 FOR MEDICARE OP, ALT 636 FOR OP/ED): Performed by: NURSE PRACTITIONER

## 2024-06-18 PROCEDURE — 2500000005 HC RX 250 GENERAL PHARMACY W/O HCPCS: Performed by: INTERNAL MEDICINE

## 2024-06-18 PROCEDURE — 2500000002 HC RX 250 W HCPCS SELF ADMINISTERED DRUGS (ALT 637 FOR MEDICARE OP, ALT 636 FOR OP/ED): Performed by: INTERNAL MEDICINE

## 2024-06-18 ASSESSMENT — COGNITIVE AND FUNCTIONAL STATUS - GENERAL
TOILETING: A LITTLE
MOVING TO AND FROM BED TO CHAIR: A LITTLE
STANDING UP FROM CHAIR USING ARMS: A LITTLE
MOVING FROM LYING ON BACK TO SITTING ON SIDE OF FLAT BED WITH BEDRAILS: A LITTLE
DRESSING REGULAR LOWER BODY CLOTHING: A LOT
MOBILITY SCORE: 18
CLIMB 3 TO 5 STEPS WITH RAILING: A LITTLE
DAILY ACTIVITIY SCORE: 18
TURNING FROM BACK TO SIDE WHILE IN FLAT BAD: A LITTLE
WALKING IN HOSPITAL ROOM: A LITTLE
DRESSING REGULAR UPPER BODY CLOTHING: A LITTLE
HELP NEEDED FOR BATHING: A LOT

## 2024-06-18 ASSESSMENT — ACTIVITIES OF DAILY LIVING (ADL)
BATHING_ASSISTANCE: MODERATE
ADL_ASSISTANCE: NEEDS ASSISTANCE
HOME_MANAGEMENT_TIME_ENTRY: 10

## 2024-06-18 ASSESSMENT — PAIN DESCRIPTION - ORIENTATION: ORIENTATION: MID

## 2024-06-18 ASSESSMENT — PAIN - FUNCTIONAL ASSESSMENT
PAIN_FUNCTIONAL_ASSESSMENT: 0-10

## 2024-06-18 ASSESSMENT — PAIN SCALES - GENERAL
PAINLEVEL_OUTOF10: 0 - NO PAIN
PAINLEVEL_OUTOF10: 4
PAINLEVEL_OUTOF10: 0 - NO PAIN

## 2024-06-18 ASSESSMENT — PAIN SCALES - PAIN ASSESSMENT IN ADVANCED DEMENTIA (PAINAD): TOTALSCORE: MEDICATION (SEE MAR)

## 2024-06-18 ASSESSMENT — PAIN DESCRIPTION - DESCRIPTORS: DESCRIPTORS: ACHING

## 2024-06-18 ASSESSMENT — PAIN DESCRIPTION - LOCATION: LOCATION: BACK

## 2024-06-18 NOTE — PROGRESS NOTES
Subjective Data:      Overnight Events:         Objective Data:  Last Recorded Vitals:  Vitals:    06/17/24 2333 06/18/24 0442 06/18/24 0636 06/18/24 0740   BP: 156/59 142/78  128/57   BP Location: Right arm Right arm  Left arm   Patient Position: Lying Lying  Sitting   Pulse: 63 75  61   Resp: 18 18  18   Temp: 36.9 °C (98.4 °F) 36.6 °C (97.9 °F)  36.5 °C (97.7 °F)   TempSrc: Temporal Temporal  Oral   SpO2: 90% 92%  96%   Weight:   89 kg (196 lb 3.4 oz)    Height:           Last Labs:  CBC - 6/18/2024:  4:14 AM  5.2 9.5 72    26.7      CMP - 6/18/2024:  4:14 AM  8.3 6.2 72 --- 1.3   3.1 3.5 30 82      PTT - 11/10/2023:  4:59 PM  1.2   12.3 22.2     HGBA1C   Date/Time Value Ref Range Status   05/03/2024 01:22 PM 9.6 see below % Final   11/15/2023 11:08 AM 6.7 See below % Final     LDLCALC   Date/Time Value Ref Range Status   05/03/2024 01:22 PM 45 <=99 mg/dL Final     Comment:                                 Near   Borderline      AGE      Desirable  Optimal    High     High     Very High     0-19 Y     0 - 109     ---    110-129   >/= 130     ----    20-24 Y     0 - 119     ---    120-159   >/= 160     ----      >24 Y     0 -  99   100-129  130-159   160-189     >/=190     11/15/2023 11:08 AM 30 65 - 130 mg/dL Final   08/15/2023 05:50 AM 31 65 - 130 MG/DL Final   10/28/2022 05:39 AM 32 65 - 130 MG/DL Final   07/06/2021 09:50 AM 39 65 - 130 MG/DL Final     VLDL   Date/Time Value Ref Range Status   05/03/2024 01:22 PM 14 0 - 40 mg/dL Final   08/22/2022 10:17 AM 11 0 - 40 mg/dL Final   09/17/2019 01:20 PM 16 0 - 40 mg/dL Final      Last I/O:  I/O last 3 completed shifts:  In: 560 (6.3 mL/kg) [P.O.:560]  Out: 2025 (22.8 mL/kg) [Urine:2025 (0.6 mL/kg/hr)]  Weight: 89 kg     Past Cardiology Tests (Last 3 Years):  EKG:  ECG 12 lead 06/14/2024      ECG 12 lead 05/15/2024      ECG 12 lead 02/14/2024      ECG 12 lead 12/18/2023      Electrocardiogram, 12-lead PRN ACS symptoms 12/18/2023      ECG 12 lead 11/26/2023      ECG  "12 Lead       ECG 12 lead daily 11/17/2023      ECG 12 lead daily 11/15/2023      ECG 12 lead daily 11/15/2023      ECG 12 lead 11/15/2023    Echo:  Transthoracic Echo (TTE) Limited 11/10/2023      Transthoracic Echo (TTE) Limited 11/10/2023    Ejection Fractions:  No results found for: \"EF\"  Cath:  No results found for this or any previous visit from the past 1095 days.    Stress Test:  Nuclear Stress Test 01/24/2022    Cardiac Imaging:  No results found for this or any previous visit from the past 1095 days.      Inpatient Medications:  Scheduled medications   Medication Dose Route Frequency    acetaminophen  650 mg oral 4x daily    amLODIPine  5 mg oral Daily    atorvastatin  40 mg oral Nightly    enoxaparin  40 mg subcutaneous Daily    ferrous sulfate (325 mg ferrous sulfate)  65 mg of iron oral Daily with breakfast    finasteride  5 mg oral Daily    gabapentin  100 mg oral BID    insulin glargine  10 Units subcutaneous Nightly    insulin lispro  0-5 Units subcutaneous TID    levETIRAcetam  250 mg oral BID    magnesium oxide  400 mg oral Daily    metFORMIN  1,000 mg oral BID    pantoprazole  40 mg oral Daily    sennosides-docusate sodium  2 tablet oral Nightly    sulfamethoxazole-trimethoprim  1 tablet oral BID    tamsulosin  0.4 mg oral BID     PRN medications   Medication    acetaminophen    dextrose    glucagon    ondansetron    Or    ondansetron    polyethylene glycol    traZODone     Continuous Medications   Medication Dose Last Rate       Physical Exam:       Assessment/Plan   Principal Problem:    Cystitis       6/14: This elderly white male with an extensive past medical history including coronary artery disease, remote CABG, hypertension, hyperlipidemia, type 2 diabetes, paroxysmal atrial fibrillation, history of?  TIA versus recurrent localized focal seizures, history of GI bleeding on Eliquis anticoagulation, status post Watchman implantation is currently admitted with high-grade fever and evidence of " recurrent urinary tract infection possible urosepsis.  The patient had previously been scheduled for a laser-assisted TURP on 6/20/2024.  He has been started on empiric IV Rocephin.  Systolic blood pressure slightly elevated on the amlodipine 5 mg daily.  Urine and blood cultures are pending.  No leukocytosis and renal parameters remain at baseline presumably positive indicators of early diagnosis.  Minor elevation of high-sensitivity troponin consistent with minimal demand ischemia.  Will hold usual low-dose Lasix 40 mg daily.  Patient was not given usual IV fluids per sepsis protocol due to the slight elevation in his high-sensitivity troponin.  Clinically the patient is not volume overloaded.     6/15: No significant changes since admission.  Patient continues to have recurrent fevers.  Most recent 103.  He remains on IV antibiotics for his known urinary tract infection.  He is receiving Tylenol for his fevers.  He states he feels achy but otherwise okay.  He is breathing comfortably on nasal cannula oxygen.  There is no evidence of fluid volume overload.  No peripheral edema.  He is chest pain-free.  On telemetry monitor overnight he remained in a sinus arrhythmia without significant ectopy.  His blood pressure is stable at 118/85.  Overall he is stable from a cardiac perspective.  Will follow from a distance.  Please feel free to reach out with further or new cardiac concerns.     6/16: Cardiac wise the patient remains stable.  No chest pain or anginal type symptoms.  Receiving IV antibiotic therapy and overall clinical condition has improved.  Tentative plan is prostate surgery on Thursday of coming week.  Dr. Grant will resume cardiology care tomorrow.     6/17: The patient is sitting upright in bed side chair feeling and appearing much improved.  He is awake alert, conversant and eating a full breakfast.  The original urine and blood cultures that were drawn on 6/14/2024 from the emergency room proved to be  positive for Enterobacter cloacae UTI/bacteremia.  The patient is responding well to the IV meropenem.  Fortunately the patient had been brought to the emergency room quickly upon the original onset of symptoms and he never developed leukocytosis.  CBC today WBC of 5400 hematocrit is unchanged at 28.7.  He does have a chronic thrombocytopenia platelet count 68,000 presently.  Electrolyte panel is notable for creatinine of 1.0 serum sodium 129.  Systolic blood pressures are in the range of 140-150 mmHg on amlodipine 5 mg daily.  The patient was tentatively previously scheduled for a laser-assisted TURP on 6/20 and hopefully this procedure can still be performed as planned.  The patient has had several episodes of urosepsis related to urinary retention originally and now from Tiwari catheter currently.    6/18: The patient is currently off the floor having a barium swallow performed apparently for some component of choking.  The patient was seen by speech therapy yesterday and their impression was normal oral phase but possible pharyngeal phase dysphagia based on clinical assessment and a modified barium swallow was recommended currently being performed.  Otherwise he is feeling improved.  He is being followed by infectious disease and was converted to oral Bactrim therapy for the Enterobacter cloacae I UTI/bacteremia.  No leukocytosis on CBC with a WBC of 5200.  Hemoglobin modestly decreased to 9.5 platelet count unchanged at 72,000.  His renal parameters do remain stable creatinine 1.1 and sodium 132.  Systolic blood pressure in acceptable range at 140 mmHg.  Will continue amlodipine unchanged.         Peripheral IV 06/14/24 20 G Distal;Right;Upper Arm (Active)   Site Assessment Clean;Dry;Intact 06/18/24 0900   Dressing Status Clean;Dry 06/18/24 0900   Number of days: 4       Urethral Catheter Straight-tip 18 Fr. (Active)   Site Assessment Clean;Skin intact 06/18/24 0617   Number of days: 30       Code  Status:  DNR    I spent 20 minutes in the professional and overall care of this patient.        Luis Grant MD

## 2024-06-18 NOTE — PROGRESS NOTES
Physical Therapy    Physical Therapy Treatment    Patient Name: Luis Greene  MRN: [id number]  Today's Date: 6/18/2024  Time Calculation  Start Time: 0722  Stop Time: 0746  Time Calculation (min): 24 min    Assessment/Plan   PT Assessment  End of Session Communication: Bedside nurse  End of Session Patient Position: Up in chair, Alarm off, not on at start of session  PT Plan  Inpatient/Swing Bed or Outpatient: Inpatient  PT Plan  Treatment/Interventions: Bed mobility, Transfer training, Gait training, Stair training, Balance training, Strengthening, Endurance training, Therapeutic exercise, Therapeutic activity  PT Plan: Ongoing PT  PT Frequency: 5 times per week  PT Discharge Recommendations: Low intensity level of continued care  PT Recommended Transfer Status: Assist x1  PT - OK to Discharge: Yes (with skilled physical therapy services at next level of care.)      General Visit Information:   PT  Visit  PT Received On: 06/18/24  General  Prior to Session Communication: Bedside nurse  Patient Position Received: Bed, 3 rail up, Alarm off, not on at start of session  General Comment: Cleared by nursing to be seen for therapy, pt agreeable with tx, seated EOB upon arrival.    Subjective        Objective   Pain:  Pain Assessment  Pain Assessment: 0-10  Pain Score: 0 - No pain     Postural Control:  Static Sitting Balance  Static Sitting-Balance Support: No upper extremity supported, Feet supported  Static Sitting-Level of Assistance: Modified independent  Static Standing Balance  Static Standing-Balance Support: Bilateral upper extremity supported  Static Standing-Level of Assistance: Minimum assistance     Treatments:  Therapeutic Exercise  Therapeutic Exercise Performed: Yes  Therapeutic Exercise Activity 1: Bilateral ankle pumps x15  Therapeutic Exercise Activity 2: Bilateral hip flexion x15  Therapeutic Exercise Activity 3: Bilateral knee extension x15  Therapeutic Exercise Activity 4: Resisted hip abd/add  15    Bed Mobility  Bed Mobility: Yes  Bed Mobility 1  Bed Mobility 1: Supine to sitting  Level of Assistance 1: Modified independent  Bed Mobility Comments 1: Good overall mobility.    Ambulation/Gait Training  Ambulation/Gait Training Performed: Yes  Ambulation/Gait Training 1  Surface 1: Level tile  Device 1: Rollator  Gait Support Devices: R Ankle-foot orthoses, L Ankle-foot orthosis  Assistance 1: Minimum assistance  Quality of Gait 1: Wide base of support, Diminished heel strike, Decreased step length, Forward flexed posture  Comments/Distance (ft) 1: 120' with wheeled walker, bilateral AFO's, cues for upright posture/cues to remain in BETH of walker, min assist for balance.    Transfers  Transfer: Yes  Transfer 1  Transfer From 1: Sit to  Transfer to 1: Stand  Technique 1: Sit to stand, Stand to sit  Transfer Level of Assistance 1: Contact guard  Trials/Comments 1: Cues for proper hand placement, cues for safety during eccentric control in sitting.    Outcome Measures:  [facility] Basic Mobility  Turning from your back to your side while in a flat bed without using bedrails: A little  Moving from lying on your back to sitting on the side of a flat bed without using bedrails: A little  Moving to and from bed to chair (including a wheelchair): A little  Standing up from a chair using your arms (e.g. wheelchair or bedside chair): A little  To walk in hospital room: A little  Climbing 3-5 steps with railing: A little  Basic Mobility - Total Score: 18    Encounter Problems       Encounter Problems (Active)       Mobility       Bed mobility including supine to sit and sit to supine independently. (Progressing)       Start:  06/16/24    Expected End:  06/30/24            Ambulate 200 feet with rolling walker and distant supervision. (Progressing)       Start:  06/16/24    Expected End:  06/30/24            Negotiate 4 steps with single handrail +/- cane and mod assist. (Not Progressing)       Start:  06/16/24    Expected  End:  06/30/24               PT Transfers       Transfers sit to stand and stand to sit with distant supervision. (Progressing)       Start:  06/16/24    Expected End:  06/30/24

## 2024-06-18 NOTE — PROGRESS NOTES
INFECTIOUS DISEASES PROGRESS NOTE    Consulted / following patient for:  Enterobacter septicemia due to catheter-associated urinary tract infection    Subjective   Interval History:   Tolerating Bactrim, looking forward to having his greenlight laser surgery during this admission   Objective   PHYSICAL EXAMINATION  Vital signs:  Visit Vitals  /57 (BP Location: Left arm, Patient Position: Sitting)   Pulse 61   Temp 36.5 °C (97.7 °F) (Oral)   Resp 18      General: No acute distress  Abdomen:  Soft, nontender. No palpable organs or masses  Back:  No spinal or CVA tenderness  Genitalia: Tiwari catheter has been changed to draining clear urine    Relevant Results  WBC: 5200  Platelets: 68,000 ---> 72,000  Hemoglobin: 9.5    Results from last 72 hours   Lab Units 06/18/24  0414   CREATININE mg/dL 1.10   ANION GAP mmol/L 11   EGFR mL/min/1.73m*2 67     Results from last 72 hours   Lab Units 06/15/24  1446   AST U/L 72*   ALT U/L 30   ALK PHOS U/L 82   BILIRUBIN TOTAL mg/dL 1.3*     Microbiology:  Blood (6/14): Enterobacter X2, susceptible to TMP-SMX  Blood (6/15): Negative X2  Urine (6/14): High colony count Enterobacter    Imaging:  CT abdomen and pelvis: No hydronephrosis or hydroureter or abscess      ASSESSMENT:  Enterobacter septicemia, due to Enterobacter catheter-associated urinary tract infection  Patient is responding well to antimicrobial therapy and catheter change.  Tolerating Bactrim.  Anticipate greenlight laser surgery 6/20      PLANS:  -   Continue TMP-SMX  -   Anticipate surgery 6/20  -   Will monitor for allergy and adverse reactions to antibiotic therapy    Raul Edwards MD  [facility]  Office:  [phone]

## 2024-06-18 NOTE — PROCEDURES
"Speech-Language Pathology      Modified Barium Swallow Study     Patient Name: Luis Greene  MRN: [id number]  : 1940  Today's Date: 24  Time Calculation  Start Time: 1110  Stop Time: 1140  Time Calculation (min): 30 min       Recommendations:  Regular and thin liquids, single sips     Assessment/Impression:    Full detailed SLP/Radiologist Modified Barium Swallow study report can be found under Chart Review tab, Imaging tab and  titled \"FL Modified Barium Swallow Study\"      Pt. Presenting with transient laryngeal penetration with thin and nectar thick liquids with consecutive sips only. No aspiration occurred, trace-mild pharyngeal  residues pt spontaneously cleared.     Plan:  Treatment/Interventions: Pharyngeal exercises, Oral motor exercises, Patient/family education, Bolus trials  SLP Plan: No treatment needs identified at this time     Pain:   Rating 0-10: 0    Education:   Pt. Educated on results of MBS study, recommended diet and recommended safe swallow strategies.  Verbal understanding given      "

## 2024-06-18 NOTE — PROGRESS NOTES
Luis Greene is a 84 y.o. male on day 4 of admission presenting with Cystitis.      Subjective   Patient seen and examined.  Laying in bed.  Denies pain or complaints.  No acute distress.       Objective     Last Recorded Vitals  /64 (BP Location: Left arm, Patient Position: Lying)   Pulse 73   Temp 36.6 °C (97.9 °F) (Oral)   Resp 18   Wt 89 kg (196 lb 3.4 oz)   SpO2 94%   Intake/Output last 3 Shifts:    Intake/Output Summary (Last 24 hours) at 6/18/2024 1316  Last data filed at 6/18/2024 1257  Gross per 24 hour   Intake 800 ml   Output 1425 ml   Net -625 ml       Admission Weight  Weight: 76.9 kg (169 lb 8 oz) (06/14/24 1222)    Daily Weight  06/18/24 : 89 kg (196 lb 3.4 oz)    Image Results  XR foot right 3+ views  Addendum: Interpreted By:  Mathew Varela,  and Radiology Admin    ADDENDUM:   This radiology study is being re-signed due to an administrative   error but not reinterpreted. The new signature merely reflects the   date upon which the administrative error was corrected for placement   in patient chart. The actual interpretation took place in a timely   fashion, consistent with  policy, by the physician referenced as   interpreting the study.        STUDY: XR FOOT RIGHT 3+ VIEWS        Signed by: Mathew Varela 6/17/2024 5:19 PM        -------- ORIGINAL REPORT --------   Dictation workstation:   [id number]  Narrative: Interpreted By:  Mathew Varela,   STUDY:  XR FOOT RIGHT 1-2 VIEWS; ;  6/15/2024 3:10 pm      INDICATION:  Signs/Symptoms:assess wound, OM.      COMPARISON:  None.      ACCESSION NUMBER(S):  [id number]      ORDERING CLINICIAN:  GINGER MITCHELL      FINDINGS:  Right foot, three views      There is no osseous destruction or erosions to suggest presence of  osteomyelitis There is moderate joint space are was otherwise the 1st  MTP joint. There is mild degenerative change the tarsometatarsal,  naviculocuneiform and talonavicular joints. There is a moderate-sized  plantar  calcaneal spur.      Impression: No radiographic evidence of osteomyelitis.  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          MACRO:  None      Signed by: Mathew Kosradha 6/15/2024 3:22 PM  Dictation workstation:   [id number]      Physical Exam  Vitals and nursing note reviewed.   Constitutional:       Appearance: Normal appearance.   HENT:      Head: Normocephalic and atraumatic.      Mouth/Throat:      Mouth: Mucous membranes are moist.   Eyes:      Extraocular Movements: Extraocular movements intact.      Pupils: Pupils are equal, round, and reactive to light.   Cardiovascular:      Rate and Rhythm: Normal rate.      Pulses: Normal pulses.      Heart sounds: Normal heart sounds.   Pulmonary:      Effort: Pulmonary effort is normal.      Breath sounds: Normal breath sounds.   Abdominal:      General: Bowel sounds are normal.      Palpations: Abdomen is soft.   Genitourinary:     Comments: Indwelling Tiwari catheter draining clear yellow urine  Musculoskeletal:         General: Normal range of motion.      Cervical back: Normal range of motion and neck supple.   Skin:     General: Skin is warm and dry.      Capillary Refill: Capillary refill takes less than 2 seconds.   Neurological:      General: No focal deficit present.      Mental Status: He is alert and oriented to person, place, and time.   Psychiatric:         Mood and Affect: Mood normal.         Relevant Results               Assessment/Plan        Principal Problem:    Cystitis    Acute cystitis, catheter related  Prior urinary culture with ESBL E. Coli, urine culture 6/14 with enterobacter  Meropenem switched to p.o. Bactrim per ID  New urine culture from 6/15/2024 negative for growth  Continue recommendations from ID     Bacteremia  Blood cultures from 6/14/24 positive for enterobacter  Repeat blood cultures 6/15/2024 negative to date  ID following     BPH  Chronic urinary retention  Greenlight therapy with Dr. Stephens on 6/20/24  Urology  planning to continue with procedure as scheduled, inpatient, while patient is still on antibiotics.  Continue home Flomax, Proscar     Elevated troponins  Suspect related to acute infection, cardiology evaluated. No ACS.      Heart failure  Continue home medications  Cardiology following recommendations appreciated    Hyponatremia-improving  Improving  Monitor BMP     Atrial fibrillation status post watchman placement  Monitor on telemetry     Diabetes, insulin-dependent,  Last A1c 9.6  POC blood sugar checks ACHS  Basal Lantus, sliding scale, p.o. metformin  Hypoglycemia protocol     History of seizures  Continue home Keppra  Seizure precautions, fall precautions    Hypertension  Continue home amlodipine  Monitor vital signs    Hyperlipidemia  Continue home statin     DVT/GI prophylaxis   Lovenox subcu daily, PPI     Wound to right foot  X-ray right foot negative for evidence of osteomyelitis              Marilynn Suarez, APRN-CNP

## 2024-06-18 NOTE — PROGRESS NOTES
Subjective Data:      Overnight Events:         Objective Data:  Last Recorded Vitals:  Vitals:    06/17/24 2333 06/18/24 0442 06/18/24 0636 06/18/24 0740   BP: 156/59 142/78  128/57   BP Location: Right arm Right arm  Left arm   Patient Position: Lying Lying  Sitting   Pulse: 63 75  61   Resp: 18 18  18   Temp: 36.9 °C (98.4 °F) 36.6 °C (97.9 °F)  36.5 °C (97.7 °F)   TempSrc: Temporal Temporal  Oral   SpO2: 90% 92%  96%   Weight:   89 kg (196 lb 3.4 oz)    Height:           Last Labs:  CBC - 6/18/2024:  4:14 AM  5.2 9.5 72    26.7      CMP - 6/18/2024:  4:14 AM  8.3 6.2 72 --- 1.3   3.1 3.5 30 82      PTT - 11/10/2023:  4:59 PM  1.2   12.3 22.2     HGBA1C   Date/Time Value Ref Range Status   05/03/2024 01:22 PM 9.6 see below % Final   11/15/2023 11:08 AM 6.7 See below % Final     LDLCALC   Date/Time Value Ref Range Status   05/03/2024 01:22 PM 45 <=99 mg/dL Final     Comment:                                 Near   Borderline      AGE      Desirable  Optimal    High     High     Very High     0-19 Y     0 - 109     ---    110-129   >/= 130     ----    20-24 Y     0 - 119     ---    120-159   >/= 160     ----      >24 Y     0 -  99   100-129  130-159   160-189     >/=190     11/15/2023 11:08 AM 30 65 - 130 mg/dL Final   08/15/2023 05:50 AM 31 65 - 130 MG/DL Final   10/28/2022 05:39 AM 32 65 - 130 MG/DL Final   07/06/2021 09:50 AM 39 65 - 130 MG/DL Final     VLDL   Date/Time Value Ref Range Status   05/03/2024 01:22 PM 14 0 - 40 mg/dL Final   08/22/2022 10:17 AM 11 0 - 40 mg/dL Final   09/17/2019 01:20 PM 16 0 - 40 mg/dL Final      Last I/O:  I/O last 3 completed shifts:  In: 560 (6.3 mL/kg) [P.O.:560]  Out: 2025 (22.8 mL/kg) [Urine:2025 (0.6 mL/kg/hr)]  Weight: 89 kg     Past Cardiology Tests (Last 3 Years):  EKG:  ECG 12 lead 06/14/2024      ECG 12 lead 05/15/2024      ECG 12 lead 02/14/2024      ECG 12 lead 12/18/2023      Electrocardiogram, 12-lead PRN ACS symptoms 12/18/2023      ECG 12 lead 11/26/2023      ECG  "12 Lead       ECG 12 lead daily 11/17/2023      ECG 12 lead daily 11/15/2023      ECG 12 lead daily 11/15/2023      ECG 12 lead 11/15/2023    Echo:  Transthoracic Echo (TTE) Limited 11/10/2023      Transthoracic Echo (TTE) Limited 11/10/2023    Ejection Fractions:  No results found for: \"EF\"  Cath:  No results found for this or any previous visit from the past 1095 days.    Stress Test:  Nuclear Stress Test 01/24/2022    Cardiac Imaging:  No results found for this or any previous visit from the past 1095 days.      Inpatient Medications:  Scheduled medications   Medication Dose Route Frequency    acetaminophen  650 mg oral 4x daily    amLODIPine  5 mg oral Daily    atorvastatin  40 mg oral Nightly    barium sulfate  5 mL oral Once in imaging    barium sulfate  90 mL oral Once in imaging    barium sulfate  8 mL oral Once in imaging    barium sulfate  90 mL oral Once in imaging    enoxaparin  40 mg subcutaneous Daily    ferrous sulfate (325 mg ferrous sulfate)  65 mg of iron oral Daily with breakfast    finasteride  5 mg oral Daily    gabapentin  100 mg oral BID    insulin glargine  10 Units subcutaneous Nightly    insulin lispro  0-5 Units subcutaneous TID    levETIRAcetam  250 mg oral BID    magnesium oxide  400 mg oral Daily    metFORMIN  1,000 mg oral BID    pantoprazole  40 mg oral Daily    sennosides-docusate sodium  2 tablet oral Nightly    sulfamethoxazole-trimethoprim  1 tablet oral BID    tamsulosin  0.4 mg oral BID     PRN medications   Medication    acetaminophen    dextrose    glucagon    ondansetron    Or    ondansetron    polyethylene glycol    traZODone     Continuous Medications   Medication Dose Last Rate       Physical Exam:       Assessment/Plan   Principal Problem:    Cystitis        6/14: This elderly white male with an extensive past medical history including coronary artery disease, remote CABG, hypertension, hyperlipidemia, type 2 diabetes, paroxysmal atrial fibrillation, history of?  TIA " versus recurrent localized focal seizures, history of GI bleeding on Eliquis anticoagulation, status post Watchman implantation is currently admitted with high-grade fever and evidence of recurrent urinary tract infection possible urosepsis.  The patient had previously been scheduled for a laser-assisted TURP on 6/20/2024.  He has been started on empiric IV Rocephin.  Systolic blood pressure slightly elevated on the amlodipine 5 mg daily.  Urine and blood cultures are pending.  No leukocytosis and renal parameters remain at baseline presumably positive indicators of early diagnosis.  Minor elevation of high-sensitivity troponin consistent with minimal demand ischemia.  Will hold usual low-dose Lasix 40 mg daily.  Patient was not given usual IV fluids per sepsis protocol due to the slight elevation in his high-sensitivity troponin.  Clinically the patient is not volume overloaded.     6/15: No significant changes since admission.  Patient continues to have recurrent fevers.  Most recent 103.  He remains on IV antibiotics for his known urinary tract infection.  He is receiving Tylenol for his fevers.  He states he feels achy but otherwise okay.  He is breathing comfortably on nasal cannula oxygen.  There is no evidence of fluid volume overload.  No peripheral edema.  He is chest pain-free.  On telemetry monitor overnight he remained in a sinus arrhythmia without significant ectopy.  His blood pressure is stable at 118/85.  Overall he is stable from a cardiac perspective.  Will follow from a distance.  Please feel free to reach out with further or new cardiac concerns.     6/16: Cardiac wise the patient remains stable.  No chest pain or anginal type symptoms.  Receiving IV antibiotic therapy and overall clinical condition has improved.  Tentative plan is prostate surgery on Thursday of coming week.  Dr. Grant will resume cardiology care tomorrow.     6/17: The patient is sitting upright in bed side chair feeling and  appearing much improved.  He is awake alert, conversant and eating a full breakfast.  The original urine and blood cultures that were drawn on 6/14/2024 from the emergency room proved to be positive for Enterobacter cloacae UTI/bacteremia.  The patient is responding well to the IV meropenem.  Fortunately the patient had been brought to the emergency room quickly upon the original onset of symptoms and he never developed leukocytosis.  CBC today WBC of 5400 hematocrit is unchanged at 28.7.  He does have a chronic thrombocytopenia platelet count 68,000 presently.  Electrolyte panel is notable for creatinine of 1.0 serum sodium 129.  Systolic blood pressures are in the range of 140-150 mmHg on amlodipine 5 mg daily.  The patient was tentatively previously scheduled for a laser-assisted TURP on 6/20 and hopefully this procedure can still be performed as planned.  The patient has had several episodes of urosepsis related to urinary retention originally and now from Tiwari catheter currently.     6/18: The patient is currently off the floor having a barium swallow performed apparently for some component of choking.  The patient was seen by speech therapy yesterday and their impression was normal oral phase but possible pharyngeal phase dysphagia based on clinical assessment and a modified barium swallow was recommended currently being performed.  Otherwise he is feeling improved.  He is being followed by infectious disease and was converted to oral Bactrim therapy for the Enterobacter cloacae I UTI/bacteremia.  No leukocytosis on CBC with a WBC of 5200.  Hemoglobin modestly decreased to 9.5 platelet count unchanged at 72,000.  His renal parameters do remain stable creatinine 1.1 and sodium 132.  Systolic blood pressure in acceptable range at 140 mmHg.  Will continue amlodipine unchanged.                Peripheral IV 06/14/24 20 G Distal;Right;Upper Arm (Active)   Site Assessment Clean;Dry;Intact 06/18/24 0900   Dressing  Status Clean;Dry 06/18/24 0900   Number of days: 4       Urethral Catheter Straight-tip 18 Fr. (Active)   Site Assessment Clean;Skin intact 06/18/24 0617   Number of days: 30       Code Status:  DNR    I spent 20 minutes in the professional and overall care of this patient.        Luis Grant MD

## 2024-06-18 NOTE — PROGRESS NOTES
Occupational Therapy    Evaluation/Treatment    Patient Name: Luis Greene  MRN: [id number]  : 1940  Today's Date: 24  Time Calculation  Start Time: 1255  Stop Time: 1315  Time Calculation (min): 20 min       Assessment:  OT Assessment: OT order received, chart reviewed, evaluation completed. Pt demonstrated mild deficits in ADLs and functional mobility and would benefit from acute OT services to faciliate return to PLOF.  Prognosis: Good  Barriers to Discharge: None  Evaluation/Treatment Tolerance: Patient tolerated treatment well  Medical Staff Made Aware: Yes  End of Session Communication: Bedside nurse  End of Session Patient Position: Bed, 3 rail up, Alarm off, not on at start of session (Pt agreeable to call light use.)  OT Assessment Results: Decreased ADL status, Decreased upper extremity strength, Decreased endurance, Decreased functional mobility, Decreased IADLs  Prognosis: Good  Barriers to Discharge: None  Evaluation/Treatment Tolerance: Patient tolerated treatment well  Medical Staff Made Aware: Yes  Strengths: Ability to acquire knowledge  Barriers to Participation: Comorbidities  Plan:  Treatment Interventions: ADL retraining, Functional transfer training, UE strengthening/ROM, Endurance training, Patient/family training, Equipment evaluation/education  OT Frequency: 3 times per week  OT Discharge Recommendations: Low intensity level of continued care  Equipment Recommended upon Discharge: Wheeled walker  OT Recommended Transfer Status: Stand by assist  OT - OK to Discharge: Yes  Treatment Interventions: ADL retraining, Functional transfer training, UE strengthening/ROM, Endurance training, Patient/family training, Equipment evaluation/education    Subjective     General:   OT Received On: 24  General  Reason for Referral: activities of daily living, cystitis, fever  Past Medical History Relevant to Rehab: AFIB, anemia, BPH, CHF, CAD, CM, GERD, sepsis, upper GI bleed, hyperlipid,  OA, cardiac cath, CABG, lami, knee surgery, neuropathy, trinity AFO's,  Family/Caregiver Present: No  Prior to Session Communication: Bedside nurse  Patient Position Received: Bed, 3 rail up, Alarm off, not on at start of session  Preferred Learning Style: verbal  General Comment: Cleared by nursing to be seen for therapy, pt agreeable, supine upon arrival.  Precautions:  Hearing/Visual Limitations: [place]  Medical Precautions: Fall precautions  Braces Applied: B AFO's and shoes.  Vital Signs:     Pain:  Pain Assessment  Pain Assessment: 0-10  Pain Score: 0 - No pain    Objective   Cognition:  Overall Cognitive Status: Within Functional Limits  Orientation Level: Oriented X4     Home Living:  Type of Home: House  Lives With: Spouse  Home Adaptive Equipment: Cane, Walker rolling or standard  Home Layout: One level  Home Access: Stairs to enter with rails  Entrance Stairs-Number of Steps: 4  Prior Function:  Level of [place]: Needs assistance with ADLs  Receives Help From: Family  ADL Assistance: Needs assistance  Homemaking Assistance: Needs assistance  Ambulatory Assistance: Independent (mod independent with rolling walker)  Vocational: Retired  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;  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 recently discontinued;  does not drive    ADL:  Eating Assistance: Independent  Grooming Assistance: Stand by  Grooming Deficit: Supervision/safety, Increased time to complete, Teeth care (seated at sink on rollator for oral care)  Bathing Assistance: Moderate  UE Dressing Assistance: Minimal  UE Dressing Deficit:  (don backside gown)  LE Dressing Assistance: Moderate  Toileting Assistance with Device: Minimal    Activity Tolerance:  Endurance: Decreased tolerance for upright activites  Activity Tolerance Comments: mild fatigue, reports generalized weakness  Functional Standing Tolerance:     Bed Mobility/Transfers: Bed Mobility  Bed Mobility: Yes  Bed Mobility 1  Bed Mobility 1: Supine to sitting  Level of  Assistance 1: Modified independent  Bed Mobility Comments 1: HOB elevated and use of rail.  Bed Mobility 2  Bed Mobility  2: Sitting to supine  Level of Assistance 2: Close supervision  Bed Mobility Comments 2: Pt returned to supine with close S, able to use bed rails to reposition self in bed, needs in reach.    Transfers  Transfer: Yes  Transfer 1  Transfer From 1: Bed to  Transfer to 1: Stand  Technique 1: Sit to stand  Transfer Device 1:  (rollator)  Transfer Level of Assistance 1: Contact guard  Trials/Comments 1: cues to lock rollator breaks, CGA during transfer due to mild instability  Transfers 2  Transfer From 2: Stand to  Transfer to 2: Sit, Stand (on rollator)  Technique 2: Stand to sit, Sit to stand  Transfer Device 2:  (rollator)  Transfer Level of Assistance 2: Contact guard  Trials/Comments 2: cues to lock breaks multiple times  Transfers 3  Transfer From 3: Stand to  Transfer to 3: Bed  Technique 3: Stand to sit  Transfer Device 3:  (rollator)  Transfer Level of Assistance 3: Contact guard  Trials/Comments 3: cues for safe had placement and locking brakes    Functional Mobility:  Functional Mobility  Functional Mobility Performed: Yes  Functional Mobility 1  Surface 1: Level tile  Device 1:  (Rollator)  Functional Mobility Support Devices:  (pt decline dto apply SILVER AFOs)  Assistance 1: Close supervision  Comments 1: Pt performed functional mobility to and from bathroom in room with close S and cues for safety due to not wearing SILVER AFOs. Pt required seated rest break in bathroom during ADLs then returned to bed.    Sensation:  Light Touch: Partial deficits in the LLE, Partial deficits in the RLE  Strength:  Strength Comments: 3/5 B UES    Coordination:  Movements are Fluid and Coordinated: Yes   Hand Function:  Hand Function  Gross Grasp: Functional  Coordination: Functional  Extremities: RUE   RUE : Within Functional Limits and LUE   LUE: Within Functional Limits    Outcome Measures: [facility] Daily  Activity  Putting on and taking off regular lower body clothing: A lot  Bathing (including washing, rinsing, drying): A lot  Putting on and taking off regular upper body clothing: A little  Toileting, which includes using toilet, bedpan or urinal: A little  Taking care of personal grooming such as brushing teeth: None  Eating Meals: None  Daily Activity - Total Score: 18    Education Documentation  Precautions, taught by Martina Hanson OT at 6/18/2024  2:13 PM.  Learner: Patient  Readiness: Acceptance  Method: Explanation  Response: Verbalizes Understanding  Comment: Pt edu on OT POC    Home Exercise Program, taught by Martina Hanson OT at 6/18/2024  2:13 PM.  Learner: Patient  Readiness: Acceptance  Method: Explanation  Response: Verbalizes Understanding  Comment: Pt edu on OT POC    ADL Training, taught by Martina Hanson OT at 6/18/2024  2:13 PM.  Learner: Patient  Readiness: Acceptance  Method: Explanation  Response: Verbalizes Understanding  Comment: Pt edu on OT POC      Goals:  Encounter Problems       Encounter Problems (Active)       OT Goals       ADLs (Progressing)       Start:  06/18/24    Expected End:  07/12/24       Pt will complete ADL tasks with Mod I, using AE as needed, in order to complete self-care tasks.           Functional Mobility (Progressing)       Start:  06/18/24    Expected End:  07/12/24       Pt will perform functional mobility household distance at mod ind level with RW           Functional transfers (Progressing)       Start:  06/18/24    Expected End:  07/12/24       Pt will perform functional transfers at mod ind level with RW.

## 2024-06-18 NOTE — NURSING NOTE
Pt resting in bed at this time. Family bedside.Pt and family concerned about oxygen saturations. Reeducated on swallowing restrictions from SLP earlier this shift. Advised patient to limit thin liquid intake until swallow study. Pt O2 recovers quickly, c/o mucous with cough. Denies other needs at this time. Care continues.

## 2024-06-19 ENCOUNTER — HOSPITAL ENCOUNTER (INPATIENT)
Facility: HOSPITAL | Age: 84
LOS: 2 days | Discharge: HOME | End: 2024-06-21
Attending: FAMILY MEDICINE | Admitting: UROLOGY
Payer: MEDICARE

## 2024-06-19 VITALS
WEIGHT: 196.21 LBS | TEMPERATURE: 97.2 F | RESPIRATION RATE: 17 BRPM | DIASTOLIC BLOOD PRESSURE: 55 MMHG | HEIGHT: 71 IN | OXYGEN SATURATION: 98 % | HEART RATE: 68 BPM | SYSTOLIC BLOOD PRESSURE: 126 MMHG | BODY MASS INDEX: 27.47 KG/M2

## 2024-06-19 DIAGNOSIS — L97.521 CHRONIC ULCER OF LEFT FOOT LIMITED TO BREAKDOWN OF SKIN (MULTI): ICD-10-CM

## 2024-06-19 DIAGNOSIS — N13.8 BPH WITH OBSTRUCTION/LOWER URINARY TRACT SYMPTOMS: Primary | ICD-10-CM

## 2024-06-19 DIAGNOSIS — N40.1 BPH WITH OBSTRUCTION/LOWER URINARY TRACT SYMPTOMS: Primary | ICD-10-CM

## 2024-06-19 LAB
ANION GAP SERPL CALC-SCNC: 10 MMOL/L
BACTERIA BLD AEROBE CULT: ABNORMAL
BACTERIA BLD AEROBE CULT: ABNORMAL
BACTERIA BLD CULT: ABNORMAL
BACTERIA BLD CULT: ABNORMAL
BACTERIA BLD CULT: NORMAL
BACTERIA BLD CULT: NORMAL
BUN SERPL-MCNC: 21 MG/DL (ref 8–25)
CALCIUM SERPL-MCNC: 8.5 MG/DL (ref 8.5–10.4)
CHLORIDE SERPL-SCNC: 101 MMOL/L (ref 97–107)
CO2 SERPL-SCNC: 22 MMOL/L (ref 24–31)
CREAT SERPL-MCNC: 1.2 MG/DL (ref 0.4–1.6)
EGFRCR SERPLBLD CKD-EPI 2021: 60 ML/MIN/1.73M*2
ERYTHROCYTE [DISTWIDTH] IN BLOOD BY AUTOMATED COUNT: 13.8 % (ref 11.5–14.5)
GLUCOSE BLD MANUAL STRIP-MCNC: 157 MG/DL (ref 74–99)
GLUCOSE BLD MANUAL STRIP-MCNC: 163 MG/DL (ref 74–99)
GLUCOSE BLD MANUAL STRIP-MCNC: 222 MG/DL (ref 74–99)
GLUCOSE SERPL-MCNC: 158 MG/DL (ref 65–99)
GRAM STN SPEC: ABNORMAL
GRAM STN SPEC: ABNORMAL
HCT VFR BLD AUTO: 28.5 % (ref 41–52)
HGB BLD-MCNC: 9.5 G/DL (ref 13.5–17.5)
MCH RBC QN AUTO: 34.2 PG (ref 26–34)
MCHC RBC AUTO-ENTMCNC: 33.3 G/DL (ref 32–36)
MCV RBC AUTO: 103 FL (ref 80–100)
NRBC BLD-RTO: 0 /100 WBCS (ref 0–0)
PLATELET # BLD AUTO: 84 X10*3/UL (ref 150–450)
POTASSIUM SERPL-SCNC: 4.2 MMOL/L (ref 3.4–5.1)
RBC # BLD AUTO: 2.78 X10*6/UL (ref 4.5–5.9)
SODIUM SERPL-SCNC: 133 MMOL/L (ref 133–145)
WBC # BLD AUTO: 4.6 X10*3/UL (ref 4.4–11.3)

## 2024-06-19 PROCEDURE — 80048 BASIC METABOLIC PNL TOTAL CA: CPT | Performed by: INTERNAL MEDICINE

## 2024-06-19 PROCEDURE — 82947 ASSAY GLUCOSE BLOOD QUANT: CPT | Mod: 91

## 2024-06-19 PROCEDURE — 97116 GAIT TRAINING THERAPY: CPT | Mod: GP

## 2024-06-19 PROCEDURE — 2500000002 HC RX 250 W HCPCS SELF ADMINISTERED DRUGS (ALT 637 FOR MEDICARE OP, ALT 636 FOR OP/ED): Performed by: NURSE PRACTITIONER

## 2024-06-19 PROCEDURE — 82947 ASSAY GLUCOSE BLOOD QUANT: CPT

## 2024-06-19 PROCEDURE — 99232 SBSQ HOSP IP/OBS MODERATE 35: CPT | Performed by: INTERNAL MEDICINE

## 2024-06-19 PROCEDURE — 97110 THERAPEUTIC EXERCISES: CPT | Mod: GP

## 2024-06-19 PROCEDURE — 2500000002 HC RX 250 W HCPCS SELF ADMINISTERED DRUGS (ALT 637 FOR MEDICARE OP, ALT 636 FOR OP/ED): Performed by: STUDENT IN AN ORGANIZED HEALTH CARE EDUCATION/TRAINING PROGRAM

## 2024-06-19 PROCEDURE — 36415 COLL VENOUS BLD VENIPUNCTURE: CPT | Performed by: INTERNAL MEDICINE

## 2024-06-19 PROCEDURE — 2500000002 HC RX 250 W HCPCS SELF ADMINISTERED DRUGS (ALT 637 FOR MEDICARE OP, ALT 636 FOR OP/ED): Performed by: INTERNAL MEDICINE

## 2024-06-19 PROCEDURE — 2500000004 HC RX 250 GENERAL PHARMACY W/ HCPCS (ALT 636 FOR OP/ED): Performed by: NURSE PRACTITIONER

## 2024-06-19 PROCEDURE — G0378 HOSPITAL OBSERVATION PER HR: HCPCS

## 2024-06-19 PROCEDURE — 85027 COMPLETE CBC AUTOMATED: CPT | Performed by: INTERNAL MEDICINE

## 2024-06-19 PROCEDURE — 2500000001 HC RX 250 WO HCPCS SELF ADMINISTERED DRUGS (ALT 637 FOR MEDICARE OP): Performed by: NURSE PRACTITIONER

## 2024-06-19 PROCEDURE — 2500000001 HC RX 250 WO HCPCS SELF ADMINISTERED DRUGS (ALT 637 FOR MEDICARE OP): Performed by: INTERNAL MEDICINE

## 2024-06-19 PROCEDURE — 1100000001 HC PRIVATE ROOM DAILY

## 2024-06-19 PROCEDURE — 2500000001 HC RX 250 WO HCPCS SELF ADMINISTERED DRUGS (ALT 637 FOR MEDICARE OP): Performed by: STUDENT IN AN ORGANIZED HEALTH CARE EDUCATION/TRAINING PROGRAM

## 2024-06-19 RX ORDER — SULFAMETHOXAZOLE AND TRIMETHOPRIM 800; 160 MG/1; MG/1
1 TABLET ORAL 2 TIMES DAILY
Start: 2024-06-19 | End: 2024-07-04

## 2024-06-19 RX ORDER — ENOXAPARIN SODIUM 100 MG/ML
40 INJECTION SUBCUTANEOUS DAILY
Status: DISCONTINUED | OUTPATIENT
Start: 2024-06-20 | End: 2024-06-20

## 2024-06-19 RX ORDER — ACETAMINOPHEN 325 MG/1
650 TABLET ORAL 4 TIMES DAILY
Status: DISCONTINUED | OUTPATIENT
Start: 2024-06-20 | End: 2024-06-21 | Stop reason: HOSPADM

## 2024-06-19 RX ORDER — POLYETHYLENE GLYCOL 3350 17 G/17G
17 POWDER, FOR SOLUTION ORAL DAILY PRN
Status: DISCONTINUED | OUTPATIENT
Start: 2024-06-19 | End: 2024-06-21 | Stop reason: HOSPADM

## 2024-06-19 RX ORDER — INSULIN LISPRO 100 [IU]/ML
0-5 INJECTION, SOLUTION INTRAVENOUS; SUBCUTANEOUS
Start: 2024-06-20

## 2024-06-19 RX ORDER — ATORVASTATIN CALCIUM 40 MG/1
40 TABLET, FILM COATED ORAL NIGHTLY
Status: DISCONTINUED | OUTPATIENT
Start: 2024-06-19 | End: 2024-06-21 | Stop reason: HOSPADM

## 2024-06-19 RX ORDER — FINASTERIDE 5 MG/1
5 TABLET, FILM COATED ORAL DAILY
Status: DISCONTINUED | OUTPATIENT
Start: 2024-06-20 | End: 2024-06-20

## 2024-06-19 RX ORDER — GABAPENTIN 100 MG/1
100 CAPSULE ORAL 2 TIMES DAILY
Status: DISCONTINUED | OUTPATIENT
Start: 2024-06-19 | End: 2024-06-21 | Stop reason: HOSPADM

## 2024-06-19 RX ORDER — PANTOPRAZOLE SODIUM 40 MG/1
40 TABLET, DELAYED RELEASE ORAL DAILY
Status: DISCONTINUED | OUTPATIENT
Start: 2024-06-20 | End: 2024-06-21 | Stop reason: HOSPADM

## 2024-06-19 RX ORDER — FERROUS SULFATE 325(65) MG
65 TABLET ORAL
Status: DISCONTINUED | OUTPATIENT
Start: 2024-06-20 | End: 2024-06-21 | Stop reason: HOSPADM

## 2024-06-19 RX ORDER — AMLODIPINE BESYLATE 5 MG/1
5 TABLET ORAL DAILY
Status: DISCONTINUED | OUTPATIENT
Start: 2024-06-20 | End: 2024-06-21 | Stop reason: HOSPADM

## 2024-06-19 RX ORDER — SODIUM CHLORIDE 9 MG/ML
75 INJECTION, SOLUTION INTRAVENOUS CONTINUOUS
Status: DISCONTINUED | OUTPATIENT
Start: 2024-06-20 | End: 2024-06-21 | Stop reason: HOSPADM

## 2024-06-19 RX ORDER — DEXTROSE 50 % IN WATER (D50W) INTRAVENOUS SYRINGE
12.5
Status: DISCONTINUED | OUTPATIENT
Start: 2024-06-19 | End: 2024-06-21 | Stop reason: HOSPADM

## 2024-06-19 RX ORDER — INSULIN GLARGINE 100 [IU]/ML
10 INJECTION, SOLUTION SUBCUTANEOUS NIGHTLY
Start: 2024-06-19

## 2024-06-19 RX ORDER — LEVETIRACETAM 250 MG/1
250 TABLET ORAL 2 TIMES DAILY
Status: DISCONTINUED | OUTPATIENT
Start: 2024-06-19 | End: 2024-06-21 | Stop reason: HOSPADM

## 2024-06-19 RX ORDER — INSULIN GLARGINE 100 [IU]/ML
10 INJECTION, SOLUTION SUBCUTANEOUS NIGHTLY
Status: DISCONTINUED | OUTPATIENT
Start: 2024-06-19 | End: 2024-06-21

## 2024-06-19 RX ORDER — AMOXICILLIN 250 MG
2 CAPSULE ORAL NIGHTLY
Status: DISCONTINUED | OUTPATIENT
Start: 2024-06-19 | End: 2024-06-21 | Stop reason: HOSPADM

## 2024-06-19 RX ORDER — METFORMIN HYDROCHLORIDE 500 MG/1
1000 TABLET ORAL
Status: DISCONTINUED | OUTPATIENT
Start: 2024-06-20 | End: 2024-06-21 | Stop reason: HOSPADM

## 2024-06-19 RX ORDER — ONDANSETRON 4 MG/1
4 TABLET, FILM COATED ORAL EVERY 8 HOURS PRN
Start: 2024-06-19

## 2024-06-19 RX ORDER — SULFAMETHOXAZOLE AND TRIMETHOPRIM 800; 160 MG/1; MG/1
1 TABLET ORAL 2 TIMES DAILY
Status: DISCONTINUED | OUTPATIENT
Start: 2024-06-19 | End: 2024-06-21 | Stop reason: HOSPADM

## 2024-06-19 RX ORDER — FUROSEMIDE 40 MG/1
40 TABLET ORAL DAILY
COMMUNITY
End: 2024-07-12 | Stop reason: SDUPTHER

## 2024-06-19 RX ORDER — TAMSULOSIN HYDROCHLORIDE 0.4 MG/1
0.4 CAPSULE ORAL 2 TIMES DAILY
Status: DISCONTINUED | OUTPATIENT
Start: 2024-06-19 | End: 2024-06-20

## 2024-06-19 RX ORDER — TRAZODONE HYDROCHLORIDE 50 MG/1
50 TABLET ORAL NIGHTLY PRN
Status: DISCONTINUED | OUTPATIENT
Start: 2024-06-19 | End: 2024-06-21 | Stop reason: HOSPADM

## 2024-06-19 RX ORDER — LANOLIN ALCOHOL/MO/W.PET/CERES
400 CREAM (GRAM) TOPICAL DAILY
Status: DISCONTINUED | OUTPATIENT
Start: 2024-06-20 | End: 2024-06-21 | Stop reason: HOSPADM

## 2024-06-19 RX ORDER — INSULIN LISPRO 100 [IU]/ML
0-5 INJECTION, SOLUTION INTRAVENOUS; SUBCUTANEOUS
Status: DISCONTINUED | OUTPATIENT
Start: 2024-06-19 | End: 2024-06-21 | Stop reason: HOSPADM

## 2024-06-19 SDOH — HEALTH STABILITY: PHYSICAL HEALTH: ON AVERAGE, HOW MANY DAYS PER WEEK DO YOU ENGAGE IN MODERATE TO STRENUOUS EXERCISE (LIKE A BRISK WALK)?: 1 DAY

## 2024-06-19 SDOH — SOCIAL STABILITY: SOCIAL NETWORK: ARE YOU MARRIED, WIDOWED, DIVORCED, SEPARATED, NEVER MARRIED, OR LIVING WITH A PARTNER?: MARRIED

## 2024-06-19 SDOH — SOCIAL STABILITY: SOCIAL INSECURITY: WERE YOU ABLE TO COMPLETE ALL THE BEHAVIORAL HEALTH SCREENINGS?: YES

## 2024-06-19 SDOH — SOCIAL STABILITY: SOCIAL INSECURITY: ARE YOU OR HAVE YOU BEEN THREATENED OR ABUSED PHYSICALLY, EMOTIONALLY, OR SEXUALLY BY ANYONE?: NO

## 2024-06-19 SDOH — ECONOMIC STABILITY: HOUSING INSECURITY: IN THE LAST 12 MONTHS, HOW MANY PLACES HAVE YOU LIVED?: 1

## 2024-06-19 SDOH — SOCIAL STABILITY: SOCIAL INSECURITY: HAS ANYONE EVER THREATENED TO HURT YOUR FAMILY OR YOUR PETS?: NO

## 2024-06-19 SDOH — HEALTH STABILITY: MENTAL HEALTH: HOW OFTEN DO YOU HAVE A DRINK CONTAINING ALCOHOL?: NEVER

## 2024-06-19 SDOH — SOCIAL STABILITY: SOCIAL INSECURITY: DO YOU FEEL ANYONE HAS EXPLOITED OR TAKEN ADVANTAGE OF YOU FINANCIALLY OR OF YOUR PERSONAL PROPERTY?: NO

## 2024-06-19 SDOH — SOCIAL STABILITY: SOCIAL INSECURITY: DO YOU FEEL UNSAFE GOING BACK TO THE PLACE WHERE YOU ARE LIVING?: NO

## 2024-06-19 SDOH — SOCIAL STABILITY: SOCIAL INSECURITY: WITHIN THE LAST YEAR, HAVE YOU BEEN HUMILIATED OR EMOTIONALLY ABUSED IN OTHER WAYS BY YOUR PARTNER OR EX-PARTNER?: NO

## 2024-06-19 SDOH — SOCIAL STABILITY: SOCIAL INSECURITY: HAVE YOU HAD ANY THOUGHTS OF HARMING ANYONE ELSE?: NO

## 2024-06-19 SDOH — SOCIAL STABILITY: SOCIAL NETWORK: HOW OFTEN DO YOU ATTENT MEETINGS OF THE CLUB OR ORGANIZATION YOU BELONG TO?: NEVER

## 2024-06-19 SDOH — SOCIAL STABILITY: SOCIAL NETWORK: HOW OFTEN DO YOU ATTEND CHURCH OR RELIGIOUS SERVICES?: NEVER

## 2024-06-19 SDOH — HEALTH STABILITY: MENTAL HEALTH: HOW MANY STANDARD DRINKS CONTAINING ALCOHOL DO YOU HAVE ON A TYPICAL DAY?: PATIENT DOES NOT DRINK

## 2024-06-19 SDOH — ECONOMIC STABILITY: INCOME INSECURITY: IN THE PAST 12 MONTHS, HAS THE ELECTRIC, GAS, OIL, OR WATER COMPANY THREATENED TO SHUT OFF SERVICE IN YOUR HOME?: NO

## 2024-06-19 SDOH — ECONOMIC STABILITY: INCOME INSECURITY: IN THE LAST 12 MONTHS, WAS THERE A TIME WHEN YOU WERE NOT ABLE TO PAY THE MORTGAGE OR RENT ON TIME?: NO

## 2024-06-19 SDOH — SOCIAL STABILITY: SOCIAL INSECURITY: ARE THERE ANY APPARENT SIGNS OF INJURIES/BEHAVIORS THAT COULD BE RELATED TO ABUSE/NEGLECT?: NO

## 2024-06-19 SDOH — ECONOMIC STABILITY: FOOD INSECURITY: WITHIN THE PAST 12 MONTHS, THE FOOD YOU BOUGHT JUST DIDN'T LAST AND YOU DIDN'T HAVE MONEY TO GET MORE.: NEVER TRUE

## 2024-06-19 SDOH — ECONOMIC STABILITY: FOOD INSECURITY: WITHIN THE PAST 12 MONTHS, YOU WORRIED THAT YOUR FOOD WOULD RUN OUT BEFORE YOU GOT MONEY TO BUY MORE.: NEVER TRUE

## 2024-06-19 SDOH — SOCIAL STABILITY: SOCIAL NETWORK: HOW OFTEN DO YOU GET TOGETHER WITH FRIENDS OR RELATIVES?: THREE TIMES A WEEK

## 2024-06-19 SDOH — SOCIAL STABILITY: SOCIAL INSECURITY: DOES ANYONE TRY TO KEEP YOU FROM HAVING/CONTACTING OTHER FRIENDS OR DOING THINGS OUTSIDE YOUR HOME?: NO

## 2024-06-19 SDOH — SOCIAL STABILITY: SOCIAL INSECURITY: WITHIN THE LAST YEAR, HAVE YOU BEEN AFRAID OF YOUR PARTNER OR EX-PARTNER?: NO

## 2024-06-19 SDOH — HEALTH STABILITY: MENTAL HEALTH: HOW OFTEN DO YOU HAVE 6 OR MORE DRINKS ON ONE OCCASION?: NEVER

## 2024-06-19 SDOH — SOCIAL STABILITY: SOCIAL INSECURITY: ABUSE: ADULT

## 2024-06-19 SDOH — HEALTH STABILITY: PHYSICAL HEALTH: ON AVERAGE, HOW MANY MINUTES DO YOU ENGAGE IN EXERCISE AT THIS LEVEL?: 30 MIN

## 2024-06-19 SDOH — ECONOMIC STABILITY: INCOME INSECURITY: HOW HARD IS IT FOR YOU TO PAY FOR THE VERY BASICS LIKE FOOD, HOUSING, MEDICAL CARE, AND HEATING?: NOT HARD AT ALL

## 2024-06-19 SDOH — SOCIAL STABILITY: SOCIAL INSECURITY: HAVE YOU HAD THOUGHTS OF HARMING ANYONE ELSE?: NO

## 2024-06-19 ASSESSMENT — COGNITIVE AND FUNCTIONAL STATUS - GENERAL
DAILY ACTIVITIY SCORE: 23
MOVING FROM LYING ON BACK TO SITTING ON SIDE OF FLAT BED WITH BEDRAILS: A LITTLE
DRESSING REGULAR LOWER BODY CLOTHING: A LITTLE
CLIMB 3 TO 5 STEPS WITH RAILING: A LOT
WALKING IN HOSPITAL ROOM: A LITTLE
STANDING UP FROM CHAIR USING ARMS: A LITTLE
STANDING UP FROM CHAIR USING ARMS: A LITTLE
MOBILITY SCORE: 17
MOBILITY SCORE: 22
PATIENT BASELINE BEDBOUND: NO
MOVING TO AND FROM BED TO CHAIR: A LITTLE
TURNING FROM BACK TO SIDE WHILE IN FLAT BAD: A LITTLE
WALKING IN HOSPITAL ROOM: A LITTLE

## 2024-06-19 ASSESSMENT — ACTIVITIES OF DAILY LIVING (ADL)
BATHING: INDEPENDENT
JUDGMENT_ADEQUATE_SAFELY_COMPLETE_DAILY_ACTIVITIES: YES
GROOMING: INDEPENDENT
TOILETING: INDEPENDENT
ADEQUATE_TO_COMPLETE_ADL: YES
PATIENT'S MEMORY ADEQUATE TO SAFELY COMPLETE DAILY ACTIVITIES?: YES
HEARING - LEFT EAR: FUNCTIONAL
FEEDING YOURSELF: INDEPENDENT
HEARING - RIGHT EAR: FUNCTIONAL
DRESSING YOURSELF: INDEPENDENT
WALKS IN HOME: NEEDS ASSISTANCE
ASSISTIVE_DEVICE: EYEGLASSES;WALKER

## 2024-06-19 ASSESSMENT — PATIENT HEALTH QUESTIONNAIRE - PHQ9
2. FEELING DOWN, DEPRESSED OR HOPELESS: NOT AT ALL
1. LITTLE INTEREST OR PLEASURE IN DOING THINGS: NOT AT ALL
SUM OF ALL RESPONSES TO PHQ9 QUESTIONS 1 & 2: 0

## 2024-06-19 ASSESSMENT — LIFESTYLE VARIABLES
HOW OFTEN DO YOU HAVE 6 OR MORE DRINKS ON ONE OCCASION: NEVER
HOW MANY STANDARD DRINKS CONTAINING ALCOHOL DO YOU HAVE ON A TYPICAL DAY: PATIENT DOES NOT DRINK
AUDIT-C TOTAL SCORE: 0
PRESCIPTION_ABUSE_PAST_12_MONTHS: NO
HOW OFTEN DO YOU HAVE A DRINK CONTAINING ALCOHOL: NEVER
SKIP TO QUESTIONS 9-10: 1
AUDIT-C TOTAL SCORE: 0
SUBSTANCE_ABUSE_PAST_12_MONTHS: NO
SKIP TO QUESTIONS 9-10: 1
AUDIT-C TOTAL SCORE: 0

## 2024-06-19 ASSESSMENT — PAIN SCALES - GENERAL
PAINLEVEL_OUTOF10: 4
PAINLEVEL_OUTOF10: 0 - NO PAIN
PAINLEVEL_OUTOF10: 0 - NO PAIN

## 2024-06-19 ASSESSMENT — PAIN - FUNCTIONAL ASSESSMENT: PAIN_FUNCTIONAL_ASSESSMENT: 0-10

## 2024-06-19 NOTE — PROGRESS NOTES
Subjective Data:      Overnight Events:         Objective Data:  Last Recorded Vitals:  Vitals:    06/18/24 1952 06/18/24 2335 06/19/24 0036 06/19/24 0750   BP: 153/55 149/59 139/56 167/50   BP Location: Left arm Right arm Right arm Right arm   Patient Position: Lying Lying Lying Lying   Pulse: 72 75 53 65   Resp: 16 16 16 17   Temp: 36.6 °C (97.9 °F) 37.3 °C (99.1 °F) 37.3 °C (99.1 °F) 36.8 °C (98.2 °F)   TempSrc: Temporal Oral Oral Oral   SpO2: 93% 95% 93% 93%   Weight:       Height:           Last Labs:  CBC - 6/19/2024:  4:28 AM  4.6 9.5 84    28.5      CMP - 6/19/2024:  4:28 AM  8.5 6.2 72 --- 1.3   3.1 3.5 30 82      PTT - 11/10/2023:  4:59 PM  1.2   12.3 22.2     HGBA1C   Date/Time Value Ref Range Status   05/03/2024 01:22 PM 9.6 see below % Final   11/15/2023 11:08 AM 6.7 See below % Final     LDLCALC   Date/Time Value Ref Range Status   05/03/2024 01:22 PM 45 <=99 mg/dL Final     Comment:                                 Near   Borderline      AGE      Desirable  Optimal    High     High     Very High     0-19 Y     0 - 109     ---    110-129   >/= 130     ----    20-24 Y     0 - 119     ---    120-159   >/= 160     ----      >24 Y     0 -  99   100-129  130-159   160-189     >/=190     11/15/2023 11:08 AM 30 65 - 130 mg/dL Final   08/15/2023 05:50 AM 31 65 - 130 MG/DL Final   10/28/2022 05:39 AM 32 65 - 130 MG/DL Final   07/06/2021 09:50 AM 39 65 - 130 MG/DL Final     VLDL   Date/Time Value Ref Range Status   05/03/2024 01:22 PM 14 0 - 40 mg/dL Final   08/22/2022 10:17 AM 11 0 - 40 mg/dL Final   09/17/2019 01:20 PM 16 0 - 40 mg/dL Final      Last I/O:  I/O last 3 completed shifts:  In: 800 (9 mL/kg) [P.O.:800]  Out: 2275 (25.6 mL/kg) [Urine:2275 (0.7 mL/kg/hr)]  Weight: 89 kg     Past Cardiology Tests (Last 3 Years):  EKG:  ECG 12 lead 06/14/2024      ECG 12 lead 05/15/2024      ECG 12 lead 02/14/2024      ECG 12 lead 12/18/2023      Electrocardiogram, 12-lead PRN ACS symptoms 12/18/2023      ECG 12 lead  "11/26/2023      ECG 12 Lead       ECG 12 lead daily 11/17/2023      ECG 12 lead daily 11/15/2023      ECG 12 lead daily 11/15/2023      ECG 12 lead 11/15/2023    Echo:  Transthoracic Echo (TTE) Limited 11/10/2023      Transthoracic Echo (TTE) Limited 11/10/2023    Ejection Fractions:  No results found for: \"EF\"  Cath:  No results found for this or any previous visit from the past 1095 days.    Stress Test:  Nuclear Stress Test 01/24/2022    Cardiac Imaging:  No results found for this or any previous visit from the past 1095 days.      Inpatient Medications:  Scheduled medications   Medication Dose Route Frequency    acetaminophen  650 mg oral 4x daily    amLODIPine  5 mg oral Daily    atorvastatin  40 mg oral Nightly    enoxaparin  40 mg subcutaneous Daily    ferrous sulfate (325 mg ferrous sulfate)  65 mg of iron oral Daily with breakfast    finasteride  5 mg oral Daily    gabapentin  100 mg oral BID    insulin glargine  10 Units subcutaneous Nightly    insulin lispro  0-5 Units subcutaneous TID    levETIRAcetam  250 mg oral BID    magnesium oxide  400 mg oral Daily    metFORMIN  1,000 mg oral BID    pantoprazole  40 mg oral Daily    sennosides-docusate sodium  2 tablet oral Nightly    sulfamethoxazole-trimethoprim  1 tablet oral BID    tamsulosin  0.4 mg oral BID     PRN medications   Medication    acetaminophen    dextrose    glucagon    ondansetron    Or    ondansetron    polyethylene glycol    traZODone     Continuous Medications   Medication Dose Last Rate       Physical Exam:       Assessment/Plan     Principal Problem:    Cystitis        6/14: This elderly white male with an extensive past medical history including coronary artery disease, remote CABG, hypertension, hyperlipidemia, type 2 diabetes, paroxysmal atrial fibrillation, history of?  TIA versus recurrent localized focal seizures, history of GI bleeding on Eliquis anticoagulation, status post Watchman implantation is currently admitted with " high-grade fever and evidence of recurrent urinary tract infection possible urosepsis.  The patient had previously been scheduled for a laser-assisted TURP on 6/20/2024.  He has been started on empiric IV Rocephin.  Systolic blood pressure slightly elevated on the amlodipine 5 mg daily.  Urine and blood cultures are pending.  No leukocytosis and renal parameters remain at baseline presumably positive indicators of early diagnosis.  Minor elevation of high-sensitivity troponin consistent with minimal demand ischemia.  Will hold usual low-dose Lasix 40 mg daily.  Patient was not given usual IV fluids per sepsis protocol due to the slight elevation in his high-sensitivity troponin.  Clinically the patient is not volume overloaded.     6/15: No significant changes since admission.  Patient continues to have recurrent fevers.  Most recent 103.  He remains on IV antibiotics for his known urinary tract infection.  He is receiving Tylenol for his fevers.  He states he feels achy but otherwise okay.  He is breathing comfortably on nasal cannula oxygen.  There is no evidence of fluid volume overload.  No peripheral edema.  He is chest pain-free.  On telemetry monitor overnight he remained in a sinus arrhythmia without significant ectopy.  His blood pressure is stable at 118/85.  Overall he is stable from a cardiac perspective.  Will follow from a distance.  Please feel free to reach out with further or new cardiac concerns.     6/16: Cardiac wise the patient remains stable.  No chest pain or anginal type symptoms.  Receiving IV antibiotic therapy and overall clinical condition has improved.  Tentative plan is prostate surgery on Thursday of coming week.  Dr. Grant will resume cardiology care tomorrow.     6/17: The patient is sitting upright in bed side chair feeling and appearing much improved.  He is awake alert, conversant and eating a full breakfast.  The original urine and blood cultures that were drawn on 6/14/2024  from the emergency room proved to be positive for Enterobacter cloacae UTI/bacteremia.  The patient is responding well to the IV meropenem.  Fortunately the patient had been brought to the emergency room quickly upon the original onset of symptoms and he never developed leukocytosis.  CBC today WBC of 5400 hematocrit is unchanged at 28.7.  He does have a chronic thrombocytopenia platelet count 68,000 presently.  Electrolyte panel is notable for creatinine of 1.0 serum sodium 129.  Systolic blood pressures are in the range of 140-150 mmHg on amlodipine 5 mg daily.  The patient was tentatively previously scheduled for a laser-assisted TURP on 6/20 and hopefully this procedure can still be performed as planned.  The patient has had several episodes of urosepsis related to urinary retention originally and now from Tiwari catheter currently.     6/18: The patient is currently off the floor having a barium swallow performed apparently for some component of choking.  The patient was seen by speech therapy yesterday and their impression was normal oral phase but possible pharyngeal phase dysphagia based on clinical assessment and a modified barium swallow was recommended currently being performed.  Otherwise he is feeling improved.  He is being followed by infectious disease and was converted to oral Bactrim therapy for the Enterobacter cloacae I UTI/bacteremia.  No leukocytosis on CBC with a WBC of 5200.  Hemoglobin modestly decreased to 9.5 platelet count unchanged at 72,000.  His renal parameters do remain stable creatinine 1.1 and sodium 132.  Systolic blood pressure in acceptable range at 140 mmHg.  Will continue amlodipine unchanged.    6/19: Patient sitting at bedside chair awake alert conversant without any complaints.  He  evidently will be transferred to the [facility] facility tomorrow to undergo his previously scheduled laser-assisted TURP on 6/20/2024.  He will remain at the [facility] facility until discharge.   Patient will remain off of Lasix therapy which was stopped at time of admission.  Blood pressure acceptable and amlodipine 5 mg daily.  Patient is medically optimized for his procedure tomorrow.  Antibiotic therapy as per infectious disease and urology.  Will see the patient in several weeks for cardiology follow-up in the office.             Peripheral IV 06/14/24 20 G Distal;Right;Upper Arm (Active)   Site Assessment Clean;Dry;Intact 06/19/24 0900   Dressing Status Clean;Dry;Occlusive 06/19/24 0900   Number of days: 5       Urethral Catheter Straight-tip 18 Fr. (Active)   Site Assessment Clean;Skin intact 06/19/24 0632   Number of days: 31       Code Status:  DNR    I spent 20 minutes in the professional and overall care of this patient.        Luis Grant MD

## 2024-06-19 NOTE — CARE PLAN
Pt is active with [facility]- pt wishes to return home with [facility]; sent updated Urology notes to them  Per yesterdays notes by Dr. Stephens, if the pt is okay today, he may be transferred to [facility] for Green light laser surgery.    DISCHARGE PLAN: HOME WITH [facility]--DO NOT DISCHARGE PATIENT BEFORE SPEAKING WITH CARE COORDINATION; NEED TO SEND DISCHARGE INFO TO [facility] PRIOR TO DISCHARGE.

## 2024-06-19 NOTE — NURSING NOTE
2235  Report called to [place] JULIEN for transfer to [facility].  Belongings packed.  Daughter notified per pt

## 2024-06-19 NOTE — RESEARCH NOTES
Artificial Intelligence Monitoring in Nursing (AIMS Nursing) Study    Principle Investigator - Dr. Varghese Jimenez  Research Coordinator - Kaiden Watkins     Patient Name - Luis Greene  Date - 6/19/2024 12:43 PM  Location - [facility]    Luis Greene was not approached by Kaiden Watkins to talk about participating in the AIMS Nursing Study. The patient was not able to be approached, a research coordinator will come back at a later time.     Kaiden Watkins

## 2024-06-19 NOTE — PROGRESS NOTES
INFECTIOUS DISEASES PROGRESS NOTE    Consulted / following patient for:  Enterobacter septicemia due to catheter-associated urinary tract infection    Subjective   Interval History:   Tolerating Bactrim, looking forward to having his greenlight laser surgery during tomorrow at [place]   Objective   PHYSICAL EXAMINATION  Vital signs:  Visit Vitals  /62 (BP Location: Left arm, Patient Position: Sitting)   Pulse 93   Temp 36.7 °C (98.1 °F) (Oral)   Resp 17      General: No acute distress  Abdomen:  Soft, nontender. No palpable organs or masses  Back:  No spinal or CVA tenderness  Genitalia: Tiwari catheter has been changed to draining clear urine    Relevant Results  WBC: 5200  Platelets: 68,000 ---> 72,000  Hemoglobin: 9.5    Results from last 72 hours   Lab Units 06/19/24  0428   CREATININE mg/dL 1.20   ANION GAP mmol/L 10   EGFR mL/min/1.73m*2 60*           Microbiology:  Blood (6/14): Enterobacter X2, susceptible to TMP-SMX  Blood (6/15): Negative X2  Urine (6/14): High colony count Enterobacter    Imaging:  CT abdomen and pelvis: No hydronephrosis or hydroureter or abscess      ASSESSMENT:  Enterobacter septicemia, due to Enterobacter catheter-associated urinary tract infection  Patient is responding well to antimicrobial therapy and catheter change.  Tolerating Bactrim.  Anticipate greenlight laser surgery 6/20, and suggest continuing TMP-SMX for an additional 14 days after that procedure = 7/3      PLANS:  -   Continue TMP-SMX through 7/3  -   Anticipate surgery 6/20    No additional suggestions    WILL SIGN OFF.  PLEASE RE-CONSULT PRN.  THANK YOU.    Raul Edwards MD  [facility]  Office:  [phone]

## 2024-06-19 NOTE — PROGRESS NOTES
Physical Therapy    Physical Therapy Treatment    Patient Name: Luis Greene  MRN: [id number]  Today's Date: 6/19/2024  Time Calculation  Start Time: 0820  Stop Time: 0850  Time Calculation (min): 30 min    Assessment/Plan   PT Assessment  Rehab Prognosis: Good  Evaluation/Treatment Tolerance: Patient limited by fatigue  End of Session Communication: Bedside nurse  Assessment Comment: Progressing slowly with functional mobility;  tolerance to activity improving slowly;  fall risk.  End of Session Patient Position: Up in chair, Alarm off, not on at start of session  PT Plan  Inpatient/Swing Bed or Outpatient: Inpatient  PT Plan  Treatment/Interventions: Bed mobility, Transfer training, Gait training, Stair training, Balance training, Strengthening, Endurance training, Therapeutic exercise, Therapeutic activity  PT Plan: Ongoing PT  PT Frequency: 5 times per week  PT Discharge Recommendations: Low intensity level of continued care  PT Recommended Transfer Status: Assist x1  PT - OK to Discharge: Yes (with skilled physical therapy services at next level of care.)      General Visit Information:   PT  Visit  PT Received On: 06/19/24  General  Prior to Session Communication: Bedside nurse  Patient Position Received: Bed, 2 rail up, Alarm off, not on at start of session  General Comment: RN cleared patient for treatment.  Patient reports agreeable to treatment.    Subjective   Precautions:  Precautions  Medical Precautions: Fall precautions  Precautions Comment: O2 at 2 liters;  trinity LE AFO's      Objective   Pain:  Pain Assessment  Pain Assessment: 0-10  Pain Score: 0 - No pain  Cognition:  Cognition  Overall Cognitive Status: Within Functional Limits  Coordination:  Movements are Fluid and Coordinated: No  Lower Body Coordination: slower rate of movement trinity LE  Postural Control:  Static Sitting Balance  Static Sitting-Balance Support: No upper extremity supported  Static Sitting-Level of Assistance: Independent  Static  Standing Balance  Static Standing-Balance Support: Bilateral upper extremity supported  Static Standing-Level of Assistance: Close supervision  Static Standing-Comment/Number of Minutes: Supervision with balance during static standing with rolling walker.       Activity Tolerance:  Activity Tolerance  Endurance: Decreased tolerance for upright activites  Activity Tolerance Comments: Fatigue  Treatments:  Therapeutic Exercise  Therapeutic Exercise Performed: Yes  Therapeutic Exercise Activity 1: Assisted ankle dorsiflexion trinity LE 10 reps x 1 set;  resisted ankle plantarflexion 10 reps x 1 set;  gentle passive gastroc stretch trinity LE ankles 5 reps x 1 set              Bed Mobility  Bed Mobility: Yes  Bed Mobility 1  Bed Mobility 1: Supine to sitting  Level of Assistance 1: Close supervision  Bed Mobility Comments 1: Increased time and effort required to achieve supine to sit.    Ambulation/Gait Training  Ambulation/Gait Training Performed: Yes  Ambulation/Gait Training 1  Surface 1: Level tile  Device 1: Rolling walker  Assistance 1: Minimum assistance  Comments/Distance (ft) 1: 90 feet x 2 with rolling walker and assist with balance;  verbal cues for posture and walker distancing;  patient ambulates with slow jose david, reciprocating gait pattern, decreased step length trinity LE, narrow base of support, and forward flexed posture.  Transfers  Transfer: Yes  Transfer 1  Transfer From 1: Sit to  Transfer to 1: Stand  Technique 1: Sit to stand  Transfer Device 1: Walker  Transfer Level of Assistance 1: Close supervision  Trials/Comments 1: Supervision with balance.  Transfers 2  Transfer From 2: Stand to  Transfer to 2: Sit  Technique 2: Stand to sit  Transfer Device 2: Walker  Transfer Level of Assistance 2: Close supervision  Trials/Comments 2: Supervision with balance;  verbal cues for hand placement.    Stairs  Stairs: No         Outcome Measures:  [facility] Basic Mobility  Turning from your back to your side while in a flat  bed without using bedrails: A little  Moving from lying on your back to sitting on the side of a flat bed without using bedrails: A little  Moving to and from bed to chair (including a wheelchair): A little  Standing up from a chair using your arms (e.g. wheelchair or bedside chair): A little  To walk in hospital room: A little  Climbing 3-5 steps with railing: A lot  Basic Mobility - Total Score: 17    Education Documentation  No documentation found.  Education Comments  No comments found.        OP EDUCATION:       Encounter Problems       Encounter Problems (Active)       Mobility       Bed mobility including supine to sit and sit to supine independently. (Progressing)       Start:  06/16/24    Expected End:  06/30/24            Ambulate 200 feet with rolling walker and distant supervision. (Progressing)       Start:  06/16/24    Expected End:  06/30/24            Negotiate 4 steps with single handrail +/- cane and mod assist. (Not Progressing)       Start:  06/16/24    Expected End:  06/30/24               PT Transfers       Transfers sit to stand and stand to sit with distant supervision. (Progressing)       Start:  06/16/24    Expected End:  06/30/24               Pain - Adult

## 2024-06-19 NOTE — PREPROCEDURE INSTRUCTIONS
Pt called to give arrival time for his procedure tomorrow, pt states he is admitted at [facility] and is being transferred to [facility] today to have surgery here tomorrow. Pt states he is aware his procedure is going to be around 0900 tomorrow. Pre-op staff notified that he is going to be an inpatient.

## 2024-06-19 NOTE — CARE PLAN
Problem: Infection prevention/bleeding  Goal: Infection s/sx managed  Outcome: Progressing  Goal: No further progression of infection  Outcome: Progressing  Goal: No signs of bleeding  Outcome: Progressing  Goal: Normal coagulation studies  Outcome: Progressing     Problem: Perfusion/Cardiac  Goal: Adequate perfusion to organs/extremities  Outcome: Progressing  Goal: Hemodynamically stable  Outcome: Progressing  Goal: No cardiac arrhythmias  Outcome: Progressing     Problem: Respiratory/Oxygenation  Goal: No signs of respiratory compromise  Outcome: Progressing  Goal: Tolerates activity without increased O2 demands  Outcome: Progressing     Problem: Neuro/Coping  Goal: Minimal anxiety; utilize coping mechanisms  Outcome: Progressing  Goal: No signs of neurological compromise  Outcome: Progressing  Goal: Increase self care/family involvement  Outcome: Progressing     Problem: Fluid/Electrolyte/Nutrition  Goal: Fluid balance within 1 liter of normovolemia  Outcome: Progressing  Goal: No signs of renal failure  Outcome: Progressing  Goal: Normal electrolyte levels  Outcome: Progressing  Goal: Normal glucose levels  Outcome: Progressing  Goal: Tolerates nutritional intake  Outcome: Progressing     Problem: Infection related to problem list condition  Goal: Infection will resolve through treatment  Outcome: Progressing     Problem: Skin  Goal: Decreased wound size/increased tissue granulation at next dressing change  Outcome: Progressing  Flowsheets (Taken 6/19/2024 0038)  Decreased wound size/increased tissue granulation at next dressing change: Protective dressings over bony prominences  Goal: Participates in plan/prevention/treatment measures  Outcome: Progressing  Flowsheets (Taken 6/19/2024 0038)  Participates in plan/prevention/treatment measures: Elevate heels  Goal: Prevent/manage excess moisture  Outcome: Progressing  Flowsheets (Taken 6/19/2024 0038)  Prevent/manage excess moisture: Moisturize dry skin  Goal:  Prevent/minimize sheer/friction injuries  Outcome: Progressing  Flowsheets (Taken 6/19/2024 0038)  Prevent/minimize sheer/friction injuries:   Use pull sheet   HOB 30 degrees or less  Goal: Promote/optimize nutrition  Outcome: Progressing  Flowsheets (Taken 6/19/2024 0038)  Promote/optimize nutrition: Monitor/record intake including meals  Goal: Promote skin healing  Outcome: Progressing  Flowsheets (Taken 6/19/2024 0038)  Promote skin healing: Protective dressings over bony prominences

## 2024-06-19 NOTE — PROGRESS NOTES
"Luis Greene is a 84 y.o. male on day 4 of admission presenting with Cystitis.    Subjective   He feels more rundown and lethargic today.   Enterobacter septicemia on TMP/SMX  WBC 5.2    Objective     Physical Exam  Awake but very tired  Normal respiratory pattern  Abd: Soft  Urine blood tinged    Last Recorded Vitals  Blood pressure 153/55, pulse 72, temperature 36.6 °C (97.9 °F), temperature source Temporal, resp. rate 16, height 1.803 m (5' 11\"), weight 89 kg (196 lb 3.4 oz), SpO2 93%.  Intake/Output last 3 Shifts:  I/O last 3 completed shifts:  In: 800 (9 mL/kg) [P.O.:800]  Out: 1875 (21.1 mL/kg) [Urine:1875 (0.6 mL/kg/hr)]  Weight: 89 kg     Relevant Results              Results for orders placed or performed during the hospital encounter of 06/14/24 (from the past 24 hour(s))   CBC   Result Value Ref Range    WBC 5.2 4.4 - 11.3 x10*3/uL    nRBC 0.0 0.0 - 0.0 /100 WBCs    RBC 2.78 (L) 4.50 - 5.90 x10*6/uL    Hemoglobin 9.5 (L) 13.5 - 17.5 g/dL    Hematocrit 26.7 (L) 41.0 - 52.0 %    MCV 96 80 - 100 fL    MCH 34.2 (H) 26.0 - 34.0 pg    MCHC 35.6 32.0 - 36.0 g/dL    RDW 13.2 11.5 - 14.5 %    Platelets 72 (L) 150 - 450 x10*3/uL   Basic Metabolic Panel   Result Value Ref Range    Glucose 191 (H) 65 - 99 mg/dL    Sodium 132 (L) 133 - 145 mmol/L    Potassium 3.8 3.4 - 5.1 mmol/L    Chloride 100 97 - 107 mmol/L    Bicarbonate 21 (L) 24 - 31 mmol/L    Urea Nitrogen 25 8 - 25 mg/dL    Creatinine 1.10 0.40 - 1.60 mg/dL    eGFR 67 >60 mL/min/1.73m*2    Calcium 8.3 (L) 8.5 - 10.4 mg/dL    Anion Gap 11 <=19 mmol/L   POCT GLUCOSE   Result Value Ref Range    POCT Glucose 173 (H) 74 - 99 mg/dL   POCT GLUCOSE   Result Value Ref Range    POCT Glucose 299 (H) 74 - 99 mg/dL   POCT GLUCOSE   Result Value Ref Range    POCT Glucose 268 (H) 74 - 99 mg/dL   POCT GLUCOSE   Result Value Ref Range    POCT Glucose 207 (H) 74 - 99 mg/dL                   Assessment/Plan   Principal Problem:    Cystitis    Recurrent UTI now with septicemia " from Enterobacter cystitis.  On appropriate abx.  Today, however, he has more malaise.  No objective findings as WBC and renal function are good.    If he is better tomorrow, would still like to do his Green Light Laser surgery BUT HE WOULD NEED TO BE TRANSFERRED TO [facility] TOMORROW.  If he is stable medically and consultants feel he is OK for surgery, please initiate the transfer             Grant Stephens MD

## 2024-06-19 NOTE — NURSING NOTE
0950  pt taking to 4N via bed with Alysia JULIEN and 2 nursing students.   Pt states he informed his daughter of transfer

## 2024-06-19 NOTE — PROGRESS NOTES
Luis Greene is a 84 y.o. male on day 5 of admission presenting with Cystitis.      Subjective   Patient seen and examined. Daughter bedside. Sitting up in chair.  Denies pain or complaints.  No acute distress. Awaiting Transfer to [facility] for prostate laser surgery.       Objective     Last Recorded Vitals  /62 (BP Location: Left arm, Patient Position: Sitting)   Pulse 93   Temp 36.7 °C (98.1 °F) (Oral)   Resp 17   Wt 89 kg (196 lb 3.4 oz)   SpO2 97%   Intake/Output last 3 Shifts:    Intake/Output Summary (Last 24 hours) at 6/19/2024 1459  Last data filed at 6/19/2024 1417  Gross per 24 hour   Intake 550 ml   Output 2025 ml   Net -1475 ml       Admission Weight  Weight: 76.9 kg (169 lb 8 oz) (06/14/24 1222)    Daily Weight  06/18/24 : 89 kg (196 lb 3.4 oz)    Image Results  FL modified barium swallow study  Narrative: Interpreted By:  Low Camejo and Brodsky Sheryl   STUDY:  FL MODIFIED BARIUM SWALLOW STUDY;; 6/18/2024 11:53 am      INDICATION:  Signs/Symptoms:r/o aspiration.      COMPARISON:  None.      ACCESSION NUMBER(S):  [id number]      ORDERING CLINICIAN:  MENDOZA VANESSA      TECHNIQUE:  MBSS completed. Informed verbal consent obtained prior to completion  of exam. Patient tested with Varibar consistencies in order per MBS  protocol: 5mL thin liquids. 20mL thin liquid. 60mL consecutive  drinking. 5mL nectar thick liquids.  20mL of nectar. 60mL nectar  thick consecutive sips.  5mL pudding via tsp. 1/4 loorna doone cookie  with 3mL pudding. AP view: 5mL thin liquids. 20 mL thin liquid via  cup. Pudding via tsp. E-Z barium tablet. . Fluoroscopy time :  102  seconds. Total dose: Air kerma 26.27 mGy.      SLP: Ivone VAZ/SLP  Phone/Pager: [phone]      SPEECH FINDINGS:  Reason for referral:  Suspected pharyngeal stage dyspahgia, pt drank 3oz of thin liquid in  one attempt, without breaking, which resulted in immediate cough.          Patient hx:  Chief complaint: Pt  was admitted on 6/14/24 presenting with fever of  102 ï¿½F, accompanied by chills and rigor, patient reports onset of  symptoms 730 this morning, this was also accompanied by confusion.  PMHx relevant to rehab: GERD (gastroesophageal reflux disease)  TIA (transient ischemic attack)  Seizure disorder (Multi)  Respiratory status: Nasal cannula  Previous diet: Regular and thin liquids      FINAL SPEECH RECOMMENDATIONS      Diet recommendations/feeding strategies:  Regular and thin liquids, single sips          Follow-up speech therapy recommended:  SLP Plan: No treatment needs identified at this time, no skilled  speech tx indicated      Short term goals: n/a          Education provided:  Pt. Educated on results of MBS study, recommended diet and  recommended safe swallow strategies. Verbal understanding given          Treatment Provided today: no      Additional consult suggested: no      Repeat study/ dc plan: D/c without skilled speech tx recoimmended      Mechanics of the swallow summary:  Oral phase: WFL          Pharyngeal phase:  1.  narrow column of contrast within laryngeal vestibule ,ejected6.  7.  Trace residues in pharynx. Cleared with spon reswallow      Esophageal phase:  1. Complete clearance during esophageal screening/scan with thin  barium, pudding barium and barium tablet      SLP impressions with severity rating:  Pt. Presenting with transient laryngeal penetration with thin and  nectar thick liquids with consecutive sips only. No aspiration  occurred, trace pharyngeal  residues pt spontaneously cleared.      Thin Liquids (MBSS)  Rosenbek's Penetration Aspiration Scale, Thin Liquids (MBSS):  2. PENETRATION that CLEARS - contrast enter airway, above vocal  cords, no residue Response to Pharyngeal Residue, Thin Liquid (MBSS):  spontaneous clearing          Nectar Thick Liquids (MBSS)  Rosenbek's Penetration Aspiration Scale, Nectar thick liquids (MBSS):  2. PENETRATION that CLEARS - contrast enter  airway, above vocal  cords, no residue Response to Pharyngeal Residue, Nectar thick  liquids (MBSS): spontaneous clearing      Purees (MBSS)  Rosenbek's Penetration Aspiration Scale, Purees (MBSS):  1. NO ASPIRATION & NO PENETRATION - no aspiration, contrast does  not enter airway      Response to Pharyngeal Residue, Purees (MBSS):  spontaneous clearing          Solids (MBSS)  Rosenbek's Penetration Aspiration Scale, Solids (MBSS):  1. NO ASPIRATION & NO PENETRATION - no aspiration, contrast does  not enter airway Response to Pharyngeal Residue, Solids (MBSS):  spontaneous clearing          Speech Therapy section of this report signed by  on 6/18/2024 at 1:09  pm.      RADIOLOGY FINDINGS:          Radiology section of this report signed by Dr. Camejo.      Impression: Penetration with nectar and thin barium. No aspiration. See above for  details.      MACRO:  None      Signed by: Low Camejo 6/18/2024 1:29 PM  Dictation workstation:   [id number]      Physical Exam  Vitals and nursing note reviewed.   Constitutional:       Appearance: Normal appearance.   HENT:      Head: Normocephalic and atraumatic.      Mouth/Throat:      Mouth: Mucous membranes are moist.   Eyes:      Extraocular Movements: Extraocular movements intact.      Pupils: Pupils are equal, round, and reactive to light.   Cardiovascular:      Rate and Rhythm: Normal rate.      Pulses: Normal pulses.      Heart sounds: Normal heart sounds.   Pulmonary:      Effort: Pulmonary effort is normal.      Breath sounds: Normal breath sounds.   Abdominal:      General: Bowel sounds are normal.      Palpations: Abdomen is soft.   Genitourinary:     Comments: Indwelling Tiwari catheter draining clear yellow urine  Musculoskeletal:         General: Normal range of motion.      Cervical back: Normal range of motion and neck supple.   Skin:     General: Skin is warm and dry.      Capillary Refill: Capillary refill takes less than 2 seconds.   Neurological:       General: No focal deficit present.      Mental Status: He is alert and oriented to person, place, and time.   Psychiatric:         Mood and Affect: Mood normal.         Relevant Results               Assessment/Plan        Principal Problem:    Cystitis    Acute cystitis, catheter related  Prior urinary culture with ESBL E. Coli, urine culture 6/14 with enterobacter  Meropenem switched to p.o. Bactrim per ID  New urine culture from 6/15/2024 negative for growth  Continue recommendations from ID     Bacteremia  Blood cultures from 6/14/24 positive for enterobacter  Repeat blood cultures 6/15/2024 negative to date  ID following     BPH  Chronic urinary retention  Greenlight therapy with Dr. Stephens on 6/20/24  Urology planning to continue with procedure as scheduled, inpatient, while patient is still on antibiotics.  Continue home Flomax, Proscar     Elevated troponins  Suspect related to acute infection, cardiology evaluated. No ACS.      Heart failure  Continue home medications  Cardiology following recommendations appreciated    Hyponatremia-improving  Improving  Monitor BMP     Atrial fibrillation status post watchman placement  Monitor on telemetry     Diabetes, insulin-dependent,  Last A1c 9.6  POC blood sugar checks ACHS  Basal Lantus, sliding scale, p.o. metformin  Hypoglycemia protocol     History of seizures  Continue home Keppra  Seizure precautions, fall precautions    Hypertension  Continue home amlodipine  Monitor vital signs    Hyperlipidemia  Continue home statin     DVT/GI prophylaxis   Lovenox subcu daily, PPI     Wound to right foot  X-ray right foot negative for evidence of osteomyelitis    6/19/24: Patient currently denies complaints.  Transfer process has been initiated for [facility] for patient's laser surgery with urology tomorrow.              Marilynn Suarez, APRN-CNP

## 2024-06-20 ENCOUNTER — APPOINTMENT (OUTPATIENT)
Dept: CARDIOLOGY | Facility: HOSPITAL | Age: 84
End: 2024-06-20
Payer: MEDICARE

## 2024-06-20 ENCOUNTER — ANESTHESIA EVENT (OUTPATIENT)
Dept: OPERATING ROOM | Facility: HOSPITAL | Age: 84
End: 2024-06-20
Payer: MEDICARE

## 2024-06-20 ENCOUNTER — ANESTHESIA (OUTPATIENT)
Dept: OPERATING ROOM | Facility: HOSPITAL | Age: 84
End: 2024-06-20
Payer: MEDICARE

## 2024-06-20 LAB
ANION GAP SERPL CALC-SCNC: 12 MMOL/L (ref 10–20)
BUN SERPL-MCNC: 17 MG/DL (ref 6–23)
CALCIUM SERPL-MCNC: 8.7 MG/DL (ref 8.6–10.3)
CHLORIDE SERPL-SCNC: 102 MMOL/L (ref 98–107)
CO2 SERPL-SCNC: 23 MMOL/L (ref 21–32)
CREAT SERPL-MCNC: 1.19 MG/DL (ref 0.5–1.3)
EGFRCR SERPLBLD CKD-EPI 2021: 61 ML/MIN/1.73M*2
ERYTHROCYTE [DISTWIDTH] IN BLOOD BY AUTOMATED COUNT: 13.9 % (ref 11.5–14.5)
GLUCOSE BLD MANUAL STRIP-MCNC: 115 MG/DL (ref 74–99)
GLUCOSE BLD MANUAL STRIP-MCNC: 166 MG/DL (ref 74–99)
GLUCOSE BLD MANUAL STRIP-MCNC: 252 MG/DL (ref 74–99)
GLUCOSE BLD MANUAL STRIP-MCNC: 294 MG/DL (ref 74–99)
GLUCOSE SERPL-MCNC: 112 MG/DL (ref 74–99)
HCT VFR BLD AUTO: 28.7 % (ref 41–52)
HGB BLD-MCNC: 9.7 G/DL (ref 13.5–17.5)
MCH RBC QN AUTO: 34.4 PG (ref 26–34)
MCHC RBC AUTO-ENTMCNC: 33.8 G/DL (ref 32–36)
MCV RBC AUTO: 102 FL (ref 80–100)
NRBC BLD-RTO: ABNORMAL /100{WBCS}
PLATELET # BLD AUTO: 100 X10*3/UL (ref 150–450)
POTASSIUM SERPL-SCNC: 4.4 MMOL/L (ref 3.5–5.3)
RBC # BLD AUTO: 2.82 X10*6/UL (ref 4.5–5.9)
SODIUM SERPL-SCNC: 133 MMOL/L (ref 136–145)
WBC # BLD AUTO: 5 X10*3/UL (ref 4.4–11.3)

## 2024-06-20 PROCEDURE — 2500000002 HC RX 250 W HCPCS SELF ADMINISTERED DRUGS (ALT 637 FOR MEDICARE OP, ALT 636 FOR OP/ED): Performed by: UROLOGY

## 2024-06-20 PROCEDURE — 3600000010 HC OR TIME - EACH INCREMENTAL 1 MINUTE - PROCEDURE LEVEL FIVE: Performed by: UROLOGY

## 2024-06-20 PROCEDURE — 93005 ELECTROCARDIOGRAM TRACING: CPT

## 2024-06-20 PROCEDURE — C1889 IMPLANT/INSERT DEVICE, NOC: HCPCS | Performed by: UROLOGY

## 2024-06-20 PROCEDURE — 36415 COLL VENOUS BLD VENIPUNCTURE: CPT | Performed by: STUDENT IN AN ORGANIZED HEALTH CARE EDUCATION/TRAINING PROGRAM

## 2024-06-20 PROCEDURE — 3700000002 HC GENERAL ANESTHESIA TIME - EACH INCREMENTAL 1 MINUTE: Performed by: UROLOGY

## 2024-06-20 PROCEDURE — 85027 COMPLETE CBC AUTOMATED: CPT | Performed by: STUDENT IN AN ORGANIZED HEALTH CARE EDUCATION/TRAINING PROGRAM

## 2024-06-20 PROCEDURE — 97116 GAIT TRAINING THERAPY: CPT | Mod: GP

## 2024-06-20 PROCEDURE — 1100000001 HC PRIVATE ROOM DAILY

## 2024-06-20 PROCEDURE — 2500000004 HC RX 250 GENERAL PHARMACY W/ HCPCS (ALT 636 FOR OP/ED): Performed by: STUDENT IN AN ORGANIZED HEALTH CARE EDUCATION/TRAINING PROGRAM

## 2024-06-20 PROCEDURE — 2500000001 HC RX 250 WO HCPCS SELF ADMINISTERED DRUGS (ALT 637 FOR MEDICARE OP): Performed by: UROLOGY

## 2024-06-20 PROCEDURE — 80048 BASIC METABOLIC PNL TOTAL CA: CPT | Performed by: STUDENT IN AN ORGANIZED HEALTH CARE EDUCATION/TRAINING PROGRAM

## 2024-06-20 PROCEDURE — 82947 ASSAY GLUCOSE BLOOD QUANT: CPT

## 2024-06-20 PROCEDURE — 97161 PT EVAL LOW COMPLEX 20 MIN: CPT | Mod: GP

## 2024-06-20 PROCEDURE — 7100000002 HC RECOVERY ROOM TIME - EACH INCREMENTAL 1 MINUTE: Performed by: UROLOGY

## 2024-06-20 PROCEDURE — 3700000001 HC GENERAL ANESTHESIA TIME - INITIAL BASE CHARGE: Performed by: UROLOGY

## 2024-06-20 PROCEDURE — 3600000005 HC OR TIME - INITIAL BASE CHARGE - PROCEDURE LEVEL FIVE: Performed by: UROLOGY

## 2024-06-20 PROCEDURE — 2720000007 HC OR 272 NO HCPCS: Performed by: UROLOGY

## 2024-06-20 PROCEDURE — 0V507ZZ DESTRUCTION OF PROSTATE, VIA NATURAL OR ARTIFICIAL OPENING: ICD-10-PCS | Performed by: UROLOGY

## 2024-06-20 PROCEDURE — 7100000001 HC RECOVERY ROOM TIME - INITIAL BASE CHARGE: Performed by: UROLOGY

## 2024-06-20 PROCEDURE — 2500000004 HC RX 250 GENERAL PHARMACY W/ HCPCS (ALT 636 FOR OP/ED): Performed by: ANESTHESIOLOGY

## 2024-06-20 PROCEDURE — 2500000005 HC RX 250 GENERAL PHARMACY W/O HCPCS: Performed by: ANESTHESIOLOGY

## 2024-06-20 PROCEDURE — 2500000001 HC RX 250 WO HCPCS SELF ADMINISTERED DRUGS (ALT 637 FOR MEDICARE OP): Performed by: ANESTHESIOLOGY

## 2024-06-20 PROCEDURE — 94762 N-INVAS EAR/PLS OXIMTRY CONT: CPT

## 2024-06-20 PROCEDURE — 94760 N-INVAS EAR/PLS OXIMETRY 1: CPT

## 2024-06-20 PROCEDURE — 82947 ASSAY GLUCOSE BLOOD QUANT: CPT | Mod: 91

## 2024-06-20 PROCEDURE — 97164 PT RE-EVAL EST PLAN CARE: CPT | Mod: GP

## 2024-06-20 PROCEDURE — 2500000005 HC RX 250 GENERAL PHARMACY W/O HCPCS: Performed by: STUDENT IN AN ORGANIZED HEALTH CARE EDUCATION/TRAINING PROGRAM

## 2024-06-20 RX ORDER — MEPERIDINE HYDROCHLORIDE 25 MG/ML
12.5 INJECTION INTRAMUSCULAR; INTRAVENOUS; SUBCUTANEOUS EVERY 10 MIN PRN
Status: DISCONTINUED | OUTPATIENT
Start: 2024-06-20 | End: 2024-06-20 | Stop reason: HOSPADM

## 2024-06-20 RX ORDER — METOCLOPRAMIDE 10 MG/1
10 TABLET ORAL ONCE
Status: COMPLETED | OUTPATIENT
Start: 2024-06-20 | End: 2024-06-20

## 2024-06-20 RX ORDER — ALBUTEROL SULFATE 0.83 MG/ML
2.5 SOLUTION RESPIRATORY (INHALATION) ONCE
Status: DISCONTINUED | OUTPATIENT
Start: 2024-06-20 | End: 2024-06-20 | Stop reason: HOSPADM

## 2024-06-20 RX ORDER — SIMETHICONE 80 MG
80 TABLET,CHEWABLE ORAL 4 TIMES DAILY PRN
Status: DISCONTINUED | OUTPATIENT
Start: 2024-06-20 | End: 2024-06-21 | Stop reason: HOSPADM

## 2024-06-20 RX ORDER — CIPROFLOXACIN 2 MG/ML
400 INJECTION, SOLUTION INTRAVENOUS ONCE
Status: DISCONTINUED | OUTPATIENT
Start: 2024-06-20 | End: 2024-06-20 | Stop reason: HOSPADM

## 2024-06-20 RX ORDER — SODIUM CHLORIDE, SODIUM LACTATE, POTASSIUM CHLORIDE, CALCIUM CHLORIDE 600; 310; 30; 20 MG/100ML; MG/100ML; MG/100ML; MG/100ML
INJECTION, SOLUTION INTRAVENOUS CONTINUOUS PRN
Status: DISCONTINUED | OUTPATIENT
Start: 2024-06-20 | End: 2024-06-20

## 2024-06-20 RX ORDER — ENOXAPARIN SODIUM 100 MG/ML
40 INJECTION SUBCUTANEOUS DAILY
Status: DISCONTINUED | OUTPATIENT
Start: 2024-06-21 | End: 2024-06-21 | Stop reason: HOSPADM

## 2024-06-20 RX ORDER — ONDANSETRON HYDROCHLORIDE 2 MG/ML
INJECTION, SOLUTION INTRAVENOUS AS NEEDED
Status: DISCONTINUED | OUTPATIENT
Start: 2024-06-20 | End: 2024-06-20

## 2024-06-20 RX ORDER — KETOROLAC TROMETHAMINE 15 MG/ML
15 INJECTION, SOLUTION INTRAMUSCULAR; INTRAVENOUS ONCE AS NEEDED
Status: DISCONTINUED | OUTPATIENT
Start: 2024-06-20 | End: 2024-06-20 | Stop reason: HOSPADM

## 2024-06-20 RX ORDER — HYDROMORPHONE HYDROCHLORIDE 0.2 MG/ML
0.2 INJECTION INTRAMUSCULAR; INTRAVENOUS; SUBCUTANEOUS EVERY 5 MIN PRN
Status: DISCONTINUED | OUTPATIENT
Start: 2024-06-20 | End: 2024-06-20 | Stop reason: HOSPADM

## 2024-06-20 RX ORDER — ALBUTEROL SULFATE 0.83 MG/ML
2.5 SOLUTION RESPIRATORY (INHALATION) ONCE AS NEEDED
Status: DISCONTINUED | OUTPATIENT
Start: 2024-06-20 | End: 2024-06-20 | Stop reason: HOSPADM

## 2024-06-20 RX ORDER — ONDANSETRON HYDROCHLORIDE 2 MG/ML
4 INJECTION, SOLUTION INTRAVENOUS ONCE AS NEEDED
Status: DISCONTINUED | OUTPATIENT
Start: 2024-06-20 | End: 2024-06-20 | Stop reason: HOSPADM

## 2024-06-20 RX ORDER — NORETHINDRONE AND ETHINYL ESTRADIOL 0.5-0.035
50 KIT ORAL ONCE
Status: DISCONTINUED | OUTPATIENT
Start: 2024-06-20 | End: 2024-06-20 | Stop reason: HOSPADM

## 2024-06-20 RX ORDER — DIPHENHYDRAMINE HYDROCHLORIDE 50 MG/ML
12.5 INJECTION INTRAMUSCULAR; INTRAVENOUS ONCE AS NEEDED
Status: DISCONTINUED | OUTPATIENT
Start: 2024-06-20 | End: 2024-06-20 | Stop reason: HOSPADM

## 2024-06-20 RX ORDER — PROPOFOL 10 MG/ML
INJECTION, EMULSION INTRAVENOUS AS NEEDED
Status: DISCONTINUED | OUTPATIENT
Start: 2024-06-20 | End: 2024-06-20

## 2024-06-20 RX ORDER — FAMOTIDINE 20 MG/1
20 TABLET, FILM COATED ORAL ONCE
Status: COMPLETED | OUTPATIENT
Start: 2024-06-20 | End: 2024-06-20

## 2024-06-20 RX ORDER — FENTANYL CITRATE 50 UG/ML
INJECTION, SOLUTION INTRAMUSCULAR; INTRAVENOUS AS NEEDED
Status: DISCONTINUED | OUTPATIENT
Start: 2024-06-20 | End: 2024-06-20

## 2024-06-20 RX ORDER — HYDRALAZINE HYDROCHLORIDE 20 MG/ML
10 INJECTION INTRAMUSCULAR; INTRAVENOUS EVERY 30 MIN PRN
Status: DISCONTINUED | OUTPATIENT
Start: 2024-06-20 | End: 2024-06-20 | Stop reason: HOSPADM

## 2024-06-20 RX ORDER — LIDOCAINE HYDROCHLORIDE 10 MG/ML
INJECTION, SOLUTION EPIDURAL; INFILTRATION; INTRACAUDAL; PERINEURAL AS NEEDED
Status: DISCONTINUED | OUTPATIENT
Start: 2024-06-20 | End: 2024-06-20

## 2024-06-20 RX ORDER — LABETALOL HYDROCHLORIDE 5 MG/ML
10 INJECTION, SOLUTION INTRAVENOUS ONCE AS NEEDED
Status: DISCONTINUED | OUTPATIENT
Start: 2024-06-20 | End: 2024-06-20 | Stop reason: HOSPADM

## 2024-06-20 RX ORDER — ONDANSETRON HYDROCHLORIDE 2 MG/ML
4 INJECTION, SOLUTION INTRAVENOUS EVERY 4 HOURS PRN
Status: DISCONTINUED | OUTPATIENT
Start: 2024-06-20 | End: 2024-06-21 | Stop reason: HOSPADM

## 2024-06-20 SDOH — HEALTH STABILITY: MENTAL HEALTH: CURRENT SMOKER: 0

## 2024-06-20 ASSESSMENT — PAIN SCALES - GENERAL
PAINLEVEL_OUTOF10: 0 - NO PAIN
PAIN_LEVEL: 0
PAINLEVEL_OUTOF10: 0 - NO PAIN
PAINLEVEL_OUTOF10: 0 - NO PAIN

## 2024-06-20 ASSESSMENT — COGNITIVE AND FUNCTIONAL STATUS - GENERAL
MOBILITY SCORE: 19
STANDING UP FROM CHAIR USING ARMS: A LOT
WALKING IN HOSPITAL ROOM: A LITTLE
CLIMB 3 TO 5 STEPS WITH RAILING: A LITTLE
WALKING IN HOSPITAL ROOM: A LITTLE
DRESSING REGULAR LOWER BODY CLOTHING: A LITTLE
MOBILITY SCORE: 19
DAILY ACTIVITIY SCORE: 23
MOVING TO AND FROM BED TO CHAIR: A LITTLE
CLIMB 3 TO 5 STEPS WITH RAILING: A LITTLE
STANDING UP FROM CHAIR USING ARMS: A LITTLE
WALKING IN HOSPITAL ROOM: A LITTLE
DRESSING REGULAR LOWER BODY CLOTHING: A LITTLE
STANDING UP FROM CHAIR USING ARMS: A LOT
DAILY ACTIVITIY SCORE: 23
CLIMB 3 TO 5 STEPS WITH RAILING: A LITTLE
MOVING TO AND FROM BED TO CHAIR: A LITTLE
MOBILITY SCORE: 21

## 2024-06-20 ASSESSMENT — PAIN - FUNCTIONAL ASSESSMENT
PAIN_FUNCTIONAL_ASSESSMENT: 0-10

## 2024-06-20 ASSESSMENT — ACTIVITIES OF DAILY LIVING (ADL): ADL_ASSISTANCE: NEEDS ASSISTANCE

## 2024-06-20 NOTE — NURSING NOTE
Patient will be NPO at midnight for procedure in the am.  Hospitalist ordered to hold all HS insulin including his lantus.

## 2024-06-20 NOTE — PROGRESS NOTES
Respiratory Therapy Note  RT obtained Pulse Ox reading of 88% on 2L NC, RT increased NC to 4L to get an SpO2 reading of 92%. Pt remains on 4L NC at this time.

## 2024-06-20 NOTE — CARE PLAN
PT CALLED RECALL ON LOSARTAN DOES SHE NEED TO STOP TAKING PLEASE GIVE HER A CALL   The patient's goals for the shift include pain control/ comfort     The clinical goals for the shift include comfort and pain control

## 2024-06-20 NOTE — NURSING NOTE
Pt returned to unit from PACU. Pt resting in bed with call light in reach. NC-2L in place, lung sounds clear bilaterally. Tiwari catheter draining without kinks. Output clear pink. Pt states pain is 0/10 and no nausea. No needs at this time.

## 2024-06-20 NOTE — PERIOPERATIVE NURSING NOTE
Pt received from OR via bed, monitors on, report received. Pt awake, talking, reoriented to time and place.  Assessment completed as documented, 22G IV infusing in pt left F.A.

## 2024-06-20 NOTE — NURSING NOTE
Patient is to go to Preop bay 5 at 0730.  Patient has been NPO all night.  NS @ 75ml/hr.  CHG bath given.

## 2024-06-20 NOTE — CARE PLAN
The patient's goals for the shift include gom home    The clinical goals for the shift include comfort and pain control

## 2024-06-20 NOTE — NURSING NOTE
Assumed care of patient.  Awaiting medication orders to be entered.  Patient was transferred over from [facility].  Patient is scheduled to have a greenlight laser procedure tomorrow with Dr. Stephens.  Patient is alert and oriented x 4.  Daughter is at bedside.  No c/o of pain voiced.  Tiwari cath draining clear yellow urine. Patient is diabetic and has a history of seizures, per patient he was thought to have had TIA's in the past put was informed that they were seizures, he is taking keppra.  Has not had any abnormal activity since starting the medication. Bed rails will be padded.

## 2024-06-20 NOTE — PROGRESS NOTES
"  Progress Note:    Luis Greene is a 84 y.o. male on day 1 of admission presenting with BPH with obstruction/lower urinary tract symptoms.    Subjective   Pt is back from PVP prostate with Dr Stephens. He has guidry draining yellow urine. Surprisingly there is no obvious bleeding in the guidry.      Physical Exam  General: No acute distress, alert & oriented    Cardiac: RRR, NL S1 and S2, 3/6 murmur    Pulmonary: Lungs clear to auscultation bilaterally, no wheezes, rhales or rhonchi    Abdomen: Soft, non-tender, non-distended    : yellow urine draining in guidry bag    Extremities: No clubbing , cyanosis or edema.     Last Recorded Vitals  Blood pressure 135/59, pulse 59, temperature 36 °C (96.8 °F), temperature source Temporal, resp. rate 18, height 1.803 m (5' 10.98\"), weight 89 kg (196 lb 3.4 oz), SpO2 92%.    Scheduled medications   Medication Dose Route Frequency    acetaminophen  650 mg oral 4x daily    amLODIPine  5 mg oral Daily    atorvastatin  40 mg oral Nightly    [START ON 6/21/2024] enoxaparin  40 mg subcutaneous Daily    ferrous sulfate (325 mg ferrous sulfate)  65 mg of iron oral Daily with breakfast    gabapentin  100 mg oral BID    insulin glargine  10 Units subcutaneous Nightly    insulin lispro  0-5 Units subcutaneous Before meals & nightly    levETIRAcetam  250 mg oral BID    magnesium oxide  400 mg oral Daily    metFORMIN  1,000 mg oral BID    pantoprazole  40 mg oral Daily    sennosides-docusate sodium  2 tablet oral Nightly    sulfamethoxazole-trimethoprim  1 tablet oral BID     Relevant Results  Results from last 7 days   Lab Units 06/20/24  0537 06/19/24 0428 06/18/24 0414   WBC AUTO x10*3/uL 5.0 4.6 5.2   HEMOGLOBIN g/dL 9.7* 9.5* 9.5*   HEMATOCRIT % 28.7* 28.5* 26.7*   PLATELETS AUTO x10*3/uL 100* 84* 72*      Results from last 7 days   Lab Units 06/20/24  0537 06/19/24 0428 06/18/24 0414   SODIUM mmol/L 133* 133 132*   POTASSIUM mmol/L 4.4 4.2 3.8   CHLORIDE mmol/L 102 101 100   CO2 " mmol/L 23 22* 21*   BUN mg/dL 17 21 25   CREATININE mg/dL 1.19 1.20 1.10   GLUCOSE mg/dL 112* 158* 191*   CALCIUM mg/dL 8.7 8.5 8.3*         Estimated Creatinine Clearance: 50.1 mL/min (by C-G formula based on SCr of 1.19 mg/dL).    Assessment/Plan   Enterobacter bacteremia  Patient to remain on Bactrim through 7/3/2024 per ID recs  Cultures from 6/15/2024 no growth (final)  Complicated acute cystitis  Bactrim, as above  Tiwari catheter replaced by urology  BPH with LUTS  S/p PVP prostate.   Continue Flomax and finasteride   Restart 40 mg lovenox subcutaneous tomorrow for DVT ppx  HFpEF  Compensated, monitor fluid status  Atrial fibrillation status post watchman procedure  Stable  Insulin-dependent diabetes mellitus  Continue Lantus 10 units nightly, sliding scale insulin, metformin  ACHS Accu-Cheks  Seizure disorder vs TIAs  Continue Keppra  Hypertension  Continue amlodipine  Hyperlipidemia  Continue statin       I spent 45 minutes in the professional and overall care of this patient.      Rossi Gold PA-C

## 2024-06-20 NOTE — H&P
History Of Present Illness  Luis Greene is a 84 y.o. male presenting for greenlight laser prostatectomy.  He was admitted to [facility] 5 days ago due to acute cystitis, with fever and confusion.  Was actually found to have Enterobacter UTI/bacteremia.  Infectious disease was consulted, and patient was empirically on meropenem until switched to Bactrim, which he is to continue through 7/3/2024.  Repeat cultures have been no growth to date.  Urology was consulted and exchanged patient's chronic indwelling Tiwari catheter.  He has been transferred to [facility] for his planned greenlight laser prostatectomy on 6/20/2024.  Patient offers no acute complaints.  Vital signs are stable at the time of arrival.     Past Medical History  Past Medical History:   Diagnosis Date    A-fib (Multi)     Anemia     BPH (benign prostatic hyperplasia)     CHF (congestive heart failure) (Multi)     Cognitive communication deficit     Coronary artery disease     Diabetes (Multi)     GERD (gastroesophageal reflux disease)     H/O sepsis     H/O: upper GI bleed     HL (hearing loss)     Hyperlipidemia     Iron deficiency anemia     Muscle weakness (generalized)     Osteoarthritis     Personal history of other diseases of the circulatory system     History of cardiac disorder    Personal history of other endocrine, nutritional and metabolic disease     History of hypercholesterolemia    Recurrent UTI     Seizure disorder (Multi)     Thrombocytopenia (CMS-HCC)     TIA (transient ischemic attack)     Type 2 diabetes mellitus (Multi)     Unspecified convulsions (Multi)     Urinary retention     Urinary tract infection        Surgical History  Past Surgical History:   Procedure Laterality Date    CARDIAC CATHETERIZATION N/A 11/10/2023    Procedure: LAAO (Left Atrial Appendage Occlusion);  Surgeon: Louie Avitia MD;  Location: [place] [address] [facility];  Service: Cardiovascular;  Laterality: N/A;  Last Eliquis  11/06/SD /[employer]    CARDIAC CATHETERIZATION  2012    COLONOSCOPY      CORONARY ARTERY BYPASS GRAFT  2011    CABG x2 vessels bypassed    LAMINECTOMY  2022    MR HEAD ANGIO WO IV CONTRAST  02/17/2019    MR HEAD ANGIO WO IV CONTRAST [facility]    MR HEAD ANGIO WO IV CONTRAST  10/28/2022    MR HEAD ANGIO WO IV CONTRAST [facility]    MR HEAD ANGIO WO IV CONTRAST  08/15/2023    MR HEAD ANGIO WO IV CONTRAST [facility]    OTHER SURGICAL HISTORY  03/11/2019    Heart surgery    OTHER SURGICAL HISTORY  03/11/2019    Knee surgery    OTHER SURGICAL HISTORY  03/11/2019    Tonsillectomy    OTHER SURGICAL HISTORY      urolift    OTHER SURGICAL HISTORY      Loop recorder placement    ROTATOR CUFF REPAIR      UPPER GASTROINTESTINAL ENDOSCOPY          Social History  He reports that he has never smoked. He has never used smokeless tobacco. He reports that he does not drink alcohol and does not use drugs.    Family History  Family History   Problem Relation Name Age of Onset    Other (cardiac disorder) Father      Diabetes Father      Hyperlipidemia Father      Hypertension Father      Other (liver carcinoma) Father      Other (hypercholesterolemia) Father      Hypertension Daughter          Allergies  Isosorbide, Famotidine, Prednisone, Amoxicillin, Donepezil, and Ibuprofen    Review of Systems   All other systems reviewed and are negative.       Physical Exam  Constitutional:       General: He is not in acute distress.     Appearance: Normal appearance.   HENT:      Head: Normocephalic and atraumatic.      Nose: Nose normal.      Mouth/Throat:      Mouth: Mucous membranes are moist.      Pharynx: Oropharynx is clear.   Cardiovascular:      Rate and Rhythm: Normal rate and regular rhythm.   Pulmonary:      Effort: Pulmonary effort is normal. No respiratory distress.      Breath sounds: Normal breath sounds.   Abdominal:      General: Bowel sounds are normal.      Palpations: Abdomen is  "soft.      Tenderness: There is no abdominal tenderness. There is no rebound.   Genitourinary:     Comments: Chronic indwelling guidry draining clear, yellow urine  Musculoskeletal:         General: No swelling, deformity or signs of injury.      Cervical back: Normal range of motion and neck supple.   Skin:     General: Skin is warm and dry.   Neurological:      General: No focal deficit present.      Mental Status: He is alert and oriented to person, place, and time. Mental status is at baseline.   Psychiatric:         Attention and Perception: Attention and perception normal.         Mood and Affect: Mood normal.         Behavior: Behavior normal.         Judgment: Judgment normal.          Last Recorded Vitals  Blood pressure (!) 150/47, pulse 53, temperature 36.4 °C (97.5 °F), resp. rate 16, height 1.803 m (5' 10.98\"), weight 89 kg (196 lb 3.4 oz), SpO2 94%.    Relevant Results        Results for orders placed or performed during the hospital encounter of 06/14/24 (from the past 24 hour(s))   CBC   Result Value Ref Range    WBC 4.6 4.4 - 11.3 x10*3/uL    nRBC 0.0 0.0 - 0.0 /100 WBCs    RBC 2.78 (L) 4.50 - 5.90 x10*6/uL    Hemoglobin 9.5 (L) 13.5 - 17.5 g/dL    Hematocrit 28.5 (L) 41.0 - 52.0 %     (H) 80 - 100 fL    MCH 34.2 (H) 26.0 - 34.0 pg    MCHC 33.3 32.0 - 36.0 g/dL    RDW 13.8 11.5 - 14.5 %    Platelets 84 (L) 150 - 450 x10*3/uL   Basic Metabolic Panel   Result Value Ref Range    Glucose 158 (H) 65 - 99 mg/dL    Sodium 133 133 - 145 mmol/L    Potassium 4.2 3.4 - 5.1 mmol/L    Chloride 101 97 - 107 mmol/L    Bicarbonate 22 (L) 24 - 31 mmol/L    Urea Nitrogen 21 8 - 25 mg/dL    Creatinine 1.20 0.40 - 1.60 mg/dL    eGFR 60 (L) >60 mL/min/1.73m*2    Calcium 8.5 8.5 - 10.4 mg/dL    Anion Gap 10 <=19 mmol/L   POCT GLUCOSE   Result Value Ref Range    POCT Glucose 163 (H) 74 - 99 mg/dL   POCT GLUCOSE   Result Value Ref Range    POCT Glucose 222 (H) 74 - 99 mg/dL   POCT GLUCOSE   Result Value Ref Range    " POCT Glucose 157 (H) 74 - 99 mg/dL          Assessment/Plan   Principal Problem:    BPH with obstruction/lower urinary tract symptoms    Enterobacter bacteremia  Patient to remain on Bactrim through 7/3/2024 per ID recs  Cultures from 6/15/2024 no growth to date  Complicated acute cystitis  Bactrim, as above  Tiwari catheter replaced by urology  BPH with LUTS  Urology consult for GLL prostatectomy  N.p.o. after midnight  Hold Lovenox DVT prophylaxis until cleared by urology  Continue Flomax and finasteride  HFpEF  Compensated, monitor fluid status  Atrial fibrillation status post watchman procedure  Stable  Insulin-dependent diabetes mellitus  Continue Lantus 10 units nightly, sliding scale insulin, metformin  ACHS Accu-Cheks  Seizure disorder  Continue Keppra  Hypertension  Continue amlodipine  Hyperlipidemia  Continue statin    DNR-CCA  SCDs      Pauline Goldberg MD

## 2024-06-20 NOTE — PROGRESS NOTES
Assessment/Plan:    Here with daughters   Since the afib her memory has been quite affected   They are wondering what could cause that   She was hyponatremic but fixed that   Her Cr is doubled on her last bmp 2 days ago   Her digoxin is new but wnl   And she is on lasix but that shouldn't affect the memory but does the GFR of course   She was on ssri (which was stopped due to the hypoNa)   That could cause the memory issues   She could have a CVA given the afib of course   And she is on a higher dose BB which might also affect the memory   And I did have 1 pt tell me they think the eliquis was affecting their memory although its hard to say it is the med vs the reason that they need to take the med   We will stop the metoformin due to the kassie and change to glimepiride 1mg with dinner only   She did have a white count 17K which we can follow up   Blood urine and cxr all negative for source     She does NOT  Want to persue the TAVR   She does have STEVENSON but is willing to live with it   She is old and understands her lot in life she says     Did have some sundowning while in the hospital       1  Acute memory impairment  -     CT head wo contrast; Future; Expected date: 08/04/2022    2  KASSIE (acute kidney injury) ([address] )  -     Basic metabolic panel; Future    3  Leukocytosis, unspecified type  -     CBC and differential; Future    4  Type 2 diabetes mellitus without complication, without long-term current use of insulin (HCC)  -     glimepiride (AMARYL) 1 mg tablet; Take 1 tablet (1 mg total) by mouth daily with dinner    5  Other specified hypothyroidism    6  New onset atrial fibrillation ([address] )    7  Severe aortic stenosis    8  Dementia without behavioral disturbance, unspecified dementia type ([address] )    9  Anxiety       Subjective:      Patient ID: Shanice Tripathi is a 81 y o  female      HPI  Here for follow up on TCM   For  Severe AS   Pulmonary htn  htn  Dm  New onset afib   Hyponatremia   elivated     BG checks Physical Therapy Evaluation & Treatment    Patient Name: Luis Greene  MRN: [id number]  Today's Date: 6/20/2024   Time Calculation  Start Time: 1457  Stop Time: 1530  Time Calculation (min): 33 min    Assessment/Plan   PT Assessment  PT Assessment Results: Decreased strength, Decreased endurance, Impaired balance, Decreased mobility, Decreased safety awareness  Rehab Prognosis: Good  Evaluation/Treatment Tolerance: Patient tolerated treatment well  End of Session Communication: Bedside nurse  Assessment Comment: Pt presents with impaired functional mobility s/p prostate surgery. Recommend discharge home with 24 hour supervision/intermittent assistance and 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.   End of Session Patient Position: Up in chair, BLE elevated, call light in reach, needs met, RN aware.  IP OR SWING BED PT PLAN  Inpatient or Swing Bed: Inpatient  PT Plan  Treatment/Interventions: Bed mobility, Transfer training, Gait training, Stair training, Balance training, Strengthening, Therapeutic exercise, Endurance training, Therapeutic activity, Home exercise program, Positioning, Postural re-education  PT Plan: Ongoing PT  PT Frequency: 4 times per week  PT Discharge Recommendations: Low intensity level of continued care  Equipment Recommended upon Discharge: Wheeled walker  PT Recommended Transfer Status: Assistive device, Contact guard  PT - OK to Discharge: Yes      Subjective     General Visit Information:  General  Reason for Referral: s/p green light laser prostate by Dr. Stephens  Referred By: Dr. Goldberg  Past Medical History Relevant to Rehab: A fib, anemia, BPH, CHF, CAD, CM, GERD, sepsis, upper GI bleed, hyperlipid, OA, cardiac cath, CABG, lami, knee surgery, neuropathy, trinity AFOs  Family/Caregiver Present: No  Prior to Session Communication: Bedside nurse  Patient Position Received: Bed, 0 rail up (seated EOB)  Home Living:  Home Living  Type of Home: House  Lives With: Spouse  Home Adaptive Equipment: Cane, Walker rolling or  [phone]  Later in the day         TCM Call     Date and time call was made  7/27/2022 10:04 AM    Hospital care reviewed  Records reviewed    Patient was hospitialized at  [address] [place] [address]    Date of Admission  07/21/22    Date of discharge  07/24/22    Diagnosis  New onset atrial fibrillation    Disposition  Home    Were the patients medications reviewed and updated  Yes    Current Symptoms  None      TCM Call     Post hospital issues  None    Should patient be enrolled in anticoag monitoring? No    Scheduled for follow up? Yes    Patients specialists  Cardiologist    Did you obtain your prescribed medications  Yes    Do you need help managing your prescriptions or medications  Yes    Why type of assitance do you need  daughter helps with medications    Is transportation to your appointment needed  Yes    Specify why  daughter helps with transportation    I have advised the patient to call PCP with any new or worsening symptoms  [address]  Family    The type of support provided  None    Do you have social support  Yes, as much as I need    Are you recieving any outpatient services  No    Are you recieving home care services  Yes    Types of home care services  Nurse visit; Home PT    Are you using any community resources  No    Current waiver services  No    Have you fallen in the last 12 months  No    Interperter language line needed  No    Counseling  Patient          The following portions of the patient's history were reviewed and updated as appropriate: allergies, current medications, past family history, past medical history, past social history, past surgical history and problem list     Review of Systems   Constitutional: Negative for fever and unexpected weight change  HENT: Negative for nosebleeds and trouble swallowing  Eyes: Negative for visual disturbance  Respiratory: Positive for shortness of breath   Negative for chest standard  Home Layout: One level  Home Access: Stairs to enter with rails  Entrance Stairs-Number of Steps: 4  Prior Level of Function:  Prior Function Per Pt/Caregiver Report  Level of [place]: Independent with ADLs and functional transfers, Needs assistance with ADLs, Needs assistance with functional transfers  Receives Help From: Family, Home health (RN for guidry care; HHPT discontinued recently)  ADL Assistance: Needs assistance  Homemaking Assistance: Needs assistance  Ambulatory Assistance: Needs assistance  Gait: Assistive device (rollator)  Vocational: Retired  Prior Function Comments: Pt denies falls prior to admission.  Precautions:  Precautions  LE Weight Bearing Status: Weight Bearing as Tolerated  Medical Precautions: Fall precautions  Braces Applied: B AFOs and shoes.      Objective   Pain:  Pain Assessment  Pain Assessment: 0-10  0-10 (Numeric) Pain Score: 0 - No pain  Cognition:  Cognition  Overall Cognitive Status: Within Functional Limits  Orientation Level: Oriented X4  Attention: Within Functional Limits  Memory: Within Funtional Limits  Problem Solving: Within Functional Limits  Numeric Reasoning: Within Functional Limits  Abstract Reasoning: Within Functional Limits  Safety/Judgement: Within Functional Limits  Insight: Within function limits    General Assessments:  Activity Tolerance  Endurance: Tolerates 10 - 20 min exercise with multiple rests    Sensation  Light Touch: No apparent deficits    Coordination  Movements are Fluid and Coordinated: Yes    Postural Control  Postural Control: Within Functional Limits    Static Sitting Balance  Static Sitting-Balance Support: Feet supported  Static Sitting-Level of Assistance: Independent  Dynamic Sitting Balance  Dynamic Sitting-Balance Support: Feet supported  Dynamic Sitting-Comments: Independent    Static Standing Balance  Static Standing-Balance Support: Bilateral upper extremity supported (with rollator)  Static Standing-Level of  Assistance: Contact guard  Dynamic Standing Balance  Dynamic Standing-Balance Support: Bilateral upper extremity supported (with rollator)  Dynamic Standing-Comments: CGA  Functional Assessments:  Transfers  Transfer: Yes  Transfer 1  Transfer From 1: Bed to  Transfer to 1: Chair with arms  Technique 1: Sit to stand, Stand to sit  Transfer Device 1: Walker  Transfer Level of Assistance 1: Moderate assistance, Minimum assistance, Minimal verbal cues (cues for hand placement)  Trials/Comments 1: assist for elevation of trunk and controlled descent of trunk    Ambulation/Gait Training  Ambulation/Gait Training Performed: Yes  Ambulation/Gait Training 1  Surface 1: Level tile  Device 1: Rolling walker  Gait Support Devices: R Ankle-foot orthoses, L Ankle-foot orthosis  Assistance 1: Contact guard, Minimal verbal cues (cues for safety)  Quality of Gait 1: Decreased step length, Diminished heel strike, Forward flexed posture (decreased jose david, mild postural sway, B AFOs donned due to foot drop)  Comments/Distance (ft) 1: 50 feet x 2  Extremity/Trunk Assessments:  RUE   RUE : Within Functional Limits  LUE   LUE: Within Functional Limits  RLE   RLE : Exceptions to WFL  Strength RLE  R Ankle Dorsiflexion:  (chroninc foot drop)  LLE   LLE : Exceptions to WFL  Strength LLE  L Ankle Dorsiflexion:  (chronic foot drop)    Outcome Measures:  [facility] Basic Mobility  Turning from your back to your side while in a flat bed without using bedrails: None  Moving from lying on your back to sitting on the side of a flat bed without using bedrails: None  Moving to and from bed to chair (including a wheelchair): A little  Standing up from a chair using your arms (e.g. wheelchair or bedside chair): A lot  To walk in hospital room: A little  Climbing 3-5 steps with railing: A little  Basic Mobility - Total Score: 19    Encounter Problems       Encounter Problems (Active)       Balance       LTG - Patient will demonstrate Intervention to  tightness  Cardiovascular: Negative for chest pain, palpitations and leg swelling  Gastrointestinal: Negative for abdominal pain, constipation, diarrhea and nausea  Endocrine: Negative for cold intolerance  Genitourinary: Negative for dysuria and urgency  Musculoskeletal: Negative for joint swelling and myalgias  Skin: Negative for rash  Neurological: Negative for tremors, seizures and syncope  Hematological: Does not bruise/bleed easily  Psychiatric/Behavioral: Positive for behavioral problems, confusion, decreased concentration and sleep disturbance  Negative for hallucinations and suicidal ideas  The patient is nervous/anxious           Gave up driving          Objective:      /72 (BP Location: Left arm, Patient Position: Sitting, Cuff Size: Standard)   Pulse 95   Resp 16   Ht 5' (1 524 m)   Wt 49 9 kg (110 lb)   SpO2 96%   BMI 21 48 kg/m²     Appointment on 08/02/2022   Component Date Value    Sodium 08/02/2022 136     Potassium 08/02/2022 4 9     Chloride 08/02/2022 103     CO2 08/02/2022 21     ANION GAP 08/02/2022 12     BUN 08/02/2022 63 (A)    Creatinine 08/02/2022 2 09 (A)    Glucose 08/02/2022 214 (A)    Calcium 08/02/2022 9 8     eGFR 08/02/2022 20     Digoxin Lvl 08/02/2022 1 1    Admission on 07/21/2022, Discharged on 07/24/2022   Component Date Value    WBC 07/21/2022 8 16     RBC 07/21/2022 3 64 (A)    Hemoglobin 07/21/2022 11 4 (A)    Hematocrit 07/21/2022 34 1 (A)    MCV 07/21/2022 94     MCH 07/21/2022 31 3     MCHC 07/21/2022 33 4     RDW 07/21/2022 13 4     MPV 07/21/2022 11 3     Platelets 54/83/3680 318     nRBC 07/21/2022 0     Neutrophils Relative 07/21/2022 77 (A)    Immat GRANS % 07/21/2022 0     Lymphocytes Relative 07/21/2022 12 (A)    Monocytes Relative 07/21/2022 9     Eosinophils Relative 07/21/2022 1     Basophils Relative 07/21/2022 1     Neutrophils Absolute 07/21/2022 6 27     Immature Grans Absolute 07/21/2022 0 02     Lymphocytes Absolute 07/21/2022 0 99     Monocytes Absolute 07/21/2022 0 76     Eosinophils Absolute 07/21/2022 0 06     Basophils Absolute 07/21/2022 0 06     Protime 07/21/2022 12 6     INR 07/21/2022 0 94     PTT 07/21/2022 35     Sodium 07/21/2022 129 (A)    Potassium 07/21/2022 4 3     Chloride 07/21/2022 94 (A)    CO2 07/21/2022 26     ANION GAP 07/21/2022 9     BUN 07/21/2022 16     Creatinine 07/21/2022 0 95     Glucose 07/21/2022 181 (A)    Calcium 07/21/2022 9 5     AST 07/21/2022 17     ALT 07/21/2022 12     Alkaline Phosphatase 07/21/2022 72     Total Protein 07/21/2022 6 8     Albumin 07/21/2022 3 9     Total Bilirubin 07/21/2022 0 55     eGFR 07/21/2022 52     hs TnI 0hr 07/21/2022 32     TSH 3RD GENERATON 07/21/2022 1 753     hs TnI 2hr 07/21/2022 79 (A)    Delta 2hr hsTnI 07/21/2022 47 (A)    hs TnI 4hr 07/21/2022 147 (A)    Delta 4hr hsTnI 07/21/2022 115 (A)    BNP 07/21/2022 436 (A)    TSH 3RD GENERATON 07/21/2022 1 630     Sodium, Ur 07/22/2022 48     Osmolality, Ur 07/22/2022 468     Color, UA 07/22/2022 Light Yellow     Clarity, UA 07/22/2022 Clear     Specific Gravity, UA 07/22/2022 1 014     pH, UA 07/22/2022 5 5     Leukocytes, UA 07/22/2022 Trace (A)    Nitrite, UA 07/22/2022 Negative     Protein, UA 07/22/2022 Trace (A)    Glucose, UA 07/22/2022 150 (3/20%) (A)    Ketones, UA 07/22/2022 20 (1+) (A)    Urobilinogen, UA 07/22/2022 <2 0     Bilirubin, UA 07/22/2022 Negative     Occult Blood, UA 07/22/2022 Negative     RBC, UA 07/22/2022 2-4 (A)    WBC, UA 07/22/2022 4-10 (A)    Epithelial Cells 07/22/2022 Occasional     Bacteria, UA 07/22/2022 Occasional     Transitional Epithelial * 07/22/2022 Present     Uric Acid 07/21/2022 7 4     Osmolality Serum 07/21/2022 275 (A)    POC Glucose 07/21/2022 200 (A)    POC Glucose 07/21/2022 145 (A)    POC Glucose 07/21/2022 151 (A)    WBC 07/22/2022 12 90 (A)    RBC 07/22/2022 3 74 (A)    Hemoglobin enhance balance for safe completion of daily activities (Progressing)       Start:  06/20/24            LTG - Patient will maintain balance to allow for safe mobility (Progressing)       Start:  06/20/24               Mobility       LTG - Patient will ambulate community distance with RW at a modified independent level.   (Progressing)       Start:  06/20/24            LTG - Patient will navigate 4 steps with 1 rail at a modified independent level.   (Progressing)       Start:  06/20/24               PT Transfers       LTG - Patient will demonstrate safe transfer techniques with RW at a modified independent level.   (Progressing)       Start:  06/20/24               Pain - Adult          Safety       LTG - Patient will demonstrate safety requirements appropriate to situation/environment (Progressing)       Start:  06/20/24                   Education Documentation  Home Exercise Program, taught by Sindhu Christian PT at 6/20/2024  2:57 PM.  Learner: Patient  Readiness: Acceptance  Method: Explanation, Demonstration  Response: Verbalizes Understanding, Demonstrated Understanding    Body Mechanics, taught by Sindhu Christian PT at 6/20/2024  2:57 PM.  Learner: Patient  Readiness: Acceptance  Method: Explanation, Demonstration  Response: Verbalizes Understanding, Demonstrated Understanding    Mobility Training, taught by Sindhu Christian PT at 6/20/2024  2:57 PM.  Learner: Patient  Readiness: Acceptance  Method: Explanation, Demonstration  Response: Verbalizes Understanding, Demonstrated Understanding    Education Comments  No comments found.    Sindhu Christian, MARYAM, DPT     07/22/2022 11 4 (A)    Hematocrit 07/22/2022 35 8     MCV 07/22/2022 96     MCH 07/22/2022 30 5     MCHC 07/22/2022 31 8     RDW 07/22/2022 13 6     MPV 07/22/2022 10 9     Platelets 46/18/0354 354     nRBC 07/22/2022 0     Neutrophils Relative 07/22/2022 68     Immat GRANS % 07/22/2022 0     Lymphocytes Relative 07/22/2022 21     Monocytes Relative 07/22/2022 10     Eosinophils Relative 07/22/2022 0     Basophils Relative 07/22/2022 1     Neutrophils Absolute 07/22/2022 8 69 (A)    Immature Grans Absolute 07/22/2022 0 05     Lymphocytes Absolute 07/22/2022 2 75     Monocytes Absolute 07/22/2022 1 29 (A)    Eosinophils Absolute 07/22/2022 0 04     Basophils Absolute 07/22/2022 0 08     Sodium 07/22/2022 132 (A)    Potassium 07/22/2022 3 8     Chloride 07/22/2022 97     CO2 07/22/2022 21     ANION GAP 07/22/2022 14 (A)    BUN 07/22/2022 20     Creatinine 07/22/2022 1 06     Glucose 07/22/2022 187 (A)    Calcium 07/22/2022 9 4     eGFR 07/22/2022 45     HS TnI random 07/22/2022 147 (A)    POC Glucose 07/22/2022 204 (A)    SARS-CoV-2 07/22/2022 Negative     INFLUENZA A PCR 07/22/2022 Negative     INFLUENZA B PCR 07/22/2022 Negative     RSV PCR 07/22/2022 Negative     Procalcitonin 07/22/2022 <0 05     POC Glucose 07/22/2022 225 (A)    POC Glucose 07/22/2022 127     Hemoglobin A1C 07/22/2022 6 2 (A)    EAG 07/22/2022 131     Ventricular Rate 07/21/2022 87     Atrial Rate 07/21/2022 76     QRSD Interval 07/21/2022 148     QT Interval 07/21/2022 450     QTC Interval 07/21/2022 541     QRS Axis 07/21/2022 -40     T Wave Axis 07/21/2022 127     Ventricular Rate 07/21/2022 147     Atrial Rate 07/21/2022 144     QRSD Interval 07/21/2022 124     QT Interval 07/21/2022 300     QTC Interval 07/21/2022 469     QRS Axis 07/21/2022 -33     T Wave Axis 07/21/2022 138     POC Glucose 07/22/2022 129     Sodium 07/23/2022 131 (A)    Potassium 07/23/2022 4 1     Chloride 07/23/2022 98     CO2 07/23/2022 25     ANION GAP 07/23/2022 8     BUN 07/23/2022 24     Creatinine 07/23/2022 1 06     Glucose 07/23/2022 122     Calcium 07/23/2022 9 5     eGFR 07/23/2022 45     Magnesium 07/23/2022 1 7 (A)    WBC 07/23/2022 17 81 (A)    RBC 07/23/2022 3 44 (A)    Hemoglobin 07/23/2022 10 6 (A)    Hematocrit 07/23/2022 32 3 (A)    MCV 07/23/2022 94     MCH 07/23/2022 30 8     MCHC 07/23/2022 32 8     RDW 07/23/2022 13 5     MPV 07/23/2022 11 0     Platelets 58/74/0646 284     nRBC 07/23/2022 0     Neutrophils Relative 07/23/2022 87 (A)    Immat GRANS % 07/23/2022 1     Lymphocytes Relative 07/23/2022 6 (A)    Monocytes Relative 07/23/2022 6     Eosinophils Relative 07/23/2022 0     Basophils Relative 07/23/2022 0     Neutrophils Absolute 07/23/2022 15 46 (A)    Immature Grans Absolute 07/23/2022 0 14     Lymphocytes Absolute 07/23/2022 1 10     Monocytes Absolute 07/23/2022 1 06     Eosinophils Absolute 07/23/2022 0 00     Basophils Absolute 07/23/2022 0 05     POC Glucose 07/23/2022 126     Blood Culture 07/23/2022 No Growth After 5 Days   Blood Culture 07/23/2022 No Growth After 5 Days   POC Glucose 07/23/2022 122     POC Glucose 07/23/2022 179 (A)    POC Glucose 07/24/2022 134     POC Glucose 07/24/2022 142 (A)          Physical Exam  Vitals and nursing note reviewed  Constitutional:       Appearance: She is well-developed  Comments: Here with daughter    SUNDAY:      Head: Normocephalic and atraumatic  Cardiovascular:      Rate and Rhythm: Normal rate  Rhythm irregular  Pulses:           Dorsalis pedis pulses are 2+ on the right side and 2+ on the left side  Heart sounds: Murmur heard  Pulmonary:      Effort: Pulmonary effort is normal       Breath sounds: Normal breath sounds  No wheezing or rales  Abdominal:      General: Bowel sounds are normal  There is no distension  Palpations: Abdomen is soft  Tenderness:  There is no abdominal tenderness  Musculoskeletal:         General: No tenderness  Normal range of motion  Cervical back: Normal range of motion and neck supple  Feet:      Right foot:      Skin integrity: Callus and dry skin present  No ulcer, skin breakdown, erythema or warmth  Left foot:      Skin integrity: Callus and dry skin present  No ulcer, skin breakdown, erythema or warmth  Lymphadenopathy:      Cervical: No cervical adenopathy  Skin:     General: Skin is warm and dry  Capillary Refill: Capillary refill takes less than 2 seconds  Findings: No rash  Neurological:      Mental Status: She is alert and oriented to person, place, and time  Cranial Nerves: No cranial nerve deficit  Sensory: No sensory deficit  Motor: No abnormal muscle tone  Psychiatric:         Behavior: Behavior normal          Thought Content:  Thought content normal          Judgment: Judgment normal              Marcin Lees MD  [place] [id number]

## 2024-06-20 NOTE — ANESTHESIA PREPROCEDURE EVALUATION
Patient: Luis Greene    Procedure Information       Date/Time: 06/20/24 0900    Procedure: GREEN LIGHT LASER PROSTATE    Location: [facility] / [facility]    Surgeons: Grant Stephens MD            Relevant Problems   Cardiac   (+) Afib (Multi)   (+) Atrial fibrillation (Multi)   (+) Benign essential hypertension   (+) Chest pain   (+) Congestive heart failure (Multi)   (+) Coronary artery disease of native artery of native heart with stable angina pectoris (CMS-HCC)   (+) Hyperlipidemia   (+) Hypertension   (+) Nonrheumatic aortic valve stenosis   (+) Paroxysmal atrial fibrillation (Multi)      Pulmonary   (+) Dyspnea on effort   (+) Obstructive sleep apnea syndrome      Neuro   (+) 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[facility])   (+) Anxiety   (+) Cervical radiculitis   (+) Polyneuropathy   (+) Seizure (Multi)      GI   (+) GERD without esophagitis      /Renal   (+) BPH (benign prostatic hyperplasia)   (+) BPH with obstruction/lower urinary tract symptoms   (+) Urinary tract infection associated with indwelling urethral catheter (CMS-HCC)   (+) Urinary tract infection associated with indwelling urethral catheter, initial encounter (CMS-HCC)      Endocrine   (+) Diabetes mellitus type 2 without retinopathy (Multi)      Hematology   (+) Anemia   (+) Iron deficiency anemia due to chronic blood loss   (+) Thrombocytopenia, unspecified (CMS-HCC)      Musculoskeletal   (+) Lumbar spondylosis   (+) Spinal stenosis of lumbar region   (+) Unspecified osteoarthritis, unspecified site      HEENT   (+) [place] IRA (hard of hearing)      ID   (+) Bacteremia due to Escherichia coli   (+) Infection of superficial incisional surgical site after procedure   (+) Urinary tract infection associated with indwelling urethral catheter (CMS-HCC)   (+) Urinary tract infection associated with indwelling urethral catheter, initial encounter (CMS-HCC)   (+) Urinary tract infection due to extended-spectrum beta lactamase (ESBL)  producing Escherichia coli       Clinical information reviewed:   Tobacco  Allergies  Meds  Problems  Med Hx  Surg Hx   Fam Hx  Soc   Hx        NPO Detail:  NPO/Void Status  Date of Last Liquid: 06/19/24  Time of Last Liquid: 1950  Date of Last Solid: 06/19/24  Time of Last Solid: 1900  Last Intake Type: Clear fluids; Solid meal  Time of Last Void: 0754         Physical Exam    Airway  Mallampati: II  TM distance: >3 FB     Cardiovascular   Rhythm: regular  Rate: normal     Dental - normal exam     Pulmonary   Breath sounds clear to auscultation     Abdominal   Abdomen: soft             Anesthesia Plan    History of general anesthesia?: yes  History of complications of general anesthesia?: no    ASA 3     general   (GETA easy intubation lumbar michel. 2022.)  The patient is not a current smoker.    intravenous induction   Postoperative administration of opioids is intended.  Anesthetic plan and risks discussed with patient.    Plan discussed with CRNA, attending and CAA.

## 2024-06-20 NOTE — ANESTHESIA PROCEDURE NOTES
Airway  Date/Time: 6/20/2024 9:35 AM  Urgency: elective      Staffing  Performed: attending   Authorized by: Calderon Bauman DO    Performed by: Calderon Bauman DO  Patient location during procedure: OR    Indications and Patient Condition  Indications for airway management: anesthesia  Spontaneous Ventilation: absent  Sedation level: deep  Preoxygenated: yes  MILS not maintained throughout  Mask difficulty assessment: 0 - not attempted  No planned trial extubation    Final Airway Details  Final airway type: supraglottic airway      Successful airway: classic  Size 4     Number of attempts at approach: 1  Number of other approaches attempted: 0

## 2024-06-20 NOTE — PERIOPERATIVE NURSING NOTE
Pt tx'd to 2nd floor RNF via bed, accompanied by PACU RN. Pt placed on NC O2 in room, JACLYN Haque and 2 dtrs in attendance to room. Call light given to pt.

## 2024-06-20 NOTE — ANESTHESIA POSTPROCEDURE EVALUATION
Patient: Luis Greene    Procedure Summary       Date: 06/20/24 Room / Location: [facility] / [facility]    Anesthesia Start: 0922 Anesthesia Stop: 1023    Procedure: GREEN LIGHT LASER PROSTATE Diagnosis:       BPH with urinary obstruction      (BPH N40.1)    Surgeons: Grant Stephens MD Responsible Provider: Calderon Bauman DO    Anesthesia Type: general ASA Status: 3            Anesthesia Type: general    Vitals Value Taken Time   /43 06/20/24 1023   Temp  06/20/24 1023   Pulse 65 06/20/24 1023   Resp 16 06/20/24 1023   SpO2 90 06/20/24 1023       Anesthesia Post Evaluation    Patient location during evaluation: bedside  Patient participation: complete - patient participated  Level of consciousness: awake  Pain score: 0  Pain management: adequate  Airway patency: patent  Two or more strategies used to mitigate risk of obstructive sleep apnea  Cardiovascular status: acceptable  Respiratory status: acceptable  Hydration status: acceptable  Postoperative Nausea and Vomiting: none        No notable events documented.

## 2024-06-20 NOTE — OP NOTE
GREEN LIGHT LASER PROSTATE Operative Note     Date: 2024  OR Location: [facility]    Name: Luis Greene : 1940, Age: 84 y.o., MRN: [id number], Sex: male    Diagnosis  Pre-op Diagnosis     * BPH with urinary obstruction [N40.1, N13.8] Post-op Diagnosis     * BPH with urinary obstruction [N40.1, N13.8]     Procedures  GREEN LIGHT LASER PROSTATE  08117 - GA LASER VAPORIZATION OF PROSTATE FOR URINE FLOW      Surgeons      * Grant Stephens - Primary    Resident/Fellow/Other Assistant:  Surgeons and Role:  * No surgeons found with a matching role *    Procedure Summary  Anesthesia: General  ASA: III  Anesthesia Staff: Anesthesiologist: Calderon Bauman DO  Estimated Blood Loss: 40 mL  Intra-op Medications:   Administrations occurring from 0900 to 1035 on 24:   Medication Name Total Dose   amLODIPine (Norvasc) tablet 5 mg Cannot be calculated   enoxaparin (Lovenox) syringe 40 mg Cannot be calculated   finasteride (Proscar) tablet 5 mg Cannot be calculated   gabapentin (Neurontin) capsule 100 mg Cannot be calculated   levETIRAcetam (Keppra) tablet 250 mg Cannot be calculated   magnesium oxide (Mag-Ox) tablet 400 mg Cannot be calculated   pantoprazole (ProtoNix) EC tablet 40 mg Cannot be calculated   sulfamethoxazole-trimethoprim (Bactrim DS) 800-160 mg per tablet 1 tablet Cannot be calculated   tamsulosin (Flomax) 24 hr capsule 0.4 mg Cannot be calculated              Anesthesia Record               Intraprocedure I/O Totals       None           Specimen: No specimens collected     Staff:   Circulator: Annie Crainub Person: Guy         Drains and/or Catheters:   Urethral Catheter Latex 22 Fr. (Active)       Tourniquet Times:         Implants:     Findings: Bilobar hypertrophy of the prostate prior UroLift staples    Indications: Luis Greene is an 84 y.o. male who is having surgery for BPH N40.1.     The patient was seen in the preoperative area. The risks, benefits, complications, treatment  options, non-operative alternatives, expected recovery and outcomes were discussed with the patient. The possibilities of reaction to medication, pulmonary aspiration, injury to surrounding structures, bleeding, recurrent infection, the need for additional procedures, failure to diagnose a condition, and creating a complication requiring transfusion or operation were discussed with the patient. The patient concurred with the proposed plan, giving informed consent.  The site of surgery was properly noted/marked if necessary per policy. The patient has been actively warmed in preoperative area. Preoperative antibiotics are not indicated. Venous thrombosis prophylaxis have been ordered including unilateral sequential compression device    Procedure Details: Patient was taken the operating room placed under general anesthesia.  He was then placed in dorsolithotomy position.  His Tiwari catheter was removed.  He was prepped and draped in a sterile manner.  Greenlight laser scope was placed showing bilobar hypertrophy of the prostate.  Inspection of bladder revealed no obvious abnormalities.  The greenlight laser was used to vaporize the prostate.  A wide open channel was created.  There was 3 UroLift staples that were visualized and they were grasped and removed.  At the conclusion of the procedure a wide open channel had been created.  Care was taken not to vaporize distal to the verumontanum nor to injure the ureteral orifice ease.  The bladder was drained.  22 Mongolian Tiwari catheter was placed and connected to dependent drainage.  The patient was awakened and taken to the recovery room in stable condition.  Complications:  None; patient tolerated the procedure well.    Disposition: PACU - hemodynamically stable.  Condition: stable         Additional Details:     Attending Attestation: I performed the procedure.    Grant Stephens  Phone Number: [phone]

## 2024-06-20 NOTE — NURSING NOTE
Introduced myself to patient and obtained vitals and blood glucose of 115.    CHG bath completed. Placed call light within reach.

## 2024-06-20 NOTE — NURSING NOTE
Patient admitted to room 219 from [facility]. Alert and oriented to person place and time. No complaint of pain or discomfort. He walked from the EMS cart to bed with 1 person assist. Tiwari catheter noted draining clear yellow. Continuing to monitor. Tiwari has unknown insertion date and time. Right arm 20 luciano IV also unknown insertion date and time.

## 2024-06-20 NOTE — CARE PLAN
Problem: Balance  Goal: LTG - Patient will demonstrate Intervention to enhance balance for safe completion of daily activities  Outcome: Progressing  Goal: LTG - Patient will maintain balance to allow for safe mobility  Outcome: Progressing     Problem: Mobility  Goal: LTG - Patient will ambulate community distance with RW at a modified independent level.    Outcome: Progressing  Goal: LTG - Patient will navigate 4 steps with 1 rail at a modified independent level.    Outcome: Progressing     Problem: Safety  Goal: LTG - Patient will demonstrate safety requirements appropriate to situation/environment  Outcome: Progressing     Problem: PT Transfers  Goal: LTG - Patient will demonstrate safe transfer techniques with RW at a modified independent level.    Outcome: Progressing      yes

## 2024-06-20 NOTE — PROGRESS NOTES
"Luis Greene is a 84 y.o. male on day 1 of admission presenting with BPH with obstruction/lower urinary tract symptoms.    Subjective   He feels much better today.  WBC 4.6  BUN 21, Cr 1.2  Urine is clear to mildly blood tinged       Objective     Physical Exam  Awake and alert    Last Recorded Vitals  Blood pressure (!) 150/47, pulse 53, temperature 36.4 °C (97.5 °F), resp. rate 16, height 1.803 m (5' 10.98\"), weight 89 kg (196 lb 3.4 oz), SpO2 94%.  Intake/Output last 3 Shifts:  I/O last 3 completed shifts:  In: -   Out: 300 [Urine:300]    Relevant Results              Results for orders placed or performed during the hospital encounter of 06/14/24 (from the past 24 hour(s))   CBC   Result Value Ref Range    WBC 4.6 4.4 - 11.3 x10*3/uL    nRBC 0.0 0.0 - 0.0 /100 WBCs    RBC 2.78 (L) 4.50 - 5.90 x10*6/uL    Hemoglobin 9.5 (L) 13.5 - 17.5 g/dL    Hematocrit 28.5 (L) 41.0 - 52.0 %     (H) 80 - 100 fL    MCH 34.2 (H) 26.0 - 34.0 pg    MCHC 33.3 32.0 - 36.0 g/dL    RDW 13.8 11.5 - 14.5 %    Platelets 84 (L) 150 - 450 x10*3/uL   Basic Metabolic Panel   Result Value Ref Range    Glucose 158 (H) 65 - 99 mg/dL    Sodium 133 133 - 145 mmol/L    Potassium 4.2 3.4 - 5.1 mmol/L    Chloride 101 97 - 107 mmol/L    Bicarbonate 22 (L) 24 - 31 mmol/L    Urea Nitrogen 21 8 - 25 mg/dL    Creatinine 1.20 0.40 - 1.60 mg/dL    eGFR 60 (L) >60 mL/min/1.73m*2    Calcium 8.5 8.5 - 10.4 mg/dL    Anion Gap 10 <=19 mmol/L   POCT GLUCOSE   Result Value Ref Range    POCT Glucose 163 (H) 74 - 99 mg/dL   POCT GLUCOSE   Result Value Ref Range    POCT Glucose 222 (H) 74 - 99 mg/dL   POCT GLUCOSE   Result Value Ref Range    POCT Glucose 157 (H) 74 - 99 mg/dL             FL modified barium swallow study    Result Date: 6/18/2024  Interpreted By:  Low Camejo and Brodsky Sheryl STUDY: FL MODIFIED BARIUM SWALLOW STUDY;; 6/18/2024 11:53 am   INDICATION: Signs/Symptoms:r/o aspiration.   COMPARISON: None.   ACCESSION NUMBER(S): [id number] "   ORDERING CLINICIAN: MENDOZA VANESSA   TECHNIQUE: MBSS completed. Informed verbal consent obtained prior to completion of exam. Patient tested with Varibar consistencies in order per MBS protocol: 5mL thin liquids. 20mL thin liquid. 60mL consecutive drinking. 5mL nectar thick liquids.  20mL of nectar. 60mL nectar thick consecutive sips.  5mL pudding via tsp. 1/4 loorna doone cookie with 3mL pudding. AP view: 5mL thin liquids. 20 mL thin liquid via cup. Pudding via tsp. E-Z barium tablet. . Fluoroscopy time :  102 seconds. Total dose: Air kerma 26.27 mGy.   SLP: Ivone VAZ/SLP Phone/Pager: [phone]   SPEECH FINDINGS: Reason for referral: Suspected pharyngeal stage dyspahgia, pt drank 3oz of thin liquid in one attempt, without breaking, which resulted in immediate cough.     Patient hx: Chief complaint: Pt was admitted on 6/14/24 presenting with fever of 102 ï¿½F, accompanied by chills and rigor, patient reports onset of symptoms 730 this morning, this was also accompanied by confusion. PMHx relevant to rehab: GERD (gastroesophageal reflux disease) TIA (transient ischemic attack) Seizure disorder (Multi) Respiratory status: Nasal cannula Previous diet: Regular and thin liquids   FINAL SPEECH RECOMMENDATIONS   Diet recommendations/feeding strategies: Regular and thin liquids, single sips     Follow-up speech therapy recommended: SLP Plan: No treatment needs identified at this time, no skilled speech tx indicated   Short term goals: n/a     Education provided: Pt. Educated on results of MBS study, recommended diet and recommended safe swallow strategies. Verbal understanding given     Treatment Provided today: no   Additional consult suggested: no   Repeat study/ dc plan: D/c without skilled speech tx recoimmended   Mechanics of the swallow summary: Oral phase: WFL     Pharyngeal phase: 1.  narrow column of contrast within laryngeal vestibule ,ejected6. 7.  Trace residues in pharynx. Cleared with  spon reswallow   Esophageal phase: 1. Complete clearance during esophageal screening/scan with thin barium, pudding barium and barium tablet   SLP impressions with severity rating: Pt. Presenting with transient laryngeal penetration with thin and nectar thick liquids with consecutive sips only. No aspiration occurred, trace pharyngeal  residues pt spontaneously cleared.   Thin Liquids (MBSS) Rosenbek's Penetration Aspiration Scale, Thin Liquids (MBSS): 2. PENETRATION that CLEARS - contrast enter airway, above vocal cords, no residue Response to Pharyngeal Residue, Thin Liquid (MBSS): spontaneous clearing     Nectar Thick Liquids (MBSS) Rosenbek's Penetration Aspiration Scale, Nectar thick liquids (MBSS): 2. PENETRATION that CLEARS - contrast enter airway, above vocal cords, no residue Response to Pharyngeal Residue, Nectar thick liquids (MBSS): spontaneous clearing   Purees (MBSS) Rosenbek's Penetration Aspiration Scale, Purees (MBSS): 1. NO ASPIRATION & NO PENETRATION - no aspiration, contrast does not enter airway   Response to Pharyngeal Residue, Purees (MBSS): spontaneous clearing     Solids (MBSS) Rosenbek's Penetration Aspiration Scale, Solids (MBSS): 1. NO ASPIRATION & NO PENETRATION - no aspiration, contrast does not enter airway Response to Pharyngeal Residue, Solids (MBSS): spontaneous clearing     Speech Therapy section of this report signed by  on 6/18/2024 at 1:09 pm.   RADIOLOGY FINDINGS:     Radiology section of this report signed by Dr. Camejo.       Penetration with nectar and thin barium. No aspiration. See above for details.   MACRO: None   Signed by: Low Camejo 6/18/2024 1:29 PM Dictation workstation:   [id number]    XR foot right 3+ views    Addendum Date: 6/17/2024    Interpreted By:  Mathew Varela,  and Radiology Admin ADDENDUM: This radiology study is being re-signed due to an administrative error but not reinterpreted. The new signature merely reflects the date upon which the  administrative error was corrected for placement in patient chart. The actual interpretation took place in a timely fashion, consistent with  policy, by the physician referenced as interpreting the study.   STUDY: XR FOOT RIGHT 3+ VIEWS   Signed by: Mathew Varela 6/17/2024 5:19 PM   -------- ORIGINAL REPORT -------- Dictation workstation:   [id number]    Result Date: 6/17/2024  Interpreted By:  Mathew Varela, STUDY: XR FOOT RIGHT 1-2 VIEWS; ;  6/15/2024 3:10 pm   INDICATION: Signs/Symptoms:assess wound, OM.   COMPARISON: None.   ACCESSION NUMBER(S): [id number]   ORDERING CLINICIAN: GINGER MITCHELL   FINDINGS: Right foot, three views   There is no osseous destruction or erosions to suggest presence of osteomyelitis There is moderate joint space are was otherwise the 1st MTP joint. There is mild degenerative change the tarsometatarsal, naviculocuneiform and talonavicular joints. There is a moderate-sized plantar calcaneal spur.       No radiographic evidence of osteomyelitis. 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     MACRO: None   Signed by: Mathew Varela 6/15/2024 3:22 PM Dictation workstation:   [id number]    ECG 12 lead    Result Date: 6/14/2024  Atrial fibrillation Right bundle branch block Abnormal ECG When compared with ECG of 15-MAY-2024 18:52, No significant change was found Confirmed by Ismael Uriostegui ([id number]) on 6/14/2024 1:35:05 PM    CT abdomen pelvis wo IV contrast    Result Date: 6/14/2024  Interpreted By:  Reema Wray, STUDY: CT ABDOMEN PELVIS WO IV CONTRAST;  6/14/2024 10:52 am   INDICATION: Signs/Symptoms:fever; h/o urosepsis.   COMPARISON: 12/20/2023   ACCESSION NUMBER(S): [id number]   ORDERING CLINICIAN: ISA FRIEDMAN   TECHNIQUE: CT of the abdomen and pelvis was performed. Sagittal and coronal reconstructions were generated.  No intravenous contrast given for the exam.   FINDINGS: Solid organ and vessel evaluation limited without IV contrast.   ABDOMINAL ORGANS:   LIVER:  No focal lesion within limits of unenhanced exam.   GALL BLADDER AND BILIARY TREE: Small calcified gallstone   SPLEEN: No focal lesion within limits of unenhanced exam. 2 isodense presumed splenules near the inferior pole again seen..   PANCREAS: No focal lesion within limits of unenhanced exam   ADRENALS: No adrenal mass   KIDNEYS AND URETERS: No renal mass or hydronephrosis within limits of unenhanced exam. Mild relatively symmetric perinephric fat stranding similar to the previous exam allowing for technical differences.   BOWEL: No abnormally dilated large or small bowel loops. Dot of air in nondilated cecal appendix. Moderate fecal debris throughout the colon.   PERITONEUM, RETROPERITONEUM, NODES: No significant free fluid. No free air. No significant retroperitoneal adenopathy within limits of unenhanced exam.   VESSELS:  Lack of IV contrast precludes vascular luminal assessment. Extensive atherosclerotic calcifications. No abdominal aortic aneurysm.   PELVIS: Tiwari catheter and small amount of air in mildly distended thick-walled urinary bladder. Questionable mild perivesical fat stranding anteriorly. Dense presumed surgical material in enlarged prostate gland measuring 5.2 cm transversely.   ABDOMINAL WALL: Small fat containing umbilical hernia. Dots of air in the perineum on the most caudal images likely related to skin fold.   BONES: Multifocal presumed degenerative changes. Laminectomy defects and spondylolisthesis in the lower lumbar spine similar to the previous exam.   LOWER CHEST: Uneven interstitial and dependent densities in the visualized lung bases. Small bilateral pleural effusions. Coronary artery calcifications and probable epicardial wire along the undersurface of the heart again seen.       Mild bladder wall thickening with questionable perivesical stranding concerning for cystitis. Small amount of air in the bladder presumably related to presence of Tiwari catheter however gas-forming  infection or enterovesical fistula could have a similar appearance. Clinical correlation recommended.   Cholelithiasis.   Colonic fecal retention.   Mild atelectasis or infiltrates in both lung bases with small pleural effusions.   Additional findings as described above.   MACRO: None.   Signed by: Reema Wray 6/14/2024 11:21 AM Dictation workstation:   [id number]    XR chest 1 view    Result Date: 6/14/2024  Interpreted By:  Reema Wray, STUDY: XR CHEST 1 VIEW;  6/14/2024 10:39 am   INDICATION: Signs/Symptoms:sepsis.   COMPARISON: 05/21/2024   ACCESSION NUMBER(S): [id number]   ORDERING CLINICIAN: ISA FRIEDMAN   FINDINGS: No focal infiltrate, pleural effusion or pneumothorax identified. Cardiac silhouette is within normal limits for size with overlying electronic structure and sternotomy wires again seen.       No acute cardiopulmonary process radiographically.   MACRO: None.   Signed by: Reema Wray 6/14/2024 11:11 AM Dictation workstation:   [id number]    XR chest 1 view    Result Date: 5/21/2024  Interpreted By:  Low Camejo, STUDY: XR CHEST 1 VIEW; 5/21/2024 11:58 am   INDICATION: Signs/Symptoms:f/u pleural effusion   COMPARISON: 05/15/2024   ACCESSION NUMBER(S): [id number]   ORDERING CLINICIAN: SAPNA MOE   FINDINGS: The study is limited due to rotation. Median sternotomy wires and surgical clips are again present, consistent with previous coronary artery bypass graft. Implantable device is again seen in the left anterior chest wall. The cardiac silhouette is within normal limits for the technique. Calcifications involve the tortuous aorta. There is no pneumothorax, confluent infiltrates or significant effusion. The osseous structures are unchanged.       Allowing for the aforementioned limitation, no acute cardiopulmonary disease.   Signed by: Low Camejo 5/21/2024 3:02 PM Dictation workstation:   [id number]        Assessment/Plan   Principal Problem:    BPH with obstruction/lower urinary  tract symptoms    He feels much better today.  Doing wellon the abx.  [place] [facility] [place] [facility] [place]             Grant Stephens MD

## 2024-06-21 VITALS
HEIGHT: 71 IN | OXYGEN SATURATION: 93 % | DIASTOLIC BLOOD PRESSURE: 62 MMHG | BODY MASS INDEX: 26.14 KG/M2 | TEMPERATURE: 97 F | RESPIRATION RATE: 16 BRPM | WEIGHT: 186.73 LBS | HEART RATE: 56 BPM | SYSTOLIC BLOOD PRESSURE: 152 MMHG

## 2024-06-21 LAB
ANION GAP SERPL CALC-SCNC: 15 MMOL/L (ref 10–20)
ANION GAP SERPL CALC-SCNC: 15 MMOL/L (ref 10–20)
BUN SERPL-MCNC: 20 MG/DL (ref 6–23)
BUN SERPL-MCNC: 23 MG/DL (ref 6–23)
CALCIUM SERPL-MCNC: 8.6 MG/DL (ref 8.6–10.3)
CALCIUM SERPL-MCNC: 8.9 MG/DL (ref 8.6–10.3)
CHLORIDE SERPL-SCNC: 100 MMOL/L (ref 98–107)
CHLORIDE SERPL-SCNC: 97 MMOL/L (ref 98–107)
CO2 SERPL-SCNC: 20 MMOL/L (ref 21–32)
CO2 SERPL-SCNC: 21 MMOL/L (ref 21–32)
CREAT SERPL-MCNC: 1.19 MG/DL (ref 0.5–1.3)
CREAT SERPL-MCNC: 1.31 MG/DL (ref 0.5–1.3)
EGFRCR SERPLBLD CKD-EPI 2021: 54 ML/MIN/1.73M*2
EGFRCR SERPLBLD CKD-EPI 2021: 61 ML/MIN/1.73M*2
ERYTHROCYTE [DISTWIDTH] IN BLOOD BY AUTOMATED COUNT: 13.7 % (ref 11.5–14.5)
GLUCOSE BLD MANUAL STRIP-MCNC: 232 MG/DL (ref 74–99)
GLUCOSE BLD MANUAL STRIP-MCNC: 242 MG/DL (ref 74–99)
GLUCOSE SERPL-MCNC: 271 MG/DL (ref 74–99)
GLUCOSE SERPL-MCNC: 305 MG/DL (ref 74–99)
HCT VFR BLD AUTO: 28.6 % (ref 41–52)
HGB BLD-MCNC: 9.5 G/DL (ref 13.5–17.5)
MCH RBC QN AUTO: 34.2 PG (ref 26–34)
MCHC RBC AUTO-ENTMCNC: 33.2 G/DL (ref 32–36)
MCV RBC AUTO: 103 FL (ref 80–100)
NRBC BLD-RTO: ABNORMAL /100{WBCS}
PLATELET # BLD AUTO: 134 X10*3/UL (ref 150–450)
POTASSIUM SERPL-SCNC: 4.7 MMOL/L (ref 3.5–5.3)
POTASSIUM SERPL-SCNC: 5.4 MMOL/L (ref 3.5–5.3)
RBC # BLD AUTO: 2.78 X10*6/UL (ref 4.5–5.9)
SODIUM SERPL-SCNC: 128 MMOL/L (ref 136–145)
SODIUM SERPL-SCNC: 130 MMOL/L (ref 136–145)
WBC # BLD AUTO: 6 X10*3/UL (ref 4.4–11.3)

## 2024-06-21 PROCEDURE — 97530 THERAPEUTIC ACTIVITIES: CPT | Mod: GP

## 2024-06-21 PROCEDURE — 2500000002 HC RX 250 W HCPCS SELF ADMINISTERED DRUGS (ALT 637 FOR MEDICARE OP, ALT 636 FOR OP/ED): Performed by: UROLOGY

## 2024-06-21 PROCEDURE — 2500000001 HC RX 250 WO HCPCS SELF ADMINISTERED DRUGS (ALT 637 FOR MEDICARE OP): Performed by: UROLOGY

## 2024-06-21 PROCEDURE — 80048 BASIC METABOLIC PNL TOTAL CA: CPT | Performed by: UROLOGY

## 2024-06-21 PROCEDURE — 97116 GAIT TRAINING THERAPY: CPT | Mod: GP

## 2024-06-21 PROCEDURE — 94762 N-INVAS EAR/PLS OXIMTRY CONT: CPT

## 2024-06-21 PROCEDURE — 36415 COLL VENOUS BLD VENIPUNCTURE: CPT | Performed by: PHYSICIAN ASSISTANT

## 2024-06-21 PROCEDURE — 2500000005 HC RX 250 GENERAL PHARMACY W/O HCPCS: Performed by: STUDENT IN AN ORGANIZED HEALTH CARE EDUCATION/TRAINING PROGRAM

## 2024-06-21 PROCEDURE — 85027 COMPLETE CBC AUTOMATED: CPT | Performed by: UROLOGY

## 2024-06-21 PROCEDURE — 2500000004 HC RX 250 GENERAL PHARMACY W/ HCPCS (ALT 636 FOR OP/ED): Performed by: UROLOGY

## 2024-06-21 PROCEDURE — 82947 ASSAY GLUCOSE BLOOD QUANT: CPT | Mod: 91

## 2024-06-21 PROCEDURE — 80048 BASIC METABOLIC PNL TOTAL CA: CPT | Mod: 91 | Performed by: PHYSICIAN ASSISTANT

## 2024-06-21 PROCEDURE — 2500000002 HC RX 250 W HCPCS SELF ADMINISTERED DRUGS (ALT 637 FOR MEDICARE OP, ALT 636 FOR OP/ED): Mod: MUE | Performed by: UROLOGY

## 2024-06-21 PROCEDURE — 36415 COLL VENOUS BLD VENIPUNCTURE: CPT | Performed by: UROLOGY

## 2024-06-21 RX ORDER — INSULIN GLARGINE 100 [IU]/ML
14 INJECTION, SOLUTION SUBCUTANEOUS NIGHTLY
Status: DISCONTINUED | OUTPATIENT
Start: 2024-06-21 | End: 2024-06-21 | Stop reason: HOSPADM

## 2024-06-21 ASSESSMENT — COGNITIVE AND FUNCTIONAL STATUS - GENERAL
DAILY ACTIVITIY SCORE: 24
MOBILITY SCORE: 21
CLIMB 3 TO 5 STEPS WITH RAILING: A LITTLE
STANDING UP FROM CHAIR USING ARMS: A LITTLE
MOBILITY SCORE: 24
WALKING IN HOSPITAL ROOM: A LITTLE

## 2024-06-21 ASSESSMENT — PAIN - FUNCTIONAL ASSESSMENT
PAIN_FUNCTIONAL_ASSESSMENT: 0-10

## 2024-06-21 ASSESSMENT — PAIN SCALES - GENERAL
PAINLEVEL_OUTOF10: 2
PAINLEVEL_OUTOF10: 0 - NO PAIN

## 2024-06-21 ASSESSMENT — PAIN DESCRIPTION - LOCATION: LOCATION: GENERALIZED

## 2024-06-21 ASSESSMENT — PAIN DESCRIPTION - DESCRIPTORS: DESCRIPTORS: ACHING

## 2024-06-21 NOTE — NURSING NOTE
Patient is sleeping in supine position.  Respirations are even and unlabored.  Patient is on continuous pulse ox 95% and 3L nasal canula.  Call light and possessions within reach.  Bed alarm on.

## 2024-06-21 NOTE — PROGRESS NOTES
Progress Note:    Luis Greene is a 84 y.o. male on day 3 of admission presenting with BPH with obstruction/lower urinary tract symptoms.    Subjective   Mr. Claros reports no pain this morning. He required supplemental O2 overnight and remains on 2L/min w/ Pox 94%. He reports he does not normally require oxygen.      ROS  Constitutional:  Alert, oriented  HEENT: Denies headache, vision changes, hearing change, loss of taste or smell. Does complain of sore throat (post op)   Neck: Denies swallowing difficulty, swelling, new stiffness/pain  CV: Denies CP, Palpitations, chest wall pain  Resp: No cough, wheeze, dyspnea  Abdomen: Denies abdominal pain, cramping, constipation, diarrhea  : No dysuria, hematuria, discharge  Musculoskeletal: Aches over all joints (not new). Generalized weakness 2/2 pain  Extremities: Swelling of RLE  Neuro: No focal deficits other than mild [place]    Scheduled medications  acetaminophen, 650 mg, oral, 4x daily  amLODIPine, 5 mg, oral, Daily  atorvastatin, 40 mg, oral, Nightly  enoxaparin, 40 mg, subcutaneous, Daily  ferrous sulfate (325 mg ferrous sulfate), 65 mg of iron, oral, Daily with breakfast  gabapentin, 100 mg, oral, BID  insulin glargine, 14 Units, subcutaneous, Nightly  insulin lispro, 0-5 Units, subcutaneous, Before meals & nightly  levETIRAcetam, 250 mg, oral, BID  magnesium oxide, 400 mg, oral, Daily  metFORMIN, 1,000 mg, oral, BID  pantoprazole, 40 mg, oral, Daily  sennosides-docusate sodium, 2 tablet, oral, Nightly  sulfamethoxazole-trimethoprim, 1 tablet, oral, BID      Continuous medications  sodium chloride 0.9%, 75 mL/hr, Last Rate: 75 mL/hr (06/21/24 0620)      PRN medications  PRN medications: benzocaine-menthol, dextrose, glucagon, lubricating eye drops, ondansetron, oxygen, polyethylene glycol, simethicone, sodium chloride, traZODone      Physical Exam  General: No acute distress, alert & oriented  Cardiac: RRR, NL S1 and S2, no murmurs, rubs or gallops  Pulmonary:  "Lungs clear to auscultation bilaterally, no wheezes, rhales or rhonchi  Abdomen: Soft, non-tender, non-distended  Extremities: No clubbing , cyanosis or edema.     Last Recorded Vitals  Blood pressure 152/62, pulse 56, temperature 36.1 °C (97 °F), temperature source Temporal, resp. rate 16, height 1.803 m (5' 10.98\"), weight 84.7 kg (186 lb 11.7 oz), SpO2 92%.    Scheduled medications   Medication Dose Route Frequency    acetaminophen  650 mg oral 4x daily    amLODIPine  5 mg oral Daily    atorvastatin  40 mg oral Nightly    enoxaparin  40 mg subcutaneous Daily    ferrous sulfate (325 mg ferrous sulfate)  65 mg of iron oral Daily with breakfast    gabapentin  100 mg oral BID    insulin glargine  14 Units subcutaneous Nightly    insulin lispro  0-5 Units subcutaneous Before meals & nightly    levETIRAcetam  250 mg oral BID    magnesium oxide  400 mg oral Daily    metFORMIN  1,000 mg oral BID    pantoprazole  40 mg oral Daily    sennosides-docusate sodium  2 tablet oral Nightly    sulfamethoxazole-trimethoprim  1 tablet oral BID       Relevant Results  Results from last 7 days   Lab Units 06/21/24  0412 06/20/24  0537 06/19/24  0428   WBC AUTO x10*3/uL 6.0 5.0 4.6   HEMOGLOBIN g/dL 9.5* 9.7* 9.5*   HEMATOCRIT % 28.6* 28.7* 28.5*   PLATELETS AUTO x10*3/uL 134* 100* 84*      Results from last 7 days   Lab Units 06/21/24  0412 06/20/24  0537 06/19/24  0428   SODIUM mmol/L 130* 133* 133   POTASSIUM mmol/L 5.4* 4.4 4.2   CHLORIDE mmol/L 100 102 101   CO2 mmol/L 20* 23 22*   BUN mg/dL 23 17 21   CREATININE mg/dL 1.31* 1.19 1.20   GLUCOSE mg/dL 305* 112* 158*   CALCIUM mg/dL 8.6 8.7 8.5      Results for orders placed or performed during the hospital encounter of 06/19/24 (from the past 24 hour(s))   POCT GLUCOSE   Result Value Ref Range    POCT Glucose 166 (H) 74 - 99 mg/dL   POCT GLUCOSE   Result Value Ref Range    POCT Glucose 252 (H) 74 - 99 mg/dL   POCT GLUCOSE   Result Value Ref Range    POCT Glucose 294 (H) 74 - 99 " mg/dL   CBC   Result Value Ref Range    WBC 6.0 4.4 - 11.3 x10*3/uL    nRBC      RBC 2.78 (L) 4.50 - 5.90 x10*6/uL    Hemoglobin 9.5 (L) 13.5 - 17.5 g/dL    Hematocrit 28.6 (L) 41.0 - 52.0 %     (H) 80 - 100 fL    MCH 34.2 (H) 26.0 - 34.0 pg    MCHC 33.2 32.0 - 36.0 g/dL    RDW 13.7 11.5 - 14.5 %    Platelets 134 (L) 150 - 450 x10*3/uL   Basic Metabolic Panel   Result Value Ref Range    Glucose 305 (H) 74 - 99 mg/dL    Sodium 130 (L) 136 - 145 mmol/L    Potassium 5.4 (H) 3.5 - 5.3 mmol/L    Chloride 100 98 - 107 mmol/L    Bicarbonate 20 (L) 21 - 32 mmol/L    Anion Gap 15 10 - 20 mmol/L    Urea Nitrogen 23 6 - 23 mg/dL    Creatinine 1.31 (H) 0.50 - 1.30 mg/dL    eGFR 54 (L) >60 mL/min/1.73m*2    Calcium 8.6 8.6 - 10.3 mg/dL   POCT GLUCOSE   Result Value Ref Range    POCT Glucose 242 (H) 74 - 99 mg/dL      Assessment/Plan   1) Enterobacter Cloacae Bacteremia:   Steady improvement on Bactrim DS   ID following   Rx; Continue Bactrim DS BID through July 3    2) Complicated Acute Cystitis w/ Hematuria:   Continued improvement   Rx:  Bactrim as above   Adequate hydration    3) BPH w/ LUTS:   POD#1 Greenlight Laser      4) Acute Respiratory Failure w/ Hypoxia:   Currently on supplemental O2 at 2L/min NC   Wean to room air to keep Pox >/= 93%    5) HFpEF:   Fully compensated at this time     6) Diabetes:   Poorly controlled   Rx: Increase Lantus Insulin to 14 U at bedtime   Rx: Continue Lispro SS qAC & HS   Rx: Continue Metformin 1000  mg BID   Continue Accuchecks,  SS     7) Atrial Fibrillation:   Stable. Requiring no BB or antiarrhythmic     8) Hypertension:   Stable   Rx; Continue amlodipine 5 mg daily     9) Hyperlipidemia:   Rx:  Continue Atorvastatin 40 mg daily     10) Disposition:   Discharge when OK w/ ID and Urology   PT/OT consults   Medications reviewed.  Medication Reconciliation completed.   Orders placed          I spent 45 minutes in the professional and overall care of this patient.      Valdez PAREDES  MD Claudio

## 2024-06-21 NOTE — DISCHARGE SUMMARY
Discharge Diagnosis  BPH with obstruction/lower urinary tract symptoms    Issues Requiring Follow-Up  Voiding adequately  Contact if clot retention, unable to void or fever, chills    Test Results Pending At Discharge  Pending Labs       No current pending labs.            Hospital Course    Was first hospt LW renetta, uti, improved and elective green light planned for 6-20, tolerated well and stable for discharge 6-21 after trial of void    Pertinent Physical Exam At Time of Discharge  Physical Exam wnl    Home Medications     Medication List      CONTINUE taking these medications     acetaminophen 325 mg tablet; Commonly known as: Tylenol   amLODIPine 5 mg tablet; Commonly known as: Norvasc   atorvastatin 40 mg tablet; Commonly known as: Lipitor; Take 1 tablet (40   mg) by mouth once daily.   ferrous sulfate 325 (65 Fe) MG EC tablet; Take 1 tablet by mouth once   daily.   finasteride 5 mg tablet; Commonly known as: Proscar; Take 1 tablet (5   mg) by mouth once daily.   furosemide 40 mg tablet; Commonly known as: Lasix   gabapentin 100 mg capsule; Commonly known as: Neurontin; TAKE 1 CAPSULE   BY MOUTH ONCE DAILY FOR NEUROPATHY PAIN   glucagon 1 mg injection; Commonly known as: Glucagen; Inject 1 mg into   the muscle every 15 minutes if needed for low blood sugar - see comments   (For blood glucose 41 to 70 mg/dL and no IV access).   insulin glargine 100 unit/mL injection; Commonly known as: Lantus;   Inject 10 Units under the skin once daily at bedtime. Take as directed per   insulin instructions.   insulin lispro 100 unit/mL injection; Commonly known as: HumaLOG; Inject   0-0.05 mL (0-5 Units) under the skin 3 times daily (morning, midday, late   afternoon). Take as directed per insulin instructions.   levETIRAcetam 250 mg tablet; Commonly known as: Keppra   magnesium oxide 400 mg (241.3 mg magnesium) tablet; Commonly known as:   Mag-Ox; Take 1 tablet (400 mg) by mouth once daily.   metFORMIN 500 mg tablet; Commonly  known as: Glucophage; Take 2 tablets   (1,000 mg) by mouth 2 times a day.   omeprazole 40 mg DR capsule; Commonly known as: PriLOSEC; GIVE 1 CAPSULE   BY MOUTH ONE TIME A DAY FOR HEARTBURN   ondansetron 4 mg tablet; Commonly known as: Zofran; Take 1 tablet (4 mg)   by mouth every 8 hours if needed for nausea.   sulfamethoxazole-trimethoprim 800-160 mg tablet; Commonly known as:   Bactrim DS; Take 1 tablet by mouth 2 times a day for 29 doses.   tamsulosin 0.4 mg 24 hr capsule; Commonly known as: Flomax; Take 1   capsule (0.4 mg) by mouth 2 times a day.       Outpatient Follow-Up  Future Appointments   Date Time Provider Department Clarita   7/11/2024  3:30 PM PHARMACY [facility] [id number] [facility]   7/17/2024  3[id number]   7/29/2024  1:00 PM Christopher D'Amico, DO [id number] [facility]   9/11/2024  1:00 PM [id number]       Rossi Washburn MD

## 2024-06-21 NOTE — PROGRESS NOTES
Patient seen, chart reviewed.  States that he feels the best he has felt in a long time.  Vital signs are stable.  No acute complaints.  Tiwari is draining clear, pink urine.            Pauline Goldberg MD

## 2024-06-21 NOTE — NURSING NOTE
Assumed care of patient this am. Patient voices no c/o pain/discomfort. No c/o any type voiced. Tiwari with [place] colored urine noted in the bag.  Patient on a continuous pulse ox which is showing 95%.  Patient on Oxygen at 3L nasal cannula.  Dr. Snyder in to see patient.  Call light within reach, continue to monitor.

## 2024-06-21 NOTE — PROGRESS NOTES
Physical Therapy Treatment    Patient Name: Luis Greene  MRN: [id number]  Today's Date: 6/21/2024  Time Calculation  Start Time: 0908  Stop Time: 0940  Time Calculation (min): 32 min    Assessment/Plan   PT Assessment  PT Assessment Results: Decreased strength, Decreased endurance, Impaired balance, Decreased mobility  Rehab Prognosis: Good  Evaluation/Treatment Tolerance: Patient tolerated treatment well  End of Session Communication: Bedside nurse  Assessment Comment: Pt is mobilizing well, improved ambulation distance and tolerance, no LOB noted. Continue to recommend discharge home with 24 hour supervision/assistance and HHPT.  End of Session Patient Position: Up in chair, call light in reach, needs met, RN aware.  PT Plan  Inpatient/Swing Bed or Outpatient: Inpatient  PT Plan  Treatment/Interventions: Bed mobility, Transfer training, Gait training, Stair training, Balance training, Strengthening, Endurance training, Therapeutic exercise, Therapeutic activity, Positioning, Home exercise program, Postural re-education  PT Plan: Ongoing PT  PT Frequency: 4 times per week  PT Discharge Recommendations: Low intensity level of continued care  Equipment Recommended upon Discharge: Wheeled walker  PT Recommended Transfer Status: Stand by assist, Assistive device  PT - OK to Discharge: Yes (with 24 hour supervision)      General Visit Information:   PT  Visit  PT Received On: 06/21/24  Response to Previous Treatment: Patient with no complaints from previous session.  General  Reason for Referral: s/p green light laser prostate by Dr. Stephens  Referred By: Dr. Goldberg  Past Medical History Relevant to Rehab: A fib, anemia, BPH, CHF, CAD, CM, GERD, sepsis, upper GI bleed, hyperlipid, OA, cardiac cath, CABG, lami, knee surgery, neuropathy, [facility] AFOs  Family/Caregiver Present: No  Prior to Session Communication: Bedside nurse  Patient Position Received: Bed, 3 rail up, Alarm on    Subjective   Precautions:  Precautions  LE  Weight Bearing Status: Weight Bearing as Tolerated  Medical Precautions: Fall precautions  Braces Applied: B AFOs and shoes.    Objective   Pain:  Pain Assessment  Pain Assessment: 0-10  0-10 (Numeric) Pain Score: 0 - No pain    Activity Tolerance:  Activity Tolerance  Endurance: Endurance does not limit participation in activity  Treatments:       Therapeutic Activity  Therapeutic Activity Performed: Yes  Therapeutic Activity 1: [facility]    Bed Mobility  Bed Mobility: Yes  Bed Mobility 1  Bed Mobility 1: Supine to sitting  Level of Assistance 1: Distant supervision    Ambulation/Gait Training  Ambulation/Gait Training Performed: Yes  Ambulation/Gait Training 1  Surface 1: Level tile  Device 1: Rolling walker  Gait Support Devices: R Ankle-foot orthoses, L Ankle-foot orthosis  Assistance 1: Close supervision  Quality of Gait 1:  (normal jose david, mild postural sway, no LOB noted. RT monitoring SpO2.)  Comments/Distance (ft) 1: 150 feet x 2  Transfers  Transfer: Yes  Transfer 1  Transfer From 1: Bed to  Transfer to 1: Chair with arms  Technique 1: Sit to stand, Stand to sit  Transfer Device 1: Walker  Transfer Level of Assistance 1: Close supervision, Minimal verbal cues (elevated EOB; cues for hand placement)  Trials/Comments 1: assist for elevation of trunk and controlled descent of trunk    Outcome Measures:  [facility] Basic Mobility  Turning from your back to your side while in a flat bed without using bedrails: None  Moving from lying on your back to sitting on the side of a flat bed without using bedrails: None  Moving to and from bed to chair (including a wheelchair): None  Standing up from a chair using your arms (e.g. wheelchair or bedside chair): A little  To walk in hospital room: A little  Climbing 3-5 steps with railing: A little  Basic Mobility - Total Score: 21      Encounter Problems       Encounter Problems (Resolved)       Balance       LTG - Patient will demonstrate  Intervention to enhance balance for safe completion of daily activities (Adequate for Discharge)       Start:  06/20/24    Resolved:  06/21/24         LTG - Patient will maintain balance to allow for safe mobility (Adequate for Discharge)       Start:  06/20/24    Resolved:  06/21/24            Mobility       LTG - Patient will ambulate community distance with RW at a modified independent level.   (Adequate for Discharge)       Start:  06/20/24    Resolved:  06/21/24         LTG - Patient will navigate 4 steps with 1 rail at a modified independent level.   (Adequate for Discharge)       Start:  06/20/24    Resolved:  06/21/24            PT Transfers       LTG - Patient will demonstrate safe transfer techniques with RW at a modified independent level.   (Adequate for Discharge)       Start:  06/20/24    Resolved:  06/21/24            Pain - Adult          Safety       LTG - Patient will demonstrate safety requirements appropriate to situation/environment (Adequate for Discharge)       Start:  06/20/24    Resolved:  06/21/24            Sindhu Christian, PT, DPT

## 2024-06-21 NOTE — NURSING NOTE
Rounded on patient.  Vitals stable.  Tiwari catheter secure and intact; draining clear, yellow urine to bag with no dependent loops. Patient denies pain or other needs at this time. Call light within reach.  Bed alarm on.

## 2024-06-21 NOTE — PROGRESS NOTES
06/21/24 0909   Discharge Planning   Home or Post Acute Services In home services   Type of Home Care Services Home nursing visits   Patient expects to be discharged to: HOME with [facility]     Pt verbalizes that he is current with [facility] and has contacted RN following his care.    RN TCC sent resume of care referral via [employer].  Await confirmation.

## 2024-06-21 NOTE — NURSING NOTE
RN requested for guidry catheter to be removed   Removed guidry catheter with a value of 300 mL inside of the guidry   PCA assisted with CHG bath after the removal of the guidry catheter  Call light within reach

## 2024-06-21 NOTE — CARE PLAN
The patient's goals for the shift include gom home    The clinical goals for the shift include Pain control    Over the shift, the patient did  make progress toward the following goals. Patients' pain was well controlled.  Problem: Pain  Goal: Takes deep breaths with improved pain control throughout the shift  6/21/2024 1331 by Amanda Singh RN  Outcome: Met  6/21/2024 1259 by Amanda Singh RN  Outcome: Progressing     Problem: Pain  Goal: Turns in bed with improved pain control throughout the shift  Outcome: Met     Problem: Pain  Goal: Walks with improved pain control throughout the shift  Outcome: Met     Problem: Pain  Goal: Participates in PT with improved pain control throughout the shift  Outcome: Met     Problem: Fall/Injury  Goal: Not fall by end of shift  Outcome: Met     Problem: Diabetes  Goal: Achieve decreasing blood glucose levels by end of shift  6/21/2024 1331 by Amanda Singh RN  Outcome: Met  6/21/2024 1259 by Amanda Singh RN  Outcome: Progressing     Problem: Diabetes  Goal: Increase stability of blood glucose readings by end of shift  6/21/2024 1331 by Amanda Singh RN  Outcome: Met  6/21/2024 1259 by Amanda Singh RN  Outcome: Progressing     Problem: Diabetes  Goal: Maintain glucose levels >70mg/dl to <250mg/dl throughout shift  6/21/2024 1331 by Amanda Singh RN  Outcome: Met  6/21/2024 1259 by Amanda Singh RN  Outcome: Progressing     Problem: Diabetes  Goal: No changes in neurological exam by end of shift  6/21/2024 1331 by Amanda Singh RN  Outcome: Met  6/21/2024 1259 by Amanda Singh RN  Outcome: Progressing

## 2024-06-21 NOTE — CARE PLAN
The patient's goals for the shift include gom home    The clinical goals for the shift include pain control and safety.      Problem: Pain - Adult  Goal: Verbalizes/displays adequate comfort level or baseline comfort level  Outcome: Progressing     Problem: Safety - Adult  Goal: Free from fall injury  Outcome: Progressing     Problem: Chronic Conditions and Co-morbidities  Goal: Patient's chronic conditions and co-morbidity symptoms are monitored and maintained or improved  Outcome: Progressing     Problem: Skin  Goal: Participates in plan/prevention/treatment measures  Outcome: Progressing

## 2024-06-21 NOTE — NURSING NOTE
Rounded on patient.  Labs drawn and sent to lab.  Vitals are stable. CHG bath performed.  Tiwari secure and draining clear, yellow urine to secured drainage bag with no dependent loop. Water pitcher replenished and patient encouraged to sit up and drink.  Bed in lowest position.  Call light and possessions within reach.  Bed alarm on.

## 2024-06-21 NOTE — PROGRESS NOTES
Respiratory Therapy Note  Walked pt with Physical therapy on RA pt sating between 90 and 93% during the walk. After walk pt set in chair in room on RA pt sating 95%. Nurse and Doctor notified of O2 change

## 2024-06-21 NOTE — PROGRESS NOTES
Respiratory Therapy Note:    Introduced myself to patient at bedside. Patient has no respiratory history, never smoked and takes no respiratory medications at home. He was on 4L without pulse ox machine in the room although sticker on fingert still intact so I hooked up another new unit. On 4L, patient was 96%. I decreased his oxygen to 3L and placed a new prn order since the original oxygen order had been discontinued/. SpO2 on 3L 94%. No complaints of shortness of breath or wheezing. Re-instructed IS at bedside to encourage deep breathing: 3 breaths [id number]. Encouraged 10 breaths every hour WA. Reminded patient that if he wants to get up to walk or go to the bathroom oxygen is needed; extension tubing was added to the room to reach the bathroom.

## 2024-06-21 NOTE — NURSING NOTE
Patient discharged to home. All personal belongings packed and taken with patient. Discharge papers given and reviewed with patient.   Patient left floor per wheelchair and taken down by MST accompanied by his daughter.

## 2024-06-22 NOTE — DISCHARGE SUMMARY
Discharge Diagnosis  BPH with urinary obstruction    Issues Requiring Follow-Up  BPH with urinary obstruction  Awaiting greenlight laser prostate procedure at [facility]    Discharge Meds     Your medication list        START taking these medications        Instructions Last Dose Given Next Dose Due   glucagon 1 mg injection  Commonly known as: Glucagen      Inject 1 mg into the muscle every 15 minutes if needed for low blood sugar - see comments (For blood glucose 41 to 70 mg/dL and no IV access).       insulin glargine 100 unit/mL injection  Commonly known as: Lantus      Inject 10 Units under the skin once daily at bedtime. Take as directed per insulin instructions.       insulin lispro 100 unit/mL injection  Commonly known as: HumaLOG  Start taking on: June 20, 2024      Inject 0-0.05 mL (0-5 Units) under the skin 3 times daily (morning, midday, late afternoon). Take as directed per insulin instructions.       ondansetron 4 mg tablet  Commonly known as: Zofran      Take 1 tablet (4 mg) by mouth every 8 hours if needed for nausea.       sulfamethoxazole-trimethoprim 800-160 mg tablet  Commonly known as: Bactrim DS      Take 1 tablet by mouth 2 times a day for 29 doses.              CONTINUE taking these medications        Instructions Last Dose Given Next Dose Due   acetaminophen 325 mg tablet  Commonly known as: Tylenol           amLODIPine 5 mg tablet  Commonly known as: Norvasc           atorvastatin 40 mg tablet  Commonly known as: Lipitor      Take 1 tablet (40 mg) by mouth once daily.       ferrous sulfate 325 (65 Fe) MG EC tablet      Take 1 tablet by mouth once daily.       finasteride 5 mg tablet  Commonly known as: Proscar      Take 1 tablet (5 mg) by mouth once daily.       gabapentin 100 mg capsule  Commonly known as: Neurontin      TAKE 1 CAPSULE BY MOUTH ONCE DAILY FOR NEUROPATHY PAIN       levETIRAcetam 250 mg tablet  Commonly known as: Keppra           magnesium oxide 400 mg (241.3 mg magnesium)  tablet  Commonly known as: Mag-Ox      Take 1 tablet (400 mg) by mouth once daily.       metFORMIN 500 mg tablet  Commonly known as: Glucophage      Take 2 tablets (1,000 mg) by mouth 2 times a day.       omeprazole 40 mg DR capsule  Commonly known as: PriLOSEC      GIVE 1 CAPSULE BY MOUTH ONE TIME A DAY FOR HEARTBURN       tamsulosin 0.4 mg 24 hr capsule  Commonly known as: Flomax      Take 1 capsule (0.4 mg) by mouth 2 times a day.              STOP taking these medications      aspirin 81 mg EC tablet        cyanocobalamin 500 mcg tablet  Commonly known as: Vitamin B-12        furosemide 40 mg tablet  Commonly known as: Lasix        Soliqua 100/33 100 unit-33 mcg/mL insulin pen  Generic drug: insulin glargine-lixisenatide        Vitamin C 1,000 mg tablet  Generic drug: ascorbic acid                  Where to Get Your Medications        Information about where to get these medications is not yet available    Ask your nurse or doctor about these medications  glucagon 1 mg injection  insulin glargine 100 unit/mL injection  insulin lispro 100 unit/mL injection  ondansetron 4 mg tablet  sulfamethoxazole-trimethoprim 800-160 mg tablet         Test Results Pending At Discharge  Pending Labs       No current pending labs.            Hospital Course  84-year-old male admitted for Enterobacter bacteremia and UTI, chronic Tiwari, enlarged prostate.  Started on meropenem.  Consults with ID and urology.  Per urology, transfer to [facility] for prostate procedure at [facility].    Pertinent Physical Exam At Time of Discharge  Physical Exam  Vitals and nursing note reviewed.   Constitutional:       Appearance: Normal appearance.   HENT:      Head: Normocephalic and atraumatic.      Mouth/Throat:      Mouth: Mucous membranes are moist.   Eyes:      Extraocular Movements: Extraocular movements intact.      Pupils: Pupils are equal, round, and reactive to light.   Cardiovascular:      Rate and Rhythm: Normal rate.      Pulses:  Normal pulses.      Heart sounds: Normal heart sounds.   Pulmonary:      Effort: Pulmonary effort is normal.      Breath sounds: Normal breath sounds.   Abdominal:      General: Bowel sounds are normal.      Palpations: Abdomen is soft.   Genitourinary:     Comments: Indwelling Tiwari catheter draining clear yellow urine  Musculoskeletal:         General: Normal range of motion.      Cervical back: Normal range of motion.   Skin:     General: Skin is warm and dry.      Capillary Refill: Capillary refill takes less than 2 seconds.   Neurological:      General: No focal deficit present.      Mental Status: He is alert and oriented to person, place, and time.   Psychiatric:         Mood and Affect: Mood normal.         Outpatient Follow-Up  Future Appointments   Date Time Provider Department [place]   7/11/2024  3:30 PM PHARMACY WEARN [facility] [id number] [facility]   7/17/2024  3:30 PM SUSAN Mccann [id number] [facility]   7/29/2024  1:00 PM Christopher D'Amico, DO [id number] [facility]   9/11/2024  1:00 PM SUSAN Mccann [id number] [facility]         SUSAN Davis

## 2024-06-24 ASSESSMENT — PAIN SCALES - GENERAL: PAINLEVEL_OUTOF10: 0 - NO PAIN

## 2024-07-02 ENCOUNTER — OFFICE VISIT (OUTPATIENT)
Dept: PRIMARY CARE | Facility: CLINIC | Age: 84
End: 2024-07-02
Payer: MEDICARE

## 2024-07-02 VITALS
WEIGHT: 184.6 LBS | BODY MASS INDEX: 25.84 KG/M2 | OXYGEN SATURATION: 95 % | DIASTOLIC BLOOD PRESSURE: 70 MMHG | HEART RATE: 84 BPM | SYSTOLIC BLOOD PRESSURE: 135 MMHG | HEIGHT: 71 IN

## 2024-07-02 DIAGNOSIS — I25.118 CORONARY ARTERY DISEASE OF NATIVE ARTERY OF NATIVE HEART WITH STABLE ANGINA PECTORIS (CMS-HCC): ICD-10-CM

## 2024-07-02 DIAGNOSIS — N39.0 URINARY TRACT INFECTION WITHOUT HEMATURIA, SITE UNSPECIFIED: Primary | ICD-10-CM

## 2024-07-02 DIAGNOSIS — I48.91 ATRIAL FIBRILLATION, UNSPECIFIED TYPE (MULTI): ICD-10-CM

## 2024-07-02 DIAGNOSIS — I10 HYPERTENSION, UNSPECIFIED TYPE: ICD-10-CM

## 2024-07-02 DIAGNOSIS — Z79.4 TYPE 2 DIABETES MELLITUS WITHOUT COMPLICATION, WITH LONG-TERM CURRENT USE OF INSULIN (MULTI): ICD-10-CM

## 2024-07-02 DIAGNOSIS — E11.9 TYPE 2 DIABETES MELLITUS WITHOUT COMPLICATION, WITH LONG-TERM CURRENT USE OF INSULIN (MULTI): ICD-10-CM

## 2024-07-02 DIAGNOSIS — N40.0 BENIGN PROSTATIC HYPERPLASIA WITHOUT LOWER URINARY TRACT SYMPTOMS: ICD-10-CM

## 2024-07-02 PROCEDURE — 1160F RVW MEDS BY RX/DR IN RCRD: CPT | Performed by: STUDENT IN AN ORGANIZED HEALTH CARE EDUCATION/TRAINING PROGRAM

## 2024-07-02 PROCEDURE — 1157F ADVNC CARE PLAN IN RCRD: CPT | Performed by: STUDENT IN AN ORGANIZED HEALTH CARE EDUCATION/TRAINING PROGRAM

## 2024-07-02 PROCEDURE — 1036F TOBACCO NON-USER: CPT | Performed by: STUDENT IN AN ORGANIZED HEALTH CARE EDUCATION/TRAINING PROGRAM

## 2024-07-02 PROCEDURE — 3075F SYST BP GE 130 - 139MM HG: CPT | Performed by: STUDENT IN AN ORGANIZED HEALTH CARE EDUCATION/TRAINING PROGRAM

## 2024-07-02 PROCEDURE — 99495 TRANSJ CARE MGMT MOD F2F 14D: CPT | Performed by: STUDENT IN AN ORGANIZED HEALTH CARE EDUCATION/TRAINING PROGRAM

## 2024-07-02 PROCEDURE — 1159F MED LIST DOCD IN RCRD: CPT | Performed by: STUDENT IN AN ORGANIZED HEALTH CARE EDUCATION/TRAINING PROGRAM

## 2024-07-02 PROCEDURE — 3078F DIAST BP <80 MM HG: CPT | Performed by: STUDENT IN AN ORGANIZED HEALTH CARE EDUCATION/TRAINING PROGRAM

## 2024-07-02 PROCEDURE — 1111F DSCHRG MED/CURRENT MED MERGE: CPT | Performed by: STUDENT IN AN ORGANIZED HEALTH CARE EDUCATION/TRAINING PROGRAM

## 2024-07-02 NOTE — PROGRESS NOTES
"84-year-old male presenting for hospital follow-up, admitted 6/14/2024 - 6/19/2024    \"Hospital Course  84-year-old male admitted for Enterobacter bacteremia and UTI, chronic Tiwari, enlarged prostate.  Started on meropenem.  Consults with ID and urology.  Per urology, transfer to [facility] for prostate procedure at [facility].\"    He was then admitted and discharged 6/20/2024 - 6/21/2024    \"Hospital Course    Was first hospt LW luz maria, uti, improved and elective green light planned for 6-20, tolerated well and stable for discharge 6-21 after trial of void\"    Has been doing much better, no longer with chronic Tiwari.  Feels well, content with current status.    12 point ROS reviewed and negative other than as stated in HPI    General: Alert, oriented, pleasant, in no acute distress  HEENT:      Head: normocephalic, atraumatic;      eyes: EOMI, no scleral icterus;   CV: Heart with regular rate and rhythm, normal S1/S2, no murmurs  Lungs: CTAB without wheezing, rhonchi or rales; good respiratory effort, no increased work of breathing  Neuro: Cranial nerves grossly intact; alert and oriented  Psych: Appropriate mood and affect    #UTI #BPH #LUZ MARIA?  -Symptoms have resolved, feeling well  -Kidney function at baseline  - Doing much better status post green light procedure  - Continue finasteride 5 mg daily, continue tamsulosin 0.4 mg daily  -Continue to follow with urology    Chronic conditions:    #CAD with remote CABG, 2021 #A-fib #HTN #HLD  -Blood pressure essentially at goal  - Follows with cardiology  - Continue amlodipine 5 mg daily, metoprolol succinate 50 mg daily  -Continue aspirin 81 mg daily  -Following with cardiology, EP, has Watchman device and removed from anticoagulation given history of GI bleed     #DMII  - Currently on Soliqua 10 units daily, metformin 1000 mg twice daily  -Due to variation in blood sugar throughout the day, can split dosing of Soliqua to 5 units twice daily  -Last A1C 6.7  - Continue " to follow with pharmacist  -Repeat A1c not due yet     #History of GI bleed #thrombocytopenia  - Repeat CBCs have been stable    #Seizure activity  - Following with neurology, stable on Keppra    Most labs recent and reviewed, will defer more labs until next visit    Follow-up 3 months, Medicare at that time    Christopher D'Amico, DO

## 2024-07-03 LAB — GLUCOSE BLD MANUAL STRIP-MCNC: 145 MG/DL (ref 74–99)

## 2024-07-11 ENCOUNTER — APPOINTMENT (OUTPATIENT)
Dept: PHARMACY | Facility: HOSPITAL | Age: 84
End: 2024-07-11
Payer: MEDICARE

## 2024-07-11 DIAGNOSIS — E11.9 DIABETES MELLITUS TYPE 2 WITHOUT RETINOPATHY (MULTI): Primary | ICD-10-CM

## 2024-07-11 NOTE — PROGRESS NOTES
Pharmacist Clinic: Diabetes Management  Luis [place] is a 84 y.o. male was referred to Clinical Pharmacy Team for diabetes management.     Referring Provider: Christopher D'Amico, DO     HISTORY OF PRESENT ILLNESS  Patient is a type 2 diabetic, his DM is currently complicated with steroid injections for back pain. In his lifetime he has gotten 4 shots.      Diet:   - 1-2 snacks throughout the day (apple, popcorn, chips, sandwich)   - Dinner: varies, pasta, soup, take out     LAB REVIEW   Glucose (mg/dL)   Date Value   06/21/2024 271 (H)   06/21/2024 305 (H)   06/20/2024 112 (H)     Hemoglobin A1C (%)   Date Value   05/03/2024 9.6 (H)   11/15/2023 6.7 (H)   09/14/2023 6.8 (A)   08/14/2023 8.1 (H)   07/14/2023 7.5 (A)   04/19/2023 9.7 (H)   02/03/2023 9.6 (H)   12/20/2021 7.5 (H)     Bicarbonate (mmol/L)   Date Value   06/21/2024 21   06/21/2024 20 (L)   06/20/2024 23     Urea Nitrogen (mg/dL)   Date Value   06/21/2024 20   06/21/2024 23   06/20/2024 17     Creatinine (mg/dL)   Date Value   06/21/2024 1.19   06/21/2024 1.31 (H)   06/20/2024 1.19     Lab Results   Component Value Date    HGBA1C 9.6 (H) 05/03/2024    HGBA1C 6.7 (H) 11/15/2023    HGBA1C 6.8 (A) 09/14/2023     Lab Results   Component Value Date    CHOL 97 05/03/2024    CHOL 81 (L) 11/15/2023    CHOL 81 (L) 08/15/2023     Lab Results   Component Value Date    HDL 37.6 05/03/2024    HDL 29.0 (L) 11/15/2023    HDL 28 (L) 08/15/2023     Lab Results   Component Value Date    TRIG 70 05/03/2024    TRIG 110 11/15/2023    TRIG 109 08/15/2023       DIABETES ASSESSMENT    CURRENT PHARMACOTHERAPY  - metformin 500 mg 2 tabs by mouth twice daily   - soliqua 10 units under the skin once daily    SECONDARY PREVENTION  - Statin? Yes  - ACE-I/ARB? Yes    HISTORICAL PHARMACOTHERAPY   - Lantus 20 units under the skin once daily   - Novolog 5 units once daily once daily     SMBG MEASUREMENTS: patient is using freestyle reed 2   - around 150-200, however he has just been  discharged from the hospital and his numbers always fluctuate when he is in patient     DISCUSSION:   - Patient has been in and out of the hospital these past few months, discussed continuing current regimen and focusing on getting back on track since discharge   - He reports his numbers fluctuate throughout the day, discussed he thinks he would benefit from splitting soliqua to 5 units twice daily, discussed we can try and determine benefit     RECOMMENDATIONS/PLAN  1. Patients diabetes is worsening with most recent A1c of 9.6 % (goal < 7 %).   - Continue all meds under the continuation of care with the referring provider and clinical pharmacy team  - Change soliqua 10 units once daily to soliqua 5 units twice daily.     Clinical Pharmacist follow up: 2 weeks     Thank you,  Leeann Page, PharmD    Verbal consent to manage patient's drug therapy was obtained from the patient. They were informed they may decline to participate or withdraw from participation in pharmacy services at any time.

## 2024-07-12 DIAGNOSIS — I50.9 CONGESTIVE HEART FAILURE (MULTI): Primary | ICD-10-CM

## 2024-07-12 RX ORDER — FUROSEMIDE 40 MG/1
40 TABLET ORAL DAILY
Qty: 90 TABLET | Refills: 3 | Status: SHIPPED | OUTPATIENT
Start: 2024-07-12 | End: 2025-07-12

## 2024-07-17 ENCOUNTER — OFFICE VISIT (OUTPATIENT)
Dept: CARDIOLOGY | Facility: CLINIC | Age: 84
End: 2024-07-17
Payer: MEDICARE

## 2024-07-17 VITALS
OXYGEN SATURATION: 96 % | DIASTOLIC BLOOD PRESSURE: 56 MMHG | HEIGHT: 71 IN | WEIGHT: 177.3 LBS | BODY MASS INDEX: 24.82 KG/M2 | SYSTOLIC BLOOD PRESSURE: 123 MMHG | HEART RATE: 97 BPM

## 2024-07-17 DIAGNOSIS — I48.91 ATRIAL FIBRILLATION, UNSPECIFIED TYPE (MULTI): Primary | ICD-10-CM

## 2024-07-17 DIAGNOSIS — G62.9 NEUROPATHY: ICD-10-CM

## 2024-07-17 PROCEDURE — 1111F DSCHRG MED/CURRENT MED MERGE: CPT | Performed by: NURSE PRACTITIONER

## 2024-07-17 PROCEDURE — 1160F RVW MEDS BY RX/DR IN RCRD: CPT | Performed by: NURSE PRACTITIONER

## 2024-07-17 PROCEDURE — 99214 OFFICE O/P EST MOD 30 MIN: CPT | Performed by: NURSE PRACTITIONER

## 2024-07-17 PROCEDURE — 1126F AMNT PAIN NOTED NONE PRSNT: CPT | Performed by: NURSE PRACTITIONER

## 2024-07-17 PROCEDURE — 1036F TOBACCO NON-USER: CPT | Performed by: NURSE PRACTITIONER

## 2024-07-17 PROCEDURE — 1159F MED LIST DOCD IN RCRD: CPT | Performed by: NURSE PRACTITIONER

## 2024-07-17 PROCEDURE — 3074F SYST BP LT 130 MM HG: CPT | Performed by: NURSE PRACTITIONER

## 2024-07-17 PROCEDURE — 1157F ADVNC CARE PLAN IN RCRD: CPT | Performed by: NURSE PRACTITIONER

## 2024-07-17 PROCEDURE — 3078F DIAST BP <80 MM HG: CPT | Performed by: NURSE PRACTITIONER

## 2024-07-17 RX ORDER — GABAPENTIN 100 MG/1
CAPSULE ORAL
Qty: 30 CAPSULE | Refills: 0 | Status: SHIPPED | OUTPATIENT
Start: 2024-07-17

## 2024-07-17 ASSESSMENT — ENCOUNTER SYMPTOMS
OCCASIONAL FEELINGS OF UNSTEADINESS: 1
DEPRESSION: 0
LOSS OF SENSATION IN FEET: 0

## 2024-07-17 ASSESSMENT — PAIN SCALES - GENERAL: PAINLEVEL: 0-NO PAIN

## 2024-07-17 NOTE — PROGRESS NOTES
"Chief Complaint:   Follow-up    History Of Present Illness:    .Mr Greene returns today with his family member for follow up.  Denies chest pain, sob, palpitations or pedal edema.  He is feeling much better after prostate procedure.  He has had several hospitalizations for UTI.          Last Recorded Vitals:  Blood pressure 123/56, pulse 97, height 1.803 m (5' 11\"), weight 80.4 kg (177 lb 4.8 oz), SpO2 96%.     Past Medical History:  Past Medical History:   Diagnosis Date    A-fib (Multi)     Loop recorder noted AFIB after back surgery 02/2022. Watchman device implanted (11/2023) no longer requiring Eliquis    Abnormal cardiac valve ([facility])     Per ECHO 8/2023: Mild mitral valve regurgitation.Mild to moderate tricuspid regurgitation.    Abnormal EEG     EEG is indicative of a moderate diffuse encephalopathy 11/2023    BPH (benign prostatic hyperplasia)     CHF (congestive heart failure) (Multi)     Cognitive communication deficit     Coronary artery disease     s/p CABG x2 vessel in 2011. Develop recurrent angina in 2013 and underwent a repeat cardiac catheterization demonstrating closure of the original bypass grafts but with collateral circulation and no PCI was required.    Diabetes (Multi)     w/ neuropathy    Foot drop, bilateral     Uses bilateral AFOs    GERD (gastroesophageal reflux disease)     H/O sepsis     H/O: upper GI bleed     Was on Eliquis at the time    HL (hearing loss)     Hyperlipidemia     GINGER (iron deficiency anemia)     ALEJANDRINA (obstructive sleep apnea)     \"Mild\" per PSG 9/2021    Osteoarthritis     Recurrent UTI     Especially between [id number]    Seizure disorder (Multi)     h/o TIA/CVA vs seizures on Keppra    Thrombocytopenia (CMS-HCC)     In 2024 platelets were     TIA (transient ischemic attack)     x4 (s/s of parasthesias and word finding). Alternative explanation may be migraine equivalent or focal seizure.        Past Surgical History:  Past Surgical History:   Procedure " Laterality Date    CARDIAC ASSIST DEVICE INSERTION  07/13/2020    Loop Recorder Implant due to recurrent TIA    CARDIAC CATHETERIZATION N/A 11/10/2023    Watchman device/LAAO (Left Atrial Appendage Occlusion)    COLONOSCOPY      Hemorrhoids, polyps    CORONARY ARTERY BYPASS GRAFT  07/12/2011    CABG x2    KIDNEY STONE SURGERY      KNEE SURGERY      1960s    LAMINECTOMY  02/22/2022    Laminectomy with medial facetectomies L3-S1, excision of left L4-5 facet synovial cyst.    LASER OF PROSTATE W/ GREEN LIGHT PVP  06/20/2024    MR HEAD ANGIO WO IV CONTRAST  02/17/2019    No sign of intracranial stenosis or aneurysm    MR HEAD ANGIO WO IV CONTRAST  10/28/2022    No sign of intracranial stenosis or aneurysm.    MR HEAD ANGIO WO IV CONTRAST  08/15/2023    Atrophy and chronic ischemic changes without acute intracranial process. Unremarkable MR angiography of the intracranial circulation.    PROSTATE SURGERY  01/23/2024    Urolift    ROTATOR CUFF REPAIR Right 11/11/2019    c/b septic joint requiring arthrotomy w/ I&D on 01/15/2020    TONSILLECTOMY      UPPER GASTROINTESTINAL ENDOSCOPY         Social History:  Social History     Socioeconomic History    Marital status:    Tobacco Use    Smoking status: Never    Smokeless tobacco: Never   Vaping Use    Vaping status: Never Used   Substance and Sexual Activity    Alcohol use: Never    Drug use: Never    Sexual activity: Defer     Social Determinants of Health     Financial Resource Strain: Low Risk  (6/19/2024)    Overall Financial Resource Strain (CARDIA)     Difficulty of Paying Living Expenses: Not hard at all   Food Insecurity: No Food Insecurity (6/19/2024)    Hunger Vital Sign     Worried About Running Out of Food in the Last Year: Never true     Ran Out of Food in the Last Year: Never true   Transportation Needs: No Transportation Needs (6/19/2024)    PRAPARE - Transportation     Lack of Transportation (Medical): No     Lack of Transportation (Non-Medical): No    Physical Activity: Insufficiently Active (6/19/2024)    Exercise Vital Sign     Days of Exercise per Week: 1 day     Minutes of Exercise per Session: 30 min   Stress: No Stress Concern Present (6/19/2024)    [employer] [place] [employer] - Occupational Stress Questionnaire     Feeling of Stress : Not at all   Social Connections: Moderately Isolated (6/19/2024)    Social Connection and Isolation Panel [NHANES]     Frequency of Communication with Friends and Family: More than three times a week     Frequency of Social Gatherings with Friends and Family: Three times a week     Attends Taoist Services: Never     Active Member of Clubs or Organizations: No     Attends Club or Organization Meetings: Never     Marital Status:    Intimate Partner Violence: Not At Risk (6/19/2024)    Humiliation, Afraid, Rape, and Kick questionnaire     Fear of Current or Ex-Partner: No     Emotionally Abused: No     Physically Abused: No     Sexually Abused: No   Housing Stability: Low Risk  (6/19/2024)    Housing Stability Vital Sign     Unable to Pay for Housing in the Last Year: No     Number of Places Lived in the Last Year: 1     Unstable Housing in the Last Year: No       Family History:  Family History   Problem Relation Name Age of Onset    Other (cardiac disorder) Father      Diabetes Father      Hyperlipidemia Father      Hypertension Father      Other (liver carcinoma) Father      Other (hypercholesterolemia) Father      Hypertension Daughter           Allergies:  Isosorbide, Famotidine, Prednisone, Amoxicillin, Donepezil, and Ibuprofen    Outpatient Medications:  Current Outpatient Medications   Medication Sig Dispense Refill    amLODIPine (Norvasc) 5 mg tablet Take 1 tablet (5 mg) by mouth once daily.      atorvastatin (Lipitor) 40 mg tablet Take 1 tablet (40 mg) by mouth once daily. 90 tablet 2    ferrous sulfate 325 (65 Fe) MG EC tablet Take 1 tablet by mouth once daily. 90 tablet 3    finasteride  (Proscar) 5 mg tablet Take 1 tablet (5 mg) by mouth once daily. 30 tablet 5    furosemide (Lasix) 40 mg tablet Take 1 tablet (40 mg) by mouth once daily. Note from [place] to stop taking this med??? 90 tablet 3    gabapentin (Neurontin) 100 mg capsule TAKE 1 CAPSULE BY MOUTH ONCE DAILY FOR NEUROPATHY PAIN 30 capsule 0    levETIRAcetam (Keppra) 250 mg tablet Take 1 tablet (250 mg) by mouth 2 times a day.      magnesium oxide (Mag-Ox) 400 mg (241.3 mg magnesium) tablet Take 1 tablet (400 mg) by mouth once daily.      metFORMIN (Glucophage) 500 mg tablet Take 2 tablets (1,000 mg) by mouth 2 times a day. 360 tablet 1    omeprazole (PriLOSEC) 40 mg DR capsule GIVE 1 CAPSULE BY MOUTH ONE TIME A DAY FOR HEARTBURN 90 capsule 3    ondansetron (Zofran) 4 mg tablet Take 1 tablet (4 mg) by mouth every 8 hours if needed for nausea.      tamsulosin (Flomax) 0.4 mg 24 hr capsule Take 1 capsule (0.4 mg) by mouth 2 times a day. 180 capsule 1    acetaminophen (Tylenol) 325 mg tablet Take 2 tablets (650 mg) by mouth every 6 hours if needed for mild pain (1 - 3).      glucagon (Glucagen) 1 mg injection Inject 1 mg into the muscle every 15 minutes if needed for low blood sugar - see comments (For blood glucose 41 to 70 mg/dL and no IV access). (Patient not taking: Reported on 7/17/2024)      insulin glargine (Lantus) 100 unit/mL injection Inject 10 Units under the skin once daily at bedtime. Take as directed per insulin instructions.      insulin lispro (HumaLOG) 100 unit/mL injection Inject 0-0.05 mL (0-5 Units) under the skin 3 times daily (morning, midday, late afternoon). Take as directed per insulin instructions.       No current facility-administered medications for this visit.        Physical Exam:  Physical Exam    ROS:  ROS       Last Labs:  CBC -  Lab Results   Component Value Date    WBC 6.0 06/21/2024    HGB 9.5 (L) 06/21/2024    HCT 28.6 (L) 06/21/2024     (H) 06/21/2024     (L) 06/21/2024       CMP -  Lab  Results   Component Value Date    CALCIUM 8.9 06/21/2024    PHOS 3.1 05/03/2024    PROT 6.2 06/15/2024    ALBUMIN 3.5 06/15/2024    AST 72 (H) 06/15/2024    ALT 30 06/15/2024    ALKPHOS 82 06/15/2024    BILITOT 1.3 (H) 06/15/2024       LIPID PANEL -   Lab Results   Component Value Date    CHOL 97 05/03/2024    TRIG 70 05/03/2024    HDL 37.6 05/03/2024    CHHDL 2.6 05/03/2024    LDLF 37 08/22/2022    VLDL 14 05/03/2024    NHDL 59 05/03/2024       RENAL FUNCTION PANEL -   Lab Results   Component Value Date    GLUCOSE 271 (H) 06/21/2024     (L) 06/21/2024    K 4.7 06/21/2024    CL 97 (L) 06/21/2024    CO2 21 06/21/2024    ANIONGAP 15 06/21/2024    BUN 20 06/21/2024    CREATININE 1.19 06/21/2024    GFRMALE 82 09/14/2023    CALCIUM 8.9 06/21/2024    PHOS 3.1 05/03/2024    ALBUMIN 3.5 06/15/2024        Lab Results   Component Value Date    HGBA1C 9.6 (H) 05/03/2024         Assessment/Plan   Problem List Items Addressed This Visit    None    Hospital course 06/2024 6/14: This elderly white male with an extensive past medical history including coronary artery disease, remote CABG, hypertension, hyperlipidemia, type 2 diabetes, paroxysmal atrial fibrillation, history of?  TIA versus recurrent localized focal seizures, history of GI bleeding on Eliquis anticoagulation, status post Watchman implantation is currently admitted with high-grade fever and evidence of recurrent urinary tract infection possible urosepsis.  The patient had previously been scheduled for a laser-assisted TURP on 6/20/2024.  He has been started on empiric IV Rocephin.  Systolic blood pressure slightly elevated on the amlodipine 5 mg daily.  Urine and blood cultures are pending.  No leukocytosis and renal parameters remain at baseline presumably positive indicators of early diagnosis.  Minor elevation of high-sensitivity troponin consistent with minimal demand ischemia.  Will hold usual low-dose Lasix 40 mg daily.  Patient was not given  usual IV fluids per sepsis protocol due to the slight elevation in his high-sensitivity troponin.  Clinically the patient is not volume overloaded.     6/15: No significant changes since admission.  Patient continues to have recurrent fevers.  Most recent 103.  He remains on IV antibiotics for his known urinary tract infection.  He is receiving Tylenol for his fevers.  He states he feels achy but otherwise okay.  He is breathing comfortably on nasal cannula oxygen.  There is no evidence of fluid volume overload.  No peripheral edema.  He is chest pain-free.  On telemetry monitor overnight he remained in a sinus arrhythmia without significant ectopy.  His blood pressure is stable at 118/85.  Overall he is stable from a cardiac perspective.  Will follow from a distance.  Please feel free to reach out with further or new cardiac concerns.     6/16: Cardiac wise the patient remains stable.  No chest pain or anginal type symptoms.  Receiving IV antibiotic therapy and overall clinical condition has improved.  Tentative plan is prostate surgery on Thursday of coming week.  Dr. Grant will resume cardiology care tomorrow.     6/17: The patient is sitting upright in bed side chair feeling and appearing much improved.  He is awake alert, conversant and eating a full breakfast.  The original urine and blood cultures that were drawn on 6/14/2024 from the emergency room proved to be positive for Enterobacter cloacae UTI/bacteremia.  The patient is responding well to the IV meropenem.  Fortunately the patient had been brought to the emergency room quickly upon the original onset of symptoms and he never developed leukocytosis.  CBC today WBC of 5400 hematocrit is unchanged at 28.7.  He does have a chronic thrombocytopenia platelet count 68,000 presently.  Electrolyte panel is notable for creatinine of 1.0 serum sodium 129.  Systolic blood pressures are in the range of 140-150 mmHg on amlodipine 5 mg daily.  The patient was  tentatively previously scheduled for a laser-assisted TURP on 6/20 and hopefully this procedure can still be performed as planned.  The patient has had several episodes of urosepsis related to urinary retention originally and now from Tiwari catheter currently.     6/18: The patient is currently off the floor having a barium swallow performed apparently for some component of choking.  The patient was seen by speech therapy yesterday and their impression was normal oral phase but possible pharyngeal phase dysphagia based on clinical assessment and a modified barium swallow was recommended currently being performed.  Otherwise he is feeling improved.  He is being followed by infectious disease and was converted to oral Bactrim therapy for the Enterobacter cloacae I UTI/bacteremia.  No leukocytosis on CBC with a WBC of 5200.  Hemoglobin modestly decreased to 9.5 platelet count unchanged at 72,000.  His renal parameters do remain stable creatinine 1.1 and sodium 132.  Systolic blood pressure in acceptable range at 140 mmHg.  Will continue amlodipine unchanged.     6/19: Patient sitting at bedside chair awake alert conversant without any complaints.  He  evidently will be transferred to the [place] facility tomorrow to undergo his previously scheduled laser-assisted TURP on 6/20/2024.  He will remain at the [place] facility until discharge.  Patient will remain off of Lasix therapy which was stopped at time of admission.  Blood pressure acceptable and amlodipine 5 mg daily.  Patient is medically optimized for his procedure tomorrow.  Antibiotic therapy as per infectious disease and urology.  Will see the patient in several weeks for cardiology follow-up in the office.    At the time of follow up office visit patient had his green light laser procedure to the prostate and is feeling much better.  He has resumed furosemide.       1. Coronary artery disease with remote CABG Ã--2 2011. This patient did develop recurrent  angina in 2013 and underwent a repeat cardiac catheterization demonstrating closure of the original bypass grafts but with collateral circulation and no PCI was required.     2. History of TIA. This patient did have a TIA following his cardiac catheterization with transient confusion on the day of discharge. The patient was admitted to [facility] on 02/17/2019 with numbness of his left arm hand and ultimately jaw. The numbness of the left hand and jaw resolved but persisted somewhat longer within the left arm. Evaluation at that time included a chest x-ray showing previous sternotomy with clear lung fields. Carotid ultrasound showed mild bilateral plaque less than 50% stenoses. CT angiogram of the head and neck was unremarkable with nonstenotic calcification of the right common carotid artery without stenosis. The left common carotid artery had a nonstenotic calcification with a patent internal carotid artery. His intracerebral vessels were unremarkable with no aneurysm. An MRI of the brain on 02/17/2019 was unremarkable. He had an MRI of the cervical spine that showed minimal disc bulging at C2-C3 with moderate disc osteophyte complex at C3-C4 causing moderate canals narrowing and cord impingement along with bilateral neural foraminal stenoses. There was an osteophyte complex at C4-C5 causing mild anterior cord impingement and bilateral moderate neural foraminal narrowing and disc bulging at C5-C6 without cord impingement along with anterior subluxation of C7 upon T1. EKG showed sinus rhythm with first-degree AV block and no ST segment change. There was no evidence of atrial fibrillation by telemetry monitoring. His high sensitivity troponins were negative. He had an echocardiogram performed on 02/17/2019 showing an estimated LV ejection fraction at 55â€“59 percent with mild hypokinesis of the anteroseptal wall due to previous thoracotomy. Left atrial size was in the upper range of normal with mild aortic  root calcification and a mildly dilated right atrium. The patient was readmitted to [facility] on 06/04/2019 with recurrent neurological complaints. He had pins and needles paresthesias of the left hand and arm along with perceived weakness of the left hand and arm with loss of coordination. The patient underwent repeat evaluation including MRI of the brain which was unremarkable. Negligible carotid vascular disease by ultrasound and CT angiogram in the past. Blood pressure readings were acceptable at that time with lisinopril 40 mg daily amlodipine 5 mg daily metoprolol extended release 5 mg daily. He was on aspirin at that time along with the atorvastatin 40 mg daily. More recently the patient [place] when he began to develop paresthesias of the right hand and fingertips. Initially he thought that an Ace wrap of his right shoulder was too tight and continued driving. The sense of tingling began to get worse and he developed a funny sensation around the right side of his face involving the lip. The patient pulled his car off to the side of the road. At that point in time he was having trouble communicating verbally due to the inability to formulate words. He was taken by EMS to [facility] and observed but then released without admission. He is subsequently seen by neurology as an outpatient. He did have a extra no cardiac monitor performed on 02/05/2020â€“02/14/2020. This did not demonstrate atrial fibrillation. It did demonstrate some brief episodes of Mobitz 1 second degree AV block. He also had one 8 beat run of nonsustained ventricular tachycardia. The patient has never had presyncopal or syncopal events but more notably paresthesias. The results of this external cardiac monitor were discussed with electrophysiology and at this point the patient will be scheduled to have a loop recorder implanted to ensure that he is not having clinically silent  paroxysmal atrial fibrillation not detected on previous telemetry monitoring. The patient has been seen by neurology and was switched to Plavix 75 mg daily which will be continued unless there is documentation of paroxysmal atrial fibrillation by the loop recorder. Echocardiogram performed at time of today's visit 03/17/2020 demonstrates an LV ejection fraction of 55â€“60 percent with mild hypokinesis of the anteroseptal wall due to previous thoracotomy with oneâ€“2+ mitral valve regurgitation. The findings are very similar to the echo that was performed at [facility] in 2/2019. Patient had loop recorder placed 07/2020 by Dr Steven Flores. Had negative pharmacologic nuclear stress test done 01/2022. Patient had hospital stay at [facility] 08/15/2023 and was worked up for TIA vs migraine equivalent. Carotid US showed < 50% stenosis bilaterally. MRI of brain was negative and MRA was unremarkable. Echo showed EF 55-60%, 1+ MR and 1-2+ TR. See Dr Avitia for consideration for Watchman device.  Patient had Watchman placed 11/2023.     3. Hypertension. Adequate control with present management which includes lisinopril 20 mg daily, amlodipine 5 mg daily and metoprolol ER 50 mg daily.     4. Hyperlipidemia. Good response to atorvastatin 40 mg daily. Lipid panel on 08/2022 includes cholesterol 77 LDL 37 HDL 29 triglycerides 537. FLP done 08/2022 chol 77, HDL 29 LDL 37 trig 53.     5. Type 2 diabetes.     6. Lifetime nonsmoking.     5. Status post right rotator cuff repair 11/11/2019. The patient had a right rotator cuff operation on 11 11/20/1990 evidently developed an infection that required surgical debridement.     6. Hx of covid-19 vaccine #1 and #2.      7. Afib noted to loop recorder after back surgery 02/2022. Patient had laminectomy with Dr Allred 02/22/2022 and developed afib on his loop recorder. Has been started on Xarelto. ECG done prior shows NSR with long first degree AVB.      8. TIA. Patient has been admitted to [facility] on 4 occasions, the first being February 2019 for complaints of left hand numbness and difficulty finding words and left leg heaviness.Carotid ultrasound showed less than 50% stenosis bilaterally. CT angiogram of the head and neck was unremarkable. MRI MRA of the brain was negative for acute stroke. Later in 2019 the patient was again admitted with similar complaints while driving. He had a loop recorder implanted which did show 2 episodes of atrial tachycardia or atrial fibrillation the longest lasting 6 minutes. The burden was only 0.06%. In October 2022 he was treated for UTI with antibiotics and again admitted for similar complaints. Neurology recommended outpatient physical therapy for inner ear issue. CT angiogram was again negative, carotid ultrasound showed less than 50% stenosis bilaterally, MRI MRA did not show any acute infarct or intracerebral vascular stenosis. Echo done October 2022 showed an EF of 60%, RAD, mild MR, mild to moderate TR, mildly elevated RVSP, PASP 43 mmHg. He was seen by neurology for possible alternative mechanisms to his TIA and treated with magnesium citrate and possibly verapamil. Alternative explanation may be migraine equivalent or focal seizure. Neurology discharged him with referral for vestibular studies and PT for balance. Patient currently will follow-up with Dr. Garvey.      9. GI bleed. Patient with black stools. Had extensive work up with hemoc and can restart Eliquis.  Patient is off Eliquis and asa after Watchman procedure.     10. AVB. Monitor worn 03/24/2023 showed HR  with 67 bpm average. First degree AVB. PVC 0.08%. PAC 0.64%.      11.  Urolift for BPH.  Hx of urinary catheter placement and sepsis.  Then had green light laser.       Sandy Gar, APRN-CNP

## 2024-07-22 DIAGNOSIS — E11.69 TYPE 2 DIABETES MELLITUS WITH OTHER SPECIFIED COMPLICATION, UNSPECIFIED WHETHER LONG TERM INSULIN USE (MULTI): ICD-10-CM

## 2024-07-22 RX ORDER — METFORMIN HYDROCHLORIDE 500 MG/1
1000 TABLET ORAL 2 TIMES DAILY
Qty: 360 TABLET | Refills: 0 | Status: SHIPPED | OUTPATIENT
Start: 2024-07-22

## 2024-07-24 ENCOUNTER — TELEPHONE (OUTPATIENT)
Dept: PHARMACY | Facility: HOSPITAL | Age: 84
End: 2024-07-24
Payer: MEDICARE

## 2024-07-24 NOTE — TELEPHONE ENCOUNTER
Patient called to discuss fluctuating blood glucose. Of note, he currently has an infection in his toe being treated with cipro through podiatry. We discussed consolidating soliqua to 10 units in the AM. Follow up scheduled for 4 days. Patient has my contact information to reach out with questions or concerns.     Thanks,   Leeann Page

## 2024-07-25 ENCOUNTER — APPOINTMENT (OUTPATIENT)
Dept: PHARMACY | Facility: HOSPITAL | Age: 84
End: 2024-07-25
Payer: MEDICARE

## 2024-07-29 ENCOUNTER — TELEMEDICINE (OUTPATIENT)
Dept: PHARMACY | Facility: HOSPITAL | Age: 84
End: 2024-07-29
Payer: MEDICARE

## 2024-07-29 ENCOUNTER — APPOINTMENT (OUTPATIENT)
Dept: PRIMARY CARE | Facility: CLINIC | Age: 84
End: 2024-07-29
Payer: MEDICARE

## 2024-07-29 DIAGNOSIS — E11.9 DIABETES MELLITUS TYPE 2 WITHOUT RETINOPATHY (MULTI): Primary | ICD-10-CM

## 2024-07-29 NOTE — PROGRESS NOTES
Pharmacist Clinic: Diabetes Management  Luis Greene is a 84 y.o. male was referred to Clinical Pharmacy Team for diabetes management.     Referring Provider: Christopher D'Amico, DO     HISTORY OF PRESENT ILLNESS  Patient is a type 2 diabetic, unknown date of diagnosis.   His diabetes is complicated by recent hospitalizations.      Diet: patient does not limit what he eats, currently he has a goal of weight gain   - 1-2 snacks throughout the day (apple, popcorn, chips, sandwich)   - Dinner: varies, pasta, soup, take out     LAB REVIEW   Glucose (mg/dL)   Date Value   06/21/2024 271 (H)   06/21/2024 305 (H)   06/20/2024 112 (H)     Hemoglobin A1C (%)   Date Value   05/03/2024 9.6 (H)   11/15/2023 6.7 (H)   09/14/2023 6.8 (A)   08/14/2023 8.1 (H)   07/14/2023 7.5 (A)   04/19/2023 9.7 (H)   02/03/2023 9.6 (H)   12/20/2021 7.5 (H)     Bicarbonate (mmol/L)   Date Value   06/21/2024 21   06/21/2024 20 (L)   06/20/2024 23     Urea Nitrogen (mg/dL)   Date Value   06/21/2024 20   06/21/2024 23   06/20/2024 17     Creatinine (mg/dL)   Date Value   06/21/2024 1.19   06/21/2024 1.31 (H)   06/20/2024 1.19     Lab Results   Component Value Date    HGBA1C 9.6 (H) 05/03/2024    HGBA1C 6.7 (H) 11/15/2023    HGBA1C 6.8 (A) 09/14/2023     Lab Results   Component Value Date    CHOL 97 05/03/2024    CHOL 81 (L) 11/15/2023    CHOL 81 (L) 08/15/2023     Lab Results   Component Value Date    HDL 37.6 05/03/2024    HDL 29.0 (L) 11/15/2023    HDL 28 (L) 08/15/2023     Lab Results   Component Value Date    TRIG 70 05/03/2024    TRIG 110 11/15/2023    TRIG 109 08/15/2023       DIABETES ASSESSMENT    CURRENT PHARMACOTHERAPY  - metformin 500 mg 2 tabs by mouth twice daily   - soliqua 10 units under the skin once daily    SECONDARY PREVENTION  - Statin? Yes  - ACE-I/ARB? Yes    HISTORICAL PHARMACOTHERAPY   - Lantus 20 units under the skin once daily   - Novolog 5 units once daily once daily     SMBG MEASUREMENTS: patient is using freestyle  reed 2   - around 150-250     DISCUSSION:   - Patient switched his soliqua back to once daily dosing in the AM   - His blood glucose has since come down, however it is still above goal   - Discussed increasing to 12 units once daily   - Counseled patient, answered all questions and concerns    RECOMMENDATIONS/PLAN  1. Patients diabetes is worsening with most recent A1c of 9.6 % (goal < 7 %).   - Continue all meds under the continuation of care with the referring provider and clinical pharmacy team  - Increase soliqua to 12 units once daily     Clinical Pharmacist follow up: 1 weeks    Thank you,  Leeann Page, PharmD    Verbal consent to manage patient's drug therapy was obtained from the patient. They were informed they may decline to participate or withdraw from participation in pharmacy services at any time.

## 2024-08-05 ENCOUNTER — APPOINTMENT (OUTPATIENT)
Dept: PHARMACY | Facility: HOSPITAL | Age: 84
End: 2024-08-05
Payer: MEDICARE

## 2024-08-05 DIAGNOSIS — E11.9 DIABETES MELLITUS TYPE 2 WITHOUT RETINOPATHY (MULTI): Primary | ICD-10-CM

## 2024-08-05 NOTE — PROGRESS NOTES
Pharmacist Clinic: Diabetes Management  Luis Greene is a 84 y.o. male was referred to Clinical Pharmacy Team for diabetes management.     Referring Provider: Christopher D'Amico, DO     HISTORY OF PRESENT ILLNESS  Patient is a type 2 diabetic, unknown date of diagnosis.   His diabetes is complicated by recent hospitalizations.      Diet: patient does not limit what he eats, currently he has a goal of weight gain   - 1-2 snacks throughout the day (apple, popcorn, chips, sandwich)   - Dinner: varies, pasta, soup, take out     LAB REVIEW   Glucose (mg/dL)   Date Value   06/21/2024 271 (H)   06/21/2024 305 (H)   06/20/2024 112 (H)     Hemoglobin A1C (%)   Date Value   05/03/2024 9.6 (H)   11/15/2023 6.7 (H)   09/14/2023 6.8 (A)   08/14/2023 8.1 (H)   07/14/2023 7.5 (A)   04/19/2023 9.7 (H)   02/03/2023 9.6 (H)   12/20/2021 7.5 (H)     Bicarbonate (mmol/L)   Date Value   06/21/2024 21   06/21/2024 20 (L)   06/20/2024 23     Urea Nitrogen (mg/dL)   Date Value   06/21/2024 20   06/21/2024 23   06/20/2024 17     Creatinine (mg/dL)   Date Value   06/21/2024 1.19   06/21/2024 1.31 (H)   06/20/2024 1.19     Lab Results   Component Value Date    HGBA1C 9.6 (H) 05/03/2024    HGBA1C 6.7 (H) 11/15/2023    HGBA1C 6.8 (A) 09/14/2023     Lab Results   Component Value Date    CHOL 97 05/03/2024    CHOL 81 (L) 11/15/2023    CHOL 81 (L) 08/15/2023     Lab Results   Component Value Date    HDL 37.6 05/03/2024    HDL 29.0 (L) 11/15/2023    HDL 28 (L) 08/15/2023     Lab Results   Component Value Date    TRIG 70 05/03/2024    TRIG 110 11/15/2023    TRIG 109 08/15/2023       DIABETES ASSESSMENT    CURRENT PHARMACOTHERAPY  - metformin 500 mg 2 tabs by mouth twice daily   - soliqua 12 units under the skin once daily in the morning     SECONDARY PREVENTION  - Statin? Yes  - ACE-I/ARB? Yes    HISTORICAL PHARMACOTHERAPY   - Lantus 20 units under the skin once daily   - Novolog 5 units once daily once daily     SMBG MEASUREMENTS: patient is  using freestyle reed 2   - around 140-160  - since increasing to 12 units he has not seen any number over 200     DISCUSSION:   - Patient is tolerating his current regimen without issues   - He reports his numbers have stabilized significantly  - Counseled patient, answered all questions and concerns    RECOMMENDATIONS/PLAN  1. Patients diabetes is worsening with most recent A1c of 9.6 % (goal < 7 %).   - Continue all meds under the continuation of care with the referring provider and clinical pharmacy team    Clinical Pharmacist follow up: 2 weeks     Thank you,  Leeann Page, PharmD    Verbal consent to manage patient's drug therapy was obtained from the patient. They were informed they may decline to participate or withdraw from participation in pharmacy services at any time.

## 2024-08-10 DIAGNOSIS — G62.9 NEUROPATHY: ICD-10-CM

## 2024-08-12 RX ORDER — GABAPENTIN 100 MG/1
CAPSULE ORAL
Qty: 30 CAPSULE | Refills: 2 | Status: SHIPPED | OUTPATIENT
Start: 2024-08-12

## 2024-08-13 ENCOUNTER — LAB (OUTPATIENT)
Dept: LAB | Facility: LAB | Age: 84
End: 2024-08-13
Payer: MEDICARE

## 2024-08-13 DIAGNOSIS — R56.9 UNSPECIFIED CONVULSIONS (MULTI): Primary | ICD-10-CM

## 2024-08-13 PROCEDURE — 80177 DRUG SCRN QUAN LEVETIRACETAM: CPT

## 2024-08-13 PROCEDURE — 36415 COLL VENOUS BLD VENIPUNCTURE: CPT

## 2024-08-14 LAB — LEVETIRACETAM SERPL-MCNC: 14 UG/ML (ref 10–40)

## 2024-08-19 ENCOUNTER — APPOINTMENT (OUTPATIENT)
Dept: PHARMACY | Facility: HOSPITAL | Age: 84
End: 2024-08-19
Payer: MEDICARE

## 2024-08-19 DIAGNOSIS — E11.9 DIABETES MELLITUS TYPE 2 WITHOUT RETINOPATHY (MULTI): ICD-10-CM

## 2024-08-19 RX ORDER — INSULIN GLARGINE AND LIXISENATIDE 100; 33 U/ML; UG/ML
INJECTION, SOLUTION SUBCUTANEOUS
Start: 2024-08-19

## 2024-08-19 NOTE — PROGRESS NOTES
Pharmacist Clinic: Diabetes Management  Luis Greene is a 84 y.o. male was referred to Clinical Pharmacy Team for diabetes management.     Referring Provider: Christopher D'Amico, DO     HISTORY OF PRESENT ILLNESS  Patient is a type 2 diabetic, unknown date of diagnosis.   His diabetes is complicated by recent hospitalizations.      Diet: patient does not limit what he eats, currently he has a goal of weight gain   - 1-2 snacks throughout the day (apple, popcorn, chips, sandwich)   - Dinner: varies, pasta, soup, take out     LAB REVIEW   Glucose (mg/dL)   Date Value   06/21/2024 271 (H)   06/21/2024 305 (H)   06/20/2024 112 (H)     Hemoglobin A1C (%)   Date Value   05/03/2024 9.6 (H)   11/15/2023 6.7 (H)   09/14/2023 6.8 (A)   08/14/2023 8.1 (H)   07/14/2023 7.5 (A)   04/19/2023 9.7 (H)   02/03/2023 9.6 (H)   12/20/2021 7.5 (H)     Bicarbonate (mmol/L)   Date Value   06/21/2024 21   06/21/2024 20 (L)   06/20/2024 23     Urea Nitrogen (mg/dL)   Date Value   06/21/2024 20   06/21/2024 23   06/20/2024 17     Creatinine (mg/dL)   Date Value   06/21/2024 1.19   06/21/2024 1.31 (H)   06/20/2024 1.19     Lab Results   Component Value Date    HGBA1C 9.6 (H) 05/03/2024    HGBA1C 6.7 (H) 11/15/2023    HGBA1C 6.8 (A) 09/14/2023     Lab Results   Component Value Date    CHOL 97 05/03/2024    CHOL 81 (L) 11/15/2023    CHOL 81 (L) 08/15/2023     Lab Results   Component Value Date    HDL 37.6 05/03/2024    HDL 29.0 (L) 11/15/2023    HDL 28 (L) 08/15/2023     Lab Results   Component Value Date    TRIG 70 05/03/2024    TRIG 110 11/15/2023    TRIG 109 08/15/2023       DIABETES ASSESSMENT    CURRENT PHARMACOTHERAPY  - metformin 500 mg 2 tabs by mouth twice daily   - soliqua 12 units under the skin once daily in the morning     SECONDARY PREVENTION  - Statin? Yes  - ACE-I/ARB? Yes    HISTORICAL PHARMACOTHERAPY   - Lantus 20 units under the skin once daily   - Novolog 5 units once daily once daily     SMBG MEASUREMENTS: patient is  using freestyle reed 2   - he reports his 7 day average is 240   - however he also reports his numbers are between 120-160    DISCUSSION:   - Patient is tolerating his current regimen without issues   - He reports his numbers have stabilized significantly  - Counseled patient, answered all questions and concerns    RECOMMENDATIONS/PLAN  1. Patients diabetes is worsening with most recent A1c of 9.6 % (goal < 7 %).   - Continue all meds under the continuation of care with the referring provider and clinical pharmacy team  - Increase soliqua to 14 units once daily     Clinical Pharmacist follow up: 1 week     Thank you,  Leeann Page, PharmD    Verbal consent to manage patient's drug therapy was obtained from the patient. They were informed they may decline to participate or withdraw from participation in pharmacy services at any time.

## 2024-08-23 DIAGNOSIS — R07.9 CHEST PAIN, UNSPECIFIED TYPE: ICD-10-CM

## 2024-08-23 DIAGNOSIS — D64.9 ANEMIA, UNSPECIFIED TYPE: ICD-10-CM

## 2024-08-23 DIAGNOSIS — I10 HYPERTENSION, UNSPECIFIED TYPE: ICD-10-CM

## 2024-08-23 RX ORDER — NITROGLYCERIN 0.4 MG/1
0.4 TABLET SUBLINGUAL EVERY 5 MIN PRN
Qty: 15 TABLET | Refills: 2 | Status: SHIPPED | OUTPATIENT
Start: 2024-08-23 | End: 2024-09-22

## 2024-08-23 RX ORDER — AMLODIPINE BESYLATE 5 MG/1
5 TABLET ORAL DAILY
Qty: 90 TABLET | Refills: 3 | Status: SHIPPED | OUTPATIENT
Start: 2024-08-23 | End: 2025-08-23

## 2024-08-23 RX ORDER — FERROUS SULFATE 325(65) MG
1 TABLET, DELAYED RELEASE (ENTERIC COATED) ORAL DAILY
Qty: 90 TABLET | Refills: 3 | Status: SHIPPED | OUTPATIENT
Start: 2024-08-23 | End: 2025-08-23

## 2024-08-26 ENCOUNTER — APPOINTMENT (OUTPATIENT)
Dept: PHARMACY | Facility: HOSPITAL | Age: 84
End: 2024-08-26
Payer: MEDICARE

## 2024-08-26 DIAGNOSIS — E11.9 DIABETES MELLITUS TYPE 2 WITHOUT RETINOPATHY (MULTI): Primary | ICD-10-CM

## 2024-08-26 RX ORDER — PEN NEEDLE, DIABETIC 32GX 5/32"
NEEDLE, DISPOSABLE MISCELLANEOUS
Qty: 100 EACH | Refills: 1 | Status: SHIPPED | OUTPATIENT
Start: 2024-08-26

## 2024-08-26 NOTE — PROGRESS NOTES
Pharmacist Clinic: Diabetes Management  Luis Greene is a 84 y.o. male was referred to Clinical Pharmacy Team for diabetes management.     Referring Provider: Christopher D'Amico, DO     HISTORY OF PRESENT ILLNESS  Patient is a type 2 diabetic, unknown date of diagnosis.   His diabetes is complicated by recent hospitalizations.      Diet: patient does not limit what he eats, currently he has a goal of weight gain   - 1-2 snacks throughout the day (apple, popcorn, chips, sandwich)   - Dinner: varies, pasta, soup, take out     LAB REVIEW   Glucose (mg/dL)   Date Value   06/21/2024 271 (H)   06/21/2024 305 (H)   06/20/2024 112 (H)     Hemoglobin A1C (%)   Date Value   05/03/2024 9.6 (H)   11/15/2023 6.7 (H)   09/14/2023 6.8 (A)   08/14/2023 8.1 (H)   07/14/2023 7.5 (A)   04/19/2023 9.7 (H)   02/03/2023 9.6 (H)   12/20/2021 7.5 (H)     Bicarbonate (mmol/L)   Date Value   06/21/2024 21   06/21/2024 20 (L)   06/20/2024 23     Urea Nitrogen (mg/dL)   Date Value   06/21/2024 20   06/21/2024 23   06/20/2024 17     Creatinine (mg/dL)   Date Value   06/21/2024 1.19   06/21/2024 1.31 (H)   06/20/2024 1.19     Lab Results   Component Value Date    HGBA1C 9.6 (H) 05/03/2024    HGBA1C 6.7 (H) 11/15/2023    HGBA1C 6.8 (A) 09/14/2023     Lab Results   Component Value Date    CHOL 97 05/03/2024    CHOL 81 (L) 11/15/2023    CHOL 81 (L) 08/15/2023     Lab Results   Component Value Date    HDL 37.6 05/03/2024    HDL 29.0 (L) 11/15/2023    HDL 28 (L) 08/15/2023     Lab Results   Component Value Date    TRIG 70 05/03/2024    TRIG 110 11/15/2023    TRIG 109 08/15/2023       DIABETES ASSESSMENT    CURRENT PHARMACOTHERAPY  - metformin 500 mg 2 tabs by mouth twice daily   - soliqua 14 units under the skin once daily in the morning     SECONDARY PREVENTION  - Statin? Yes  - ACE-I/ARB? Yes    HISTORICAL PHARMACOTHERAPY   - Lantus 20 units under the skin once daily   - Novolog 5 units once daily once daily     SMBG MEASUREMENTS: patient is  using freestyle reed 2   - he reports his 7 day average is 240   - however he also reports his numbers are between 120-160    DISCUSSION:   - Patient is tolerating his current regimen without issues   - He reports his numbers have stabilized significantly  - Counseled patient, answered all questions and concerns    RECOMMENDATIONS/PLAN  1. Patients diabetes is worsening with most recent A1c of 9.6 % (goal < 7 %).   - Continue all meds under the continuation of care with the referring provider and clinical pharmacy team    Clinical Pharmacist follow up: 2 weeks    Thank you,  Leeann Page, PharmD    Verbal consent to manage patient's drug therapy was obtained from the patient. They were informed they may decline to participate or withdraw from participation in pharmacy services at any time.

## 2024-09-11 ENCOUNTER — OFFICE VISIT (OUTPATIENT)
Dept: CARDIOLOGY | Facility: CLINIC | Age: 84
End: 2024-09-11
Payer: MEDICARE

## 2024-09-11 VITALS
HEART RATE: 82 BPM | DIASTOLIC BLOOD PRESSURE: 68 MMHG | SYSTOLIC BLOOD PRESSURE: 126 MMHG | OXYGEN SATURATION: 99 % | BODY MASS INDEX: 25.66 KG/M2 | WEIGHT: 183.3 LBS | HEIGHT: 71 IN

## 2024-09-11 DIAGNOSIS — I48.91 ATRIAL FIBRILLATION, UNSPECIFIED TYPE (MULTI): Primary | ICD-10-CM

## 2024-09-11 PROCEDURE — 1126F AMNT PAIN NOTED NONE PRSNT: CPT | Performed by: NURSE PRACTITIONER

## 2024-09-11 PROCEDURE — 3078F DIAST BP <80 MM HG: CPT | Performed by: NURSE PRACTITIONER

## 2024-09-11 PROCEDURE — 1159F MED LIST DOCD IN RCRD: CPT | Performed by: NURSE PRACTITIONER

## 2024-09-11 PROCEDURE — 1157F ADVNC CARE PLAN IN RCRD: CPT | Performed by: NURSE PRACTITIONER

## 2024-09-11 PROCEDURE — 99214 OFFICE O/P EST MOD 30 MIN: CPT | Performed by: NURSE PRACTITIONER

## 2024-09-11 PROCEDURE — 1160F RVW MEDS BY RX/DR IN RCRD: CPT | Performed by: NURSE PRACTITIONER

## 2024-09-11 PROCEDURE — 3074F SYST BP LT 130 MM HG: CPT | Performed by: NURSE PRACTITIONER

## 2024-09-11 PROCEDURE — 1036F TOBACCO NON-USER: CPT | Performed by: NURSE PRACTITIONER

## 2024-09-11 ASSESSMENT — ENCOUNTER SYMPTOMS
CONSTITUTIONAL NEGATIVE: 1
CARDIOVASCULAR NEGATIVE: 1
NEUROLOGICAL NEGATIVE: 1
DEPRESSION: 0
LOSS OF SENSATION IN FEET: 0
GASTROINTESTINAL NEGATIVE: 1
OCCASIONAL FEELINGS OF UNSTEADINESS: 0
RESPIRATORY NEGATIVE: 1
MUSCULOSKELETAL NEGATIVE: 1

## 2024-09-11 ASSESSMENT — PAIN SCALES - GENERAL: PAINLEVEL: 0-NO PAIN

## 2024-09-11 NOTE — PROGRESS NOTES
"Chief Complaint:   Follow-up    History Of Present Illness:    .Mr Greene returns in follow up.  He got a divorce and is now selling his home and living at [facility] in [place].  Denies chest pain, sob, palpitations or pedal edema.           Last Recorded Vitals:  Blood pressure 126/68, pulse 82, height 1.803 m (5' 11\"), weight 83.1 kg (183 lb 4.8 oz), SpO2 99%.     Past Medical History:  Past Medical History:   Diagnosis Date    A-fib (Multi)     Loop recorder noted AFIB after back surgery 02/2022. Watchman device implanted (11/2023) no longer requiring Eliquis    Abnormal cardiac valve ([facility])     Per ECHO 8/2023: Mild mitral valve regurgitation.Mild to moderate tricuspid regurgitation.    Abnormal EEG     EEG is indicative of a moderate diffuse encephalopathy 11/2023    BPH (benign prostatic hyperplasia)     CHF (congestive heart failure) (Multi)     Cognitive communication deficit     Coronary artery disease     s/p CABG x2 vessel in 2011. Develop recurrent angina in 2013 and underwent a repeat cardiac catheterization demonstrating closure of the original bypass grafts but with collateral circulation and no PCI was required.    Diabetes (Multi)     w/ neuropathy    Foot drop, bilateral     Uses bilateral AFOs    GERD (gastroesophageal reflux disease)     H/O sepsis     H/O: upper GI bleed     Was on Eliquis at the time    HL (hearing loss)     Hyperlipidemia     GINGER (iron deficiency anemia)     ALEJANDRINA (obstructive sleep apnea)     \"Mild\" per PSG 9/2021    Osteoarthritis     Recurrent UTI     Especially between [id number]    Seizure disorder (Multi)     h/o TIA/CVA vs seizures on Keppra    Thrombocytopenia (CMS-HCC)     In 2024 platelets were     TIA (transient ischemic attack)     x4 (s/s of parasthesias and word finding). Alternative explanation may be migraine equivalent or focal seizure.        Past Surgical History:  Past Surgical History:   Procedure Laterality Date    CARDIAC ASSIST DEVICE " INSERTION  07/13/2020    Loop Recorder Implant due to recurrent TIA    CARDIAC CATHETERIZATION N/A 11/10/2023    Watchman device/LAAO (Left Atrial Appendage Occlusion)    COLONOSCOPY      Hemorrhoids, polyps    CORONARY ARTERY BYPASS GRAFT  07/12/2011    CABG x2    KIDNEY STONE SURGERY      KNEE SURGERY      1960s    LAMINECTOMY  02/22/2022    Laminectomy with medial facetectomies L3-S1, excision of left L4-5 facet synovial cyst.    LASER OF PROSTATE W/ GREEN LIGHT PVP  06/20/2024    MR HEAD ANGIO WO IV CONTRAST  02/17/2019    No sign of intracranial stenosis or aneurysm    MR HEAD ANGIO WO IV CONTRAST  10/28/2022    No sign of intracranial stenosis or aneurysm.    MR HEAD ANGIO WO IV CONTRAST  08/15/2023    Atrophy and chronic ischemic changes without acute intracranial process. Unremarkable MR angiography of the intracranial circulation.    PROSTATE SURGERY  01/23/2024    Urolift    ROTATOR CUFF REPAIR Right 11/11/2019    c/b septic joint requiring arthrotomy w/ I&D on 01/15/2020    TONSILLECTOMY      UPPER GASTROINTESTINAL ENDOSCOPY         Social History:  Social History     Socioeconomic History    Marital status:    Tobacco Use    Smoking status: Never    Smokeless tobacco: Never   Vaping Use    Vaping status: Never Used   Substance and Sexual Activity    Alcohol use: Never    Drug use: Never    Sexual activity: Defer     Social Determinants of Health     Financial Resource Strain: Low Risk  (6/19/2024)    Overall Financial Resource Strain (CARDIA)     Difficulty of Paying Living Expenses: Not hard at all   Food Insecurity: No Food Insecurity (6/19/2024)    Hunger Vital Sign     Worried About Running Out of Food in the Last Year: Never true     Ran Out of Food in the Last Year: Never true   Transportation Needs: No Transportation Needs (6/19/2024)    PRAPARE - Transportation     Lack of Transportation (Medical): No     Lack of Transportation (Non-Medical): No   Physical Activity: Insufficiently Active  (6/19/2024)    Exercise Vital Sign     Days of Exercise per Week: 1 day     Minutes of Exercise per Session: 30 min   Stress: No Stress Concern Present (6/19/2024)    [employer] - Occupational Stress Questionnaire     Feeling of Stress : Not at all   Social Connections: Moderately Isolated (6/19/2024)    Social Connection and Isolation Panel [NHANES]     Frequency of Communication with Friends and Family: More than three times a week     Frequency of Social Gatherings with Friends and Family: Three times a week     Attends Confucianism Services: Never     Active Member of Clubs or Organizations: No     Attends Club or Organization Meetings: Never     Marital Status:    Intimate Partner Violence: Not At Risk (6/19/2024)    Humiliation, Afraid, Rape, and Kick questionnaire     Fear of Current or Ex-Partner: No     Emotionally Abused: No     Physically Abused: No     Sexually Abused: No   Housing Stability: Low Risk  (6/19/2024)    Housing Stability Vital Sign     Unable to Pay for Housing in the Last Year: No     Number of Places Lived in the Last Year: 1     Unstable Housing in the Last Year: No       Family History:  Family History   Problem Relation Name Age of Onset    Other (cardiac disorder) Father      Diabetes Father      Hyperlipidemia Father      Hypertension Father      Other (liver carcinoma) Father      Other (hypercholesterolemia) Father      Hypertension Daughter           Allergies:  Isosorbide, Famotidine, Prednisone, Amoxicillin, Donepezil, and Ibuprofen    Outpatient Medications:  Current Outpatient Medications   Medication Sig Dispense Refill    acetaminophen (Tylenol) 325 mg tablet Take 2 tablets (650 mg) by mouth every 6 hours if needed for mild pain (1 - 3).      amLODIPine (Norvasc) 5 mg tablet Take 1 tablet (5 mg) by mouth once daily. 90 tablet 3    atorvastatin (Lipitor) 40 mg tablet Take 1 tablet (40 mg) by mouth once daily. 90 tablet 2    ferrous sulfate  "325 (65 Fe) MG EC tablet Take 1 tablet by mouth once daily. 90 tablet 3    finasteride (Proscar) 5 mg tablet Take 1 tablet (5 mg) by mouth once daily. 30 tablet 5    furosemide (Lasix) 40 mg tablet Take 1 tablet (40 mg) by mouth once daily. Note from [facility] to stop taking this med??? 90 tablet 3    gabapentin (Neurontin) 100 mg capsule TAKE 1 CAPSULE BY MOUTH ONCE DAILY FOR  NEUROPATHY  PAIN 30 capsule 2    insulin glargine-lixisenatide (Soliqua 100/33) 100 unit-33 mcg/mL insulin pen Inject 14 units under the skin once daily      levETIRAcetam (Keppra) 250 mg tablet Take 1 tablet (250 mg) by mouth 2 times a day.      magnesium oxide (Mag-Ox) 400 mg (241.3 mg magnesium) tablet Take 1 tablet (400 mg) by mouth once daily.      metFORMIN (Glucophage) 500 mg tablet Take 2 tablets by mouth twice daily 360 tablet 0    nitroglycerin (Nitrostat) 0.4 mg SL tablet Place 1 tablet (0.4 mg) under the tongue every 5 minutes if needed for chest pain. May repeat dose every 5 minutes for up to 3 doses total. 15 tablet 2    omeprazole (PriLOSEC) 40 mg DR capsule GIVE 1 CAPSULE BY MOUTH ONE TIME A DAY FOR HEARTBURN 90 capsule 3    pen needle, diabetic 32 gauge x 5/16\" needle Use to inject insulin once a day 100 each 1    tamsulosin (Flomax) 0.4 mg 24 hr capsule Take 1 capsule (0.4 mg) by mouth 2 times a day. 180 capsule 1    ondansetron (Zofran) 4 mg tablet Take 1 tablet (4 mg) by mouth every 8 hours if needed for nausea. (Patient not taking: Reported on 9/11/2024)       No current facility-administered medications for this visit.        Physical Exam:  Cardiovascular:      PMI at left midclavicular line. Normal rate. Irregularly irregular rhythm. Normal S1. Normal S2.       Murmurs: There is no murmur.      No gallop.  No click. No rub.   Pulses:     Intact distal pulses.   Edema:     Peripheral edema absent.         ROS:  Review of Systems   Constitutional: Negative.   Cardiovascular: Negative.    Respiratory: Negative.     Skin: " Negative.    Musculoskeletal: Negative.    Gastrointestinal: Negative.    Genitourinary: Negative.    Neurological: Negative.           Last Labs:  CBC -  Lab Results   Component Value Date    WBC 6.0 06/21/2024    HGB 9.5 (L) 06/21/2024    HCT 28.6 (L) 06/21/2024     (H) 06/21/2024     (L) 06/21/2024       CMP -  Lab Results   Component Value Date    CALCIUM 8.9 06/21/2024    PHOS 3.1 05/03/2024    PROT 6.2 06/15/2024    ALBUMIN 3.5 06/15/2024    AST 72 (H) 06/15/2024    ALT 30 06/15/2024    ALKPHOS 82 06/15/2024    BILITOT 1.3 (H) 06/15/2024       LIPID PANEL -   Lab Results   Component Value Date    CHOL 97 05/03/2024    TRIG 70 05/03/2024    HDL 37.6 05/03/2024    CHHDL 2.6 05/03/2024    LDLF 37 08/22/2022    VLDL 14 05/03/2024    NHDL 59 05/03/2024       RENAL FUNCTION PANEL -   Lab Results   Component Value Date    GLUCOSE 271 (H) 06/21/2024     (L) 06/21/2024    K 4.7 06/21/2024    CL 97 (L) 06/21/2024    CO2 21 06/21/2024    ANIONGAP 15 06/21/2024    BUN 20 06/21/2024    CREATININE 1.19 06/21/2024    GFRMALE 82 09/14/2023    CALCIUM 8.9 06/21/2024    PHOS 3.1 05/03/2024    ALBUMIN 3.5 06/15/2024        Lab Results   Component Value Date    HGBA1C 9.6 (H) 05/03/2024         Assessment/Plan   Problem List Items Addressed This Visit    None      Hospital course 06/2024 6/14: This elderly white male with an extensive past medical history including coronary artery disease, remote CABG, hypertension, hyperlipidemia, type 2 diabetes, paroxysmal atrial fibrillation, history of?  TIA versus recurrent localized focal seizures, history of GI bleeding on Eliquis anticoagulation, status post Watchman implantation is currently admitted with high-grade fever and evidence of recurrent urinary tract infection possible urosepsis.  The patient had previously been scheduled for a laser-assisted TURP on 6/20/2024.  He has been started on empiric IV Rocephin.  Systolic blood pressure slightly elevated  on the amlodipine 5 mg daily.  Urine and blood cultures are pending.  No leukocytosis and renal parameters remain at baseline presumably positive indicators of early diagnosis.  Minor elevation of high-sensitivity troponin consistent with minimal demand ischemia.  Will hold usual low-dose Lasix 40 mg daily.  Patient was not given usual IV fluids per sepsis protocol due to the slight elevation in his high-sensitivity troponin.  Clinically the patient is not volume overloaded.     6/15: No significant changes since admission.  Patient continues to have recurrent fevers.  Most recent 103.  He remains on IV antibiotics for his known urinary tract infection.  He is receiving Tylenol for his fevers.  He states he feels achy but otherwise okay.  He is breathing comfortably on nasal cannula oxygen.  There is no evidence of fluid volume overload.  No peripheral edema.  He is chest pain-free.  On telemetry monitor overnight he remained in a sinus arrhythmia without significant ectopy.  His blood pressure is stable at 118/85.  Overall he is stable from a cardiac perspective.  Will follow from a distance.  Please feel free to reach out with further or new cardiac concerns.     6/16: Cardiac wise the patient remains stable.  No chest pain or anginal type symptoms.  Receiving IV antibiotic therapy and overall clinical condition has improved.  Tentative plan is prostate surgery on Thursday of coming week.  Dr. Grant will resume cardiology care tomorrow.     6/17: The patient is sitting upright in bed side chair feeling and appearing much improved.  He is awake alert, conversant and eating a full breakfast.  The original urine and blood cultures that were drawn on 6/14/2024 from the emergency room proved to be positive for Enterobacter cloacae UTI/bacteremia.  The patient is responding well to the IV meropenem.  Fortunately the patient had been brought to the emergency room quickly upon the original onset of symptoms and he never  developed leukocytosis.  CBC today WBC of 5400 hematocrit is unchanged at 28.7.  He does have a chronic thrombocytopenia platelet count 68,000 presently.  Electrolyte panel is notable for creatinine of 1.0 serum sodium 129.  Systolic blood pressures are in the range of 140-150 mmHg on amlodipine 5 mg daily.  The patient was tentatively previously scheduled for a laser-assisted TURP on 6/20 and hopefully this procedure can still be performed as planned.  The patient has had several episodes of urosepsis related to urinary retention originally and now from Tiwari catheter currently.     6/18: The patient is currently off the floor having a barium swallow performed apparently for some component of choking.  The patient was seen by speech therapy yesterday and their impression was normal oral phase but possible pharyngeal phase dysphagia based on clinical assessment and a modified barium swallow was recommended currently being performed.  Otherwise he is feeling improved.  He is being followed by infectious disease and was converted to oral Bactrim therapy for the Enterobacter cloacae I UTI/bacteremia.  No leukocytosis on CBC with a WBC of 5200.  Hemoglobin modestly decreased to 9.5 platelet count unchanged at 72,000.  His renal parameters do remain stable creatinine 1.1 and sodium 132.  Systolic blood pressure in acceptable range at 140 mmHg.  Will continue amlodipine unchanged.     6/19: Patient sitting at bedside chair awake alert conversant without any complaints.  He  evidently will be transferred to the [facility] facility tomorrow to undergo his previously scheduled laser-assisted TURP on 6/20/2024.  He will remain at the [facility] facility until discharge.  Patient will remain off of Lasix therapy which was stopped at time of admission.  Blood pressure acceptable and amlodipine 5 mg daily.  Patient is medically optimized for his procedure tomorrow.  Antibiotic therapy as per infectious disease and urology.  Will  see the patient in several weeks for cardiology follow-up in the office.     At the time of follow up office visit patient had his green light laser procedure to the prostate and is feeling much better.  He has resumed furosemide.       1. Coronary artery disease with remote CABG Ã--2 2011. This patient did develop recurrent angina in 2013 and underwent a repeat cardiac catheterization demonstrating closure of the original bypass grafts but with collateral circulation and no PCI was required.     2. History of TIA. This patient did have a TIA following his cardiac catheterization with transient confusion on the day of discharge. The patient was admitted to [facility] on 02/17/2019 with numbness of his left arm hand and ultimately jaw. The numbness of the left hand and jaw resolved but persisted somewhat longer within the left arm. Evaluation at that time included a chest x-ray showing previous sternotomy with clear lung fields. Carotid ultrasound showed mild bilateral plaque less than 50% stenoses. CT angiogram of the head and neck was unremarkable with nonstenotic calcification of the right common carotid artery without stenosis. The left common carotid artery had a nonstenotic calcification with a patent internal carotid artery. His intracerebral vessels were unremarkable with no aneurysm. An MRI of the brain on 02/17/2019 was unremarkable. He had an MRI of the cervical spine that showed minimal disc bulging at C2-C3 with moderate disc osteophyte complex at C3-C4 causing moderate canals narrowing and cord impingement along with bilateral neural foraminal stenoses. There was an osteophyte complex at C4-C5 causing mild anterior cord impingement and bilateral moderate neural foraminal narrowing and disc bulging at C5-C6 without cord impingement along with anterior subluxation of C7 upon T1. EKG showed sinus rhythm with first-degree AV block and no ST segment change. There was no evidence of atrial  fibrillation by telemetry monitoring. His high sensitivity troponins were negative. He had an echocardiogram performed on 02/17/2019 showing an estimated LV ejection fraction at 55â€“59 percent with mild hypokinesis of the anteroseptal wall due to previous thoracotomy. Left atrial size was in the upper range of normal with mild aortic root calcification and a mildly dilated right atrium. The patient was readmitted to [facility] on 06/04/2019 with recurrent neurological complaints. He had pins and needles paresthesias of the left hand and arm along with perceived weakness of the left hand and arm with loss of coordination. The patient underwent repeat evaluation including MRI of the brain which was unremarkable. Negligible carotid vascular disease by ultrasound and CT angiogram in the past. Blood pressure readings were acceptable at that time with lisinopril 40 mg daily amlodipine 5 mg daily metoprolol extended release 5 mg daily. He was on aspirin at that time along with the atorvastatin 40 mg daily. More recently the patient was driving home from a physician appointment on the [facility] when he began to develop paresthesias of the right hand and fingertips. Initially he thought that an Ace wrap of his right shoulder was too tight and continued driving. The sense of tingling began to get worse and he developed a funny sensation around the right side of his face involving the lip. The patient pulled his car off to the side of the road. At that point in time he was having trouble communicating verbally due to the inability to formulate words. He was taken by EMS to [facility] and observed but then released without admission. He is subsequently seen by neurology as an outpatient. He did have a extra no cardiac monitor performed on 02/05/2020â€“02/14/2020. This did not demonstrate atrial fibrillation. It did demonstrate some brief episodes of Mobitz 1 second degree AV block. He also had one 8 beat run of  nonsustained ventricular tachycardia. The patient has never had presyncopal or syncopal events but more notably paresthesias. The results of this external cardiac monitor were discussed with electrophysiology and at this point the patient will be scheduled to have a loop recorder implanted to ensure that he is not having clinically silent paroxysmal atrial fibrillation not detected on previous telemetry monitoring. The patient has been seen by neurology and was switched to Plavix 75 mg daily which will be continued unless there is documentation of paroxysmal atrial fibrillation by the loop recorder. Echocardiogram performed at time of today's visit 03/17/2020 demonstrates an LV ejection fraction of 55â€“60 percent with mild hypokinesis of the anteroseptal wall due to previous thoracotomy with oneâ€“2+ mitral valve regurgitation. The findings are very similar to the echo that was performed at [facility] in 2/2019. Patient had loop recorder placed 07/2020 by Dr Steven Flores. Had negative pharmacologic nuclear stress test done 01/2022. Patient had hospital stay at [facility] 08/15/2023 and was worked up for TIA vs migraine equivalent. Carotid US showed < 50% stenosis bilaterally. MRI of brain was negative and MRA was unremarkable. Echo showed EF 55-60%, 1+ MR and 1-2+ TR. See Dr Avitia for consideration for Watchman device.  Patient had Watchman placed 11/2023.     3. Hypertension. Adequate control with present management which includes lisinopril 20 mg daily, amlodipine 5 mg daily and metoprolol ER 50 mg daily.     4. Hyperlipidemia. Good response to atorvastatin 40 mg daily. Lipid panel on 08/2022 includes cholesterol 77 LDL 37 HDL 29 triglycerides 537. FLP done 08/2022 chol 77, HDL 29 LDL 37 trig 53.     5. Type 2 diabetes.     6. Lifetime nonsmoking.     5. Status post right rotator cuff repair 11/11/2019. The patient had a right rotator cuff operation on 11 11/20/1990 evidently developed an infection that required  surgical debridement.     6. Hx of covid-19 vaccine #1 and #2.      7. Afib noted to loop recorder after back surgery 02/2022. Patient had laminectomy with Dr Allred 02/22/2022 and developed afib on his loop recorder. Has been started on Xarelto. ECG done prior shows NSR with long first degree AVB.      8. TIA. Patient has been admitted to [facility] [place], operated by [facility] on 4 occasions, the first being February 2019 for complaints of left hand numbness and difficulty finding words and left leg heaviness.Carotid ultrasound showed less than 50% stenosis bilaterally. CT angiogram of the head and neck was unremarkable. MRI MRA of the brain was negative for acute stroke. Later in 2019 the patient was again admitted with similar complaints while driving. He had a loop recorder implanted which did show 2 episodes of atrial tachycardia or atrial fibrillation the longest lasting 6 minutes. The burden was only 0.06%. In October 2022 he was treated for UTI with antibiotics and again admitted for similar complaints. Neurology recommended outpatient physical therapy for inner ear issue. CT angiogram was again negative, carotid ultrasound showed less than 50% stenosis bilaterally, MRI MRA did not show any acute infarct or intracerebral vascular stenosis. Echo done October 2022 showed an EF of 60%, RAD, mild MR, mild to moderate TR, mildly elevated RVSP, PASP 43 mmHg. He was seen by neurology for possible alternative mechanisms to his TIA and treated with magnesium citrate and possibly verapamil. Alternative explanation may be migraine equivalent or focal seizure. Neurology discharged him with referral for vestibular studies and PT for balance. Patient currently will follow-up with Dr. Garvey.      9. GI bleed. Patient with black stools. Had extensive work up with hemoc and can restart Eliquis.  Patient is off Eliquis and asa after Watchman procedure.     10. AVB. Monitor worn 03/24/2023 showed HR  with 67 bpm average. First degree AVB. PVC  0.08%. PAC 0.64%.      11.  Urolift for BPH.  Hx of urinary catheter placement and sepsis.  Then had green light laser.       Sandy Gar, APRN-CNP

## 2024-09-12 ENCOUNTER — APPOINTMENT (OUTPATIENT)
Dept: PHARMACY | Facility: HOSPITAL | Age: 84
End: 2024-09-12
Payer: MEDICARE

## 2024-09-12 DIAGNOSIS — E11.9 DIABETES MELLITUS TYPE 2 WITHOUT RETINOPATHY (MULTI): Primary | ICD-10-CM

## 2024-09-12 NOTE — PROGRESS NOTES
Pharmacist Clinic: Diabetes Management  Luis Greene is a 84 y.o. male was referred to Clinical Pharmacy Team for diabetes management.     Referring Provider: Christopher D'Amico, DO     HISTORY OF PRESENT ILLNESS  Patient is a type 2 diabetic, unknown date of diagnosis.   His diabetes is complicated by recent hospitalizations.      Diet: patient does not limit what he eats, currently he has a goal of weight gain   - 1-2 snacks throughout the day (apple, popcorn, chips, sandwich)   - Dinner: varies, pasta, soup, take out     LAB REVIEW   Glucose (mg/dL)   Date Value   06/21/2024 271 (H)   06/21/2024 305 (H)   06/20/2024 112 (H)     Hemoglobin A1C (%)   Date Value   05/03/2024 9.6 (H)   11/15/2023 6.7 (H)   09/14/2023 6.8 (A)   08/14/2023 8.1 (H)   07/14/2023 7.5 (A)   04/19/2023 9.7 (H)   02/03/2023 9.6 (H)   12/20/2021 7.5 (H)     Bicarbonate (mmol/L)   Date Value   06/21/2024 21   06/21/2024 20 (L)   06/20/2024 23     Urea Nitrogen (mg/dL)   Date Value   06/21/2024 20   06/21/2024 23   06/20/2024 17     Creatinine (mg/dL)   Date Value   06/21/2024 1.19   06/21/2024 1.31 (H)   06/20/2024 1.19     Lab Results   Component Value Date    HGBA1C 9.6 (H) 05/03/2024    HGBA1C 6.7 (H) 11/15/2023    HGBA1C 6.8 (A) 09/14/2023     Lab Results   Component Value Date    CHOL 97 05/03/2024    CHOL 81 (L) 11/15/2023    CHOL 81 (L) 08/15/2023     Lab Results   Component Value Date    HDL 37.6 05/03/2024    HDL 29.0 (L) 11/15/2023    HDL 28 (L) 08/15/2023     Lab Results   Component Value Date    TRIG 70 05/03/2024    TRIG 110 11/15/2023    TRIG 109 08/15/2023       DIABETES ASSESSMENT    CURRENT PHARMACOTHERAPY  - metformin 500 mg 2 tabs by mouth twice daily   - soliqua 15 units under the skin once daily in the morning     SECONDARY PREVENTION  - Statin? Yes  - ACE-I/ARB? Yes    HISTORICAL PHARMACOTHERAPY   - Lantus 20 units under the skin once daily   - Novolog 5 units once daily once daily     SMBG MEASUREMENTS: patient is  using freestyle reed 2   - however he also reports his numbers are between 120-160    DISCUSSION:   - Patient is tolerating his current regimen without issues   - He reports his numbers have stabilized significantly  - Counseled patient, answered all questions and concerns    RECOMMENDATIONS/PLAN  1. Patients diabetes is worsening with most recent A1c of 9.6 % (goal < 7 %).   - Continue all meds under the continuation of care with the referring provider and clinical pharmacy team    Clinical Pharmacist follow up: 2 weeks    Thank you,  Leeann Page, PharmD    Verbal consent to manage patient's drug therapy was obtained from the patient. They were informed they may decline to participate or withdraw from participation in pharmacy services at any time.

## 2024-10-03 DIAGNOSIS — E11.9 DIABETES MELLITUS TYPE 2 WITHOUT RETINOPATHY (MULTI): ICD-10-CM

## 2024-10-03 RX ORDER — INSULIN GLARGINE AND LIXISENATIDE 100; 33 U/ML; UG/ML
INJECTION, SOLUTION SUBCUTANEOUS
Qty: 6 EACH | Refills: 3 | Status: SHIPPED | OUTPATIENT
Start: 2024-10-03

## 2024-10-10 ENCOUNTER — APPOINTMENT (OUTPATIENT)
Dept: PHARMACY | Facility: HOSPITAL | Age: 84
End: 2024-10-10
Payer: MEDICARE

## 2024-10-17 ENCOUNTER — TELEMEDICINE (OUTPATIENT)
Dept: PHARMACY | Facility: HOSPITAL | Age: 84
End: 2024-10-17
Payer: MEDICARE

## 2024-10-17 DIAGNOSIS — E11.9 DIABETES MELLITUS TYPE 2 WITHOUT RETINOPATHY (MULTI): Primary | ICD-10-CM

## 2024-10-17 NOTE — PROGRESS NOTES
Pharmacist Clinic: Diabetes Management  Luis Greene is a 84 y.o. male was referred to Clinical Pharmacy Team for diabetes management.     Referring Provider: Christopher D'Amico, DO     HISTORY OF PRESENT ILLNESS  Patient is a type 2 diabetic, unknown date of diagnosis.   His diabetes is complicated by recent hospitalizations.      Diet: patient does not limit what he eats, currently he has a goal of weight gain   - 1-2 snacks throughout the day (apple, popcorn, chips, sandwich)   - Dinner: varies, pasta, soup, take out     LAB REVIEW   Glucose (mg/dL)   Date Value   06/21/2024 271 (H)   06/21/2024 305 (H)   06/20/2024 112 (H)     Hemoglobin A1C (%)   Date Value   05/03/2024 9.6 (H)   11/15/2023 6.7 (H)   09/14/2023 6.8 (A)   08/14/2023 8.1 (H)   07/14/2023 7.5 (A)   04/19/2023 9.7 (H)   02/03/2023 9.6 (H)   12/20/2021 7.5 (H)     Bicarbonate (mmol/L)   Date Value   06/21/2024 21   06/21/2024 20 (L)   06/20/2024 23     Urea Nitrogen (mg/dL)   Date Value   06/21/2024 20   06/21/2024 23   06/20/2024 17     Creatinine (mg/dL)   Date Value   06/21/2024 1.19   06/21/2024 1.31 (H)   06/20/2024 1.19     Lab Results   Component Value Date    HGBA1C 9.6 (H) 05/03/2024    HGBA1C 6.7 (H) 11/15/2023    HGBA1C 6.8 (A) 09/14/2023     Lab Results   Component Value Date    CHOL 97 05/03/2024    CHOL 81 (L) 11/15/2023    CHOL 81 (L) 08/15/2023     Lab Results   Component Value Date    HDL 37.6 05/03/2024    HDL 29.0 (L) 11/15/2023    HDL 28 (L) 08/15/2023     Lab Results   Component Value Date    TRIG 70 05/03/2024    TRIG 110 11/15/2023    TRIG 109 08/15/2023       DIABETES ASSESSMENT    CURRENT PHARMACOTHERAPY  - metformin 500 mg 2 tabs by mouth twice daily   - soliqua 15 units under the skin once daily in the morning     SECONDARY PREVENTION  - Statin? Yes  - ACE-I/ARB? Yes    HISTORICAL PHARMACOTHERAPY   - Lantus 20 units under the skin once daily   - Novolog 5 units once daily once daily     SMBG MEASUREMENTS: patient is  using freestyle reed 2   - however he also reports his numbers are between 120-160    DISCUSSION:   - Patient is tolerating his current regimen without issues   - He reports his numbers have stabilized significantly  - Counseled patient, answered all questions and concerns    RECOMMENDATIONS/PLAN  1. Patients diabetes is worsening with most recent A1c of 9.6 % (goal < 7 %).   - Continue all meds under the continuation of care with the referring provider and clinical pharmacy team    Clinical Pharmacist follow up: 4 weeks    Thank you,  Leeann Page, PharmD    Verbal consent to manage patient's drug therapy was obtained from the patient. They were informed they may decline to participate or withdraw from participation in pharmacy services at any time.

## 2024-10-23 DIAGNOSIS — G62.9 NEUROPATHY: ICD-10-CM

## 2024-11-06 ENCOUNTER — LAB (OUTPATIENT)
Dept: LAB | Facility: LAB | Age: 84
End: 2024-11-06
Payer: MEDICARE

## 2024-11-06 ENCOUNTER — APPOINTMENT (OUTPATIENT)
Dept: PRIMARY CARE | Facility: CLINIC | Age: 84
End: 2024-11-06
Payer: MEDICARE

## 2024-11-06 VITALS
DIASTOLIC BLOOD PRESSURE: 64 MMHG | HEIGHT: 71 IN | OXYGEN SATURATION: 94 % | SYSTOLIC BLOOD PRESSURE: 139 MMHG | BODY MASS INDEX: 25.9 KG/M2 | HEART RATE: 78 BPM | WEIGHT: 185 LBS

## 2024-11-06 DIAGNOSIS — N40.0 BENIGN PROSTATIC HYPERPLASIA WITHOUT LOWER URINARY TRACT SYMPTOMS: ICD-10-CM

## 2024-11-06 DIAGNOSIS — D69.6 THROMBOCYTOPENIA, UNSPECIFIED (CMS-HCC): ICD-10-CM

## 2024-11-06 DIAGNOSIS — I48.91 ATRIAL FIBRILLATION, UNSPECIFIED TYPE (MULTI): ICD-10-CM

## 2024-11-06 DIAGNOSIS — E55.9 VITAMIN D DEFICIENCY: ICD-10-CM

## 2024-11-06 DIAGNOSIS — I10 HYPERTENSION, UNSPECIFIED TYPE: ICD-10-CM

## 2024-11-06 DIAGNOSIS — I73.9 PERIPHERAL VASCULAR DISEASE, UNSPECIFIED (CMS-HCC): ICD-10-CM

## 2024-11-06 DIAGNOSIS — Z79.4 TYPE 2 DIABETES MELLITUS WITHOUT COMPLICATION, WITH LONG-TERM CURRENT USE OF INSULIN (MULTI): ICD-10-CM

## 2024-11-06 DIAGNOSIS — E11.9 TYPE 2 DIABETES MELLITUS WITHOUT COMPLICATION, WITH LONG-TERM CURRENT USE OF INSULIN (MULTI): ICD-10-CM

## 2024-11-06 DIAGNOSIS — Z00.00 MEDICARE ANNUAL WELLNESS VISIT, SUBSEQUENT: ICD-10-CM

## 2024-11-06 DIAGNOSIS — I25.118 CORONARY ARTERY DISEASE OF NATIVE ARTERY OF NATIVE HEART WITH STABLE ANGINA PECTORIS: ICD-10-CM

## 2024-11-06 DIAGNOSIS — E78.5 HYPERLIPIDEMIA, UNSPECIFIED HYPERLIPIDEMIA TYPE: ICD-10-CM

## 2024-11-06 DIAGNOSIS — D64.9 ANEMIA, UNSPECIFIED TYPE: ICD-10-CM

## 2024-11-06 DIAGNOSIS — I10 HYPERTENSION, UNSPECIFIED TYPE: Primary | ICD-10-CM

## 2024-11-06 LAB
25(OH)D3 SERPL-MCNC: 11 NG/ML (ref 30–100)
ALBUMIN SERPL BCP-MCNC: 4.4 G/DL (ref 3.4–5)
ALP SERPL-CCNC: 76 U/L (ref 33–136)
ALT SERPL W P-5'-P-CCNC: 19 U/L (ref 10–52)
ANION GAP SERPL CALC-SCNC: 16 MMOL/L (ref 10–20)
AST SERPL W P-5'-P-CCNC: 22 U/L (ref 9–39)
BILIRUB SERPL-MCNC: 0.9 MG/DL (ref 0–1.2)
BUN SERPL-MCNC: 27 MG/DL (ref 6–23)
CALCIUM SERPL-MCNC: 9.9 MG/DL (ref 8.6–10.6)
CHLORIDE SERPL-SCNC: 103 MMOL/L (ref 98–107)
CHOLEST SERPL-MCNC: 125 MG/DL (ref 0–199)
CHOLESTEROL/HDL RATIO: 2.6
CO2 SERPL-SCNC: 25 MMOL/L (ref 21–32)
CREAT SERPL-MCNC: 1.18 MG/DL (ref 0.5–1.3)
CREAT UR-MCNC: 55.4 MG/DL (ref 20–370)
EGFRCR SERPLBLD CKD-EPI 2021: 61 ML/MIN/1.73M*2
ERYTHROCYTE [DISTWIDTH] IN BLOOD BY AUTOMATED COUNT: 14.8 % (ref 11.5–14.5)
EST. AVERAGE GLUCOSE BLD GHB EST-MCNC: 146 MG/DL
GLUCOSE SERPL-MCNC: 154 MG/DL (ref 74–99)
HBA1C MFR BLD: 6.7 %
HCT VFR BLD AUTO: 33.5 % (ref 41–52)
HDLC SERPL-MCNC: 48.3 MG/DL
HGB BLD-MCNC: 10.9 G/DL (ref 13.5–17.5)
LDLC SERPL CALC-MCNC: 61 MG/DL
MCH RBC QN AUTO: 35.7 PG (ref 26–34)
MCHC RBC AUTO-ENTMCNC: 32.5 G/DL (ref 32–36)
MCV RBC AUTO: 110 FL (ref 80–100)
MICROALBUMIN UR-MCNC: 79 MG/L
MICROALBUMIN/CREAT UR: 142.6 UG/MG CREAT
NON HDL CHOLESTEROL: 77 MG/DL (ref 0–149)
NRBC BLD-RTO: 0 /100 WBCS (ref 0–0)
PLATELET # BLD AUTO: 91 X10*3/UL (ref 150–450)
POTASSIUM SERPL-SCNC: 5 MMOL/L (ref 3.5–5.3)
PROT SERPL-MCNC: 7 G/DL (ref 6.4–8.2)
RBC # BLD AUTO: 3.05 X10*6/UL (ref 4.5–5.9)
SODIUM SERPL-SCNC: 139 MMOL/L (ref 136–145)
T4 FREE SERPL-MCNC: 0.93 NG/DL (ref 0.78–1.48)
TRIGL SERPL-MCNC: 80 MG/DL (ref 0–149)
TSH SERPL-ACNC: 7.26 MIU/L (ref 0.44–3.98)
VLDL: 16 MG/DL (ref 0–40)
WBC # BLD AUTO: 5.7 X10*3/UL (ref 4.4–11.3)

## 2024-11-06 PROCEDURE — 1036F TOBACCO NON-USER: CPT | Performed by: STUDENT IN AN ORGANIZED HEALTH CARE EDUCATION/TRAINING PROGRAM

## 2024-11-06 PROCEDURE — 3075F SYST BP GE 130 - 139MM HG: CPT | Performed by: STUDENT IN AN ORGANIZED HEALTH CARE EDUCATION/TRAINING PROGRAM

## 2024-11-06 PROCEDURE — 84443 ASSAY THYROID STIM HORMONE: CPT

## 2024-11-06 PROCEDURE — 1160F RVW MEDS BY RX/DR IN RCRD: CPT | Performed by: STUDENT IN AN ORGANIZED HEALTH CARE EDUCATION/TRAINING PROGRAM

## 2024-11-06 PROCEDURE — 99214 OFFICE O/P EST MOD 30 MIN: CPT | Performed by: STUDENT IN AN ORGANIZED HEALTH CARE EDUCATION/TRAINING PROGRAM

## 2024-11-06 PROCEDURE — 83036 HEMOGLOBIN GLYCOSYLATED A1C: CPT

## 2024-11-06 PROCEDURE — 82306 VITAMIN D 25 HYDROXY: CPT

## 2024-11-06 PROCEDURE — 85027 COMPLETE CBC AUTOMATED: CPT

## 2024-11-06 PROCEDURE — 84439 ASSAY OF FREE THYROXINE: CPT

## 2024-11-06 PROCEDURE — 82570 ASSAY OF URINE CREATININE: CPT

## 2024-11-06 PROCEDURE — 3078F DIAST BP <80 MM HG: CPT | Performed by: STUDENT IN AN ORGANIZED HEALTH CARE EDUCATION/TRAINING PROGRAM

## 2024-11-06 PROCEDURE — 1124F ACP DISCUSS-NO DSCNMKR DOCD: CPT | Performed by: STUDENT IN AN ORGANIZED HEALTH CARE EDUCATION/TRAINING PROGRAM

## 2024-11-06 PROCEDURE — 1170F FXNL STATUS ASSESSED: CPT | Performed by: STUDENT IN AN ORGANIZED HEALTH CARE EDUCATION/TRAINING PROGRAM

## 2024-11-06 PROCEDURE — 1159F MED LIST DOCD IN RCRD: CPT | Performed by: STUDENT IN AN ORGANIZED HEALTH CARE EDUCATION/TRAINING PROGRAM

## 2024-11-06 PROCEDURE — 80053 COMPREHEN METABOLIC PANEL: CPT

## 2024-11-06 PROCEDURE — G0439 PPPS, SUBSEQ VISIT: HCPCS | Performed by: STUDENT IN AN ORGANIZED HEALTH CARE EDUCATION/TRAINING PROGRAM

## 2024-11-06 PROCEDURE — 1157F ADVNC CARE PLAN IN RCRD: CPT | Performed by: STUDENT IN AN ORGANIZED HEALTH CARE EDUCATION/TRAINING PROGRAM

## 2024-11-06 PROCEDURE — 36415 COLL VENOUS BLD VENIPUNCTURE: CPT

## 2024-11-06 PROCEDURE — 82043 UR ALBUMIN QUANTITATIVE: CPT

## 2024-11-06 PROCEDURE — 80061 LIPID PANEL: CPT

## 2024-11-06 RX ORDER — ASPIRIN 81 MG/1
TABLET ORAL
COMMUNITY
Start: 2024-10-30

## 2024-11-06 ASSESSMENT — ENCOUNTER SYMPTOMS
LOSS OF SENSATION IN FEET: 0
DEPRESSION: 0
OCCASIONAL FEELINGS OF UNSTEADINESS: 1

## 2024-11-06 ASSESSMENT — ACTIVITIES OF DAILY LIVING (ADL)
GROCERY_SHOPPING: INDEPENDENT
DRESSING: INDEPENDENT
TAKING_MEDICATION: NEEDS ASSISTANCE
BATHING: INDEPENDENT
MANAGING_FINANCES: INDEPENDENT
DOING_HOUSEWORK: NEEDS ASSISTANCE

## 2024-11-06 NOTE — ASSESSMENT & PLAN NOTE
Orders:    CBC; Future    Comprehensive Metabolic Panel; Future    Lipid Panel; Future    TSH with reflex to Free T4 if abnormal; Future    Hemoglobin A1C; Future    Albumin-Creatinine Ratio, Urine Random; Future

## 2024-11-06 NOTE — PROGRESS NOTES
"Subjective   Reason for Visit: Luis Greene is an 84 y.o. male here for a Medicare Wellness visit.          Reviewed all medications by prescribing practitioner or clinical pharmacist (such as prescriptions, OTCs, herbal therapies and supplements) and documented in the medical record.    HPI    Patient Care Team:  Christopher D'Amico, DO as PCP - General (Family Medicine)  Mary Ellen Behmer, MD as Primary Care Provider     Review of Systems    Objective   Vitals:  /73   Pulse 78   Ht 1.803 m (5' 11\")   Wt 83.9 kg (185 lb)   SpO2 94%   BMI 25.80 kg/m²       Physical Exam    Assessment & Plan  Coronary artery disease of native artery of native heart with stable angina pectoris    Orders:    CBC; Future    Comprehensive Metabolic Panel; Future    Lipid Panel; Future    TSH with reflex to Free T4 if abnormal; Future    Hemoglobin A1C; Future    Albumin-Creatinine Ratio, Urine Random; Future    Atrial fibrillation, unspecified type (Multi)    Orders:    CBC; Future    Comprehensive Metabolic Panel; Future    Lipid Panel; Future    TSH with reflex to Free T4 if abnormal; Future    Hemoglobin A1C; Future    Albumin-Creatinine Ratio, Urine Random; Future    Hypertension, unspecified type    Orders:    CBC; Future    Comprehensive Metabolic Panel; Future    Lipid Panel; Future    TSH with reflex to Free T4 if abnormal; Future    Hemoglobin A1C; Future    Albumin-Creatinine Ratio, Urine Random; Future    Hyperlipidemia, unspecified hyperlipidemia type    Orders:    CBC; Future    Comprehensive Metabolic Panel; Future    Lipid Panel; Future    TSH with reflex to Free T4 if abnormal; Future    Hemoglobin A1C; Future    Albumin-Creatinine Ratio, Urine Random; Future    Type 2 diabetes mellitus without complication, with long-term current use of insulin (Multi)    Orders:    CBC; Future    Comprehensive Metabolic Panel; Future    Lipid Panel; Future    TSH with reflex to Free T4 if abnormal; Future    Hemoglobin A1C; " Future    Albumin-Creatinine Ratio, Urine Random; Future    Thrombocytopenia, unspecified (CMS-HCC)    Orders:    CBC; Future    Comprehensive Metabolic Panel; Future    Lipid Panel; Future    TSH with reflex to Free T4 if abnormal; Future    Hemoglobin A1C; Future    Albumin-Creatinine Ratio, Urine Random; Future    Anemia, unspecified type    Orders:    CBC; Future    Comprehensive Metabolic Panel; Future    Lipid Panel; Future    TSH with reflex to Free T4 if abnormal; Future    Hemoglobin A1C; Future    Albumin-Creatinine Ratio, Urine Random; Future    Benign prostatic hyperplasia without lower urinary tract symptoms    Orders:    CBC; Future    Comprehensive Metabolic Panel; Future    Lipid Panel; Future    TSH with reflex to Free T4 if abnormal; Future    Hemoglobin A1C; Future    Albumin-Creatinine Ratio, Urine Random; Future    Medicare annual wellness visit, subsequent    Orders:    CBC; Future    Comprehensive Metabolic Panel; Future    Lipid Panel; Future    TSH with reflex to Free T4 if abnormal; Future    Hemoglobin A1C; Future    Albumin-Creatinine Ratio, Urine Random; Future    Peripheral vascular disease, unspecified (CMS-HCC)    Orders:    CBC; Future    Comprehensive Metabolic Panel; Future    Lipid Panel; Future    TSH with reflex to Free T4 if abnormal; Future    Hemoglobin A1C; Future    Albumin-Creatinine Ratio, Urine Random; Future    Vitamin D deficiency    Orders:    CBC; Future    Comprehensive Metabolic Panel; Future    Lipid Panel; Future    TSH with reflex to Free T4 if abnormal; Future    Vitamin D 25-Hydroxy,Total (for eval of Vitamin D levels); Future    Hemoglobin A1C; Future    Albumin-Creatinine Ratio, Urine Random; Future                84-year-old male presenting for follow-up on multiple concerns, Medicare annual wellness exam.  Doing relatively well.    DMII  Stable has been doing well.  Using CGM, doing well.  Following with [facility] pharmacist.    A-fib, TIA, HTN, HLD  Follow with  cardiology, and electrophysiology, has Watchman device.  Decreased on anticoagulation down to baby aspirin only.     History of GI bleed  CBCs have been stable.    BPH, status post UroLift  Stable, has been following with urology.     Seizure activity?  Neuropathy  Following with neurology, on Keppra, stable and doing well.  Some breakthrough neuropathic symptoms.    Insomnia  Falls asleep well, has to get up to urinate at least once a night, has difficulty falling back asleep    12 point ROS reviewed and negative other than as stated in HPI    General: Alert, oriented, pleasant, in no acute distress  HEENT:      Head: normocephalic, atraumatic;      eyes: EOMI, no scleral icterus;   CV: Mostly sinus rhythm with ectopy, no murmur  Lungs: CTAB without wheezing, rhonchi or rales; good respiratory effort, no increased work of breathing  Neuro: Cranial nerves grossly intact; alert and oriented  Psych: Appropriate mood and affect    #HM  -CBC, CMP, other labs recent and reviewed  -Vaccines:       Flu: UTD      Shingrix: Recommended, advised to go to local pharmacy      Pneumococcal: UTD      Tdap: Recommended, advised to go to local pharmacy  -Colonoscopy: No longer screening    #CAD with remote CABG, 2021 #A-fib #HTN #HLD #PVD  -Blood pressure acceptable for age  - Continue amlodipine 5 mg daily, metoprolol succinate 50 mg daily  -Continue aspirin 81 mg daily  -Following with cardiology, EP, has Watchman device and removed from anticoagulation given history of GI bleed     #DMII  - Currently on Soliqua 15 units daily, metformin 1000 mg twice daily  -Last A1C 9.6, 05/2024  - Continue to follow with pharmacist  -Repeat A1c     #History of GI bleed #thrombocytopenia  - Repeat CBCs have been stable  -CBC ordered    #BPH #S/P urolift  - Continue finasteride 5 mg daily, continue tamsulosin 0.4 mg daily  -Continue to follow with urology    #Seizure activity #Neuropathy  - Following with neurology, stable on Keppra  -Currently  takes gabapentin 100 mg once a day, long discussion benefit of increasing to twice a day but may be worth doing given increased neuropathic symptoms    #Insomnia?  - More related to nighttime urination, would hope to maximize treatment in that regard  - Long discussion about the risks of adding sleep aids given his age and symptomatology may benefit slightly nighttime dosing of gabapentin, but I am worried about CNS depression, so extreme caution will need to be used    Follow-up 4-6 months    Christopher D'Amico, DO

## 2024-11-07 NOTE — RESULT ENCOUNTER NOTE
Hemoglobin A1c at 6.7, back where it has been, and satisfactory, continue with Leeann and monitoring blood sugars routinely.    Moderate anemia, improved over the last few labs.  Will continue to monitor.  Low platelets at 91, similar to labs seen over the past 4 months.  I am not sure exactly where this is coming from.  In conjunction with his elevated MCV on his blood count, may be worth getting an ultrasound of his liver to make sure another issue is there.  With low platelets he is at a higher risk of bleeding.  Since this is persistent, it may also be worth seeing hematology to figure out what is going on.  If amenable, I can put an ultrasound for liver, and hematology referral.    TSH elevated at 7.26 with normal T4 has been uptrending on the last 2 labs.  He does not appear to be on thyroid hormone replacement, would consider low-dose treatment, alternatively we could just repeat labs in 4 weeks to see how they are doing, and if still elevated, discussed treatment at that time.    Vitamin D significantly low at 11, if not already doing so, recommend OTC vitamin D3, 7357-6653 units daily.    Protein present in urine, will continue to monitor, may benefit from SGLT2 such as Jardiance or Farxiga, but already on significant medication burden, so we will monitor.  I know cardiology took him off lisinopril, which is usually the treatment for protein in urine.  Will continue to monitor.

## 2024-11-11 DIAGNOSIS — E03.8 SUBCLINICAL HYPOTHYROIDISM: ICD-10-CM

## 2024-11-11 DIAGNOSIS — D75.89 MACROCYTOSIS: Primary | ICD-10-CM

## 2024-11-11 DIAGNOSIS — D69.6 THROMBOCYTOPENIA (CMS-HCC): ICD-10-CM

## 2024-11-11 NOTE — NURSING NOTE
Nurse to nurse report received from WILY Forrester.  Assumed care of patient.  Medications, labs, and orders reviewed.  Patient in bed watching TV.  HOB elevated. Patient is alert and oriented x 4. Patient denies pain.  Vitals stable. Lungs clear to auscultation bilaterally. Pedal pulses 2+ bilaterally, with +2 BLE edema.  Patient has small healing ulcer on plantar side of right foot that is intact and open to air with no visible drainage. Patient is on 4L nasal canula oxygen at 96%.  Incentive spirometer is at bedside and patient was educated on purpose and encouraged use.  Patient states that he was been using it. Call light within reach.  Bed in lowest position and bed alarm on.  Patient denies any other needs at this time.    Per Mom it is ok to give information to father Chung Nice.

## 2024-11-14 ENCOUNTER — HOSPITAL ENCOUNTER (OUTPATIENT)
Dept: RADIOLOGY | Facility: HOSPITAL | Age: 84
Discharge: HOME | End: 2024-11-14
Payer: MEDICARE

## 2024-11-14 ENCOUNTER — APPOINTMENT (OUTPATIENT)
Dept: PHARMACY | Facility: HOSPITAL | Age: 84
End: 2024-11-14
Payer: MEDICARE

## 2024-11-14 DIAGNOSIS — D69.6 THROMBOCYTOPENIA (CMS-HCC): ICD-10-CM

## 2024-11-14 DIAGNOSIS — D75.89 MACROCYTOSIS: ICD-10-CM

## 2024-11-14 DIAGNOSIS — E11.9 DIABETES MELLITUS TYPE 2 WITHOUT RETINOPATHY (MULTI): Primary | ICD-10-CM

## 2024-11-14 PROCEDURE — 76705 ECHO EXAM OF ABDOMEN: CPT | Performed by: STUDENT IN AN ORGANIZED HEALTH CARE EDUCATION/TRAINING PROGRAM

## 2024-11-14 PROCEDURE — 76705 ECHO EXAM OF ABDOMEN: CPT

## 2024-11-14 NOTE — PROGRESS NOTES
Pharmacist Clinic: Diabetes Management  Luis Greene is a 84 y.o. male was referred to Clinical Pharmacy Team for diabetes management.     Referring Provider: Christopher D'Amico, DO     HISTORY OF PRESENT ILLNESS  Patient is a type 2 diabetic, unknown date of diagnosis.   His diabetes is complicated by recent hospitalizations.      Diet: patient does not limit what he eats, currently he has a goal of weight gain   - 1-2 snacks throughout the day (apple, popcorn, chips, sandwich)   - Dinner: varies, pasta, soup, take out     LAB REVIEW   Glucose (mg/dL)   Date Value   11/06/2024 154 (H)   06/21/2024 271 (H)   06/21/2024 305 (H)     Hemoglobin A1C (%)   Date Value   11/06/2024 6.7 (H)   05/03/2024 9.6 (H)   11/15/2023 6.7 (H)     Bicarbonate (mmol/L)   Date Value   11/06/2024 25   06/21/2024 21   06/21/2024 20 (L)     Urea Nitrogen (mg/dL)   Date Value   11/06/2024 27 (H)   06/21/2024 20   06/21/2024 23     Creatinine (mg/dL)   Date Value   11/06/2024 1.18   06/21/2024 1.19   06/21/2024 1.31 (H)     Lab Results   Component Value Date    HGBA1C 6.7 (H) 11/06/2024    HGBA1C 9.6 (H) 05/03/2024    HGBA1C 6.7 (H) 11/15/2023     Lab Results   Component Value Date    CHOL 125 11/06/2024    CHOL 97 05/03/2024    CHOL 81 (L) 11/15/2023     Lab Results   Component Value Date    HDL 48.3 11/06/2024    HDL 37.6 05/03/2024    HDL 29.0 (L) 11/15/2023     Lab Results   Component Value Date    TRIG 80 11/06/2024    TRIG 70 05/03/2024    TRIG 110 11/15/2023       DIABETES ASSESSMENT    CURRENT PHARMACOTHERAPY  - metformin 500 mg 2 tabs by mouth twice daily   - soliqua 15 units under the skin once daily in the morning     SECONDARY PREVENTION  - Statin? Yes  - ACE-I/ARB? Yes    HISTORICAL PHARMACOTHERAPY   - Lantus 20 units under the skin once daily   - Novolog 5 units once daily once daily     SMBG MEASUREMENTS: patient is using freestyle reed 2   - however he also reports his numbers are between 120-160    DISCUSSION:   - Patient  got his lab work done, most recent A1c came back at 6.7% which is below goal of 7%, congrats!   - Patient is tolerating his current regimen without issues   - Counseled patient, answered all questions and concerns    RECOMMENDATIONS/PLAN  1. Patients diabetes is worsening with most recent A1c of 6.7 % (goal < 7 %).   - Continue all meds under the continuation of care with the referring provider and clinical pharmacy team    Clinical Pharmacist follow up: 3 months    Thank you,  Leeann Page, PharmD    Verbal consent to manage patient's drug therapy was obtained from the patient. They were informed they may decline to participate or withdraw from participation in pharmacy services at any time.

## 2024-11-15 DIAGNOSIS — K74.60 CIRRHOSIS OF LIVER WITHOUT ASCITES, UNSPECIFIED HEPATIC CIRRHOSIS TYPE (MULTI): Primary | ICD-10-CM

## 2024-11-15 NOTE — RESULT ENCOUNTER NOTE
Liver ultrasound does show likely cirrhosis.  Should have hepatology consultation.  Referral placed.

## 2025-01-08 ENCOUNTER — OFFICE VISIT (OUTPATIENT)
Dept: CARDIOLOGY | Facility: CLINIC | Age: 85
End: 2025-01-08
Payer: MEDICARE

## 2025-01-08 VITALS
OXYGEN SATURATION: 98 % | WEIGHT: 196.3 LBS | BODY MASS INDEX: 27.48 KG/M2 | SYSTOLIC BLOOD PRESSURE: 112 MMHG | DIASTOLIC BLOOD PRESSURE: 60 MMHG | HEART RATE: 72 BPM | HEIGHT: 71 IN

## 2025-01-08 DIAGNOSIS — I48.91 ATRIAL FIBRILLATION, UNSPECIFIED TYPE (MULTI): Primary | ICD-10-CM

## 2025-01-08 PROCEDURE — 1159F MED LIST DOCD IN RCRD: CPT | Performed by: INTERNAL MEDICINE

## 2025-01-08 PROCEDURE — 99214 OFFICE O/P EST MOD 30 MIN: CPT | Performed by: INTERNAL MEDICINE

## 2025-01-08 PROCEDURE — 1160F RVW MEDS BY RX/DR IN RCRD: CPT | Performed by: INTERNAL MEDICINE

## 2025-01-08 PROCEDURE — 93005 ELECTROCARDIOGRAM TRACING: CPT | Performed by: INTERNAL MEDICINE

## 2025-01-08 PROCEDURE — 1126F AMNT PAIN NOTED NONE PRSNT: CPT | Performed by: INTERNAL MEDICINE

## 2025-01-08 PROCEDURE — 3078F DIAST BP <80 MM HG: CPT | Performed by: INTERNAL MEDICINE

## 2025-01-08 PROCEDURE — 1036F TOBACCO NON-USER: CPT | Performed by: INTERNAL MEDICINE

## 2025-01-08 PROCEDURE — 3074F SYST BP LT 130 MM HG: CPT | Performed by: INTERNAL MEDICINE

## 2025-01-08 PROCEDURE — 1157F ADVNC CARE PLAN IN RCRD: CPT | Performed by: INTERNAL MEDICINE

## 2025-01-08 RX ORDER — ACETAMINOPHEN 500 MG
5000 TABLET ORAL DAILY
Qty: 90 TABLET | Refills: 3 | Status: SHIPPED | OUTPATIENT
Start: 2025-01-08

## 2025-01-08 ASSESSMENT — ENCOUNTER SYMPTOMS
RESPIRATORY NEGATIVE: 1
GASTROINTESTINAL NEGATIVE: 1
MUSCULOSKELETAL NEGATIVE: 1
CARDIOVASCULAR NEGATIVE: 1
CONSTITUTIONAL NEGATIVE: 1
NEUROLOGICAL NEGATIVE: 1

## 2025-01-08 ASSESSMENT — PAIN SCALES - GENERAL: PAINLEVEL_OUTOF10: 0-NO PAIN

## 2025-01-08 ASSESSMENT — PATIENT HEALTH QUESTIONNAIRE - PHQ9
2. FEELING DOWN, DEPRESSED OR HOPELESS: NOT AT ALL
1. LITTLE INTEREST OR PLEASURE IN DOING THINGS: NOT AT ALL
SUM OF ALL RESPONSES TO PHQ9 QUESTIONS 1 AND 2: 0

## 2025-01-08 NOTE — PROGRESS NOTES
"Chief Complaint:   Follow-up    History Of Present Illness:    .Mr Greene returns in follow up.  He got a divorce and is now selling his home and living at [facility] in [place].  Denies chest pain, sob, palpitations or pedal edema.         Last Recorded Vitals:  Blood pressure 125/56, pulse 73, height 1.803 m (5' 11\"), weight 89 kg (196 lb 4.8 oz), SpO2 98%.     Past Medical History:  Past Medical History:   Diagnosis Date   • A-fib (Multi)     Loop recorder noted AFIB after back surgery 02/2022. Watchman device implanted (11/2023) no longer requiring Eliquis   • Abnormal cardiac valve     Per ECHO 8/2023: Mild mitral valve regurgitation.Mild to moderate tricuspid regurgitation.   • Abnormal EEG     EEG is indicative of a moderate diffuse encephalopathy 11/2023   • BPH (benign prostatic hyperplasia)    • CHF (congestive heart failure)    • Cognitive communication deficit    • Coronary artery disease     s/p CABG x2 vessel in 2011. Develop recurrent angina in 2013 and underwent a repeat cardiac catheterization demonstrating closure of the original bypass grafts but with collateral circulation and no PCI was required.   • Diabetes (Multi)     w/ neuropathy   • Foot drop, bilateral     Uses bilateral AFOs   • GERD (gastroesophageal reflux disease)    • H/O sepsis    • H/O: upper GI bleed     Was on Eliquis at the time   • HL (hearing loss)    • Hyperlipidemia    • GINGER (iron deficiency anemia)    • ALEJANDRINA (obstructive sleep apnea)     \"Mild\" per PSG 9/2021   • Osteoarthritis    • Recurrent UTI     Especially between [id number]   • Seizure disorder (Multi)     h/o TIA/CVA vs seizures on Keppra   • Thrombocytopenia (CMS-HCC)     In 2024 platelets were    • TIA (transient ischemic attack)     x4 (s/s of parasthesias and word finding). Alternative explanation may be migraine equivalent or focal seizure.        Past Surgical History:  Past Surgical History:   Procedure Laterality Date   • CARDIAC ASSIST DEVICE " INSERTION  07/13/2020    Loop Recorder Implant due to recurrent TIA   • CARDIAC CATHETERIZATION N/A 11/10/2023    Watchman device/LAAO (Left Atrial Appendage Occlusion)   • COLONOSCOPY      Hemorrhoids, polyps   • CORONARY ARTERY BYPASS GRAFT  07/12/2011    CABG x2   • KIDNEY STONE SURGERY     • KNEE SURGERY      1960s   • LAMINECTOMY  02/22/2022    Laminectomy with medial facetectomies L3-S1, excision of left L4-5 facet synovial cyst.   • LASER OF PROSTATE W/ GREEN LIGHT PVP  06/20/2024   • MR HEAD ANGIO WO IV CONTRAST  02/17/2019    No sign of intracranial stenosis or aneurysm   • MR HEAD ANGIO WO IV CONTRAST  10/28/2022    No sign of intracranial stenosis or aneurysm.   • MR HEAD ANGIO WO IV CONTRAST  08/15/2023    Atrophy and chronic ischemic changes without acute intracranial process. Unremarkable MR angiography of the intracranial circulation.   • PROSTATE SURGERY  01/23/2024    Urolift   • ROTATOR CUFF REPAIR Right 11/11/2019    c/b septic joint requiring arthrotomy w/ I&D on 01/15/2020   • TONSILLECTOMY     • UPPER GASTROINTESTINAL ENDOSCOPY         Social History:  Social History     Socioeconomic History   • Marital status:    Tobacco Use   • Smoking status: Never   • Smokeless tobacco: Never   Vaping Use   • Vaping status: Never Used   Substance and Sexual Activity   • Alcohol use: Never   • Drug use: Never   • Sexual activity: Defer     Social Drivers of Health     Financial Resource Strain: Low Risk  (6/19/2024)    Overall Financial Resource Strain (CARDIA)    • Difficulty of Paying Living Expenses: Not hard at all   Food Insecurity: No Food Insecurity (6/19/2024)    Hunger Vital Sign    • Worried About Running Out of Food in the Last Year: Never true    • Ran Out of Food in the Last Year: Never true   Transportation Needs: No Transportation Needs (6/19/2024)    PRAPARE - Transportation    • Lack of Transportation (Medical): No    • Lack of Transportation (Non-Medical): No   Physical Activity:  Insufficiently Active (6/19/2024)    Exercise Vital Sign    • Days of Exercise per Week: 1 day    • Minutes of Exercise per Session: 30 min   Stress: No Stress Concern Present (6/19/2024)    [employer] - Occupational Stress Questionnaire    • Feeling of Stress : Not at all   Social Connections: Moderately Isolated (6/19/2024)    Social Connection and Isolation Panel [NHANES]    • Frequency of Communication with Friends and Family: More than three times a week    • Frequency of Social Gatherings with Friends and Family: Three times a week    • Attends Jew Services: Never    • Active Member of Clubs or Organizations: No    • Attends Club or Organization Meetings: Never    • Marital Status:    Intimate Partner Violence: Not At Risk (6/19/2024)    Humiliation, Afraid, Rape, and Kick questionnaire    • Fear of Current or Ex-Partner: No    • Emotionally Abused: No    • Physically Abused: No    • Sexually Abused: No   Housing Stability: Low Risk  (6/19/2024)    Housing Stability Vital Sign    • Unable to Pay for Housing in the Last Year: No    • Number of Places Lived in the Last Year: 1    • Unstable Housing in the Last Year: No       Family History:  Family History   Problem Relation Name Age of Onset   • Other (cardiac disorder) Father     • Diabetes Father     • Hyperlipidemia Father     • Hypertension Father     • Other (liver carcinoma) Father     • Other (hypercholesterolemia) Father     • Hypertension Daughter           Allergies:  Isosorbide, Famotidine, Prednisone, Amoxicillin, Donepezil, and Ibuprofen    Outpatient Medications:  Current Outpatient Medications   Medication Sig Dispense Refill   • acetaminophen (Tylenol) 325 mg tablet Take 2 tablets (650 mg) by mouth every 6 hours if needed for mild pain (1 - 3).     • amLODIPine (Norvasc) 5 mg tablet Take 1 tablet (5 mg) by mouth once daily. 90 tablet 3   • aspirin 81 mg EC tablet      • atorvastatin (Lipitor) 40 mg  "tablet Take 1 tablet (40 mg) by mouth once daily. 90 tablet 2   • ferrous sulfate 325 (65 Fe) MG EC tablet Take 1 tablet by mouth once daily. 90 tablet 3   • finasteride (Proscar) 5 mg tablet Take 1 tablet (5 mg) by mouth once daily. 30 tablet 5   • furosemide (Lasix) 40 mg tablet Take 1 tablet (40 mg) by mouth once daily. Note from [facility] to stop taking this med??? 90 tablet 3   • gabapentin (Neurontin) 100 mg capsule TAKE 1 CAPSULE BY MOUTH ONCE DAILY FOR  NEUROPATHY  PAIN 30 capsule 2   • insulin glargine-lixisenatide (Soliqua 100/33) 100 unit-33 mcg/mL insulin pen Inject 14 units under the skin once daily 6 each 3   • levETIRAcetam (Keppra) 250 mg tablet Take 1 tablet (250 mg) by mouth 2 times a day.     • magnesium oxide (Mag-Ox) 400 mg (241.3 mg magnesium) tablet Take 1 tablet (400 mg) by mouth once daily.     • metFORMIN (Glucophage) 500 mg tablet Take 2 tablets by mouth twice daily 360 tablet 0   • omeprazole (PriLOSEC) 40 mg DR capsule GIVE 1 CAPSULE BY MOUTH ONE TIME A DAY FOR HEARTBURN 90 capsule 3   • pen needle, diabetic 32 gauge x 5/16\" needle Use to inject insulin once a day 100 each 1   • tamsulosin (Flomax) 0.4 mg 24 hr capsule Take 1 capsule (0.4 mg) by mouth 2 times a day. 180 capsule 1   • nitroglycerin (Nitrostat) 0.4 mg SL tablet Place 1 tablet (0.4 mg) under the tongue every 5 minutes if needed for chest pain. May repeat dose every 5 minutes for up to 3 doses total. 15 tablet 2     No current facility-administered medications for this visit.        Physical Exam:  Cardiovascular:      PMI at left midclavicular line. Normal rate. Irregularly irregular rhythm. Normal S1. Normal S2.       Murmurs: There is no murmur.      No gallop.  No click. No rub.   Pulses:     Intact distal pulses.   Edema:     Peripheral edema absent.       ROS:  Review of Systems   Constitutional: Negative.   Cardiovascular: Negative.    Respiratory: Negative.     Skin: Negative.    Musculoskeletal: Negative.  "   Gastrointestinal: Negative.    Genitourinary: Negative.    Neurological: Negative.           Last Labs:  CBC -  Lab Results   Component Value Date    WBC 5.7 11/06/2024    HGB 10.9 (L) 11/06/2024    HCT 33.5 (L) 11/06/2024     (H) 11/06/2024    PLT 91 (L) 11/06/2024       CMP -  Lab Results   Component Value Date    CALCIUM 9.9 11/06/2024    PHOS 3.1 05/03/2024    PROT 7.0 11/06/2024    ALBUMIN 4.4 11/06/2024    AST 22 11/06/2024    ALT 19 11/06/2024    ALKPHOS 76 11/06/2024    BILITOT 0.9 11/06/2024       LIPID PANEL -   Lab Results   Component Value Date    CHOL 125 11/06/2024    TRIG 80 11/06/2024    HDL 48.3 11/06/2024    CHHDL 2.6 11/06/2024    LDLF 37 08/22/2022    VLDL 16 11/06/2024    NHDL 77 11/06/2024       RENAL FUNCTION PANEL -   Lab Results   Component Value Date    GLUCOSE 154 (H) 11/06/2024     11/06/2024    K 5.0 11/06/2024     11/06/2024    CO2 25 11/06/2024    ANIONGAP 16 11/06/2024    BUN 27 (H) 11/06/2024    CREATININE 1.18 11/06/2024    GFRMALE 82 09/14/2023    CALCIUM 9.9 11/06/2024    PHOS 3.1 05/03/2024    ALBUMIN 4.4 11/06/2024        Lab Results   Component Value Date    HGBA1C 6.7 (H) 11/06/2024         Assessment/Plan   Problem List Items Addressed This Visit    None      Hospital course 06/2024 6/14: This elderly white male with an extensive past medical history including coronary artery disease, remote CABG, hypertension, hyperlipidemia, type 2 diabetes, paroxysmal atrial fibrillation, history of?  TIA versus recurrent localized focal seizures, history of GI bleeding on Eliquis anticoagulation, status post Watchman implantation is currently admitted with high-grade fever and evidence of recurrent urinary tract infection possible urosepsis.  The patient had previously been scheduled for a laser-assisted TURP on 6/20/2024.  He has been started on empiric IV Rocephin.  Systolic blood pressure slightly elevated on the amlodipine 5 mg daily.  Urine and blood  cultures are pending.  No leukocytosis and renal parameters remain at baseline presumably positive indicators of early diagnosis.  Minor elevation of high-sensitivity troponin consistent with minimal demand ischemia.  Will hold usual low-dose Lasix 40 mg daily.  Patient was not given usual IV fluids per sepsis protocol due to the slight elevation in his high-sensitivity troponin.  Clinically the patient is not volume overloaded.     6/15: No significant changes since admission.  Patient continues to have recurrent fevers.  Most recent 103.  He remains on IV antibiotics for his known urinary tract infection.  He is receiving Tylenol for his fevers.  He states he feels achy but otherwise okay.  He is breathing comfortably on nasal cannula oxygen.  There is no evidence of fluid volume overload.  No peripheral edema.  He is chest pain-free.  On telemetry monitor overnight he remained in a sinus arrhythmia without significant ectopy.  His blood pressure is stable at 118/85.  Overall he is stable from a cardiac perspective.  Will follow from a distance.  Please feel free to reach out with further or new cardiac concerns.     6/16: Cardiac wise the patient remains stable.  No chest pain or anginal type symptoms.  Receiving IV antibiotic therapy and overall clinical condition has improved.  Tentative plan is prostate surgery on Thursday of coming week.  Dr. Grant will resume cardiology care tomorrow.     6/17: The patient is sitting upright in bed side chair feeling and appearing much improved.  He is awake alert, conversant and eating a full breakfast.  The original urine and blood cultures that were drawn on 6/14/2024 from the emergency room proved to be positive for Enterobacter cloacae UTI/bacteremia.  The patient is responding well to the IV meropenem.  Fortunately the patient had been brought to the emergency room quickly upon the original onset of symptoms and he never developed leukocytosis.  CBC today WBC of 5400  hematocrit is unchanged at 28.7.  He does have a chronic thrombocytopenia platelet count 68,000 presently.  Electrolyte panel is notable for creatinine of 1.0 serum sodium 129.  Systolic blood pressures are in the range of 140-150 mmHg on amlodipine 5 mg daily.  The patient was tentatively previously scheduled for a laser-assisted TURP on 6/20 and hopefully this procedure can still be performed as planned.  The patient has had several episodes of urosepsis related to urinary retention originally and now from Tiwari catheter currently.     6/18: The patient is currently off the floor having a barium swallow performed apparently for some component of choking.  The patient was seen by speech therapy yesterday and their impression was normal oral phase but possible pharyngeal phase dysphagia based on clinical assessment and a modified barium swallow was recommended currently being performed.  Otherwise he is feeling improved.  He is being followed by infectious disease and was converted to oral Bactrim therapy for the Enterobacter cloacae I UTI/bacteremia.  No leukocytosis on CBC with a WBC of 5200.  Hemoglobin modestly decreased to 9.5 platelet count unchanged at 72,000.  His renal parameters do remain stable creatinine 1.1 and sodium 132.  Systolic blood pressure in acceptable range at 140 mmHg.  Will continue amlodipine unchanged.     6/19: Patient sitting at bedside chair awake alert conversant without any complaints.  He  evidently will be transferred to the [facility] facility tomorrow to undergo his previously scheduled laser-assisted TURP on 6/20/2024.  He will remain at the [facility] facility until discharge.  Patient will remain off of Lasix therapy which was stopped at time of admission.  Blood pressure acceptable and amlodipine 5 mg daily.  Patient is medically optimized for his procedure tomorrow.  Antibiotic therapy as per infectious disease and urology.  Will see the patient in several weeks for cardiology  follow-up in the office.     At the time of follow up office visit patient had his green light laser procedure to the prostate and is feeling much better.  He has resumed furosemide.       1. Coronary artery disease with remote CABG Ã--2 2011. This patient did develop recurrent angina in 2013 and underwent a repeat cardiac catheterization demonstrating closure of the original bypass grafts but with collateral circulation and no PCI was required.     2. History of TIA. This patient did have a TIA following his cardiac catheterization with transient confusion on the day of discharge. The patient was admitted to [facility] on 02/17/2019 with numbness of his left arm hand and ultimately jaw. The numbness of the left hand and jaw resolved but persisted somewhat longer within the left arm. Evaluation at that time included a chest x-ray showing previous sternotomy with clear lung fields. Carotid ultrasound showed mild bilateral plaque less than 50% stenoses. CT angiogram of the head and neck was unremarkable with nonstenotic calcification of the right common carotid artery without stenosis. The left common carotid artery had a nonstenotic calcification with a patent internal carotid artery. His intracerebral vessels were unremarkable with no aneurysm. An MRI of the brain on 02/17/2019 was unremarkable. He had an MRI of the cervical spine that showed minimal disc bulging at C2-C3 with moderate disc osteophyte complex at C3-C4 causing moderate canals narrowing and cord impingement along with bilateral neural foraminal stenoses. There was an osteophyte complex at C4-C5 causing mild anterior cord impingement and bilateral moderate neural foraminal narrowing and disc bulging at C5-C6 without cord impingement along with anterior subluxation of C7 upon T1. EKG showed sinus rhythm with first-degree AV block and no ST segment change. There was no evidence of atrial fibrillation by telemetry monitoring. His high sensitivity  troponins were negative. He had an echocardiogram performed on 02/17/2019 showing an estimated LV ejection fraction at 55â€“59 percent with mild hypokinesis of the anteroseptal wall due to previous thoracotomy. Left atrial size was in the upper range of normal with mild aortic root calcification and a mildly dilated right atrium. The patient was readmitted to [facility] on 06/04/2019 with recurrent neurological complaints. He had pins and needles paresthesias of the left hand and arm along with perceived weakness of the left hand and arm with loss of coordination. The patient underwent repeat evaluation including MRI of the brain which was unremarkable. Negligible carotid vascular disease by ultrasound and CT angiogram in the past. Blood pressure readings were acceptable at that time with lisinopril 40 mg daily amlodipine 5 mg daily metoprolol extended release 5 mg daily. He was on aspirin at that time along with the atorvastatin 40 mg daily. More recently the patient [place] when he began to develop paresthesias of the right hand and fingertips. Initially he thought that an Ace wrap of his right shoulder was too tight and continued driving. The sense of tingling began to get worse and he developed a funny sensation around the right side of his face involving the lip. The patient pulled his car off to the side of the road. At that point in time he was having trouble communicating verbally due to the inability to formulate words. He was taken by EMS to [facility] and observed but then released without admission. He is subsequently seen by neurology as an outpatient. He did have a extra no cardiac monitor performed on 02/05/2020â€“02/14/2020. This did not demonstrate atrial fibrillation. It did demonstrate some brief episodes of Mobitz 1 second degree AV block. He also had one 8 beat run of nonsustained ventricular tachycardia. The patient has never had  presyncopal or syncopal events but more notably paresthesias. The results of this external cardiac monitor were discussed with electrophysiology and at this point the patient will be scheduled to have a loop recorder implanted to ensure that he is not having clinically silent paroxysmal atrial fibrillation not detected on previous telemetry monitoring. The patient has been seen by neurology and was switched to Plavix 75 mg daily which will be continued unless there is documentation of paroxysmal atrial fibrillation by the loop recorder. Echocardiogram performed at time of today's visit 03/17/2020 demonstrates an LV ejection fraction of 55â€“60 percent with mild hypokinesis of the anteroseptal wall due to previous thoracotomy with oneâ€“2+ mitral valve regurgitation. The findings are very similar to the echo that was performed at [facility] in 2/2019. Patient had loop recorder placed 07/2020 by Dr Steven Flores. Had negative pharmacologic nuclear stress test done 01/2022. Patient had hospital stay at [facility] 08/15/2023 and was worked up for TIA vs migraine equivalent. Carotid US showed < 50% stenosis bilaterally. MRI of brain was negative and MRA was unremarkable. Echo showed EF 55-60%, 1+ MR and 1-2+ TR. See Dr Avitia for consideration for Watchman device.  Patient had Watchman placed 11/2023.     3. Hypertension. Adequate control with present management which includes lisinopril 20 mg daily, amlodipine 5 mg daily and metoprolol ER 50 mg daily.     4. Hyperlipidemia. Good response to atorvastatin 40 mg daily. Lipid panel on 08/2022 includes cholesterol 77 LDL 37 HDL 29 triglycerides 537. FLP done 08/2022 chol 77, HDL 29 LDL 37 trig 53.     5. Type 2 diabetes.     6. Lifetime nonsmoking.     5. Status post right rotator cuff repair 11/11/2019. The patient had a right rotator cuff operation on 11 11/20/1990 evidently developed an infection that required surgical debridement.     6. Hx of covid-19 vaccine #1 and #2.       7. Afib noted to loop recorder after back surgery 02/2022. Patient had laminectomy with Dr Allred 02/22/2022 and developed afib on his loop recorder. Has been started on Xarelto. ECG done prior shows NSR with long first degree AVB.      8. TIA. Patient has been admitted to [facility] on 4 occasions, the first being February 2019 for complaints of left hand numbness and difficulty finding words and left leg heaviness.Carotid ultrasound showed less than 50% stenosis bilaterally. CT angiogram of the head and neck was unremarkable. MRI MRA of the brain was negative for acute stroke. Later in 2019 the patient was again admitted with similar complaints while driving. He had a loop recorder implanted which did show 2 episodes of atrial tachycardia or atrial fibrillation the longest lasting 6 minutes. The burden was only 0.06%. In October 2022 he was treated for UTI with antibiotics and again admitted for similar complaints. Neurology recommended outpatient physical therapy for inner ear issue. CT angiogram was again negative, carotid ultrasound showed less than 50% stenosis bilaterally, MRI MRA did not show any acute infarct or intracerebral vascular stenosis. Echo done October 2022 showed an EF of 60%, RAD, mild MR, mild to moderate TR, mildly elevated RVSP, PASP 43 mmHg. He was seen by neurology for possible alternative mechanisms to his TIA and treated with magnesium citrate and possibly verapamil. Alternative explanation may be migraine equivalent or focal seizure. Neurology discharged him with referral for vestibular studies and PT for balance. Patient currently will follow-up with Dr. Garvey.      9. GI bleed. Patient with black stools. Had extensive work up with hemoc and can restart Eliquis.  Patient is off Eliquis and asa after Watchman procedure.     10. AVB. Monitor worn 03/24/2023 showed HR  with 67 bpm average. First degree AVB. PVC 0.08%. PAC 0.64%.      11.  Urolift for BPH.  Hx of urinary  catheter placement and sepsis.  Then had green light laser.

## 2025-01-09 PROCEDURE — 93010 ELECTROCARDIOGRAM REPORT: CPT | Performed by: INTERNAL MEDICINE

## 2025-01-11 LAB
ATRIAL RATE: 241 BPM
Q ONSET: 221 MS
QRS COUNT: 12 BEATS
QRS DURATION: 150 MS
QT INTERVAL: 420 MS
QTC CALCULATION(BAZETT): 462 MS
QTC FREDERICIA: 448 MS
R AXIS: 73 DEGREES
T AXIS: 8 DEGREES
T OFFSET: 431 MS
VENTRICULAR RATE: 73 BPM

## 2025-01-14 ENCOUNTER — APPOINTMENT (OUTPATIENT)
Dept: PHARMACY | Facility: HOSPITAL | Age: 85
End: 2025-01-14
Payer: MEDICARE

## 2025-01-14 DIAGNOSIS — E11.9 DIABETES MELLITUS TYPE 2 WITHOUT RETINOPATHY (MULTI): Primary | ICD-10-CM

## 2025-01-23 ENCOUNTER — CLINICAL SUPPORT (OUTPATIENT)
Dept: GASTROENTEROLOGY | Facility: CLINIC | Age: 85
End: 2025-01-23
Payer: MEDICARE

## 2025-01-23 ENCOUNTER — OFFICE VISIT (OUTPATIENT)
Dept: GASTROENTEROLOGY | Facility: CLINIC | Age: 85
End: 2025-01-23
Payer: MEDICARE

## 2025-01-23 ENCOUNTER — LAB (OUTPATIENT)
Dept: LAB | Facility: LAB | Age: 85
End: 2025-01-23
Payer: MEDICARE

## 2025-01-23 VITALS
TEMPERATURE: 97.2 F | HEART RATE: 83 BPM | SYSTOLIC BLOOD PRESSURE: 165 MMHG | RESPIRATION RATE: 20 BRPM | BODY MASS INDEX: 27.25 KG/M2 | DIASTOLIC BLOOD PRESSURE: 67 MMHG | WEIGHT: 195.4 LBS | OXYGEN SATURATION: 97 %

## 2025-01-23 DIAGNOSIS — K74.60 CIRRHOSIS OF LIVER WITHOUT ASCITES, UNSPECIFIED HEPATIC CIRRHOSIS TYPE (MULTI): ICD-10-CM

## 2025-01-23 DIAGNOSIS — D64.9 ANEMIA, UNSPECIFIED TYPE: Primary | ICD-10-CM

## 2025-01-23 DIAGNOSIS — D64.9 ANEMIA, UNSPECIFIED TYPE: ICD-10-CM

## 2025-01-23 LAB
ALBUMIN SERPL BCP-MCNC: 4.6 G/DL (ref 3.4–5)
ALP SERPL-CCNC: 57 U/L (ref 33–136)
ALT SERPL W P-5'-P-CCNC: 14 U/L (ref 10–52)
AST SERPL W P-5'-P-CCNC: 21 U/L (ref 9–39)
BASOPHILS # BLD AUTO: 0.03 X10*3/UL (ref 0–0.1)
BASOPHILS NFR BLD AUTO: 0.7 %
BILIRUB DIRECT SERPL-MCNC: 0.2 MG/DL (ref 0–0.3)
BILIRUB SERPL-MCNC: 0.9 MG/DL (ref 0–1.2)
CERULOPLASMIN SERPL-MCNC: 22.7 MG/DL (ref 20–60)
EOSINOPHIL # BLD AUTO: 0.2 X10*3/UL (ref 0–0.4)
EOSINOPHIL NFR BLD AUTO: 4.6 %
ERYTHROCYTE [DISTWIDTH] IN BLOOD BY AUTOMATED COUNT: 14.2 % (ref 11.5–14.5)
FERRITIN SERPL-MCNC: 39 NG/ML (ref 20–300)
HBV CORE AB SER QL: NONREACTIVE
HBV SURFACE AG SERPL QL IA: NONREACTIVE
HCT VFR BLD AUTO: 33.3 % (ref 41–52)
HCV AB SER QL: NONREACTIVE
HGB BLD-MCNC: 10.8 G/DL (ref 13.5–17.5)
IMM GRANULOCYTES # BLD AUTO: 0.03 X10*3/UL (ref 0–0.5)
IMM GRANULOCYTES NFR BLD AUTO: 0.7 % (ref 0–0.9)
INR PPP: 1.2 (ref 0.9–1.1)
IRON SATN MFR SERPL: 26 % (ref 25–45)
IRON SERPL-MCNC: 101 UG/DL (ref 35–150)
LYMPHOCYTES # BLD AUTO: 1.91 X10*3/UL (ref 0.8–3)
LYMPHOCYTES NFR BLD AUTO: 43.9 %
MCH RBC QN AUTO: 34.3 PG (ref 26–34)
MCHC RBC AUTO-ENTMCNC: 32.4 G/DL (ref 32–36)
MCV RBC AUTO: 106 FL (ref 80–100)
MONOCYTES # BLD AUTO: 0.54 X10*3/UL (ref 0.05–0.8)
MONOCYTES NFR BLD AUTO: 12.4 %
NEUTROPHILS # BLD AUTO: 1.64 X10*3/UL (ref 1.6–5.5)
NEUTROPHILS NFR BLD AUTO: 37.7 %
NRBC BLD-RTO: 0 /100 WBCS (ref 0–0)
PLATELET # BLD AUTO: 86 X10*3/UL (ref 150–450)
PROT SERPL-MCNC: 7 G/DL (ref 6.4–8.2)
PROTHROMBIN TIME: 13 SECONDS (ref 9.8–12.8)
RBC # BLD AUTO: 3.15 X10*6/UL (ref 4.5–5.9)
TIBC SERPL-MCNC: 391 UG/DL (ref 240–445)
UIBC SERPL-MCNC: 290 UG/DL (ref 110–370)
WBC # BLD AUTO: 4.4 X10*3/UL (ref 4.4–11.3)

## 2025-01-23 PROCEDURE — 86381 MITOCHONDRIAL ANTIBODY EACH: CPT

## 2025-01-23 PROCEDURE — 87340 HEPATITIS B SURFACE AG IA: CPT

## 2025-01-23 PROCEDURE — 83550 IRON BINDING TEST: CPT

## 2025-01-23 PROCEDURE — 80076 HEPATIC FUNCTION PANEL: CPT

## 2025-01-23 PROCEDURE — 1126F AMNT PAIN NOTED NONE PRSNT: CPT | Performed by: STUDENT IN AN ORGANIZED HEALTH CARE EDUCATION/TRAINING PROGRAM

## 2025-01-23 PROCEDURE — 99214 OFFICE O/P EST MOD 30 MIN: CPT | Performed by: STUDENT IN AN ORGANIZED HEALTH CARE EDUCATION/TRAINING PROGRAM

## 2025-01-23 PROCEDURE — 1157F ADVNC CARE PLAN IN RCRD: CPT | Performed by: STUDENT IN AN ORGANIZED HEALTH CARE EDUCATION/TRAINING PROGRAM

## 2025-01-23 PROCEDURE — 85025 COMPLETE CBC W/AUTO DIFF WBC: CPT

## 2025-01-23 PROCEDURE — 36415 COLL VENOUS BLD VENIPUNCTURE: CPT

## 2025-01-23 PROCEDURE — 86708 HEPATITIS A ANTIBODY: CPT

## 2025-01-23 PROCEDURE — 99204 OFFICE O/P NEW MOD 45 MIN: CPT | Performed by: STUDENT IN AN ORGANIZED HEALTH CARE EDUCATION/TRAINING PROGRAM

## 2025-01-23 PROCEDURE — 86015 ACTIN ANTIBODY EACH: CPT

## 2025-01-23 PROCEDURE — 3078F DIAST BP <80 MM HG: CPT | Performed by: STUDENT IN AN ORGANIZED HEALTH CARE EDUCATION/TRAINING PROGRAM

## 2025-01-23 PROCEDURE — 3077F SYST BP >= 140 MM HG: CPT | Performed by: STUDENT IN AN ORGANIZED HEALTH CARE EDUCATION/TRAINING PROGRAM

## 2025-01-23 PROCEDURE — 83540 ASSAY OF IRON: CPT

## 2025-01-23 PROCEDURE — 1159F MED LIST DOCD IN RCRD: CPT | Performed by: STUDENT IN AN ORGANIZED HEALTH CARE EDUCATION/TRAINING PROGRAM

## 2025-01-23 PROCEDURE — 85610 PROTHROMBIN TIME: CPT

## 2025-01-23 PROCEDURE — 86038 ANTINUCLEAR ANTIBODIES: CPT

## 2025-01-23 PROCEDURE — 81450 HL NEO GSAP 5-50DNA/DNA&RNA: CPT

## 2025-01-23 PROCEDURE — 82104 ALPHA-1-ANTITRYPSIN PHENO: CPT

## 2025-01-23 PROCEDURE — 86706 HEP B SURFACE ANTIBODY: CPT

## 2025-01-23 PROCEDURE — 82728 ASSAY OF FERRITIN: CPT

## 2025-01-23 PROCEDURE — 91200 LIVER ELASTOGRAPHY: CPT | Performed by: STUDENT IN AN ORGANIZED HEALTH CARE EDUCATION/TRAINING PROGRAM

## 2025-01-23 PROCEDURE — 82390 ASSAY OF CERULOPLASMIN: CPT

## 2025-01-23 PROCEDURE — 86803 HEPATITIS C AB TEST: CPT

## 2025-01-23 PROCEDURE — 86704 HEP B CORE ANTIBODY TOTAL: CPT

## 2025-01-23 RX ORDER — METFORMIN HYDROCHLORIDE 1000 MG/1
1000 TABLET ORAL
COMMUNITY
Start: 2024-12-26

## 2025-01-23 ASSESSMENT — PAIN SCALES - GENERAL: PAINLEVEL_OUTOF10: 0-NO PAIN

## 2025-01-23 NOTE — PROGRESS NOTES
[facility] HEPATOLOGY CLINIC NOTE     History of Present Illness:   Luis Greene is a 84 y.o. male who presents to clinic for evaluation of cirrhosis.     The patient was seen by his primary care doctor for a Medicare wellness visit in November 2024.  The patient was found to have a high MCV and thrombocytopenia which prompted a right upper quadrant ultrasound.  This demonstrated a liver measuring 13.7 cm, heterogeneous echotexture and mild surface contour irregularity concerning for cirrhosis.    In terms of his liver function, the patient has had largely normal AST and ALT since 2019 with only mild elevations within 2 times the upper limit of normal.  He has had intermittent elevations in the bilirubin up to 1.6, his albumin has been intermittently low.  He has stable renal function but also hyperglycemia.  The patient has had longstanding thrombocytopenia and also anemia.  INR is 1.2 measured in 2023.    He also has a past medical history of atrial fibrillation status post Watchman procedure, BPH status post greenlight laser prostate vaporization [6/2024], he has a history of Enterobacter bacteremia treated with outpatient Bactrim, type 2 diabetes mellitus, heart failure with preserved ejection fraction, hypertension, hyperlipidemia, and seizure disorder on Keppra, GERD, peripheral vascular disease.    The patient feels well without abdominal pain, nausea, vomiting, fevers, chills.  He did have a history of GI bleeding in the past, he had melena and had a workup with an upper endoscopy colonoscopy video capsule endoscopy.  This was self-limited.  He denies any history of varices or variceal hemorrhage.  Never had hepatic encephalopathy or ascites.    The patient did serve in the [employer] in the [place] [employer].  He worked  construction and became a supervisor eventually.  No other risk factors for chronic liver disease, does have metabolic syndrome, no alcohol use, does not smoke.  He takes Tylenol  occasionally, no more than 2-3 pills a day.    Review of Systems  Review of systems otherwise negative.     Social History   reports that he has never smoked. He has never used smokeless tobacco. He reports that he does not drink alcohol and does not use drugs.     Family History  family history includes Diabetes in his father; Hyperlipidemia in his father; Hypertension in his daughter and father; cardiac disorder in his father; hypercholesterolemia in his father; liver carcinoma in his father.     Allergies  Allergies   Allergen Reactions    Isosorbide GI Upset and Unknown    Famotidine Agitation     Shakiness       Prednisone Other and Unknown     Agitation     Amoxicillin Unknown    Donepezil Unknown    Ibuprofen Unknown     Pt stated he is not allergic to this medication but has bleeding with excessive use.       Medications  Current Outpatient Medications   Medication Instructions    acetaminophen (TYLENOL) 650 mg, Every 6 hours PRN    amLODIPine (NORVASC) 5 mg, oral, Daily    aspirin 81 mg EC tablet     atorvastatin (LIPITOR) 40 mg, oral, Daily    cholecalciferol (VITAMIN D-3) 5,000 Units, oral, Daily    ferrous sulfate 325 (65 Fe) MG EC tablet 1 tablet, oral, Daily    finasteride (PROSCAR) 5 mg, oral, Daily    furosemide (LASIX) 40 mg, oral, Daily, Note from [facility] to stop taking this med???    gabapentin (Neurontin) 100 mg capsule TAKE 1 CAPSULE BY MOUTH ONCE DAILY FOR  NEUROPATHY  PAIN    insulin glargine-lixisenatide (Soliqua 100/33) 100 unit-33 mcg/mL insulin pen Inject 14 units under the skin once daily    levETIRAcetam (KEPPRA) 250 mg, 2 times daily    magnesium oxide (MAG-OX) 400 mg, oral, Daily    metFORMIN (GLUCOPHAGE) 1,000 mg, oral, 2 times daily    nitroglycerin (NITROSTAT) 0.4 mg, sublingual, Every 5 min PRN, May repeat dose every 5 minutes for up to 3 doses total.    omeprazole (PriLOSEC) 40 mg DR capsule GIVE 1 CAPSULE BY MOUTH ONE TIME A DAY FOR HEARTBURN    pen needle, diabetic 32 gauge x  "5/16\" needle Use to inject insulin once a day    tamsulosin (FLOMAX) 0.4 mg, oral, 2 times daily        There were no vitals taken for this visit.   Physical Exam  Elderly man in no acute distress, nontoxic-appearing, well-nourished well-developed  Extract moods are intact, there is no jaundice  Abdomen is soft and nontender to palpation  Well-healed surgical scars  No lower extremity edema present  No rashes  Heart and lung exam are unremarkable    Labs    Lab Results   Component Value Date    WBC 5.7 11/06/2024    HGB 10.9 (L) 11/06/2024    HCT 33.5 (L) 11/06/2024     (H) 11/06/2024    PLT 91 (L) 11/06/2024     Lab Results   Component Value Date    GLUCOSE 154 (H) 11/06/2024    CALCIUM 9.9 11/06/2024     11/06/2024    K 5.0 11/06/2024    CO2 25 11/06/2024     11/06/2024    BUN 27 (H) 11/06/2024    CREATININE 1.18 11/06/2024     Lab Results   Component Value Date    ALT 19 11/06/2024    AST 22 11/06/2024    ALKPHOS 76 11/06/2024    BILITOT 0.9 11/06/2024     Lab Results   Component Value Date    INR 1.2 11/11/2023    INR 1.2 11/11/2023    INR 1.2 11/10/2023    PROTIME 12.3 11/11/2023    PROTIME 12.3 11/11/2023    PROTIME 12.3 11/10/2023     ASSESSMENT AND PLAN:    #Radiographic evidence of cirrhosis    The patient has had longstanding thrombocytopenia and anemia.  Will obtain a FibroScan to confirm the presence of cirrhosis.  I have also ordered a workup for chronic liver disease including chronic viral hepatitis, alpha-1 antitrypsin, iron studies for hemochromatosis, autoimmune hepatitis workup.  I discussed the probability that this is metabolic dysfunction associated steatotic liver disease.  He has had a history of poorly controlled diabetes.  His weight is fairly stable.  His diabetes is now well-controlled.  Does have other metabolic risk factors including hypertension and hyperlipidemia.    Should he have cirrhosis, I will continue to follow the patient for HCC screening and we will " arrange for follow-up of this.    Kip Roman MD  [facility], [facility]

## 2025-01-24 LAB
ANA SER QL HEP2 SUBST: NEGATIVE
HAV AB SER QL IA: REACTIVE
HBV SURFACE AB SER-ACNC: 8.9 MIU/ML
MITOCHONDRIA AB SER QL IF: NEGATIVE
SMOOTH MUSCLE AB SER QL IF: NEGATIVE

## 2025-01-27 ENCOUNTER — OFFICE VISIT (OUTPATIENT)
Dept: HEMATOLOGY/ONCOLOGY | Facility: CLINIC | Age: 85
End: 2025-01-27
Payer: MEDICARE

## 2025-01-27 VITALS
SYSTOLIC BLOOD PRESSURE: 160 MMHG | BODY MASS INDEX: 28.34 KG/M2 | TEMPERATURE: 97.9 F | WEIGHT: 197.97 LBS | DIASTOLIC BLOOD PRESSURE: 75 MMHG | OXYGEN SATURATION: 96 % | HEART RATE: 77 BPM | RESPIRATION RATE: 17 BRPM | HEIGHT: 70 IN

## 2025-01-27 DIAGNOSIS — D75.89 MACROCYTOSIS: ICD-10-CM

## 2025-01-27 DIAGNOSIS — D69.6 THROMBOCYTOPENIA (CMS-HCC): ICD-10-CM

## 2025-01-27 PROCEDURE — 99214 OFFICE O/P EST MOD 30 MIN: CPT | Performed by: PHYSICIAN ASSISTANT

## 2025-01-27 PROCEDURE — 3078F DIAST BP <80 MM HG: CPT | Performed by: PHYSICIAN ASSISTANT

## 2025-01-27 PROCEDURE — 1159F MED LIST DOCD IN RCRD: CPT | Performed by: PHYSICIAN ASSISTANT

## 2025-01-27 PROCEDURE — 1126F AMNT PAIN NOTED NONE PRSNT: CPT | Performed by: PHYSICIAN ASSISTANT

## 2025-01-27 PROCEDURE — 3077F SYST BP >= 140 MM HG: CPT | Performed by: PHYSICIAN ASSISTANT

## 2025-01-27 PROCEDURE — 1157F ADVNC CARE PLAN IN RCRD: CPT | Performed by: PHYSICIAN ASSISTANT

## 2025-01-27 SDOH — ECONOMIC STABILITY: FOOD INSECURITY: WITHIN THE PAST 12 MONTHS, YOU WORRIED THAT YOUR FOOD WOULD RUN OUT BEFORE YOU GOT THE MONEY TO BUY MORE.: NEVER TRUE

## 2025-01-27 SDOH — ECONOMIC STABILITY: FOOD INSECURITY: WITHIN THE PAST 12 MONTHS, THE FOOD YOU BOUGHT JUST DIDN'T LAST AND YOU DIDN'T HAVE MONEY TO GET MORE.: NEVER TRUE

## 2025-01-27 ASSESSMENT — ENCOUNTER SYMPTOMS
OCCASIONAL FEELINGS OF UNSTEADINESS: 1
LOSS OF SENSATION IN FEET: 1
DEPRESSION: 0

## 2025-01-27 ASSESSMENT — PATIENT HEALTH QUESTIONNAIRE - PHQ9
SUM OF ALL RESPONSES TO PHQ9 QUESTIONS 1 AND 2: 0
1. LITTLE INTEREST OR PLEASURE IN DOING THINGS: NOT AT ALL
2. FEELING DOWN, DEPRESSED OR HOPELESS: NOT AT ALL

## 2025-01-27 ASSESSMENT — PAIN SCALES - GENERAL: PAINLEVEL_OUTOF10: 0-NO PAIN

## 2025-01-27 NOTE — PROGRESS NOTES
Patient Visit Information:   Visit Type: Follow Up Visit      History of Present Illness:      ID Statement:    ASHKAN OLIVEROS is a 83 year old Male        Chief Complaint: iron deficiency anemia   Interval History:    Patient presents for initial hematology consultation for iron deficiency anemia referred by his cardiologist Dr. Grant.  Patient had been started on Xarelto for  history of A-fib at follow-up evaluation with his cardiologist on January 27, 2023 he was noted to have significant iron deficiency.  Iron studies completed on February 9, 2023 showed an iron of 20, TIBC 359, transferrin percent saturation 5.6.  Patient  reports he began having black stools while on Xarelto which resolved within a week of stopping Xarelto.  He has since met wit Dr. Goodman undergone  EGD and Colonoscopy which was reportedly negative. More recently he underwent capsule endoscopy - results pending.   He has been taking oral iron for 1 month he reports he is tolerating this well.  He has not had any fever, chills or night sweats.  No cough, chest pain or shortness of breath.  No nausea or vomiting.  No constipation occasional diarrhea.  He reports  feeling quite fatigued when his anemia was found but seems to be improving over this past month.  He has not noted any blood in urine or any other signs or symptoms of active bleeding or unusual bruising.      He take vitamin B12 twice weekly      Past medical history:  Atrial fibrillation, CAD, dyslipidemia, hypertension, GERD, diabetes, TIA     Past surgical history:  Right rotator cuff 2019 followed by sepsis and IV antibiotics, back surgery 2021, implantable loop recorder x2     Family history:  Father liver carcinoma after obed hepatitis in WW[place].  No family history hematologic or bleeding disorders    History of present illness:  Patient presents for follow up accompanied by his daughter.  On assessment, reports feeling well with good energy and appetite and no new  complaints. Continues on oral iron daily, tolerating well. No longer on AC and denies any bleeding. Recently saw hepatology due to c/f cirrhosis. Had fibro scan. Otherwise doing well.      Review of Systems:   A review of systems has been completed and are negative for complaints except what is stated in the HPI and/or past medical history.      Allergies and Intolerances:  Allergies   Allergen Reactions    Isosorbide GI Upset and Unknown    Famotidine Agitation     Shakiness       Prednisone Other and Unknown     Agitation     Amoxicillin Unknown    Donepezil Unknown    Ibuprofen Unknown     Pt stated he is not allergic to this medication but has bleeding with excessive use.     Outpatient Medication Profile:  Current Outpatient Medications on File Prior to Visit   Medication Sig Dispense Refill    acetaminophen (Tylenol) 325 mg tablet Take 2 tablets (650 mg) by mouth every 6 hours if needed for mild pain (1 - 3).      amLODIPine (Norvasc) 5 mg tablet Take 1 tablet (5 mg) by mouth once daily. 90 tablet 3    aspirin 81 mg EC tablet       atorvastatin (Lipitor) 40 mg tablet Take 1 tablet (40 mg) by mouth once daily. 90 tablet 2    cholecalciferol (Vitamin D-3) 5,000 Units tablet Take 1 tablet (5,000 Units) by mouth once daily. 90 tablet 3    ferrous sulfate 325 (65 Fe) MG EC tablet Take 1 tablet by mouth once daily. 90 tablet 3    finasteride (Proscar) 5 mg tablet Take 1 tablet (5 mg) by mouth once daily. 30 tablet 5    furosemide (Lasix) 40 mg tablet Take 1 tablet (40 mg) by mouth once daily. Note from [place] [facility] to stop taking this med??? 90 tablet 3    gabapentin (Neurontin) 100 mg capsule TAKE 1 CAPSULE BY MOUTH ONCE DAILY FOR  NEUROPATHY  PAIN 30 capsule 2    insulin glargine-lixisenatide (Soliqua 100/33) 100 unit-33 mcg/mL insulin pen Inject 14 units under the skin once daily 6 each 3    levETIRAcetam (Keppra) 250 mg tablet Take 1 tablet (250 mg) by mouth 2 times a day.      magnesium oxide (Mag-Ox) 400 mg  "(241.3 mg magnesium) tablet Take 1 tablet (400 mg) by mouth once daily.      metFORMIN (Glucophage) 1,000 mg tablet Take 1 tablet (1,000 mg) by mouth 2 times daily (morning and late afternoon).      nitroglycerin (Nitrostat) 0.4 mg SL tablet Place 1 tablet (0.4 mg) under the tongue every 5 minutes if needed for chest pain. May repeat dose every 5 minutes for up to 3 doses total. 15 tablet 2    omeprazole (PriLOSEC) 40 mg DR capsule GIVE 1 CAPSULE BY MOUTH ONE TIME A DAY FOR HEARTBURN 90 capsule 3    pen needle, diabetic 32 gauge x 5/16\" needle Use to inject insulin once a day 100 each 1    tamsulosin (Flomax) 0.4 mg 24 hr capsule Take 1 capsule (0.4 mg) by mouth 2 times a day. 180 capsule 1    [DISCONTINUED] metFORMIN (Glucophage) 500 mg tablet Take 2 tablets by mouth twice daily 360 tablet 0     No current facility-administered medications on file prior to visit.      Vitals and Measurements:       7/17/2024     3:37 PM 9/11/2024     1:04 PM 11/6/2024    11:13 AM 1/8/2025     3:06 PM 1/8/2025     4:01 PM 1/23/2025     9:27 AM 1/27/2025     9:05 AM   Vitals   Systolic 123 126 139 125 112 165 160   Diastolic 56 68 64 56 60 67 75   BP Location Right arm Left arm  Left arm  Right arm Left arm   Heart Rate 97 82 78 73 72 83 77   Temp      36.2 °C (97.2 °F) 36.6 °C (97.9 °F)   Resp      20 17   Height 1.803 m (5' 11\") 1.803 m (5' 11\") 1.803 m (5' 11\") 1.803 m (5' 11\")   1.772 m (5' 9.76\")    Weight (lb) 177.3 183.3 185 196.3  195.4 197.97   BMI 24.73 kg/m2 25.57 kg/m2 25.8 kg/m2 27.38 kg/m2  27.25 kg/m2 28.6 kg/m2   BSA (m2) 2.01 m2 2.04 m2 2.05 m2 2.11 m2  2.11 m2 2.1 m2   Visit Report Report Report Report Report Report Report Report       Significant value     Physical Exam:   Constitutional: alert, awake and oriented, not in acute distress   HEENT: moist mucous membranes, normal nose   Neck: supple, no lymphadenopathy   EYES: PERRL, EOM intact, conjunctiva normal  Skin: no jaundice, rash or erythema  Neurological: " "AAOx3, no gross focal deficit   Psychiatric: normal mood and behavior      Lab Results:   Labs:  Lab Results   Component Value Date    WBC 4.4 01/23/2025    NEUTROABS 1.64 01/23/2025    IGABSOL 0.03 01/23/2025    LYMPHSABS 1.91 01/23/2025    MONOSABS 0.54 01/23/2025    EOSABS 0.20 01/23/2025    BASOSABS 0.03 01/23/2025    RBC 3.15 (L) 01/23/2025     (H) 01/23/2025    MCHC 32.4 01/23/2025    HGB 10.8 (L) 01/23/2025    HCT 33.3 (L) 01/23/2025    PLT 86 (L) 01/23/2025     No results found for: \"RETICCTPCT\"   Lab Results   Component Value Date    CREATININE 1.18 11/06/2024    BUN 27 (H) 11/06/2024    EGFR 61 11/06/2024     11/06/2024    K 5.0 11/06/2024     11/06/2024    CO2 25 11/06/2024      Lab Results   Component Value Date    ALT 14 01/23/2025    AST 21 01/23/2025    ALKPHOS 57 01/23/2025    BILITOT 0.9 01/23/2025      Lab Results   Component Value Date    TSH 7.26 (H) 11/06/2024     Lab Results   Component Value Date    TSH 7.26 (H) 11/06/2024     Lab Results   Component Value Date    IRON 101 01/23/2025    TIBC 391 01/23/2025    FERRITIN 39 01/23/2025      Lab Results   Component Value Date    XPGLRHXG60 805 12/19/2023      Lab Results   Component Value Date    FOLATE 9.3 11/27/2023     Lab Results   Component Value Date    SOM Negative 01/23/2025    SEDRATE <1 07/14/2023      Lab Results   Component Value Date    SPEP NORMAL 07/14/2023     Lab Results   Component Value Date     03/22/2023    IGM 41 03/22/2023     03/22/2023       Assessment:    Anemia, iron deficiency:  -Patient will continue oral iron plus vitamin C  -Labs today CBC with differential, ferritin, iron panel, SPEP, kappa lambda free light chains and quantitative IgG's  -Patient is noted to have a low hematocrit dating back to at least 2019 hemoglobin has fallen over the course of the past year aside from labs drawn in February 2023 there were no other iron studies available for me to review today.  Patient may have "  a had a slight iron deficiency previously which potentially worsened when he was started on Xarelto and noted of rectal bleeding with dark stools.  GI work-up did not identify any source of blood loss to date.  Results of capsule endoscopy are pending.   We will await results of iron studies and repeat place with IV iron if necessary.  Once patient's anemia is resolved we could restart Xarelto and monitor patient closely with monthly lab draws repeating iron should patient again become anemic.     Thrombocytopenia:  -This has been ongoing since at least February 2019 and platelet count was seen to be 115,000 I have no prior  labs.  Nutritional labs are found to be within normal limits.  We will first manage anemia to see if correcting anemia improves platelet count.   If not flow cytometry and her bone marrow biopsy can become pleated in the future.  Thrombocytopenia has never been below 100,000 do not believe this contributes to his iron deficiency anemia.      Plan:    Reviewed and discussed lab, imaging, and pathology results with patient in detail as well as diagnosis, prognosis, and treatment options.    Continue oral iron daily as tolerated    Discussed role of BMBx; pending Myloid panel, can't r/o MDS or AOCD    F/U w/PCP and hepatology     RTC in 6 months    Patient verbalized understanding, and all his questions were answered to his satisfaction    30 min spent with patient greater than 50 % of which was spent in consultation and coordination of care.

## 2025-01-28 LAB
A1AT PHENOTYP SERPL-IMP: NORMAL
A1AT SERPL-MCNC: 127 MG/DL (ref 90–200)

## 2025-01-29 LAB
ELECTRONICALLY SIGNED BY: NORMAL
MYELOID NGS RESULTS: NORMAL

## 2025-02-07 ENCOUNTER — TELEPHONE (OUTPATIENT)
Dept: GASTROENTEROLOGY | Facility: HOSPITAL | Age: 85
End: 2025-02-07
Payer: MEDICARE

## 2025-04-14 ENCOUNTER — APPOINTMENT (OUTPATIENT)
Dept: PHARMACY | Facility: HOSPITAL | Age: 85
End: 2025-04-14
Payer: MEDICARE

## 2025-04-14 DIAGNOSIS — E11.42 TYPE 2 DIABETES MELLITUS WITH DIABETIC POLYNEUROPATHY, WITH LONG-TERM CURRENT USE OF INSULIN: Primary | ICD-10-CM

## 2025-04-14 DIAGNOSIS — E11.9 DIABETES MELLITUS TYPE 2 WITHOUT RETINOPATHY (MULTI): ICD-10-CM

## 2025-04-14 DIAGNOSIS — Z79.4 TYPE 2 DIABETES MELLITUS WITH DIABETIC POLYNEUROPATHY, WITH LONG-TERM CURRENT USE OF INSULIN: Primary | ICD-10-CM

## 2025-04-14 NOTE — PROGRESS NOTES
Pharmacist Clinic: Diabetes Management  Luis Greene is a 85 y.o. male was referred to Clinical Pharmacy Team for diabetes management.     Referring Provider: Christopher D'Amico, DO     HISTORY OF PRESENT ILLNESS  Patient is a type 2 diabetic, unknown date of diagnosis.   His diabetes is complicated by recent hospitalizations.      Diet: patient does not limit what he eats, currently he has a goal of weight gain   - 1-2 snacks throughout the day (apple, popcorn, chips, sandwich)   - Dinner: varies, pasta, soup, take out     LAB REVIEW   Glucose (mg/dL)   Date Value   11/06/2024 154 (H)   06/21/2024 271 (H)   06/21/2024 305 (H)     Hemoglobin A1C (%)   Date Value   11/06/2024 6.7 (H)   05/03/2024 9.6 (H)   11/15/2023 6.7 (H)     Bicarbonate (mmol/L)   Date Value   11/06/2024 25   06/21/2024 21   06/21/2024 20 (L)     Urea Nitrogen (mg/dL)   Date Value   11/06/2024 27 (H)   06/21/2024 20   06/21/2024 23     Creatinine (mg/dL)   Date Value   11/06/2024 1.18   06/21/2024 1.19   06/21/2024 1.31 (H)     Lab Results   Component Value Date    HGBA1C 6.7 (H) 11/06/2024    HGBA1C 9.6 (H) 05/03/2024    HGBA1C 6.7 (H) 11/15/2023     Lab Results   Component Value Date    CHOL 125 11/06/2024    CHOL 97 05/03/2024    CHOL 81 (L) 11/15/2023     Lab Results   Component Value Date    HDL 48.3 11/06/2024    HDL 37.6 05/03/2024    HDL 29.0 (L) 11/15/2023     Lab Results   Component Value Date    TRIG 80 11/06/2024    TRIG 70 05/03/2024    TRIG 110 11/15/2023       DIABETES ASSESSMENT    CURRENT PHARMACOTHERAPY  - metformin 500 mg 2 tabs by mouth twice daily   - soliqua 15 units under the skin once daily in the morning     SECONDARY PREVENTION  - Statin? Yes  - ACE-I/ARB? Yes    HISTORICAL PHARMACOTHERAPY   - Lantus 20 units under the skin once daily   - Novolog 5 units once daily once daily     SMBG MEASUREMENTS: patient is using freestyle reed 2   - however he also reports his numbers are between 120-160    DISCUSSION:   - Patient  got his lab work done, most recent A1c came back at 6.7% which is below goal of 7%, congrats!   - Patient is tolerating his current regimen without issues   - Counseled patient, answered all questions and concerns    RECOMMENDATIONS/PLAN  1. Patients diabetes is worsening with most recent A1c of 6.7 % (goal < 7 %).   - Continue all meds under the continuation of care with the referring provider and clinical pharmacy team    Clinical Pharmacist follow up: as needed    Thank you,  Leeann Page, PharmD    Verbal consent to manage patient's drug therapy was obtained from the patient. They were informed they may decline to participate or withdraw from participation in pharmacy services at any time.

## 2025-05-28 ENCOUNTER — OFFICE VISIT (OUTPATIENT)
Dept: CARDIOLOGY | Facility: CLINIC | Age: 85
End: 2025-05-28
Payer: MEDICARE

## 2025-05-28 VITALS
WEIGHT: 202.4 LBS | HEART RATE: 89 BPM | HEIGHT: 71 IN | OXYGEN SATURATION: 95 % | BODY MASS INDEX: 28.34 KG/M2 | SYSTOLIC BLOOD PRESSURE: 149 MMHG | DIASTOLIC BLOOD PRESSURE: 60 MMHG

## 2025-05-28 DIAGNOSIS — R79.9 ABNORMAL FINDING OF BLOOD CHEMISTRY, UNSPECIFIED: ICD-10-CM

## 2025-05-28 DIAGNOSIS — R82.994 HYPERCALCIURIA: ICD-10-CM

## 2025-05-28 DIAGNOSIS — E78.2 MIXED HYPERLIPIDEMIA: ICD-10-CM

## 2025-05-28 DIAGNOSIS — I48.91 ATRIAL FIBRILLATION, UNSPECIFIED TYPE (MULTI): Primary | ICD-10-CM

## 2025-05-28 DIAGNOSIS — R93.3 ABNORMAL FINDINGS ON DIAGNOSTIC IMAGING OF OTHER PARTS OF DIGESTIVE TRACT: ICD-10-CM

## 2025-05-28 PROCEDURE — 99214 OFFICE O/P EST MOD 30 MIN: CPT | Performed by: NURSE PRACTITIONER

## 2025-05-28 PROCEDURE — 1126F AMNT PAIN NOTED NONE PRSNT: CPT | Performed by: NURSE PRACTITIONER

## 2025-05-28 PROCEDURE — 3078F DIAST BP <80 MM HG: CPT | Performed by: NURSE PRACTITIONER

## 2025-05-28 PROCEDURE — 1036F TOBACCO NON-USER: CPT | Performed by: NURSE PRACTITIONER

## 2025-05-28 PROCEDURE — 1160F RVW MEDS BY RX/DR IN RCRD: CPT | Performed by: NURSE PRACTITIONER

## 2025-05-28 PROCEDURE — 1159F MED LIST DOCD IN RCRD: CPT | Performed by: NURSE PRACTITIONER

## 2025-05-28 PROCEDURE — 3077F SYST BP >= 140 MM HG: CPT | Performed by: NURSE PRACTITIONER

## 2025-05-28 ASSESSMENT — ENCOUNTER SYMPTOMS
CONSTITUTIONAL NEGATIVE: 1
MUSCULOSKELETAL NEGATIVE: 1
DEPRESSION: 0
NEUROLOGICAL NEGATIVE: 1
OCCASIONAL FEELINGS OF UNSTEADINESS: 1
CARDIOVASCULAR NEGATIVE: 1
RESPIRATORY NEGATIVE: 1
LOSS OF SENSATION IN FEET: 1
GASTROINTESTINAL NEGATIVE: 1

## 2025-05-28 ASSESSMENT — PAIN SCALES - GENERAL: PAINLEVEL_OUTOF10: 0-NO PAIN

## 2025-05-28 NOTE — PROGRESS NOTES
"Chief Complaint:   Follow-up    History Of Present Illness:    .Mr Greene returns in follow up.  Denies chest pain, sob, palpitations or pedal edema.  He is wearing elbow pads due to nerve issue in wrist.         Last Recorded Vitals:  Blood pressure 149/60, pulse 89, height 1.803 m (5' 11\"), weight 91.8 kg (202 lb 6.4 oz), SpO2 95%.     Past Medical History:  Medical History[1]     Past Surgical History:  Surgical History[2]    Social History:  Social History[3]    Family History:  Family History[4]      Allergies:  Isosorbide, Famotidine, Prednisone, Amoxicillin, Donepezil, and Ibuprofen    Outpatient Medications:  Current Medications[5]     Physical Exam:  Cardiovascular:      PMI at left midclavicular line. Normal rate. Irregularly irregular rhythm. Normal S1. Normal S2.       Murmurs: There is no murmur.      No gallop.  No click. No rub.   Pulses:     Intact distal pulses.   Edema:     Peripheral edema absent.         ROS:  Review of Systems   Constitutional: Negative.   Cardiovascular: Negative.    Respiratory: Negative.     Skin: Negative.    Musculoskeletal: Negative.    Gastrointestinal: Negative.    Genitourinary: Negative.    Neurological: Negative.           Last Labs: reviewed  CBC -  Lab Results   Component Value Date    WBC 4.4 01/23/2025    HGB 10.8 (L) 01/23/2025    HCT 33.3 (L) 01/23/2025     (H) 01/23/2025    PLT 86 (L) 01/23/2025       CMP -  Lab Results   Component Value Date    CALCIUM 9.9 11/06/2024    PHOS 3.1 05/03/2024    PROT 7.0 01/23/2025    ALBUMIN 4.6 01/23/2025    AST 21 01/23/2025    ALT 14 01/23/2025    ALKPHOS 57 01/23/2025    BILITOT 0.9 01/23/2025       LIPID PANEL -   Lab Results   Component Value Date    CHOL 125 11/06/2024    TRIG 80 11/06/2024    HDL 48.3 11/06/2024    CHHDL 2.6 11/06/2024    LDLF 37 08/22/2022    VLDL 16 11/06/2024    NHDL 77 11/06/2024       RENAL FUNCTION PANEL -   Lab Results   Component Value Date    GLUCOSE 154 (H) 11/06/2024     " 11/06/2024    K 5.0 11/06/2024     11/06/2024    CO2 25 11/06/2024    ANIONGAP 16 11/06/2024    BUN 27 (H) 11/06/2024    CREATININE 1.18 11/06/2024    GFRMALE 82 09/14/2023    CALCIUM 9.9 11/06/2024    PHOS 3.1 05/03/2024    ALBUMIN 4.6 01/23/2025        Lab Results   Component Value Date    HGBA1C 6.7 (H) 11/06/2024         Assessment/Plan   Problem List Items Addressed This Visit    None       1. Coronary artery disease with remote CABG Ã--2 2011. This patient did develop recurrent angina in 2013 and underwent a repeat cardiac catheterization demonstrating closure of the original bypass grafts but with collateral circulation and no PCI was required.  Patient currently residing at an independent living facility.  He has gained some weight no lightheadedness or dizziness.  Not short of breath.  Minimal ankle edema.  He is trying to keep his legs elevated.  Patient no longer on lisinopril or metoprolol.2. History of TIA. This patient did have a TIA following his cardiac catheterization with transient confusion on the day of discharge. The patient was admitted to [facility] on 02/17/2019 with numbness of his left arm hand and ultimately jaw. The numbness of the left hand and jaw resolved but persisted somewhat longer within the left arm. Evaluation at that time included a chest x-ray showing previous sternotomy with clear lung fields. Carotid ultrasound showed mild bilateral plaque less than 50% stenoses. CT angiogram of the head and neck was unremarkable with nonstenotic calcification of the right common carotid artery without stenosis. The left common carotid artery had a nonstenotic calcification with a patent internal carotid artery. His intracerebral vessels were unremarkable with no aneurysm. An MRI of the brain on 02/17/2019 was unremarkable. He had an MRI of the cervical spine that showed minimal disc bulging at C2-C3 with moderate disc osteophyte complex at C3-C4 causing moderate canals  narrowing and cord impingement along with bilateral neural foraminal stenoses. There was an osteophyte complex at C4-C5 causing mild anterior cord impingement and bilateral moderate neural foraminal narrowing and disc bulging at C5-C6 without cord impingement along with anterior subluxation of C7 upon T1. EKG showed sinus rhythm with first-degree AV block and no ST segment change. There was no evidence of atrial fibrillation by telemetry monitoring. His high sensitivity troponins were negative. He had an echocardiogram performed on 02/17/2019 showing an estimated LV ejection fraction at 55â€“59 percent with mild hypokinesis of the anteroseptal wall due to previous thoracotomy. Left atrial size was in the upper range of normal with mild aortic root calcification and a mildly dilated right atrium. The patient was readmitted to [facility] on 06/04/2019 with recurrent neurological complaints. He had pins and needles paresthesias of the left hand and arm along with perceived weakness of the left hand and arm with loss of coordination. The patient underwent repeat evaluation including MRI of the brain which was unremarkable. Negligible carotid vascular disease by ultrasound and CT angiogram in the past. Blood pressure readings were acceptable at that time with lisinopril 40 mg daily amlodipine 5 mg daily metoprolol extended release 5 mg daily. He was on aspirin at that time along with the atorvastatin 40 mg daily. More recently the patient [place] when he began to develop paresthesias of the right hand and fingertips. Initially he thought that an Ace wrap of his right shoulder was too tight and continued driving. The sense of tingling began to get worse and he developed a funny sensation around the right side of his face involving the lip. The patient pulled his car off to the side of the road. At that point in time he was having trouble communicating verbally due  to the inability to formulate words. He was taken by EMS to [facility] and observed but then released without admission. He is subsequently seen by neurology as an outpatient. He did have a extra no cardiac monitor performed on 02/05/2020â€“02/14/2020. This did not demonstrate atrial fibrillation. It did demonstrate some brief episodes of Mobitz 1 second degree AV block. He also had one 8 beat run of nonsustained ventricular tachycardia. The patient has never had presyncopal or syncopal events but more notably paresthesias. The results of this external cardiac monitor were discussed with electrophysiology and at this point the patient will be scheduled to have a loop recorder implanted to ensure that he is not having clinically silent paroxysmal atrial fibrillation not detected on previous telemetry monitoring. The patient has been seen by neurology and was switched to Plavix 75 mg daily which will be continued unless there is documentation of paroxysmal atrial fibrillation by the loop recorder. Echocardiogram performed at time of today's visit 03/17/2020 demonstrates an LV ejection fraction of 55â€“60 percent with mild hypokinesis of the anteroseptal wall due to previous thoracotomy with oneâ€“2+ mitral valve regurgitation. The findings are very similar to the echo that was performed at [facility] in 2/2019. Patient had loop recorder placed 07/2020 by Dr Steven Flores. Had negative pharmacologic nuclear stress test done 01/2022. Patient had hospital stay at [facility] 08/15/2023 and was worked up for TIA vs migraine equivalent. Carotid US showed < 50% stenosis bilaterally. MRI of brain was negative and MRA was unremarkable. Echo showed EF 55-60%, 1+ MR and 1-2+ TR. See Dr Avitia for consideration for Watchman device.  Patient had Watchman placed 11/2023.     3. Hypertension. Adequate control with present management which includes lisinopril 20 mg daily, amlodipine 5 mg daily and metoprolol ER 50 mg daily.  At time  of office visit 1/8/2025 patient no longer on the lisinopril and metoprolol although the systolic values slightly elevated and additional therapy may need to be reinstituted patient currently on amlodipine 5 mg daily.     4. Hyperlipidemia. Good response to atorvastatin 40 mg daily. Lipid panel on 08/2022 includes cholesterol 77 LDL 37 HDL 29 triglycerides 537. FLP done 08/2022 chol 77, HDL 29 LDL 37 trig 53.  Lipid panel satisfactory from 11/6/2024 with cholesterol 125 LDL 67 HDL 48 triglyceride 80.  The TSH was 7.26 Free thyroxine 0.93.     5. Type 2 diabetes.  Lab work 11/6/2024 includes a CMP normal except glucose 154.  Creatinine is 1.18.  Glycohemoglobin 6.7% UACR 142.  The TSH was 7.26 Free thyroxine 0.93.     6. Lifetime nonsmoking.     5. Status post right rotator cuff repair 11/11/2019. The patient had a right rotator cuff operation on 11 11/20/1990 evidently developed an infection that required surgical debridement.     6. Hx of covid-19 vaccine #1 and #2.      7. Afib noted to loop recorder after back surgery 02/2022. Patient had laminectomy with Dr Allred 02/22/2022 and developed afib on his loop recorder. Has been started on Xarelto. ECG done prior shows NSR with long first degree AVB.  Patient with very rare palpitations that are better in the noticeable.  The patient still has a loop recorder in place through [employer] but his patient profile has been removed.  EKG office visit 1/9/2025 demonstrates sinus rhythm first-degree AV block with a right bundle branch block conduction delay.     8. TIA. Patient has been admitted to [facility] on 4 occasions, the first being February 2019 for complaints of left hand numbness and difficulty finding words and left leg heaviness.Carotid ultrasound showed less than 50% stenosis bilaterally. CT angiogram of the head and neck was unremarkable. MRI MRA of the brain was negative for acute stroke. Later in 2019 the patient was again admitted with similar complaints  while driving. He had a loop recorder implanted which did show 2 episodes of atrial tachycardia or atrial fibrillation the longest lasting 6 minutes. The burden was only 0.06%. In October 2022 he was treated for UTI with antibiotics and again admitted for similar complaints. Neurology recommended outpatient physical therapy for inner ear issue. CT angiogram was again negative, carotid ultrasound showed less than 50% stenosis bilaterally, MRI MRA did not show any acute infarct or intracerebral vascular stenosis. Echo done October 2022 showed an EF of 60%, RAD, mild MR, mild to moderate TR, mildly elevated RVSP, PASP 43 mmHg. He was seen by neurology for possible alternative mechanisms to his TIA and treated with magnesium citrate and possibly verapamil. Alternative explanation may be migraine equivalent or focal seizure. Neurology discharged him with referral for vestibular studies and PT for balance. Patient currently will follow-up with Dr. Garvey.      9. GI bleed. Patient with black stools. Had extensive work up with hemoc and can restart Eliquis.  Patient is off Eliquis and asa after Watchman procedure.     10. AVB. Monitor worn 03/24/2023 showed HR  with 67 bpm average. First degree AVB. PVC 0.08%. PAC 0.64%.      11.  Urolift for BPH.  Hx of urinary catheter placement and sepsis.  Then had green light laser.       12.  Urinary tract infection with urosepsis 6/2024. The patient had previously been scheduled for a laser-assisted TURP on 6/20/2024.  Patient admitted to [facility] 6/14/2024 with fever and impending urosepsis.  He has been started on empiric IV Rocephin.  Systolic blood pressure slightly elevated on the amlodipine 5 mg daily.  Urine and blood cultures are pending.  No leukocytosis and renal parameters remain at baseline presumably positive indicators of early diagnosis.  Minor elevation of high-sensitivity troponin consistent with minimal demand ischemia.  Will hold usual low-dose  Lasix 40 mg daily.  Patient was not given usual IV fluids per sepsis protocol due to the slight elevation in his high-sensitivity troponin. The original urine and blood cultures that were drawn on 6/14/2024 from the emergency room proved to be positive for Enterobacter cloacae UTI/bacteremia.  The patient is responding well to the IV meropenem.  Fortunately the patient had been brought to the emergency room quickly upon the original onset of symptoms and he never developed leukocytosis.  CBC today WBC of 5400 hematocrit is unchanged at 28.7.  He does have a chronic thrombocytopenia platelet count 68,000 presently.  Electrolyte panel is notable for creatinine of 1.0 serum sodium 129.  Systolic blood pressures are in the range of 140-150 mmHg on amlodipine 5 mg daily.  The patient was tentatively previously scheduled for a laser-assisted TURP on 6/20 and hopefully this procedure can still be performed as planned.  The patient has had several episodes of urosepsis related to urinary retention originally and now from Tiwari catheter currently.  Patient no longer with Tiwari catheter able to void well after his laser assisted TURP.     13.  Vitamin D deficiency.  Lab work 11/6/2024 includes vitamin D level of only 11.  He was started on vitamin D 5000 units/day.     14.  Anemia.  On iron.       Sandy Gar, APRN-CNP       [1]   Past Medical History:  Diagnosis Date    A-fib (Multi)     Loop recorder noted AFIB after back surgery 02/2022. Watchman device implanted (11/2023) no longer requiring Eliquis    Abnormal cardiac valve     Per ECHO 8/2023: Mild mitral valve regurgitation.Mild to moderate tricuspid regurgitation.    Abnormal EEG     EEG is indicative of a moderate diffuse encephalopathy 11/2023    BPH (benign prostatic hyperplasia)     CHF (congestive heart failure)     Cognitive communication deficit     Coronary artery disease     s/p CABG x2 vessel in 2011. Develop recurrent angina in 2013 and underwent a  "repeat cardiac catheterization demonstrating closure of the original bypass grafts but with collateral circulation and no PCI was required.    Diabetes (Multi)     w/ neuropathy    Foot drop, bilateral     Uses bilateral AFOs    GERD (gastroesophageal reflux disease)     H/O sepsis     H/O: upper GI bleed     Was on Eliquis at the time    HL (hearing loss)     Hyperlipidemia     GINGER (iron deficiency anemia)     ALEJANDRINA (obstructive sleep apnea)     \"Mild\" per PSG 9/2021    Osteoarthritis     Recurrent UTI     Especially between 5039-6973    Seizure disorder (Multi)     h/o TIA/CVA vs seizures on Keppra    Thrombocytopenia     In 2024 platelets were     TIA (transient ischemic attack)     x4 (s/s of parasthesias and word finding). Alternative explanation may be migraine equivalent or focal seizure.   [2]   Past Surgical History:  Procedure Laterality Date    CARDIAC ASSIST DEVICE INSERTION  07/13/2020    Loop Recorder Implant due to recurrent TIA    CARDIAC CATHETERIZATION N/A 11/10/2023    Watchman device/LAAO (Left Atrial Appendage Occlusion)    COLONOSCOPY      Hemorrhoids, polyps    CORONARY ARTERY BYPASS GRAFT  07/12/2011    CABG x2    KIDNEY STONE SURGERY      KNEE SURGERY      1960s    LAMINECTOMY  02/22/2022    Laminectomy with medial facetectomies L3-S1, excision of left L4-5 facet synovial cyst.    LASER OF PROSTATE W/ GREEN LIGHT PVP  06/20/2024    MR HEAD ANGIO WO IV CONTRAST  02/17/2019    No sign of intracranial stenosis or aneurysm    MR HEAD ANGIO WO IV CONTRAST  10/28/2022    No sign of intracranial stenosis or aneurysm.    MR HEAD ANGIO WO IV CONTRAST  08/15/2023    Atrophy and chronic ischemic changes without acute intracranial process. Unremarkable MR angiography of the intracranial circulation.    PROSTATE SURGERY  01/23/2024    Urolift    ROTATOR CUFF REPAIR Right 11/11/2019    c/b septic joint requiring arthrotomy w/ I&D on 01/15/2020    TONSILLECTOMY      UPPER GASTROINTESTINAL ENDOSCOPY   "   [3]   Social History  Socioeconomic History    Marital status:    Tobacco Use    Smoking status: Never    Smokeless tobacco: Never   Vaping Use    Vaping status: Never Used   Substance and Sexual Activity    Alcohol use: Never    Drug use: Never    Sexual activity: Defer     Social Drivers of Health     Financial Resource Strain: Low Risk  (6/19/2024)    Overall Financial Resource Strain (CARDIA)     Difficulty of Paying Living Expenses: Not hard at all   Food Insecurity: No Food Insecurity (1/27/2025)    Hunger Vital Sign     Worried About Running Out of Food in the Last Year: Never true     Ran Out of Food in the Last Year: Never true   Transportation Needs: No Transportation Needs (6/19/2024)    PRAPARE - Transportation     Lack of Transportation (Medical): No     Lack of Transportation (Non-Medical): No   Physical Activity: Insufficiently Active (6/19/2024)    Exercise Vital Sign     Days of Exercise per Week: 1 day     Minutes of Exercise per Session: 30 min   Stress: No Stress Concern Present (6/19/2024)    [employer] [place] [employer] - Occupational Stress Questionnaire     Feeling of Stress : Not at all   Social Connections: Moderately Isolated (6/19/2024)    Social Connection and Isolation Panel [NHANES]     Frequency of Communication with Friends and Family: More than three times a week     Frequency of Social Gatherings with Friends and Family: Three times a week     Attends Sikh Services: Never     Active Member of Clubs or Organizations: No     Attends Club or Organization Meetings: Never     Marital Status:    Intimate Partner Violence: Not At Risk (6/19/2024)    Humiliation, Afraid, Rape, and Kick questionnaire     Fear of Current or Ex-Partner: No     Emotionally Abused: No     Physically Abused: No     Sexually Abused: No   Housing Stability: Low Risk  (6/19/2024)    Housing Stability Vital Sign     Unable to Pay for Housing in the Last Year: No     Number of Places  Lived in the Last Year: 1     Unstable Housing in the Last Year: No   [4]   Family History  Problem Relation Name Age of Onset    Other (cardiac disorder) Father      Diabetes Father      Hyperlipidemia Father      Hypertension Father      Other (liver carcinoma) Father      Other (hypercholesterolemia) Father      Hypertension Daughter     [5]   Current Outpatient Medications   Medication Sig Dispense Refill    acetaminophen (Tylenol) 325 mg tablet Take 2 tablets (650 mg) by mouth every 6 hours if needed for mild pain (1 - 3).      amLODIPine (Norvasc) 5 mg tablet Take 1 tablet (5 mg) by mouth once daily. 90 tablet 3    aspirin 81 mg EC tablet       atorvastatin (Lipitor) 40 mg tablet Take 1 tablet (40 mg) by mouth once daily. 90 tablet 2    cholecalciferol (Vitamin D-3) 5,000 Units tablet Take 1 tablet (5,000 Units) by mouth once daily. 90 tablet 3    ferrous sulfate 325 (65 Fe) MG EC tablet Take 1 tablet by mouth once daily. 90 tablet 3    finasteride (Proscar) 5 mg tablet Take 1 tablet (5 mg) by mouth once daily. 30 tablet 5    furosemide (Lasix) 40 mg tablet Take 1 tablet (40 mg) by mouth once daily. Note from [facility] to stop taking this med??? 90 tablet 3    gabapentin (Neurontin) 100 mg capsule TAKE 1 CAPSULE BY MOUTH ONCE DAILY FOR  NEUROPATHY  PAIN 30 capsule 2    insulin glargine-lixisenatide (Soliqua 100/33) 100 unit-33 mcg/mL insulin pen Inject 14 units under the skin once daily 6 each 3    levETIRAcetam (Keppra) 250 mg tablet Take 1 tablet (250 mg) by mouth 2 times a day.      magnesium oxide (Mag-Ox) 400 mg (241.3 mg magnesium) tablet Take 1 tablet (400 mg) by mouth once daily.      metFORMIN (Glucophage) 1,000 mg tablet Take 1 tablet (1,000 mg) by mouth 2 times daily (morning and late afternoon).      nitroglycerin (Nitrostat) 0.4 mg SL tablet Place 1 tablet (0.4 mg) under the tongue every 5 minutes if needed for chest pain. May repeat dose every 5 minutes for up to 3 doses total. 15 tablet 2     "omeprazole (PriLOSEC) 40 mg DR capsule GIVE 1 CAPSULE BY MOUTH ONE TIME A DAY FOR HEARTBURN 90 capsule 3    pen needle, diabetic 32 gauge x 5/16\" needle Use to inject insulin once a day 100 each 1    tamsulosin (Flomax) 0.4 mg 24 hr capsule Take 1 capsule (0.4 mg) by mouth 2 times a day. 180 capsule 1     No current facility-administered medications for this visit.     "

## 2025-06-05 ENCOUNTER — APPOINTMENT (OUTPATIENT)
Dept: PRIMARY CARE | Facility: CLINIC | Age: 85
End: 2025-06-05
Payer: MEDICARE

## 2025-06-05 VITALS
HEART RATE: 83 BPM | WEIGHT: 192 LBS | BODY MASS INDEX: 26.88 KG/M2 | DIASTOLIC BLOOD PRESSURE: 71 MMHG | SYSTOLIC BLOOD PRESSURE: 155 MMHG | OXYGEN SATURATION: 95 % | HEIGHT: 71 IN

## 2025-06-05 DIAGNOSIS — Z79.4 TYPE 2 DIABETES MELLITUS WITHOUT COMPLICATION, WITH LONG-TERM CURRENT USE OF INSULIN: ICD-10-CM

## 2025-06-05 DIAGNOSIS — I10 HYPERTENSION, UNSPECIFIED TYPE: ICD-10-CM

## 2025-06-05 DIAGNOSIS — R56.9 SEIZURE (MULTI): ICD-10-CM

## 2025-06-05 DIAGNOSIS — I48.91 ATRIAL FIBRILLATION, UNSPECIFIED TYPE (MULTI): ICD-10-CM

## 2025-06-05 DIAGNOSIS — I25.118 CORONARY ARTERY DISEASE OF NATIVE ARTERY OF NATIVE HEART WITH STABLE ANGINA PECTORIS: Primary | ICD-10-CM

## 2025-06-05 DIAGNOSIS — I73.9 PERIPHERAL VASCULAR DISEASE, UNSPECIFIED: ICD-10-CM

## 2025-06-05 DIAGNOSIS — D64.9 ANEMIA, UNSPECIFIED TYPE: ICD-10-CM

## 2025-06-05 DIAGNOSIS — N40.0 BENIGN PROSTATIC HYPERPLASIA WITHOUT LOWER URINARY TRACT SYMPTOMS: ICD-10-CM

## 2025-06-05 DIAGNOSIS — E11.9 TYPE 2 DIABETES MELLITUS WITHOUT COMPLICATION, WITH LONG-TERM CURRENT USE OF INSULIN: ICD-10-CM

## 2025-06-05 DIAGNOSIS — G62.9 POLYNEUROPATHY: ICD-10-CM

## 2025-06-05 DIAGNOSIS — G62.9 NEUROPATHY: ICD-10-CM

## 2025-06-05 DIAGNOSIS — D69.6 THROMBOCYTOPENIA, UNSPECIFIED: ICD-10-CM

## 2025-06-05 DIAGNOSIS — E78.5 HYPERLIPIDEMIA, UNSPECIFIED HYPERLIPIDEMIA TYPE: ICD-10-CM

## 2025-06-05 PROBLEM — R41.841 COGNITIVE COMMUNICATION DISORDER: Status: ACTIVE | Noted: 2023-08-21

## 2025-06-05 PROCEDURE — 1159F MED LIST DOCD IN RCRD: CPT | Performed by: STUDENT IN AN ORGANIZED HEALTH CARE EDUCATION/TRAINING PROGRAM

## 2025-06-05 PROCEDURE — 1036F TOBACCO NON-USER: CPT | Performed by: STUDENT IN AN ORGANIZED HEALTH CARE EDUCATION/TRAINING PROGRAM

## 2025-06-05 PROCEDURE — 1124F ACP DISCUSS-NO DSCNMKR DOCD: CPT | Performed by: STUDENT IN AN ORGANIZED HEALTH CARE EDUCATION/TRAINING PROGRAM

## 2025-06-05 PROCEDURE — 3078F DIAST BP <80 MM HG: CPT | Performed by: STUDENT IN AN ORGANIZED HEALTH CARE EDUCATION/TRAINING PROGRAM

## 2025-06-05 PROCEDURE — G2211 COMPLEX E/M VISIT ADD ON: HCPCS | Performed by: STUDENT IN AN ORGANIZED HEALTH CARE EDUCATION/TRAINING PROGRAM

## 2025-06-05 PROCEDURE — 3077F SYST BP >= 140 MM HG: CPT | Performed by: STUDENT IN AN ORGANIZED HEALTH CARE EDUCATION/TRAINING PROGRAM

## 2025-06-05 PROCEDURE — 99215 OFFICE O/P EST HI 40 MIN: CPT | Performed by: STUDENT IN AN ORGANIZED HEALTH CARE EDUCATION/TRAINING PROGRAM

## 2025-06-05 PROCEDURE — 1160F RVW MEDS BY RX/DR IN RCRD: CPT | Performed by: STUDENT IN AN ORGANIZED HEALTH CARE EDUCATION/TRAINING PROGRAM

## 2025-06-05 RX ORDER — ONDANSETRON 4 MG/1
4 TABLET, FILM COATED ORAL EVERY 8 HOURS PRN
COMMUNITY

## 2025-06-05 RX ORDER — INSULIN GLARGINE 100 [IU]/ML
INJECTION, SOLUTION SUBCUTANEOUS EVERY 24 HOURS
COMMUNITY

## 2025-06-05 RX ORDER — INSULIN LISPRO 100 [IU]/ML
INJECTION, SOLUTION INTRAVENOUS; SUBCUTANEOUS
COMMUNITY

## 2025-06-05 RX ORDER — GABAPENTIN 100 MG/1
100 CAPSULE ORAL 2 TIMES DAILY
Qty: 180 CAPSULE | Refills: 1 | Status: SHIPPED | OUTPATIENT
Start: 2025-06-05

## 2025-06-05 ASSESSMENT — ENCOUNTER SYMPTOMS
OCCASIONAL FEELINGS OF UNSTEADINESS: 1
LOSS OF SENSATION IN FEET: 0
DEPRESSION: 0

## 2025-06-05 NOTE — PROGRESS NOTES
85-year-old male presenting for follow-up on multiple concerns.  Mostly stable without significant new concerns today.    DMII  Stable has been doing well.  Using CGM, doing well.  Following with [facility] pharmacist.  Last A1c at goal.    A-fib, TIA, HTN, HLD  Follow with cardiology, and electrophysiology, has Watchman device.  Discontinue on anticoagulation down to baby aspirin only.     History of GI bleed  CBCs have been stable.    BPH, status post greenlight laser therapy  Stable, has been following with urology.     Seizure activity, neuropathy  Following with neurology, on Keppra, stable and doing well.  Some breakthrough neuropathic symptoms.  Has not tried increasing gabapentin to twice daily, but would like to.    Ulnar nerve impingement  Following with neurology and orthopedics, considering getting steroid injection.  Wearing elbow braces.    Gait instability, bilateral foot drop, frailty, lower extremity weakness  Requesting paperwork to be filled out for assistance with certain ADLs    Lower extremity edema  Has been on furosemide daily.  Used to get improvement with elevation overnight, has not been working as well.  Pooling particularly in the feet, with some darkening of the skin.    12 point ROS reviewed and negative other than as stated in HPI    General: Alert, oriented, pleasant, in no acute distress  HEENT:      Head: normocephalic, atraumatic;      eyes: EOMI, no scleral icterus;   CV: Regular rate and rhythm on auscultation without murmur  Lungs: CTAB without wheezing, rhonchi or rales; good respiratory effort, no increased work of breathing  MSK: Bilateral elbow braces, bilateral AFOs, ambulating with rollator  Extremities: +1-2 pitting edema bilaterally, particularly in the feet, with some venous stasis discoloration  Neuro: Cranial nerves grossly intact; alert and oriented  Psych: Appropriate mood and affect    #CAD with remote CABG, 2021 #A-fib #HTN #HLD #PVD  -Blood pressure elevated in office,  nearly exclusively at goal when checking at home and with PT/OT  - Continue amlodipine 5 mg daily, metoprolol succinate 50 mg daily  -Continue aspirin 81 mg daily  -Following with cardiology, EP, has Watchman device and removed from anticoagulation given history of GI bleed     #DMII  - Currently on Soliqua 15 units daily, metformin 1000 mg twice daily  -Last A1C 6.7, 11/2024  - Follow-up with ophthalmology  - Known history of neuropathy, following with podiatry  - Continue to follow with pharmacist  -Repeat A1c     #History of GI bleed #thrombocytopenia  - Repeat CBCs have been stable  -CBC ordered    #BPH #S/P greenlight laser therapy  - Continue finasteride 5 mg daily, continue tamsulosin 0.4 mg daily  -Continue to follow with urology    #Seizure activity #Neuropathy  - Following with neurology, stable on Keppra  -Currently takes gabapentin 100 mg once a day, discussed risk benefit of increasing, will trial increasing to twice daily    #Lower extremity edema  -Currently taking furosemide 40 mg every day  -Strongly recommend compression stockings daily  - If not improving, will send to vascular    #Ulnar nerve impingement   -Continue to follow with neurology and orthopedics    #Bilateral foot drop #frailty #gait instability  - Continue with assistive device  - Recommend continued assistance with multiple ADLs    Follow-up 4-6 months, Medicare due in November    Christopher D'Amico, DO

## 2025-06-06 LAB
ALBUMIN SERPL-MCNC: 4.7 G/DL (ref 3.6–5.1)
ALP SERPL-CCNC: 49 U/L (ref 35–144)
ALT SERPL-CCNC: 13 U/L (ref 9–46)
ANION GAP SERPL CALCULATED.4IONS-SCNC: 12 MMOL/L (CALC) (ref 7–17)
AST SERPL-CCNC: 22 U/L (ref 10–35)
BILIRUB SERPL-MCNC: 0.7 MG/DL (ref 0.2–1.2)
BUN SERPL-MCNC: 32 MG/DL (ref 7–25)
CALCIUM SERPL-MCNC: 10.4 MG/DL (ref 8.6–10.3)
CHLORIDE SERPL-SCNC: 102 MMOL/L (ref 98–110)
CO2 SERPL-SCNC: 26 MMOL/L (ref 20–32)
CREAT SERPL-MCNC: 1.29 MG/DL (ref 0.7–1.22)
EGFRCR SERPLBLD CKD-EPI 2021: 55 ML/MIN/1.73M2
ERYTHROCYTE [DISTWIDTH] IN BLOOD BY AUTOMATED COUNT: 15.1 % (ref 11–15)
EST. AVERAGE GLUCOSE BLD GHB EST-MCNC: 143 MG/DL
EST. AVERAGE GLUCOSE BLD GHB EST-SCNC: 7.9 MMOL/L
GLUCOSE SERPL-MCNC: 170 MG/DL (ref 65–99)
HBA1C MFR BLD: 6.6 %
HCT VFR BLD AUTO: 36.4 % (ref 38.5–50)
HGB BLD-MCNC: 11.9 G/DL (ref 13.2–17.1)
MCH RBC QN AUTO: 34.2 PG (ref 27–33)
MCHC RBC AUTO-ENTMCNC: 32.7 G/DL (ref 32–36)
MCV RBC AUTO: 104.6 FL (ref 80–100)
PLATELET # BLD AUTO: 102 THOUSAND/UL (ref 140–400)
PMV BLD REES-ECKER: 11 FL (ref 7.5–12.5)
POTASSIUM SERPL-SCNC: 5.4 MMOL/L (ref 3.5–5.3)
PROT SERPL-MCNC: 7.1 G/DL (ref 6.1–8.1)
RBC # BLD AUTO: 3.48 MILLION/UL (ref 4.2–5.8)
SODIUM SERPL-SCNC: 140 MMOL/L (ref 135–146)
WBC # BLD AUTO: 5.5 THOUSAND/UL (ref 3.8–10.8)

## 2025-06-08 NOTE — RESULT ENCOUNTER NOTE
Hemoglobin A1c at 6.6, acceptable.  Continue current regimen.    Mild anemia at 11.9, slightly elevated MCV at 104.  Stable, likely secondary to liver disease.  Continue to follow with hepatology.    Mild/moderate kidney dysfunction, GFR 55, creatinine 1.29, BUN at 32.  Not far from baseline.  Continue to keep blood pressure and blood sugar under control, avoid nephrotoxic medications.    Borderline elevated potassium of 5.4.  Usually, has had borderline elevation in the past and normalized.  Within normal limits no medications that I can see that would cause this, will continue to monitor.    Calcium borderline elevated at 10.4.    No other significant findings.

## 2025-06-09 LAB
ALBUMIN/CREAT UR: 52 MG/G CREAT
CREAT UR-MCNC: 118 MG/DL (ref 20–320)
MICROALBUMIN UR-MCNC: 6.1 MG/DL

## 2025-06-10 ENCOUNTER — TELEPHONE (OUTPATIENT)
Dept: PHARMACY | Facility: HOSPITAL | Age: 85
End: 2025-06-10
Payer: MEDICARE

## 2025-06-10 DIAGNOSIS — E11.9 DIABETES MELLITUS TYPE 2 WITHOUT RETINOPATHY (MULTI): ICD-10-CM

## 2025-06-10 DIAGNOSIS — I10 HYPERTENSION, UNSPECIFIED TYPE: Primary | ICD-10-CM

## 2025-06-10 RX ORDER — LOSARTAN POTASSIUM 25 MG/1
25 TABLET ORAL DAILY
Qty: 30 TABLET | Refills: 11 | Status: SHIPPED | OUTPATIENT
Start: 2025-06-10 | End: 2026-06-10

## 2025-06-10 NOTE — TELEPHONE ENCOUNTER
Discussed with patient's daughter to start losartan 25mg daily and to monitor blood pressure. Clinical pharmacy to follow up in a month to assess if further titrations are needed for BP control.    Prescription put in under referring provider: Sandy TENORIO-THERESA OvalleD

## 2025-06-23 ENCOUNTER — APPOINTMENT (OUTPATIENT)
Dept: DERMATOLOGY | Facility: CLINIC | Age: 85
End: 2025-06-23
Payer: MEDICARE

## 2025-06-23 DIAGNOSIS — L57.9 SKIN CHANGES DUE TO CHRONIC EXPOSURE TO NONIONIZING RADIATION: ICD-10-CM

## 2025-06-23 DIAGNOSIS — L82.1 SEBORRHEIC KERATOSIS: ICD-10-CM

## 2025-06-23 DIAGNOSIS — D22.9 BENIGN NEVUS: ICD-10-CM

## 2025-06-23 DIAGNOSIS — L81.4 LENTIGO: ICD-10-CM

## 2025-06-23 DIAGNOSIS — D48.5 NEOPLASM OF UNCERTAIN BEHAVIOR OF SKIN: Primary | ICD-10-CM

## 2025-06-23 DIAGNOSIS — L57.0 ACTINIC KERATOSIS: ICD-10-CM

## 2025-06-23 DIAGNOSIS — D18.01 ANGIOMA OF SKIN: ICD-10-CM

## 2025-06-23 DIAGNOSIS — Z12.83 ENCOUNTER FOR SCREENING FOR MALIGNANT NEOPLASM OF SKIN: ICD-10-CM

## 2025-06-23 PROCEDURE — 99203 OFFICE O/P NEW LOW 30 MIN: CPT | Performed by: NURSE PRACTITIONER

## 2025-06-23 PROCEDURE — 17000 DESTRUCT PREMALG LESION: CPT | Performed by: NURSE PRACTITIONER

## 2025-06-23 PROCEDURE — 1036F TOBACCO NON-USER: CPT | Performed by: NURSE PRACTITIONER

## 2025-06-23 PROCEDURE — 1160F RVW MEDS BY RX/DR IN RCRD: CPT | Performed by: NURSE PRACTITIONER

## 2025-06-23 PROCEDURE — 11102 TANGNTL BX SKIN SINGLE LES: CPT | Performed by: NURSE PRACTITIONER

## 2025-06-23 PROCEDURE — 17003 DESTRUCT PREMALG LES 2-14: CPT | Performed by: NURSE PRACTITIONER

## 2025-06-23 PROCEDURE — 1159F MED LIST DOCD IN RCRD: CPT | Performed by: NURSE PRACTITIONER

## 2025-06-23 PROCEDURE — 11103 TANGNTL BX SKIN EA SEP/ADDL: CPT | Performed by: NURSE PRACTITIONER

## 2025-06-23 NOTE — Clinical Note
ABCDEs of melanoma and atypical moles were discussed with the patient.    Patient was instructed to perform monthly self skin examination.  We recommended that the patient have regular full skin exams given an increased risk of subsequent skin cancers.    The patient was instructed to use sun protective behaviors including use of broad spectrum sunscreens and sun protective clothing to reduce risk of skin cancers.    Warning signs of non-melanoma skin cancer discussed.     Patient unaware of skin cancer in the past but he reports his memory is very bad.

## 2025-06-23 NOTE — Clinical Note
WHAT IS ACTINIC KERATOSIS?   - Actinic keratosis (AK) is a skin condition caused by sun damage. It causes scaly, rough, or bumpy spots on the skin.  - If left alone, AKs may turn into a skin cancer. People who burn easily or have trouble tanning are at more risk for developing AKs.   - There is no one test for AKs and diagnosis is made by clinical appearance. Treatment options include cryotherapy, therapy with lights, and various creams (e.g., topical 5-fluorocuracil, imiquimod).       To lower the chance of getting AK, you can:       ?  Stay out of the sun in the middle of the day (from 10 a.m. to 4 p.m.)       ?  Wear sunscreen - An SPF of at least 30 is best. The SPF number is on the sunscreen bottle or tube.       ?  Wear a wide-brimmed hat, long-sleeved shirt, long pants, or long skirt outside. A baseball hat does not give much protection.        ?  Do not use tanning beds.        ?  Keep a low-fat diet, less than 21% of calories should come from fat       ?  Take Vitamin B3 (nicotinomide) 500mg twice daily.      YOUR TREATMENT PLAN  - At this time I recommend treatment with cryotherapy. Will treat some lesion and have him return to clinic in 2 months for additional LN2 to treat remaining lesions on the face, scalp area.   - Possible side effects of liquid nitrogen treatment reviewed including formation of blisters, crusting, tenderness, scar, and discoloration which may be permanent.  - Patient advised to return the office for re-evaluation if the treated lesion(s) do not resolve within 4-6 weeks. Patient verbalizes understanding.

## 2025-06-23 NOTE — PROGRESS NOTES
Subjective     Luis Greene is a 85 y.o. male who presents for the following: Skin Check.     Review of Systems:  No other skin or systemic complaints other than what is documented elsewhere in the note.    The following portions of the chart were reviewed this encounter and updated as appropriate:   Tobacco  Allergies  Meds  Problems  Med Hx  Surg Hx         Skin Cancer History  Biopsy Log Book  No skin cancers from Specimen Tracking.    Additional History      Specialty Problems    None       Objective   Well appearing patient in no apparent distress; mood and affect are within normal limits.    A focused skin examination was performed waist up. All findings within normal limits unless otherwise noted below.    Assessment/Plan   Skin Exam  1. NEOPLASM OF UNCERTAIN BEHAVIOR OF SKIN (2)  Mid Parietal Scalp  12 mm red crusty plaque    Lesion biopsy  Type of biopsy: tangential    Informed consent: discussed and consent obtained    Timeout: patient name, date of birth, surgical site, and procedure verified    Procedure prep:  Patient was prepped and draped  Anesthesia: the lesion was anesthetized in a standard fashion    Anesthetic:  1% lidocaine plain local infiltration  Instrument used: DermaBlade    Hemostasis achieved with: electrodesiccation    Outcome: patient tolerated procedure well    Post-procedure details: wound care instructions given    Additional details:  Cleaned area with isopropyl alcohol prior to anesthesia or biopsy. Applied thin layer of vaseline and covered with bandaid after procedure      Staff Communication: Dermatology Local Anesthesia: 1 % Plain Lidocaine - Amount:0.5 ml per lesion  Specimen 1 - Dermatopathology- DERM LAB  Differential Diagnosis: NMSC vs HAK vs ISK  Check Margins Yes/No?:    Comments:    Dermpath Lab: Routine Histopathology (formalin-fixed tissue)  Left Upper Arm - Anterior  22 x 16 mm red crusty plaque    Lesion biopsy  Type of biopsy: tangential    Informed consent:  discussed and consent obtained    Timeout: patient name, date of birth, surgical site, and procedure verified    Procedure prep:  Patient was prepped and draped  Anesthesia: the lesion was anesthetized in a standard fashion    Anesthetic:  1% lidocaine plain local infiltration  Instrument used: DermaBlade    Hemostasis achieved with: electrodesiccation    Outcome: patient tolerated procedure well    Post-procedure details: wound care instructions given    Additional details:  Cleaned area with isopropyl alcohol prior to anesthesia or biopsy. Applied thin layer of vaseline and covered with bandaid after procedure      Staff Communication: Dermatology Local Anesthesia: 1 % Plain Lidocaine - Amount:0.5 ml per lesion  Specimen 2 - Dermatopathology- DERM LAB  Differential Diagnosis: NMSC  Check Margins Yes/No?:    Comments:    Dermpath Lab: Routine Histopathology (formalin-fixed tissue)  NUB  - Given uncertainty in clinical diagnosis, shave biopsy is recommended in clinic today.  - The patient expressed understanding, is in agreement with this plan, and wishes to proceed with biopsy.  - Oral and written wound care instructions provided.  - Advised patient that the office will call within 2 weeks to discuss biopsy results.    Related Procedures  Follow Up In Dermatology - Established Patient  2. ACTINIC KERATOSIS (10)  Left Forearm - Posterior, Mid Forehead (3), Mid Frontal Scalp (2), Mid Parietal Scalp (4)  Thin erythematous papules with gritty scale  WHAT IS ACTINIC KERATOSIS?   - Actinic keratosis (AK) is a skin condition caused by sun damage. It causes scaly, rough, or bumpy spots on the skin.  - If left alone, AKs may turn into a skin cancer. People who burn easily or have trouble tanning are at more risk for developing AKs.   - There is no one test for AKs and diagnosis is made by clinical appearance. Treatment options include cryotherapy, therapy with lights, and various creams (e.g., topical 5-fluorocuracil,  imiquimod).       To lower the chance of getting AK, you can:       ?  Stay out of the sun in the middle of the day (from 10 a.m. to 4 p.m.)       ?  Wear sunscreen - An SPF of at least 30 is best. The SPF number is on the sunscreen bottle or tube.       ?  Wear a wide-brimmed hat, long-sleeved shirt, long pants, or long skirt outside. A baseball hat does not give much protection.        ?  Do not use tanning beds.        ?  Keep a low-fat diet, less than 21% of calories should come from fat       ?  Take Vitamin B3 (nicotinomide) 500mg twice daily.      YOUR TREATMENT PLAN  - At this time I recommend treatment with cryotherapy. Will treat some lesion and have him return to clinic in 2 months for additional LN2 to treat remaining lesions on the face, scalp area.   - Possible side effects of liquid nitrogen treatment reviewed including formation of blisters, crusting, tenderness, scar, and discoloration which may be permanent.  - Patient advised to return the office for re-evaluation if the treated lesion(s) do not resolve within 4-6 weeks. Patient verbalizes understanding.  Destr of lesion - Left Forearm - Posterior, Mid Forehead (3), Mid Frontal Scalp (2), Mid Parietal Scalp (4)  Complexity: simple    Destruction method: cryotherapy    Informed consent: discussed and consent obtained    Timeout:  patient name, date of birth, surgical site, and procedure verified  Lesion destroyed using liquid nitrogen: Yes    Cryotherapy cycles:  2  Outcome: patient tolerated procedure well with no complications    Post-procedure details: wound care instructions given    Related Procedures  Follow Up In Dermatology - Established Patient  3. ENCOUNTER FOR SCREENING FOR MALIGNANT NEOPLASM OF SKIN  Generalized  Related Procedures  Follow Up In Dermatology - Established Patient  4. ANGIOMA OF SKIN  Generalized  Scattered cherry-red papule(s).  A cherry hemangioma is a small macule (small, flat, smooth area) or papule (small, solid  bump) formed from an overgrowth of tiny blood vessels in the skin. Cherry hemangiomas are characteristically red or purplish in color. They often first appear in middle adulthood and usually increase in number with age. Cherry hemangiomas are noncancerous (benign) and are common in adults.    The present appearance of the lesion is not worrisome but it should continue to be observed and testing/treatment may be warranted if change occurs.  Related Procedures  Follow Up In Dermatology - Established Patient  5. BENIGN NEVUS  Generalized  Scattered, uniform and benign-appearing, regular brown melanocytic papules and macules.  The present appearance of the lesion is not worrisome but it should continue to be observed and testing/treatment may be warranted if change occurs.  Related Procedures  Follow Up In Dermatology - Established Patient  6. SEBORRHEIC KERATOSIS  Generalized  Stuck on verrucous, tan-brown papules and plaques.    Seborrheic keratoses are common noncancerous (benign) growths of unknown cause seen in adults due to a thickening of an area of the top skin layer. Seborrheic keratoses may appear as if they are stuck on to the skin. They have distinct borders, and they may appear as papules (small, solid bumps) or plaques (solid, raised patches that are bigger than a thumbnail). They may be the same color as your skin, or they may be pink, light brown, darker brown, or very dark brown, or sometimes may appear black.    There is no way to prevent new seborrheic keratoses from forming. Seborrheic keratoses can be removed, but removal is considered a cosmetic issue and is usually not covered by insurance.    PLAN  No treatment is needed unless there is irritation from clothing, such as itching or bleeding.  2.   Some lotions containing alpha hydroxy acids, salicylic acid, or urea may make the areas feel smoother with regular use but will not eliminate them.  Related Procedures  Follow Up In Dermatology -  Established Patient  7. LENTIGO  Generalized  Scattered tan macules in sun-exposed areas.  A solar lentigo (plural, solar lentigines), also known as a sun-induced freckle or senile lentigo, is a dark (hyperpigmented) lesion caused by natural or artificial ultraviolet (UV) light. Solar lentigines may be single or multiple. This type of lentigo is different from a simple lentigo (lentigo simplex) because it is caused by exposure to UV light. Solar lentigines are benign, but they do indicate excessive sun exposure, a risk factor for the development of skin cancer.    To prevent solar lentigines, avoid exposure to sunlight in midday (10 AM to 3 PM), wear sun-protective clothing (tightly woven clothes and hats), and apply sunscreen (SPF 30 UVA and UVB block).    The present appearance of the lesion is not worrisome but it should continue to be observed and testing/treatment may be warranted if change occurs.  Related Procedures  Follow Up In Dermatology - Established Patient  8. SKIN CHANGES DUE TO CHRONIC EXPOSURE TO NONIONIZING RADIATION  Generalized  Actinic changes in the form of freckles, lentigines and hyper/hypopigmentation   ABCDEs of melanoma and atypical moles were discussed with the patient.    Patient was instructed to perform monthly self skin examination.  We recommended that the patient have regular full skin exams given an increased risk of subsequent skin cancers.    The patient was instructed to use sun protective behaviors including use of broad spectrum sunscreens and sun protective clothing to reduce risk of skin cancers.    Warning signs of non-melanoma skin cancer discussed.     Patient unaware of skin cancer in the past but he reports his memory is very bad.   Related Procedures  Follow Up In Dermatology - Established Patient    Return in 2 months for LN2 remaining AK  and  6 months for routine skin check or return to clinic sooner if needed

## 2025-06-23 NOTE — PATIENT INSTRUCTIONS
Protecting Your Skin from the Sun     The sun’s rays contain ultraviolet (UV) radiation that can damage our skin. There is no “safe” ultraviolet ray. Even on cloudy days, UV radiation reaches the earth and can cause skin damage. Ultraviolet A (UVA) is primarily responsible for premature aging, wrinkles, and tanning, while ultraviolet B (UVB) causes sunburns. Both types can severely damage the skin and cause skin cancer.    Sun exposure is the most preventable risk factor for all skin cancers, including melanoma. You can still have fun in the sun and decrease your risk for skin cancer.     Apply water-resistant sunscreen generously with a Sun Protection Factor (SPF) of at least 30 that provides broad-spectrum protection from both ultraviolet A (UVA) and ultraviolet B (UVB) rays to all exposed skin. Re-apply every two hours, even on cloudy days, and after sweating or swimming. Sunscreens are not perfect because some ultraviolet light may still get through sunscreens, so they should not be used as a way of prolonging sun exposure.  Seek shade when appropriate, remembering that the sun’s rays are the strongest between 10 AM and 4 PM.  Wear sun protective clothing such as a long-sleeved shirt, pants, a wide-brimmed hat, and sunglasses, when possible.  Some sunlight will get through your clothing.  You can wear sunscreen underneath, or you can buy clothing that has been treated to give additional sun protection.  Such clothing will have a UPF rating on the label.  (e.g., www.[employer], www.[employer])  Protect children from sun exposure by having them play in the shade, dressing them in protective clothing, and applying sunscreen.  Use extra precaution when near water, snow, and sand.  These surfaces reflect the damaging rays of the sun and can increase your chance of sunburn.  Get vitamin D safely through a healthy diet that may include vitamin supplements.  Don’t seek the sun for vitamin D.  Avoid tanning  beds. Ultraviolet light from the sun and tanning beds can cause wrinkling and skin cancer. If you want to look like you’ve been in the sun, consider using a sunless self-tanning product, but continue to use sunscreen with it.  Perform a regular self skin exam.  If you notice anything changing, growing, or bleeding on your skin, see a dermatologist.  Skin cancer is very treatable when caught early.    Examples of good broad-spectrum sunscreens:  Blue Lizard  Coppertone Spectra 3  [place] Block  Neutrogena Ultra Sheer Dry Touch  Vanicream  Solbar  Total Block/[place]  Elta MD    What the active ingredients do:  UVB blockers:  padimate O homosalate, octyl methoxycinnamate, benzophenone octyl salicylate, phenylbenzimadazole sulfonic acid, octocrylene  UVA blockers:  oxybenzone, avobenzone (Parsol 1789)  Physical blockers:  titanium dioxide, zinc oxide (These are chemical-free sunscreens that reflect both UVA and UVB. These may be safest if you are allergic to some sunscreens. These may also be better for sensitive skin.)

## 2025-06-25 LAB
LABORATORY COMMENT REPORT: NORMAL
PATH REPORT.FINAL DX SPEC: NORMAL
PATH REPORT.GROSS SPEC: NORMAL
PATH REPORT.MICROSCOPIC SPEC OTHER STN: NORMAL
PATH REPORT.RELEVANT HX SPEC: NORMAL
PATH REPORT.TOTAL CANCER: NORMAL

## 2025-06-26 DIAGNOSIS — C44.629 SQUAMOUS CELL CANCER OF SKIN OF FOREARM, LEFT: ICD-10-CM

## 2025-07-08 NOTE — PROGRESS NOTES
"  Pharmacist Clinic: Cardiology Management    Luis Greene is a 85 y.o. male was referred to Clinical Pharmacy Team for Hypertension management.     Referring Provider: Sandy Gar APRN*    THIS IS A NEW PATIENT APPOINTMENT. PATIENT WILL BE ESTABLISHING CARE WITH CLINICAL PHARMACY.    Appointment was completed by Luis who was reached at primary number.    Allergies Reviewed? Yes    Allergies[1]    Medical History[2]    Medications Ordered Prior to Encounter[3]      RELEVANT LAB RESULTS:  Lab Results   Component Value Date    BILITOT 0.7 06/05/2025    CALCIUM 10.4 (H) 06/05/2025    CO2 26 06/05/2025     06/05/2025    CREATININE 1.29 (H) 06/05/2025    GLUCOSE 170 (H) 06/05/2025    ALKPHOS 49 06/05/2025    K 5.4 (H) 06/05/2025    PROT 7.1 06/05/2025     06/05/2025    AST 22 06/05/2025    ALT 13 06/05/2025    BUN 32 (H) 06/05/2025    ANIONGAP 12 06/05/2025    MG 1.60 11/30/2023    PHOS 3.1 05/03/2024    ALBUMIN 4.7 06/05/2025    LIPASE 50 06/14/2024    GFRMALE 82 09/14/2023     Lab Results   Component Value Date    TRIG 80 11/06/2024    CHOL 125 11/06/2024    LDLCALC 61 11/06/2024    HDL 48.3 11/06/2024     No results found for: \"BMCBC\", \"CBCDIF\"     PHARMACEUTICAL ASSESSMENT:    MEDICATION RECONCILIATION    Was a medication reconciliation completed at this visit? Yes  Home Pharmacy Reviewed? Yes, describe: [facility]    Added:  - none  Changed:  - Soliqua 15 units daily  Removed:  - Lantus, Lispro, Zofran    Drug Interactions? No    Medication Documentation Review Audit       Reviewed by JONA Garcia-CNP (Nurse Practitioner) on 06/23/25 at 1000      Medication Order Taking? Sig Documenting Provider Last Dose Status   acetaminophen (Tylenol) 325 mg tablet [id number] No Take 2 tablets (650 mg) by mouth every 6 hours if needed for mild pain (1 - 3). Historical Provider, MD Taking Active   amLODIPine (Norvasc) 5 mg tablet [id number] No Take 1 tablet (5 mg) by mouth once daily. Luis RIVERA" MD Rudy Taking Active   aspirin 81 mg EC tablet [id number]   Historical Provider, MD  Active   atorvastatin (Lipitor) 40 mg tablet [id number] No Take 1 tablet (40 mg) by mouth once daily. Christopher D'Amico, DO Taking Active   cholecalciferol (Vitamin D-3) 5,000 Units tablet [id number]  Take 1 tablet (5,000 Units) by mouth once daily. Luis Grant MD  Active   ferrous sulfate 325 (65 Fe) MG EC tablet [id number] No Take 1 tablet by mouth once daily. Luis Grant MD Taking Active   finasteride (Proscar) 5 mg tablet [id number] No Take 1 tablet (5 mg) by mouth once daily. Christopher D'Amico, DO Taking Active   furosemide (Lasix) 40 mg tablet [id number] No Take 1 tablet (40 mg) by mouth once daily. Note from [facility] to stop taking this med??? Luis Grant MD Taking Active   gabapentin (Neurontin) 100 mg capsule [id number]  Take 1 capsule (100 mg) by mouth 2 times a day. TAKE 1 CAPSULE BY MOUTH ONCE DAILY FOR NEUROPATHY PAIN Christopher D'Amico, DO  Active   insulin glargine (Lantus U-100 Insulin) 100 unit/mL injection [id number]  Inject under the skin once every 24 hours. Take as directed per insulin instructions. Historical Provider, MD  Active   insulin glargine-lixisenatide (Soliqua 100/33) 100 unit-33 mcg/mL insulin pen [id number]  Inject 14 units under the skin once daily Christopher D'Amico, DO  Active   insulin lispro 100 unit/mL injection [id number]  Inject under the skin 3 times daily (morning, midday, late afternoon). Take as directed per insulin instructions. Historical Provider, MD  Active   levETIRAcetam (Keppra) 250 mg tablet [id number] No Take 1 tablet (250 mg) by mouth 2 times a day. James Provider, MD Taking Active   losartan (Cozaar) 25 mg tablet [id number]  Take 1 tablet (25 mg) by mouth once daily. JONA Mccann-CNP  Active   magnesium oxide (Mag-Ox) 400 mg (241.3 mg magnesium) tablet [id number] No Take 1 tablet (400 mg) by mouth once daily. JONA Man-THERESA Taking Active  "  metFORMIN (Glucophage) 1,000 mg tablet [id number]  Take 1 tablet (1,000 mg) by mouth 2 times daily (morning and late afternoon). Historical Provider, MD  Active   nitroglycerin (Nitrostat) 0.4 mg SL tablet [id number] No Place 1 tablet (0.4 mg) under the tongue every 5 minutes if needed for chest pain. May repeat dose every 5 minutes for up to 3 doses total. Luis Grant MD Taking  25 2359   omeprazole (PriLOSEC) 40 mg DR capsule [id number] No GIVE 1 CAPSULE BY MOUTH ONE TIME A DAY FOR HEARTBURN Christopher D'Amico,  Taking Active   ondansetron (Zofran) 4 mg tablet [id number]  Take by mouth every 8 hours if needed for nausea or vomiting. Historical Provider, MD  Active   pen needle, diabetic 32 gauge x 5/16\" needle [id number] No Use to inject insulin once a day Christopher D'Amico, DO Taking Active   tamsulosin (Flomax) 0.4 mg 24 hr capsule [id number] No Take 1 capsule (0.4 mg) by mouth 2 times a day. Christopher D'Amico, DO Taking Active                    DISEASE MANAGEMENT ASSESSMENT:     HTN ASSESSMENT     CURRENT PHARMACOTHERAPY:   Losartan 25mg daily  Amlodipine 5mg daily  Furosemide 40mg daily    Affordability: medications are affordable   Adherence/Compliance: reports adherence to his medications  Adverse Effects: none reported    Monitoring Blood Pressure at Home: Yes  Home BP Recordings: 110s-130s/60s-70s    Past In Office BP Readings:   BP Readings from Last 6 Encounters:   25 155/71   25 149/60   25 160/75   25 165/67   25 112/60   24 139/64       EDUCATION/COUNSELING:   - Counseled patient on MOA, expectations, duration of therapy, contraindications, administration, and monitoring parameters  - Counseled patient on lifestyle modifications that can decrease your risk of having complications (smoking cessation, losing weight, daily weights, vaccines)  - Answered all patient questions and concerns       DISCUSSION/NOTES:   Luis was started on losartan " following lab work from 6/6 showing Albumin/Creatinine ratio of 52.  Luis was previously on lisinopril and this was stopped during a past hospitalization after some complications.  Blood pressure well controlled since starting losartan. No side effects reported.    ASSESSMENT:    Assessment/Plan   Problem List Items Addressed This Visit       Hypertension    Tolerating all medications no adjustments needed.         Diabetes mellitus type 2 without retinopathy (Multi)         RECOMMENDATIONS/PLAN:    CONTINUE  Losartan 25mg daily  Amlodipine 5mg daily  Furosemide 40mg daily    Last Appnt with Referring Provider: 5/28/25  Next Appnt with Referring Provider: 8/27/25  Clinical Pharmacist follow up: as needed  VAF/Application Expiration: No  Type of Encounter: Armani Morgan PharmD    Verbal consent to manage patient's drug therapy was obtained from the patient . They were informed they may decline to participate or withdraw from participation in pharmacy services at any time.    Continue all meds under the continuation of care with the referring provider and clinical pharmacy team.            [1]   Allergies  Allergen Reactions    Isosorbide GI Upset and Unknown    Famotidine Agitation     Shakiness       Prednisone Other and Unknown     Agitation     Amoxicillin Unknown    Donepezil Unknown    Ibuprofen Unknown     Pt stated he is not allergic to this medication but has bleeding with excessive use.   [2]   Past Medical History:  Diagnosis Date    A-fib (Multi)     Loop recorder noted AFIB after back surgery 02/2022. Watchman device implanted (11/2023) no longer requiring Eliquis    Abnormal cardiac valve     Per ECHO 8/2023: Mild mitral valve regurgitation.Mild to moderate tricuspid regurgitation.    Abnormal EEG     EEG is indicative of a moderate diffuse encephalopathy 11/2023    Arthritis Na    BPH (benign prostatic hyperplasia)     CHF (congestive heart failure)     Cognitive communication deficit   "   Coronary artery disease     s/p CABG x2 vessel in 2011. Develop recurrent angina in 2013 and underwent a repeat cardiac catheterization demonstrating closure of the original bypass grafts but with collateral circulation and no PCI was required.    Diabetes (Multi)     w/ neuropathy    Difficulty walking About 6months    Foot drop, bilateral     Uses bilateral AFOs    GERD (gastroesophageal reflux disease)     H/O sepsis     H/O: upper GI bleed     Was on Eliquis at the time    Heart disease Z    HL (hearing loss)     Hyperlipidemia     Hypertension 2000    GINGER (iron deficiency anemia)     Neuropathy in diabetes (Multi) July 2023    ALEJANDRINA (obstructive sleep apnea)     \"Mild\" per PSG 9/2021    Osteoarthritis     Recurrent UTI     Especially between 2201-9345    Seizure disorder (Multi)     h/o TIA/CVA vs seizures on Keppra    Thrombocytopenia     In 2024 platelets were     TIA (transient ischemic attack)     x4 (s/s of parasthesias and word finding). Alternative explanation may be migraine equivalent or focal seizure.    Weakness of limb Not sure   [3]   Current Outpatient Medications on File Prior to Visit   Medication Sig Dispense Refill    acetaminophen (Tylenol) 325 mg tablet Take 2 tablets (650 mg) by mouth every 6 hours if needed for mild pain (1 - 3).      amLODIPine (Norvasc) 5 mg tablet Take 1 tablet (5 mg) by mouth once daily. 90 tablet 3    aspirin 81 mg EC tablet       atorvastatin (Lipitor) 40 mg tablet Take 1 tablet (40 mg) by mouth once daily. 90 tablet 2    cholecalciferol (Vitamin D-3) 5,000 Units tablet Take 1 tablet (5,000 Units) by mouth once daily. 90 tablet 3    ferrous sulfate 325 (65 Fe) MG EC tablet Take 1 tablet by mouth once daily. 90 tablet 3    finasteride (Proscar) 5 mg tablet Take 1 tablet (5 mg) by mouth once daily. 30 tablet 5    furosemide (Lasix) 40 mg tablet Take 1 tablet (40 mg) by mouth once daily. Note from [facility] to stop taking this med??? 90 tablet 3    gabapentin " "(Neurontin) 100 mg capsule Take 1 capsule (100 mg) by mouth 2 times a day. TAKE 1 CAPSULE BY MOUTH ONCE DAILY FOR NEUROPATHY PAIN 180 capsule 1    insulin glargine-lixisenatide (Soliqua 100/33) 100 unit-33 mcg/mL insulin pen Inject 14 units under the skin once daily (Patient taking differently: 15 Units. Inject 15 units under the skin once daily) 6 each 3    levETIRAcetam (Keppra) 250 mg tablet Take 1 tablet (250 mg) by mouth 2 times a day.      losartan (Cozaar) 25 mg tablet Take 1 tablet (25 mg) by mouth once daily. 30 tablet 11    magnesium oxide (Mag-Ox) 400 mg (241.3 mg magnesium) tablet Take 1 tablet (400 mg) by mouth once daily.      metFORMIN (Glucophage) 1,000 mg tablet Take 1 tablet (1,000 mg) by mouth 2 times daily (morning and late afternoon).      nitroglycerin (Nitrostat) 0.4 mg SL tablet Place 1 tablet (0.4 mg) under the tongue every 5 minutes if needed for chest pain. May repeat dose every 5 minutes for up to 3 doses total. 15 tablet 2    omeprazole (PriLOSEC) 40 mg DR capsule GIVE 1 CAPSULE BY MOUTH ONE TIME A DAY FOR HEARTBURN 90 capsule 3    tamsulosin (Flomax) 0.4 mg 24 hr capsule Take 1 capsule (0.4 mg) by mouth 2 times a day. 180 capsule 1    pen needle, diabetic 32 gauge x 5/16\" needle Use to inject insulin once a day 100 each 1    [DISCONTINUED] insulin glargine (Lantus U-100 Insulin) 100 unit/mL injection Inject under the skin once every 24 hours. Take as directed per insulin instructions.      [DISCONTINUED] insulin lispro 100 unit/mL injection Inject under the skin 3 times daily (morning, midday, late afternoon). Take as directed per insulin instructions.      [DISCONTINUED] ondansetron (Zofran) 4 mg tablet Take by mouth every 8 hours if needed for nausea or vomiting. (Patient not taking: Reported on 7/9/2025)       No current facility-administered medications on file prior to visit.     "

## 2025-07-09 ENCOUNTER — APPOINTMENT (OUTPATIENT)
Dept: PHARMACY | Facility: HOSPITAL | Age: 85
End: 2025-07-09
Payer: MEDICARE

## 2025-07-09 DIAGNOSIS — E11.9 DIABETES MELLITUS TYPE 2 WITHOUT RETINOPATHY (MULTI): ICD-10-CM

## 2025-07-09 DIAGNOSIS — I10 HYPERTENSION, UNSPECIFIED TYPE: ICD-10-CM

## 2025-07-09 NOTE — Clinical Note
Luis started and has been tolerating losartan (previously had hypotension with lisinopril). Home BP controlled no hypotensive symptoms noted.

## 2025-07-28 ENCOUNTER — OFFICE VISIT (OUTPATIENT)
Dept: HEMATOLOGY/ONCOLOGY | Facility: CLINIC | Age: 85
End: 2025-07-28
Payer: MEDICARE

## 2025-07-28 VITALS
TEMPERATURE: 96.8 F | RESPIRATION RATE: 16 BRPM | BODY MASS INDEX: 28.6 KG/M2 | WEIGHT: 205.03 LBS | HEART RATE: 79 BPM | OXYGEN SATURATION: 95 % | SYSTOLIC BLOOD PRESSURE: 150 MMHG | DIASTOLIC BLOOD PRESSURE: 72 MMHG

## 2025-07-28 DIAGNOSIS — D75.89 MACROCYTOSIS: ICD-10-CM

## 2025-07-28 DIAGNOSIS — D69.6 THROMBOCYTOPENIA: ICD-10-CM

## 2025-07-28 LAB
ALBUMIN SERPL BCP-MCNC: 4.2 G/DL (ref 3.4–5)
ALP SERPL-CCNC: 43 U/L (ref 33–136)
ALT SERPL W P-5'-P-CCNC: 15 U/L (ref 10–52)
ANION GAP SERPL CALC-SCNC: 16 MMOL/L (ref 10–20)
AST SERPL W P-5'-P-CCNC: 21 U/L (ref 9–39)
BASOPHILS # BLD AUTO: 0.03 X10*3/UL (ref 0–0.1)
BASOPHILS NFR BLD AUTO: 0.6 %
BILIRUB SERPL-MCNC: 0.8 MG/DL (ref 0–1.2)
BUN SERPL-MCNC: 28 MG/DL (ref 6–23)
CALCIUM SERPL-MCNC: 10.1 MG/DL (ref 8.6–10.3)
CHLORIDE SERPL-SCNC: 102 MMOL/L (ref 98–107)
CO2 SERPL-SCNC: 26 MMOL/L (ref 21–32)
CREAT SERPL-MCNC: 1.21 MG/DL (ref 0.5–1.3)
EGFRCR SERPLBLD CKD-EPI 2021: 59 ML/MIN/1.73M*2
EOSINOPHIL # BLD AUTO: 0.14 X10*3/UL (ref 0–0.4)
EOSINOPHIL NFR BLD AUTO: 2.7 %
ERYTHROCYTE [DISTWIDTH] IN BLOOD BY AUTOMATED COUNT: 14.4 % (ref 11.5–14.5)
FERRITIN SERPL-MCNC: 40 NG/ML (ref 20–300)
GLUCOSE SERPL-MCNC: 174 MG/DL (ref 74–99)
HCT VFR BLD AUTO: 33.4 % (ref 41–52)
HGB BLD-MCNC: 11.2 G/DL (ref 13.5–17.5)
IMM GRANULOCYTES # BLD AUTO: 0.55 X10*3/UL (ref 0–0.5)
IMM GRANULOCYTES NFR BLD AUTO: 10.5 % (ref 0–0.9)
IRON SATN MFR SERPL: 34 % (ref 25–45)
IRON SERPL-MCNC: 108 UG/DL (ref 35–150)
LYMPHOCYTES # BLD AUTO: 1.82 X10*3/UL (ref 0.8–3)
LYMPHOCYTES NFR BLD AUTO: 34.7 %
MCH RBC QN AUTO: 34.4 PG (ref 26–34)
MCHC RBC AUTO-ENTMCNC: 33.5 G/DL (ref 32–36)
MCV RBC AUTO: 103 FL (ref 80–100)
MONOCYTES # BLD AUTO: 0.7 X10*3/UL (ref 0.05–0.8)
MONOCYTES NFR BLD AUTO: 13.3 %
NEUTROPHILS # BLD AUTO: 2.01 X10*3/UL (ref 1.6–5.5)
NEUTROPHILS NFR BLD AUTO: 38.2 %
NRBC BLD-RTO: ABNORMAL /100{WBCS}
PLATELET # BLD AUTO: 93 X10*3/UL (ref 150–450)
POTASSIUM SERPL-SCNC: 4.8 MMOL/L (ref 3.5–5.3)
PROT SERPL-MCNC: 6.6 G/DL (ref 6.4–8.2)
RBC # BLD AUTO: 3.26 X10*6/UL (ref 4.5–5.9)
RBC MORPH BLD: NORMAL
SODIUM SERPL-SCNC: 139 MMOL/L (ref 136–145)
TIBC SERPL-MCNC: 320 UG/DL (ref 240–445)
UIBC SERPL-MCNC: 212 UG/DL (ref 110–370)
VIT B12 SERPL-MCNC: 547 PG/ML (ref 211–911)
WBC # BLD AUTO: 5.3 X10*3/UL (ref 4.4–11.3)

## 2025-07-28 PROCEDURE — 99214 OFFICE O/P EST MOD 30 MIN: CPT | Performed by: PHYSICIAN ASSISTANT

## 2025-07-28 PROCEDURE — 82607 VITAMIN B-12: CPT | Mod: GEALAB | Performed by: PHYSICIAN ASSISTANT

## 2025-07-28 PROCEDURE — 85025 COMPLETE CBC W/AUTO DIFF WBC: CPT | Performed by: PHYSICIAN ASSISTANT

## 2025-07-28 PROCEDURE — 1126F AMNT PAIN NOTED NONE PRSNT: CPT | Performed by: PHYSICIAN ASSISTANT

## 2025-07-28 PROCEDURE — 1036F TOBACCO NON-USER: CPT | Performed by: PHYSICIAN ASSISTANT

## 2025-07-28 PROCEDURE — 80053 COMPREHEN METABOLIC PANEL: CPT | Performed by: PHYSICIAN ASSISTANT

## 2025-07-28 PROCEDURE — 83540 ASSAY OF IRON: CPT | Performed by: PHYSICIAN ASSISTANT

## 2025-07-28 PROCEDURE — 3077F SYST BP >= 140 MM HG: CPT | Performed by: PHYSICIAN ASSISTANT

## 2025-07-28 PROCEDURE — 36415 COLL VENOUS BLD VENIPUNCTURE: CPT | Performed by: PHYSICIAN ASSISTANT

## 2025-07-28 PROCEDURE — 82728 ASSAY OF FERRITIN: CPT | Performed by: PHYSICIAN ASSISTANT

## 2025-07-28 PROCEDURE — 3078F DIAST BP <80 MM HG: CPT | Performed by: PHYSICIAN ASSISTANT

## 2025-07-28 ASSESSMENT — PAIN SCALES - GENERAL: PAINLEVEL_OUTOF10: 0-NO PAIN

## 2025-07-28 NOTE — PROGRESS NOTES
Patient Visit Information:   Visit Type: Follow Up Visit      History of Present Illness:      ID Statement:    ASHKAN OLIVEROS is a 83 year old Male        Chief Complaint: iron deficiency anemia   Interval History:    Patient presents for initial hematology consultation for iron deficiency anemia referred by his cardiologist Dr. Grant.  Patient had been started on Xarelto for  history of A-fib at follow-up evaluation with his cardiologist on January 27, 2023 he was noted to have significant iron deficiency.  Iron studies completed on February 9, 2023 showed an iron of 20, TIBC 359, transferrin percent saturation 5.6.  Patient  reports he began having black stools while on Xarelto which resolved within a week of stopping Xarelto.  He has since met wit Dr. Goodman undergone  EGD and Colonoscopy which was reportedly negative. More recently he underwent capsule endoscopy - results pending.   He has been taking oral iron for 1 month he reports he is tolerating this well.  He has not had any fever, chills or night sweats.  No cough, chest pain or shortness of breath.  No nausea or vomiting.  No constipation occasional diarrhea.  He reports  feeling quite fatigued when his anemia was found but seems to be improving over this past month.  He has not noted any blood in urine or any other signs or symptoms of active bleeding or unusual bruising.      He take vitamin B12 twice weekly      Past medical history:  Atrial fibrillation, CAD, dyslipidemia, hypertension, GERD, diabetes, TIA     Past surgical history:  Right rotator cuff 2019 followed by sepsis and IV antibiotics, back surgery 2021, implantable loop recorder x2     Family history:  Father liver carcinoma after obed hepatitis in WW[place].  No family history hematologic or bleeding disorders    History of present illness:  Patient presents for follow up accompanied by his daughter.  On assessment, reports feeling well with good energy and appetite and no new  complaints. Continues on oral iron daily, tolerating well. No longer on AC and denies any bleeding. Recently saw hepatology due to c/f cirrhosis. Had fibro scan. Otherwise doing well.      Review of Systems:   A review of systems has been completed and are negative for complaints except what is stated in the HPI and/or past medical history.      Allergies and Intolerances:  Allergies   Allergen Reactions    Isosorbide GI Upset and Unknown    Famotidine Agitation     Shakiness       Prednisone Other and Unknown     Agitation     Amoxicillin Unknown    Donepezil Unknown    Ibuprofen Unknown     Pt stated he is not allergic to this medication but has bleeding with excessive use.     Outpatient Medication Profile:  Current Outpatient Medications on File Prior to Visit   Medication Sig Dispense Refill    acetaminophen (Tylenol) 325 mg tablet Take 2 tablets (650 mg) by mouth every 6 hours if needed for mild pain (1 - 3).      amLODIPine (Norvasc) 5 mg tablet Take 1 tablet (5 mg) by mouth once daily. 90 tablet 3    aspirin 81 mg EC tablet       atorvastatin (Lipitor) 40 mg tablet Take 1 tablet (40 mg) by mouth once daily. 90 tablet 2    cholecalciferol (Vitamin D-3) 5,000 Units tablet Take 1 tablet (5,000 Units) by mouth once daily. 90 tablet 3    ferrous sulfate 325 (65 Fe) MG EC tablet Take 1 tablet by mouth once daily. 90 tablet 3    finasteride (Proscar) 5 mg tablet Take 1 tablet (5 mg) by mouth once daily. 30 tablet 5    furosemide (Lasix) 40 mg tablet Take 1 tablet (40 mg) by mouth once daily. Note from [facility] to stop taking this med??? 90 tablet 3    gabapentin (Neurontin) 100 mg capsule Take 1 capsule (100 mg) by mouth 2 times a day. TAKE 1 CAPSULE BY MOUTH ONCE DAILY FOR NEUROPATHY PAIN 180 capsule 1    insulin glargine-lixisenatide (Soliqua 100/33) 100 unit-33 mcg/mL insulin pen Inject 14 units under the skin once daily (Patient taking differently: 15 Units. Inject 15 units under the skin once daily) 6 each 3  "   levETIRAcetam (Keppra) 250 mg tablet Take 1 tablet (250 mg) by mouth 2 times a day.      losartan (Cozaar) 25 mg tablet Take 1 tablet (25 mg) by mouth once daily. 30 tablet 11    magnesium oxide (Mag-Ox) 400 mg (241.3 mg magnesium) tablet Take 1 tablet (400 mg) by mouth once daily.      metFORMIN (Glucophage) 1,000 mg tablet Take 1 tablet (1,000 mg) by mouth 2 times daily (morning and late afternoon).      nitroglycerin (Nitrostat) 0.4 mg SL tablet Place 1 tablet (0.4 mg) under the tongue every 5 minutes if needed for chest pain. May repeat dose every 5 minutes for up to 3 doses total. 15 tablet 2    omeprazole (PriLOSEC) 40 mg DR capsule GIVE 1 CAPSULE BY MOUTH ONE TIME A DAY FOR HEARTBURN 90 capsule 3    pen needle, diabetic 32 gauge x 5/16\" needle Use to inject insulin once a day 100 each 1    tamsulosin (Flomax) 0.4 mg 24 hr capsule Take 1 capsule (0.4 mg) by mouth 2 times a day. 180 capsule 1     No current facility-administered medications on file prior to visit.      Vitals and Measurements:       1/8/2025     3:06 PM 1/8/2025     4:01 PM 1/23/2025     9:27 AM 1/27/2025     9:05 AM 5/28/2025     2:07 PM 6/5/2025     1:45 PM 7/28/2025     1:22 PM   Vitals   Systolic 125 112 165 160 149 155 150   Diastolic 56 60 67 75 60 71 72   BP Location Left arm  Right arm Left arm Right arm  Right arm   Heart Rate 73 72 83 77 89 83 79   Temp   36.2 °C (97.2 °F) 36.6 °C (97.9 °F)   36 °C (96.8 °F)   Resp   20 17   16   Height 1.803 m (5' 11\")   1.772 m (5' 9.76\")  1.803 m (5' 11\") 1.803 m (5' 11\")    Weight (lb) 196.3  195.4 197.97 202.4 192 205.03   BMI 27.38 kg/m2  27.25 kg/m2 28.6 kg/m2 28.23 kg/m2 26.78 kg/m2 28.6 kg/m2   BSA (m2) 2.11 m2  2.11 m2 2.1 m2 2.14 m2 2.09 m2 2.16 m2   Visit Report Report Report Report Report Report Report Report       Significant value     Physical Exam:   Constitutional: alert, awake and oriented, not in acute distress   HEENT: moist mucous membranes, normal nose   Neck: supple, no " "lymphadenopathy   EYES: PERRL, EOM intact, conjunctiva normal  Skin: no jaundice, rash or erythema  Neurological: AAOx3, no gross focal deficit   Psychiatric: normal mood and behavior      Lab Results:   Labs:  Lab Results   Component Value Date    WBC 5.3 07/28/2025    NEUTROABS 2.01 07/28/2025    IGABSOL 0.55 (H) 07/28/2025    LYMPHSABS 1.82 07/28/2025    MONOSABS 0.70 07/28/2025    EOSABS 0.14 07/28/2025    BASOSABS 0.03 07/28/2025    RBC 3.26 (L) 07/28/2025     (H) 07/28/2025    MCHC 33.5 07/28/2025    HGB 11.2 (L) 07/28/2025    HCT 33.4 (L) 07/28/2025    PLT 93 (L) 07/28/2025     No results found for: \"RETICCTPCT\"   Lab Results   Component Value Date    CREATININE 1.21 07/28/2025    BUN 28 (H) 07/28/2025    EGFR 59 (L) 07/28/2025     07/28/2025    K 4.8 07/28/2025     07/28/2025    CO2 26 07/28/2025      Lab Results   Component Value Date    ALT 13 06/05/2025    AST 22 06/05/2025    ALKPHOS 49 06/05/2025    BILITOT 0.7 06/05/2025      Lab Results   Component Value Date    TSH 7.26 (H) 11/06/2024     Lab Results   Component Value Date    TSH 7.26 (H) 11/06/2024     Lab Results   Component Value Date    IRON 108 07/28/2025    TIBC 320 07/28/2025    FERRITIN 40 07/28/2025      Lab Results   Component Value Date    LWQYNQHP42 805 12/19/2023      Lab Results   Component Value Date    FOLATE 9.3 11/27/2023     Lab Results   Component Value Date    SOM Negative 01/23/2025    SEDRATE <1 07/14/2023      Lab Results   Component Value Date    SPEP NORMAL 07/14/2023     Lab Results   Component Value Date     03/22/2023    IGM 41 03/22/2023     03/22/2023       Assessment:    Anemia, iron deficiency:  -Patient will continue oral iron plus vitamin C  -Labs today CBC with differential, ferritin, iron panel, SPEP, kappa lambda free light chains and quantitative IgG's  -Patient is noted to have a low hematocrit dating back to at least 2019 hemoglobin has fallen over the course of the past year " aside from labs drawn in February 2023 there were no other iron studies available for me to review today.  Patient may have  a had a slight iron deficiency previously which potentially worsened when he was started on Xarelto and noted of rectal bleeding with dark stools.  GI work-up did not identify any source of blood loss to date.  Results of capsule endoscopy are pending.   We will await results of iron studies and repeat place with IV iron if necessary.  Once patient's anemia is resolved we could restart Xarelto and monitor patient closely with monthly lab draws repeating iron should patient again become anemic.     Thrombocytopenia:  -This has been ongoing since at least February 2019 and platelet count was seen to be 115,000 I have no prior  labs.  Nutritional labs are found to be within normal limits.  We will first manage anemia to see if correcting anemia improves platelet count.   If not flow cytometry and bone marrow biopsy can become pleated in the future.  Thrombocytopenia has never been below 100,000 do not believe this contributes to his iron deficiency anemia.      Cirrhosis     CKD    Mylid panel DISEASE ASSOCIATED GENOMIC FINDINGS:   ASXL1 splicesite ([id number])  TET2 [id number]*2 ([id number])  TET2 splicesite ([id number] c.[id number]>A)    Plan:    Reviewed and discussed lab, imaging, and pathology results with patient in detail as well as diagnosis, prognosis, and treatment options.    Discussed no change in anemia/thrombocytopenia     Continue oral iron daily as tolerated,    Discussed role of BMBx; can't r/o MDS or AOCD;     F/U w/PCP and hepatology     RTC in 6 months    Patient verbalized understanding, and all his questions were answered to his satisfaction    30 min spent with patient greater than 50 % of which was spent in consultation and coordination of care.

## 2025-07-29 ENCOUNTER — APPOINTMENT (OUTPATIENT)
Dept: DERMATOLOGY | Facility: CLINIC | Age: 85
End: 2025-07-29
Payer: MEDICARE

## 2025-07-29 VITALS — HEART RATE: 77 BPM | DIASTOLIC BLOOD PRESSURE: 61 MMHG | SYSTOLIC BLOOD PRESSURE: 160 MMHG

## 2025-07-29 DIAGNOSIS — C44.629 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, INCLUDING SHOULDER: ICD-10-CM

## 2025-07-29 PROCEDURE — 13121 CMPLX RPR S/A/L 2.6-7.5 CM: CPT | Performed by: DERMATOLOGY

## 2025-07-29 PROCEDURE — 17313 MOHS 1 STAGE T/A/L: CPT | Performed by: DERMATOLOGY

## 2025-07-29 PROCEDURE — 99214 OFFICE O/P EST MOD 30 MIN: CPT | Performed by: DERMATOLOGY

## 2025-07-29 NOTE — PROGRESS NOTES
Mohs Surgery Operative Note    Date of Surgery:  7/29/2025  Surgeon:  Paty Suero MD  Office Location:  [address] [facility]  [address]  [address]  [facility] [address]  Dept: [phone]  Dept Fax: [phone]  Referring Provider: Freddy Bailey, APRN-CNP  [address] [facility] [address]  [place] [address]      Assessment/Plan   Pre-procedure:   Obtained informed consent: written from patient  The surgical site was identified and confirmed with the patient.     Intra-operative:   Audible time out called at : 10:31 AM 07/29/25  by: Nimisha May MA   Verified patient name, birthdate, site, specimen bottle label & requisition.    The planned procedure(s) was again reviewed with the patient. The risks of bleeding, infection, nerve damage and scarring were reviewed. Written authorization was obtained. The patient identity, surgical site, and planned procedure(s) were verified. The provider acted as both surgeon and pathologist.     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, INCLUDING SHOULDER  Left Upper Arm - Anterior  - Mohs surgery    Consent obtained: written    Universal Protocol:  Procedure explained and questions answered to patient or proxy's satisfaction: Yes    Test results available and properly labeled: Yes    Pathology report reviewed: Yes    External notes reviewed: Yes    Photo or diagram used for site identification: Yes    Site/side marked: Yes    Slide independently reviewed by Mohs surgeon: Yes    Immediately prior to procedure a time out was called: Yes    Patient identity confirmed: verbally with patient  Preparation: Patient was prepped and draped in usual sterile fashion      Anticoagulation:  Is the patient taking prescription anticoagulant and/or aspirin prescribed/recommended by a physician? Yes    Was the anticoagulation regimen changed prior to Mohs? No      Anesthesia:  Anesthesia method: local infiltration  Local anesthetic: 0.5% lido with  epi.    Procedure Details:  Case ID Number: [id number]  Biopsy accession number: [id number]  Date of biopsy: 6/23/2025  Pre-Op diagnosis: squamous cell carcinoma  SCC subtype: moderately differentiated  Surgical site (from skin exam): Left Upper Arm - Anterior  Pre-operative length (cm): 2.8  Pre-operative width (cm): 1.8  Indications for Mohs surgery: tumor size greater than 2 cm  Previously treated? No      Micrographic Surgery Details:  Post-operative length (cm): 3  Post-operative width (cm): 3  Number of Mohs stages: 1    Stage 1     Comments: The patient was brought into the operating room and placed in the procedure chair in the appropriate position.  The area positive by previous biopsy was identified and confirmed with the patient. The area of clinically obvious tumor was debulked using a curette and/or scalpel as needed. An incision was made following the Mohs approach through the skin. The specimen was taken to the lab, divided into 2 piece(s) and appropriately chromacoded and processed.         Tumor features identified on Mohs section: no tumor identified    Depth of defect: subcutaneous fat    Patient tolerance of procedure: tolerated well, no immediate complications    Reconstruction:  Was the defect reconstructed? Yes    Was reconstruction performed by the same Mohs surgeon? Yes    Setting of reconstruction: hospital-based outpatient clinical space  When was reconstruction performed? same day  Type of reconstruction: linear  Linear reconstruction: complex  Length of linear repair (cm): 6.3  Subcutaneous Layers (Deep Stitches)   Suture size:  3-0  Suture type:  Vicryl  Stitches:  Buried horizontal mattress  Fine/surface layer approximation (top stitches)   Epidermal/Superficial suture size:  5-0  Epidermal/Superficial suture type:  Fast-absorbing gut  Stitches: simple running    Hemostasis achieved with: suture, pressure and electrodesiccation  Outcome: patient tolerated procedure well with no  complications    Post-procedure details: sterile dressing applied    Dressing type: pressure dressing      Complex Linear Repair - Wide Undermining:  Given the location and size of the defect, it was determined that a complex layered linear closure was required to restore normal anatomy and function. The repair was considered complex because extensive undermining was required and performed. The amount of undermining performed was greater than the maximum width of the defect as measured perpendicular to the closure line along at least one entire edge of the defect. Standing cutaneous cones were removed using Burow's triangles. The wound edges were brought into close approximation with buried vertical mattress sutures. The remainder of the wound was then closed with epidermal top sutures.The final repair measured 6.3 cm              Wound care was discussed, and the patient was given written post-operative wound care instructions.      The patient will follow up with Paty Suero MD as needed for any post operative problems or concerns, and will follow up with their primary dermatologist as scheduled.       Nimisha CLINE MA am scribing for, and in the presence of MD MARLYN Winston Christina Y Wong, MD, personally performed the services described in the documentation as scribed by Nimisha Andujar MA in my presence, and confirm it is both accurate and complete.

## 2025-07-29 NOTE — PROGRESS NOTES
Office Visit Note  Date: 7/29/2025  Surgeon:  Paty Suero MD  Office Location:  [address] [facility]  [address]  [address]  [facility] [address]  Dept: [phone]  Dept Fax: [phone]  Referring Provider: Freddy Bailey, APRN-CNP  [address]  [address]  [place] [address]    Subjective   Luis Greene is a 85 y.o. male who presents for the following: MOHS Surgery    According to the patient, the lesion has been present for approximately greater than 1 year at the time of diagnosis.  The lesion is painful.  The lesion has not been treated previously.    The patient does not have a pacemaker / defibrillator.  The patient does not have a heart valve / joint replacement.    The patient is on blood thinners.  The patient does not have a history of hepatitis B or C.  The patient does not have a history of HIV.  The patient does not have a history of immunosuppression (e.g. organ transplantation, malignancy, medications)    Review of Systems:  No other skin or systemic complaints other than what is documented elsewhere in the note.    MEDICAL HISTORY: clinically relevant history including significant past medical history, medications and allergies was reviewed and documented in Epic.    Objective   Well appearing patient in no apparent distress; mood and affect are within normal limits.  Vital signs: See record.  Noted on the   Left Upper Arm - Anterior  Is a 2.8 x 1.8 cm scar    The patient confirmed the identified site.    Discussion:  The nature of the diagnosis was explained. The lesion is a skin cancer.  It has a risk of local growth and distant spread. The condition is associated with sun exposure.  Warning signs of non-melanoma skin cancer discussed. Patient was instructed to perform monthly self skin examination.  We recommended that the patient have regular full skin exams given an increased risk of subsequent skin cancers. The patient was instructed to use sun  protective behaviors including use of broad spectrum sunscreens and sun protective clothing to reduce risk of skin cancers.      Risks, benefits, side effects of Mohs surgery were discussed with patient and the patient voiced understanding.  It was explained that even though the cure rate of Mohs is very high it is not 100%. Risks of surgery including but not limited to bleeding, infection, numbness, nerve damage, and scar were reviewed.  Discussion included wound care requirements, activity restrictions, likely scar outcome and time to heal.     After Mohs surgery, the defect may need to be repaired surgically and the scar may be longer than the original lesion.  Reconstruction options, risks, and benefits were reviewed including second intention healing, linear repair (4-1 ratio was explained), local flaps, skin grafts, cartilage grafts and interpolation flaps (the need for multiple surgeries was explained). Possible outcomes were reviewed including likely scar appearance, failure of flap survival, infection, bleeding and the need for revision surgery.     The pathology was reviewed, the photograph was reviewed, and the referring physician's note was reviewed.    Patient elected for Mohs surgery.     INimisha MA am scribing for, and in the presence of MD MARLYN Winston Christina Y Wong, MD, personally performed the services described in the documentation as scribed by Nimisha Andujar MA  in my presence, and confirm it is both accurate and complete.

## 2025-08-11 ENCOUNTER — DOCUMENTATION (OUTPATIENT)
Dept: PRIMARY CARE | Facility: CLINIC | Age: 85
End: 2025-08-11
Payer: MEDICARE

## 2025-08-25 ENCOUNTER — APPOINTMENT (OUTPATIENT)
Dept: DERMATOLOGY | Facility: CLINIC | Age: 85
End: 2025-08-25
Payer: MEDICARE

## 2025-08-27 ENCOUNTER — OFFICE VISIT (OUTPATIENT)
Dept: CARDIOLOGY | Facility: CLINIC | Age: 85
End: 2025-08-27
Payer: MEDICARE

## 2025-08-27 ASSESSMENT — ENCOUNTER SYMPTOMS
CONSTITUTIONAL NEGATIVE: 1
NEUROLOGICAL NEGATIVE: 1
RESPIRATORY NEGATIVE: 1
DEPRESSION: 0
LOSS OF SENSATION IN FEET: 0
CARDIOVASCULAR NEGATIVE: 1
GASTROINTESTINAL NEGATIVE: 1
OCCASIONAL FEELINGS OF UNSTEADINESS: 0
MUSCULOSKELETAL NEGATIVE: 1

## 2025-08-27 ASSESSMENT — PAIN SCALES - GENERAL: PAINLEVEL_OUTOF10: 0-NO PAIN

## 2025-11-10 ENCOUNTER — APPOINTMENT (OUTPATIENT)
Dept: PRIMARY CARE | Facility: CLINIC | Age: 85
End: 2025-11-10
Payer: MEDICARE

## 2025-12-17 ENCOUNTER — APPOINTMENT (OUTPATIENT)
Dept: DERMATOLOGY | Facility: CLINIC | Age: 85
End: 2025-12-17
Payer: MEDICARE

## (undated) DEVICE — GOWN, SURGICAL, SIRUS, NON REINFORCED, LARGE

## (undated) DEVICE — CATHETER, ACUSON ACUNAV ULTRASOUND, 8FR 90CM  (REPROCESSED)

## (undated) DEVICE — VERSACROSS KIT, ACCESS SOLUTION, RF WIRE 180CM

## (undated) DEVICE — BAG, DRAINAGE, CONTINUOUS IRRIGATION, 4L

## (undated) DEVICE — SYSTEM, ACCESS, FXD DBL CURVE, WATCHMAN

## (undated) DEVICE — 22FR, 2-WAY, 5CC BARDEX LUBRICATH FOLEY CATHETER

## (undated) DEVICE — PREP TRAY, SKIN, DRY, W/GLOVES

## (undated) DEVICE — INTRODUCER, CATHETER, HEMOSTASIS, FAST-CATH, 12 FR X 12 CM

## (undated) DEVICE — BAG, DRAINAGE, ANTI-REFLUX CHAMBER, 2000ML

## (undated) DEVICE — BLANKET, LOWER BODY, VHA PLUS, ADULT

## (undated) DEVICE — GLOVE, SURGICAL, PROTEXIS MICRO, 7.0, PF, LATEX

## (undated) DEVICE — CLOSURE SYSTEM, VASCULAR, MVP 6-12FR, VENOUS

## (undated) DEVICE — FIBER, MOXY, LIQUID COOLED

## (undated) DEVICE — SHEATH, PINNACLE, 10 CM,  8FR INTRODUCER, 8FR DIA, 2.5 CM DIALATOR

## (undated) DEVICE — IRRIGATION SET, CYSTOSCOPY, F/CONSTANT/INTERMITTENT, 8 GTT/CC, 77 IN

## (undated) DEVICE — SHEATH, PINNACLE, W/O GUIDEWIRE, 8FR INTRODUCER,  8FR DIA, 2.5 CM DILATOR

## (undated) DEVICE — DEVICE, CLOSURE, PENCLOSE, PROGLIDE 6FR

## (undated) DEVICE — GUIDEWIRE, INQWIRE, 3MM J, .035, 150

## (undated) DEVICE — SHEATH, PINNACLE, 10 CM,  7FR INTRODUCER, 7FR DIA, 2.5 CM DIALATOR

## (undated) DEVICE — CATHETER, ANGIO, IMPULSE, PIG 155, 6 FR X 110 CM

## (undated) DEVICE — CATHETER, URETHRAL, FOLEY, 2 WAY, BARDEX LUBRICATH, SHORT LENGTH, 22 FR, 5 CC, LATEX

## (undated) DEVICE — ACCESS KIT, S-MAK MINI, 4FR 10CM 0.018IN 40CM, NT/PT, ECHO ENHANCE NEEDLE

## (undated) DEVICE — Device